# Patient Record
Sex: MALE | Race: WHITE | NOT HISPANIC OR LATINO | URBAN - METROPOLITAN AREA
[De-identification: names, ages, dates, MRNs, and addresses within clinical notes are randomized per-mention and may not be internally consistent; named-entity substitution may affect disease eponyms.]

---

## 2021-07-07 PROBLEM — D63.8 ANEMIA OF CHRONIC ILLNESS: Status: ACTIVE | Noted: 2021-07-07

## 2021-07-07 PROBLEM — Z80.1 FAMILY HISTORY OF LUNG CANCER: Status: ACTIVE | Noted: 2021-07-07

## 2021-07-07 PROBLEM — I25.10 ASHD (ARTERIOSCLEROTIC HEART DISEASE): Status: ACTIVE | Noted: 2021-07-07

## 2021-07-07 PROBLEM — R91.8 LUNG NODULES: Status: ACTIVE | Noted: 2021-07-07

## 2021-07-07 PROBLEM — Z82.3 FAMILY HISTORY OF CEREBROVASCULAR ACCIDENT (CVA): Status: ACTIVE | Noted: 2021-07-07

## 2021-07-07 PROBLEM — E78.00 HIGH CHOLESTEROL: Status: ACTIVE | Noted: 2021-07-07

## 2021-07-07 PROBLEM — Z86.73 HISTORY OF CEREBROVASCULAR ACCIDENT: Status: RESOLVED | Noted: 2021-07-07 | Resolved: 2021-07-07

## 2021-07-08 ENCOUNTER — OUTPATIENT (OUTPATIENT)
Dept: OUTPATIENT SERVICES | Facility: HOSPITAL | Age: 66
LOS: 1 days | Discharge: HOME | End: 2021-07-08

## 2021-07-08 ENCOUNTER — NON-APPOINTMENT (OUTPATIENT)
Age: 66
End: 2021-07-08

## 2021-07-08 ENCOUNTER — APPOINTMENT (OUTPATIENT)
Dept: SURGERY | Facility: CLINIC | Age: 66
End: 2021-07-08
Payer: MEDICARE

## 2021-07-08 ENCOUNTER — RESULT REVIEW (OUTPATIENT)
Age: 66
End: 2021-07-08

## 2021-07-08 VITALS
HEART RATE: 84 BPM | WEIGHT: 270 LBS | DIASTOLIC BLOOD PRESSURE: 84 MMHG | TEMPERATURE: 98 F | HEIGHT: 71 IN | BODY MASS INDEX: 37.8 KG/M2 | SYSTOLIC BLOOD PRESSURE: 174 MMHG | OXYGEN SATURATION: 96 % | RESPIRATION RATE: 16 BRPM

## 2021-07-08 DIAGNOSIS — I25.10 ATHEROSCLEROTIC HEART DISEASE OF NATIVE CORONARY ARTERY W/OUT ANGINA PECTORIS: ICD-10-CM

## 2021-07-08 DIAGNOSIS — Z80.3 FAMILY HISTORY OF MALIGNANT NEOPLASM OF BREAST: ICD-10-CM

## 2021-07-08 DIAGNOSIS — D63.8 ANEMIA IN OTHER CHRONIC DISEASES CLASSIFIED ELSEWHERE: ICD-10-CM

## 2021-07-08 DIAGNOSIS — K63.5 POLYP OF COLON: ICD-10-CM

## 2021-07-08 DIAGNOSIS — C21.8 MALIGNANT NEOPLASM OF OVERLAPPING SITES OF RECTUM, ANUS AND ANAL CANAL: ICD-10-CM

## 2021-07-08 DIAGNOSIS — Z86.73 PERSONAL HISTORY OF TRANSIENT ISCHEMIC ATTACK (TIA), AND CEREBRAL INFARCTION W/OUT RESIDUAL DEFICITS: ICD-10-CM

## 2021-07-08 DIAGNOSIS — R91.8 OTHER NONSPECIFIC ABNORMAL FINDING OF LUNG FIELD: ICD-10-CM

## 2021-07-08 DIAGNOSIS — Z82.49 FAMILY HISTORY OF ISCHEMIC HEART DISEASE AND OTHER DISEASES OF THE CIRCULATORY SYSTEM: ICD-10-CM

## 2021-07-08 DIAGNOSIS — E78.00 PURE HYPERCHOLESTEROLEMIA, UNSPECIFIED: ICD-10-CM

## 2021-07-08 DIAGNOSIS — Z82.3 FAMILY HISTORY OF STROKE: ICD-10-CM

## 2021-07-08 DIAGNOSIS — Z80.1 FAMILY HISTORY OF MALIGNANT NEOPLASM OF TRACHEA, BRONCHUS AND LUNG: ICD-10-CM

## 2021-07-08 PROCEDURE — 99072 ADDL SUPL MATRL&STAF TM PHE: CPT

## 2021-07-08 PROCEDURE — 45300 PROCTOSIGMOIDOSCOPY DX: CPT

## 2021-07-08 PROCEDURE — 99204 OFFICE O/P NEW MOD 45 MIN: CPT

## 2021-07-08 NOTE — HISTORY OF PRESENT ILLNESS
[FreeTextEntry1] : Shabbir is a 67 y/o male here for a consultation for rectal cancer found on colonoscopy 6/10/21 with Dr. Shabbir Thakkar. Findings: infiltrating non-obstructing medium sized non circumferential mass in the rectum (path = invasive adenocarcinoma, mod. differentiated, immunostains MLH1,MSH2 and PMS2 intact);  12 mm sessile polyp in rectum completely retrieved  (Path = fragments of TA); 3 mm polyp in sigmoid (path = hyperplastic polyp); 13 mm polyp in descending colon ( path= fragments of TA); 4 mm sessile polyp in transverse colon (path= TA).\par PET 6/16/21 shows no active findings of malignancy. A 7 mm right upper lobe pulmonary nodule that a non-contrast CT is recommended in 3 months.\par Labs 6/2/21 show anemia H/H 9.0/31.6, . No CEA done.\par \par Patient reports rectal bleeding that started over 3 months ago - saw Dr. Thakkar for colonoscopy. He was having painless or bleeding with bms. Has 2 hard bms daily with small amt of blood on TP.

## 2021-07-08 NOTE — PHYSICAL EXAM
[Thyroid] : the thyroid was normal [Normal Breath Sounds] : Normal breath sounds [Alert] : alert [Oriented to Person] : oriented to person [Oriented to Place] : oriented to place [Oriented to Time] : oriented to time [Anxious] : anxious [Abdomen Masses] : No abdominal masses [Abdomen Tenderness] : ~T No ~M abdominal tenderness [JVD] : no jugular venous distention  [de-identified] : obese [de-identified] : wnl obese male [de-identified] : wnl [de-identified] : irregular rhythm [de-identified] : full ROM- varicose veins bilaterally [de-identified] : skin lesions from eczema  [FreeTextEntry1] : Perianal inspection revealed a deep buttocks. The tumor was palpable in the left lateral position at approximately 2 cm proximal to the levators. Rigid sigmoidoscopy revealed the lesion at 6 cm from the anal verge.

## 2021-07-08 NOTE — ASSESSMENT
[FreeTextEntry1] : I have seen and evaluated patient and I have corroborated all nursing input into this note. Patient with rectal cancer in the left lateral position at 6 cm from the anal verge on rigid sigmoidoscopy. PET CT shows no evidence of metastatic disease. Patient will also get standard CTs of the chest abdomen and pelvis as per NCCN and NAPRC guidelines, as well as a dedicated rectal cancer staging MRI. Patient will then be presented at the rectal cancer ([facility])multidisciplinary team meeting for treatment recommendations. I expect that the total neoadjuvant treatment as per [facility] protocol will most likely be recommended. Since the patient has a BMI of 39 and the cancer appears to be within 2 cm of the levators a wait and watch approach would be considered if the patient has a complete clinical response to neoadjuvant treatment.

## 2021-07-08 NOTE — CONSULT LETTER
[Dear  ___] : Dear ~JAGDISH, [Courtesy Letter:] : I had the pleasure of seeing your patient, [unfilled], in my office today. [Please see my note below.] : Please see my note below. [Consult Closing:] : Thank you very much for allowing me to participate in the care of this patient.  If you have any questions, please do not hesitate to contact me. [Sincerely,] : Sincerely, [DrValerio  ___] : Dr. GEORGE [FreeTextEntry2] : Dr. Shabbir Thakkar [FreeTextEntry3] : Buck Magana M.D., KARL.[email or web address]., [email or web address].\[facility] Chief Colorectal Clinical Services, [facility]

## 2021-07-09 ENCOUNTER — RESULT REVIEW (OUTPATIENT)
Age: 66
End: 2021-07-09

## 2021-07-09 ENCOUNTER — LABORATORY RESULT (OUTPATIENT)
Age: 66
End: 2021-07-09

## 2021-07-09 ENCOUNTER — APPOINTMENT (OUTPATIENT)
Dept: HEMATOLOGY ONCOLOGY | Facility: CLINIC | Age: 66
End: 2021-07-09
Payer: MEDICARE

## 2021-07-09 ENCOUNTER — OUTPATIENT (OUTPATIENT)
Dept: OUTPATIENT SERVICES | Facility: HOSPITAL | Age: 66
LOS: 1 days | End: 2021-07-09
Payer: MEDICARE

## 2021-07-09 VITALS
RESPIRATION RATE: 18 BRPM | BODY MASS INDEX: 38.46 KG/M2 | OXYGEN SATURATION: 98 % | DIASTOLIC BLOOD PRESSURE: 87 MMHG | WEIGHT: 265.66 LBS | SYSTOLIC BLOOD PRESSURE: 152 MMHG | HEART RATE: 83 BPM | HEIGHT: 69.88 IN | TEMPERATURE: 97.3 F

## 2021-07-09 DIAGNOSIS — Z87.891 PERSONAL HISTORY OF NICOTINE DEPENDENCE: ICD-10-CM

## 2021-07-09 DIAGNOSIS — L30.9 DERMATITIS, UNSPECIFIED: ICD-10-CM

## 2021-07-09 DIAGNOSIS — I63.9 CEREBRAL INFARCTION, UNSPECIFIED: ICD-10-CM

## 2021-07-09 DIAGNOSIS — I10 ESSENTIAL (PRIMARY) HYPERTENSION: ICD-10-CM

## 2021-07-09 DIAGNOSIS — I48.91 UNSPECIFIED ATRIAL FIBRILLATION: ICD-10-CM

## 2021-07-09 DIAGNOSIS — C21.8 MALIGNANT NEOPLASM OF OVERLAPPING SITES OF RECTUM, ANUS AND ANAL CANAL: ICD-10-CM

## 2021-07-09 DIAGNOSIS — E11.9 TYPE 2 DIABETES MELLITUS W/OUT COMPLICATIONS: ICD-10-CM

## 2021-07-09 LAB
BASOPHILS # BLD AUTO: 0.07 K/UL — SIGNIFICANT CHANGE UP (ref 0–0.2)
BASOPHILS NFR BLD AUTO: 0.7 % — SIGNIFICANT CHANGE UP (ref 0–2)
EOSINOPHIL # BLD AUTO: 0.19 K/UL — SIGNIFICANT CHANGE UP (ref 0–0.5)
EOSINOPHIL NFR BLD AUTO: 1.9 % — SIGNIFICANT CHANGE UP (ref 0–6)
HCT VFR BLD CALC: 33.4 % — LOW (ref 39–50)
HGB BLD-MCNC: 9.1 G/DL — LOW (ref 13–17)
IMM GRANULOCYTES NFR BLD AUTO: 0.7 % — SIGNIFICANT CHANGE UP (ref 0–1.5)
LYMPHOCYTES # BLD AUTO: 1.11 K/UL — SIGNIFICANT CHANGE UP (ref 1–3.3)
LYMPHOCYTES # BLD AUTO: 11.2 % — LOW (ref 13–44)
MCHC RBC-ENTMCNC: 19.2 PG — LOW (ref 27–34)
MCHC RBC-ENTMCNC: 27.2 G/DL — LOW (ref 32–36)
MCV RBC AUTO: 70.3 FL — LOW (ref 80–100)
MONOCYTES # BLD AUTO: 0.78 K/UL — SIGNIFICANT CHANGE UP (ref 0–0.9)
MONOCYTES NFR BLD AUTO: 7.8 % — SIGNIFICANT CHANGE UP (ref 2–14)
NEUTROPHILS # BLD AUTO: 7.72 K/UL — HIGH (ref 1.8–7.4)
NEUTROPHILS NFR BLD AUTO: 77.7 % — HIGH (ref 43–77)
NRBC # BLD: 0 /100 WBCS — SIGNIFICANT CHANGE UP (ref 0–0)
PLATELET # BLD AUTO: 557 K/UL — HIGH (ref 150–400)
RBC # BLD: 4.75 M/UL — SIGNIFICANT CHANGE UP (ref 4.2–5.8)
RBC # FLD: 18.8 % — HIGH (ref 10.3–14.5)
WBC # BLD: 9.94 K/UL — SIGNIFICANT CHANGE UP (ref 3.8–10.5)
WBC # FLD AUTO: 9.94 K/UL — SIGNIFICANT CHANGE UP (ref 3.8–10.5)

## 2021-07-09 PROCEDURE — 99072 ADDL SUPL MATRL&STAF TM PHE: CPT

## 2021-07-09 PROCEDURE — 86901 BLOOD TYPING SEROLOGIC RH(D): CPT

## 2021-07-09 PROCEDURE — 86850 RBC ANTIBODY SCREEN: CPT

## 2021-07-09 PROCEDURE — G2212 PROLONG OUTPT/OFFICE VIS: CPT

## 2021-07-09 PROCEDURE — 99205 OFFICE O/P NEW HI 60 MIN: CPT

## 2021-07-09 PROCEDURE — 86900 BLOOD TYPING SEROLOGIC ABO: CPT

## 2021-07-09 RX ORDER — METFORMIN HYDROCHLORIDE 1000 MG/1
1000 TABLET, COATED ORAL
Refills: 0 | Status: ACTIVE | COMMUNITY

## 2021-07-09 RX ORDER — METFORMIN HYDROCHLORIDE 625 MG/1
TABLET ORAL
Refills: 0 | Status: DISCONTINUED | COMMUNITY
End: 2021-07-09

## 2021-07-09 RX ORDER — AMLODIPINE BESYLATE 10 MG/1
10 TABLET ORAL
Refills: 0 | Status: ACTIVE | COMMUNITY

## 2021-07-09 RX ORDER — APIXABAN 5 MG/1
5 TABLET, FILM COATED ORAL
Refills: 0 | Status: ACTIVE | COMMUNITY

## 2021-07-09 RX ORDER — BISOPROLOL FUMARATE AND HYDROCHLOROTHIAZIDE 10; 6.25 MG/1; MG/1
10-6.25 TABLET, COATED ORAL
Refills: 0 | Status: ACTIVE | COMMUNITY

## 2021-07-09 RX ORDER — APIXABAN 5 MG/1
TABLET, FILM COATED ORAL
Refills: 0 | Status: DISCONTINUED | COMMUNITY
End: 2021-07-09

## 2021-07-09 RX ORDER — AMLODIPINE BESYLATE 5 MG/1
TABLET ORAL
Refills: 0 | Status: DISCONTINUED | COMMUNITY
End: 2021-07-09

## 2021-07-09 RX ORDER — BISOPROLOL FUMARATE 5 MG/1
TABLET, FILM COATED ORAL
Refills: 0 | Status: DISCONTINUED | COMMUNITY
End: 2021-07-09

## 2021-07-09 NOTE — PHYSICAL EXAM
[Obese] : obese [Normal] : affect appropriate [Ambulatory and capable of all self care but unable to carry out any work activities] : Status 2- Ambulatory and capable of all self care but unable to carry out any work activities. Up and about more than 50% of waking hours [FreeTextEntry1] : refused today due to pain [de-identified] : multiple excoriations on bilateral forearms

## 2021-07-09 NOTE — CONSULT LETTER
[Dear  ___] : Dear  [unfilled], [Consult Letter:] : I had the pleasure of evaluating your patient, [unfilled]. [Please see my note below.] : Please see my note below. [Consult Closing:] : Thank you very much for allowing me to participate in the care of this patient.  If you have any questions, please do not hesitate to contact me. [Sincerely,] : Sincerely, [DrValerio  ___] : Dr. GEORGE [DrValerio ___] : Dr. GEORGE

## 2021-07-09 NOTE — HISTORY OF PRESENT ILLNESS
[Disease: _____________________] : Disease: [unfilled] [de-identified] : Mr. Chacon is a 66 year old gentlemen with past medical history of HTN, DM2, and mild CVA presenting to the office for an initial consultation of rectal CA.\par \par Patient originally presented with rectal bleeding x 3 months and was seen by his gastroenterologist.\par \par Patient underwent colonoscopy with Dr. Shabbir Thakkar.\par Findings:  infiltrating non-obstructing medium sized non circumferential mass in the rectum\par Pathology: invasive adenocarcinoma, moderately differentiated, MMR-proficient.\par \par Exam by Dr Magana: Perianal inspection revealed a deep buttocks. The tumor was palpable in the left lateral position at approximately 2 cm proximal to the levators. Rigid sigmoidoscopy revealed the lesion at 6 cm from the anal verge. \par \par PET 6/16/21 shows no active findings of malignancy. A 7 mm right upper lobe pulmonary nodule that a non-contrast CT is recommended in 3 months.\par Labs 6/2/21 show anemia H/H = 9.0/31.6, PLT = 576. \par \par CT chest, abdomen/pelvis 7/10/2021\par MR pelvis 7/15/2021\par \par CVA and HTN diagnosed in early 2020, although no neurologic issue; however he had high blood pressure.\par A stroke was found on brain MRI at that time, chronicity not clear.\par The stroke is thought to be possible related to atrial fibrillation, and he was started on Eliquis.\par \par Patient has not had COVID-19 vaccine. He did not have prior COVID.\par He has been fearful of reactions to it, but is willing to reconsider. [de-identified] : adenocarcinoma [de-identified] : GI: Shabbir Thakkar\par Colorectal surgeon: Buck Magana\par Cardiology: Eliazar Duenas [phone]\par PCP: Juventino Garcia  [phone]\par Neurology: Lenka Tate\par \par Daughter: Hafsa

## 2021-07-10 ENCOUNTER — APPOINTMENT (OUTPATIENT)
Dept: CT IMAGING | Facility: IMAGING CENTER | Age: 66
End: 2021-07-10
Payer: MEDICARE

## 2021-07-10 ENCOUNTER — OUTPATIENT (OUTPATIENT)
Dept: OUTPATIENT SERVICES | Facility: HOSPITAL | Age: 66
LOS: 1 days | End: 2021-07-10
Payer: MEDICARE

## 2021-07-10 DIAGNOSIS — Z00.8 ENCOUNTER FOR OTHER GENERAL EXAMINATION: ICD-10-CM

## 2021-07-10 DIAGNOSIS — C20 MALIGNANT NEOPLASM OF RECTUM: ICD-10-CM

## 2021-07-10 PROCEDURE — 71260 CT THORAX DX C+: CPT | Mod: 26

## 2021-07-10 PROCEDURE — 74177 CT ABD & PELVIS W/CONTRAST: CPT

## 2021-07-10 PROCEDURE — 71260 CT THORAX DX C+: CPT

## 2021-07-10 PROCEDURE — 74177 CT ABD & PELVIS W/CONTRAST: CPT | Mod: 26

## 2021-07-10 PROCEDURE — 82565 ASSAY OF CREATININE: CPT

## 2021-07-15 ENCOUNTER — APPOINTMENT (OUTPATIENT)
Dept: MRI IMAGING | Facility: IMAGING CENTER | Age: 66
End: 2021-07-15
Payer: MEDICARE

## 2021-07-15 ENCOUNTER — OUTPATIENT (OUTPATIENT)
Dept: OUTPATIENT SERVICES | Facility: HOSPITAL | Age: 66
LOS: 1 days | End: 2021-07-15
Payer: MEDICARE

## 2021-07-15 DIAGNOSIS — C20 MALIGNANT NEOPLASM OF RECTUM: ICD-10-CM

## 2021-07-15 DIAGNOSIS — Z00.8 ENCOUNTER FOR OTHER GENERAL EXAMINATION: ICD-10-CM

## 2021-07-15 PROCEDURE — A9585: CPT

## 2021-07-15 PROCEDURE — 72197 MRI PELVIS W/O & W/DYE: CPT | Mod: 26

## 2021-07-15 PROCEDURE — 72197 MRI PELVIS W/O & W/DYE: CPT

## 2021-07-17 ENCOUNTER — NON-APPOINTMENT (OUTPATIENT)
Age: 66
End: 2021-07-17

## 2021-07-21 ENCOUNTER — NON-APPOINTMENT (OUTPATIENT)
Age: 66
End: 2021-07-21

## 2021-07-22 ENCOUNTER — NON-APPOINTMENT (OUTPATIENT)
Age: 66
End: 2021-07-22

## 2021-07-26 ENCOUNTER — APPOINTMENT (OUTPATIENT)
Dept: RADIATION ONCOLOGY | Facility: CLINIC | Age: 66
End: 2021-07-26
Payer: MEDICARE

## 2021-07-26 ENCOUNTER — OUTPATIENT (OUTPATIENT)
Dept: OUTPATIENT SERVICES | Facility: HOSPITAL | Age: 66
LOS: 1 days | Discharge: HOME | End: 2021-07-26
Payer: MEDICARE

## 2021-07-26 VITALS
TEMPERATURE: 97.6 F | WEIGHT: 261.24 LBS | RESPIRATION RATE: 17 BRPM | HEART RATE: 80 BPM | OXYGEN SATURATION: 99 % | BODY MASS INDEX: 36.57 KG/M2 | SYSTOLIC BLOOD PRESSURE: 165 MMHG | DIASTOLIC BLOOD PRESSURE: 81 MMHG | HEIGHT: 71 IN

## 2021-07-26 PROCEDURE — 77263 THER RADIOLOGY TX PLNG CPLX: CPT

## 2021-07-26 PROCEDURE — 99203 OFFICE O/P NEW LOW 30 MIN: CPT | Mod: 25

## 2021-07-29 ENCOUNTER — NON-APPOINTMENT (OUTPATIENT)
Age: 66
End: 2021-07-29

## 2021-07-29 PROCEDURE — 77333 RADIATION TREATMENT AID(S): CPT | Mod: 26

## 2021-07-29 PROCEDURE — 77290 THER RAD SIMULAJ FIELD CPLX: CPT | Mod: 26

## 2021-07-29 NOTE — HISTORY OF PRESENT ILLNESS
[FreeTextEntry1] : 85 yo male seen with \par \par Diagnosis: invasive adenocarcinoma, moderately differentiated of the rectum, T3NO\par \par \par Oncologic history/history of presenting complaint: Presented  2/2020 with rectal bleeding x 3 months. Evaluated by GI and underwent colonoscopy 6/10/21 with Dr. Shabbir Thakkar. Findings:  infiltrating non-obstructing medium sized non circumferential mass in the rectum. Pathology: invasive adenocarcinoma, moderately differentiated.\par \par PET 6/16/21 shows no active findings of malignancy. A 7 mm right upper lobe pulmonary nodule that a non-contrast CT is recommended in 3 months. Labs 6/2/21 show anemia H/H = 9.0/31.6, PLT = 576. \par \par 7/8/21: saw Dr Magana: Perianal inspection revealed a deep buttocks. The tumor was palpable in the left lateral position at approximately 2 cm proximal to the levators. Rigid sigmoidoscopy revealed the lesion at 6 cm from the anal verge. \par \par 7/8/21: saw Dr. Ramos. Plan for chemoradiation with xeloda\par \par 7/10/2021: CT CAP:  Slight asymmetry of the anterior left rectal wall may correspond to the patient's known rectal cancer. No evidence of surrounding adenopathy can be demonstrated.\par \par 7/15/2021: MR pelvis/Rectal/Anal: Mid rectal tumor with a 1.3 cm above the anal sphincter. Stage: T3  N0\par \par Significant PMH: CVA and HTN diagnosed in early 1/2020, although no neurologic issue; however he had high blood pressure.\par  MRI findings show CVA. CVA thought to be possible related to atrial fibrillation, and he was started on Eliquis.\par \par \par Today: Feels generally well. Denies abdominal pain, weight loss, or changes in bowel habits or urination. Notes occasional constipation  and blood in stool with straining.\par \par \par \par \par \par \par \par

## 2021-07-29 NOTE — REVIEW OF SYSTEMS
[Visual Disturbances] : visual disturbances [Constipation] : constipation [Nocturia] : nocturia [Urinary Frequency] : urinary frequency [Disturbance Of Gait] : gait disturbance [Anxiety] : anxiety [Negative] : Constitutional [Eye Pain] : no eye pain [Loss of Hearing] : no loss of hearing [Chest Pain] : no chest pain [Shortness Of Breath] : no shortness of breath [Cough] : no cough [Abdominal Pain] : no abdominal pain [Vomiting] : no vomiting [Diarrhea] : no diarrhea [Confused] : no confusion [Easy Bruising] : no tendency for easy bruising [Swollen Glands] : no swollen glands [FreeTextEntry3] : R eye cataract [FreeTextEntry9] : uses cane [de-identified] :  skin peeling forearms from scratching due to anxiety

## 2021-07-29 NOTE — PHYSICAL EXAM
[General Appearance - Alert] : alert [General Appearance - In No Acute Distress] : in no acute distress [Sclera] : the sclera and conjunctiva were normal [Hearing Threshold Finger Rub Not [place]] : hearing was normal [] : no respiratory distress [Abdomen Soft] : soft [Oriented To Time, Place, And Person] : oriented to person, place, and time [de-identified] : gait instability [de-identified] :  skin peeling forearms from scratching

## 2021-07-29 NOTE — VITALS
[Maximal Pain Intensity: 0/10] : 0/10 [Least Pain Intensity: 0/10] : 0/10 [80: Normal activity with effort; some signs or symptoms of disease.] : 80: Normal activity with effort; some signs or symptoms of disease.  [7 - Distress Level] : Distress Level: 7 [ECOG Performance Status: 1 - Restricted in physically strenuous activity but ambulatory and able to carry out work of a light or sedentary nature] : Performance Status: 1 - Restricted in physically strenuous activity but ambulatory and able to carry out work of a light or sedentary nature, e.g., light house work, office work

## 2021-07-29 NOTE — REASON FOR VISIT
[Rectal Cancer] : cancer [Other: _____] : [unfilled] [Consideration of Curative Therapy] : consideration of curative therapy for

## 2021-08-11 PROCEDURE — 77334 RADIATION TREATMENT AID(S): CPT | Mod: 26

## 2021-08-11 PROCEDURE — 77295 3-D RADIOTHERAPY PLAN: CPT | Mod: 26

## 2021-08-11 PROCEDURE — 77300 RADIATION THERAPY DOSE PLAN: CPT | Mod: 26

## 2021-08-16 PROCEDURE — 77280 THER RAD SIMULAJ FIELD SMPL: CPT | Mod: 26

## 2021-08-17 PROCEDURE — 77387B: CUSTOM | Mod: 26

## 2021-08-17 RX ORDER — PROCHLORPERAZINE MALEATE 10 MG/1
10 TABLET ORAL EVERY 6 HOURS
Qty: 20 | Refills: 2 | Status: ACTIVE | COMMUNITY
Start: 2021-08-17 | End: 1900-01-01

## 2021-08-18 PROCEDURE — 77387B: CUSTOM | Mod: 26

## 2021-08-19 ENCOUNTER — NON-APPOINTMENT (OUTPATIENT)
Age: 66
End: 2021-08-19

## 2021-08-19 VITALS
SYSTOLIC BLOOD PRESSURE: 220 MMHG | OXYGEN SATURATION: 99 % | BODY MASS INDEX: 37.44 KG/M2 | HEART RATE: 92 BPM | RESPIRATION RATE: 17 BRPM | WEIGHT: 268.41 LBS | DIASTOLIC BLOOD PRESSURE: 109 MMHG

## 2021-08-19 PROCEDURE — 77387B: CUSTOM | Mod: 26

## 2021-08-19 NOTE — VITALS
[Maximal Pain Intensity: 0/10] : 0/10 [Least Pain Intensity: 0/10] : 0/10 [80: Normal activity with effort; some signs or symptoms of disease.] : 80: Normal activity with effort; some signs or symptoms of disease.  [ECOG Performance Status: 1 - Restricted in physically strenuous activity but ambulatory and able to carry out work of a light or sedentary nature] : Performance Status: 1 - Restricted in physically strenuous activity but ambulatory and able to carry out work of a light or sedentary nature, e.g., light house work, office work [7 - Distress Level] : Distress Level: 7

## 2021-08-19 NOTE — REVIEW OF SYSTEMS
[Anal Pain: Grade 0] : Anal Pain: Grade 0 [Constipation: Grade 0] : Constipation: Grade 0 [Diarrhea: Grade 0] : Diarrhea: Grade 0 [Dysphagia: Grade 0] : Dysphagia: Grade 0 [Nausea: Grade 1 - Loss of appetite without alteration in eating habits] : Nausea: Grade 1 - Loss of appetite without alteration in eating habits [Vomiting: Grade 0] : Vomiting: Grade 0 [Fatigue: Grade 1 - Fatigue relieved by rest] : Fatigue: Grade 1 - Fatigue relieved by rest [Cough: Grade 0] : Cough: Grade 0

## 2021-08-20 PROCEDURE — 77387B: CUSTOM | Mod: 26

## 2021-08-23 ENCOUNTER — NON-APPOINTMENT (OUTPATIENT)
Age: 66
End: 2021-08-23

## 2021-08-23 PROCEDURE — 77387B: CUSTOM | Mod: 26

## 2021-08-23 PROCEDURE — 77427 RADIATION TX MANAGEMENT X5: CPT

## 2021-08-23 NOTE — REASON FOR VISIT
[Routine On-Treatment] : a routine on-treatment visit for [Rectal Cancer] : cancer [Spouse] : spouse

## 2021-08-23 NOTE — REVIEW OF SYSTEMS
[Anal Pain: Grade 0] : Anal Pain: Grade 0 [Diarrhea: Grade 0] : Diarrhea: Grade 0 [Vomiting: Grade 0] : Vomiting: Grade 0 [Fatigue: Grade 1 - Fatigue relieved by rest] : Fatigue: Grade 1 - Fatigue relieved by rest [Cough: Grade 0] : Cough: Grade 0 [Nausea: Grade 0] : Nausea: Grade 0

## 2021-08-24 VITALS
OXYGEN SATURATION: 97 % | HEART RATE: 58 BPM | WEIGHT: 262.24 LBS | BODY MASS INDEX: 36.58 KG/M2 | RESPIRATION RATE: 17 BRPM | DIASTOLIC BLOOD PRESSURE: 95 MMHG | SYSTOLIC BLOOD PRESSURE: 177 MMHG

## 2021-08-24 PROCEDURE — 77387B: CUSTOM | Mod: 26

## 2021-08-25 PROCEDURE — 77387B: CUSTOM | Mod: 26

## 2021-08-26 PROCEDURE — 77387B: CUSTOM | Mod: 26

## 2021-08-27 PROCEDURE — 77387B: CUSTOM | Mod: 26

## 2021-08-30 ENCOUNTER — NON-APPOINTMENT (OUTPATIENT)
Age: 66
End: 2021-08-30

## 2021-08-30 VITALS
DIASTOLIC BLOOD PRESSURE: 111 MMHG | OXYGEN SATURATION: 100 % | SYSTOLIC BLOOD PRESSURE: 185 MMHG | BODY MASS INDEX: 36.58 KG/M2 | WEIGHT: 262.24 LBS | HEART RATE: 74 BPM | TEMPERATURE: 95.54 F | RESPIRATION RATE: 17 BRPM

## 2021-08-30 PROCEDURE — 77387B: CUSTOM | Mod: 26

## 2021-08-30 PROCEDURE — 77427 RADIATION TX MANAGEMENT X5: CPT

## 2021-08-30 NOTE — REVIEW OF SYSTEMS
[Anal Pain: Grade 0] : Anal Pain: Grade 0 [Diarrhea: Grade 0] : Diarrhea: Grade 0 [Vomiting: Grade 0] : Vomiting: Grade 0 [Fatigue: Grade 1 - Fatigue relieved by rest] : Fatigue: Grade 1 - Fatigue relieved by rest [Cough: Grade 0] : Cough: Grade 0 [Nausea: Grade 1 - Loss of appetite without alteration in eating habits] : Nausea: Grade 1 - Loss of appetite without alteration in eating habits

## 2021-09-01 PROCEDURE — 77387B: CUSTOM | Mod: 26

## 2021-09-02 PROCEDURE — 77387B: CUSTOM | Mod: 26

## 2021-09-03 ENCOUNTER — OUTPATIENT (OUTPATIENT)
Dept: OUTPATIENT SERVICES | Facility: HOSPITAL | Age: 66
LOS: 1 days | Discharge: HOME | End: 2021-09-03

## 2021-09-03 DIAGNOSIS — C21.8 MALIGNANT NEOPLASM OF OVERLAPPING SITES OF RECTUM, ANUS AND ANAL CANAL: ICD-10-CM

## 2021-09-03 PROCEDURE — 77387B: CUSTOM | Mod: 26

## 2021-09-05 NOTE — HISTORY OF PRESENT ILLNESS
[FreeTextEntry1] : Shabbir Chacon is a 86 year old man diagnosed with invasive adenocarcinom moderately differentiated of the rectum T3NO\par Taking capecitabine under guidance of Dr. Ramos\par \par 8/23/21: 5/28 fx occasional blood in toilet bowl. No further nausea. Denies pain. Skin care reviewed\par \par 8/19/21\par -Tolerating tx well\par -No complaints of pain noted\par -Eating well\par -BP elevated and will speak to medical doctor.\par -Moving bowels well.\par

## 2021-09-05 NOTE — PHYSICAL EXAM
[General Appearance - Alert] : alert [General Appearance - In No Acute Distress] : in no acute distress [de-identified] : mild erythema perianal area

## 2021-09-07 PROCEDURE — 77387B: CUSTOM | Mod: 26

## 2021-09-08 ENCOUNTER — APPOINTMENT (OUTPATIENT)
Dept: HEMATOLOGY ONCOLOGY | Facility: CLINIC | Age: 66
End: 2021-09-08
Payer: MEDICARE

## 2021-09-08 ENCOUNTER — RESULT REVIEW (OUTPATIENT)
Age: 66
End: 2021-09-08

## 2021-09-08 ENCOUNTER — NON-APPOINTMENT (OUTPATIENT)
Age: 66
End: 2021-09-08

## 2021-09-08 VITALS
OXYGEN SATURATION: 99 % | TEMPERATURE: 95.72 F | SYSTOLIC BLOOD PRESSURE: 186 MMHG | DIASTOLIC BLOOD PRESSURE: 81 MMHG | RESPIRATION RATE: 17 BRPM | BODY MASS INDEX: 36.24 KG/M2 | WEIGHT: 259.81 LBS | HEART RATE: 76 BPM

## 2021-09-08 LAB
ALBUMIN SERPL ELPH-MCNC: 4.1 G/DL
ALP BLD-CCNC: 107 U/L
ALT SERPL-CCNC: 10 U/L
ANION GAP SERPL CALC-SCNC: 18 MMOL/L
AST SERPL-CCNC: 14 U/L
BASOPHILS # BLD AUTO: 0.04 K/UL — SIGNIFICANT CHANGE UP (ref 0–0.2)
BASOPHILS NFR BLD AUTO: 0.6 % — SIGNIFICANT CHANGE UP (ref 0–2)
BILIRUB SERPL-MCNC: 0.3 MG/DL
BUN SERPL-MCNC: 17 MG/DL
CALCIUM SERPL-MCNC: 9.7 MG/DL
CEA SERPL-MCNC: 3.6 NG/ML
CHLORIDE SERPL-SCNC: 94 MMOL/L
CO2 SERPL-SCNC: 23 MMOL/L
CREAT SERPL-MCNC: 1.01 MG/DL
EOSINOPHIL # BLD AUTO: 0.21 K/UL — SIGNIFICANT CHANGE UP (ref 0–0.5)
EOSINOPHIL NFR BLD AUTO: 3.4 % — SIGNIFICANT CHANGE UP (ref 0–6)
GLUCOSE SERPL-MCNC: 367 MG/DL
HCT VFR BLD CALC: 39.8 % — SIGNIFICANT CHANGE UP (ref 39–50)
HGB BLD-MCNC: 11.6 G/DL — LOW (ref 13–17)
IMM GRANULOCYTES NFR BLD AUTO: 0.5 % — SIGNIFICANT CHANGE UP (ref 0–1.5)
LYMPHOCYTES # BLD AUTO: 0.42 K/UL — LOW (ref 1–3.3)
LYMPHOCYTES # BLD AUTO: 6.7 % — LOW (ref 13–44)
MCHC RBC-ENTMCNC: 23.4 PG — LOW (ref 27–34)
MCHC RBC-ENTMCNC: 29.1 G/DL — LOW (ref 32–36)
MCV RBC AUTO: 80.7 FL — SIGNIFICANT CHANGE UP (ref 80–100)
MONOCYTES # BLD AUTO: 0.69 K/UL — SIGNIFICANT CHANGE UP (ref 0–0.9)
MONOCYTES NFR BLD AUTO: 11.1 % — SIGNIFICANT CHANGE UP (ref 2–14)
NEUTROPHILS # BLD AUTO: 4.84 K/UL — SIGNIFICANT CHANGE UP (ref 1.8–7.4)
NEUTROPHILS NFR BLD AUTO: 77.7 % — HIGH (ref 43–77)
NRBC # BLD: 0 /100 WBCS — SIGNIFICANT CHANGE UP (ref 0–0)
PLATELET # BLD AUTO: 310 K/UL — SIGNIFICANT CHANGE UP (ref 150–400)
POTASSIUM SERPL-SCNC: 4.6 MMOL/L
PROT SERPL-MCNC: 7.3 G/DL
RBC # BLD: 4.95 M/UL — SIGNIFICANT CHANGE UP (ref 4.2–5.8)
RBC # FLD: 28.3 % — HIGH (ref 10.3–14.5)
SODIUM SERPL-SCNC: 135 MMOL/L
WBC # BLD: 6.23 K/UL — SIGNIFICANT CHANGE UP (ref 3.8–10.5)
WBC # FLD AUTO: 6.23 K/UL — SIGNIFICANT CHANGE UP (ref 3.8–10.5)

## 2021-09-08 PROCEDURE — 99214 OFFICE O/P EST MOD 30 MIN: CPT

## 2021-09-08 PROCEDURE — 77387B: CUSTOM | Mod: 26

## 2021-09-08 PROCEDURE — 77427 RADIATION TX MANAGEMENT X5: CPT

## 2021-09-08 NOTE — REVIEW OF SYSTEMS
[Anal Pain: Grade 0] : Anal Pain: Grade 0 [Diarrhea: Grade 1 - Increase of <4 stools per day over baseline; mild increase in ostomy output compared to baseline] : Diarrhea: Grade 1 - Increase of <4 stools per day over baseline; mild increase in ostomy output compared to baseline [Fatigue: Grade 1 - Fatigue relieved by rest] : Fatigue: Grade 1 - Fatigue relieved by rest

## 2021-09-08 NOTE — HISTORY OF PRESENT ILLNESS
[Disease: _____________________] : Disease: [unfilled] [de-identified] : Mr. Chacon is a 66 year old gentlemen with past medical history of HTN, DM2, and mild CVA presenting to the office for an initial consultation of rectal CA.\par \par Patient originally presented with rectal bleeding x 3 months and was seen by his gastroenterologist.\par \par Patient underwent colonoscopy with Dr. Shabbir Thakkar.\par Findings:  infiltrating non-obstructing medium sized non circumferential mass in the rectum\par Pathology: invasive adenocarcinoma, moderately differentiated, MMR-proficient.\par \par Exam by Dr Mgaana: Perianal inspection revealed a deep buttocks. The tumor was palpable in the left lateral position at approximately 2 cm proximal to the levators. Rigid sigmoidoscopy revealed the lesion at 6 cm from the anal verge. \par \par PET 6/16/21 shows no active findings of malignancy. A 7 mm right upper lobe pulmonary nodule that a non-contrast CT is recommended in 3 months.\par Labs 6/2/21 show anemia H/H = 9.0/31.6, PLT = 576. \par \par CT chest, abdomen/pelvis 7/10/2021\par MR pelvis 7/15/2021\par \par CVA and HTN diagnosed in early 2020, although no neurologic issue; however he had high blood pressure.\par A stroke was found on brain MRI at that time, chronicity not clear.\par The stroke is thought to be possible related to atrial fibrillation, and he was started on Eliquis.\par \par Patient has not had COVID-19 vaccine. He did not have prior COVID.\par He has been fearful of reactions to it, but is willing to reconsider.\par \par 9/8/2021: \par 1) Rectal CA: D1 Xeloda/RT on 8/17/2021.\par Today is D15/28\par He is currently on Capecitabine 3 tablets in AM and 2 tablets in PM.\par Patient admits to diarrhea over the weekend which has subsided.\par On average patient has 2-3 bowel movement/day prior to starting Xeloda.\par Over the weekend patient went 6-7 times/day.\par The stool was watery, no blood or mucus noted.\par No bad odor as per patient.\par He is back to normal.\par Denies fever, chills, HFS or fatigue.\par No restrictions with her ADLs. [de-identified] : adenocarcinoma [de-identified] : GI: Shabbir Thakkar\par Colorectal surgeon: Buck Magana\par Cardiology: Eliazar Duenas [phone]\par PCP: Juventino Garcia  [phone]\par Neurology: Lenka Tate\par \par Daughter: Hafsa

## 2021-09-09 PROCEDURE — 77387B: CUSTOM | Mod: 26

## 2021-09-09 NOTE — REVIEW OF SYSTEMS
[Visual Disturbances] : visual disturbances [Constipation] : constipation [Nocturia] : nocturia [Urinary Frequency] : urinary frequency [Disturbance Of Gait] : gait disturbance [Anxiety] : anxiety [Negative] : Constitutional [Diarrhea: Grade 0] : Diarrhea: Grade 0 [Rectal Pain: Grade 0] : Rectal Pain: Grade 0 [Eye Pain] : no eye pain [Loss of Hearing] : no loss of hearing [Chest Pain] : no chest pain [Shortness Of Breath] : no shortness of breath [Cough] : no cough [Abdominal Pain] : no abdominal pain [Vomiting] : no vomiting [Diarrhea] : no diarrhea [Confused] : no confusion [Easy Bruising] : no tendency for easy bruising [Swollen Glands] : no swollen glands [FreeTextEntry3] : R eye cataract [FreeTextEntry9] : uses cane [de-identified] :  skin peeling forearms from scratching due to anxiety

## 2021-09-09 NOTE — DISEASE MANAGEMENT
[Clinical] : TNM Stage: c [IIA] : IIA [TTNM] : 3 [NTNM] : 0 [MTNM] : 0 [de-identified] : 3fx / 540cGy [de-identified] : 28 Fx / 2150cGy

## 2021-09-09 NOTE — DISEASE MANAGEMENT
[Clinical] : TNM Stage: c [IIA] : IIA [TTNM] : 3 [NTNM] : 0 [MTNM] : 0 [de-identified] : 3 tx/540cGy [de-identified] : 28 tx/4500cGy [de-identified] : rectum

## 2021-09-09 NOTE — HISTORY OF PRESENT ILLNESS
[FreeTextEntry1] : Shabbir Chacon is a 86 year old man diagnosed with invasive adenocarcinom moderately differentiated of the rectum T3NO\par \par 8/19/21\par -Tolerating tx well\par -No complaints of pain noted\par -Eating well\par -BP elevated and will speak to medical doctor.\par -Moving bowels well.\par

## 2021-09-10 ENCOUNTER — APPOINTMENT (OUTPATIENT)
Dept: ENDOCRINOLOGY | Facility: CLINIC | Age: 66
End: 2021-09-10

## 2021-09-13 ENCOUNTER — NON-APPOINTMENT (OUTPATIENT)
Age: 66
End: 2021-09-13

## 2021-09-13 VITALS
HEIGHT: 71 IN | OXYGEN SATURATION: 100 % | DIASTOLIC BLOOD PRESSURE: 95 MMHG | WEIGHT: 257.17 LBS | SYSTOLIC BLOOD PRESSURE: 147 MMHG | BODY MASS INDEX: 36 KG/M2 | RESPIRATION RATE: 16 BRPM | TEMPERATURE: 96.4 F | HEART RATE: 79 BPM

## 2021-09-13 PROCEDURE — 77387B: CUSTOM | Mod: 26

## 2021-09-13 NOTE — REVIEW OF SYSTEMS
[Anal Pain: Grade 0] : Anal Pain: Grade 0 [Diarrhea: Grade 1 - Increase of <4 stools per day over baseline; mild increase in ostomy output compared to baseline] : Diarrhea: Grade 1 - Increase of <4 stools per day over baseline; mild increase in ostomy output compared to baseline [Fatigue: Grade 1 - Fatigue relieved by rest] : Fatigue: Grade 1 - Fatigue relieved by rest [Nausea: Grade 1 - Loss of appetite without alteration in eating habits] : Nausea: Grade 1 - Loss of appetite without alteration in eating habits [Hematuria: Grade 0] : Hematuria: Grade 0 [Urinary Tract Pain: Grade 0] : Urinary Tract Pain: Grade 0 [Dermatitis Radiation: Grade 1 - Faint erythema or dry desquamation] : Dermatitis Radiation: Grade 1 - Faint erythema or dry desquamation

## 2021-09-13 NOTE — PHYSICAL EXAM
[General Appearance - Alert] : alert [General Appearance - In No Acute Distress] : in no acute distress [Oriented To Time, Place, And Person] : oriented to person, place, and time [de-identified] : mild erythema to treated  site

## 2021-09-13 NOTE — HISTORY OF PRESENT ILLNESS
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 Taking capecitabine under guidance of Dr. Ramos.\par \par 9/13/21: 17/28 fx: nausea,  passing soft  stools and small amounts clear liquid 2-3 x day. No blood in stool or  symptoms. Appetite fair-approx. 2 lb weight loss. Not taking ant-emetics.  Skin care with desitin/baby wipes\par \par 9/8/21: 15/28 fx: diarrhea over weekend\par \par 8/30/21: 10/28 fx: occasionally minimal blood in the stool. Occasional nausea and diarrhea (once a week).\par \par 8/23/21: 5/28 fx occasional blood in toilet bowl. No further nausea. Denies pain. Skin care reviewed\par \par 8/19/21\par -Tolerating tx well\par -No complaints of pain noted\par -Eating well\par -BP elevated and will speak to medical doctor.\par -Moving bowels well.\par

## 2021-09-14 PROCEDURE — 77280 THER RAD SIMULAJ FIELD SMPL: CPT | Mod: 26

## 2021-09-14 PROCEDURE — 77387B: CUSTOM | Mod: 26

## 2021-09-14 NOTE — ADDENDUM
[FreeTextEntry1] : I reviewed above and agree.  Patient doing well except for some diarrhea. Continue with RT.\par [facility]\par

## 2021-09-14 NOTE — HISTORY OF PRESENT ILLNESS
[FreeTextEntry1] : Shabbir Chacon is a 66 year old man diagnosed with invasive adenocarcinom moderately differentiated of the rectum T3NO\par Taking capecitabine under guidance of Dr. Ramos.\par \par \par 9/8/21: 15/28 fx: diarrhea over weekend\par \par 8/30/21: 10/28 fx: occasionally minimal blood in the stool. Occasional nausea and diarrhea (once a week).\par \par 8/23/21: 5/28 fx occasional blood in toilet bowl. No further nausea. Denies pain. Skin care reviewed\par \par 8/19/21\par -Tolerating tx well\par -No complaints of pain noted\par -Eating well\par -BP elevated and will speak to medical doctor.\par -Moving bowels well.\par

## 2021-09-14 NOTE — HISTORY OF PRESENT ILLNESS
[FreeTextEntry1] : Shabbir Chacon is a 66 year old man diagnosed with invasive adenocarcinom moderately differentiated of the rectum T3NO\par Taking capecitabine under guidance of Dr. Ramos.\par \par 8/30/21: 10/28 fx: occasionally minimal blood in the stool. Occasional nausea and diarrhea (once a week).\par \par 8/23/21: 5/28 fx occasional blood in toilet bowl. No further nausea. Denies pain. Skin care reviewed\par \par 8/19/21\par -Tolerating tx well\par -No complaints of pain noted\par -Eating well\par -BP elevated and will speak to medical doctor.\par -Moving bowels well.\par

## 2021-09-14 NOTE — ADDENDUM
[FreeTextEntry1] : I reviewed above and agree.  Patient doing well.  Continue with RT.\par [facility]\par

## 2021-09-15 ENCOUNTER — APPOINTMENT (OUTPATIENT)
Dept: HEMATOLOGY ONCOLOGY | Facility: CLINIC | Age: 66
End: 2021-09-15
Payer: MEDICARE

## 2021-09-15 ENCOUNTER — RESULT REVIEW (OUTPATIENT)
Age: 66
End: 2021-09-15

## 2021-09-15 VITALS
BODY MASS INDEX: 35.98 KG/M2 | RESPIRATION RATE: 16 BRPM | OXYGEN SATURATION: 98 % | SYSTOLIC BLOOD PRESSURE: 157 MMHG | WEIGHT: 257.94 LBS | TEMPERATURE: 97.3 F | DIASTOLIC BLOOD PRESSURE: 88 MMHG | HEART RATE: 77 BPM

## 2021-09-15 LAB
BASOPHILS # BLD AUTO: 0.04 K/UL — SIGNIFICANT CHANGE UP (ref 0–0.2)
BASOPHILS NFR BLD AUTO: 0.6 % — SIGNIFICANT CHANGE UP (ref 0–2)
EOSINOPHIL # BLD AUTO: 0.22 K/UL — SIGNIFICANT CHANGE UP (ref 0–0.5)
EOSINOPHIL NFR BLD AUTO: 3.4 % — SIGNIFICANT CHANGE UP (ref 0–6)
HCT VFR BLD CALC: 37.5 % — LOW (ref 39–50)
HGB BLD-MCNC: 11.3 G/DL — LOW (ref 13–17)
IMM GRANULOCYTES NFR BLD AUTO: 0.8 % — SIGNIFICANT CHANGE UP (ref 0–1.5)
LYMPHOCYTES # BLD AUTO: 0.48 K/UL — LOW (ref 1–3.3)
LYMPHOCYTES # BLD AUTO: 7.3 % — LOW (ref 13–44)
MCHC RBC-ENTMCNC: 24.2 PG — LOW (ref 27–34)
MCHC RBC-ENTMCNC: 30.1 G/DL — LOW (ref 32–36)
MCV RBC AUTO: 80.3 FL — SIGNIFICANT CHANGE UP (ref 80–100)
MONOCYTES # BLD AUTO: 0.52 K/UL — SIGNIFICANT CHANGE UP (ref 0–0.9)
MONOCYTES NFR BLD AUTO: 8 % — SIGNIFICANT CHANGE UP (ref 2–14)
NEUTROPHILS # BLD AUTO: 5.23 K/UL — SIGNIFICANT CHANGE UP (ref 1.8–7.4)
NEUTROPHILS NFR BLD AUTO: 79.9 % — HIGH (ref 43–77)
NRBC # BLD: 0 /100 WBCS — SIGNIFICANT CHANGE UP (ref 0–0)
PLATELET # BLD AUTO: 264 K/UL — SIGNIFICANT CHANGE UP (ref 150–400)
RBC # BLD: 4.67 M/UL — SIGNIFICANT CHANGE UP (ref 4.2–5.8)
RBC # FLD: 28.8 % — HIGH (ref 10.3–14.5)
WBC # BLD: 6.54 K/UL — SIGNIFICANT CHANGE UP (ref 3.8–10.5)
WBC # FLD AUTO: 6.54 K/UL — SIGNIFICANT CHANGE UP (ref 3.8–10.5)

## 2021-09-15 PROCEDURE — 99213 OFFICE O/P EST LOW 20 MIN: CPT

## 2021-09-15 PROCEDURE — 77387B: CUSTOM | Mod: 26

## 2021-09-15 NOTE — HISTORY OF PRESENT ILLNESS
[Disease: _____________________] : Disease: [unfilled] [de-identified] : Mr. Chacon is a 66 year old gentlemen with past medical history of HTN, DM2, and mild CVA presenting to the office for an initial consultation of rectal CA.\par \par Patient originally presented with rectal bleeding x 3 months and was seen by his gastroenterologist.\par \par Patient underwent colonoscopy with Dr. Shabbir Thakkar.\par Findings:  infiltrating non-obstructing medium sized non circumferential mass in the rectum\par Pathology: invasive adenocarcinoma, moderately differentiated, MMR-proficient.\par \par Exam by Dr Magana: Perianal inspection revealed a deep buttocks. The tumor was palpable in the left lateral position at approximately 2 cm proximal to the levators. Rigid sigmoidoscopy revealed the lesion at 6 cm from the anal verge. \par \par PET 6/16/21 shows no active findings of malignancy. A 7 mm right upper lobe pulmonary nodule that a non-contrast CT is recommended in 3 months.\par Labs 6/2/21 show anemia H/H = 9.0/31.6, PLT = 576. \par \par CT chest, abdomen/pelvis 7/10/2021\par MR pelvis 7/15/2021\par \par CVA and HTN diagnosed in early 2020, although no neurologic issue; however he had high blood pressure.\par A stroke was found on brain MRI at that time, chronicity not clear.\par The stroke is thought to be possible related to atrial fibrillation, and he was started on Eliquis.\par \par Patient has not had COVID-19 vaccine. He did not have prior COVID.\par He has been fearful of reactions to it, but is willing to reconsider.\par \par 9/8/2021: \par 1) Rectal CA: D1 Xeloda/RT on 8/17/2021.\par Today is D15/28\par He is currently on Capecitabine 3 tablets in AM and 2 tablets in PM.\par Patient admits to diarrhea over the weekend which has subsided.\par On average patient has 2-3 bowel movement/day prior to starting Xeloda.\par Over the weekend patient went 6-7 times/day.\par The stool was watery, no blood or mucus noted.\par No bad odor as per patient.\par He is back to normal.\par Denies fever, chills, HFS or fatigue.\par No restrictions with her ADLs.\par \par 9/15/2021:\par 1) Rectal CA:\par Today is D20 Xeloda/RT.\par Patient is tolerating Xeloda well and reports no significant adverse events.\par Admits to mild nausea and is using Compazine with relief.\par Denies fever, chills, diarrhea, abdominal pain, mouth sores or HFS.\par Admits to mild rectal irritation and is using desiccant cream PRN to area.\par Patient is apprehensive about receiving further treatment given his other comorbidities.\par He would like to wait after chemo/RT is over before deciding if he would like to pursue with IV chemotherapy.\par Blood counts today are stable.\par  [de-identified] : adenocarcinoma [de-identified] : GI: Shabbir Thakkar\par Colorectal surgeon: Buck Magana\par Cardiology: Eliazar Duenas [phone]\par PCP: Juventino Garcia  [phone]\par Neurology: Lenka Tate\par \par Daughter: Hafsa

## 2021-09-15 NOTE — PHYSICAL EXAM
[Ambulatory and capable of all self care but unable to carry out any work activities] : Status 2- Ambulatory and capable of all self care but unable to carry out any work activities. Up and about more than 50% of waking hours [Obese] : obese [Normal] : affect appropriate [FreeTextEntry1] : refused today due to pain [de-identified] : multiple excoriations on bilateral forearms

## 2021-09-16 PROCEDURE — 77387B: CUSTOM | Mod: 26

## 2021-09-16 PROCEDURE — 77427 RADIATION TX MANAGEMENT X5: CPT

## 2021-09-17 PROCEDURE — 77387B: CUSTOM | Mod: 26

## 2021-09-20 ENCOUNTER — NON-APPOINTMENT (OUTPATIENT)
Age: 66
End: 2021-09-20

## 2021-09-20 VITALS
SYSTOLIC BLOOD PRESSURE: 175 MMHG | HEART RATE: 87 BPM | DIASTOLIC BLOOD PRESSURE: 92 MMHG | RESPIRATION RATE: 17 BRPM | BODY MASS INDEX: 36.21 KG/M2 | WEIGHT: 259.59 LBS | OXYGEN SATURATION: 98 % | TEMPERATURE: 97 F

## 2021-09-20 PROCEDURE — 77387B: CUSTOM | Mod: 26

## 2021-09-20 NOTE — REVIEW OF SYSTEMS
[Anal Pain: Grade 0] : Anal Pain: Grade 0 [Diarrhea: Grade 1 - Increase of <4 stools per day over baseline; mild increase in ostomy output compared to baseline] : Diarrhea: Grade 1 - Increase of <4 stools per day over baseline; mild increase in ostomy output compared to baseline [Nausea: Grade 1 - Loss of appetite without alteration in eating habits] : Nausea: Grade 1 - Loss of appetite without alteration in eating habits [Fatigue: Grade 1 - Fatigue relieved by rest] : Fatigue: Grade 1 - Fatigue relieved by rest [Hematuria: Grade 0] : Hematuria: Grade 0 [Urinary Tract Pain: Grade 0] : Urinary Tract Pain: Grade 0 [Dermatitis Radiation: Grade 1 - Faint erythema or dry desquamation] : Dermatitis Radiation: Grade 1 - Faint erythema or dry desquamation

## 2021-09-21 PROCEDURE — 77387B: CUSTOM | Mod: 26

## 2021-09-22 ENCOUNTER — RESULT REVIEW (OUTPATIENT)
Age: 66
End: 2021-09-22

## 2021-09-22 ENCOUNTER — APPOINTMENT (OUTPATIENT)
Dept: HEMATOLOGY ONCOLOGY | Facility: CLINIC | Age: 66
End: 2021-09-22
Payer: MEDICARE

## 2021-09-22 VITALS
HEART RATE: 88 BPM | RESPIRATION RATE: 17 BRPM | DIASTOLIC BLOOD PRESSURE: 96 MMHG | HEIGHT: 71 IN | OXYGEN SATURATION: 98 % | WEIGHT: 256.4 LBS | SYSTOLIC BLOOD PRESSURE: 159 MMHG | TEMPERATURE: 97.5 F | BODY MASS INDEX: 35.9 KG/M2

## 2021-09-22 LAB
BASOPHILS # BLD AUTO: 0.05 K/UL — SIGNIFICANT CHANGE UP (ref 0–0.2)
BASOPHILS NFR BLD AUTO: 0.8 % — SIGNIFICANT CHANGE UP (ref 0–2)
EOSINOPHIL # BLD AUTO: 0.39 K/UL — SIGNIFICANT CHANGE UP (ref 0–0.5)
EOSINOPHIL NFR BLD AUTO: 6.2 % — HIGH (ref 0–6)
HCT VFR BLD CALC: 38.2 % — LOW (ref 39–50)
HGB BLD-MCNC: 11.8 G/DL — LOW (ref 13–17)
IMM GRANULOCYTES NFR BLD AUTO: 1 % — SIGNIFICANT CHANGE UP (ref 0–1.5)
LYMPHOCYTES # BLD AUTO: 0.39 K/UL — LOW (ref 1–3.3)
LYMPHOCYTES # BLD AUTO: 6.2 % — LOW (ref 13–44)
MCHC RBC-ENTMCNC: 25.2 PG — LOW (ref 27–34)
MCHC RBC-ENTMCNC: 30.9 G/DL — LOW (ref 32–36)
MCV RBC AUTO: 81.4 FL — SIGNIFICANT CHANGE UP (ref 80–100)
MONOCYTES # BLD AUTO: 0.72 K/UL — SIGNIFICANT CHANGE UP (ref 0–0.9)
MONOCYTES NFR BLD AUTO: 11.5 % — SIGNIFICANT CHANGE UP (ref 2–14)
NEUTROPHILS # BLD AUTO: 4.67 K/UL — SIGNIFICANT CHANGE UP (ref 1.8–7.4)
NEUTROPHILS NFR BLD AUTO: 74.3 % — SIGNIFICANT CHANGE UP (ref 43–77)
NRBC # BLD: 0 /100 WBCS — SIGNIFICANT CHANGE UP (ref 0–0)
PLATELET # BLD AUTO: 276 K/UL — SIGNIFICANT CHANGE UP (ref 150–400)
RBC # BLD: 4.69 M/UL — SIGNIFICANT CHANGE UP (ref 4.2–5.8)
RBC # FLD: 28.8 % — HIGH (ref 10.3–14.5)
WBC # BLD: 6.28 K/UL — SIGNIFICANT CHANGE UP (ref 3.8–10.5)
WBC # FLD AUTO: 6.28 K/UL — SIGNIFICANT CHANGE UP (ref 3.8–10.5)

## 2021-09-22 PROCEDURE — 99213 OFFICE O/P EST LOW 20 MIN: CPT

## 2021-09-22 PROCEDURE — 77387B: CUSTOM | Mod: 26

## 2021-09-22 RX ORDER — CHOLECALCIFEROL (VITAMIN D3) 50 MCG
50 MCG TABLET ORAL
Qty: 300 | Refills: 0 | Status: DISCONTINUED | COMMUNITY
Start: 2021-07-13 | End: 2021-09-22

## 2021-09-22 RX ORDER — CHLORHEXIDINE GLUCONATE 4 %
325 (65 FE) LIQUID (ML) TOPICAL DAILY
Qty: 90 | Refills: 0 | Status: DISCONTINUED | COMMUNITY
Start: 2021-07-13 | End: 2021-09-22

## 2021-09-22 NOTE — PHYSICAL EXAM
[Ambulatory and capable of all self care but unable to carry out any work activities] : Status 2- Ambulatory and capable of all self care but unable to carry out any work activities. Up and about more than 50% of waking hours [Obese] : obese [Normal] : affect appropriate [FreeTextEntry1] : refused today due to pain [de-identified] : multiple excoriations on bilateral forearms

## 2021-09-22 NOTE — HISTORY OF PRESENT ILLNESS
[Disease: _____________________] : Disease: [unfilled] [de-identified] : Mr. Chacon is a 66 year old gentlemen with past medical history of HTN, DM2, and mild CVA presenting to the office for an initial consultation of rectal CA.\par \par Patient originally presented with rectal bleeding x 3 months and was seen by his gastroenterologist.\par \par Patient underwent colonoscopy with Dr. Shabbir Thakkar.\par Findings:  infiltrating non-obstructing medium sized non circumferential mass in the rectum\par Pathology: invasive adenocarcinoma, moderately differentiated, MMR-proficient.\par \par Exam by Dr Magana: Perianal inspection revealed a deep buttocks. The tumor was palpable in the left lateral position at approximately 2 cm proximal to the levators. Rigid sigmoidoscopy revealed the lesion at 6 cm from the anal verge. \par \par PET 6/16/21 shows no active findings of malignancy. A 7 mm right upper lobe pulmonary nodule that a non-contrast CT is recommended in 3 months.\par Labs 6/2/21 show anemia H/H = 9.0/31.6, PLT = 576. \par \par CT chest, abdomen/pelvis 7/10/2021\par MR pelvis 7/15/2021\par \par CVA and HTN diagnosed in early 2020, although no neurologic issue; however he had high blood pressure.\par A stroke was found on brain MRI at that time, chronicity not clear.\par The stroke is thought to be possible related to atrial fibrillation, and he was started on Eliquis.\par \par Patient has not had COVID-19 vaccine. He did not have prior COVID.\par He has been fearful of reactions to it, but is willing to reconsider.\par \par 9/8/2021: \par 1) Rectal CA: D1 Xeloda/RT on 8/17/2021.\par Today is D15/28\par He is currently on Capecitabine 3 tablets in AM and 2 tablets in PM.\par Patient admits to diarrhea over the weekend which has subsided.\par On average patient has 2-3 bowel movement/day prior to starting Xeloda.\par Over the weekend patient went 6-7 times/day.\par The stool was watery, no blood or mucus noted.\par No bad odor as per patient.\par He is back to normal.\par Denies fever, chills, HFS or fatigue.\par No restrictions with her ADLs.\par \par 9/22/2021:\par 1) Rectal CA:\par Today is D25 Xeloda/RT.\par Patient is tolerating Xeloda well and reports no significant adverse events.\par Admits to mild nausea and is using Compazine with relief.\par Denies fever, chills, diarrhea, abdominal pain, mouth sores or HFS.\par Admits to mild rectal irritation and is using desiccant cream PRN to area.\par Patient is apprehensive about receiving further treatment given his other comorbidities.\par He would like to wait after chemo/RT is over before deciding if he would like to pursue with IV chemotherapy.\par As of now, patient is leaning towards no IV chemotherapy after chemo/RT is completed.\par Blood counts today are stable.\par \par \par  [de-identified] : adenocarcinoma [de-identified] : GI: Shabbir Thakkar\par Colorectal surgeon: Buck Magana\par Cardiology: Eliazar Duenas [phone]\par PCP: Juventino Garcia  [phone]\par Neurology: Lenka Tate\par \par Daughter: Hafsa

## 2021-09-23 PROCEDURE — 77427 RADIATION TX MANAGEMENT X5: CPT

## 2021-09-23 PROCEDURE — 77387B: CUSTOM | Mod: 26

## 2021-09-24 PROCEDURE — 77387B: CUSTOM | Mod: 26

## 2021-09-27 PROCEDURE — 77387B: CUSTOM | Mod: 26

## 2021-09-27 NOTE — HISTORY OF PRESENT ILLNESS
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 Taking capecitabine under guidance of Dr. Ramos.\par \par 9/20/21: 22/28 fx: still with nausea, soft stools. Taking Imodium and anti-emetics. No  symptoms. appetite good. Weight back to baseline. Skin care with desitin/baby wipes\par \par 9/13/21: 17/28 fx: nausea,  passing soft  stools and small amounts clear liquid 2-3 x day. No blood in stool or  symptoms. Appetite fair-approx. 2 lb weight loss. Not taking ant-emetics.  Skin care with desitin/baby wipes\par \par 9/8/21: 15/28 fx: diarrhea over weekend\par \par 8/30/21: 10/28 fx: occasionally minimal blood in the stool. Occasional nausea and diarrhea (once a week).\par \par 8/23/21: 5/28 fx occasional blood in toilet bowl. No further nausea. Denies pain. Skin care reviewed\par \par 8/19/21\par -Tolerating tx well\par -No complaints of pain noted\par -Eating well\par -BP elevated and will speak to medical doctor.\par -Moving bowels well.\par

## 2021-09-27 NOTE — PHYSICAL EXAM
[General Appearance - Alert] : alert [Oriented To Time, Place, And Person] : oriented to person, place, and time [General Appearance - In No Acute Distress] : in no acute distress [de-identified] : mild erythema to treated  site

## 2021-09-28 ENCOUNTER — APPOINTMENT (OUTPATIENT)
Dept: HEMATOLOGY ONCOLOGY | Facility: CLINIC | Age: 66
End: 2021-09-28
Payer: MEDICARE

## 2021-09-28 ENCOUNTER — RESULT REVIEW (OUTPATIENT)
Age: 66
End: 2021-09-28

## 2021-09-28 ENCOUNTER — NON-APPOINTMENT (OUTPATIENT)
Age: 66
End: 2021-09-28

## 2021-09-28 VITALS
BODY MASS INDEX: 36.11 KG/M2 | TEMPERATURE: 97.2 F | DIASTOLIC BLOOD PRESSURE: 81 MMHG | SYSTOLIC BLOOD PRESSURE: 162 MMHG | HEIGHT: 71 IN | HEART RATE: 87 BPM | WEIGHT: 257.94 LBS | RESPIRATION RATE: 18 BRPM | OXYGEN SATURATION: 99 %

## 2021-09-28 LAB
BASOPHILS # BLD AUTO: 0.06 K/UL — SIGNIFICANT CHANGE UP (ref 0–0.2)
BASOPHILS NFR BLD AUTO: 0.9 % — SIGNIFICANT CHANGE UP (ref 0–2)
EOSINOPHIL # BLD AUTO: 0.3 K/UL — SIGNIFICANT CHANGE UP (ref 0–0.5)
EOSINOPHIL NFR BLD AUTO: 4.6 % — SIGNIFICANT CHANGE UP (ref 0–6)
HCT VFR BLD CALC: 40.8 % — SIGNIFICANT CHANGE UP (ref 39–50)
HGB BLD-MCNC: 12.4 G/DL — LOW (ref 13–17)
IMM GRANULOCYTES NFR BLD AUTO: 0.6 % — SIGNIFICANT CHANGE UP (ref 0–1.5)
LYMPHOCYTES # BLD AUTO: 0.32 K/UL — LOW (ref 1–3.3)
LYMPHOCYTES # BLD AUTO: 4.9 % — LOW (ref 13–44)
MCHC RBC-ENTMCNC: 25.7 PG — LOW (ref 27–34)
MCHC RBC-ENTMCNC: 30.4 G/DL — LOW (ref 32–36)
MCV RBC AUTO: 84.5 FL — SIGNIFICANT CHANGE UP (ref 80–100)
MONOCYTES # BLD AUTO: 0.66 K/UL — SIGNIFICANT CHANGE UP (ref 0–0.9)
MONOCYTES NFR BLD AUTO: 10.1 % — SIGNIFICANT CHANGE UP (ref 2–14)
NEUTROPHILS # BLD AUTO: 5.13 K/UL — SIGNIFICANT CHANGE UP (ref 1.8–7.4)
NEUTROPHILS NFR BLD AUTO: 78.9 % — HIGH (ref 43–77)
NRBC # BLD: 0 /100 WBCS — SIGNIFICANT CHANGE UP (ref 0–0)
PLATELET # BLD AUTO: 269 K/UL — SIGNIFICANT CHANGE UP (ref 150–400)
RBC # BLD: 4.83 M/UL — SIGNIFICANT CHANGE UP (ref 4.2–5.8)
RBC # FLD: 27.3 % — HIGH (ref 10.3–14.5)
WBC # BLD: 6.51 K/UL — SIGNIFICANT CHANGE UP (ref 3.8–10.5)
WBC # FLD AUTO: 6.51 K/UL — SIGNIFICANT CHANGE UP (ref 3.8–10.5)

## 2021-09-28 PROCEDURE — 77427 RADIATION TX MANAGEMENT X5: CPT

## 2021-09-28 PROCEDURE — 99214 OFFICE O/P EST MOD 30 MIN: CPT

## 2021-09-28 PROCEDURE — 77387B: CUSTOM | Mod: 26

## 2021-09-28 RX ORDER — CAPECITABINE 500 MG/1
500 TABLET ORAL
Qty: 140 | Refills: 0 | Status: DISCONTINUED | COMMUNITY
Start: 2021-08-17 | End: 2021-09-28

## 2021-09-28 NOTE — HISTORY OF PRESENT ILLNESS
[Disease: _____________________] : Disease: [unfilled] [de-identified] : Mr. Chacon is a 66 year old gentlemen with past medical history of HTN, DM2, and mild CVA presenting to the office for an initial consultation of rectal CA.\par \par Patient originally presented with rectal bleeding x 3 months and was seen by his gastroenterologist.\par \par Patient underwent colonoscopy with Dr. Shabbir Thakkar.\par Findings:  infiltrating non-obstructing medium sized non circumferential mass in the rectum\par Pathology: invasive adenocarcinoma, moderately differentiated, MMR-proficient.\par \par Exam by Dr Magana: Perianal inspection revealed a deep buttocks. The tumor was palpable in the left lateral position at approximately 2 cm proximal to the levators. Rigid sigmoidoscopy revealed the lesion at 6 cm from the anal verge. \par \par PET 6/16/21 shows no active findings of malignancy. A 7 mm right upper lobe pulmonary nodule that a non-contrast CT is recommended in 3 months.\par Labs 6/2/21 show anemia H/H = 9.0/31.6, PLT = 576. \par \par CT chest, abdomen/pelvis 7/10/2021\par MR pelvis 7/15/2021\par \par CVA and HTN diagnosed in early 2020, although no neurologic issue; however he had high blood pressure.\par A stroke was found on brain MRI at that time, chronicity not clear.\par The stroke is thought to be possible related to atrial fibrillation, and he was started on Eliquis.\par \par Patient has not had COVID-19 vaccine. He did not have prior COVID.\par He has been fearful of reactions to it, but is willing to reconsider.\par \par 9/8/2021: \par 1) Rectal CA: D1 Xeloda/RT on 8/17/2021.\par Today is D15/28\par He is currently on Capecitabine 3 tablets in AM and 2 tablets in PM.\par Patient admits to diarrhea over the weekend which has subsided.\par On average patient has 2-3 bowel movement/day prior to starting Xeloda.\par Over the weekend patient went 6-7 times/day.\par The stool was watery, no blood or mucus noted.\par No bad odor as per patient.\par He is back to normal.\par Denies fever, chills, HFS or fatigue.\par No restrictions with her ADLs.\par \par 9/22/2021:\par 1) Rectal CA:\par Today is D25 Xeloda/RT.\par Patient is tolerating Xeloda well and reports no significant adverse events.\par Admits to mild nausea and is using Compazine with relief.\par Denies fever, chills, diarrhea, abdominal pain, mouth sores or HFS.\par Admits to mild rectal irritation and is using desiccant cream PRN to area.\par Patient is apprehensive about receiving further treatment given his other comorbidities.\par He would like to wait after chemo/RT is over before deciding if he would like to pursue with IV chemotherapy.\par As of now, patient is leaning towards no IV chemotherapy after chemo/RT is completed.\par Blood counts today are stable.\par \par 9/28/21\par Patient is here for follow-up of rectal tumor.\par He has had a difficult time with Xeloda with nausea.\par This is much more than expected and occurs despite 50% dose reduction.\par He took Compazine with no benefit, despite what he told PA last week.\par Patient is not interested to pursue chemotherapy as part of ZAID right now.\par We discussed FOLFOX instead of Xeloda, and is not interested right now.\par Would like to see surgeon to see how the tumor is doing.\par I explained that it takes weeks to see a response.\par \par  [de-identified] : adenocarcinoma [de-identified] : GI: Shabbir Thakkar\par Colorectal surgeon: Buck Magana\par Cardiology: Eliazar Duenas [phone]\par PCP: Juventino Garcia  [phone]\par Neurology: Lenka Tate\par \par Daughter: Hafsa

## 2021-09-28 NOTE — PHYSICAL EXAM
[Ambulatory and capable of all self care but unable to carry out any work activities] : Status 2- Ambulatory and capable of all self care but unable to carry out any work activities. Up and about more than 50% of waking hours [Obese] : obese [Normal] : affect appropriate [FreeTextEntry1] : refused today due to pain [de-identified] : multiple excoriations on bilateral forearms

## 2021-09-30 LAB
ALBUMIN SERPL ELPH-MCNC: 3.8 G/DL
ALP BLD-CCNC: 123 U/L
ALT SERPL-CCNC: 18 U/L
ANION GAP SERPL CALC-SCNC: 14 MMOL/L
AST SERPL-CCNC: 25 U/L
BILIRUB SERPL-MCNC: 0.5 MG/DL
BUN SERPL-MCNC: 17 MG/DL
CALCIUM SERPL-MCNC: 9.5 MG/DL
CEA SERPL-MCNC: 3.9 NG/ML
CHLORIDE SERPL-SCNC: 99 MMOL/L
CO2 SERPL-SCNC: 20 MMOL/L
CREAT SERPL-MCNC: 1.02 MG/DL
GLUCOSE SERPL-MCNC: 291 MG/DL
POTASSIUM SERPL-SCNC: 5 MMOL/L
PROT SERPL-MCNC: 7.2 G/DL
SODIUM SERPL-SCNC: 134 MMOL/L

## 2021-10-05 ENCOUNTER — NON-APPOINTMENT (OUTPATIENT)
Age: 66
End: 2021-10-05

## 2021-10-27 ENCOUNTER — APPOINTMENT (OUTPATIENT)
Dept: SURGERY | Facility: CLINIC | Age: 66
End: 2021-10-27
Payer: MEDICARE

## 2021-10-27 VITALS
OXYGEN SATURATION: 97 % | TEMPERATURE: 98.2 F | SYSTOLIC BLOOD PRESSURE: 166 MMHG | RESPIRATION RATE: 17 BRPM | DIASTOLIC BLOOD PRESSURE: 103 MMHG | HEART RATE: 75 BPM

## 2021-10-27 PROCEDURE — 99214 OFFICE O/P EST MOD 30 MIN: CPT | Mod: 25

## 2021-10-27 PROCEDURE — 45300 PROCTOSIGMOIDOSCOPY DX: CPT

## 2021-10-27 RX ORDER — OMEPRAZOLE 40 MG/1
40 CAPSULE, DELAYED RELEASE ORAL
Qty: 30 | Refills: 1 | Status: DISCONTINUED | COMMUNITY
Start: 2021-09-28 | End: 2021-10-27

## 2021-10-27 NOTE — ASSESSMENT
[FreeTextEntry1] : Cancer seems to be approximately half the size of the original lesion status post chemotherapy/long course radiation. I strongly recommended that the patient continue the ZAID approach to improve the chance of complete response. The patient's cancer is very close to the sphincters and sphincter sparing surgery in this obese patient may not be possible. The patient's sister was present for the conversation and all questions were answered. The patient stated that he plans to continue chemotherapy as recommended.

## 2021-10-27 NOTE — HISTORY OF PRESENT ILLNESS
[FreeTextEntry1] : Shabbir is a 66 year old male here for follow up. \par \par Shabbir is a 67 y/o male here for a consultation for rectal cancer found on colonoscopy 6/10/21 with Dr. Shabbir Thakkar. Findings: infiltrating non-obstructing medium sized non circumferential mass in the rectum (path = invasive adenocarcinoma, mod. differentiated, immunostains MLH1,MSH2 and PMS2 intact); 12 mm sessile polyp in rectum completely retrieved (Path = fragments of TA); 3 mm polyp in sigmoid (path = hyperplastic polyp); 13 mm polyp in descending colon ( path= fragments of TA); 4 mm sessile polyp in transverse colon (path= TA).\par PET 6/16/21 shows no active findings of malignancy. A 7 mm right upper lobe pulmonary nodule that a non-contrast CT is recommended in 3 months.\par CT chest, abdomen and pelvis 7/10/21- slight asymmetry of the anterior left rectal wall may correspond ti the patient's known rectal cancer. no evidence of surrounding adenopathy can be demonstrated. nonspecific punctate pulmonary nodules but no definitive evidence of metastatic disease. \par MR pelvis rectal anal 715/21- mid rectal tumor with a 1.3 cm above the anal sphincter. stage: T3N0. CRM (T3 tumors): clear. Sphincter involvement (low rectal tumors): n/a. suspicious extra mesorectal nodes: None. \par \par Last seen 7/8/21- Patient with rectal cancer in the left lateral position at 6 cm from the anal verge on rigid sigmoidoscopy. PET CT shows no evidence of metastatic disease. Patient will also get standard CTs of the chest abdomen and pelvis as per NCCN and NAPRC guidelines, as well as a dedicated rectal cancer staging MRI. Patient will then be presented at the rectal cancer ([facility])multidisciplinary team meeting for treatment recommendations. I expect that the total neoadjuvant treatment as per [facility] protocol will most likely be recommended. Since the patient has a BMI of 39 and the cancer appears to be within 2 cm of the levators a wait and watch approach would be considered if the patient has a complete clinical response to neoadjuvant treatment. \par \par Last saw Dr. Ramos 9/28/21\par \par Today pt reports feeling great. 1 formed BM daily, no pain, no bleeding. No episodes of incontinence. Good appetite, no nausea, no vomiting. Pt on Eliquis daily for afib. Last chemo radiation 9/28/21.  Had long course with Xeloda

## 2021-10-27 NOTE — PHYSICAL EXAM
[FreeTextEntry1] : Perianal inspection unremarkable. 2 cm x 2 cm ulcerated lesion identified in left lateral position just proximal to the levators on digital exam. Rigid sigmoidoscopy confirmed the lesion at approximately 5.5 cm from the anal verge.

## 2021-10-28 ENCOUNTER — OUTPATIENT (OUTPATIENT)
Dept: OUTPATIENT SERVICES | Facility: HOSPITAL | Age: 66
LOS: 1 days | Discharge: HOME | End: 2021-10-28

## 2021-10-28 DIAGNOSIS — C21.8 MALIGNANT NEOPLASM OF OVERLAPPING SITES OF RECTUM, ANUS AND ANAL CANAL: ICD-10-CM

## 2021-10-30 NOTE — HISTORY OF PRESENT ILLNESS
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 Taking capecitabine under guidance of Dr. Ramos.\par \par 9/28/21: 28/28 fx: Overall doing ok.  some nausea, meds the same, continue sitz baths.\par \par 9/20/21: 22/28 fx: still with nausea, soft stools. Taking Imodium and anti-emetics. No  symptoms. appetite good. Weight back to baseline. Skin care with desitin/baby wipes\par \par 9/13/21: 17/28 fx: nausea,  passing soft  stools and small amounts clear liquid 2-3 x day. No blood in stool or  symptoms. Appetite fair-approx. 2 lb weight loss. Not taking ant-emetics.  Skin care with desitin/baby wipes\par \par 9/8/21: 15/28 fx: diarrhea over weekend\par \par 8/30/21: 10/28 fx: occasionally minimal blood in the stool. Occasional nausea and diarrhea (once a week).\par \par 8/23/21: 5/28 fx occasional blood in toilet bowl. No further nausea. Denies pain. Skin care reviewed\par \par 8/19/21\par -Tolerating tx well\par -No complaints of pain noted\par -Eating well\par -BP elevated and will speak to medical doctor.\par -Moving bowels well.\par

## 2021-11-01 ENCOUNTER — APPOINTMENT (OUTPATIENT)
Dept: RADIATION ONCOLOGY | Facility: CLINIC | Age: 66
End: 2021-11-01
Payer: MEDICARE

## 2021-11-01 ENCOUNTER — APPOINTMENT (OUTPATIENT)
Dept: HEMATOLOGY ONCOLOGY | Facility: CLINIC | Age: 66
End: 2021-11-01
Payer: MEDICARE

## 2021-11-01 ENCOUNTER — RESULT REVIEW (OUTPATIENT)
Age: 66
End: 2021-11-01

## 2021-11-01 VITALS
OXYGEN SATURATION: 97 % | WEIGHT: 259.26 LBS | HEIGHT: 70.98 IN | SYSTOLIC BLOOD PRESSURE: 163 MMHG | DIASTOLIC BLOOD PRESSURE: 90 MMHG | TEMPERATURE: 97.7 F | HEART RATE: 67 BPM | RESPIRATION RATE: 18 BRPM | BODY MASS INDEX: 36.3 KG/M2

## 2021-11-01 VITALS
RESPIRATION RATE: 16 BRPM | HEIGHT: 70 IN | BODY MASS INDEX: 37.08 KG/M2 | SYSTOLIC BLOOD PRESSURE: 181 MMHG | DIASTOLIC BLOOD PRESSURE: 108 MMHG | OXYGEN SATURATION: 100 % | HEART RATE: 77 BPM | WEIGHT: 259 LBS

## 2021-11-01 LAB
BASOPHILS # BLD AUTO: 0.04 K/UL — SIGNIFICANT CHANGE UP (ref 0–0.2)
BASOPHILS NFR BLD AUTO: 0.5 % — SIGNIFICANT CHANGE UP (ref 0–2)
EOSINOPHIL # BLD AUTO: 0.33 K/UL — SIGNIFICANT CHANGE UP (ref 0–0.5)
EOSINOPHIL NFR BLD AUTO: 3.9 % — SIGNIFICANT CHANGE UP (ref 0–6)
HCT VFR BLD CALC: 41.5 % — SIGNIFICANT CHANGE UP (ref 39–50)
HGB BLD-MCNC: 12.6 G/DL — LOW (ref 13–17)
IMM GRANULOCYTES NFR BLD AUTO: 0.6 % — SIGNIFICANT CHANGE UP (ref 0–1.5)
LYMPHOCYTES # BLD AUTO: 0.58 K/UL — LOW (ref 1–3.3)
LYMPHOCYTES # BLD AUTO: 6.8 % — LOW (ref 13–44)
MCHC RBC-ENTMCNC: 25.9 PG — LOW (ref 27–34)
MCHC RBC-ENTMCNC: 30.4 G/DL — LOW (ref 32–36)
MCV RBC AUTO: 85.2 FL — SIGNIFICANT CHANGE UP (ref 80–100)
MONOCYTES # BLD AUTO: 0.8 K/UL — SIGNIFICANT CHANGE UP (ref 0–0.9)
MONOCYTES NFR BLD AUTO: 9.4 % — SIGNIFICANT CHANGE UP (ref 2–14)
NEUTROPHILS # BLD AUTO: 6.69 K/UL — SIGNIFICANT CHANGE UP (ref 1.8–7.4)
NEUTROPHILS NFR BLD AUTO: 78.8 % — HIGH (ref 43–77)
NRBC # BLD: 0 /100 WBCS — SIGNIFICANT CHANGE UP (ref 0–0)
PLATELET # BLD AUTO: 394 K/UL — SIGNIFICANT CHANGE UP (ref 150–400)
RBC # BLD: 4.87 M/UL — SIGNIFICANT CHANGE UP (ref 4.2–5.8)
RBC # FLD: 19.8 % — HIGH (ref 10.3–14.5)
WBC # BLD: 8.49 K/UL — SIGNIFICANT CHANGE UP (ref 3.8–10.5)
WBC # FLD AUTO: 8.49 K/UL — SIGNIFICANT CHANGE UP (ref 3.8–10.5)

## 2021-11-01 PROCEDURE — 99215 OFFICE O/P EST HI 40 MIN: CPT

## 2021-11-01 PROCEDURE — 99024 POSTOP FOLLOW-UP VISIT: CPT | Mod: GC

## 2021-11-01 NOTE — PHYSICAL EXAM
[Ambulatory and capable of all self care but unable to carry out any work activities] : Status 2- Ambulatory and capable of all self care but unable to carry out any work activities. Up and about more than 50% of waking hours [Obese] : obese [Normal] : affect appropriate [FreeTextEntry1] : refused today due to pain [de-identified] : multiple excoriations on bilateral forearms

## 2021-11-01 NOTE — HISTORY OF PRESENT ILLNESS
[Disease: _____________________] : Disease: [unfilled] [de-identified] : Mr. Chacon is a 66 year old gentlemen with past medical history of HTN, DM2, and mild CVA presenting to the office for an initial consultation of rectal CA.\par \par Patient originally presented with rectal bleeding x 3 months and was seen by his gastroenterologist.\par \par Patient underwent colonoscopy with Dr. Shabbir Thakkar.\par Findings:  infiltrating non-obstructing medium sized non circumferential mass in the rectum\par Pathology: invasive adenocarcinoma, moderately differentiated, MMR-proficient.\par \par Exam by Dr Magana: Perianal inspection revealed a deep buttocks. The tumor was palpable in the left lateral position at approximately 2 cm proximal to the levators. Rigid sigmoidoscopy revealed the lesion at 6 cm from the anal verge. \par \par PET 6/16/21 shows no active findings of malignancy. A 7 mm right upper lobe pulmonary nodule that a non-contrast CT is recommended in 3 months.\par Labs 6/2/21 show anemia H/H = 9.0/31.6, PLT = 576. \par \par CT chest, abdomen/pelvis 7/10/2021\par MR pelvis 7/15/2021\par \par CVA and HTN diagnosed in early 2020, although no neurologic issue; however he had high blood pressure.\par A stroke was found on brain MRI at that time, chronicity not clear.\par The stroke is thought to be possible related to atrial fibrillation, and he was started on Eliquis.\par \par Patient has not had COVID-19 vaccine. He did not have prior COVID.\par He has been fearful of reactions to it, but is willing to reconsider.\par \par 9/8/2021: \par 1) Rectal CA: D1 Xeloda/RT on 8/17/2021.\par Today is D15/28\par He is currently on Capecitabine 3 tablets in AM and 2 tablets in PM.\par Patient admits to diarrhea over the weekend which has subsided.\par On average patient has 2-3 bowel movement/day prior to starting Xeloda.\par Over the weekend patient went 6-7 times/day.\par The stool was watery, no blood or mucus noted.\par No bad odor as per patient.\par He is back to normal.\par Denies fever, chills, HFS or fatigue.\par No restrictions with her ADLs.\par \par 9/22/2021:\par 1) Rectal CA:\par Today is D25 Xeloda/RT.\par Patient is tolerating Xeloda well and reports no significant adverse events.\par Admits to mild nausea and is using Compazine with relief.\par Denies fever, chills, diarrhea, abdominal pain, mouth sores or HFS.\par Admits to mild rectal irritation and is using desiccant cream PRN to area.\par Patient is apprehensive about receiving further treatment given his other comorbidities.\par He would like to wait after chemo/RT is over before deciding if he would like to pursue with IV chemotherapy.\par As of now, patient is leaning towards no IV chemotherapy after chemo/RT is completed.\par Blood counts today are stable.\par \par 9/28/21\par Patient is here for follow-up of rectal tumor.\par He has had a difficult time with Xeloda with nausea.\par This is much more than expected and occurs despite 50% dose reduction.\par He took Compazine with no benefit, despite what he told PA last week.\par Patient is not interested to pursue chemotherapy as part of ZAID right now.\par We discussed FOLFOX instead of Xeloda, and is not interested right now.\par Would like to see surgeon to see how the tumor is doing.\par I explained that it takes weeks to see a response.\par \par 11/1/2021:\par 1) Rectal CA:\par He completed chemoRT to 5040 cGy/28 fxs with concurrent Xeloda on 9/27/21.\par He was seen by Dr. Buck Magana (Colorectal Sx) who recommended to continue with ZAID to improve the change of complete response.\par The tumor is very close to the sphincter and sphincter sparing surgery will be difficult.\par He is feeling well today and is here to discuss next steps.\par Patient was recommended to start on FOLFOX x 9 cycles (4.5 months).\par Patient has agreed to start IV chemotherapy.\par We discuss the need of mediport and patient was given education.\par Emailed authorization team to expedite insertion.\par We reviewed logistics, mechanism of action and most common adverse events from FOLFOX.\par Will start of with 50% dose reduction and have low threshold to start patient on Emend if he develops significant nausea or vomiting.\par \par \par \par  [de-identified] : adenocarcinoma [de-identified] : GI: Shabbir Thakkar\par Colorectal surgeon: Buck Magana\par Cardiology: Eliazar Duenas [phone]\par PCP: Juventino Garcia  [phone]\par Neurology: Lenka Tate\par \par Daughter: Hafsa

## 2021-11-01 NOTE — REVIEW OF SYSTEMS
[Constipation] : constipation [Negative] : Integumentary [Constipation: Grade 1 - Occasional or intermittent symptoms; occasional use of stool softeners, laxatives, dietary modification, or enema] : Constipation: Grade 1 - Occasional or intermittent symptoms; occasional use of stool softeners, laxatives, dietary modification, or enema [Diarrhea: Grade 0] : Diarrhea: Grade 0 [Dysphagia: Grade 0] : Dysphagia: Grade 0 [Nausea: Grade 0] : Nausea: Grade 0 [Vomiting: Grade 0] : Vomiting: Grade 0 [Edema Limbs: Grade 0] : Edema Limbs: Grade 0  [Fatigue: Grade 0] : Fatigue: Grade 0 [Localized Edema: Grade 0] : Localized Edema: Grade 0  [Neck Edema: Grade 0] : Neck Edema: Grade 0 [Alopecia: Grade 0] : Alopecia: Grade 0 [Pruritus: Grade 0] : Pruritus: Grade 0 [Skin Atrophy: Grade 0] : Skin Atrophy: Grade 0 [Skin Hyperpigmentation: Grade 0] : Skin Hyperpigmentation: Grade 0 [Skin Induration: Grade 0] : Skin Induration: Grade 0 [Dermatitis Radiation: Grade 0] : Dermatitis Radiation: Grade 0

## 2021-11-01 NOTE — VITALS
[Maximal Pain Intensity: 0/10] : 0/10 [Least Pain Intensity: 1/10] : 1/10 [90: Able to carry normal activity; minor signs or symptoms of disease.] : 90: Able to carry normal activity; minor signs or symptoms of disease.  [ECOG Performance Status: 1 - Restricted in physically strenuous activity but ambulatory and able to carry out work of a light or sedentary nature] : Performance Status: 1 - Restricted in physically strenuous activity but ambulatory and able to carry out work of a light or sedentary nature, e.g., light house work, office work

## 2021-11-01 NOTE — PHYSICAL EXAM
[Normal] : no respiratory distress, lungs were clear to auscultation bilaterally [Abdomen Soft] : soft [Nondistended] : nondistended

## 2021-11-02 LAB
ALBUMIN SERPL ELPH-MCNC: 4.3 G/DL
ALP BLD-CCNC: 140 U/L
ALT SERPL-CCNC: 11 U/L
ANION GAP SERPL CALC-SCNC: 14 MMOL/L
APTT BLD: 36.1 SEC
AST SERPL-CCNC: 13 U/L
BILIRUB SERPL-MCNC: 0.2 MG/DL
BUN SERPL-MCNC: 31 MG/DL
CALCIUM SERPL-MCNC: 10 MG/DL
CHLORIDE SERPL-SCNC: 98 MMOL/L
CO2 SERPL-SCNC: 22 MMOL/L
CREAT SERPL-MCNC: 1.14 MG/DL
GLUCOSE SERPL-MCNC: 289 MG/DL
INR PPP: 1.25 RATIO
POTASSIUM SERPL-SCNC: 5.3 MMOL/L
PROT SERPL-MCNC: 7.8 G/DL
PT BLD: 14.6 SEC
SODIUM SERPL-SCNC: 134 MMOL/L

## 2021-11-03 NOTE — HISTORY OF PRESENT ILLNESS
[FreeTextEntry1] : Shabbir Chacon is a 66 year old man diagnosed with T3N0 invasive adenocarcinoma of the rectum. He completed chemoRT to 5040 cGy/28 fxs with concurrent Xeloda on 9/27/21.\par \par 11/1/21 He returns for PTE today. Constipation continues on and off.Patient was seen by surgeon, sigmoidoscopy was done. Asymptomatic except for dry skin and itching. Recommend Benadryl cream. Will follow up.\par \par

## 2021-11-08 ENCOUNTER — OUTPATIENT (OUTPATIENT)
Dept: OUTPATIENT SERVICES | Facility: HOSPITAL | Age: 66
LOS: 1 days | End: 2021-11-08
Payer: MEDICARE

## 2021-11-08 DIAGNOSIS — Z11.52 ENCOUNTER FOR SCREENING FOR COVID-19: ICD-10-CM

## 2021-11-08 LAB — SARS-COV-2 RNA SPEC QL NAA+PROBE: SIGNIFICANT CHANGE UP

## 2021-11-08 PROCEDURE — C9803: CPT

## 2021-11-08 PROCEDURE — U0003: CPT

## 2021-11-08 PROCEDURE — U0005: CPT

## 2021-11-11 ENCOUNTER — RESULT REVIEW (OUTPATIENT)
Age: 66
End: 2021-11-11

## 2021-11-11 ENCOUNTER — OUTPATIENT (OUTPATIENT)
Dept: OUTPATIENT SERVICES | Facility: HOSPITAL | Age: 66
LOS: 1 days | End: 2021-11-11
Payer: MEDICARE

## 2021-11-11 VITALS
RESPIRATION RATE: 15 BRPM | HEIGHT: 71 IN | DIASTOLIC BLOOD PRESSURE: 95 MMHG | HEART RATE: 77 BPM | OXYGEN SATURATION: 98 % | TEMPERATURE: 98 F | WEIGHT: 259.04 LBS | SYSTOLIC BLOOD PRESSURE: 156 MMHG

## 2021-11-11 VITALS
OXYGEN SATURATION: 97 % | RESPIRATION RATE: 18 BRPM | HEART RATE: 75 BPM | DIASTOLIC BLOOD PRESSURE: 72 MMHG | SYSTOLIC BLOOD PRESSURE: 160 MMHG

## 2021-11-11 DIAGNOSIS — C20 MALIGNANT NEOPLASM OF RECTUM: ICD-10-CM

## 2021-11-11 PROCEDURE — 36561 INSERT TUNNELED CV CATH: CPT

## 2021-11-11 PROCEDURE — 76937 US GUIDE VASCULAR ACCESS: CPT

## 2021-11-11 PROCEDURE — 77001 FLUOROGUIDE FOR VEIN DEVICE: CPT

## 2021-11-11 PROCEDURE — C1894: CPT

## 2021-11-11 PROCEDURE — C1788: CPT

## 2021-11-11 PROCEDURE — 77001 FLUOROGUIDE FOR VEIN DEVICE: CPT | Mod: 26

## 2021-11-11 PROCEDURE — 76937 US GUIDE VASCULAR ACCESS: CPT | Mod: 26

## 2021-11-11 PROCEDURE — C1769: CPT

## 2021-11-11 NOTE — PROGRESS NOTE ADULT - SUBJECTIVE AND OBJECTIVE BOX
Interventional Radiology    HPI: 66y Male with rectal Ca for venous access    Allergies: Patient stated he had sensation of  throat closing  30 minites after  Epidural pain management resolved it self few minitus after , /  20 y ago Unable to recall MD name or number (Other)    Medications (Abx/Cardiac/Anticoagulation/Blood Products)      Data:  180.3  117.5  T(C): 36.6  HR: 77  BP: 156/95  RR: 15  SpO2: 98%    Exam  General: No acute distress  Chest: Non labored breathing  Abdomen: Non-distended  Extremities: No swelling, warm          Imaging:     Plan: 66y Male presents for chest port for venous access. Labs reviewed and acceptable.  -Risks/Benefits/alternatives explained with the patient and/or healthcare proxy and witnessed informed consent obtained.

## 2021-11-11 NOTE — ASU PATIENT PROFILE, ADULT - NSICDXPASTMEDICALHX_GEN_ALL_CORE_FT
PAST MEDICAL HISTORY:  Benign hypertension dx 10 years    Diabetes mellitus type II  on Meds 4/2012     Eczema     H/O renal calculi ESWL right side yrs ago  last stone one year ago  (passed on its own)    Lumbago     Lumbago with sciatica of right side     Obese

## 2021-11-11 NOTE — ASU DISCHARGE PLAN (ADULT/PEDIATRIC) - ASU DC SPECIAL INSTRUCTIONSFT
Chest Port Placement    Discharge Instructions  - You have had a chest port implanted in your chest.   - The port is ready for use.  - You may shower in 48 hours. No soaking or swimming for 2 weeks or until the site is completely healed.  - Keep the area covered and dry for the next 2 days. It may be removed by a chemotherapy nurse as needed for treatment.  -Do not remove steri strips, they will fall in 1-2 weeks.  - Do not perform any heavy lifting or put tension on the area for the next week or until the site is healed.  - You may resume your normal diet.  - You may resume your normal medications however you should wait 48 hours before restarting aspirin, plavix, or blood thinners.  - It is normal to experience some pain over the site for the next few days. Take Tylenol for that pain. Do not take more frequently than every 6 hours and do not exceed more than 3000mg of Tylenol in a 24 hour period.  - You were given conscious sedation which may make you drowsy, therefore you need someone to stay with you until the morning following the procedure.  - Do not drive, engage in heavy lifting or strenuous activity, or drink any alcoholic beverages for the next 24 hours.   - You may resume normal activity in 24 hours.    Notify your primary physician and/or Interventional Radiology IMMEDIATELY if you experience any of the following       - Fever of 101F or 38C       - Chills or Rigors/ Shakes       - Swelling and/or Redness in the area around the port       - Worsening Pain       - Blood soaked bandages or worsening bleeding       - Lightheadedness and/or dizziness upon standing       - Chest Pain/ Tightness       - Shortness of Breath       - Difficulty walking    If you have a problem that you believe requires IMMEDIATE attention, please go to your NEAREST Emergency Room. If you believe your problem can safely wait until you speak to a physician, please call Interventional Radiology for any concerns.    May resume eliquis in 24 hours if no bleeding overnight.

## 2021-11-11 NOTE — PROCEDURE NOTE - PROCEDURE FINDINGS AND DETAILS
catheter tip in SVC 8fr powerport. Catheter tip in SVC. 8fr powerport placed using US and fluoro guidance. . Catheter tip in SVC.May use immediately.  Full report to follow.

## 2021-11-11 NOTE — ASU DISCHARGE PLAN (ADULT/PEDIATRIC) - NURSING INSTRUCTIONS
Please feel free to contact us at [phone] if any problems arise. After 6PM, Monday through Friday, on weekends and on holidays, please call [phone] and ask for the radiology resident on call to be paged.

## 2021-11-13 ENCOUNTER — EMERGENCY (EMERGENCY)
Facility: HOSPITAL | Age: 66
LOS: 1 days | Discharge: ANOTHER HOSPITAL | End: 2021-11-13
Attending: EMERGENCY MEDICINE
Payer: MEDICARE

## 2021-11-13 ENCOUNTER — APPOINTMENT (OUTPATIENT)
Dept: NEUROSURGERY | Facility: HOSPITAL | Age: 66
End: 2021-11-13

## 2021-11-13 ENCOUNTER — INPATIENT (INPATIENT)
Facility: HOSPITAL | Age: 66
LOS: 3 days | Discharge: NURSING, REHAB OR LONG-TERM CARE | DRG: 24 | End: 2021-11-17
Attending: SPECIALIST | Admitting: PSYCHIATRY & NEUROLOGY
Payer: MEDICARE

## 2021-11-13 ENCOUNTER — EMERGENCY (EMERGENCY)
Facility: HOSPITAL | Age: 66
LOS: 1 days | Discharge: HOME | End: 2021-11-13
Attending: STUDENT IN AN ORGANIZED HEALTH CARE EDUCATION/TRAINING PROGRAM
Payer: MEDICARE

## 2021-11-13 VITALS
SYSTOLIC BLOOD PRESSURE: 145 MMHG | DIASTOLIC BLOOD PRESSURE: 78 MMHG | TEMPERATURE: 98 F | OXYGEN SATURATION: 95 % | RESPIRATION RATE: 17 BRPM | HEART RATE: 83 BPM

## 2021-11-13 VITALS
DIASTOLIC BLOOD PRESSURE: 96 MMHG | HEART RATE: 94 BPM | OXYGEN SATURATION: 95 % | RESPIRATION RATE: 20 BRPM | SYSTOLIC BLOOD PRESSURE: 168 MMHG

## 2021-11-13 VITALS
OXYGEN SATURATION: 95 % | TEMPERATURE: 98 F | RESPIRATION RATE: 20 BRPM | HEART RATE: 81 BPM | SYSTOLIC BLOOD PRESSURE: 170 MMHG | DIASTOLIC BLOOD PRESSURE: 86 MMHG

## 2021-11-13 VITALS
HEART RATE: 90 BPM | OXYGEN SATURATION: 97 % | WEIGHT: 261.69 LBS | DIASTOLIC BLOOD PRESSURE: 94 MMHG | SYSTOLIC BLOOD PRESSURE: 174 MMHG | RESPIRATION RATE: 16 BRPM

## 2021-11-13 VITALS — HEIGHT: 71 IN

## 2021-11-13 DIAGNOSIS — I63.9 CEREBRAL INFARCTION, UNSPECIFIED: ICD-10-CM

## 2021-11-13 LAB
A1C WITH ESTIMATED AVERAGE GLUCOSE RESULT: 11.8 % — HIGH (ref 4–5.6)
ALBUMIN SERPL ELPH-MCNC: 3.2 G/DL — LOW (ref 3.5–5)
ALP SERPL-CCNC: 133 U/L — HIGH (ref 40–120)
ALT FLD-CCNC: 18 U/L DA — SIGNIFICANT CHANGE UP (ref 10–60)
ANION GAP SERPL CALC-SCNC: 11 MMOL/L — SIGNIFICANT CHANGE UP (ref 5–17)
ANION GAP SERPL CALC-SCNC: 13 MMOL/L — SIGNIFICANT CHANGE UP (ref 5–17)
ANION GAP SERPL CALC-SCNC: 8 MMOL/L — SIGNIFICANT CHANGE UP (ref 5–17)
APTT BLD: 32.6 SEC — SIGNIFICANT CHANGE UP (ref 27.5–35.5)
AST SERPL-CCNC: 15 U/L — SIGNIFICANT CHANGE UP (ref 10–40)
BASOPHILS # BLD AUTO: 0.07 K/UL — SIGNIFICANT CHANGE UP (ref 0–0.2)
BASOPHILS NFR BLD AUTO: 0.8 % — SIGNIFICANT CHANGE UP (ref 0–2)
BILIRUB SERPL-MCNC: 0.2 MG/DL — SIGNIFICANT CHANGE UP (ref 0.2–1.2)
BLD GP AB SCN SERPL QL: NEGATIVE — SIGNIFICANT CHANGE UP
BUN SERPL-MCNC: 16 MG/DL — SIGNIFICANT CHANGE UP (ref 7–23)
BUN SERPL-MCNC: 20 MG/DL — SIGNIFICANT CHANGE UP (ref 7–23)
BUN SERPL-MCNC: 23 MG/DL — HIGH (ref 7–18)
CALCIUM SERPL-MCNC: 8.6 MG/DL — SIGNIFICANT CHANGE UP (ref 8.4–10.5)
CALCIUM SERPL-MCNC: 8.6 MG/DL — SIGNIFICANT CHANGE UP (ref 8.4–10.5)
CALCIUM SERPL-MCNC: 8.9 MG/DL — SIGNIFICANT CHANGE UP (ref 8.4–10.5)
CHLORIDE SERPL-SCNC: 101 MMOL/L — SIGNIFICANT CHANGE UP (ref 96–108)
CHLORIDE SERPL-SCNC: 103 MMOL/L — SIGNIFICANT CHANGE UP (ref 96–108)
CHLORIDE SERPL-SCNC: 106 MMOL/L — SIGNIFICANT CHANGE UP (ref 96–108)
CHOLEST SERPL-MCNC: 162 MG/DL — SIGNIFICANT CHANGE UP
CK MB BLD-MCNC: 2.8 % — SIGNIFICANT CHANGE UP (ref 0–3.5)
CK MB CFR SERPL CALC: 5 NG/ML — SIGNIFICANT CHANGE UP (ref 0–6.7)
CK SERPL-CCNC: 181 U/L — SIGNIFICANT CHANGE UP (ref 30–200)
CO2 SERPL-SCNC: 19 MMOL/L — LOW (ref 22–31)
CO2 SERPL-SCNC: 20 MMOL/L — LOW (ref 22–31)
CO2 SERPL-SCNC: 23 MMOL/L — SIGNIFICANT CHANGE UP (ref 22–31)
CREAT SERPL-MCNC: 0.92 MG/DL — SIGNIFICANT CHANGE UP (ref 0.5–1.3)
CREAT SERPL-MCNC: 0.94 MG/DL — SIGNIFICANT CHANGE UP (ref 0.5–1.3)
CREAT SERPL-MCNC: 1.15 MG/DL — SIGNIFICANT CHANGE UP (ref 0.5–1.3)
EOSINOPHIL # BLD AUTO: 0.35 K/UL — SIGNIFICANT CHANGE UP (ref 0–0.5)
EOSINOPHIL NFR BLD AUTO: 4.2 % — SIGNIFICANT CHANGE UP (ref 0–6)
ESTIMATED AVERAGE GLUCOSE: 292 MG/DL — HIGH (ref 68–114)
ETHANOL SERPL-MCNC: 6 MG/DL — SIGNIFICANT CHANGE UP (ref 0–10)
GLUCOSE SERPL-MCNC: 128 MG/DL — HIGH (ref 70–99)
GLUCOSE SERPL-MCNC: 325 MG/DL — HIGH (ref 70–99)
GLUCOSE SERPL-MCNC: 377 MG/DL — HIGH (ref 70–99)
HCT VFR BLD CALC: 33.8 % — LOW (ref 39–50)
HCT VFR BLD CALC: 35.7 % — LOW (ref 39–50)
HCT VFR BLD CALC: 40.6 % — SIGNIFICANT CHANGE UP (ref 39–50)
HDLC SERPL-MCNC: 42 MG/DL — SIGNIFICANT CHANGE UP
HGB BLD-MCNC: 10.7 G/DL — LOW (ref 13–17)
HGB BLD-MCNC: 11.2 G/DL — LOW (ref 13–17)
HGB BLD-MCNC: 12.6 G/DL — LOW (ref 13–17)
IMM GRANULOCYTES NFR BLD AUTO: 0.5 % — SIGNIFICANT CHANGE UP (ref 0–1.5)
INR BLD: 1 RATIO — SIGNIFICANT CHANGE UP (ref 0.88–1.16)
LIPID PNL WITH DIRECT LDL SERPL: 97 MG/DL — SIGNIFICANT CHANGE UP
LYMPHOCYTES # BLD AUTO: 0.81 K/UL — LOW (ref 1–3.3)
LYMPHOCYTES # BLD AUTO: 9.8 % — LOW (ref 13–44)
MAGNESIUM SERPL-MCNC: 1.8 MG/DL — SIGNIFICANT CHANGE UP (ref 1.6–2.6)
MAGNESIUM SERPL-MCNC: 1.8 MG/DL — SIGNIFICANT CHANGE UP (ref 1.6–2.6)
MCHC RBC-ENTMCNC: 26 PG — LOW (ref 27–34)
MCHC RBC-ENTMCNC: 26 PG — LOW (ref 27–34)
MCHC RBC-ENTMCNC: 26.4 PG — LOW (ref 27–34)
MCHC RBC-ENTMCNC: 31 GM/DL — LOW (ref 32–36)
MCHC RBC-ENTMCNC: 31.4 GM/DL — LOW (ref 32–36)
MCHC RBC-ENTMCNC: 31.7 GM/DL — LOW (ref 32–36)
MCV RBC AUTO: 82.2 FL — SIGNIFICANT CHANGE UP (ref 80–100)
MCV RBC AUTO: 83.7 FL — SIGNIFICANT CHANGE UP (ref 80–100)
MCV RBC AUTO: 84.2 FL — SIGNIFICANT CHANGE UP (ref 80–100)
MONOCYTES # BLD AUTO: 0.9 K/UL — SIGNIFICANT CHANGE UP (ref 0–0.9)
MONOCYTES NFR BLD AUTO: 10.9 % — SIGNIFICANT CHANGE UP (ref 2–14)
NEUTROPHILS # BLD AUTO: 6.11 K/UL — SIGNIFICANT CHANGE UP (ref 1.8–7.4)
NEUTROPHILS NFR BLD AUTO: 73.8 % — SIGNIFICANT CHANGE UP (ref 43–77)
NON HDL CHOLESTEROL: 120 MG/DL — SIGNIFICANT CHANGE UP
NRBC # BLD: 0 /100 WBCS — SIGNIFICANT CHANGE UP (ref 0–0)
PHOSPHATE SERPL-MCNC: 2.7 MG/DL — SIGNIFICANT CHANGE UP (ref 2.5–4.5)
PHOSPHATE SERPL-MCNC: 3 MG/DL — SIGNIFICANT CHANGE UP (ref 2.5–4.5)
PLATELET # BLD AUTO: 313 K/UL — SIGNIFICANT CHANGE UP (ref 150–400)
PLATELET # BLD AUTO: 316 K/UL — SIGNIFICANT CHANGE UP (ref 150–400)
PLATELET # BLD AUTO: 365 K/UL — SIGNIFICANT CHANGE UP (ref 150–400)
POTASSIUM SERPL-MCNC: 3.6 MMOL/L — SIGNIFICANT CHANGE UP (ref 3.5–5.3)
POTASSIUM SERPL-MCNC: 3.8 MMOL/L — SIGNIFICANT CHANGE UP (ref 3.5–5.3)
POTASSIUM SERPL-MCNC: 3.9 MMOL/L — SIGNIFICANT CHANGE UP (ref 3.5–5.3)
POTASSIUM SERPL-SCNC: 3.6 MMOL/L — SIGNIFICANT CHANGE UP (ref 3.5–5.3)
POTASSIUM SERPL-SCNC: 3.8 MMOL/L — SIGNIFICANT CHANGE UP (ref 3.5–5.3)
POTASSIUM SERPL-SCNC: 3.9 MMOL/L — SIGNIFICANT CHANGE UP (ref 3.5–5.3)
PROT SERPL-MCNC: 8.1 G/DL — SIGNIFICANT CHANGE UP (ref 6–8.3)
PROTHROM AB SERPL-ACNC: 11.9 SEC — SIGNIFICANT CHANGE UP (ref 10.6–13.6)
RBC # BLD: 4.11 M/UL — LOW (ref 4.2–5.8)
RBC # BLD: 4.24 M/UL — SIGNIFICANT CHANGE UP (ref 4.2–5.8)
RBC # BLD: 4.85 M/UL — SIGNIFICANT CHANGE UP (ref 4.2–5.8)
RBC # FLD: 18.3 % — HIGH (ref 10.3–14.5)
RBC # FLD: 18.3 % — HIGH (ref 10.3–14.5)
RBC # FLD: 18.6 % — HIGH (ref 10.3–14.5)
RH IG SCN BLD-IMP: POSITIVE — SIGNIFICANT CHANGE UP
SARS-COV-2 RNA SPEC QL NAA+PROBE: SIGNIFICANT CHANGE UP
SODIUM SERPL-SCNC: 133 MMOL/L — LOW (ref 135–145)
SODIUM SERPL-SCNC: 134 MMOL/L — LOW (ref 135–145)
SODIUM SERPL-SCNC: 137 MMOL/L — SIGNIFICANT CHANGE UP (ref 135–145)
T3 SERPL-MCNC: 70 NG/DL — LOW (ref 80–200)
T4 AB SER-ACNC: 6.7 UG/DL — SIGNIFICANT CHANGE UP (ref 4.6–12)
TRIGL SERPL-MCNC: 116 MG/DL — SIGNIFICANT CHANGE UP
TROPONIN I, HIGH SENSITIVITY RESULT: 8 NG/L — SIGNIFICANT CHANGE UP
TROPONIN T, HIGH SENSITIVITY RESULT: 21 NG/L — SIGNIFICANT CHANGE UP (ref 0–51)
TSH SERPL-MCNC: 1.58 UIU/ML — SIGNIFICANT CHANGE UP (ref 0.27–4.2)
WBC # BLD: 11.1 K/UL — HIGH (ref 3.8–10.5)
WBC # BLD: 8.28 K/UL — SIGNIFICANT CHANGE UP (ref 3.8–10.5)
WBC # BLD: 9.84 K/UL — SIGNIFICANT CHANGE UP (ref 3.8–10.5)
WBC # FLD AUTO: 11.1 K/UL — HIGH (ref 3.8–10.5)
WBC # FLD AUTO: 8.28 K/UL — SIGNIFICANT CHANGE UP (ref 3.8–10.5)
WBC # FLD AUTO: 9.84 K/UL — SIGNIFICANT CHANGE UP (ref 3.8–10.5)

## 2021-11-13 PROCEDURE — 99291 CRITICAL CARE FIRST HOUR: CPT

## 2021-11-13 PROCEDURE — 80053 COMPREHEN METABOLIC PANEL: CPT

## 2021-11-13 PROCEDURE — 36415 COLL VENOUS BLD VENIPUNCTURE: CPT

## 2021-11-13 PROCEDURE — 85730 THROMBOPLASTIN TIME PARTIAL: CPT

## 2021-11-13 PROCEDURE — 0042T: CPT

## 2021-11-13 PROCEDURE — 99285 EMERGENCY DEPT VISIT HI MDM: CPT | Mod: 25

## 2021-11-13 PROCEDURE — G0425: CPT | Mod: 95

## 2021-11-13 PROCEDURE — 70496 CT ANGIOGRAPHY HEAD: CPT | Mod: 26

## 2021-11-13 PROCEDURE — 70496 CT ANGIOGRAPHY HEAD: CPT | Mod: MA

## 2021-11-13 PROCEDURE — 87635 SARS-COV-2 COVID-19 AMP PRB: CPT

## 2021-11-13 PROCEDURE — 70450 CT HEAD/BRAIN W/O DYE: CPT | Mod: 26,77

## 2021-11-13 PROCEDURE — 85610 PROTHROMBIN TIME: CPT

## 2021-11-13 PROCEDURE — 70450 CT HEAD/BRAIN W/O DYE: CPT | Mod: MA

## 2021-11-13 PROCEDURE — 93005 ELECTROCARDIOGRAM TRACING: CPT

## 2021-11-13 PROCEDURE — 70496 CT ANGIOGRAPHY HEAD: CPT | Mod: 26,MA

## 2021-11-13 PROCEDURE — 93010 ELECTROCARDIOGRAM REPORT: CPT

## 2021-11-13 PROCEDURE — 84484 ASSAY OF TROPONIN QUANT: CPT

## 2021-11-13 PROCEDURE — 80307 DRUG TEST PRSMV CHEM ANLYZR: CPT

## 2021-11-13 PROCEDURE — 99284 EMERGENCY DEPT VISIT MOD MDM: CPT

## 2021-11-13 PROCEDURE — 70498 CT ANGIOGRAPHY NECK: CPT | Mod: MA

## 2021-11-13 PROCEDURE — 70498 CT ANGIOGRAPHY NECK: CPT | Mod: 26,MA

## 2021-11-13 PROCEDURE — 61645 PERQ ART M-THROMBECT &/NFS: CPT

## 2021-11-13 PROCEDURE — 82962 GLUCOSE BLOOD TEST: CPT

## 2021-11-13 PROCEDURE — 85025 COMPLETE CBC W/AUTO DIFF WBC: CPT

## 2021-11-13 RX ORDER — SODIUM CHLORIDE 9 MG/ML
1000 INJECTION INTRAMUSCULAR; INTRAVENOUS; SUBCUTANEOUS
Refills: 0 | Status: DISCONTINUED | OUTPATIENT
Start: 2021-11-13 | End: 2021-11-14

## 2021-11-13 RX ORDER — POTASSIUM CHLORIDE 20 MEQ
10 PACKET (EA) ORAL
Refills: 0 | Status: COMPLETED | OUTPATIENT
Start: 2021-11-13 | End: 2021-11-13

## 2021-11-13 RX ORDER — HYDROMORPHONE HYDROCHLORIDE 2 MG/ML
0.25 INJECTION INTRAMUSCULAR; INTRAVENOUS; SUBCUTANEOUS
Refills: 0 | Status: DISCONTINUED | OUTPATIENT
Start: 2021-11-13 | End: 2021-11-13

## 2021-11-13 RX ORDER — MAGNESIUM SULFATE 500 MG/ML
1 VIAL (ML) INJECTION ONCE
Refills: 0 | Status: COMPLETED | OUTPATIENT
Start: 2021-11-13 | End: 2021-11-13

## 2021-11-13 RX ORDER — DEXTROSE 50 % IN WATER 50 %
25 SYRINGE (ML) INTRAVENOUS
Refills: 0 | Status: DISCONTINUED | OUTPATIENT
Start: 2021-11-13 | End: 2021-11-17

## 2021-11-13 RX ORDER — INSULIN HUMAN 100 [IU]/ML
3 INJECTION, SOLUTION SUBCUTANEOUS
Qty: 100 | Refills: 0 | Status: DISCONTINUED | OUTPATIENT
Start: 2021-11-13 | End: 2021-11-14

## 2021-11-13 RX ORDER — ACETAMINOPHEN 500 MG
650 TABLET ORAL EVERY 6 HOURS
Refills: 0 | Status: DISCONTINUED | OUTPATIENT
Start: 2021-11-13 | End: 2021-11-17

## 2021-11-13 RX ORDER — NICARDIPINE HYDROCHLORIDE 30 MG/1
5 CAPSULE, EXTENDED RELEASE ORAL
Qty: 40 | Refills: 0 | Status: DISCONTINUED | OUTPATIENT
Start: 2021-11-13 | End: 2021-11-14

## 2021-11-13 RX ORDER — ATORVASTATIN CALCIUM 80 MG/1
80 TABLET, FILM COATED ORAL AT BEDTIME
Refills: 0 | Status: DISCONTINUED | OUTPATIENT
Start: 2021-11-13 | End: 2021-11-17

## 2021-11-13 RX ORDER — ONDANSETRON 8 MG/1
4 TABLET, FILM COATED ORAL ONCE
Refills: 0 | Status: DISCONTINUED | OUTPATIENT
Start: 2021-11-13 | End: 2021-11-16

## 2021-11-13 RX ORDER — NICARDIPINE HYDROCHLORIDE 30 MG/1
5 CAPSULE, EXTENDED RELEASE ORAL
Qty: 40 | Refills: 0 | Status: DISCONTINUED | OUTPATIENT
Start: 2021-11-13 | End: 2021-11-16

## 2021-11-13 RX ORDER — AMLODIPINE BESYLATE 2.5 MG/1
10 TABLET ORAL DAILY
Refills: 0 | Status: DISCONTINUED | OUTPATIENT
Start: 2021-11-13 | End: 2021-11-17

## 2021-11-13 RX ORDER — POTASSIUM PHOSPHATE, MONOBASIC POTASSIUM PHOSPHATE, DIBASIC 236; 224 MG/ML; MG/ML
15 INJECTION, SOLUTION INTRAVENOUS ONCE
Refills: 0 | Status: COMPLETED | OUTPATIENT
Start: 2021-11-13 | End: 2021-11-13

## 2021-11-13 RX ORDER — SODIUM CHLORIDE 9 MG/ML
1000 INJECTION INTRAMUSCULAR; INTRAVENOUS; SUBCUTANEOUS
Refills: 0 | Status: DISCONTINUED | OUTPATIENT
Start: 2021-11-13 | End: 2021-11-16

## 2021-11-13 RX ORDER — DEXTROSE 50 % IN WATER 50 %
50 SYRINGE (ML) INTRAVENOUS
Refills: 0 | Status: DISCONTINUED | OUTPATIENT
Start: 2021-11-13 | End: 2021-11-17

## 2021-11-13 RX ORDER — INSULIN LISPRO 100/ML
VIAL (ML) SUBCUTANEOUS EVERY 6 HOURS
Refills: 0 | Status: DISCONTINUED | OUTPATIENT
Start: 2021-11-13 | End: 2021-11-13

## 2021-11-13 RX ADMIN — Medication 10: at 08:05

## 2021-11-13 RX ADMIN — Medication 100 MILLIEQUIVALENT(S): at 11:07

## 2021-11-13 RX ADMIN — AMLODIPINE BESYLATE 10 MILLIGRAM(S): 2.5 TABLET ORAL at 21:41

## 2021-11-13 RX ADMIN — Medication 100 MILLIEQUIVALENT(S): at 09:53

## 2021-11-13 RX ADMIN — Medication 100 GRAM(S): at 23:54

## 2021-11-13 RX ADMIN — Medication 100 GRAM(S): at 12:16

## 2021-11-13 RX ADMIN — ATORVASTATIN CALCIUM 80 MILLIGRAM(S): 80 TABLET, FILM COATED ORAL at 21:42

## 2021-11-13 RX ADMIN — SODIUM CHLORIDE 75 MILLILITER(S): 9 INJECTION INTRAMUSCULAR; INTRAVENOUS; SUBCUTANEOUS at 08:00

## 2021-11-13 RX ADMIN — INSULIN HUMAN 3 UNIT(S)/HR: 100 INJECTION, SOLUTION SUBCUTANEOUS at 10:10

## 2021-11-13 RX ADMIN — POTASSIUM PHOSPHATE, MONOBASIC POTASSIUM PHOSPHATE, DIBASIC 62.5 MILLIMOLE(S): 236; 224 INJECTION, SOLUTION INTRAVENOUS at 23:39

## 2021-11-13 RX ADMIN — NICARDIPINE HYDROCHLORIDE 25 MG/HR: 30 CAPSULE, EXTENDED RELEASE ORAL at 08:00

## 2021-11-13 NOTE — STROKE CODE NOTE - ASSESSMENT/PLAN
78 year old man presenting with dysarthria, facial droop. Last known normal at 10:30 pm when he went to sleep.   No MRI capability overnight at    NIHSS 14   not a tPA candidate given last known normal at 10:30 pm  CTA distal basilar occlusion, I talked to neuro IR at [facility], will transfer patient for thrombectomy  keep head of bed flat , permissive HTN , transfer to [facility] for thrombectomy

## 2021-11-13 NOTE — PROGRESS NOTE ADULT - SUBJECTIVE AND OBJECTIVE BOX
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      EVENTS: Seen in pacu.     VITALS:  T(C): , Max: 36.7 (11-13-21 @ 07:30)  HR:  (77 - 94)  BP:  (105/58 - 168/96)  ABP:  (128/57 - 160/69)  RR:  (14 - 20)  SpO2:  (93% - 100%)  Wt(kg): --      11-13-21 @ 07:01  -  11-13-21 @ 18:42  --------------------------------------------------------  IN: 465.5 mL / OUT: 460 mL / NET: 5.5 mL      LABS:  Na: 133 (11-13 @ 08:20)  K: 3.8 (11-13 @ 08:20)  Cl: 101 (11-13 @ 08:20)  CO2: 19 (11-13 @ 08:20)  BUN: 20 (11-13 @ 08:20)  Cr: 0.92 (11-13 @ 08:20)  Glu: 377(11-13 @ 08:20)    Hgb: 11.2 (11-13 @ 08:20)  Hct: 35.7 (11-13 @ 08:20)  WBC: 11.10 (11-13 @ 08:20)  Plt: 316 (11-13 @ 08:20)    INR:   PTT:     MEDICATIONS:  acetaminophen     Tablet .. 650 milliGRAM(s) Oral every 6 hours PRN  amLODIPine   Tablet 10 milliGRAM(s) Oral daily  atorvastatin 80 milliGRAM(s) Oral at bedtime  dextrose 50% Injectable 50 milliLiter(s) IV Push every 15 minutes  dextrose 50% Injectable 25 milliLiter(s) IV Push every 15 minutes  insulin regular Infusion 3 Unit(s)/Hr IV Continuous <Continuous>  niCARdipine Infusion 5 mG/Hr IV Continuous <Continuous>  sodium chloride 0.9%. 1000 milliLiter(s) IV Continuous <Continuous>    EXAMINATION:  General:  calm  HEENT:  MMM  Neuro:  AOx3, dysarthric, R CN6p and CN3p, L CN3p, LUE 4+/5, rest 5/5  Cards:  RRR  Respiratory:  no respiratory distress  Abdomen:  soft  Extremities:  no edema       Summary: 66M hx of DM, HTN, Afib (on eliquis on hold since 11/8 for chemoport placement), prior CVA with no deficits, colon ca, who presented to  with slurred speech and facial droop, NIHSS was 14, CTH initially negative, CTA with distal basilar artery and b/l P1 segments, txer to [facility] with NIHSS 16 on arrival, oow tpa, 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      EVENTS: Seen in pacu.     VITALS:  T(C): , Max: 36.7 (11-13-21 @ 07:30)  HR:  (77 - 94)  BP:  (105/58 - 168/96)  ABP:  (128/57 - 160/69)  RR:  (14 - 20)  SpO2:  (93% - 100%)  Wt(kg): --      11-13-21 @ 07:01  -  11-13-21 @ 18:42  --------------------------------------------------------  IN: 465.5 mL / OUT: 460 mL / NET: 5.5 mL      LABS:  Na: 133 (11-13 @ 08:20)  K: 3.8 (11-13 @ 08:20)  Cl: 101 (11-13 @ 08:20)  CO2: 19 (11-13 @ 08:20)  BUN: 20 (11-13 @ 08:20)  Cr: 0.92 (11-13 @ 08:20)  Glu: 377(11-13 @ 08:20)    Hgb: 11.2 (11-13 @ 08:20)  Hct: 35.7 (11-13 @ 08:20)  WBC: 11.10 (11-13 @ 08:20)  Plt: 316 (11-13 @ 08:20)    INR:   PTT:     MEDICATIONS:  acetaminophen     Tablet .. 650 milliGRAM(s) Oral every 6 hours PRN  amLODIPine   Tablet 10 milliGRAM(s) Oral daily  atorvastatin 80 milliGRAM(s) Oral at bedtime  dextrose 50% Injectable 50 milliLiter(s) IV Push every 15 minutes  dextrose 50% Injectable 25 milliLiter(s) IV Push every 15 minutes  insulin regular Infusion 3 Unit(s)/Hr IV Continuous <Continuous>  niCARdipine Infusion 5 mG/Hr IV Continuous <Continuous>  sodium chloride 0.9%. 1000 milliLiter(s) IV Continuous <Continuous>    EXAMINATION:  General:  calm  HEENT:  MMM  Neuro:  AOx3, dysarthric, bilat GILDA, VFF, dysmetria BUE, FC, LUE triceps 4-, hg and bi 5/5, RUE tric 4+, hg and bi 5/5, LLE 5/5, RLE HF 4/5 DF 4/5 PF 5/5   Cards:  RRR  Respiratory:  no respiratory distress  Abdomen:  soft  Extremities:  no edema       EVENTS: Seen in pacu for evening rounds. No acute events.     VITALS:  T(C): , Max: 36.7 (11-13-21 @ 07:30)  HR:  (77 - 94)  BP:  (105/58 - 168/96)  ABP:  (128/57 - 160/69)  RR:  (14 - 20)  SpO2:  (93% - 100%)  Wt(kg): --      11-13-21 @ 07:01  -  11-13-21 @ 18:42  --------------------------------------------------------  IN: 465.5 mL / OUT: 460 mL / NET: 5.5 mL      LABS:  Na: 133 (11-13 @ 08:20)  K: 3.8 (11-13 @ 08:20)  Cl: 101 (11-13 @ 08:20)  CO2: 19 (11-13 @ 08:20)  BUN: 20 (11-13 @ 08:20)  Cr: 0.92 (11-13 @ 08:20)  Glu: 377(11-13 @ 08:20)    Hgb: 11.2 (11-13 @ 08:20)  Hct: 35.7 (11-13 @ 08:20)  WBC: 11.10 (11-13 @ 08:20)  Plt: 316 (11-13 @ 08:20)      MEDICATIONS:  acetaminophen     Tablet .. 650 milliGRAM(s) Oral every 6 hours PRN  amLODIPine   Tablet 10 milliGRAM(s) Oral daily  atorvastatin 80 milliGRAM(s) Oral at bedtime  dextrose 50% Injectable 50 milliLiter(s) IV Push every 15 minutes  dextrose 50% Injectable 25 milliLiter(s) IV Push every 15 minutes  insulin regular Infusion 3 Unit(s)/Hr IV Continuous <Continuous>  niCARdipine Infusion 5 mG/Hr IV Continuous <Continuous>  sodium chloride 0.9%. 1000 milliLiter(s) IV Continuous <Continuous>    EXAMINATION:  General:  calm  HEENT:  MMM  Neuro:  AOx3, dysarthric, follows commands, bilat GILDA, VFF except for inconsistent motion detection in R temporal visual field, LUE triceps 4-, hand  and bi 5/5, RUE triceps 4+, hg and bi 5/5, LLE 5/5, RLE HF 4/5 DF 4/5 PF 5/5, dysmetria BUE  Cards:  RRR  Respiratory:  no respiratory distress  Abdomen:  soft  Extremities:  no edema. Groin without hematoma.

## 2021-11-13 NOTE — ED PROVIDER NOTE - ATTENDING CONTRIBUTION TO CARE
66 year old male with history of DM, HTN, Afib (on eliquis, however on hold since 11/8 for chemoport placement on 11/11), prior stroke (no deficits), colon cancer transferred from OSH for acute right basila stroke NIHSS 14, LNK 22:30 on 11/12/21. IR and neurology emergently consulted for mechanical thrombectomy.

## 2021-11-13 NOTE — ED PROVIDER NOTE - OBJECTIVE STATEMENT
66 year old male PMh DM, HTM, afib (normall on eliquis but hasn't taken in 5 days because has a chemoport placed 5 days ago), CVA no deficits, colon cancer coming in with slurred speech. as per wife he yelled out about 45min PTA and she found him on the floor. states that's when she noticed he had slurred speech. states went to sleep at 1030pm and was at baseline at that time.

## 2021-11-13 NOTE — ED ADULT NURSE NOTE - PRO INTERPRETER NEED 2
English Xenograft Text: The defect edges were debeveled with a #[id number] scalpel blade.  Given the location of the defect, shape of the defect and the proximity to free margins a xenograft was deemed most appropriate.  The graft was then trimmed to fit the size of the defect.  The graft was then placed in the primary defect and oriented appropriately.

## 2021-11-13 NOTE — PHYSICAL THERAPY INITIAL EVALUATION ADULT - PERTINENT HX OF CURRENT PROBLEM, REHAB EVAL
66 year old M with a h/o DM, HTN, Afib,prior stroke (no deficits), colon cancer, who initially presented to [facility] with slurred speech and facial droop. Stroke code activated at OSH, NIHSS 14 at that time. CTH without reported to be negative for acute infarct. 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 Patient transferred to [facility] for mechanical thrombectomy

## 2021-11-13 NOTE — PHYSICAL THERAPY INITIAL EVALUATION ADULT - PLANNED THERAPY INTERVENTIONS, PT EVAL
No
Pt will negotiate 1 flight of stairs indepenedently in 2 weeks./balance training/bed mobility training/gait training/strengthening/transfer training

## 2021-11-13 NOTE — PHYSICAL THERAPY INITIAL EVALUATION ADULT - ACTIVE RANGE OF MOTION EXAMINATION, REHAB EVAL
RLE in KI/bilateral upper extremity Active ROM was WFL (within functional limits)/bilateral  lower extremity Active ROM was WFL (within functional limits)

## 2021-11-13 NOTE — PHYSICAL THERAPY INITIAL EVALUATION ADULT - ADDITIONAL COMMENTS
Patient has 4 steps with B/L rails to enter home, then 1 flight up with right rails up inside home. lives with spouse. and also has a RW but states he does not use.

## 2021-11-13 NOTE — CHART NOTE - NSCHARTNOTEFT_GEN_A_CORE
CAPRINI SCORE [CLOT] Score on Admission for     AGE RELATED RISK FACTORS                                                       MOBILITY RELATED FACTORS  [ ] Age 41-60 years                                            (1 Point)                  [ ] Bed rest                                                        (1 Point)  [x ] Age: 61-74 years                                           (2 Points)                 [ ] Plaster cast                                                   (2 Points)  [ ] Age= 75 years                                              (3 Points)                 [ ] Bed bound for more than 72 hours                 (2 Points)    DISEASE RELATED RISK FACTORS                                               GENDER SPECIFIC FACTORS  [ ] Edema in the lower extremities                       (1 Point)                  [ ] Pregnancy                                                     (1 Point)  [ ] Varicose veins                                               (1 Point)                  [ ] Post-partum < 6 weeks                                   (1 Point)             [ ] BMI > 25 Kg/m2                                            (1 Point)                  [ ] Hormonal therapy  or oral contraception          (1 Point)                 [ ] Sepsis (in the previous month)                        (1 Point)                  [ ] History of pregnancy complications                 (1 point)  [ ] Pneumonia or serious lung disease                                               [ ] Unexplained or recurrent                     (1 Point)           (in the previous month)                               (1 Point)  [ ] Abnormal pulmonary function test                     (1 Point)                 SURGERY RELATED RISK FACTORS (include planned surgeries)  [ ] Acute myocardial infarction                              (1 Point)                 [ ]  Section                                             (1 Point)  [ ] Congestive heart failure (in the previous month)  (1 Point)         [ ] Minor surgery                                                  (1 Point)   [ ] Inflammatory bowel disease                             (1 Point)                 [ ] Arthroscopic surgery                                        (2 Points)  [ ] Central venous access                                      (2 Points)                [ ] General surgery lasting more than 45 minutes   (2 Points)       [x ] Stroke (in the previous month)                          (5 Points)               [ ] Elective arthroplasty                                         (5 Points)            [ ] current or past malignancy                              (2 Points)                                                                                                       HEMATOLOGY RELATED FACTORS                                                 TRAUMA RELATED RISK FACTORS  [ ] Prior episodes of VTE                                     (3 Points)                [ ] Fracture of the hip, pelvis, or leg                       (5 Points)  [ ] Positive family history for VTE                         (3 Points)                 [ ] Acute spinal cord injury (in the previous month)  (5 Points)  [ ] Prothrombin 45537 A                                     (3 Points)                 [ ] Paralysis  (less than 1 month)                             (5 Points)  [ ] Factor V Leiden                                             (3 Points)                  [ ] Multiple Trauma within 1 month                        (5 Points)  [ ] Lupus anticoagulants                                     (3 Points)                                                           [ ] Anticardiolipin antibodies                               (3 Points)                                                       [ ] High homocysteine in the blood                      (3 Points)                                             [ ] Other congenital or acquired thrombophilia      (3 Points)                                                [ ] Heparin induced thrombocytopenia                  (3 Points)                                          Total Score [      7    ]    Risk:  Very low 0   Low 1 to 2   Moderate 3 to 4   High =5       VTE Prophylasix Recommednations:  [x ] mechanical pneumatic compression devices                                      [ ] contraindicated: _____________________  [ ] chemo prophylasix                                                                                   [x] contraindicated ____new thrombectomy_________________    **** HIGH LIKELIHOOD DVT PRESENT ON ADMISSION  [x ] (please order LE dopplers within 24 hours of admission)

## 2021-11-13 NOTE — ED ADULT NURSE NOTE - OBJECTIVE STATEMENT
Pt presents to ED as notification for code stroke. Pt has slurred speech and left facial droop. Pt presents to ED as notification for code stroke. Pt has slurred speech and left facial droop. Abrasion noted to head, left arm, and bilateral lower extremities. As per wife, pt went to bed last night around 2230, pt was found on the floor at home 30-45 mins prior to ED arrival.

## 2021-11-13 NOTE — ED PROVIDER NOTE - PROGRESS NOTE DETAILS
stroke code called. as per neuro pt to be transferred for clot retrieval as basilar artery and b/l PCA occlusion.

## 2021-11-13 NOTE — H&P ADULT - ATTENDING COMMENTS
seen in PACU. code stroke called patient for MT  Briefly   66 year old M with a h/o DM, HTN, Afib (on eliquis, however on hold since 11/8 for chemoport placement on 11/11), prior stroke (no deficits), colon cancer, who initially presented to [facility] with slurred speech and facial droop. LNK 22:30 on 11/12/21 when he went to bed. Patient found by wife on the floor, after reportedly falling out of bed, at which time she noted his speech was slurred. Stroke code activated at OSH, NIHSS 14 at that time. CTH without reported to be negative for acute infarct. 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 Patient transferred to [facility] for mechanical thrombectomy.   NIHSS on arrival: 16  Not a candidate for tPA due to presentation outside the window.   Candidate for MT.  on exam s/p TICI 2B with dysconjugate gaze and severe dysarthria but DAWSON and followign commands.  Imrpession sonya likely 2/2 CE source  - ASA when abler   - High dose statin therapy - atorvastatin 40mg PO daily. LDL goal <70mg/dL.  - BP per Nsx/NSCU   - MRI brain w/o to decide AC   - Hemoglobin A1c and lipid panel  - TTE  - telemetry  - PT/OT/SS/SLP, OOBC  - check FS, glucose control <180  - GI/DVT ppx  - Counseling on diet, exercise, and medication adherence was done  - Counseling on smoking cessation and alcohol consumption offered when appropriate.  - Pain assessed and judicious use of narcotics when appropriate was discussed.    - Stroke education given when appropriate.  - Importance of fall prevention discussed.   - Differential diagnosis and plan of care discussed with patient and/or family and primary team  - Thank you for allowing me to participate in the care of this patient. Call with questions.   - eventually tx to stroke unit  Long Fernandez MD  Vascular Neurology  Office: [phone]

## 2021-11-13 NOTE — H&P ADULT - NSHPPHYSICALEXAM_GEN_ALL_CORE
General:  Constitutional: M, obese, appears stated age.  Head: Normocephalic & atraumatic.  ENT: Patent ear canals, intact TM, mucus membranes moist & pink, neck supple, no lymphadenopathy.   Respiratory: On room air, no apparent respiratory distress.    Neurological (>12):  MS: Eyes initially closed, however open to verbal command. Alert. Follows verbal commands.   Language: Speech is severely dysarthric, however comprehension appears intact.   CNs: Pupils 2mm OU. Horizontal gaze impaired b/l, however overcome with VOR. V1-3 intact to LT. L lower facial droop. Full eye closure strength b/l. Gag reflex deferred.   Motor: Muscle bulk appears appropriate for age. No noticeable tremor at rest or seizure.   RUE: antigravity without drift  RLE: antigravity without drift  LUE: drift, hits bed  LLE: drift, hits bed   Sensation: Grossly intact to LT b/l throughout.   Cortical: Extinction on DSS (neglect): none  Coordination: Unable to perform on L. Mild dysmetria with R FNF however unclear if possible component of visual impairment.   Gait: Patient unable to participate. General:  Constitutional: M, obese, appears stated age.  Head: Normocephalic & atraumatic.  ENT: Patent ear canals, intact TM, mucus membranes moist & pink, neck supple, no lymphadenopathy.   Respiratory: On room air, no apparent respiratory distress.    Neurological (>12):  MS: Eyes initially closed, however open to verbal command. Alert. Follows verbal commands.   Language: Speech is severely dysarthric, however comprehension appears intact.   CNs: Pupils 2mm OU. Horizontal gaze impaired b/l, however overcome with VOR. BTT not elicited b/l. V1-3 intact to LT b/l. L lower facial droop. Full eye closure strength b/l. Gag reflex deferred.   Motor: Muscle bulk appears appropriate for age. No noticeable tremor at rest or seizure.   RUE: antigravity without drift  RLE: antigravity without drift  LUE: drift, hits bed  LLE: drift, hits bed   Sensation: Grossly intact to LT b/l throughout.   Cortical: Extinction on DSS (neglect): none  Coordination: Unable to perform on L. Mild dysmetria with R FNF however unclear if possible component of visual impairment.   Gait: Patient unable to participate. General:  Constitutional: M, obese, appears stated age.  Head: Normocephalic & atraumatic.  ENT: Patent ear canals, intact TM, mucus membranes moist & pink, neck supple, no lymphadenopathy.   Respiratory: On room air, no apparent respiratory distress.    Neurological (>12):  MS: Eyes initially closed, however open to verbal command. Alert. Unable to participate in assessment of orientation given incomprehensible speech. Follows verbal commands.   Language: Speech is severely dysarthric, however comprehension appears intact.   CNs: Pupils 2mm OU. Horizontal gaze impaired b/l, however overcome with VOR. BTT not elicited b/l. V1-3 intact to LT b/l. L lower facial droop. Full eye closure strength b/l. Gag reflex deferred.   Motor: Muscle bulk appears appropriate for age. No noticeable tremor at rest or seizure.   RUE: antigravity without drift  RLE: antigravity without drift  LUE: drift, hits bed  LLE: drift, hits bed   Sensation: Grossly intact to LT b/l throughout.   Cortical: Extinction on DSS (neglect): none  Coordination: Unable to perform on L. Mild dysmetria with R FNF however unclear if possible component of visual impairment.   Gait: Patient unable to participate.

## 2021-11-13 NOTE — H&P ADULT - NSHPLABSRESULTS_GEN_ALL_CORE
CTH w/o contrast ([facility]): old L occipital infarct    CTA head/neck, CTP ([facility]):    CT brain perfusion:  Large area of increased mean transit time in the posterior fossa bilaterally, sammi, bilateral cerebral white matter, with areas involving the cortex in the bilateral occipital lobes.    Perfusion parameters are reported as follows:  CBF < 30%: 0  TMax > 6s: 254 cc  Mismatch volume: 254 cc  Mismatch ratio: infinite    CTA head and neck:  Visualized innominate and subclavian arteries are patent. The common carotid arteries are unremarkable.    Minimal atherosclerotic calcifications along the right carotid bifurcation without significant stenosis. Left carotid bifurcation is unremarkable. Atherosclerotic calcifications along the bilateral cavernous segments without significant stenosis. The internal carotid arteries are patent without significant stenosis.    The anterior and middle cerebral arteries are patent without significant stenosis.    Bilateral vertebral arteries are patent from their origins to their intracranial segments. Mild to moderate stenosis of the right V4 segment.    There is occlusion of the distal basilar artery and bilateral proximal posterior cerebral artery P1 segments. Bilateral distal P1 and remainder of the bilateral posterior cerebral arteries are patent. Bilateral superior cerebellar arteries are also not seen.    Other:  No enlarged cervical adenopathy by size criteria.    The visualized lung apices are clear.    Impression:  CT brain perfusion: Large area of ischemic penumbra in the posterior fossa, sammi, bilateral cerebral white matter, with areas involving both occipital lobes.    CTA head and neck: Occlusion of the distal basilar artery, and bilateral proximal posterior cerebral artery P1 segments of the basilar. Bilateral superior cerebellar arteries are also not seen.    No CTA evidence of aneurysm or dissection.  Multilevel degenerative changes in the cervical spine.

## 2021-11-13 NOTE — CHART NOTE - NSCHARTNOTEFT_GEN_A_CORE
Interventional Neuro- Radiology   Procedure Note PA-C    Procedure: Selective Cerebral Angiography   Pre- Procedure Diagnosis: stroke/ basilar and pca occlusion   Post- Procedure Diagnosis: mechanical thrombectomy of basilar artery occlusion with tici 2b reperfusion    : Dr. Samuel Jensen   Fellow: Dr. Keith Barros    Physician Assistant: Lenka Tovar PA-C    RN: Sheyla    Anesthesia: Dr. Gonzalez (general anesthesia)    Sheath: 8 Swedish BMX     I/Os:  Estimated blood loss: less than 10cc  IV fluids: 500cc     Urine output: 900 cc        Contrast Omnipaque 240: 24cc         Antibiotics:    Vitals: /66        HR 87     Spo2 99   %       Preliminary Report:  Using a 8 Swedish BMX sheath to the right groin under general anesthesia via left vertebral artery, a selective cerebral angiography was performed and  demonstrates basilar artery occlusion. Proceeded with mechanical thrombectomy with TICI 2b reperfusion achieved. Also did angiography of right subclavian. ( Official note to follow).  Patient tolerated procedure well, hemodynamically stable, no change in neurological status compared to pre op.  Results discussed with neurosurgery, patient and patient's  family.  Groin sheath was removed, vascade device deployed,  manual compression held to hemostasis  for  21 minutes, no active bleeding, no hematoma,  quick clot and safeguard balloon dressing applied at 0700h.  Patient transferred to Recovery Room PACU  SBP post procedure 100-140  RUPAL CT performed post procedure repeat CT head non con 6 hrs

## 2021-11-13 NOTE — PROGRESS NOTE ADULT - ASSESSMENT
ASSESSMENT:  67 yo M w DM, HTN, Afib (on eliquis on hold since 11/8 for chemoport placement), prior CVA with no deficits, colon ca, who presented w an ischemic stroke w basilar artery thrombosis s/p MT w TICI2b flow restored. Etiology  cardioembolic vs hypercoagulable/malignancy less likely      NEURO:  Neurochecksq1  CT Head in AM, MRI at some point  Start asa if ct clear  stroke core measures  Stroke team to follow  Pain control  Activity:  [x] PT [x] OT  SLP eval    PULM:  Incentive spirometry, mobilize as tolerated    CV:  Keep -140mmHg, d/c a-line if off antihypertensive drips can probably liberalize tomorrow  Lipitor  TTE pending  amlodipine 10     RENAL:  IVF until good PO intake, d/c shea in AM NS 75cc    GI: SLP swallow eval  Diet: NPO currently  Bowel regimen senna miralax standing    ENDO:  on ins drip for high sugars, will wean off, start NPH   a1c 11.8  tsh wnl  Goal euglycemia (-180)    HEME/ONC:  VTE prophylaxis: [x] SCDs hold chemoprophylaxis due to: post angio    ID: Ruthy-op antibiotics    MISC:    SOCIAL/FAMILY:  [x] awaiting [] updated at bedside [] family meeting    CODE STATUS:  [] Full Code [] DNR [] DNI [] Palliative/Comfort Care    DISPOSITION:  [] ICU [] Stroke Unit [] Floor [] EMU [] RCU [] PCU    [] Patient is at high risk of neurologic deterioration/death due to:     Time seen:  Time spent: ___ [] critical care minutes    Contact: [phone] ASSESSMENT:  67 yo M w DM, HTN, Afib (on eliquis on hold since 11/8 for chemoport placement), prior CVA with no deficits, colon ca, who presented w an ischemic stroke w basilar artery thrombosis s/p MT w TICI2b flow restored. Etiology  cardioembolic vs hypercoagulable/malignancy less likely      NEURO:  Neurochecksq1  CT Head in AM, MRI at some point  Start asa if ct clear  stroke core measures  Stroke team to follow  Pain control  Activity:  [x] PT [x] OT  SLP eval    PULM:  Incentive spirometry, mobilize as tolerated    CV:  Keep -140mmHg, d/c a-line if off antihypertensive drips can probably liberalize tomorrow  Lipitor  TTE pending  amlodipine 10     RENAL:  IVF until good PO intake, d/c shea in AM NS 75cc    GI: SLP swallow eval  Diet: NPO currently  Bowel regimen senna miralax standing    ENDO:  on ins drip for high sugars, will wean off, start lantus and iss  a1c 11.8  tsh wnl  Goal euglycemia (-180)    HEME/ONC:  VTE prophylaxis: [x] SCDs hold chemoprophylaxis due to: post angio    ID: Ruthy-op antibiotics      SOCIAL/FAMILY:  [x] awaiting     CODE STATUS:  [x] Full Code   DISPOSITION:  [x] ICU    65 yo man w DM, HTN, Afib (on eliquis on hold since 11/8 for chemoport placement), prior CVA with no deficits, colon ca, who presented w an ischemic stroke w basilar artery thrombosis s/p MT w TICI2b flow restored. Etiology cardioembolic vs hypercoagulable/malignancy.      NEURO:  Neurochecksq1  CT Head in AM, MRI at some point  Start asa if ct clear  stroke core measures  Stroke team to follow  Pain control  Activity:  [x] PT [x] OT  SLP eval    PULM:  Incentive spirometry, mobilize as tolerated    CV:  Keep -140mmHg, d/c a-line if off antihypertensive drips can probably liberalize tomorrow  Lipitor  TTE pending  amlodipine 10     RENAL:  IVF until good PO intake, d/c shea in AM NS 75cc    GI: SLP swallow eval  Diet: NPO currently  Bowel regimen senna miralax standing    ENDO:  on ins drip for high sugars, will wean off, start lantus and iss  a1c 11.8  tsh wnl  Goal euglycemia (-180)    HEME/ONC:  VTE prophylaxis: [x] SCDs hold chemoprophylaxis due to: post angio    ID: Ruthy-op antibiotics

## 2021-11-13 NOTE — ED ADULT NURSE NOTE - NSIMPLEMENTINTERV_GEN_ALL_ED
Implemented All Universal Safety Interventions:  [place] to call system. Call bell, personal items and telephone within reach. Instruct patient to call for assistance. Room bathroom lighting operational. Non-slip footwear when patient is off stretcher. Physically safe environment: no spills, clutter or unnecessary equipment. Stretcher in lowest position, wheels locked, appropriate side rails in place.

## 2021-11-13 NOTE — ED PROVIDER NOTE - OBJECTIVE STATEMENT
66M DM, HTN, Afib (on eliquis, however on hold since 11/8 for chemoport placement on 11/11), prior stroke (no deficits), colon cancer, who initially presented to [facility] with slurred speech and facial droop. LNK 22:30 on 11/12/21 when he went to bed. Patient found by wife on the floor, after reportedly falling out of bed, at which time she noted his speech was slurred. Stroke code activated at OSH, NIHSS 14 at that time. CTH without reported to be negative for acute infarct. 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 Patient transferred to [facility] for mechanical thrombectomy.

## 2021-11-13 NOTE — ED PROVIDER NOTE - CLINICAL SUMMARY MEDICAL DECISION MAKING FREE TEXT BOX
66M presenting from OSH for basilar artery occlusion. Neurology called immediately, will send pre ops and plan for thrombectomy. SHARMILA Adams PGY3

## 2021-11-13 NOTE — PHYSICAL THERAPY INITIAL EVALUATION ADULT - RANGE OF MOTION EXAMINATION, REHAB EVAL
RLE in KI/bilateral upper extremity ROM was WFL (within functional limits)/bilateral lower extremity ROM was WFL (within functional limits)

## 2021-11-13 NOTE — ED ADULT NURSE NOTE - OBJECTIVE STATEMENT
65 y/o male BIBA as a transfer from [place] with c/o stroke. PMH DM, a.fib on eliquis as per EMS eliquis was paused monday for a port placement for chemo. As per EMS patient last known well ws 1030pm 11/12/2021, patient's wife found him to be aphasic, left sided facial droop, left sided weakness around 0230 today. As per EMS no medications were given at [facility]. Patient presents aphasic, able to follow basic commands. Right side upper and lower extremities strong. 67 y/o male BIBA as a transfer from [facility] with c/o stroke. PMH DM, a.fib on eliquis as per EMS eliquis was paused monday for a port placement for chemo. Unable to assess mentation due to aphasia. As per EMS patient last known well ws 1030pm 11/12/2021, patient's wife found him to be aphasic, left sided facial droop, left sided weakness around 0230 today. As per EMS no medications were given at [facility]. Patient presents aphasic, able to follow basic commands. Right side upper and lower extremities strong. Weakness noted in the upper and lower left extremities, patient unable to hold left leg up against gravity. PERRL. VSS. Neurology at the bedside upon patient arrival and patient transported up to IR.

## 2021-11-13 NOTE — ED ADULT NURSE NOTE - NSFALLRSKASSESSTYPE_ED_ALL_ED
24 yo m no pmh  pt presents for eval of L rib pain s/p basketball injury 2 days ago. pt was elbowed to L chest. pt endorses pain w/ inspiration and better w/ rest. mild relief w/ apap.  no head/neck injury. no meds.    vss  gen- NAD, aaox3  card-rrr  lungs-ctab, no wheezing or rhonchi  abd-sntnd, no guarding or rebound  neuro- full str/sensation, cn ii-xii grossly intact, normal coordination and gait  Chest- focal TTP to L ant/lateral chest wall, no skin changes    r/o rib fx, ptx  will get cxr, nsaids, reassess Initial (On Arrival)

## 2021-11-13 NOTE — ED PROVIDER NOTE - PHYSICAL EXAMINATION
Const: Well-nourished, Well-developed, appearing stated age.  Eyes: no conjunctival injection, and symmetrical lids.  HEENT: Head NCAT, no lesions. Atraumatic external nose and ears. Moist MM.  Neck: Symmetric, trachea midline.   CVS: +S1/S2, Peripheral pulses 2+ and equal in all extremities.  RESP: Unlabored respiratory effort. Clear to auscultation bilaterally.  GI: Nontender/Nondistended, No CVA tenderness b/l.   MSK: Normocephalic/Atraumatic, Lower Extremities w/o calf tenderness or edema b/l.   Skin: Warm, dry and intact.   Neuro: CNs II-XII grossly intact. Motor & Sensation grossly intact. +dysarthria and word finding difficulty   Psych: Awake, Alert, & Oriented. Appropriate mood and affect.

## 2021-11-13 NOTE — PHYSICAL THERAPY INITIAL EVALUATION ADULT - GENERAL OBSERVATIONS, REHAB EVAL
Pt encountered supine in bed, AO x3 + A line, + Dumsa, + RLE KI, + PACU and sequentials Pt encountered supine in bed, AO x3 + A line, + Dumas, + RLE KI, + PACU and sequentials, + Dysarthria

## 2021-11-13 NOTE — CHART NOTE - NSCHARTNOTEFT_GEN_A_CORE
Interventional Neuro Radiology  Pre-Procedure Note PA-C      HPI:  This is a 66yr old male with pmh hypertension, diabetese type 2, a fib (eliquis on hold since monday), cva, colon cancer presents to [facility] with slurred speech left side weakness s/p fall out of bed. Last known well 1030pm 11/12/2021. CTA showing basilar artery occulusion and pca occlusion. Transferred to [facility] for thrombectomy. Covid negative. NIH at [facility] 14 NIH at [facility] ER 16.     Allergies: No Known Drug Allergies  Patient stated he had sensation of  throat closing  30 minutes after  Epidural pain management resolved it self few minitus after , /  20 y ago Unable to recall MD name or number (Other)      PAST MEDICAL & SURGICAL HISTORY:  Lumbago    Lumbago with sciatica of right side    Benign hypertension  dx 10 years    H/O renal calculi  ESWL right side yrs ago  last stone one year ago  (passed on its own)    Obese    Eczema    Diabetes mellitus type II  on Meds 4/2012    S/P tonsillectomy      Social History: unknown    FAMILY HISTORY:  unknown      Blood Bank: 11-13-21  AB Positive    Assessment/Plan:   This is a 66y  year old male code stroke slurred speech left side weakness CTA showing basilar and pca occlusion.  Patient presents to neuro-IR for selective cerebral angiography and mechanical thrombectomy.   Procedure, goals, risks, benefits and alternatives  were discussed with patient and patient's family.  All questions were answered.  Risks include but are not limited to stroke, vessel injury, hemorrhage, and or right  groin hematoma.  Patient;s wife demonstrates understanding  of all risks involved with this procedure and wishes to continue.   Appropriate  consent was obtained from patient;s wife and consent is in the patient's chart.

## 2021-11-13 NOTE — H&P ADULT - HISTORY OF PRESENT ILLNESS
66 year old M with a h/o DM, HTN, Afib (on eliquis, however on hold since 11/8 for chemoport placement on 11/11), prior stroke (no deficits), colon cancer, who initially presented to [facility] with slurred speech and facial droop. LNK 22:30 on 11/12/21 when he went to bed. Patient found by wife on the floor, after reportedly falling out of bed, at which time she noted his sleep was slurred. Stroke code activated at OSH, NIHSS 14 at that time. CTH without reported to be negative for acute infarct. 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 Patient transferred to [facility] for mechanical thrombectomy.     NIHSS on arrival: 16  Not a candidate for tPA due to presentation outside the window.   Candidate for MT. 66 year old M with a h/o DM, HTN, Afib (on eliquis, however on hold since 11/8 for chemoport placement on 11/11), prior stroke (no deficits), colon cancer, who initially presented to [facility] with slurred speech and facial droop. LNK 22:30 on 11/12/21 when he went to bed. Patient found by wife on the floor, after reportedly falling out of bed, at which time she noted his speech was slurred. Stroke code activated at OSH, NIHSS 14 at that time. CTH without reported to be negative for acute infarct. 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 Patient transferred to [facility] for mechanical thrombectomy.     NIHSS on arrival: 16  Not a candidate for tPA due to presentation outside the window.   Candidate for MT. 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 LNK 22:30 on 11/12/21 when he went to bed. Patient found by wife on the floor, around 02:30, after reportedly falling out of bed, at which time she noted his speech was slurred. Stroke code activated at OSH, NIHSS 14 at that time. CTH without reported to be negative for acute infarct. 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 Patient transferred to [facility] for mechanical thrombectomy.     NIHSS on arrival: 16  Not a candidate for tPA due to presentation outside the window.   Candidate for MT.

## 2021-11-13 NOTE — PROGRESS NOTE ADULT - ASSESSMENT
ASSESSMENT/PLAN:    occlusion of distal basilar artery and b/l P2 segments s/p MT, TICI2b    NEURO:  - neuro checks q1h  - repeat CTH AM  - start ASA if CTH clear  - lipitor 40  - MRI brain  - stroke core measures  - pain control    CVS:  - SBP goal <140  - tele  - amlodipine 10  - cardene prn to goal    PULM:  RA, aspiration precautions    RENAL:  - Fluids: NS 75cc/hr while NPO  - daily IOs    GI:  - Diet: Swallow eval, speech/swallow, NPO\  - Bowel regimen    ENDO:   - FS goal 120-180  - GISELLE  - a1c    HEME/ONC:  - SCDs  - Chemoppx: hold pending post MT imaging    ID:  - monitor for fevers

## 2021-11-13 NOTE — H&P ADULT - ASSESSMENT
*INCOMPLETE**    Recommendations:  [] Admit to NSCU after thrombectomy    Imaging/Studies:  [] Repeat CTH in AM/24hrs to evaluate for hemorrhagic conversion, or earlier for change in mental status  [] MRI brain without contrast  [] TTE    Meds:  [] Hold AC/AP therapy for now, can resume if repeat imaging stable  [] SCDs for now. Can start pharmacological DVT ppx if repeat imaging stable  [] Atorvastatin 80mg (titrate to LDL < 70) after establishing enteral access or passing swallow evaluation    Labs:  [] Send Lipid panel, HbA1c     Other:   [] Telemetry monitoring   [] Neuro checks q1hr in ICU Unit  [] SBP goal 140-160 mmHg if reperfusion achieved   [] BG goal <180 mg/dL  [] NPO x 24hrs  [] Dysphagia screen, Speech and Swallow eval if failed dysphagia screen  [] PT/OT eval       Case to be discussed with stroke attending, Dr. Fernandez.     *INCOMPLETE**    Recommendations:  [] Admit to NSCU after thrombectomy    Imaging/Studies:  [] Repeat CTH in AM/24hrs to evaluate for hemorrhagic conversion, or earlier for change in mental status  [] MRI brain without contrast  [] TTE    Meds:  [] Hold AC/AP therapy for now, can resume if repeat imaging stable  [] SCDs for now. Can start pharmacological DVT ppx if repeat imaging stable  [] Atorvastatin 80mg (titrate to LDL < 70) after establishing enteral access or passing swallow evaluation    Labs:  [] Send Lipid panel, HbA1c     Other:   [] Telemetry monitoring   [] Neuro checks q1hr in ICU Unit  [] SBP goal 140-160 mmHg if reperfusion achieved   [] BG goal <180 mg/dL  [] NPO x 24hrs  [] Dysphagia screen; Swallow eval if failed dysphagia screen  [] PT/OT/SLP eval       Case to be discussed with stroke attending, Dr. Fernandez.     Impression: L hemiparesis, dysarthria, ?visual field deficit due to occlusion of the distal basilar artery and b/l proximal PCAs, localizing to the brainstem, with possible competing mechanisms of cardioembolism vs less likely hypercoagulability in the setting of malignancy.    Recommendations:  [] Admit to NSCU after thrombectomy    Imaging/Studies:  [] Repeat CTH in AM/24hrs to evaluate for hemorrhagic conversion, or earlier for change in mental status  [] MRI brain without contrast  [] TTE    Meds:  [] Hold AC/AP therapy for now, can resume if repeat imaging stable  [] SCDs for now. Can start pharmacological DVT ppx if repeat imaging stable  [] Atorvastatin 80mg (titrate to LDL < 70) after establishing enteral access or passing swallow evaluation    Labs:  [] Send Lipid panel, HbA1c     Other:   [] Telemetry monitoring   [] Neuro checks q1hr in ICU Unit  [] SBP goal 140-160 mmHg if reperfusion achieved   [] BG goal <180 mg/dL  [] NPO x 24hrs  [] Dysphagia screen; Swallow eval if failed dysphagia screen  [] PT/OT/SLP eval       Case to be discussed with stroke attending, Dr. Fernandez. 66 year old M with a h/o DM, HTN, Afib (on eliquis, however on hold since 11/8 for chemoport placement on 11/11), prior stroke (no deficits), colon cancer (invasive adenocarcinoma of the rectum), who initially presented to OSH with slurred speech and facial droop. LNK 22:30 on 11/12/21. Found to have occlusion of the distal basilar artery and b/l P1 segments on CTA H/N, and transferred to [facility] for MT. On exam patient noted to have severe dysarthria, ?visual field deficits, possibly b/l however difficult to ascertain, L facial droop, LUE/LLE weakness.    Impression: L hemiparesis, dysarthria, ?visual field deficit due to occlusion of the distal basilar artery and b/l proximal PCAs, localizing to the brainstem, with possible competing mechanisms of cardioembolism vs less likely hypercoagulability in the setting of malignancy.    Recommendations:  [] Admit to NSCU after thrombectomy    Imaging/Studies:  [] Repeat CTH in AM/24hrs to evaluate for hemorrhagic conversion, or earlier for change in mental status  [] MRI brain without contrast  [] TTE    Meds:  [] Hold AC/AP therapy for now, can resume if repeat imaging stable  [] SCDs for now. Can start pharmacological DVT ppx if repeat imaging stable  [] Atorvastatin 80mg (titrate to LDL < 70) after establishing enteral access or passing swallow evaluation    Labs:  [] Send Lipid panel, HbA1c     Other:   [] Telemetry monitoring   [] Neuro checks q1hr in ICU Unit  [] SBP goal 140-160 mmHg if reperfusion achieved   [] BG goal <180 mg/dL  [] NPO x 24hrs  [] Dysphagia screen; Swallow eval if failed dysphagia screen  [] PT/OT/SLP eval       Case to be discussed with stroke attending, Dr. Fernandez.

## 2021-11-14 DIAGNOSIS — I10 ESSENTIAL (PRIMARY) HYPERTENSION: ICD-10-CM

## 2021-11-14 DIAGNOSIS — E78.49 OTHER HYPERLIPIDEMIA: ICD-10-CM

## 2021-11-14 DIAGNOSIS — E11.65 TYPE 2 DIABETES MELLITUS WITH HYPERGLYCEMIA: ICD-10-CM

## 2021-11-14 PROCEDURE — 99223 1ST HOSP IP/OBS HIGH 75: CPT

## 2021-11-14 PROCEDURE — 70450 CT HEAD/BRAIN W/O DYE: CPT | Mod: 26

## 2021-11-14 PROCEDURE — 99233 SBSQ HOSP IP/OBS HIGH 50: CPT

## 2021-11-14 RX ORDER — INSULIN LISPRO 100/ML
7 VIAL (ML) SUBCUTANEOUS
Refills: 0 | Status: DISCONTINUED | OUTPATIENT
Start: 2021-11-14 | End: 2021-11-17

## 2021-11-14 RX ORDER — NICARDIPINE HYDROCHLORIDE 30 MG/1
5 CAPSULE, EXTENDED RELEASE ORAL
Qty: 40 | Refills: 0 | Status: DISCONTINUED | OUTPATIENT
Start: 2021-11-14 | End: 2021-11-14

## 2021-11-14 RX ORDER — POLYETHYLENE GLYCOL 3350 17 G/17G
17 POWDER, FOR SOLUTION ORAL
Refills: 0 | Status: DISCONTINUED | OUTPATIENT
Start: 2021-11-14 | End: 2021-11-17

## 2021-11-14 RX ORDER — INSULIN GLARGINE 100 [IU]/ML
10 INJECTION, SOLUTION SUBCUTANEOUS
Refills: 0 | Status: DISCONTINUED | OUTPATIENT
Start: 2021-11-14 | End: 2021-11-15

## 2021-11-14 RX ORDER — QUETIAPINE FUMARATE 200 MG/1
25 TABLET, FILM COATED ORAL ONCE
Refills: 0 | Status: COMPLETED | OUTPATIENT
Start: 2021-11-14 | End: 2021-11-14

## 2021-11-14 RX ORDER — ENOXAPARIN SODIUM 100 MG/ML
40 INJECTION SUBCUTANEOUS DAILY
Refills: 0 | Status: DISCONTINUED | OUTPATIENT
Start: 2021-11-14 | End: 2021-11-14

## 2021-11-14 RX ORDER — SENNA PLUS 8.6 MG/1
2 TABLET ORAL AT BEDTIME
Refills: 0 | Status: DISCONTINUED | OUTPATIENT
Start: 2021-11-14 | End: 2021-11-17

## 2021-11-14 RX ORDER — INSULIN LISPRO 100/ML
VIAL (ML) SUBCUTANEOUS
Refills: 0 | Status: DISCONTINUED | OUTPATIENT
Start: 2021-11-14 | End: 2021-11-15

## 2021-11-14 RX ORDER — APIXABAN 2.5 MG/1
5 TABLET, FILM COATED ORAL
Refills: 0 | Status: DISCONTINUED | OUTPATIENT
Start: 2021-11-14 | End: 2021-11-15

## 2021-11-14 RX ADMIN — Medication 2: at 11:07

## 2021-11-14 RX ADMIN — Medication 7 UNIT(S): at 17:00

## 2021-11-14 RX ADMIN — SENNA PLUS 2 TABLET(S): 8.6 TABLET ORAL at 21:29

## 2021-11-14 RX ADMIN — Medication 7 UNIT(S): at 11:09

## 2021-11-14 RX ADMIN — ATORVASTATIN CALCIUM 80 MILLIGRAM(S): 80 TABLET, FILM COATED ORAL at 21:14

## 2021-11-14 RX ADMIN — APIXABAN 5 MILLIGRAM(S): 2.5 TABLET, FILM COATED ORAL at 18:04

## 2021-11-14 RX ADMIN — Medication 7 UNIT(S): at 07:13

## 2021-11-14 RX ADMIN — INSULIN GLARGINE 10 UNIT(S): 100 INJECTION, SOLUTION SUBCUTANEOUS at 04:45

## 2021-11-14 RX ADMIN — Medication 2: at 21:31

## 2021-11-14 RX ADMIN — AMLODIPINE BESYLATE 10 MILLIGRAM(S): 2.5 TABLET ORAL at 05:43

## 2021-11-14 RX ADMIN — Medication 4: at 17:01

## 2021-11-14 RX ADMIN — Medication 2: at 07:08

## 2021-11-14 RX ADMIN — INSULIN GLARGINE 10 UNIT(S): 100 INJECTION, SOLUTION SUBCUTANEOUS at 19:58

## 2021-11-14 RX ADMIN — QUETIAPINE FUMARATE 25 MILLIGRAM(S): 200 TABLET, FILM COATED ORAL at 21:20

## 2021-11-14 NOTE — PATIENT PROFILE ADULT - VISION (WITH CORRECTIVE LENSES IF THE PATIENT USUALLY WEARS THEM):
history of cataracts/Partially impaired: cannot see medication labels or newsprint, but can see obstacles in path, and the surrounding layout; can count fingers at arm's length

## 2021-11-14 NOTE — SPEECH LANGUAGE PATHOLOGY EVALUATION - SLP DIAGNOSIS
Pt presents with: 1) Cognitive-linguistic deficits, with distractibility, short-term memory deficits, and impaired insight. Pt is able to follow simple directives and make wants/ needs known. 2) Dysarthria, with reduced articulatory contact resulting in impaired intelligibility in unknown contexts. 65 yo man w DM, HTN, Afib (on eliquis on hold since 11/8 for chemoport placement), prior CVA with no deficits, colon ca, who presented w an ischemic stroke w basilar artery thrombosis s/p MT w TICI2b flow restored. Pt presents with: 1) Cognitive-linguistic deficits, with distractibility, short-term memory deficits, and impaired insight. Pt is able to follow simple directives and make wants/ needs known. 2) Dysarthria, with reduced articulatory contact resulting in impaired intelligibility in unknown contexts. 67 yo man w DM, HTN, Afib (on eliquis on hold since 11/8 for chemoport placement), prior CVA with no deficits, colon ca, who presented w an ischemic stroke w basilar artery thrombosis s/p MT w TICI2b flow restored. Pt presents with: 1) Cognitive-linguistic deficits, with distractibility and impulsivity, short-term memory deficits, and impaired insight. Pt is able to follow simple directives and make basic wants/ needs known. 2) Dysarthria, with reduced articulatory contact resulting in impaired intelligibility in unknown contexts.

## 2021-11-14 NOTE — CHART NOTE - NSCHARTNOTEFT_GEN_A_CORE
CT head stable this am. OK to transfer to stroke unit per NSCU attending. Stroke attending Dr. Mosquera saw patient this am and accepted transfer. Personally signed out to stroke ACP ([id number]) before transfer.

## 2021-11-14 NOTE — CONSULT NOTE ADULT - PROBLEM SELECTOR RECOMMENDATION 9
-Poor control with Hba1c of 11.8%  -Blood glucose target is 100 to 180  -Encouraged a carbohydrate consistent diet  -Agree with transitioning off insulin drip to Lantus 10 units SQ BID and Admelog 7 units QAC with moderate correction scale  -Will follow closely for insulin regimen adjustments  -For discharge, would recommend basal bolus regimen with doses TBD  -Recommend endocrine follow up at our [place] office [phone]
Likely cardioembolic   occlusion of distal basilar artery and b/l P2 segments s/p MT, TICI2b

## 2021-11-14 NOTE — CONSULT NOTE ADULT - SUBJECTIVE AND OBJECTIVE BOX
HPI: 66 y.o. male with h/o uncontrolled Type 2 DM, HTN, hyperlipidemia, afib on Eliquis, CVA and rectal cancer presents with ischemic stroke with basilar artery thrombosis. Unable to obtain much history from patient as he is agitated and trying to climb out of his bed.    His spouse and daughter arrived at bedside and provided history. He has had Type 2 DM for many years and only takes Metformin at home. Not sure of dose of metformin. He does not monitor his blood glucose levels at home. He does watch his diet and avoids sweets. He does sometimes have chips. No regular sodas or juices. Not active. He follows with his PCP only for his diabetes care. UTD with ophthalmology and not aware of retinopathy. Family reports he needed cataract surgery; however has been put on hold given his rectal cancer diagnosis. Not aware of kidney disease or neuropathy.     Patient without complaints today but agitated.       PAST MEDICAL & SURGICAL HISTORY:    Atrial fibrillation    Lumbago with sciatica of right side    Benign hypertension    Hyperlipidemia    H/O renal calculi  ESWL right side yrs ago  last stone one year ago  (passed on its own)    Obese    Eczema    Diabetes mellitus type II     S/P tonsillectomy    rectal cancer        FAMILY HISTORY:  Mother and sister with Type 2 DM      Social History:    Home Medications:  amLODIPine 10 mg oral tablet: 1 tab(s) orally once a day (11 Nov 2021 10:18)  Eliquis 5 mg oral tablet: 1 tab(s) orally 2 times a day (11 Nov 2021 10:18)  metFORMIN 1000 mg oral tablet: 1 tab(s) orally 2 times a day (11 Nov 2021 10:18)    MEDICATIONS  (STANDING):  amLODIPine   Tablet 10 milliGRAM(s) Oral daily  apixaban 5 milliGRAM(s) Oral two times a day  atorvastatin 80 milliGRAM(s) Oral at bedtime  dextrose 50% Injectable 50 milliLiter(s) IV Push every 15 minutes  dextrose 50% Injectable 25 milliLiter(s) IV Push every 15 minutes  insulin glargine Injectable (LANTUS) 10 Unit(s) SubCutaneous two times a day  insulin lispro (ADMELOG) corrective regimen sliding scale   SubCutaneous Before meals and at bedtime  insulin lispro Injectable (ADMELOG) 7 Unit(s) SubCutaneous three times a day before meals  polyethylene glycol 3350 17 Gram(s) Oral two times a day  QUEtiapine 25 milliGRAM(s) Oral once  senna 2 Tablet(s) Oral at bedtime    MEDICATIONS  (PRN):  acetaminophen     Tablet .. 650 milliGRAM(s) Oral every 6 hours PRN Temp greater or equal to 38C (100.4F), Mild Pain (1 - 3)      Allergies    No Known Drug Allergies  Patient stated he had sensation of  throat closing  30 minutes after  Epidural pain management resolved it self few minutes after , /  20 y ago Unable to recall MD name or number (Other)    Intolerances      Review of Systems:    UNABLE TO OBTAIN    PHYSICAL EXAM:  VITALS: T(C): 37.1 (11-14-21 @ 12:26)  T(F): 98.7 (11-14-21 @ 12:26), Max: 98.8 (11-13-21 @ 20:00)  HR: 92 (11-14-21 @ 12:26) (78 - 92)  BP: 121/74 (11-14-21 @ 12:26) (121/74 - 160/80)  RR:  (14 - 22)  SpO2:  (92% - 100%)  Wt(kg): --  GENERAL: Alert  EYES: No proptosis  HEENT:  Atraumatic, Normocephalic  THYROID: Normal size  RESPIRATORY: Clear to auscultation bilaterally; No rales, rhonchi, wheezing  CARDIOVASCULAR: +S1/S2; no peripheral edema  GI: Normal bowel sounds, soft, nontender, non distended,  SKIN: Dry  MUSCULOSKELETAL: Full range of motion  NEURO:  no tremor  PSYCH: Agitated  CUSHING'S SIGNS: no striae    POCT Blood Glucose.: 199 mg/dL (11-14-21 @ 11:05)  POCT Blood Glucose.: 157 mg/dL (11-14-21 @ 07:02)  POCT Blood Glucose.: 149 mg/dL (11-14-21 @ 04:43)  POCT Blood Glucose.: 144 mg/dL (11-14-21 @ 03:32)  POCT Blood Glucose.: 132 mg/dL (11-14-21 @ 02:31)  POCT Blood Glucose.: 128 mg/dL (11-14-21 @ 01:31)  POCT Blood Glucose.: 124 mg/dL (11-14-21 @ 00:34)  POCT Blood Glucose.: 117 mg/dL (11-13-21 @ 23:25)  POCT Blood Glucose.: 122 mg/dL (11-13-21 @ 22:27)  POCT Blood Glucose.: 140 mg/dL (11-13-21 @ 21:20)  POCT Blood Glucose.: 116 mg/dL (11-13-21 @ 19:56)  POCT Blood Glucose.: 141 mg/dL (11-13-21 @ 19:05)  POCT Blood Glucose.: 152 mg/dL (11-13-21 @ 18:08)  POCT Blood Glucose.: 163 mg/dL (11-13-21 @ 16:58)  POCT Blood Glucose.: 211 mg/dL (11-13-21 @ 16:20)  POCT Blood Glucose.: 196 mg/dL (11-13-21 @ 15:10)  POCT Blood Glucose.: 194 mg/dL (11-13-21 @ 14:11)  POCT Blood Glucose.: 255 mg/dL (11-13-21 @ 13:09)  POCT Blood Glucose.: 292 mg/dL (11-13-21 @ 12:09)  POCT Blood Glucose.: 285 mg/dL (11-13-21 @ 11:11)  POCT Blood Glucose.: 301 mg/dL (11-13-21 @ 10:19)  POCT Blood Glucose.: 356 mg/dL (11-13-21 @ 07:38)  POCT Blood Glucose.: 368 mg/dL (11-13-21 @ 07:36)                            10.7   9.84  )-----------( 313      ( 13 Nov 2021 22:36 )             33.8       11-13    137  |  106  |  16  ----------------------------<  128<H>  3.6   |  20<L>  |  0.94    EGFR if : 98  EGFR if non : 85    Ca    8.6      11-13  Mg     1.8     11-13  Phos  2.7     11-13        Thyroid Function Tests:  11-13 @ 12:26 TSH 1.58 FreeT4 -- T3 70 Anti TPO -- Anti Thyroglobulin Ab -- TSI --          11-13 Chol 162 Direct LDL -- LDL calculated 97 HDL 42 Trig 116    Radiology:

## 2021-11-14 NOTE — OCCUPATIONAL THERAPY INITIAL EVALUATION ADULT - PERTINENT HX OF CURRENT PROBLEM, REHAB EVAL
66M with a h/o DM, HTN, Afib,prior stroke (no deficits), colon cancer, who initially presented to [facility] with slurred speech and facial droop. Stroke code activated at OSH, NIHSS 14 at that time. CTH without reported to be negative for acute infarct. 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 Patient transferred to [facility] for mechanical thrombectomy of basilar artery occlusion with tici 2b reperfusion 66M with a h/o DM, HTN, Afib, prior stroke (no deficits), colon cancer, who initially presented to [facility] with slurred speech and facial droop. Stroke code activated at OSH, NIHSS 14 at that time. CTH without reported to be negative for acute infarct. 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 Patient transferred to [facility] for mechanical thrombectomy of basilar artery occlusion with tici 2b reperfusion

## 2021-11-14 NOTE — OCCUPATIONAL THERAPY INITIAL EVALUATION ADULT - PLANNED THERAPY INTERVENTIONS, OT EVAL
ADL retraining/balance training/bed mobility training/cognitive, visual perceptual/fine motor coordination training/motor coordination training/neuromuscular re-education/strengthening/transfer training

## 2021-11-14 NOTE — PROGRESS NOTE ADULT - ASSESSMENT
65 yo man w DM, HTN, Afib (on eliquis on hold since 11/8 for chemoport placement), prior CVA with no deficits, colon ca, who presented w an ischemic stroke w basilar artery thrombosis s/p MT w TICI2b flow restored. Etiology cardioembolic vs hypercoagulable/malignancy.      NEURO:  Neurochecksq1  CT Head in AM, MRI at some point  Start asa if ct clear  stroke core measures  Stroke team to follow  Pain control  Activity:  [x] PT [x] OT  SLP eval    PULM:  Incentive spirometry, mobilize as tolerated    CV:  Keep -140mmHg, d/c a-line if off antihypertensive drips can probably liberalize tomorrow  Lipitor  TTE pending  amlodipine 10     RENAL:  IVF until good PO intake, d/c shea in AM, NS 75cc    GI: SLP swallow eval  Diet: Passed bedside swallow, on pureed diet  Bowel regimen senna miralax standing    ENDO:  cont lantus and iss and lispro w meals 7 units, adjust as needed  a1c 11.8  tsh wnl  Goal euglycemia (-180)    HEME/ONC:  VTE prophylaxis: [x] SCDs, resume chemical ppx pending imaging    ID: afebrile   67 yo man w DM, HTN, Afib (on eliquis on hold since 11/8 for chemoport placement), prior CVA with no deficits, colon ca, who presented w an ischemic stroke w basilar artery thrombosis s/p MT w TICI2b flow restored. Etiology cardioembolic vs hypercoagulable/malignancy.      NEURO:  Neurochecksq1  CT Head in AM, MRI at some point  Start asa if ct clear  stroke core measures  Stroke team to follow  Pain control  Activity:  [x] PT [x] OT  SLP eval    PULM:  Incentive spirometry, mobilize as tolerated    CV:  Keep -140mmHg, d/c a-line if off antihypertensive drips can probably liberalize tomorrow  Lipitor  TTE pending  amlodipine 10     RENAL:  IVF until good PO intake, d/c shea in AM, NS 75cc    GI: SLP swallow eval  Diet: Passed bedside swallow, on pureed diet  Bowel regimen senna miralax standing    ENDO:  cont lantus and iss and lispro w meals 7 units, adjust as needed  a1c 11.8  tsh wnl  Goal euglycemia (-180)    HEME/ONC: pt w active cancer and recent chemoth being seen by oncology in this hosp per pt, will contact oncology group to make them aware  VTE prophylaxis: [x] SCDs, resume chemical ppx pending imaging    ID: afebrile

## 2021-11-14 NOTE — SWALLOW BEDSIDE ASSESSMENT ADULT - ADDITIONAL RECOMMENDATIONS
Maintain good oral hygiene. Encourage mobility as able. Maintain good oral hygiene. Encourage mobility as able.    GOAL: Pt will tolerate diet without signs of laryngeal penetration/aspiration.

## 2021-11-14 NOTE — OCCUPATIONAL THERAPY INITIAL EVALUATION ADULT - DIAGNOSIS, OT EVAL
Pt p/w impaired balance, strength, endurance, coordination, vision, cognition, safety awareness impacting fxnl ind.

## 2021-11-14 NOTE — SWALLOW BEDSIDE ASSESSMENT ADULT - SWALLOW EVAL: DIAGNOSIS
Pt presents with a functional oropharyngeal swallow. No overt signs of laryngeal penetration/aspiration observed across trials. Pt is noted with impulsivity re: amount/rate of intake. 67 yo man w DM, HTN, Afib (on eliquis on hold since 11/8 for chemoport placement), prior CVA with no deficits, colon ca, who presented w an ischemic stroke w basilar artery thrombosis s/p MT w TICI2b flow restored. Pt presents with a functional oropharyngeal swallow. No overt signs of laryngeal penetration/aspiration observed across trials. Pt is noted with impulsivity re: amount/rate of intake.

## 2021-11-14 NOTE — PATIENT PROFILE ADULT - NSPRESCRUSEDDRG_GEN_A_NUR
Pt states that she has not had her period in 3 months, states that she took a pregnancy test in May that was neg.  Pt states that she started to have vaginal bleeding on Sunday and started taking BCP on that day, pt now c/o continued vaginal bleeding with clots.  Denies PMH
No

## 2021-11-14 NOTE — PROGRESS NOTE ADULT - SUBJECTIVE AND OBJECTIVE BOX
EVENTS: Transitioned off insulin gtt, cleared bedside swallow, awaiting SLP. No other events    ICU Vital Signs Last 24 Hrs  T(C): 36.8 (14 Nov 2021 04:00), Max: 37.1 (13 Nov 2021 20:00)  T(F): 98.2 (14 Nov 2021 04:00), Max: 98.8 (13 Nov 2021 20:00)  HR: 88 (14 Nov 2021 05:00) (77 - 94)  BP: 150/89 (14 Nov 2021 05:00) (105/58 - 168/96)  BP(mean): 112 (14 Nov 2021 05:00) (76 - 118)  ABP: 13/2 (13 Nov 2021 19:15) (13/2 - 160/69)  ABP(mean): 7 (13 Nov 2021 19:15) (7 - 104)  RR: 18 (14 Nov 2021 05:00) (14 - 22)  SpO2: 95% (14 Nov 2021 05:00) (93% - 100%)        11-13-21 @ 07:01  -  11-13-21 @ 18:42  --------------------------------------------------------  IN: 465.5 mL / OUT: 460 mL / NET: 5.5 mL      LABS:  Na: 133 (11-13 @ 08:20)  K: 3.8 (11-13 @ 08:20)  Cl: 101 (11-13 @ 08:20)  CO2: 19 (11-13 @ 08:20)  BUN: 20 (11-13 @ 08:20)  Cr: 0.92 (11-13 @ 08:20)  Glu: 377(11-13 @ 08:20)    Hgb: 11.2 (11-13 @ 08:20)  Hct: 35.7 (11-13 @ 08:20)  WBC: 11.10 (11-13 @ 08:20)  Plt: 316 (11-13 @ 08:20)      MEDICATIONS:  acetaminophen     Tablet .. 650 milliGRAM(s) Oral every 6 hours PRN  amLODIPine   Tablet 10 milliGRAM(s) Oral daily  atorvastatin 80 milliGRAM(s) Oral at bedtime  dextrose 50% Injectable 50 milliLiter(s) IV Push every 15 minutes  dextrose 50% Injectable 25 milliLiter(s) IV Push every 15 minutes  insulin regular Infusion 3 Unit(s)/Hr IV Continuous <Continuous>  niCARdipine Infusion 5 mG/Hr IV Continuous <Continuous>  sodium chloride 0.9%. 1000 milliLiter(s) IV Continuous <Continuous>    EXAMINATION:  General:  calm  HEENT:  MMM  Neuro:  AOx3, dysarthric, follows commands, bilat GILDA, VFF except for inconsistent motion detection in R temporal visual field, LUE triceps 4-, hand  and bi 5/5, RUE triceps 4+, hg and bi 5/5, LLE 5/5, RLE HF 4/5 DF 4/5 PF 5/5, dysmetria BUE  Cards:  RRR  Respiratory:  no respiratory distress  Abdomen:  soft  Extremities:  no edema. Groin without hematoma.        EVENTS: Transitioned off insulin gtt, cleared bedside swallow, awaiting SLP. No other events    ICU Vital Signs Last 24 Hrs  T(C): 36.8 (14 Nov 2021 04:00), Max: 37.1 (13 Nov 2021 20:00)  T(F): 98.2 (14 Nov 2021 04:00), Max: 98.8 (13 Nov 2021 20:00)  HR: 88 (14 Nov 2021 05:00) (77 - 94)  BP: 150/89 (14 Nov 2021 05:00) (105/58 - 168/96)  BP(mean): 112 (14 Nov 2021 05:00) (76 - 118)  ABP: 13/2 (13 Nov 2021 19:15) (13/2 - 160/69)  ABP(mean): 7 (13 Nov 2021 19:15) (7 - 104)  RR: 18 (14 Nov 2021 05:00) (14 - 22)  SpO2: 95% (14 Nov 2021 05:00) (93% - 100%)        11-13-21 @ 07:01  -  11-13-21 @ 18:42  --------------------------------------------------------  IN: 465.5 mL / OUT: 460 mL / NET: 5.5 mL      LABS:  Na: 133 (11-13 @ 08:20)  K: 3.8 (11-13 @ 08:20)  Cl: 101 (11-13 @ 08:20)  CO2: 19 (11-13 @ 08:20)  BUN: 20 (11-13 @ 08:20)  Cr: 0.92 (11-13 @ 08:20)  Glu: 377(11-13 @ 08:20)    Hgb: 11.2 (11-13 @ 08:20)  Hct: 35.7 (11-13 @ 08:20)  WBC: 11.10 (11-13 @ 08:20)  Plt: 316 (11-13 @ 08:20)      MEDICATIONS:  acetaminophen     Tablet .. 650 milliGRAM(s) Oral every 6 hours PRN  amLODIPine   Tablet 10 milliGRAM(s) Oral daily  atorvastatin 80 milliGRAM(s) Oral at bedtime  dextrose 50% Injectable 50 milliLiter(s) IV Push every 15 minutes  dextrose 50% Injectable 25 milliLiter(s) IV Push every 15 minutes  insulin regular Infusion 3 Unit(s)/Hr IV Continuous <Continuous>  niCARdipine Infusion 5 mG/Hr IV Continuous <Continuous>  sodium chloride 0.9%. 1000 milliLiter(s) IV Continuous <Continuous>    EXAMINATION:  General:  calm  HEENT:  MMM  Neuro:  AOx3, dysarthric, follows commands, bilat GILDA, VFF except for inconsistent motion detection in R temporal visual field, LUE triceps 4-, hand  and bi 5/5, RUE triceps 4+, hg and bi 5/5, LLE 5/5, RLE HF 4/5 DF 4/5 PF 5/5, dysmetria BUE  Cards:  RRR. Port in L upper chest  Respiratory:  no respiratory distress  Abdomen:  soft  Extremities:  no edema. Groin without hematoma.

## 2021-11-14 NOTE — PROGRESS NOTE ADULT - ASSESSMENT
ASSESSMENT: This is a 66yman with  hx of DM, HTN, Afib (on eliquis on hold since 11/8 for chemoport placement), prior CVA with no deficits, colon ca, who was found by wife on the floor, brought to [facility] with slurred speech and facial droop. CTA showed basilar artery thrombosis. Pt is now s/p MT w TICI2b flow restored.     Impression: left PCA ischemic infarct. Etiology cardioembolic vs hypercoagulable/malignancy.      NEURO: Neuro exam improved vs admission, Continue close monitoring for neurologic deterioration, -180mmHg, with slow titration to normotensive, high dose statin, titrate statin to LDL goal less than 70, Pending MRI Brain w/o contrast, CT/CTA head/neck results as above. Physical therapy/OT eval/treatment.     ANTITHROMBOTIC THERAPY: Plan to start Eliquis for Afib if repeat head CT shows no hemorrhage     PULMONARY: Protecting airway, saturating well     CARDIOVASCULAR: check TTE, cardiac monitoring: episode of Afib, continue cardiac meds, Dr Garcia contacted for further eval                               SBP goal: 110-180mmHg    GASTROINTESTINAL: Dysphagia screen failed, S/S eval on 11/13 with recommendations for easy to chew textures with thin liquids. Continue bowel regimen      Diet: Easy to chew textures with thin liquids    RENAL: BUN/Cr stable, good urine output      Na Goal: Greater than 135     Dumas: No    HEMATOLOGY: anemia, no acute signs of bleeding, will continue to monitor,  Platelets normal, LE doppler ordered to r/o DVT      DVT ppx:  LMWH     ID: afebrile, no leukocytosis     OTHER: HgbA1C 11.8, FS on admission 368, now 157 (11/14) Off Insulin drip since last night 11/13/21. Pt started on Lantus and sliding scale, Will contact endocrine team for further recommendations    DISPOSITION: Rehab or home depending on PT eval once stable and workup is complete    CORE MEASURES:        Admission NIHSS: 16     TPA:  NO      LDL/HDL: 97/42     Depression Screen: 0     Statin Therapy: Y     Dysphagia Screen:  FAIL     Smoking  NO      Afib  YES     Stroke Education  YES    Obtain screening ultrasound in patients who meet 1 or more of the following criteria as patient is high risk for DVT/PE upon admission:   [] History of DVT/PE  [X]Hypercoagulable states (Factor V Leiden, Cancer, OCP, etc. )  []Prolonged immobility (hemiplegia/hemiparesis/post operative or any other extended immobilization)   [] Transferred from outside facility (Rehab or Long term care)  [] Age </= to 50 ASSESSMENT: This is a 66yman with  hx of DM, HTN, Afib (on eliquis on hold since 11/8 for chemoport placement), prior CVA with no deficits, colon ca, who was found by wife on the floor, brought to [facility] with slurred speech and facial droop. not TPA candidate as pt LKN more than 4hrs, CTH reported to be negative for acute infarct.  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 Bilateral superior cerebellar arteries are also not seen. Not a candidate for tPA due to presentation outside the window. Pt is s/p MT w TICI2b flow restored.     Impression: Left PCA ischemic infarct. Etiology cardioembolic vs hypercoagulable/malignancy.      NEURO: Neuro exam improved vs admission, Continue close monitoring for neurologic deterioration, -180mmHg, with slow titration to normotensive, high dose statin, titrate statin to LDL goal less than 70, Pending MRI Brain w/o contrast, CT/CTA head/neck results as above. Physical therapy/OT eval/treatment.     ANTITHROMBOTIC THERAPY: Plan to start Eliquis for Afib if repeat head CT shows no hemorrhage     PULMONARY: Protecting airway, saturating well     CARDIOVASCULAR: check TTE, cardiac monitoring: episode of Afib, continue cardiac meds, Dr Garcia contacted for further eval                               SBP goal: 110-180mmHg    GASTROINTESTINAL: Dysphagia screen failed, S/S eval on 11/13 with recommendations for easy to chew textures with thin liquids. Continue bowel regimen      Diet: Easy to chew textures with thin liquids    RENAL: BUN/Cr stable, good urine output      Na Goal: Greater than 135     Dumas: No    HEMATOLOGY: anemia, no acute signs of bleeding, will continue to monitor,  Platelets normal, LE doppler ordered to r/o DVT      DVT ppx:  LMWH     ID: afebrile, no leukocytosis     OTHER: HgbA1C 11.8, FS on admission 368, now 157 (11/14) Off Insulin drip since last night 11/13/21. Pt started on Lantus and sliding scale, Will contact endocrine team for further recommendations    DISPOSITION: Rehab or home depending on PT eval once stable and workup is complete    CORE MEASURES:        Admission NIHSS: 16     TPA:  NO      LDL/HDL: 97/42     Depression Screen: 0     Statin Therapy: Y     Dysphagia Screen:  FAIL     Smoking  NO      Afib  YES     Stroke Education  YES    Obtain screening ultrasound in patients who meet 1 or more of the following criteria as patient is high risk for DVT/PE upon admission:   [] History of DVT/PE  [X]Hypercoagulable states (Factor V Leiden, Cancer, OCP, etc. )  []Prolonged immobility (hemiplegia/hemiparesis/post operative or any other extended immobilization)   [] Transferred from outside facility (Rehab or Long term care)  [] Age </= to 50

## 2021-11-14 NOTE — OCCUPATIONAL THERAPY INITIAL EVALUATION ADULT - LIVES WITH, PROFILE
Patient has 4 steps with B/L rails to enter home, then 1 flight up with right rails up inside home. Pt lives with spouse and also has a RW but states he does not use./spouse

## 2021-11-14 NOTE — SWALLOW BEDSIDE ASSESSMENT ADULT - ASPIRATION PRECAUTIONS
Monitor for s/s aspiration/laryngeal penetration. If noted:  D/C p.o. intake, provide non-oral nutrition/hydration/meds, and contact this service @ [id number]

## 2021-11-14 NOTE — SPEECH LANGUAGE PATHOLOGY EVALUATION - SLP PERTINENT HISTORY OF CURRENT PROBLEM
66 year old M with a h/o DM, HTN, Afib (on eliquis, however on hold since 11/8 for chemoport placement on 11/11), prior stroke (no deficits), colon cancer (invasive adenocarcinoma of the rectum), who initially presented to [facility] with slurred speech and facial droop. LNK 22:30 on 11/12/21 when he went to bed. Patient found by wife on the floor, around 02:30, after reportedly falling out of bed, at which time she noted his speech was slurred. Stroke code activated at OSH, NIHSS 14 at that time. CTH without reported to be negative for acute infarct. 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 Patient transferred to [facility] for mechanical thrombectomy, s/p MT w TICI2b flow restored. Etiology cardioembolic vs hypercoagulable/ malignancy.

## 2021-11-14 NOTE — SWALLOW BEDSIDE ASSESSMENT ADULT - SLP PERTINENT HISTORY OF CURRENT PROBLEM
66 year old M with a h/o DM, HTN, Afib (on eliquis, however on hold since 11/8 for chemoport placement on 11/11), prior stroke (no deficits), colon cancer (invasive adenocarcinoma of the rectum), who initially presented to [facility] with slurred speech and facial droop. LNK 22:30 on 11/12/21 when he went to bed. Patient found by wife on the floor, around 02:30, after reportedly falling out of bed, at which time she noted his speech was slurred. Stroke code activated at OSH, NIHSS 14 at that time. CTH without reported to be negative for acute infarct. 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 Patient transferred to [facility] for mechanical thrombectomy, s/p MT w TICI2b flow restored. Etiology cardioembolic vs hypercoagulable/ malignancy.

## 2021-11-14 NOTE — PATIENT PROFILE ADULT - ARRIVAL FROM
transfer from [facility]

## 2021-11-14 NOTE — SPEECH LANGUAGE PATHOLOGY EVALUATION - COMMENTS
Neuro:  AOx3, dysarthric, follows commands, bilat GILDA, VFF except for inconsistent motion detection in R temporal visual field, LUE triceps 4-, hand  and bi 5/5, RUE triceps 4+, hg and bi 5/5, LLE 5/5, RLE HF 4/5 DF 4/5 PF 5/5, dysmetria BUE  11/13: Pt passed dysphagia screen Picture description task: Pt able to identify concrete elements, however has difficulty identifying abstract elements/ inferential information Clock-drawing task: Conceptual and spatial deficits noted, with absent patient awareness. Pt has difficulty with oral reading task, with substitutions and skipped words.

## 2021-11-14 NOTE — CONSULT NOTE ADULT - SUBJECTIVE AND OBJECTIVE BOX
CHIEF COMPLAINT:    HISTORY OF PRESENT ILLNESS:  67 yo man w DM, HTN, Afib (on eliquis on hold since 11/8 for chemoport placement), prior CVA with no deficits, colon ca, who presented w an ischemic stroke w basilar artery thrombosis.  11/13: PAD0 occlusion of distal basilar artery and b/l P2 segments s/p MT, TICI2b    24 Hour Events/Subjective:  - PAD0 occlusion of distal basilar artery and b/l P2 segments s/p MT, TICI2b      PAST MEDICAL & SURGICAL HISTORY:  Lumbago    Lumbago with sciatica of right side    Benign hypertension  dx 10 years    H/O renal calculi  ESWL right side yrs ago  last stone one year ago  (passed on its own)    Obese    Eczema    Diabetes mellitus type II  on Meds 4/2012    S/P tonsillectomy            MEDICATIONS:  amLODIPine   Tablet 10 milliGRAM(s) Oral daily  enoxaparin Injectable 40 milliGRAM(s) SubCutaneous daily        acetaminophen     Tablet .. 650 milliGRAM(s) Oral every 6 hours PRN    polyethylene glycol 3350 17 Gram(s) Oral two times a day  senna 2 Tablet(s) Oral at bedtime    atorvastatin 80 milliGRAM(s) Oral at bedtime  dextrose 50% Injectable 50 milliLiter(s) IV Push every 15 minutes  dextrose 50% Injectable 25 milliLiter(s) IV Push every 15 minutes  insulin glargine Injectable (LANTUS) 10 Unit(s) SubCutaneous two times a day  insulin lispro (ADMELOG) corrective regimen sliding scale   SubCutaneous Before meals and at bedtime  insulin lispro Injectable (ADMELOG) 7 Unit(s) SubCutaneous three times a day before meals        FAMILY HISTORY:      SOCIAL HISTORY:    [ ] Non-smoker  [ ] Smoker  [ ] Alcohol    Allergies    No Known Drug Allergies  Patient stated he had sensation of  throat closing  30 minites after  Epidural pain management resolved it self few minitus after , /  20 y ago Unable to recall MD name or number (Other)    Intolerances    	    REVIEW OF SYSTEMS:  CONSTITUTIONAL: No fever, weight loss, or fatigue  EYES: No eye pain, visual disturbances, or discharge  ENMT:  No difficulty hearing, tinnitus, vertigo; No sinus or throat pain  NECK: No pain or stiffness  RESPIRATORY: No cough, wheezing, chills or hemoptysis; No Shortness of Breath  CARDIOVASCULAR: No chest pain, palpitations, passing out, dizziness, or leg swelling  GASTROINTESTINAL: No abdominal or epigastric pain. No nausea, vomiting, or hematemesis; No diarrhea or constipation. No melena or hematochezia.  GENITOURINARY: No dysuria, frequency, hematuria, or incontinence  NEUROLOGICAL: No headaches, memory loss, loss of strength, numbness, or tremors  SKIN: No itching, burning, rashes, or lesions   LYMPH Nodes: No enlarged glands  ENDOCRINE: No heat or cold intolerance; No hair loss  MUSCULOSKELETAL: No joint pain or swelling; No muscle, back, or extremity pain  PSYCHIATRIC: No depression, anxiety, mood swings, or difficulty sleeping  HEME/LYMPH: No easy bruising, or bleeding gums  ALLERY AND IMMUNOLOGIC: No hives or eczema	    [ ] All others negative	  [ ] Unable to obtain    PHYSICAL EXAM:  T(C): 36.2 (11-14-21 @ 11:00), Max: 37.1 (11-13-21 @ 20:00)  HR: 86 (11-14-21 @ 11:00) (77 - 92)  BP: 145/71 (11-14-21 @ 11:00) (122/66 - 160/80)  RR: 16 (11-14-21 @ 11:00) (14 - 22)  SpO2: 94% (11-14-21 @ 11:00) (93% - 100%)  Wt(kg): --  I&O's Summary    13 Nov 2021 07:01  -  14 Nov 2021 07:00  --------------------------------------------------------  IN: 2643.8 mL / OUT: 760 mL / NET: 1883.8 mL    14 Nov 2021 07:01  -  14 Nov 2021 11:28  --------------------------------------------------------  IN: 475 mL / OUT: 0 mL / NET: 475 mL        Appearance: NAD  HEENT:  Dry  oral mucosa, PERRL, EOMI	  Lymphatic: No lymphadenopathy  Cardiovascular: Normal S1 S2, No JVD, No murmurs, No edema  Respiratory: Lungs clear to auscultation	  Psychiatry: A & O x 3, Mood & affect appropriate  Gastrointestinal:  Soft, Non-tender, + BS	  Skin: No rashes, No ecchymoses, No cyanosis	  Neuro:  AOx3, dysarthric, follows commands, bilat GILDA, VFF except for inconsistent motion detection in R temporal visual field, LUE triceps 4-, hand  and bi 5/5, RUE triceps 4+, hg and bi 5/5, LLE 5/5, RLE HF 4/5 DF 4/5 PF 5/5, dysmetria BUE    Extremities: Normal range of motion, No clubbing, cyanosis or edema  Vascular: Peripheral pulses palpable 2+ bilaterally    TELEMETRY: 	    ECG:  < from: CT Head No Cont (11.13.21 @ 13:27) >    EXAM:  CT BRAIN                            PROCEDURE DATE:  11/13/2021            INTERPRETATION:  CLINICAL INFORMATION: Status post thrombectomy for basilar artery occlusion.    TECHNIQUE: Noncontrast axial CT images were acquired through the head. Two-dimensional sagittal and coronal reformats were generated.    COMPARISON STUDY: None    FINDINGS:    There is an ill-defined region of hypoattenuation in the left occipital lobe.    There is no CT evidence of acute intracranial hemorrhage, extra-axial collection, mass effect, midline shift, central herniation, or hydrocephalus.    Mild prominence of the ventricles and sulci compatible with age-related involutional change. Nonspecific patchy white matter hypoattenuation likely related to chronic microvascular ischemic disease.    The visualized paranasal sinuses are clear. The mastoid air cells and middle ear cavities are clear.    The soft tissues of the scalp are unremarkable. The calvarium is intact.    IMPRESSION:  No CT evidence of acute intracranial hemorrhage, mass effect or midline shift.  Ill-defined region of hypoattenuation in the left occipital lobe concerning for subacute to chronic left PCA infarct. Recommend further evaluation with MR brain.    --- End of Report ---              ANNEL KERR MD; Resident Radiology  This document has been electronically signed.  KARRIE MELCHOR MD; Attending Radiologist  This document has been electronically signed. Nov 13 2021  1:40PM    < end of copied text >  	  RADIOLOGY:  OTHER: 	  	  LABS:	 	    CARDIAC MARKERS:                                  10.7   9.84  )-----------( 313      ( 13 Nov 2021 22:36 )             33.8     11-13    137  |  106  |  16  ----------------------------<  128<H>  3.6   |  20<L>  |  0.94    Ca    8.6      13 Nov 2021 22:36  Phos  2.7     11-13  Mg     1.8     11-13      proBNP:   Lipid Profile:   HgA1c:   TSH: Thyroid Stimulating Hormone, Serum: 1.58 uIU/mL (11-13 @ 12:26)             CHIEF COMPLAINT:    HISTORY OF PRESENT ILLNESS:  67 yo man w DM, HTN, Afib (on eliquis on hold since 11/8 for chemoport placement), prior CVA with no deficits, colon ca, who presented w an ischemic stroke w basilar artery thrombosis.  11/13: PAD0 occlusion of distal basilar artery and b/l P2 segments s/p MT, TICI2b    24 Hour Events/Subjective:  - PAD0 occlusion of distal basilar artery and b/l P2 segments s/p MT, TICI2b      PAST MEDICAL & SURGICAL HISTORY:  Lumbago    Lumbago with sciatica of right side    Benign hypertension  dx 10 years    H/O renal calculi  ESWL right side yrs ago  last stone one year ago  (passed on its own)    Obese    Eczema    Diabetes mellitus type II  on Meds 4/2012    S/P tonsillectomy            MEDICATIONS:  amLODIPine   Tablet 10 milliGRAM(s) Oral daily  enoxaparin Injectable 40 milliGRAM(s) SubCutaneous daily        acetaminophen     Tablet .. 650 milliGRAM(s) Oral every 6 hours PRN    polyethylene glycol 3350 17 Gram(s) Oral two times a day  senna 2 Tablet(s) Oral at bedtime    atorvastatin 80 milliGRAM(s) Oral at bedtime  dextrose 50% Injectable 50 milliLiter(s) IV Push every 15 minutes  dextrose 50% Injectable 25 milliLiter(s) IV Push every 15 minutes  insulin glargine Injectable (LANTUS) 10 Unit(s) SubCutaneous two times a day  insulin lispro (ADMELOG) corrective regimen sliding scale   SubCutaneous Before meals and at bedtime  insulin lispro Injectable (ADMELOG) 7 Unit(s) SubCutaneous three times a day before meals        FAMILY HISTORY:      SOCIAL HISTORY:    [ ] Non-smoker  [ ] Smoker  [ ] Alcohol    Allergies    No Known Drug Allergies  Patient stated he had sensation of  throat closing  30 minites after  Epidural pain management resolved it self few minitus after , /  20 y ago Unable to recall MD name or number (Other)    Intolerances    	    REVIEW OF SYSTEMS:  CONSTITUTIONAL: No fever, weight loss, or fatigue  EYES: No eye pain, visual disturbances, or discharge  ENMT:  No difficulty hearing, tinnitus, vertigo; No sinus or throat pain  NECK: No pain or stiffness  RESPIRATORY: No cough, wheezing, chills or hemoptysis; No Shortness of Breath  CARDIOVASCULAR: No chest pain, palpitations, passing out, dizziness, or leg swelling  GASTROINTESTINAL: No abdominal or epigastric pain. No nausea, vomiting, or hematemesis; No diarrhea or constipation. No melena or hematochezia.  GENITOURINARY: No dysuria, frequency, hematuria, or incontinence  NEUROLOGICAL: No headaches, memory loss, loss of strength, numbness, or tremors  SKIN: No itching, burning, rashes, or lesions   LYMPH Nodes: No enlarged glands  ENDOCRINE: No heat or cold intolerance; No hair loss  MUSCULOSKELETAL: No joint pain or swelling; No muscle, back, or extremity pain  PSYCHIATRIC: No depression, anxiety, mood swings, or difficulty sleeping  HEME/LYMPH: No easy bruising, or bleeding gums  ALLERY AND IMMUNOLOGIC: No hives or eczema	    [ ] All others negative	  [ ] Unable to obtain    PHYSICAL EXAM:  T(C): 36.2 (11-14-21 @ 11:00), Max: 37.1 (11-13-21 @ 20:00)  HR: 86 (11-14-21 @ 11:00) (77 - 92)  BP: 145/71 (11-14-21 @ 11:00) (122/66 - 160/80)  RR: 16 (11-14-21 @ 11:00) (14 - 22)  SpO2: 94% (11-14-21 @ 11:00) (93% - 100%)  Wt(kg): --  I&O's Summary    13 Nov 2021 07:01  -  14 Nov 2021 07:00  --------------------------------------------------------  IN: 2643.8 mL / OUT: 760 mL / NET: 1883.8 mL    14 Nov 2021 07:01  -  14 Nov 2021 11:28  --------------------------------------------------------  IN: 475 mL / OUT: 0 mL / NET: 475 mL        Appearance: NAD  HEENT:  Dry  oral mucosa, PERRL, EOMI	  Lymphatic: No lymphadenopathy  Cardiovascular: Normal S1 S2, No JVD, No murmurs, No edema  Respiratory: Lungs clear to auscultation	  Psychiatry: A & O x 3, sleepy  Gastrointestinal:  Soft, Non-tender, + BS	  Skin: No rashes, No ecchymoses, No cyanosis	  Neuro:  AOx3, dysarthric, follows commands, bilat GILDA, VFF except for inconsistent motion detection in R temporal visual field, LUE triceps 4-, hand  and bi 5/5, RUE triceps 4+, hg and bi 5/5, LLE 5/5, RLE HF 4/5 DF 4/5 PF 5/5, dysmetria BUE  Extremities: Normal range of motion, No clubbing, cyanosis or edema  Vascular: Peripheral pulses palpable 2+ bilaterally    TELEMETRY: 	    ECG:  < from: CT Head No Cont (11.13.21 @ 13:27) >    EXAM:  CT BRAIN                            PROCEDURE DATE:  11/13/2021            INTERPRETATION:  CLINICAL INFORMATION: Status post thrombectomy for basilar artery occlusion.    TECHNIQUE: Noncontrast axial CT images were acquired through the head. Two-dimensional sagittal and coronal reformats were generated.    COMPARISON STUDY: None    FINDINGS:    There is an ill-defined region of hypoattenuation in the left occipital lobe.    There is no CT evidence of acute intracranial hemorrhage, extra-axial collection, mass effect, midline shift, central herniation, or hydrocephalus.    Mild prominence of the ventricles and sulci compatible with age-related involutional change. Nonspecific patchy white matter hypoattenuation likely related to chronic microvascular ischemic disease.    The visualized paranasal sinuses are clear. The mastoid air cells and middle ear cavities are clear.    The soft tissues of the scalp are unremarkable. The calvarium is intact.    IMPRESSION:  No CT evidence of acute intracranial hemorrhage, mass effect or midline shift.  Ill-defined region of hypoattenuation in the left occipital lobe concerning for subacute to chronic left PCA infarct. Recommend further evaluation with MR brain.    --- End of Report ---              ANNEL KERR MD; Resident Radiology  This document has been electronically signed.  KARRIE MELCHOR MD; Attending Radiologist  This document has been electronically signed. Nov 13 2021  1:40PM    < end of copied text >  	  RADIOLOGY:  OTHER: 	  	  LABS:	 	    CARDIAC MARKERS:                                  10.7   9.84  )-----------( 313      ( 13 Nov 2021 22:36 )             33.8     11-13    137  |  106  |  16  ----------------------------<  128<H>  3.6   |  20<L>  |  0.94    Ca    8.6      13 Nov 2021 22:36  Phos  2.7     11-13  Mg     1.8     11-13      proBNP:   Lipid Profile:   HgA1c:   TSH: Thyroid Stimulating Hormone, Serum: 1.58 uIU/mL (11-13 @ 12:26)

## 2021-11-14 NOTE — SWALLOW BEDSIDE ASSESSMENT ADULT - SLP GENERAL OBSERVATIONS
Pt encountered in bed, awake, on room air. VSS. Oriented to self, place, and month; confusion re: date/year. Able to follow commands for assessment purposes. Vocal quality WFL. Dysarthric, with reduced articulatory precision.

## 2021-11-14 NOTE — SPEECH LANGUAGE PATHOLOGY EVALUATION - SLP ANTICIPATED DISCHARGE DISPOSITION
D/c TBD, pending further evaluation by PT/OT. Pt will benefit from skilled ST services at next level of care to address dysarthria/ cognitive linguistic deficits.

## 2021-11-14 NOTE — PROGRESS NOTE ADULT - SUBJECTIVE AND OBJECTIVE BOX
THE PATIENT WAS SEEN AND EXAMINED BY ME WITH THE HOUSESTAFF AND STROKE TEAM DURING MORNING ROUNDS.   HPI:      SUBJECTIVE: No events overnight.  No new neurologic complaints, ROS reported negative unless otherwise noted.      acetaminophen     Tablet .. 650 milliGRAM(s) Oral every 6 hours PRN  amLODIPine   Tablet 10 milliGRAM(s) Oral daily  atorvastatin 80 milliGRAM(s) Oral at bedtime  dextrose 50% Injectable 50 milliLiter(s) IV Push every 15 minutes  dextrose 50% Injectable 25 milliLiter(s) IV Push every 15 minutes  insulin glargine Injectable (LANTUS) 10 Unit(s) SubCutaneous two times a day  insulin lispro (ADMELOG) corrective regimen sliding scale   SubCutaneous Before meals and at bedtime  insulin lispro Injectable (ADMELOG) 7 Unit(s) SubCutaneous three times a day before meals  polyethylene glycol 3350 17 Gram(s) Oral two times a day  senna 2 Tablet(s) Oral at bedtime      PHYSICAL EXAM:   Vital Signs Last 24 Hrs  T(C): 36.4 (14 Nov 2021 10:00), Max: 37.1 (13 Nov 2021 20:00)  T(F): 97.5 (14 Nov 2021 10:00), Max: 98.8 (13 Nov 2021 20:00)  HR: 89 (14 Nov 2021 10:30) (77 - 92)  BP: 132/68 (14 Nov 2021 10:15) (122/66 - 160/80)  BP(mean): 96 (14 Nov 2021 10:15) (85 - 118)  RR: 17 (14 Nov 2021 10:30) (14 - 22)  SpO2: 96% (14 Nov 2021 10:30) (93% - 100%)    General: No acute distress  HEENT: EOM intact, visual fields full  Abdomen: Soft, nontender, nondistended   Extremities: No edema    NEUROLOGICAL EXAM:  Mental status: Eyes open, awake, alert, oriented x3, fluent speech, no neglect, able to follow commands  Cranial Nerves: No facial asymmetry, B/L exotropia, no nystagmus, severe dysarthria,  Motor exam: Normal tone, B/L hemiparesis RUE 4/5, RLE 4/5, LUE 4/5, LLE 4/5.  Sensation: Intact to light touch   Coordination/ Gait: B/L dysmetria     LABS:                        10.7   9.84  )-----------( 313      ( 13 Nov 2021 22:36 )             33.8    11-13    137  |  106  |  16  ----------------------------<  128<H>  3.6   |  20<L>  |  0.94    Ca    8.6      13 Nov 2021 22:36  Phos  2.7     11-13  Mg     1.8     11-13          IMAGING: Reviewed by me.     Head CT (11/13/21) No CT evidence of acute intracranial hemorrhage, mass effect or midline shift.  Ill-defined region of hypoattenuation in the left occipital lobe concerning for subacute to chronic left PCA infarct.  THE PATIENT WAS SEEN AND EXAMINED BY ME WITH THE HOUSESTAFF AND STROKE TEAM DURING MORNING ROUNDS.   HPI: 66 year old M with a h/o DM, HTN, Afib (on eliquis, however on hold since 11/8 for chemoport placement on 11/11), prior stroke (no deficits), colon cancer (invasive adenocarcinoma of the rectum), who initially presented to [facility] with slurred speech and facial droop. LNK 22:30 on 11/12/21 when he went to bed. Patient found by wife on the floor, around 02:30, after reportedly falling out of bed, at which time she noted his speech was slurred. Stroke code activated at OSH, NIHSS 14 at that time. CTH without reported to be negative for acute infarct. 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 Patient transferred to [facility] for mechanical thrombectomy. NIHSS on arrival: 16, Not a candidate for tPA due to presentation outside the window.     SUBJECTIVE: No events overnight.  No new neurologic complaints, ROS reported negative unless otherwise noted.      acetaminophen     Tablet .. 650 milliGRAM(s) Oral every 6 hours PRN  amLODIPine   Tablet 10 milliGRAM(s) Oral daily  atorvastatin 80 milliGRAM(s) Oral at bedtime  dextrose 50% Injectable 50 milliLiter(s) IV Push every 15 minutes  dextrose 50% Injectable 25 milliLiter(s) IV Push every 15 minutes  insulin glargine Injectable (LANTUS) 10 Unit(s) SubCutaneous two times a day  insulin lispro (ADMELOG) corrective regimen sliding scale   SubCutaneous Before meals and at bedtime  insulin lispro Injectable (ADMELOG) 7 Unit(s) SubCutaneous three times a day before meals  polyethylene glycol 3350 17 Gram(s) Oral two times a day  senna 2 Tablet(s) Oral at bedtime      PHYSICAL EXAM:   Vital Signs Last 24 Hrs  T(C): 36.4 (14 Nov 2021 10:00), Max: 37.1 (13 Nov 2021 20:00)  T(F): 97.5 (14 Nov 2021 10:00), Max: 98.8 (13 Nov 2021 20:00)  HR: 89 (14 Nov 2021 10:30) (77 - 92)  BP: 132/68 (14 Nov 2021 10:15) (122/66 - 160/80)  BP(mean): 96 (14 Nov 2021 10:15) (85 - 118)  RR: 17 (14 Nov 2021 10:30) (14 - 22)  SpO2: 96% (14 Nov 2021 10:30) (93% - 100%)    General: No acute distress  HEENT: B/L exotropia,  Abdomen: Soft, nontender, nondistended   Extremities: No edema    NEUROLOGICAL EXAM:  Mental status: Eyes open, awake, alert, oriented x3, fluent speech, no neglect, able to follow commands  Cranial Nerves: No facial asymmetry, dysconjugate gaze: B/L exotropia, no nystagmus, severe dysarthria,  Motor exam: Normal tone, B/L hemiparesis RUE 4/5, RLE 4/5, LUE 4/5, LLE 4/5.  Sensation: Intact to light touch   Coordination/ Gait: B/L dysmetria     LABS:                        10.7   9.84  )-----------( 313      ( 13 Nov 2021 22:36 )             33.8    11-13    137  |  106  |  16  ----------------------------<  128<H>  3.6   |  20<L>  |  0.94    Ca    8.6      13 Nov 2021 22:36  Phos  2.7     11-13  Mg     1.8     11-13          IMAGING: Reviewed by me.     Head CT (11/13/21) No CT evidence of acute intracranial hemorrhage, mass effect or midline shift. Ill-defined region of hypoattenuation in the left occipital lobe concerning for subacute to chronic left PCA infarct.     CT brain perfusion: Large area of ischemic penumbra in the posterior fossa, sammi, bilateral cerebral white matter, with areas involving both occipital lobes.    CTA head and neck: Occlusion of the distal basilar artery, and bilateral proximal posterior cerebral artery P1 segments of the basilar. Bilateral superior cerebellar arteries are also not seen. No CTA evidence of aneurysm or dissection

## 2021-11-14 NOTE — CONSULT NOTE ADULT - PROBLEM SELECTOR RECOMMENDATION 3
-Agree with high intensity statin        Patient with high medical risk and high complexity level decision making  Case discussed with primary team    Elizabeth Kearns DO  Attending Division of Endocrinology  [phone]
Stable   Cont with Norvasc

## 2021-11-14 NOTE — SWALLOW BEDSIDE ASSESSMENT ADULT - COMMENTS
Neuro:  AOx3, dysarthric, follows commands, bilat GILDA, VFF except for inconsistent motion detection in R temporal visual field, LUE triceps 4-, hand  and bi 5/5, RUE triceps 4+, hg and bi 5/5, LLE 5/5, RLE HF 4/5 DF 4/5 PF 5/5, dysmetria BUE  11/13: Pt passed dysphagia screen Impulsivity re: amount/ rate of intake

## 2021-11-15 DIAGNOSIS — I63.9 CEREBRAL INFARCTION, UNSPECIFIED: ICD-10-CM

## 2021-11-15 DIAGNOSIS — C20 MALIGNANT NEOPLASM OF RECTUM: ICD-10-CM

## 2021-11-15 DIAGNOSIS — Z45.2 ENCOUNTER FOR ADJUSTMENT AND MANAGEMENT OF VASCULAR ACCESS DEVICE: ICD-10-CM

## 2021-11-15 LAB
ANION GAP SERPL CALC-SCNC: 13 MMOL/L — SIGNIFICANT CHANGE UP (ref 5–17)
APPEARANCE UR: CLEAR — SIGNIFICANT CHANGE UP
BILIRUB UR-MCNC: NEGATIVE — SIGNIFICANT CHANGE UP
BUN SERPL-MCNC: 9 MG/DL — SIGNIFICANT CHANGE UP (ref 7–23)
CALCIUM SERPL-MCNC: 8.5 MG/DL — SIGNIFICANT CHANGE UP (ref 8.4–10.5)
CHLORIDE SERPL-SCNC: 108 MMOL/L — SIGNIFICANT CHANGE UP (ref 96–108)
CO2 SERPL-SCNC: 19 MMOL/L — LOW (ref 22–31)
COLOR SPEC: YELLOW — SIGNIFICANT CHANGE UP
CREAT SERPL-MCNC: 0.93 MG/DL — SIGNIFICANT CHANGE UP (ref 0.5–1.3)
DIFF PNL FLD: ABNORMAL
GLUCOSE SERPL-MCNC: 186 MG/DL — HIGH (ref 70–99)
GLUCOSE UR QL: ABNORMAL
HCT VFR BLD CALC: 32.5 % — LOW (ref 39–50)
HGB BLD-MCNC: 9.8 G/DL — LOW (ref 13–17)
KETONES UR-MCNC: SIGNIFICANT CHANGE UP
LEUKOCYTE ESTERASE UR-ACNC: NEGATIVE — SIGNIFICANT CHANGE UP
MCHC RBC-ENTMCNC: 25.7 PG — LOW (ref 27–34)
MCHC RBC-ENTMCNC: 30.2 GM/DL — LOW (ref 32–36)
MCV RBC AUTO: 85.1 FL — SIGNIFICANT CHANGE UP (ref 80–100)
NITRITE UR-MCNC: NEGATIVE — SIGNIFICANT CHANGE UP
NRBC # BLD: 0 /100 WBCS — SIGNIFICANT CHANGE UP (ref 0–0)
PH UR: 6 — SIGNIFICANT CHANGE UP (ref 5–8)
PLATELET # BLD AUTO: 284 K/UL — SIGNIFICANT CHANGE UP (ref 150–400)
POTASSIUM SERPL-MCNC: 3.7 MMOL/L — SIGNIFICANT CHANGE UP (ref 3.5–5.3)
POTASSIUM SERPL-SCNC: 3.7 MMOL/L — SIGNIFICANT CHANGE UP (ref 3.5–5.3)
PROT UR-MCNC: ABNORMAL
RBC # BLD: 3.82 M/UL — LOW (ref 4.2–5.8)
RBC # FLD: 18.7 % — HIGH (ref 10.3–14.5)
SODIUM SERPL-SCNC: 140 MMOL/L — SIGNIFICANT CHANGE UP (ref 135–145)
SP GR SPEC: 1.02 — SIGNIFICANT CHANGE UP (ref 1.01–1.02)
UROBILINOGEN FLD QL: NEGATIVE — SIGNIFICANT CHANGE UP
WBC # BLD: 8.42 K/UL — SIGNIFICANT CHANGE UP (ref 3.8–10.5)
WBC # FLD AUTO: 8.42 K/UL — SIGNIFICANT CHANGE UP (ref 3.8–10.5)

## 2021-11-15 PROCEDURE — 99232 SBSQ HOSP IP/OBS MODERATE 35: CPT

## 2021-11-15 PROCEDURE — 93306 TTE W/DOPPLER COMPLETE: CPT | Mod: 26

## 2021-11-15 PROCEDURE — 93970 EXTREMITY STUDY: CPT | Mod: 26

## 2021-11-15 PROCEDURE — 99222 1ST HOSP IP/OBS MODERATE 55: CPT

## 2021-11-15 PROCEDURE — 70551 MRI BRAIN STEM W/O DYE: CPT | Mod: 26

## 2021-11-15 RX ORDER — INSULIN LISPRO 100/ML
VIAL (ML) SUBCUTANEOUS
Refills: 0 | Status: DISCONTINUED | OUTPATIENT
Start: 2021-11-15 | End: 2021-11-17

## 2021-11-15 RX ORDER — DIPHENHYDRAMINE HCL 50 MG
25 CAPSULE ORAL ONCE
Refills: 0 | Status: COMPLETED | OUTPATIENT
Start: 2021-11-15 | End: 2021-11-15

## 2021-11-15 RX ORDER — ENOXAPARIN SODIUM 100 MG/ML
120 INJECTION SUBCUTANEOUS
Refills: 0 | Status: DISCONTINUED | OUTPATIENT
Start: 2021-11-15 | End: 2021-11-17

## 2021-11-15 RX ORDER — INSULIN GLARGINE 100 [IU]/ML
13 INJECTION, SOLUTION SUBCUTANEOUS AT BEDTIME
Refills: 0 | Status: DISCONTINUED | OUTPATIENT
Start: 2021-11-15 | End: 2021-11-17

## 2021-11-15 RX ORDER — LANOLIN ALCOHOL/MO/W.PET/CERES
5 CREAM (GRAM) TOPICAL AT BEDTIME
Refills: 0 | Status: DISCONTINUED | OUTPATIENT
Start: 2021-11-15 | End: 2021-11-17

## 2021-11-15 RX ORDER — INSULIN LISPRO 100/ML
VIAL (ML) SUBCUTANEOUS AT BEDTIME
Refills: 0 | Status: DISCONTINUED | OUTPATIENT
Start: 2021-11-15 | End: 2021-11-17

## 2021-11-15 RX ADMIN — Medication 2: at 17:17

## 2021-11-15 RX ADMIN — Medication 2: at 08:57

## 2021-11-15 RX ADMIN — POLYETHYLENE GLYCOL 3350 17 GRAM(S): 17 POWDER, FOR SOLUTION ORAL at 17:17

## 2021-11-15 RX ADMIN — INSULIN GLARGINE 13 UNIT(S): 100 INJECTION, SOLUTION SUBCUTANEOUS at 22:33

## 2021-11-15 RX ADMIN — Medication 5 MILLIGRAM(S): at 22:33

## 2021-11-15 RX ADMIN — ATORVASTATIN CALCIUM 80 MILLIGRAM(S): 80 TABLET, FILM COATED ORAL at 22:33

## 2021-11-15 RX ADMIN — AMLODIPINE BESYLATE 10 MILLIGRAM(S): 2.5 TABLET ORAL at 05:16

## 2021-11-15 RX ADMIN — APIXABAN 5 MILLIGRAM(S): 2.5 TABLET, FILM COATED ORAL at 05:16

## 2021-11-15 RX ADMIN — SENNA PLUS 2 TABLET(S): 8.6 TABLET ORAL at 22:33

## 2021-11-15 RX ADMIN — ENOXAPARIN SODIUM 120 MILLIGRAM(S): 100 INJECTION SUBCUTANEOUS at 17:20

## 2021-11-15 RX ADMIN — Medication 25 MILLIGRAM(S): at 01:07

## 2021-11-15 RX ADMIN — Medication 7 UNIT(S): at 11:32

## 2021-11-15 RX ADMIN — POLYETHYLENE GLYCOL 3350 17 GRAM(S): 17 POWDER, FOR SOLUTION ORAL at 05:17

## 2021-11-15 RX ADMIN — Medication 7 UNIT(S): at 17:16

## 2021-11-15 RX ADMIN — Medication 4: at 11:32

## 2021-11-15 NOTE — PROGRESS NOTE ADULT - ASSESSMENT
ASSESSMENT: This is a 66yman with  hx of DM, HTN, Afib (on eliquis on hold since 11/8 for chemoport placement), prior CVA with no deficits, colon ca, who was found by wife on the floor, brought to [facility] with slurred speech and facial droop. not TPA candidate as pt LKN more than 4hrs, CTH reported to be negative for acute infarct.  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 Bilateral superior cerebellar arteries are also not seen. Not a candidate for tPA due to presentation outside the window. Pt is s/p MT w TICI2b flow restored.     Impression: Left PCA ischemic infarct. Etiology cardioembolic vs hypercoagulable/malignancy.    NEURO: Neuro exam improved vs admission, drowsy this am post sedation as he was agitated overnight, now up in bedside chair eating, Continue close monitoring for neurologic deterioration, -180mmHg, with slow titration to normotensive, high dose statin, titrate statin to LDL goal less than 70, Pending MRI Brain w/o contrast, CT/CTA head/neck results as above. Physical therapy/OT AR     ANTITHROMBOTIC THERAPY: full dose lovenox anticoagulation BID.    PULMONARY: Protecting airway, saturating well     CARDIOVASCULAR:  TTE: ef 55%, mild mitral annular calcification, minimal MR, mildly dilated LA, mild TR, minimal [place] , cardiac monitoring: episode of Afib, continue cardiac meds, Dr Garcia contacted for further eval                               SBP goal: 110-180mmHg    GASTROINTESTINAL: Dysphagia screen failed, S/S eval on 11/13 with recommendations for easy to chew textures with thin liquids. Continue bowel regimen      Diet: Easy to chew textures with thin liquids    RENAL: BUN/Cr without acute change, maintain adequate hydration, retention noted, UA pending.     Na Goal: Greater than 135     Dumas: No    HEMATOLOGY: anemia, no acute signs of bleeding, will continue to monitor,  Platelets 284, LE doppler ordered to r/o DVT,  heme/onc follow up for noted cancer .      DVT ppx:  LMWH     ID: afebrile, no leukocytosis     OTHER: HgbA1C 11.8, endocrine following. Regimen adjustment as noted.     DISPOSITION: AR     CORE MEASURES:        Admission NIHSS: 16     TPA:  NO      LDL/HDL: 97/42     Depression Screen: 0     Statin Therapy: Y     Dysphagia Screen:  FAIL     Smoking  NO      Afib  YES     Stroke Education  YES    Obtain screening ultrasound in patients who meet 1 or more of the following criteria as patient is high risk for DVT/PE upon admission:   [] History of DVT/PE  [X]Hypercoagulable states (Factor V Leiden, Cancer, OCP, etc. )  []Prolonged immobility (hemiplegia/hemiparesis/post operative or any other extended immobilization)   [] Transferred from outside facility (Rehab or Long term care)  [] Age </= to 50 ASSESSMENT: This is a 66yman with  hx of DM, HTN, Afib (on eliquis on hold since 11/8 for chemoport placement), prior CVA with no deficits, colon ca, who was found by wife on the floor, brought to [facility] with slurred speech and facial droop. not TPA candidate as pt LKN more than 4hrs, CTH reported to be negative for acute infarct.  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 Bilateral superior cerebellar arteries are also not seen. Not a candidate for tPA due to presentation outside the window. Pt is s/p MT w TICI2b flow restored.     Impression: Left PCA ischemic infarct. Etiology cardioembolic vs hypercoagulable/malignancy.    NEURO: Neuro exam improved vs admission, drowsy this am post sedation as he was agitated overnight, now upon re-eval 1700 awake, alert, oriented to self, age, location, follows simple commands, mildly impaired repetition, mild-moderate dysarthria, able to name and ID objects with function, trace right nasolabial fold flattening,  right eye exodeviated and downward, left eye with impaired adduction, DAWSON against gravity, sensation equial, Continue close monitoring for neurologic deterioration, -180mmHg, with slow titration to normotensive, high dose statin, titrate statin to LDL goal less than 70, Pending MRI Brain w/o contrast, CT/CTA head/neck results as above. Physical therapy/OT AR     ANTITHROMBOTIC THERAPY: full dose lovenox anticoagulation BID.    PULMONARY: Protecting airway, saturating well     CARDIOVASCULAR:  TTE: ef 55%, mild mitral annular calcification, minimal MR, mildly dilated LA, mild TR, minimal TN , cardiac monitoring: episode of Afib, continue cardiac meds, Dr Garcia contacted for further eval                               SBP goal: 110-180mmHg    GASTROINTESTINAL: Dysphagia screen failed, S/S eval on 11/13 with recommendations for easy to chew textures with thin liquids. Continue bowel regimen      Diet: Easy to chew textures with thin liquids    RENAL: BUN/Cr without acute change, maintain adequate hydration, retention noted, UA pending.     Na Goal: Greater than 135     Dumas: No    HEMATOLOGY: anemia, no acute signs of bleeding, will continue to monitor,  Platelets 284, LE doppler ordered to r/o DVT,  heme/onc follow up for noted cancer .      DVT ppx:  LMWH     ID: afebrile, no leukocytosis     OTHER: HgbA1C 11.8, endocrine following. Regimen adjustment as noted.     DISPOSITION: AR     CORE MEASURES:        Admission NIHSS: 16     TPA:  NO      LDL/HDL: 97/42     Depression Screen: 0     Statin Therapy: Y     Dysphagia Screen:  FAIL     Smoking  NO      Afib  YES     Stroke Education  YES    Obtain screening ultrasound in patients who meet 1 or more of the following criteria as patient is high risk for DVT/PE upon admission:   [] History of DVT/PE  [X]Hypercoagulable states (Factor V Leiden, Cancer, OCP, etc. )  []Prolonged immobility (hemiplegia/hemiparesis/post operative or any other extended immobilization)   [] Transferred from outside facility (Rehab or Long term care)  [] Age </= to 50 ASSESSMENT: This is a 66yman with  hx of DM, HTN, Afib (on eliquis on hold since 11/8 for chemoport placement), prior CVA with no deficits, colon ca, who was found by wife on the floor, brought to [facility] with slurred speech and facial droop. not TPA candidate as pt LKN more than 4hrs, CTH reported to be negative for acute infarct.  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 Bilateral superior cerebellar arteries are also not seen. Not a candidate for tPA due to presentation outside the window. Pt is s/p MT w TICI2b flow restored.     Impression: Left PCA ischemic infarct. Etiology cardioembolic vs hypercoagulable/malignancy.    NEURO: Neuro exam improved vs admission, drowsy this am post sedation as he was agitated overnight, now upon re-eval 1700 awake, alert, oriented to self, age, location, follows simple commands, mildly impaired repetition, mild-moderate dysarthria, able to name and ID objects with function, trace right nasolabial fold flattening,  right eye exodeviated and downward (states chronic), left eye with impaired adduction, right vision cut (states chronic) DAWSON against gravity, sensation equial, bl ataxia.  Continue close monitoring for neurologic deterioration, -180mmHg, with slow titration to normotensive, high dose statin, titrate statin to LDL goal less than 70, Pending MRI Brain w/o contrast, CT/CTA head/neck results as above. Physical therapy/OT AR     ANTITHROMBOTIC THERAPY: full dose lovenox anticoagulation BID.    PULMONARY: Protecting airway, saturating well     CARDIOVASCULAR:  TTE: ef 55%, mild mitral annular calcification, minimal MR, mildly dilated LA, mild TR, minimal MI , cardiac monitoring: episode of Afib, continue cardiac meds, Dr Garcia contacted for further eval                               SBP goal: 110-180mmHg    GASTROINTESTINAL: Dysphagia screen failed, S/S eval on 11/13 with recommendations for easy to chew textures with thin liquids. Continue bowel regimen      Diet: Easy to chew textures with thin liquids    RENAL: BUN/Cr without acute change, maintain adequate hydration, retention noted, UA pending.     Na Goal: Greater than 135     Dumas: No    HEMATOLOGY: anemia, no acute signs of bleeding, will continue to monitor,  Platelets 284, LE doppler ordered to r/o DVT,  heme/onc follow up for noted cancer .      DVT ppx:  LMWH     ID: afebrile, no leukocytosis     OTHER: HgbA1C 11.8, endocrine following. Regimen adjustment as noted.     DISPOSITION: AR     CORE MEASURES:        Admission NIHSS: 16     TPA:  NO      LDL/HDL: 97/42     Depression Screen: 0     Statin Therapy: Y     Dysphagia Screen:  FAIL     Smoking  NO      Afib  YES     Stroke Education  YES    Obtain screening ultrasound in patients who meet 1 or more of the following criteria as patient is high risk for DVT/PE upon admission:   [] History of DVT/PE  [X]Hypercoagulable states (Factor V Leiden, Cancer, OCP, etc. )  []Prolonged immobility (hemiplegia/hemiparesis/post operative or any other extended immobilization)   [] Transferred from outside facility (Rehab or Long term care)  [] Age </= to 50

## 2021-11-15 NOTE — CONSULT NOTE ADULT - SUBJECTIVE AND OBJECTIVE BOX
Patient is a 66 year old man with h/o DM, HTN, Afib (on eliquis, however on hold since 11/8 for chemoport placement on 11/11), prior stroke (no deficits), colon cancer (invasive adenocarcinoma of the rectum) who initially presented to [facility] with slurred speech and facial droop. Patient found by wife on the floor, around 02:30, after reportedly falling out of bed, at which time she noted his speech was slurred. Stroke code activated at OSH, NIHSS 14 at that time. CTH without reported to be negative for acute infarct. 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 Patient transferred to [facility] for mechanical thrombectomy, s/p MT w TICI2b flow restored. Patient agitated overnight, seen today with wife, sister at bedside. Patient lethargic, opens eyes, minimal verbal output.         REVIEW OF SYSTEMS  unable to obtain     VITALS  T(C): 37.3 (11-15-21 @ 07:01), Max: 37.3 (11-15-21 @ 07:01)  HR: 88 (11-15-21 @ 10:00) (79 - 96)  BP: 122/73 (11-15-21 @ 10:00) (114/72 - 153/70)  RR: 27 (11-15-21 @ 10:00) (19 - 28)  SpO2: 98% (11-15-21 @ 10:00) (91% - 99%)  Wt(kg): --    PAST MEDICAL & SURGICAL HISTORY  Acute hand eczema    Lumbago    Lumbago with sciatica of right side    Benign hypertension    Borderline diabetes mellitus    H/O renal calculi    Obese    Eczema    Diabetes mellitus type II  on Meds 4/2012    S/P tonsillectomy    Obese        SOCIAL HISTORY  Smoking - Denied  EtOH - Denied   Drugs - Denied    FUNCTIONAL HISTORY  Lives with wife, 4 [place], one flight inside   Independent ADLs, uses cane as needed outside home due to back pain/h/o surgery     CURRENT FUNCTIONAL STATUS  11/14 SLP  functional oropharyngeal swallow, dysarthria, and cognitive-linguistic deficits    11/14 PT  bed mobility mod assist   transfers mod assist x 2  gait mod assist x 2, one step     11/14 OT  bed mobility mod assist  transfers mod assist x 2    FAMILY HISTORY   no pertinent history in first degree relatives     RECENT LABS/IMAGING  CBC Full  -  ( 15 Nov 2021 04:55 )  WBC Count : 8.42 K/uL  RBC Count : 3.82 M/uL  Hemoglobin : 9.8 g/dL  Hematocrit : 32.5 %  Platelet Count - Automated : 284 K/uL  Mean Cell Volume : 85.1 fl  Mean Cell Hemoglobin : 25.7 pg  Mean Cell Hemoglobin Concentration : 30.2 gm/dL  Auto Neutrophil # : x  Auto Lymphocyte # : x  Auto Monocyte # : x  Auto Eosinophil # : x  Auto Basophil # : x  Auto Neutrophil % : x  Auto Lymphocyte % : x  Auto Monocyte % : x  Auto Eosinophil % : x  Auto Basophil % : x    11-15    140  |  108  |  9   ----------------------------<  186<H>  3.7   |  19<L>  |  0.93    Ca    8.5      15 Nov 2021 04:55  Phos  2.7     11-13  Mg     1.8     11-13    < from: CT Head No Cont (11.14.21 @ 09:37) >    IMPRESSION: Age-appropriate involutional and ischemic gliotic changes. Old left medial occipital infarct No hemorrhage. No change from 11/13/2021.      < end of copied text >        ALLERGIES  No Known Drug Allergies  Patient stated he had sensation of  throat closing  30 minites after  Epidural pain management resolved it self few minitus after , /  20 y ago Unable to recall MD name or number (Other)      MEDICATIONS   acetaminophen     Tablet .. 650 milliGRAM(s) Oral every 6 hours PRN  amLODIPine   Tablet 10 milliGRAM(s) Oral daily  atorvastatin 80 milliGRAM(s) Oral at bedtime  dextrose 50% Injectable 50 milliLiter(s) IV Push every 15 minutes  dextrose 50% Injectable 25 milliLiter(s) IV Push every 15 minutes  enoxaparin Injectable 120 milliGRAM(s) SubCutaneous two times a day  insulin glargine Injectable (LANTUS) 13 Unit(s) SubCutaneous at bedtime  insulin lispro (ADMELOG) corrective regimen sliding scale   SubCutaneous three times a day with meals  insulin lispro (ADMELOG) corrective regimen sliding scale   SubCutaneous at bedtime  insulin lispro Injectable (ADMELOG) 7 Unit(s) SubCutaneous three times a day before meals  melatonin 5 milliGRAM(s) Oral at bedtime  polyethylene glycol 3350 17 Gram(s) Oral two times a day  senna 2 Tablet(s) Oral at bedtime      ----------------------------------------------------------------------------------------  PHYSICAL EXAM  Constitutional - NAD, Comfortable, sitting in chair   Chest - Breathing comfortably  Cardiovascular - S1S2   Abdomen - Soft   Extremities - No C/C/E, No calf tenderness   Neurologic Exam -    follows commands                 Cognitive - Awake, lethargic, not oriented to month, year, place      Communication -  + dysarthria       Motor - moves all ext x 4     Sensory - Intact to LT     Psychiatric - Mood stable, Affect WNL  ----------------------------------------------------------------------------------------  ASSESSMENT/PLAN  66yMale hx of DM, HTN, Afib (on eliquis on hold since 11/8 for chemoport placement), prior CVA with no deficits, colon ca, with functional deficits after CVA  Left PCA ischemic infarct, s/p MT  Pain - Tylenol  DVT PPX - SCDs, lovenox   Rehab - Will continue to follow for ongoing rehab needs and recommendations.   continue bedside therapy  out of bed to chair   patient requires mod assist with transfers, gait    Recommend ACUTE inpatient rehabilitation for the functional deficits consisting of 3 hours of therapy/day & 24 hour RN/daily PMR physician for comorbid medical management. Patient will be able to tolerate 3 hours a day.

## 2021-11-15 NOTE — PROGRESS NOTE ADULT - SUBJECTIVE AND OBJECTIVE BOX
Diabetes Follow up note:    Chief complaint: T2DM    Interval Hx: BG values 160-mid 200s over past 24 hours. Pt seen at bedside in chair. Pt able to nod yes/no to questions today. Has poor appetite and did not eat most of breakfast. Team held premeal insulin dose and AM Lantus dose today. Pt endorses he takes Metformin at home.     Review of Systems:  General: denies pain  GI: Tolerating POs. Denies N/V/D/Abd pain  CV: Denies CP/SOB  ENDO: No S&Sx of hypoglycemia  MEDS:  atorvastatin 80 milliGRAM(s) Oral at bedtime  insulin glargine Injectable (LANTUS) 13 Unit(s) SubCutaneous at bedtime  insulin lispro (ADMELOG) corrective regimen sliding scale   SubCutaneous Before meals and at bedtime  insulin lispro Injectable (ADMELOG) 7 Unit(s) SubCutaneous three times a day before meals      Allergies    No Known Drug Allergies  Patient stated he had sensation of  throat closing  30 minites after  Epidural pain management resolved it self few minitus after , /  20 y ago Unable to recall MD name or number (Other)      PE:  General: Male sitting in chair. NAD.   Vital Signs Last 24 Hrs  T(C): 37.3 (15 Nov 2021 07:01), Max: 37.3 (15 Nov 2021 07:01)  T(F): 99.1 (15 Nov 2021 07:01), Max: 99.1 (15 Nov 2021 07:01)  HR: 88 (15 Nov 2021 10:00) (79 - 96)  BP: 122/73 (15 Nov 2021 10:00) (114/72 - 153/70)  BP(mean): 83 (15 Nov 2021 10:00) (83 - 109)  RR: 27 (15 Nov 2021 10:00) (17 - 28)  SpO2: 98% (15 Nov 2021 10:00) (91% - 99%)  Abd: Soft, NT,ND, Obese.   Extremities: Warm  Neuro: A&O X3. Dysarthric    LABS:  POCT Blood Glucose.: 184 mg/dL (11-15-21 @ 08:55)  POCT Blood Glucose.: 195 mg/dL (11-15-21 @ 07:52)  POCT Blood Glucose.: 163 mg/dL (11-14-21 @ 21:28)  POCT Blood Glucose.: 174 mg/dL (11-14-21 @ 19:55)  POCT Blood Glucose.: 245 mg/dL (11-14-21 @ 16:55)  POCT Blood Glucose.: 199 mg/dL (11-14-21 @ 11:05)  POCT Blood Glucose.: 157 mg/dL (11-14-21 @ 07:02)  POCT Blood Glucose.: 149 mg/dL (11-14-21 @ 04:43)  POCT Blood Glucose.: 144 mg/dL (11-14-21 @ 03:32)  POCT Blood Glucose.: 132 mg/dL (11-14-21 @ 02:31)  POCT Blood Glucose.: 128 mg/dL (11-14-21 @ 01:31)  POCT Blood Glucose.: 124 mg/dL (11-14-21 @ 00:34)  POCT Blood Glucose.: 117 mg/dL (11-13-21 @ 23:25)  POCT Blood Glucose.: 122 mg/dL (11-13-21 @ 22:27)  POCT Blood Glucose.: 140 mg/dL (11-13-21 @ 21:20)  POCT Blood Glucose.: 116 mg/dL (11-13-21 @ 19:56)  POCT Blood Glucose.: 141 mg/dL (11-13-21 @ 19:05)  POCT Blood Glucose.: 152 mg/dL (11-13-21 @ 18:08)  POCT Blood Glucose.: 163 mg/dL (11-13-21 @ 16:58)  POCT Blood Glucose.: 211 mg/dL (11-13-21 @ 16:20)  POCT Blood Glucose.: 196 mg/dL (11-13-21 @ 15:10)  POCT Blood Glucose.: 194 mg/dL (11-13-21 @ 14:11)  POCT Blood Glucose.: 255 mg/dL (11-13-21 @ 13:09)  POCT Blood Glucose.: 292 mg/dL (11-13-21 @ 12:09)  POCT Blood Glucose.: 285 mg/dL (11-13-21 @ 11:11)  POCT Blood Glucose.: 301 mg/dL (11-13-21 @ 10:19)  POCT Blood Glucose.: 356 mg/dL (11-13-21 @ 07:38)  POCT Blood Glucose.: 368 mg/dL (11-13-21 @ 07:36)                            9.8    8.42  )-----------( 284      ( 15 Nov 2021 04:55 )             32.5       11-15    140  |  108  |  9   ----------------------------<  186<H>  3.7   |  19<L>  |  0.93    Ca    8.5      15 Nov 2021 04:55  Phos  2.7     11-13  Mg     1.8     11-13        Thyroid Function Tests:  11-13 @ 12:26 TSH 1.58 FreeT4 -- T3 70 Anti TPO -- Anti Thyroglobulin Ab -- TSI --      A1C with Estimated Average Glucose Result: 11.8 % (11-13-21 @ 11:39)          Contact number: fidel [phone] or [phone]

## 2021-11-15 NOTE — PROGRESS NOTE ADULT - ASSESSMENT
67 yo man w DM, HTN, Afib (on eliquis on hold since 11/8 for chemoport placement), prior CVA with no deficits, colon ca, who presented w an ischemic stroke w basilar artery thrombosis s/p MT w TICI2b flow restored

## 2021-11-15 NOTE — PROGRESS NOTE ADULT - ASSESSMENT
66 y.o. male with h/o uncontrolled type 2 DM, HTN, hyperlipidemia, CVA, afib, and rectal cancer presents with ischemic stroke and now with hyperglycemia. Required insulin gtt on admission now off on basal/bolus. Variable PO intake on easy to chew diet. WIll adjust basal to once daily since AM dose held this AM w/glucose in 100s. BG goal (100-180mg/dl)> Likely will need acute rehab at discharge. Given high A1C likely will need insulin once home to improve A1C.

## 2021-11-15 NOTE — PROGRESS NOTE ADULT - SUBJECTIVE AND OBJECTIVE BOX
THE PATIENT WAS SEEN AND EXAMINED BY ME WITH THE HOUSESTAFF AND STROKE TEAM DURING MORNING ROUNDS.   HPI:  HPI: 66 year old M with a h/o DM, HTN, Afib (on eliquis, however on hold since 11/8 for chemoport placement on 11/11), prior stroke (no deficits), colon cancer (invasive adenocarcinoma of the rectum), who initially presented to [facility] with slurred speech and facial droop. LNK 22:30 on 11/12/21 when he went to bed. Patient found by wife on the floor, around 02:30, after reportedly falling out of bed, at which time she noted his speech was slurred. Stroke code activated at OSH, NIHSS 14 at that time. CTH without reported to be negative for acute infarct. 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 Patient transferred to [facility] for mechanical thrombectomy. NIHSS on arrival: 16, Not a candidate for tPA due to presentation outside the window.     SUBJECTIVE: Agitated overnight, s/p sedation. No events overnight.  No new neurologic complaints.  ROS reported negative unless otherwise noted.    acetaminophen     Tablet .. 650 milliGRAM(s) Oral every 6 hours PRN  amLODIPine   Tablet 10 milliGRAM(s) Oral daily  atorvastatin 80 milliGRAM(s) Oral at bedtime  dextrose 50% Injectable 50 milliLiter(s) IV Push every 15 minutes  dextrose 50% Injectable 25 milliLiter(s) IV Push every 15 minutes  enoxaparin Injectable 120 milliGRAM(s) SubCutaneous two times a day  insulin glargine Injectable (LANTUS) 13 Unit(s) SubCutaneous at bedtime  insulin lispro (ADMELOG) corrective regimen sliding scale   SubCutaneous three times a day with meals  insulin lispro (ADMELOG) corrective regimen sliding scale   SubCutaneous at bedtime  insulin lispro Injectable (ADMELOG) 7 Unit(s) SubCutaneous three times a day before meals  melatonin 5 milliGRAM(s) Oral at bedtime  polyethylene glycol 3350 17 Gram(s) Oral two times a day  senna 2 Tablet(s) Oral at bedtime      PHYSICAL EXAM:   Vital Signs Last 24 Hrs  T(C): 37.3 (15 Nov 2021 07:01), Max: 37.3 (15 Nov 2021 07:01)  T(F): 99.1 (15 Nov 2021 07:01), Max: 99.1 (15 Nov 2021 07:01)  HR: 88 (15 Nov 2021 10:00) (79 - 96)  BP: 122/73 (15 Nov 2021 10:00) (114/72 - 153/70)  BP(mean): 83 (15 Nov 2021 10:00) (83 - 109)  RR: 27 (15 Nov 2021 10:00) (19 - 28)  SpO2: 98% (15 Nov 2021 10:00) (91% - 99%)    General: No acute distress, drowsy  HEENT: RIght exotropia,  Abdomen: Soft, nontender, nondistended   Extremities: No edema    NEUROLOGICAL EXAM:  Mental status: Eyes closed, generates some short fluent 3 word sentences, able to follow some simple commands with prompting   Cranial Nerves: No facial asymmetry, dysconjugate gaze: R exotropia, no nystagmus, severe dysarthria,  Motor exam: Normal tone, moving all extremities well against gravity within bed   Sensation: Intact to light touch   Coordination/ Gait: B/L UE tremor    LABS:                        9.8    8.42  )-----------( 284      ( 15 Nov 2021 04:55 )             32.5    11-15    140  |  108  |  9   ----------------------------<  186<H>  3.7   |  19<L>  |  0.93    Ca    8.5      15 Nov 2021 04:55  Phos  2.7     11-13  Mg     1.8     11-13          IMAGING: Reviewed by me.      CT Head No Cont (11.14.21 @ 09:37)   Age-appropriate involutional and ischemic gliotic changes. Old left medial occipital infarct No hemorrhage. No change from 11/13/2021.    Head CT (11/13/21) No CT evidence of acute intracranial hemorrhage, mass effect or midline shift. Ill-defined region of hypoattenuation in the left occipital lobe concerning for subacute to chronic left PCA infarct.     CT brain perfusion: Large area of ischemic penumbra in the posterior fossa, sammi, bilateral cerebral white matter, with areas involving both occipital lobes.    CTA head and neck: Occlusion of the distal basilar artery, and bilateral proximal posterior cerebral artery P1 segments of the basilar. Bilateral superior cerebellar arteries are also not seen. No CTA evidence of aneurysm or dissection

## 2021-11-15 NOTE — PROGRESS NOTE ADULT - SUBJECTIVE AND OBJECTIVE BOX
Above events noted.  Neurological and neurosurgical care appreciated. Pt is sleeping.  VSS. Pt with h/o rectal ca with eliquis held for chest port with me in IR last Thursday with resumption of eliquis Fri pm developed basilar a occlusion Fri night taken to [facility] then transferred s/p thrombectomy now on lovenox.  Discussed with neurology team pt is awaiting MR and echo demonstrating no intracardiac thrombus. Discussed with Dr. Ramos of oncology. Will continue to follow.

## 2021-11-15 NOTE — PROGRESS NOTE ADULT - SUBJECTIVE AND OBJECTIVE BOX
Subjective: Patient seen and examined. No new events except as noted.   remains in Stroke unit   No cp or sob     REVIEW OF SYSTEMS:    CONSTITUTIONAL: + weakness, fevers or chills  EYES/ENT: No visual changes;  No vertigo or throat pain   NECK: No pain or stiffness  RESPIRATORY: No cough, wheezing, hemoptysis; No shortness of breath  CARDIOVASCULAR: No chest pain or palpitations  GASTROINTESTINAL: No abdominal or epigastric pain. No nausea, vomiting, or hematemesis; No diarrhea or constipation. No melena or hematochezia.  GENITOURINARY: No dysuria, frequency or hematuria  NEUROLOGICAL: No numbness or weakness  SKIN: No itching, burning, rashes, or lesions   All other review of systems is negative unless indicated above.    MEDICATIONS:  MEDICATIONS  (STANDING):  amLODIPine   Tablet 10 milliGRAM(s) Oral daily  atorvastatin 80 milliGRAM(s) Oral at bedtime  dextrose 50% Injectable 50 milliLiter(s) IV Push every 15 minutes  dextrose 50% Injectable 25 milliLiter(s) IV Push every 15 minutes  insulin glargine Injectable (LANTUS) 10 Unit(s) SubCutaneous two times a day  insulin lispro (ADMELOG) corrective regimen sliding scale   SubCutaneous Before meals and at bedtime  insulin lispro Injectable (ADMELOG) 7 Unit(s) SubCutaneous three times a day before meals  melatonin 5 milliGRAM(s) Oral at bedtime  polyethylene glycol 3350 17 Gram(s) Oral two times a day  senna 2 Tablet(s) Oral at bedtime      PHYSICAL EXAM:  T(C): 37.3 (11-15-21 @ 07:01), Max: 37.3 (11-15-21 @ 07:01)  HR: 88 (11-15-21 @ 08:00) (79 - 96)  BP: 139/75 (11-15-21 @ 08:00) (114/72 - 153/70)  RR: 26 (11-15-21 @ 08:00) (16 - 28)  SpO2: 96% (11-15-21 @ 08:00) (91% - 99%)  Wt(kg): --  I&O's Summary    14 Nov 2021 07:01  -  15 Nov 2021 07:00  --------------------------------------------------------  IN: 695 mL / OUT: 1050 mL / NET: -355 mL        Weight (kg): 121.5 (11-15 @ 09:20)      Appearance: NAD  HEENT:  Dry  oral mucosa, PERRL, EOMI	  Lymphatic: No lymphadenopathy  Cardiovascular: Normal S1 S2, No JVD, No murmurs, No edema  Respiratory: Lungs clear to auscultation	  Psychiatry: A & O x 3, sleepy  Gastrointestinal:  Soft, Non-tender, + BS	  Skin: No rashes, No ecchymoses, No cyanosis	  Neuro:  AOx3, dysarthric, follows commands, bilat GILDA, VFF except for inconsistent motion detection in R temporal visual field, LUE triceps 4-, hand  and bi 5/5, RUE triceps 4+, hg and bi 5/5, LLE 5/5, RLE HF 4/5 DF 4/5 PF 5/5, dysmetria BUE  Extremities: Normal range of motion, No clubbing, cyanosis or edema  Vascular: Peripheral pulses palpable 2+ bilaterally      LABS:    CARDIAC MARKERS:  CARDIAC MARKERS ( 13 Nov 2021 08:20 )  x     / x     / 181 U/L / x     / 5.0 ng/mL                                9.8    8.42  )-----------( 284      ( 15 Nov 2021 04:55 )             32.5     11-15    140  |  108  |  9   ----------------------------<  186<H>  3.7   |  19<L>  |  0.93    Ca    8.5      15 Nov 2021 04:55  Phos  2.7     11-13  Mg     1.8     11-13    TELEMETRY: SR	    ECG:  	  RADIOLOGY:   DIAGNOSTIC TESTING:  [ ] Echocardiogram:  [ ]  Catheterization:  [ ] Stress Test:    OTHER:

## 2021-11-16 ENCOUNTER — TRANSCRIPTION ENCOUNTER (OUTPATIENT)
Age: 66
End: 2021-11-16

## 2021-11-16 LAB
ANION GAP SERPL CALC-SCNC: 13 MMOL/L — SIGNIFICANT CHANGE UP (ref 5–17)
BUN SERPL-MCNC: 8 MG/DL — SIGNIFICANT CHANGE UP (ref 7–23)
CALCIUM SERPL-MCNC: 8.5 MG/DL — SIGNIFICANT CHANGE UP (ref 8.4–10.5)
CHLORIDE SERPL-SCNC: 106 MMOL/L — SIGNIFICANT CHANGE UP (ref 96–108)
CO2 SERPL-SCNC: 19 MMOL/L — LOW (ref 22–31)
CREAT SERPL-MCNC: 0.99 MG/DL — SIGNIFICANT CHANGE UP (ref 0.5–1.3)
GLUCOSE SERPL-MCNC: 168 MG/DL — HIGH (ref 70–99)
HCT VFR BLD CALC: 33.1 % — LOW (ref 39–50)
HGB BLD-MCNC: 10.1 G/DL — LOW (ref 13–17)
MCHC RBC-ENTMCNC: 26.3 PG — LOW (ref 27–34)
MCHC RBC-ENTMCNC: 30.5 GM/DL — LOW (ref 32–36)
MCV RBC AUTO: 86.2 FL — SIGNIFICANT CHANGE UP (ref 80–100)
NRBC # BLD: 0 /100 WBCS — SIGNIFICANT CHANGE UP (ref 0–0)
PLATELET # BLD AUTO: 293 K/UL — SIGNIFICANT CHANGE UP (ref 150–400)
POTASSIUM SERPL-MCNC: 3.8 MMOL/L — SIGNIFICANT CHANGE UP (ref 3.5–5.3)
POTASSIUM SERPL-SCNC: 3.8 MMOL/L — SIGNIFICANT CHANGE UP (ref 3.5–5.3)
RBC # BLD: 3.84 M/UL — LOW (ref 4.2–5.8)
RBC # FLD: 18.7 % — HIGH (ref 10.3–14.5)
SODIUM SERPL-SCNC: 138 MMOL/L — SIGNIFICANT CHANGE UP (ref 135–145)
WBC # BLD: 9.79 K/UL — SIGNIFICANT CHANGE UP (ref 3.8–10.5)
WBC # FLD AUTO: 9.79 K/UL — SIGNIFICANT CHANGE UP (ref 3.8–10.5)

## 2021-11-16 PROCEDURE — 99231 SBSQ HOSP IP/OBS SF/LOW 25: CPT

## 2021-11-16 PROCEDURE — 99232 SBSQ HOSP IP/OBS MODERATE 35: CPT

## 2021-11-16 PROCEDURE — 99222 1ST HOSP IP/OBS MODERATE 55: CPT | Mod: GC

## 2021-11-16 RX ORDER — METFORMIN HYDROCHLORIDE 850 MG/1
1 TABLET ORAL
Qty: 0 | Refills: 0 | DISCHARGE

## 2021-11-16 RX ORDER — INSULIN GLARGINE 100 [IU]/ML
13 INJECTION, SOLUTION SUBCUTANEOUS
Qty: 0 | Refills: 0 | DISCHARGE
Start: 2021-11-16

## 2021-11-16 RX ORDER — INSULIN LISPRO 100/ML
7 VIAL (ML) SUBCUTANEOUS
Qty: 0 | Refills: 0 | DISCHARGE
Start: 2021-11-16

## 2021-11-16 RX ORDER — INSULIN LISPRO 100/ML
1 VIAL (ML) SUBCUTANEOUS
Qty: 0 | Refills: 0 | DISCHARGE
Start: 2021-11-16

## 2021-11-16 RX ORDER — ENOXAPARIN SODIUM 100 MG/ML
120 INJECTION SUBCUTANEOUS
Qty: 0 | Refills: 0 | DISCHARGE
Start: 2021-11-16

## 2021-11-16 RX ORDER — SENNA PLUS 8.6 MG/1
2 TABLET ORAL
Qty: 0 | Refills: 0 | DISCHARGE
Start: 2021-11-16

## 2021-11-16 RX ORDER — POLYETHYLENE GLYCOL 3350 17 G/17G
17 POWDER, FOR SOLUTION ORAL
Qty: 0 | Refills: 0 | DISCHARGE
Start: 2021-11-16

## 2021-11-16 RX ORDER — NYSTATIN CREAM 100000 [USP'U]/G
1 CREAM TOPICAL
Refills: 0 | Status: DISCONTINUED | OUTPATIENT
Start: 2021-11-16 | End: 2021-11-17

## 2021-11-16 RX ORDER — ATORVASTATIN CALCIUM 80 MG/1
1 TABLET, FILM COATED ORAL
Qty: 0 | Refills: 0 | DISCHARGE
Start: 2021-11-16

## 2021-11-16 RX ORDER — LANOLIN ALCOHOL/MO/W.PET/CERES
1 CREAM (GRAM) TOPICAL
Qty: 0 | Refills: 0 | DISCHARGE
Start: 2021-11-16

## 2021-11-16 RX ORDER — AMLODIPINE BESYLATE 2.5 MG/1
1 TABLET ORAL
Qty: 0 | Refills: 0 | DISCHARGE
Start: 2021-11-16

## 2021-11-16 RX ORDER — AMLODIPINE BESYLATE 2.5 MG/1
1 TABLET ORAL
Qty: 0 | Refills: 0 | DISCHARGE

## 2021-11-16 RX ORDER — APIXABAN 2.5 MG/1
1 TABLET, FILM COATED ORAL
Qty: 0 | Refills: 0 | DISCHARGE

## 2021-11-16 RX ORDER — NYSTATIN CREAM 100000 [USP'U]/G
1 CREAM TOPICAL
Qty: 0 | Refills: 0 | DISCHARGE
Start: 2021-11-16

## 2021-11-16 RX ADMIN — NYSTATIN CREAM 1 APPLICATION(S): 100000 CREAM TOPICAL at 05:41

## 2021-11-16 RX ADMIN — POLYETHYLENE GLYCOL 3350 17 GRAM(S): 17 POWDER, FOR SOLUTION ORAL at 17:02

## 2021-11-16 RX ADMIN — Medication 7 UNIT(S): at 11:44

## 2021-11-16 RX ADMIN — AMLODIPINE BESYLATE 10 MILLIGRAM(S): 2.5 TABLET ORAL at 05:11

## 2021-11-16 RX ADMIN — POLYETHYLENE GLYCOL 3350 17 GRAM(S): 17 POWDER, FOR SOLUTION ORAL at 05:12

## 2021-11-16 RX ADMIN — Medication 7 UNIT(S): at 07:58

## 2021-11-16 RX ADMIN — ATORVASTATIN CALCIUM 80 MILLIGRAM(S): 80 TABLET, FILM COATED ORAL at 21:22

## 2021-11-16 RX ADMIN — ENOXAPARIN SODIUM 120 MILLIGRAM(S): 100 INJECTION SUBCUTANEOUS at 17:03

## 2021-11-16 RX ADMIN — ENOXAPARIN SODIUM 120 MILLIGRAM(S): 100 INJECTION SUBCUTANEOUS at 05:12

## 2021-11-16 RX ADMIN — INSULIN GLARGINE 13 UNIT(S): 100 INJECTION, SOLUTION SUBCUTANEOUS at 21:23

## 2021-11-16 RX ADMIN — Medication 7 UNIT(S): at 17:03

## 2021-11-16 RX ADMIN — SENNA PLUS 2 TABLET(S): 8.6 TABLET ORAL at 21:22

## 2021-11-16 RX ADMIN — NYSTATIN CREAM 1 APPLICATION(S): 100000 CREAM TOPICAL at 17:03

## 2021-11-16 RX ADMIN — Medication 5 MILLIGRAM(S): at 21:22

## 2021-11-16 NOTE — DISCHARGE NOTE PROVIDER - HOSPITAL COURSE
66 year old male w/ PMHx DM, HTN, atrial fibrillation (on apixaban, however apixaban held since 11/8 for chemoport placement on 11/11), prior stroke (no residual deficits), colon cancer (invasive adenocarcinoma of the recum), who initially presented to [facility] with slurred speech and facial droop. LNK 22:30 on 11/12/21 when he went to bed. Patient 66 year old male w/ PMHx DM, HTN, atrial fibrillation (on apixaban, however apixaban held since 11/8 for chemoport placement on 11/11), prior stroke (no residual deficits), colon cancer (invasive adenocarcinoma of the recum), who initially presented to [facility] with slurred speech and facial droop. LNK 22:30 on 11/12/21 when he went to bed. Patient found by wife on the floor, around 02:30, after reportedly falling out of bed, at which time she noted his speech was slurred. Stroke code activated at OSH, NIHSS 14 at that time. CTH w/o reported to be negative for acute infarct. 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 Patient transferred to [facility] for mechanical thrombectomy. NIHSS on arrival: 16, not a candidate for tPA due to presentation outside the window.  Patient s/p MT w/ TICI2b flow restored.    Impression: Left PCA ischemic infarct. Etiology cardioembolic vs hypercoagulable/malignancy.    IMAGING: Reviewed by me.    CT Head No Cont (11.14.21 @ 09:37)   Age-appropriate involutional and ischemic gliotic changes. Old left medial occipital infarct No hemorrhage. No change from 11/13/2021.    Head CT (11/13/21) No CT evidence of acute intracranial hemorrhage, mass effect or midline shift. Ill-defined region of hypoattenuation in the left occipital lobe concerning for subacute to chronic left PCA infarct.     CT brain perfusion: Large area of ischemic penumbra in the posterior fossa, sammi, bilateral cerebral white matter, with areas involving both occipital lobes.    CTA head and neck: Occlusion of the distal basilar artery, and bilateral proximal posterior cerebral artery P1 segments of the basilar. Bilateral superior cerebellar arteries are also not seen. No CTA evidence of aneurysm or dissection.    Patient started on full dose Lovenox anticoagulation BID.   TTE: ef 55%, mild mitral annular calcification, minimal MR, mildly dilated LA, mild TR, minimal AR , cardiac monitoring: episode of Afib, rate controlled, Dr Garcia was consulted.  Dysphagia screen failed, S/S evaluation on 11/13 w/ recommendations for easy to chew textures w/ thin liquids.  Heme/onc follow up for noted cancer and plan, no absolute neurological contraindication to chemo (per colorectal he is post radiation), would just closely monitor platelet level as to avoid increased risk of bleeding, he is high risk for recurrent thrombosis in setting of atrial fibrillation and malignancy. Per discussion with patient's family, they would like to focus on optimization at rehabilitation, prior to initiation of chemotherapy.  HgbA1C 11.8, Endocrinology was following..     66 year old male w/ PMHx DM, HTN, atrial fibrillation (on apixaban, however apixaban held since 11/8 for chemoport placement on 11/11), prior stroke (no residual deficits), colon cancer (invasive adenocarcinoma of the recum), who initially presented to [facility] with slurred speech and facial droop. LNK 22:30 on 11/12/21 when he went to bed. Patient found by wife on the floor, around 02:30, after reportedly falling out of bed, at which time she noted his speech was slurred. Stroke code activated at OSH, NIHSS 14 at that time. CTH w/o reported to be negative for acute infarct. 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 Patient transferred to [facility] for mechanical thrombectomy. NIHSS on arrival: 16, not a candidate for tPA due to presentation outside the window.  Patient s/p MT w/ TICI2b flow restored.    Impression: Left PCA ischemic infarct. Etiology cardioembolic vs hypercoagulable/malignancy.    IMAGING: Reviewed by me.    CT Head No Cont (11.14.21 @ 09:37)   Age-appropriate involutional and ischemic gliotic changes. Old left medial occipital infarct No hemorrhage. No change from 11/13/2021.    Head CT (11/13/21) No CT evidence of acute intracranial hemorrhage, mass effect or midline shift. Ill-defined region of hypoattenuation in the left occipital lobe concerning for subacute to chronic left PCA infarct.     CT brain perfusion: Large area of ischemic penumbra in the posterior fossa, sammi, bilateral cerebral white matter, with areas involving both occipital lobes.    CTA head and neck: Occlusion of the distal basilar artery, and bilateral proximal posterior cerebral artery P1 segments of the basilar. Bilateral superior cerebellar arteries are also not seen. No CTA evidence of aneurysm or dissection.    Patient started on full dose Lovenox anticoagulation BID.   TTE: ef 55%, mild mitral annular calcification, minimal MR, mildly dilated LA, mild TR, minimal [place] , cardiac monitoring: episode of Afib, rate controlled, Dr Garcia was consulted.  Dysphagia screen failed, S/S evaluation on 11/13 w/ recommendations for easy to chew textures w/ thin liquids.  Heme/onc follow up for noted cancer and plan, no absolute neurological contraindication to chemo (per colorectal he is post radiation), would just closely monitor platelet level as to avoid increased risk of bleeding, he is high risk for recurrent thrombosis in setting of atrial fibrillation and malignancy. Per discussion with patient's family, they would like to focus on optimization at rehabilitation, prior to initiation of chemotherapy.  HgbA1C 11.8, Endocrinology was following. PT/OT recommends acute rehab. Patient stable for discharge.      66 year old male w/ PMHx DM, HTN, atrial fibrillation (on apixaban, however apixaban held since 11/8 for chemoport placement on 11/11), prior stroke (no residual deficits), colon cancer (invasive adenocarcinoma of the recum), who initially presented to [facility] with slurred speech and facial droop. LNK 22:30 on 11/12/21 when he went to bed. Patient found by wife on the floor, around 02:30, after reportedly falling out of bed, at which time she noted his speech was slurred. Stroke code activated at OSH, NIHSS 14 at that time. CTH w/o reported to be negative for acute infarct. 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 Patient transferred to [facility] for mechanical thrombectomy. NIHSS on arrival: 16, not a candidate for tPA due to presentation outside the window.  Patient s/p MT w/ TICI2b flow restored.    Impression: Posterior circulation infarcts with etiology cardioembolic vs hypercoagulable/malignancy.    IMAGING: Reviewed by me.    CT Head No Cont (11.14.21 @ 09:37)   Age-appropriate involutional and ischemic gliotic changes. Old left medial occipital infarct No hemorrhage. No change from 11/13/2021.    Head CT (11/13/21) No CT evidence of acute intracranial hemorrhage, mass effect or midline shift. Ill-defined region of hypoattenuation in the left occipital lobe concerning for subacute to chronic left PCA infarct.     CT brain perfusion: Large area of ischemic penumbra in the posterior fossa, sammi, bilateral cerebral white matter, with areas involving both occipital lobes.    CTA head and neck: Occlusion of the distal basilar artery, and bilateral proximal posterior cerebral artery P1 segments of the basilar. Bilateral superior cerebellar arteries are also not seen. No CTA evidence of aneurysm or dissection.    MRI brain 11.15.21- Early subacute infarct of the basilar artery branch distribution involving the bilateral cerebellum and sammi, and to lesser extent distal left posterior cerebral artery (more embolic appearing). Susceptibility within the central pontine lesion suggests some early hemorrhagic conversion.    Evolving T2 signal intensity in the mid/superior sammi and cerebral peduncles consistent with that seen on the head CT of 11/14/2021; extent of this finding is greater than any regional DWI abnormalities, more consistent with extracellular edema.    Chronic old infarct with old blood products involving the medial left occipital lobe.      Patient started on full dose Lovenox anticoagulation BID.   TTE: ef 55%, mild mitral annular calcification, minimal MR, mildly dilated LA, mild TR, minimal RI , cardiac monitoring: episode of Afib, rate controlled, Dr Garcia was consulted.  Dysphagia screen failed, S/S evaluation on 11/13 w/ recommendations for easy to chew textures w/ thin liquids.  Heme/onc follow up for noted cancer and plan, no absolute neurological contraindication to chemo (per colorectal he is post radiation), would just closely monitor platelet level as to avoid increased risk of bleeding, he is high risk for recurrent thrombosis in setting of atrial fibrillation and malignancy. Per discussion with patient's family, they would like to focus on optimization at rehabilitation, prior to initiation of chemotherapy, which will be initiated as outpatient.   HgbA1C 11.8, Endocrinology was following. PT/OT recommends acute rehab. Patient stable for discharge.

## 2021-11-16 NOTE — PROGRESS NOTE ADULT - SUBJECTIVE AND OBJECTIVE BOX
pt seen 11/16/2021 at TIME- 10:40 AM    Interventional Radiology Follow-Up Note    This is a 66y Male s/p RIJ chest port placement in Interventional Radiology with Dr. Emanuel Corey on 11/11/2021.    S: Patient seen and examined @ bedside. No complaints offered. Pt denies pain or N/V. He occasionally exhibits slurred speech when talking. Patient's wife is present in room when he was seen.    Medication:     amLODIPine   Tablet: (11-16)  apixaban: (11-15)  enoxaparin Injectable: (11-16)    Vitals:   T(F): 98.7, Max: 99 (19:50)  HR: 94  BP: 167/96  RR: 20  SpO2: 96%    Physical Exam:  General: Nontoxic, in NAD, Awake and alert  Lungs: non-labored breathing   Right Chest: chest port dressing removed, site is intact with steri-strips. no oozing or hematoma.      LABS:  WBC 9.79 / Hgb 10.1 / Hct 33.1 / Plt 293  Na 138 / K 3.8 / CO2 19 / Cl 106 / BUN 8 / Cr 0.99 / Glucose 168  ALT -- / AST -- / Alk Phos -- / Tbili --  Ptt -- / Pt -- / INR --                          10.1   9.79  )-----------( 293      ( 16 Nov 2021 04:53 )             33.1     11-16    138  |  106  |  8   ----------------------------<  168<H>  3.8   |  19<L>  |  0.99    Ca    8.5      16 Nov 2021 04:53    Assessment/Plan:  66y Male admitted with h/o rectal ca with eliquis held for chest port placement by Dr. Corey in IR last Thursday on 11/11/21. After resumption of eliquis Friday 11/12 pm the patient developed basilar artery occlusion (CTA showed Occlusion of the distal basilar artery, and bilateral proximal posterior cerebral artery P1 segments of the basilar). Friday night the patient presented to [facility] then transferred to [facility]. Now he is s/p thrombectomy and on lovenox.      -continue global management per primary team/ Neurological and neurosurgical care appreciated.  -trend vs/labs  Steri-strips over the chest port should remain intact and will fall of on their own as was d/w patient and his wife.  Case d/w IR attending Dr. Emanuel Corey and IR will continue to follow.    Please call IR at extension [phone] with any questions, concerns, or issues regarding above.      ROSEMARIE Frias  Available on Microsoft Teams  Spectralink [phone]  Ext [phone]

## 2021-11-16 NOTE — DISCHARGE NOTE PROVIDER - NSDCQMSTROKERISK_NEU_ALL_CORE
Diabetes/High blood pressure/History of a stroke or TIA Atrial fibrillation/Diabetes/High blood pressure/History of a stroke or TIA

## 2021-11-16 NOTE — DISCHARGE NOTE PROVIDER - CARE PROVIDERS DIRECT ADDRESSES
,DirectAddress_Unknown,DirectAddress_Unknown,[email or web address],DirectAddress_Unknown

## 2021-11-16 NOTE — DIETITIAN INITIAL EVALUATION ADULT. - ORAL INTAKE PTA/DIET HISTORY
Pt alerted and oriented upon visit. Reports good appetite and PO intake PTA. Pt states he eats whatever his wife cooks at home, likes potatoes. Pt alerted and oriented upon visit. Reports good appetite and PO intake PTA. Pt states he eats whatever his wife cooks at home, likes potatoes. Pt states he doesn't check his blood glucose at home, only taking medications. Therapeutic restriction: avoid sweets. Per chart, "He has had Type 2 DM for many years and only takes Metformin at home. Pt alerted and oriented upon visit. Reports good appetite and PO intake PTA. Pt states he eats whatever his wife cooks at home, likes potatoes. Pt states he doesn't check his blood glucose at home, only taking medications. Pt reports he avoid sweets at home. Per chart, "He has had Type 2 DM for many years and only takes Metformin at home. Pt alerted and oriented upon visit. Reports good appetite and PO intake PTA. Pt states he eats whatever his wife cooks at home, likes potatoes. Food allergy noted to nuts. Pt states he doesn't check his blood glucose at home, only taking medications. Pt reports he avoid sweets at home. Per chart, "He has had Type 2 DM for many years and only takes Metformin at home.

## 2021-11-16 NOTE — DIETITIAN INITIAL EVALUATION ADULT. - REASON INDICATOR FOR ASSESSMENT
A1c 11.8  Source: medical record, RN, pt interview. Consult for Registered Dietitian  Source: medical record, RN, pt interview.

## 2021-11-16 NOTE — DIETITIAN INITIAL EVALUATION ADULT. - PATIENT MEETS CRITERIA FOR MALNUTRITION
After Visit Summary   3/1/2018    Ofelia Will    MRN: [id number]           Patient Information     Date Of Birth          1990        Visit Information        Provider Department      3/1/2018 9:00 AM RI PRENATAL NURSE [facility]        Today's Diagnoses     Pregnancy test positive    -  1    Supervision of normal first pregnancy, antepartum           Follow-ups after your visit        Your next 10 appointments already scheduled     Mar 05, 2018  8:15 AM CST   US OB < 14 WEEKS WITH TRANSVAGINAL SINGLE with OXUS1   [facility]    [address] [id number] [phone]           Please bring a list of your medicines (including vitamins, minerals and over-the-counter drugs). Also, tell your doctor about any allergies you may have. Wear comfortable clothes and leave your valuables at home.  If you re less than 20 weeks drink four 8-ounce glasses of fluid an hour before your exam. If you need to empty your bladder before your exam, try to release only a little urine. Then, drink another glass of fluid.  You may have up to two family members in the exam room. If you bring a small child, an adult must be there to care for him or her.  Please call the Imaging Department at your exam site with any questions.            Mar 05, 2018 10:45 AM CST   New Prenatal with Kaila Bacon,    [facility]    [address]  [facility] [id number] [phone]              Future tests that were ordered for you today     Open Future Orders        Priority Expected Expires Ordered    US OB <14 Weeks w Transvaginal Single Routine  3/1/2019 3/1/2018            Who to contact     If you have questions or need follow up information about today's clinic visit or your schedule please contact [facility] directly at [phone].  Normal or  non-critical lab and imaging results will be communicated to you by MyChart, letter or phone within 4 business days after the clinic has received the results. If you do not hear from us within 7 days, please contact the clinic through [employer] or phone. If you have a critical or abnormal lab result, we will notify you by phone as soon as possible.  Submit refill requests through [employer] or call your pharmacy and they will forward the refill request to us. Please allow 3 business days for your refill to be completed.          Additional Information About Your Visit        [employer] Information     [employer] gives you secure access to your electronic health record. If you see a primary care provider, you can also send messages to your care team and make appointments. If you have questions, please call your primary care clinic.  If you do not have a primary care provider, please call [phone] and they will assist you.        Care EveryWhere ID     This is your Care EveryWhere ID. This could be used by other organizations to access your [place] medical records  [id number]        Your Vitals Were     Last Period                   12/20/2017            Blood Pressure from Last 3 Encounters:   12/05/17 128/81   05/26/17 119/82    Weight from Last 3 Encounters:   12/05/17 144 lb (65.3 kg)   05/26/17 142 lb 4 oz (64.5 kg)              We Performed the Following     ABO/Rh type and screen     Anti Treponema     Beta HCG qual IFA urine - [facility] and [place]     CBC with platelets     Hepatitis B surface antigen     HIV Antigen Antibody Combo     Rubella Antibody IgG Quantitative     Urine Culture Aerobic Bacterial        Primary Care Provider Office Phone # Fax #    Guerda Esquivel [phone] [address] [facility] [address]        Equal Access to Services     [facility] JINNY : Frida Worthington, martínez stokes, viki benitez. So [facility]  [phone].    ATENCIÓN: Si raisa yoder, tiene a ferris disposición servicios gratuitos de asistencia lingüística. Joaquim al [phone].    We comply with applicable federal civil rights laws and [place] laws. We do not discriminate on the basis of race, color, national origin, age, disability, sex, sexual orientation, or gender identity.            Thank you!     Thank you for choosing [facility]  for your care. Our goal is always to provide you with excellent care. Hearing back from our patients is one way we can continue to improve our services. Please take a few minutes to complete the written survey that you may receive in the mail after your visit with us. Thank you!             Your Updated Medication List - Protect others around you: Learn how to safely use, store and throw away your medicines at www.disposemymeds.org.          This list is accurate as of 3/1/18 11:28 AM.  Always use your most recent med list.                   Brand Name Dispense Instructions for use Diagnosis    prenatal multivitamin plus iron 27-0.8 MG Tabs per tablet     100 tablet    Take 1 tablet by mouth daily    Supervision of normal first pregnancy, antepartum          no

## 2021-11-16 NOTE — PROGRESS NOTE ADULT - ASSESSMENT
ASSESSMENT: This is a 66yman with  hx of DM, HTN, Afib (on eliquis on hold since 11/8 for chemoport placement), prior CVA with no deficits, colon ca, who was found by wife on the floor, brought to [facility] with slurred speech and facial droop. not TPA candidate as pt LKN more than 4hrs, CTH reported to be negative for acute infarct.  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 Bilateral superior cerebellar arteries are also not seen. Not a candidate for tPA due to presentation outside the window. Pt is s/p MT w TICI2b flow restored.     Impression: Left PCA ischemic infarct. Etiology cardioembolic vs hypercoagulable/malignancy.    NEURO: Neuro exam improved vs admission, drowsy 11/15 am post sedation as he was agitated overnight, now improved, he states right eye findings are chronic, Continue close monitoring for neurologic deterioration, -180mmHg, with slow titration to normotensive, high dose statin, titrate statin to LDL goal less than 70, Pending MRI Brain w/o contrast, CT/CTA head/neck results as above. Physical therapy/OT AR     ANTITHROMBOTIC THERAPY: full dose lovenox anticoagulation BID.    PULMONARY: Protecting airway, saturating well     CARDIOVASCULAR:  TTE: ef 55%, mild mitral annular calcification, minimal MR, mildly dilated LA, mild TR, minimal OH , cardiac monitoring: episode of Afib, continue cardiac meds, Dr Garcia contacted for further eval                               SBP goal: 110-180mmHg    GASTROINTESTINAL: Dysphagia screen failed, S/S eval on 11/13 with recommendations for easy to chew textures with thin liquids. Continue bowel regimen      Diet: Easy to chew textures with thin liquids    RENAL: BUN/Cr without acute change, maintain adequate hydration, retention noted, UA without evidence of uti.     Na Goal: Greater than 135     [place]umas: No    HEMATOLOGY: anemia, no acute signs of bleeding, will continue to monitor,  Platelets 293, LE doppler ordered to r/o DVT,  heme/onc follow up for noted cancer and plan, no absolute neurological contraindication to chemo (per colorectal he is post radiation), would just closely monitor plt level as to avoid increased risk of bleeding, he is high risk for recurrent thrombosis in setting of atrial fibrillation and malignancy .      DVT ppx:  LMWH     ID: afebrile, no leukocytosis , no si/sx of infection     OTHER: HgbA1C 11.8, endocrine following. Regimen adjustment as noted.     DISPOSITION: AR     CORE MEASURES:        Admission NIHSS: 16     TPA:  NO      LDL/HDL: 97/42     Depression Screen: 0     Statin Therapy: Y     Dysphagia Screen:  FAIL     Smoking  NO      Afib  YES     Stroke Education  YES    Obtain screening ultrasound in patients who meet 1 or more of the following criteria as patient is high risk for DVT/PE upon admission:   [] History of DVT/PE  [X]Hypercoagulable states (Factor V Leiden, Cancer, OCP, etc. )  []Prolonged immobility (hemiplegia/hemiparesis/post operative or any other extended immobilization)   [] Transferred from outside facility (Rehab or Long term care)  [] Age </= to 50

## 2021-11-16 NOTE — DIETITIAN INITIAL EVALUATION ADULT. - REASON
No overt signs of muscle or fat depletion. Nutrition Focused Physical Exam is not indicated at this time

## 2021-11-16 NOTE — PROGRESS NOTE ADULT - PROBLEM SELECTOR PROBLEM 2
HTN (hypertension)
Type 2 diabetes mellitus, uncontrolled
HTN (hypertension)
Type 2 diabetes mellitus, uncontrolled

## 2021-11-16 NOTE — CONSULT NOTE ADULT - ASSESSMENT
66 y.o. male with h/o uncontrolled type 2 DM, HTN, hyperlipidemia, CVA, afib, and rectal cancer presents with ischemic stroke and now with hyperglycemia. 
66yman with  hx of DM, HTN, Afib (on apixaban, on hold since 11/8 for chemoport placement), prior CVA with no deficits, colon ca, who was found by wife on the floor, brought to [facility] with slurred speech and facial droop; not candidate for TPA (outside of window, >4 hours), CTA showing occlusion of the distal basilar artery and bilateral proximal posterior cerebral artery P1 segments of the basilar; pt is s/p MT w TICI2b flow restored.     Oncology consulted given patient's hx of rectal cancer, on treatment.    #Rectal adenocarcinoma; T3N0  -MMR-proficient; moderately differentiated invasive adenocarcinoma per path on diagnosis  -Rigid sigmoidoscopy revealed lesion at 6 cm from the anal verge  -treated with capecitabine (started 8/17/21-9/28/21); completed; completed chemoRT to 5040 cGy/28 fxs with concurrent Xeloda on 9/27/21  -Seen by Dr. Buck Magana (colorectal x), recommended continuing total neoadjuvant therapy to improve chance of complete response  -Per last outpatient note, was planned to start FOLFOX x9 cycles (4.5 months), agreed to IV chemotherapy, was to get mediport as in HPI. Planned to start w/ 50% dose reduction; was to start FOLFOX on week of 11/15/21 prior to admission  -Given stroke, will hold chemotherapy for now. Patient pending discharge to rehab. Recommend follow up in 2 weeks with Dr. Ramos, will reschedule chemotherapy at that time  -no plans for inpatient chemotherapy  -lovenox per primary team    Case discussed with primary team. Please call with any questions.    Ambrose Pal MD, MPH  Hematology / Oncology Fellow, PGY7  p   
65 yo man w DM, HTN, Afib (on eliquis on hold since 11/8 for chemoport placement), prior CVA with no deficits, colon ca, who presented w an ischemic stroke w basilar artery thrombosis s/p MT w TICI2b flow restored

## 2021-11-16 NOTE — PROGRESS NOTE ADULT - SUBJECTIVE AND OBJECTIVE BOX
Subjective: Patient seen and examined. No new events except as noted.   remains in Stroke unit    no cp or sob      REVIEW OF SYSTEMS:    CONSTITUTIONAL: + weakness, fevers or chills  EYES/ENT: No visual changes;  No vertigo or throat pain   NECK: No pain or stiffness  RESPIRATORY: No cough, wheezing, hemoptysis; No shortness of breath  CARDIOVASCULAR: No chest pain or palpitations  GASTROINTESTINAL: No abdominal or epigastric pain. No nausea, vomiting, or hematemesis; No diarrhea or constipation. No melena or hematochezia.  GENITOURINARY: No dysuria, frequency or hematuria  NEUROLOGICAL: + numbness or weakness  SKIN: No itching, burning, rashes, or lesions   All other review of systems is negative unless indicated above.    MEDICATIONS:  MEDICATIONS  (STANDING):  amLODIPine   Tablet 10 milliGRAM(s) Oral daily  atorvastatin 80 milliGRAM(s) Oral at bedtime  dextrose 50% Injectable 50 milliLiter(s) IV Push every 15 minutes  dextrose 50% Injectable 25 milliLiter(s) IV Push every 15 minutes  enoxaparin Injectable 120 milliGRAM(s) SubCutaneous two times a day  insulin glargine Injectable (LANTUS) 13 Unit(s) SubCutaneous at bedtime  insulin lispro (ADMELOG) corrective regimen sliding scale   SubCutaneous three times a day with meals  insulin lispro (ADMELOG) corrective regimen sliding scale   SubCutaneous at bedtime  insulin lispro Injectable (ADMELOG) 7 Unit(s) SubCutaneous three times a day before meals  melatonin 5 milliGRAM(s) Oral at bedtime  nystatin Powder 1 Application(s) Topical two times a day  polyethylene glycol 3350 17 Gram(s) Oral two times a day  senna 2 Tablet(s) Oral at bedtime      PHYSICAL EXAM:  T(C): 37.1 (11-16-21 @ 07:28), Max: 37.2 (11-15-21 @ 19:50)  HR: 90 (11-16-21 @ 08:00) (75 - 99)  BP: 159/86 (11-16-21 @ 08:00) (136/81 - 179/100)  RR: 20 (11-16-21 @ 08:00) (17 - 26)  SpO2: 96% (11-16-21 @ 08:00) (92% - 99%)  Wt(kg): --  I&O's Summary    15 Nov 2021 07:01  -  16 Nov 2021 07:00  --------------------------------------------------------  IN: 100 mL / OUT: 1100 mL / NET: -1000 mL        Appearance: NAD  HEENT:  Dry  oral mucosa, PERRL, EOMI	  Lymphatic: No lymphadenopathy  Cardiovascular: Normal S1 S2, No JVD, No murmurs, No edema  Respiratory: Lungs clear to auscultation	  Psychiatry: A & O x 3, sleepy  Gastrointestinal:  Soft, Non-tender, + BS	  Skin: No rashes, No ecchymoses, No cyanosis	  Neuro:  AOx3, dysarthric, follows commands, bilat GILDA, VFF except for inconsistent motion detection in R temporal visual field, LUE triceps 4-, hand  and bi 5/5, RUE triceps 4+, hg and bi 5/5, LLE 5/5, RLE HF 4/5 DF 4/5 PF 5/5, dysmetria BUE  Extremities: Normal range of motion, No clubbing, cyanosis or edema  Vascular: Peripheral pulses palpable 2+ bilaterally        LABS:    CARDIAC MARKERS:                                10.1   9.79  )-----------( 293      ( 16 Nov 2021 04:53 )             33.1     11-16    138  |  106  |  8   ----------------------------<  168<H>  3.8   |  19<L>  |  0.99    Ca    8.5      16 Nov 2021 04:53            TELEMETRY: 	  SR  ECG:  	  RADIOLOGY:   DIAGNOSTIC TESTING:  [ ] Echocardiogram:  [ ]  Catheterization:  [ ] Stress Test:    OTHER:

## 2021-11-16 NOTE — PROGRESS NOTE ADULT - SUBJECTIVE AND OBJECTIVE BOX
DIABETES FOLLOW UP : Seen earlier today    INTERVAL HX: pt asking to see his wife, endorses his blurry vision is due to a cataract and has worsened recently , neuro team aware . BG mostly at goal since dinner 136-189 . FBG at goal 148. has had variable PO intake, reports he is forcing himself to eat because he has a poor appetite       Review of Systems:  General: As above  Cardiovascular: No chest pain, palpitations  Respiratory: No SOB, no cough  GI: No nausea, vomiting, abdominal pain  Endocrine: no polyuria, polydipsia or S&Sx of hypoglycemia    Allergies    No Known Drug Allergies  Nuts (Hives)      Intolerances      MEDICATIONS  (STANDING):  amLODIPine   Tablet 10 milliGRAM(s) Oral daily  atorvastatin 80 milliGRAM(s) Oral at bedtime  dextrose 50% Injectable 50 milliLiter(s) IV Push every 15 minutes  dextrose 50% Injectable 25 milliLiter(s) IV Push every 15 minutes  enoxaparin Injectable 120 milliGRAM(s) SubCutaneous two times a day  insulin glargine Injectable (LANTUS) 13 Unit(s) SubCutaneous at bedtime  insulin lispro (ADMELOG) corrective regimen sliding scale   SubCutaneous three times a day with meals  insulin lispro (ADMELOG) corrective regimen sliding scale   SubCutaneous at bedtime  insulin lispro Injectable (ADMELOG) 7 Unit(s) SubCutaneous three times a day before meals  melatonin 5 milliGRAM(s) Oral at bedtime  nystatin Powder 1 Application(s) Topical two times a day  polyethylene glycol 3350 17 Gram(s) Oral two times a day  senna 2 Tablet(s) Oral at bedtime      PHYSICAL EXAM:  VITALS: T(C): 37.1 (11-16-21 @ 07:28)  T(F): 98.7 (11-16-21 @ 07:28), Max: 99 (11-15-21 @ 19:50)  HR: 82 (11-16-21 @ 10:00) (75 - 99)  BP: 157/85 (11-16-21 @ 10:00) (136/81 - 179/100)  RR:  (17 - 26)  SpO2:  (92% - 100%)  Wt(kg): --  GENERAL: male laying in bed in NAD  Abdomen: Soft, nontender, non distended  Extremities: Warm, no edema in all 4 exts  NEURO: Alert    LABS:  POCT Blood Glucose.: 136 mg/dL (11-16-21 @ 11:41)  POCT Blood Glucose.: 148 mg/dL (11-16-21 @ 07:32)  POCT Blood Glucose.: 156 mg/dL (11-15-21 @ 22:28)  POCT Blood Glucose.: 189 mg/dL (11-15-21 @ 16:45)  POCT Blood Glucose.: 221 mg/dL (11-15-21 @ 11:19)  POCT Blood Glucose.: 184 mg/dL (11-15-21 @ 08:55)  POCT Blood Glucose.: 195 mg/dL (11-15-21 @ 07:52)  POCT Blood Glucose.: 163 mg/dL (11-14-21 @ 21:28)  POCT Blood Glucose.: 174 mg/dL (11-14-21 @ 19:55)  POCT Blood Glucose.: 245 mg/dL (11-14-21 @ 16:55)  POCT Blood Glucose.: 199 mg/dL (11-14-21 @ 11:05)  POCT Blood Glucose.: 157 mg/dL (11-14-21 @ 07:02)  POCT Blood Glucose.: 149 mg/dL (11-14-21 @ 04:43)  POCT Blood Glucose.: 144 mg/dL (11-14-21 @ 03:32)  POCT Blood Glucose.: 132 mg/dL (11-14-21 @ 02:31)  POCT Blood Glucose.: 128 mg/dL (11-14-21 @ 01:31)  POCT Blood Glucose.: 124 mg/dL (11-14-21 @ 00:34)  POCT Blood Glucose.: 117 mg/dL (11-13-21 @ 23:25)  POCT Blood Glucose.: 122 mg/dL (11-13-21 @ 22:27)  POCT Blood Glucose.: 140 mg/dL (11-13-21 @ 21:20)  POCT Blood Glucose.: 116 mg/dL (11-13-21 @ 19:56)  POCT Blood Glucose.: 141 mg/dL (11-13-21 @ 19:05)  POCT Blood Glucose.: 152 mg/dL (11-13-21 @ 18:08)  POCT Blood Glucose.: 163 mg/dL (11-13-21 @ 16:58)  POCT Blood Glucose.: 211 mg/dL (11-13-21 @ 16:20)  POCT Blood Glucose.: 196 mg/dL (11-13-21 @ 15:10)  POCT Blood Glucose.: 194 mg/dL (11-13-21 @ 14:11)  POCT Blood Glucose.: 255 mg/dL (11-13-21 @ 13:09)  POCT Blood Glucose.: 292 mg/dL (11-13-21 @ 12:09)                            10.1   9.79  )-----------( 293      ( 16 Nov 2021 04:53 )             33.1       11-16    138  |  106  |  8   ----------------------------<  168<H>  3.8   |  19<L>  |  0.99    EGFR if : 92  EGFR if non : 80    Ca    8.5      11-16  Mg     1.8     11-13  Phos  2.7     11-13 11-13 Chol 162 Direct LDL -- LDL calculated 97 HDL 42 Trig 116    Thyroid Function Tests:  11-13 @ 12:26 TSH 1.58 FreeT4 -- T3 70 Anti TPO -- Anti Thyroglobulin Ab -- TSI --          A1C with Estimated Average Glucose Result: 11.8 % (11-13-21 @ 11:39)      Estimated Average Glucose: 292 mg/dL (11-13-21 @ 11:39)

## 2021-11-16 NOTE — CONSULT NOTE ADULT - SUBJECTIVE AND OBJECTIVE BOX
OncologyConsult Note    HPI: 66 year old M with a h/o DM, HTN, Afib (on eliquis, however on hold since 11/8 for chemoport placement on 11/11), prior stroke (no deficits), colon cancer (invasive adenocarcinoma of the rectum), who initially presented to [facility] with slurred speech and facial droop. LNK 22:30 on 11/12/21 when he went to bed. Patient found by wife on the floor, around 02:30, after reportedly falling out of bed, at which time she noted his speech was slurred. Stroke code activated at OSH, NIHSS 14 at that time. CTH without reported to be negative for acute infarct. 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 Patient transferred to [facility] for mechanical thrombectomy.     NIHSS on arrival: 16  Not a candidate for tPA due to presentation outside the window.   Candidate for MT.    Patient s/p MT w/ TICI2b flow restored on 11/13/21. Impression: Left PCA ischemic infarct. Etiology cardioembolic vs hypercoagulable/malignancy.     Patient started on full dose Lovenox anticoagulation BID    TTE: ef 55%, mild mitral annular calcification, minimal MR, mildly dilated LA, mild TR, minimal NJ , cardiac monitoring: episode of Afib, rate controlled, Dr Garcia was consulted    Dysphagia screen failed, S/S evaluation on 11/13 w/ recommendations for easy to chew textures w/ thin liquids    HgbA1C 11.8, Endocrinology was following. PT/OT recommends acute rehab. Patient stable for discharge.       PAST MEDICAL & SURGICAL HISTORY:  Lumbago  Lumbago with sciatica of right side  Benign hypertension  dx 10 years  H/O renal calculi  ESWL right side yrs ago  last stone one year ago  (passed on its own)  Obese  Eczema  Diabetes mellitus type II  on Meds 4/2012  S/P tonsillectomy    FAMILY HISTORY:      MEDICATIONS  (STANDING):  amLODIPine   Tablet 10 milliGRAM(s) Oral daily  atorvastatin 80 milliGRAM(s) Oral at bedtime  dextrose 50% Injectable 50 milliLiter(s) IV Push every 15 minutes  dextrose 50% Injectable 25 milliLiter(s) IV Push every 15 minutes  enoxaparin Injectable 120 milliGRAM(s) SubCutaneous two times a day  insulin glargine Injectable (LANTUS) 13 Unit(s) SubCutaneous at bedtime  insulin lispro (ADMELOG) corrective regimen sliding scale   SubCutaneous three times a day with meals  insulin lispro (ADMELOG) corrective regimen sliding scale   SubCutaneous at bedtime  insulin lispro Injectable (ADMELOG) 7 Unit(s) SubCutaneous three times a day before meals  melatonin 5 milliGRAM(s) Oral at bedtime  nystatin Powder 1 Application(s) Topical two times a day  polyethylene glycol 3350 17 Gram(s) Oral two times a day  senna 2 Tablet(s) Oral at bedtime    MEDICATIONS  (PRN):  acetaminophen     Tablet .. 650 milliGRAM(s) Oral every 6 hours PRN Temp greater or equal to 38C (100.4F), Mild Pain (1 - 3)      Allergies    No Known Drug Allergies  Nuts (Hives)  Patient stated he had sensation of  throat closing  30 minites after  Epidural pain management resolved it self few minitus after , /  20 y ago Unable to recall MD name or number (Other)    Intolerances    SOCIAL HISTORY: No EtOH, no tobacco    REVIEW OF SYSTEMS: ROS otherwise negative    T(F): 98.7 (11-16-21 @ 07:28), Max: 99 (11-15-21 @ 19:50)  HR: 93 (11-16-21 @ 18:00)  BP: 167/90 (11-16-21 @ 18:00)  RR: 25 (11-16-21 @ 18:00)  SpO2: 95% (11-16-21 @ 18:00)  Wt(kg): --    General: No acute distress, drowsy  HEENT: RIght exotropia,  Abdomen: Soft, nontender, nondistended   Extremities: No edema    NEUROLOGICAL EXAM:  Mental status: awake, alert, interactive, oriented to self and hospital, able to follow commands   Cranial Nerves: trace right nasolabial fold flattening, dysconjugate gaze: R exotropia, RHHA, b/l impaired adduction, no nystagmus, severe dysarthria,  Motor exam: Normal tone, moving all extremities well against gravity within bed   Sensation: Intact to light touch   Coordination/ Gait: B/L UE tremor and ataxia, dysdiadokokinesia, LLE abnormal heel to shin test                            10.1   9.79  )-----------( 293      ( 16 Nov 2021 04:53 )             33.1       11-16    138  |  106  |  8   ----------------------------<  168<H>  3.8   |  19<L>  |  0.99    Ca    8.5      16 Nov 2021 04:53      -Head CT (11/13/21) No CT evidence of acute intracranial hemorrhage, mass effect or midline shift. Ill-defined region of hypoattenuation in the left occipital lobe concerning for subacute to chronic left PCA infarct.   -CT brain perfusion: Large area of ischemic penumbra in the posterior fossa, sammi, bilateral cerebral white matter, with areas involving both occipital lobes.  -CTA head and neck: Occlusion of the distal basilar artery, and bilateral proximal posterior cerebral artery P1 segments of the basilar. Bilateral superior cerebellar arteries are also not seen. No CTA evidence of aneurysm or dissection.

## 2021-11-16 NOTE — PROGRESS NOTE ADULT - PROBLEM SELECTOR PLAN 1
-test BG AC/HS  -C/w Lantus to 13 units QHS  -c/w Admelog 7 units AC meals (hold if not eating)  -c/w Admelog moderate correction scale AC and change HS to scale to start above 250mg/dl  -cons carb/easy to chew diet-team changed today after discussing with them  -RD consult  -Insulin teaching  -For discharge, would recommend basal bolus regimen with doses TBD  -Recommend endocrine follow up at our [place] office [phone]
Likely cardioembolic   occlusion of distal basilar artery and b/l P2 segments s/p MT, TICI2b.
-test BG AC/HS  -CHange Lantus to 13 units QHS  -c/w Admelog 7 units AC meals (hold if not eating)  -c/w Admelog moderate correction scale AC and change HS to scale to start above 250mg/dl  -cons carb/easy to chew diet-team changed today after discussing with them  -RD consult  -Insulin teaching  -For discharge, would recommend basal bolus regimen with doses TBD  -Recommend endocrine follow up at our [place] office [phone]
Likely cardioembolic   occlusion of distal basilar artery and b/l P2 segments s/p MT, TICI2b.

## 2021-11-16 NOTE — DIETITIAN INITIAL EVALUATION ADULT. - ADD RECOMMEND
1. Continue with current diet order, recommend addition to low sodium 2. Continue to monitor PO intake/weights/labs/skin/GI status 3. Reinforce education as needed 1. Continue with current diet order, encourage PO intake 2. Continue to monitor PO intake/weights/labs/skin/GI status 3. Reinforce education as needed

## 2021-11-16 NOTE — PROGRESS NOTE ADULT - TIME BILLING
development of plan of care/coordination of care/glycemic control through review of labs, blood glucose values and vital signs.
development of plan of care/coordination of care/glycemic control through review of labs, blood glucose values and vital signs.

## 2021-11-16 NOTE — PROGRESS NOTE ADULT - PROBLEM SELECTOR PROBLEM 1
Type 2 diabetes mellitus, uncontrolled
CVA (cerebrovascular accident)
Type 2 diabetes mellitus, uncontrolled
CVA (cerebrovascular accident)

## 2021-11-16 NOTE — DIETITIAN INITIAL EVALUATION ADULT. - CHIEF COMPLAINT
Per chart "67 yo man w DM, HTN, Afib (on eliquis on hold since 11/8 for chemoport placement), prior CVA with no deficits, colon ca, who presented w an ischemic stroke w basilar artery thrombosis s/p MT w TICI2b flow restored"

## 2021-11-16 NOTE — DISCHARGE NOTE PROVIDER - CARE PROVIDER_API CALL
Cesar Garcia (DO)  Cardiology; Internal Medicine  [address], [address]  [place] [address]  Phone: [phone]  Fax: [phone]  Follow Up Time: 2 weeks    Alvaro Whyte (NP; RN)  NP in Adult Health  [address], [address]  [place] [address]  Phone: [phone]  Fax: [phone]  Follow Up Time: 2 weeks    Wiliam Ramos)  Hematology; Internal Medicine; Medical Oncology  [address] [place] [address]  Phone: [phone]  Fax: [phone]  Follow Up Time: 2 weeks    Long Fernandez)  Neurology; Vascular Neurology  [address], [address]  [place] [address]  Phone: [phone]  Fax: [phone]  Follow Up Time: 2 weeks

## 2021-11-16 NOTE — DIETITIAN INITIAL EVALUATION ADULT. - PHYSCIAL ASSESSMENT
Per flowsheets, no pressure injury, surgical incision to right chest, left and right skin fold upper chest erythema. obese Ht obtained from [facility] as no Ht available in chart.   Per flowsheets, no pressure injury, surgical incision to right chest, left and right skin fold upper chest erythema.

## 2021-11-16 NOTE — DISCHARGE NOTE PROVIDER - NSDCMRMEDTOKEN_GEN_ALL_CORE_FT
amLODIPine 10 mg oral tablet: 1 tab(s) orally once a day  Eliquis 5 mg oral tablet: 1 tab(s) orally 2 times a day  metFORMIN 1000 mg oral tablet: 1 tab(s) orally 2 times a day   amLODIPine 10 mg oral tablet: 1 tab(s) orally once a day  atorvastatin 80 mg oral tablet: 1 tab(s) orally once a day (at bedtime)  enoxaparin: 120 milligram(s) subcutaneous 2 times a day  insulin glargine: 13 milligram(s) subcutaneous once a day (at bedtime)  insulin lispro 100 units/mL injectable solution: Before meals:  2 Unit(s) if Glucose 151 - 200  4 Unit(s) if Glucose 201 - 250  6 Unit(s) if Glucose 251 - 300  8 Unit(s) if Glucose 301 - 350  10 Unit(s) if Glucose 351 - 400  12 Unit(s) if Glucose Greater Than 400  insulin lispro 100 units/mL injectable solution: At bedtime:  0 Unit(s) if Glucose 61 - 250  2 Unit(s) if Glucose 251 - 300  4 Unit(s) if Glucose 301 - 350  6 Unit(s) if Glucose 351 - 400  8 Unit(s) if Glucose Greater Than 400  insulin lispro 100 units/mL injectable solution: 7  injectable 3 times a day (before meals)  melatonin 5 mg oral tablet: 1 tab(s) orally once a day (at bedtime)  nystatin 100,000 units/g topical powder: 1 application topically 2 times a day  polyethylene glycol 3350 oral powder for reconstitution: 17 gram(s) orally 2 times a day  senna oral tablet: 2 tab(s) orally once a day (at bedtime)   amLODIPine 10 mg oral tablet: 1 tab(s) orally once a day  atorvastatin 80 mg oral tablet: 1 tab(s) orally once a day (at bedtime)  BISOPROLOL-HCTZ 10-6.25 MG TAB:   enoxaparin: 120 milligram(s) subcutaneous 2 times a day  FERROUS SULFATE 325 MG TABLET:   insulin glargine: 13 milligram(s) subcutaneous once a day (at bedtime)  insulin lispro 100 units/mL injectable solution: Before meals:  2 Unit(s) if Glucose 151 - 200  4 Unit(s) if Glucose 201 - 250  6 Unit(s) if Glucose 251 - 300  8 Unit(s) if Glucose 301 - 350  10 Unit(s) if Glucose 351 - 400  12 Unit(s) if Glucose Greater Than 400  insulin lispro 100 units/mL injectable solution: At bedtime:  0 Unit(s) if Glucose 61 - 250  2 Unit(s) if Glucose 251 - 300  4 Unit(s) if Glucose 301 - 350  6 Unit(s) if Glucose 351 - 400  8 Unit(s) if Glucose Greater Than 400  insulin lispro 100 units/mL injectable solution: 7  injectable 3 times a day (before meals)  melatonin 5 mg oral tablet: 1 tab(s) orally once a day (at bedtime)  nystatin 100,000 units/g topical powder: 1 application topically 2 times a day  OMEPRAZOLE  40 MG CPDR:   polyethylene glycol 3350 oral powder for reconstitution: 17 gram(s) orally 2 times a day  PROCHLORPERAZINE MALEATE  10 MG TABS:   senna oral tablet: 2 tab(s) orally once a day (at bedtime)  VITAMIN D3 50 MCG TABLET:   XELODA  500 MG TABS:

## 2021-11-16 NOTE — DISCHARGE NOTE PROVIDER - PROVIDER TOKENS
PROVIDER:[TOKEN:[[id number]],FOLLOWUP:[2 weeks]],PROVIDER:[TOKEN:[[id number]],FOLLOWUP:[2 weeks]],PROVIDER:[TOKEN:[[id number]],FOLLOWUP:[2 weeks]],PROVIDER:[TOKEN:[[id number]],FOLLOWUP:[2 weeks]]

## 2021-11-16 NOTE — DIETITIAN INITIAL EVALUATION ADULT. - OTHER INFO
Pt states he doesn't check his blood glucose at home, only taking medications. Per chart note, "He has had Type 2 DM for many years and only takes Metformin at home. Not sure of dose of metformin. He does not monitor his blood glucose levels at home. He does watch his diet and avoids sweets. He does sometimes have chips. No regular sodas or juices. Not active. He follows with his PCP only for his diabetes care."    No food allergies, or difficulty chewing/swallowing reported. Per RN, pt had some cranberry juice and peach for breakfast, was asking for his wife and food to eat during RD visit. Denies N&V, no diarrhea or constipation, last BM 11/14 per RN. Pt states he doesn't check his blood glucose at home, only taking medications. Per chart note, "He has had Type 2 DM for many years and only takes Metformin at home. Not sure of dose of metformin. He does not monitor his blood glucose levels at home. He does watch his diet and avoids sweets. He does sometimes have chips. No regular sodas or juices. Not active. He follows with his PCP only for his diabetes care."    No food allergies, or difficulty chewing/swallowing reported. Per RN, pt had some cranberry juice and peach for breakfast, was asking for his wife and food to eat during RD visit. Denies N&V, no diarrhea or constipation, last BM 11/14 per RN.    Discussed with pt on the importance of consistent carbohydrate in glycemic control. Recommended pt to avoid concentrated sweets, pair a protein with starch foods. Pt receptive, able to teach back 2 education points. No food allergies, or difficulty chewing/swallowing reported. However, pt s/p S&S, recommended easy to chew with thin liquids.   Per RN, pt had some cranberry juice and peach for breakfast, was asking for his wife and food to eat during RD visit. Denies N&V, no diarrhea or constipation, last BM 11/14 per RN.    Reports  pounds 3-4 weeks ago, current dosing wt 267.8 pounds, 5% weight gain in one month. Will continue to monitor wt as able.     Discussed the importance of consistent carbohydrate intake in glycemic control with pt. Recommended pt to avoid concentrated sweets, consuming adequate protein with well-balanced meal. Pt receptive, able to teach back 2 education points.

## 2021-11-16 NOTE — PROGRESS NOTE ADULT - ASSESSMENT
66 y.o. male with h/o uncontrolled type 2 DM, HTN, hyperlipidemia, CVA, afib, and rectal cancer presents with ischemic stroke and now with hyperglycemia. Required insulin gtt on admission now off on basal/bolus. Variable PO intake on easy to chew diet.  FBG improved this AM with increase of basal dosing, prandial BG at goal today. BG goal (100-180mg/dl)> Likely will need acute rehab at discharge. Given high A1C likely will need insulin once home to improve A1C.

## 2021-11-16 NOTE — PROGRESS NOTE ADULT - PROBLEM SELECTOR PLAN 3
-Agree with high intensity statin      Discussed with patient and primary  team Neurology PA   Contact via Microsoft Teams Tues/Thurs/Friday  On evenings and weekends, please call [phone] or page endocrine fellow on call.   Please note that this patient may be followed by different provider tomorrow. If no answer, contact endocrine fellow on call [phone] M-F 9a-5pm  [employer] password: [email or web address]
Stable   Cont with Norvasc.
Stable   Cont with Norvasc.
-Agree with high intensity statin      discussed w/pt and team  pager: [phone]   office:  [phone] (M-F 9a-5pm)               [phone] (nights/weekends)   [email or web address] password [email or web address]

## 2021-11-17 ENCOUNTER — TRANSCRIPTION ENCOUNTER (OUTPATIENT)
Age: 66
End: 2021-11-17

## 2021-11-17 ENCOUNTER — INPATIENT (INPATIENT)
Facility: HOSPITAL | Age: 66
LOS: 14 days | Discharge: ANOTHER HOSPITAL | DRG: 949 | End: 2021-12-02
Attending: PSYCHIATRY & NEUROLOGY | Admitting: PSYCHIATRY & NEUROLOGY
Payer: MEDICARE

## 2021-11-17 VITALS
SYSTOLIC BLOOD PRESSURE: 145 MMHG | DIASTOLIC BLOOD PRESSURE: 87 MMHG | OXYGEN SATURATION: 100 % | HEART RATE: 95 BPM | RESPIRATION RATE: 19 BRPM

## 2021-11-17 VITALS
HEART RATE: 110 BPM | TEMPERATURE: 98 F | WEIGHT: 260.15 LBS | RESPIRATION RATE: 15 BRPM | OXYGEN SATURATION: 96 % | DIASTOLIC BLOOD PRESSURE: 98 MMHG | HEIGHT: 71 IN | SYSTOLIC BLOOD PRESSURE: 182 MMHG

## 2021-11-17 DIAGNOSIS — Z95.828 PRESENCE OF OTHER VASCULAR IMPLANTS AND GRAFTS: Chronic | ICD-10-CM

## 2021-11-17 DIAGNOSIS — I63.9 CEREBRAL INFARCTION, UNSPECIFIED: ICD-10-CM

## 2021-11-17 LAB
ANION GAP SERPL CALC-SCNC: 13 MMOL/L — SIGNIFICANT CHANGE UP (ref 5–17)
BUN SERPL-MCNC: 7 MG/DL — SIGNIFICANT CHANGE UP (ref 7–23)
CALCIUM SERPL-MCNC: 8.9 MG/DL — SIGNIFICANT CHANGE UP (ref 8.4–10.5)
CHLORIDE SERPL-SCNC: 103 MMOL/L — SIGNIFICANT CHANGE UP (ref 96–108)
CO2 SERPL-SCNC: 20 MMOL/L — LOW (ref 22–31)
CREAT SERPL-MCNC: 0.93 MG/DL — SIGNIFICANT CHANGE UP (ref 0.5–1.3)
GLUCOSE BLDC GLUCOMTR-MCNC: 205 MG/DL — HIGH (ref 70–99)
GLUCOSE SERPL-MCNC: 140 MG/DL — HIGH (ref 70–99)
HCT VFR BLD CALC: 37.2 % — LOW (ref 39–50)
HGB BLD-MCNC: 11.3 G/DL — LOW (ref 13–17)
MCHC RBC-ENTMCNC: 26.3 PG — LOW (ref 27–34)
MCHC RBC-ENTMCNC: 30.4 GM/DL — LOW (ref 32–36)
MCV RBC AUTO: 86.5 FL — SIGNIFICANT CHANGE UP (ref 80–100)
NRBC # BLD: 0 /100 WBCS — SIGNIFICANT CHANGE UP (ref 0–0)
PLATELET # BLD AUTO: 335 K/UL — SIGNIFICANT CHANGE UP (ref 150–400)
POTASSIUM SERPL-MCNC: 3.7 MMOL/L — SIGNIFICANT CHANGE UP (ref 3.5–5.3)
POTASSIUM SERPL-SCNC: 3.7 MMOL/L — SIGNIFICANT CHANGE UP (ref 3.5–5.3)
RBC # BLD: 4.3 M/UL — SIGNIFICANT CHANGE UP (ref 4.2–5.8)
RBC # FLD: 18.6 % — HIGH (ref 10.3–14.5)
SARS-COV-2 RNA SPEC QL NAA+PROBE: SIGNIFICANT CHANGE UP
SODIUM SERPL-SCNC: 136 MMOL/L — SIGNIFICANT CHANGE UP (ref 135–145)
WBC # BLD: 7.57 K/UL — SIGNIFICANT CHANGE UP (ref 3.8–10.5)
WBC # FLD AUTO: 7.57 K/UL — SIGNIFICANT CHANGE UP (ref 3.8–10.5)

## 2021-11-17 PROCEDURE — 99233 SBSQ HOSP IP/OBS HIGH 50: CPT

## 2021-11-17 PROCEDURE — 99223 1ST HOSP IP/OBS HIGH 75: CPT

## 2021-11-17 RX ORDER — DEXTROSE 50 % IN WATER 50 %
25 SYRINGE (ML) INTRAVENOUS ONCE
Refills: 0 | Status: DISCONTINUED | OUTPATIENT
Start: 2021-11-17 | End: 2021-12-02

## 2021-11-17 RX ORDER — SODIUM CHLORIDE 9 MG/ML
1000 INJECTION, SOLUTION INTRAVENOUS
Refills: 0 | Status: DISCONTINUED | OUTPATIENT
Start: 2021-11-17 | End: 2021-12-02

## 2021-11-17 RX ORDER — DEXTROSE 50 % IN WATER 50 %
12.5 SYRINGE (ML) INTRAVENOUS ONCE
Refills: 0 | Status: DISCONTINUED | OUTPATIENT
Start: 2021-11-17 | End: 2021-12-02

## 2021-11-17 RX ORDER — GLUCAGON INJECTION, SOLUTION 0.5 MG/.1ML
1 INJECTION, SOLUTION SUBCUTANEOUS ONCE
Refills: 0 | Status: DISCONTINUED | OUTPATIENT
Start: 2021-11-17 | End: 2021-12-02

## 2021-11-17 RX ORDER — ATORVASTATIN CALCIUM 80 MG/1
80 TABLET, FILM COATED ORAL AT BEDTIME
Refills: 0 | Status: DISCONTINUED | OUTPATIENT
Start: 2021-11-17 | End: 2021-12-02

## 2021-11-17 RX ORDER — SENNA PLUS 8.6 MG/1
2 TABLET ORAL AT BEDTIME
Refills: 0 | Status: DISCONTINUED | OUTPATIENT
Start: 2021-11-17 | End: 2021-12-02

## 2021-11-17 RX ORDER — FERROUS SULFATE 325(65) MG
325 TABLET ORAL DAILY
Refills: 0 | Status: DISCONTINUED | OUTPATIENT
Start: 2021-11-18 | End: 2021-12-02

## 2021-11-17 RX ORDER — INSULIN LISPRO 100/ML
7 VIAL (ML) SUBCUTANEOUS
Refills: 0 | Status: DISCONTINUED | OUTPATIENT
Start: 2021-11-17 | End: 2021-12-02

## 2021-11-17 RX ORDER — AMLODIPINE BESYLATE 2.5 MG/1
10 TABLET ORAL DAILY
Refills: 0 | Status: DISCONTINUED | OUTPATIENT
Start: 2021-11-18 | End: 2021-12-02

## 2021-11-17 RX ORDER — ACETAMINOPHEN 500 MG
650 TABLET ORAL EVERY 6 HOURS
Refills: 0 | Status: DISCONTINUED | OUTPATIENT
Start: 2021-11-17 | End: 2021-12-02

## 2021-11-17 RX ORDER — INSULIN LISPRO 100/ML
VIAL (ML) SUBCUTANEOUS AT BEDTIME
Refills: 0 | Status: DISCONTINUED | OUTPATIENT
Start: 2021-11-17 | End: 2021-12-02

## 2021-11-17 RX ORDER — METFORMIN HYDROCHLORIDE 850 MG/1
500 TABLET ORAL
Refills: 0 | Status: DISCONTINUED | OUTPATIENT
Start: 2021-11-17 | End: 2021-11-23

## 2021-11-17 RX ORDER — PANTOPRAZOLE SODIUM 20 MG/1
40 TABLET, DELAYED RELEASE ORAL
Refills: 0 | Status: DISCONTINUED | OUTPATIENT
Start: 2021-11-18 | End: 2021-12-02

## 2021-11-17 RX ORDER — INSULIN GLARGINE 100 [IU]/ML
13 INJECTION, SOLUTION SUBCUTANEOUS AT BEDTIME
Refills: 0 | Status: DISCONTINUED | OUTPATIENT
Start: 2021-11-17 | End: 2021-12-02

## 2021-11-17 RX ORDER — APIXABAN 2.5 MG/1
0 TABLET, FILM COATED ORAL
Qty: 0 | Refills: 0 | DISCHARGE

## 2021-11-17 RX ORDER — POLYETHYLENE GLYCOL 3350 17 G/17G
17 POWDER, FOR SOLUTION ORAL
Refills: 0 | Status: DISCONTINUED | OUTPATIENT
Start: 2021-11-17 | End: 2021-12-02

## 2021-11-17 RX ORDER — DEXTROSE 50 % IN WATER 50 %
15 SYRINGE (ML) INTRAVENOUS ONCE
Refills: 0 | Status: DISCONTINUED | OUTPATIENT
Start: 2021-11-17 | End: 2021-12-02

## 2021-11-17 RX ORDER — AMLODIPINE BESYLATE 2.5 MG/1
0 TABLET ORAL
Qty: 0 | Refills: 0 | DISCHARGE

## 2021-11-17 RX ORDER — METFORMIN HYDROCHLORIDE 850 MG/1
0 TABLET ORAL
Qty: 0 | Refills: 0 | DISCHARGE

## 2021-11-17 RX ORDER — ENOXAPARIN SODIUM 100 MG/ML
120 INJECTION SUBCUTANEOUS
Refills: 0 | Status: DISCONTINUED | OUTPATIENT
Start: 2021-11-17 | End: 2021-12-01

## 2021-11-17 RX ORDER — INSULIN LISPRO 100/ML
VIAL (ML) SUBCUTANEOUS
Refills: 0 | Status: DISCONTINUED | OUTPATIENT
Start: 2021-11-17 | End: 2021-12-02

## 2021-11-17 RX ORDER — LANOLIN ALCOHOL/MO/W.PET/CERES
6 CREAM (GRAM) TOPICAL AT BEDTIME
Refills: 0 | Status: DISCONTINUED | OUTPATIENT
Start: 2021-11-17 | End: 2021-12-02

## 2021-11-17 RX ADMIN — ENOXAPARIN SODIUM 120 MILLIGRAM(S): 100 INJECTION SUBCUTANEOUS at 20:20

## 2021-11-17 RX ADMIN — ENOXAPARIN SODIUM 120 MILLIGRAM(S): 100 INJECTION SUBCUTANEOUS at 05:22

## 2021-11-17 RX ADMIN — METFORMIN HYDROCHLORIDE 500 MILLIGRAM(S): 850 TABLET ORAL at 20:22

## 2021-11-17 RX ADMIN — Medication 2: at 08:04

## 2021-11-17 RX ADMIN — Medication 7 UNIT(S): at 08:03

## 2021-11-17 RX ADMIN — ATORVASTATIN CALCIUM 80 MILLIGRAM(S): 80 TABLET, FILM COATED ORAL at 20:22

## 2021-11-17 RX ADMIN — AMLODIPINE BESYLATE 10 MILLIGRAM(S): 2.5 TABLET ORAL at 05:22

## 2021-11-17 RX ADMIN — INSULIN GLARGINE 13 UNIT(S): 100 INJECTION, SOLUTION SUBCUTANEOUS at 20:22

## 2021-11-17 RX ADMIN — POLYETHYLENE GLYCOL 3350 17 GRAM(S): 17 POWDER, FOR SOLUTION ORAL at 05:21

## 2021-11-17 RX ADMIN — NYSTATIN CREAM 1 APPLICATION(S): 100000 CREAM TOPICAL at 05:21

## 2021-11-17 RX ADMIN — Medication 6 MILLIGRAM(S): at 20:22

## 2021-11-17 RX ADMIN — Medication 7 UNIT(S): at 11:55

## 2021-11-17 NOTE — H&P ADULT - NSHPREVIEWOFSYSTEMS_GEN_ALL_CORE
REVIEW OF SYSTEMS  Constitutional: No fever, No Chills, No fatigue  HEENT: No eye pain, No visual disturbances, No difficulty hearing  Pulm: No cough,  No shortness of breath  Cardio: No chest pain, No palpitations  GI:  No abdominal pain, No nausea, No vomiting, No diarrhea, No constipation  : No dysuria, No frequency, No hematuria  Neuro: No headaches, No memory +No loss of strength, No numbness, No tremors, +dysarthria   Skin: No itching, No rashes, No lesions   Endo: No temperature intolerance  MSK: No joint pain, No joint swelling, No muscle pain, No Neck or back pain  Psych:  No depression, No anxiety REVIEW OF SYSTEMS  Constitutional: No fever, No Chills, + fatigue  HEENT: No eye pain, No visual disturbances, No difficulty hearing  Pulm: + cough,  No shortness of breath  Cardio: No chest pain, No palpitations  GI:  No abdominal pain, No nausea, No vomiting, No diarrhea, No constipation  : No dysuria, No frequency, No hematuria  Neuro: No headaches, No memory +No loss of strength, No numbness, No tremors, +dysarthria   Skin: No itching, No rashes, No lesions   Endo: No temperature intolerance  MSK: No joint pain, No joint swelling, No muscle pain, No Neck or back pain  Psych:  No depression, No anxiety

## 2021-11-17 NOTE — DISCHARGE NOTE NURSING/CASE MANAGEMENT/SOCIAL WORK - PATIENT PORTAL LINK FT
You can access the FollowMyHealth Patient Portal offered by [facility] by registering at the following website: [email or web address]. By joining [facility] FollowMyHealth portal, you will also be able to view your health information using other applications (apps) compatible with our system.

## 2021-11-17 NOTE — H&P ADULT - HISTORY OF PRESENT ILLNESS
66 year old M with a h/o DM type 2, HTN, Afib (on eliquis, however on hold since 11/8 for chemoport placement on 11/11), prior stroke (no deficits), colon cancer (invasive adenocarcinoma of the rectum), who initially presented to [facility] on 11/13/21 with speech and facial droop. Patient found by wife on the floor with slurred speech. He had imaging done in [facility] with CTH reported to be negative for acute infarct. 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 Patient transferred to [facility] for mechanical thrombectomy. NIHSS on arrival: 16, Not a candidate for tPA due to presentation outside the window. He underwent thrombectomy with TICI2b flow restored on 11/13/21. Etiology cardioembolic vs hypercoagulable/malignancy. He was evaluated for acute inpatient rehab and was admitted to [facility] on 11/17.

## 2021-11-17 NOTE — H&P ADULT - ASSESSMENT
Assessment/Plan:  66y with a history of DM type 2, HTN, afib on eliquis, Colon ca, previous stroke with no residual defecits who presented to [facility] initially on 11/13 and found to have basilar artery occlusion and was transferred to [facility] for mechanical thrombectomy which was performed on 11/13 with TICI 2B restoration of flow. He continued workup and monitoring at [facility] and was evaluated for inpatient acute rehab. Patient now admitted for a multidisciplinary rehab program. 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 with TICI2b  - Left PCA, Bilateral cerebellum and pontine stroke  -Generalized weakness, dysarthria,   - Start comprehensive rehab program of PT/OT/SLP - 3 hours a day, 5 days a week  - Continue full dose lovenox 120mg BID  -Continue atorvastatin 80mg daily    #Atrial Fibrillation  - Was on eliquis which was stopped for port placement for chemotherapy  -Restarted on full dose lovenox 120mg BID  -TTE with ef 55%  -EKG monitoring on admission    #HTN  -Continue norvasc 10mg daily  - Monitor vital signs    #DM type 2  - A1c at [facility] 11.8 on 11/13  -Continue Lantus  -Continue premeal lispro   -Continue ISS and FS  -Home meds:     #Rectal adenocarcinoma  - MMR-proficient; moderately differentiated invasive adenocarcinoma per path on diagnosis  -Rigid sigmoidoscopy revealed lesion at 6 cm from the anal verge  -treated with capecitabine (started 8/17/21-9/28/21); completed; completed chemo RT to 5040 cGy/28 fxs with concurrent Xeloda on 9/27/21  -Seen by Dr. Buck Magana (colorectal x), recommended continuing total neoadjuvant therapy to improve chance of complete response  -Per last outpatient note, was planned to start FOLFOX x9 cycles (4.5 months), agreed to IV chemotherapy Planned to start w/ 50% dose reduction; was to start FOLFOX on week of 11/15/21 prior to admission  -Given stroke, chemotherapy held for now as per Heme/onc at [facility]. - Recommended  follow up in 2 weeks with Dr. Ramos,  -no plans for inpatient chemotherapy as per Heme/onc rec      #Pain  - Tylenol PRN    #Mood / Cognition  - Neuropsychology consult PRN    #Sleep  - Maintain quiet hours and a low stim environment   - Monitor sleep logs   - Melatonin PRN     #GI / Bowel  - Senna qHS  - Miralax PRN Daily  - GI ppx: protonix 40mg     # / Bladder  - Continue bladder scans Q8 hours with straight cath for >400cc.  - Toileting schedule every 4 hours    #Skin / Pressure injury  - Monitor port-a-cath site   - Turn q2 hours in bed while awake, air mattress  - nursing to monitor skin qShift    #Diet  - Diet Consistency: Regular- carb control   - Aspiration Precautions  - SLP consult for swallow function evaluation and treatment      DVT prophylaxis:   - Full dose lovenox   - SCDs  - Teds    #Participation Restrictions/Precautions:  - Weight bearing status: WBAT  - Precautions: Falls, Seizures, Aspiration     Outpatient Follow-up:  Cesar Garcia ()  Cardiology; Internal Medicine  [address], [address]  [place] [address]  Phone: [phone]  Fax: [phone]  Follow Up Time: 2 weeks    Alvaro Whyte (NP; RN)  NP in [facility]  [address], [address]  [place] [address]  Phone: [phone]  Fax: [phone]  Follow Up Time: 2 weeks    Wiliam Ramos)  Hematology; Internal Medicine; Medical Oncology  [address] [place] [address]  Phone: [phone]  Fax: [phone]  Follow Up Time: 2 weeks    Long Fernandez)  Neurology; Vascular Neurology  [address]  [place] [address]  Phone: [phone]  Fax: [phone]  Follow Up Time: 2 weeks    ---------------    Goals: Safe discharge to home  Estimated Length of Stay: 10-14 days  Rehab Potential: Good  Medical Prognosis: Good  Estimated Disposition: Home with home care    PRESCREEN COMPARISON:  I have reviewed the prescreen information and I have found no relevant changes between the preadmission screening and my post admission evaluation.    RATIONALE FOR INPATIENT ADMISSION: Patient demonstrates the following:  [X] Medically appropriate for rehabilitation admission  [X] Has attainable rehab goals with an appropriate initial discharge plan  [X]Has rehabilitation potential (expected to make a significant improvement within a reasonable period of time)  [X] Requires close medical management by a rehab physician, rehab nursing care, Hospitalist and comprehensive interdisciplinary team (including PT, OT and/or SLP, Prosthetics and Orthotics)       Assessment/Plan:  66y with a history of DM type 2, HTN, afib on eliquis, Colon ca, previous stroke with no residual defecits who presented to [facility] initially on 11/13 and found to have basilar artery occlusion and was transferred to [facility] for mechanical thrombectomy which was performed on 11/13 with TICI 2B restoration of flow. He continued workup and monitoring at [facility] and was evaluated for inpatient acute rehab. Patient now admitted for a multidisciplinary rehab program. 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 with TICI2b  - Left PCA, Bilateral cerebellum and pontine stroke  -Generalized weakness, dysarthria,   - Start comprehensive rehab program of PT/OT/SLP - 3 hours a day, 5 days a week  - Continue full dose lovenox 120mg BID  -Continue atorvastatin 80mg daily    #Atrial Fibrillation  - Was on eliquis which was stopped for port placement for chemotherapy  -Restarted on full dose lovenox 120mg BID  -TTE with ef 55%  -EKG monitoring on admission    #HTN  -Continue norvasc 10mg daily  - Was on HCTZ at home- now held   - Monitor vital signs    #DM type 2  - A1c at [facility] 11.8 on 11/13  -Continue Lantus  -Continue premeal lispro   -Continue ISS and FS  -Restart Metformin 500mg BID  -Home meds: Metformin 1000mg BID    #Rectal adenocarcinoma  - MMR-proficient; moderately differentiated invasive adenocarcinoma per path on diagnosis  -Rigid sigmoidoscopy revealed lesion at 6 cm from the anal verge  -treated with capecitabine (started 8/17/21-9/28/21); completed; completed chemo RT to 5040 cGy/28 fxs with concurrent Xeloda on 9/27/21  -Seen by Dr. Buck Magana (colorectal x), recommended continuing total neoadjuvant therapy to improve chance of complete response  -Per last outpatient note, was planned to start FOLFOX x9 cycles (4.5 months), agreed to IV chemotherapy Planned to start w/ 50% dose reduction; was to start FOLFOX on week of 11/15/21 prior to admission  -Given stroke, chemotherapy held for now as per Heme/onc at [facility]. - Recommended  follow up in 2 weeks with Dr. Ramos,  -no plans for inpatient chemotherapy as per Heme/onc rec  -Was on Xeloda- now held    #Pain  - Tylenol PRN    #Mood / Cognition  - Neuropsychology consult PRN    #Sleep  - Maintain quiet hours and a low stim environment   - Monitor sleep logs   - Melatonin PRN     #GI / Bowel  - Senna qHS  - Miralax PRN Daily  - GI ppx: protonix 40mg     # / Bladder  - Continue bladder scans Q8 hours with straight cath for >400cc.  - Toileting schedule every 4 hours    #Skin / Pressure injury  - Monitor port-a-cath site   - Turn q2 hours in bed while awake, air mattress  - nursing to monitor skin qShift    #Diet  - Diet Consistency: Regular- carb control   - Aspiration Precautions  - SLP consult for swallow function evaluation and treatment      DVT prophylaxis:   - Full dose lovenox   - SCDs  - Teds    #Participation Restrictions/Precautions:  - Weight bearing status: WBAT  - Precautions: Falls, Seizures, Aspiration     Outpatient Follow-up:  Cesar Garcia ()  Cardiology; Internal Medicine  [address], [address]  [place] [address]  Phone: [phone]  Fax: [phone]  Follow Up Time: 2 weeks    Alvaro Whyte (NP; RN)  NP in Adult Health  [address], [address]  [place] [address]  Phone: [phone]  Fax: [phone]  Follow Up Time: 2 weeks    Wiliam Ramos)  Hematology; Internal Medicine; Medical Oncology  [address] [place] [address]  Phone: [phone]  Fax: [phone]  Follow Up Time: 2 weeks    Long Fernandez)  Neurology; Vascular Neurology  [address]  [place] [address]  Phone: [phone]  Fax: [phone]  Follow Up Time: 2 weeks    ---------------    Goals: Safe discharge to home  Estimated Length of Stay: 10-14 days  Rehab Potential: Good  Medical Prognosis: Good  Estimated Disposition: Home with home care    PRESCREEN COMPARISON:  I have reviewed the prescreen information and I have found no relevant changes between the preadmission screening and my post admission evaluation.    RATIONALE FOR INPATIENT ADMISSION: Patient demonstrates the following:  [X] Medically appropriate for rehabilitation admission  [X] Has attainable rehab goals with an appropriate initial discharge plan  [X]Has rehabilitation potential (expected to make a significant improvement within a reasonable period of time)  [X] Requires close medical management by a rehab physician, rehab nursing care, Hospitalist and comprehensive interdisciplinary team (including PT, OT and/or SLP, Prosthetics and Orthotics)

## 2021-11-17 NOTE — PROGRESS NOTE ADULT - ASSESSMENT
ASSESSMENT: This is a 66yman with  hx of DM, HTN, Afib (on eliquis on hold since 11/8 for chemoport placement), prior CVA with no deficits, colon ca, who was found by wife on the floor, brought to [facility] with slurred speech and facial droop. not TPA candidate as pt LKN more than 4hrs, CTH reported to be negative for acute infarct.  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 Bilateral superior cerebellar arteries are also not seen. Not a candidate for tPA due to presentation outside the window. Pt is s/p MT w TICI2b flow restored.     Impression: Left PCA ischemic infarcts, b/l cerebellum and pontine. Etiology cardioembolic vs hypercoagulable/malignancy.    NEURO: Neuro exam improved vs admission,  Continue close monitoring for neurologic deterioration, -180mmHg, with slow titration to normotensive, high dose statin, titrate statin to LDL goal less than 70, Pending MRI Brain w/o contrast, CT/CTA head/neck results as above. Physical therapy/OT AR     ANTITHROMBOTIC THERAPY: full dose lovenox anticoagulation BID.    PULMONARY: Protecting airway, saturating well     CARDIOVASCULAR:  TTE: ef 55%, mild mitral annular calcification, minimal MR, mildly dilated LA, mild TR, minimal WV , cardiac monitoring: episode of Afib, continue cardiac meds, Dr Garcia contacted for further eval                               SBP goal: 110-180mmHg    GASTROINTESTINAL: Dysphagia screen failed, S/S eval on 11/13 with recommendations for easy to chew textures with thin liquids. Continue bowel regimen      Diet: Easy to chew textures with thin liquids    RENAL: BUN/Cr without acute change, maintain adequate hydration, retention noted, UA without evidence of uti.     Na Goal: Greater than 135     Dumas: No    HEMATOLOGY: anemia with some improvement, no acute signs of bleeding, will continue to monitor,  Platelets 335, LE doppler ordered to r/o DVT,  heme/onc follow up for noted cancer and plan noted anticipate 2 week follow up , no absolute neurological contraindication to chemo (per colorectal he is post radiation), would just closely monitor plt level as to avoid increased risk of bleeding, he is high risk for recurrent thrombosis in setting of atrial fibrillation and malignancy .      DVT ppx:  LMWH     ID: afebrile, no leukocytosis , no si/sx of infection     OTHER: HgbA1C 11.8, endocrine following. Regimen adjustment as noted. IR follow up appreciated, steri strips over chest port should remain intact and will fall off on their own.     DISPOSITION: AR     CORE MEASURES:        Admission NIHSS: 16     TPA:  NO      LDL/HDL: 97/42     Depression Screen: 0     Statin Therapy: Y     Dysphagia Screen:  FAIL     Smoking  NO      Afib  YES     Stroke Education  YES    Obtain screening ultrasound in patients who meet 1 or more of the following criteria as patient is high risk for DVT/PE upon admission:   [] History of DVT/PE  [X]Hypercoagulable states (Factor V Leiden, Cancer, OCP, etc. )  []Prolonged immobility (hemiplegia/hemiparesis/post operative or any other extended immobilization)   [] Transferred from outside facility (Rehab or Long term care)  [] Age </= to 50

## 2021-11-17 NOTE — H&P ADULT - ATTENDING COMMENTS
Patient examined, medica; records and brain imaging reviewed  Admit to BIU  Resume full dose AC with GT ppx  Goal LDL<70, continue statin  Medicine evaluation Patient examined, medical records and brain imaging reviewed  Admit to BIU  Resume full dose AC with GT ppx  Goal LDL<70, continue statin  Medicine evaluation

## 2021-11-17 NOTE — H&P ADULT - NSHPPHYSICALEXAM_GEN_ALL_CORE
General: NAD, Resting Comfortable,                                  HEENT: NC/AT, EOM I, PERRLA, Normal Conjunctivae, dysconjugate gaze   Cardio: RRR, Normal S1-S2, No M/G/R                              Pulm: No Respiratory Distress,  Lungs CTAB                        Abdomen: ND/NT, Soft, BS+                                                MSK: No joint swelling, Full ROM                                         Ext: No C/C/E, Pulses 2+ throughout, No calf tenderness    Skin:  all skin intact                                                                 Wounds: none  Decubitus Ulcers: None Present     Neurological Examination    Cognitive: AAO x 3                                                                         Attention: Intact   Judgment: Good evidence of judgement                               Memory: Recall 3 objects immediate and 3 min later      Mood/Affect: wnl                                                                           Communication:  Fluent,  severe dysarthria   Swallow: intact  Coordination: UE ataxia                                                                      Sensory: Intact to light touch                                                                                           Tone: normal Throughout   Balance: impaired    Right facial noted    Motor    LEFT    UE: 4/5  RIGHT UE: 4/5  LEFT    LE:  4/5  RIGHT LE:  4/5      Reflex:  2 + thoroughout, Sullivan/Babinski negative PHYSICAL EXAM  VITALS  T(C): 36.7 (11-17-21 @ 17:50), Max: 37.7 (11-17-21 @ 00:00)  HR: 110 (11-17-21 @ 17:50) (79 - 110)  BP: 182/98 (11-17-21 @ 17:50) (118/87 - 182/98)  RR: 15 (11-17-21 @ 17:50) (15 - 29)  SpO2: 96% (11-17-21 @ 17:50) (94% - 100%)    Gen - NAD, Comfortable  HEENT - NCAT, EOMI, MMM  Neck - Supple, No limited ROM  Pulm - CTAB, No wheeze, No rhonchi, No crackles  Cardiovascular - RRR, S1S2, No murmurs  Abdomen - Soft, NT/ND, +BS  Extremities - No C/C/E, No calf tenderness  Neuro-     Cognitive - AAOx3     Communication -  + dysarthria     Attention: Intact      Judgement: Good evidence of judgement     Memory: Recall 3 objects immediate and 3 min later         Cranial Nerves - CN 2-12 intact     Motor - B/L UE ALEA                    LEFT    UE - ShAB 4/5, EF 4/5, EE 4/5,  4/5                    RIGHT UE - ShAB 4/5, EF 4/5, EE 4/5,   4/5                    LEFT    LE - HF 4/5, KE 4/5, DF 4/5, PF 4/5                    RIGHT LE - HF 4/5, KE 4/5, DF 4/5, PF 4/5        Sensory - Intact to LT     Reflexes - DTR Intact, No primitive reflexive     Coordination - B/L UE ATAXIA     Tone - normal  Psychiatric - Mood stable, Affect WNL  Skin:  scattered bruise all over body, redness to right forearm, SS to right chest wall  Wounds: None Present PHYSICAL EXAM  VITALS  T(C): 36.7 (11-17-21 @ 17:50), Max: 37.7 (11-17-21 @ 00:00)  HR: 110 (11-17-21 @ 17:50) (79 - 110)  BP: 182/98 (11-17-21 @ 17:50) (118/87 - 182/98)  RR: 15 (11-17-21 @ 17:50) (15 - 29)  SpO2: 96% (11-17-21 @ 17:50) (94% - 100%)    Gen - NAD, Comfortable  HEENT - NCAT, EOMI, MMM, right visual field cut  Neck - Supple, No limited ROM  Pulm - CTAB, No wheeze, No rhonchi, No crackles  Cardiovascular - RRR, S1S2, No murmurs  Abdomen - Soft, NT/ND, +BS  Extremities - No C/C/E, No calf tenderness  Neuro-     Cognitive - AAOx3     Communication -  + dysarthria     Attention: Intact      Judgement: Good evidence of judgement     Memory: Recall 3 objects immediate and 3 min later         Cranial Nerves - CN 2-12 intact     Motor - B/L UE ALEA                    LEFT    UE - ShAB 4/5, EF 4/5, EE 4/5,  4/5                    RIGHT UE - ShAB 4/5, EF 4/5, EE 4/5,   4/5                    LEFT    LE - HF 4/5, KE 4/5, DF 4/5, PF 4/5                    RIGHT LE - HF 4/5, KE 4/5, DF 4/5, PF 4/5        Sensory - Intact to LT     Reflexes - DTR Intact, No primitive reflexive     Coordination - B/L UE ATAXIA     Tone - normal  Psychiatric - Mood stable, Affect WNL  Skin:  scattered bruise all over body, redness to right forearm, SS to right chest wall  Wounds: None Present

## 2021-11-17 NOTE — PROGRESS NOTE ADULT - SUBJECTIVE AND OBJECTIVE BOX
THE PATIENT WAS SEEN AND EXAMINED BY ME WITH THE HOUSESTAFF AND STROKE TEAM DURING MORNING ROUNDS.        HPI: 66 year old M with a h/o DM, HTN, Afib (on eliquis, however on hold since 11/8 for chemoport placement on 11/11), prior stroke (no deficits), colon cancer (invasive adenocarcinoma of the rectum), who initially presented to [facility] with slurred speech and facial droop. LNK 22:30 on 11/12/21 when he went to bed. Patient found by wife on the floor, around 02:30, after reportedly falling out of bed, at which time she noted his speech was slurred. Stroke code activated at OSH, NIHSS 14 at that time. CTH without reported to be negative for acute infarct. 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 Patient transferred to [facility] for mechanical thrombectomy. NIHSS on arrival: 16, Not a candidate for tPA due to presentation outside the window.   SUBJECTIVE: No events overnight.  No new neurologic complaints.  ROS reported negative unless otherwise noted.    acetaminophen     Tablet .. 650 milliGRAM(s) Oral every 6 hours PRN  amLODIPine   Tablet 10 milliGRAM(s) Oral daily  atorvastatin 80 milliGRAM(s) Oral at bedtime  dextrose 50% Injectable 50 milliLiter(s) IV Push every 15 minutes  dextrose 50% Injectable 25 milliLiter(s) IV Push every 15 minutes  enoxaparin Injectable 120 milliGRAM(s) SubCutaneous two times a day  insulin glargine Injectable (LANTUS) 13 Unit(s) SubCutaneous at bedtime  insulin lispro (ADMELOG) corrective regimen sliding scale   SubCutaneous three times a day with meals  insulin lispro (ADMELOG) corrective regimen sliding scale   SubCutaneous at bedtime  insulin lispro Injectable (ADMELOG) 7 Unit(s) SubCutaneous three times a day before meals  melatonin 5 milliGRAM(s) Oral at bedtime  nystatin Powder 1 Application(s) Topical two times a day  polyethylene glycol 3350 17 Gram(s) Oral two times a day  senna 2 Tablet(s) Oral at bedtime      PHYSICAL EXAM:   Vital Signs Last 24 Hrs  T(C): 37 (17 Nov 2021 05:44), Max: 37.7 (17 Nov 2021 00:00)  T(F): 98.6 (17 Nov 2021 05:44), Max: 99.8 (17 Nov 2021 00:00)  HR: 91 (17 Nov 2021 10:00) (79 - 98)  BP: 132/86 (17 Nov 2021 10:00) (118/87 - 167/96)  BP(mean): 99 (17 Nov 2021 10:00) (95 - 118)  RR: 24 (17 Nov 2021 10:00) (17 - 29)  SpO2: 100% (17 Nov 2021 10:00) (94% - 100%)    General: No acute distress, drowsy  HEENT: RIght exotropia,  Abdomen: Soft, nontender, nondistended   Extremities: No edema    NEUROLOGICAL EXAM:  Mental status: awake, alert, interactive, oriented to self and hospital, able to follow commands   Cranial Nerves: trace right nasolabial fold flattening, dysconjugate gaze: R exotropia, RHHA, b/l impaired adduction, no nystagmus, severe dysarthria,  Motor exam: Normal tone, moving all extremities well against gravity within bed   Sensation: Intact to light touch   Coordination/ Gait: B/L UE  ataxia      LABS:                        11.3   7.57  )-----------( 335      ( 17 Nov 2021 05:24 )             37.2    11-17    136  |  103  |  7   ----------------------------<  140<H>  3.7   |  20<L>  |  0.93    Ca    8.9      17 Nov 2021 05:24          IMAGING: Reviewed by me.     MR Head No Cont (11.15.21 @ 22:21)   Early subacute infarct of the basilar artery branch distribution involving the bilateral cerebellum and sammi, and to lesser extent distal left posterior cerebral artery (more embolic appearing). Susceptibility within the central pontine lesion suggests someearly hemorrhagic conversion.    Evolving T2 signal intensity in the mid/superior sammi and cerebral peduncles consistent with that seen on the head CT of 11/14/2021; extent of this finding is greater than any regional DWI abnormalities, more consistent with extracellular edema.    Chronic old infarct with old blood products involving the medial left occipital lobe.    Unremarkable T2 flow voids within the vertebral basilar system. Suboptimally visualized left PCA. Consider MRA. For further assessment as clinically indicated.    No extra-axial collection, hydrocephalus, midline shift or space-occupying mass lesion.        CT Head No Cont (11.14.21 @ 09:37)   Age-appropriate involutional and ischemic gliotic changes. Old left medial occipital infarct No hemorrhage. No change from 11/13/2021.    Head CT (11/13/21) No CT evidence of acute intracranial hemorrhage, mass effect or midline shift. Ill-defined region of hypoattenuation in the left occipital lobe concerning for subacute to chronic left PCA infarct.     CT brain perfusion: Large area of ischemic penumbra in the posterior fossa, sammi, bilateral cerebral white matter, with areas involving both occipital lobes.    CTA head and neck: Occlusion of the distal basilar artery, and bilateral proximal posterior cerebral artery P1 segments of the basilar. Bilateral superior cerebellar arteries are also not seen. No CTA evidence of aneurysm or dissection

## 2021-11-17 NOTE — PROGRESS NOTE ADULT - ATTENDING COMMENTS
Long Fernandez MD  Vascular Neurology
Agree with above.
Agree with above.
Patient seen and examined by attending on 11/13/2021.    Patient is critically ill due to basilar artery occlusion and at high risk for neurological deterioration or death due to: potential hemorrhagic conversion, potential new strokes

## 2021-11-17 NOTE — H&P ADULT - NSHPLABSRESULTS_GEN_ALL_CORE
MR Head No Cont (11.15.21 @ 22:21)   Early subacute infarct of the basilar artery branch distribution involving the bilateral cerebellum and sammi, and to lesser extent distal left posterior cerebral artery (more embolic appearing). Susceptibility within the central pontine lesion suggests someearly hemorrhagic conversion.    Evolving T2 signal intensity in the mid/superior sammi and cerebral peduncles consistent with that seen on the head CT of 11/14/2021; extent of this finding is greater than any regional DWI abnormalities, more consistent with extracellular edema.    Chronic old infarct with old blood products involving the medial left occipital lobe.    Unremarkable T2 flow voids within the vertebral basilar system. Suboptimally visualized left PCA. Consider MRA. For further assessment as clinically indicated.    No extra-axial collection, hydrocephalus, midline shift or space-occupying mass lesion.        CT Head No Cont (11.14.21 @ 09:37)   Age-appropriate involutional and ischemic gliotic changes. Old left medial occipital infarct No hemorrhage. No change from 11/13/2021.    Head CT (11/13/21) No CT evidence of acute intracranial hemorrhage, mass effect or midline shift. Ill-defined region of hypoattenuation in the left occipital lobe concerning for subacute to chronic left PCA infarct.     CT brain perfusion: Large area of ischemic penumbra in the posterior fossa, sammi, bilateral cerebral white matter, with areas involving both occipital lobes.    CTA head and neck: Occlusion of the distal basilar artery, and bilateral proximal posterior cerebral artery P1 segments of the basilar. Bilateral superior cerebellar arteries are also not seen. No CTA evidence of aneurysm or dissection MR Head No Cont (11.15.21 @ 22:21)   Early subacute infarct of the basilar artery branch distribution involving the bilateral cerebellum and sammi, and to lesser extent distal left posterior cerebral artery (more embolic appearing). Susceptibility within the central pontine lesion suggests some early hemorrhagic conversion.    Evolving T2 signal intensity in the mid/superior sammi and cerebral peduncles consistent with that seen on the head CT of 11/14/2021; extent of this finding is greater than any regional DWI abnormalities, more consistent with extracellular edema.    Chronic old infarct with old blood products involving the medial left occipital lobe.    Unremarkable T2 flow voids within the vertebral basilar system. Suboptimally visualized left PCA. Consider MRA. For further assessment as clinically indicated.    No extra-axial collection, hydrocephalus, midline shift or space-occupying mass lesion.        CT Head No Cont (11.14.21 @ 09:37)   Age-appropriate involutional and ischemic gliotic changes. Old left medial occipital infarct No hemorrhage. No change from 11/13/2021.    Head CT (11/13/21) No CT evidence of acute intracranial hemorrhage, mass effect or midline shift. Ill-defined region of hypoattenuation in the left occipital lobe concerning for subacute to chronic left PCA infarct.     CT brain perfusion: Large area of ischemic penumbra in the posterior fossa, sammi, bilateral cerebral white matter, with areas involving both occipital lobes.    CTA head and neck: Occlusion of the distal basilar artery, and bilateral proximal posterior cerebral artery P1 segments of the basilar. Bilateral superior cerebellar arteries are also not seen. No CTA evidence of aneurysm or dissection

## 2021-11-17 NOTE — PROGRESS NOTE ADULT - PROVIDER SPECIALTY LIST ADULT
Neurology
Intervent Radiology
Neurology
Rehab Medicine
Intervent Radiology
Endocrinology
NSICU
Cardiology
Endocrinology
Cardiology

## 2021-11-17 NOTE — PATIENT PROFILE ADULT - NSPROGENSOURCEINFO_GEN_A_NUR
Reason for Disposition    COVID-19 vaccine, injection site reaction (e.g., pain, redness, swelling), question about    Additional Information    Negative: [1] Difficulty breathing or swallowing AND [2] starts within 2 hours after injection    Negative: Sounds like a life-threatening emergency to the triager    Negative: [1] Symptoms of COVID-19 (e.g., cough, fever, SOB, or others) AND [2] within 14 days of EXPOSURE (close contact) with diagnosed or suspected COVID-19 patient    Negative: [1] Symptoms of COVID-19 (e.g., cough, fever, SOB, or others) AND [2] within 14 days of being at a crowded indoor or outdoor event (e.g., concert, festival, rally, wedding)    Negative: Typical COVID-19 symptoms (e.g., cough, difficulty breathing, loss of taste or smell, runny nose, sore throat) that are NOT expected from vaccine    Negative: [1] COVID-19 exposure AND [2] no symptoms, or symptoms not typical of COVID-19    Negative: Fever > 104 F (40 C)    Negative: Sounds like a severe, unusual reaction to the triager    Negative: [1] Redness or red streak around the injection site AND [2] started > 48 hours after getting vaccine AND [3] fever    Negative: [1] Fever > 101 F (38.3 C) AND [2] age > 60 years AND [3] started > 48 hours after getting vaccine    Negative: [1] Fever > 100.0 F (37.8 C) AND [2] bedridden (e.g., nursing home patient, CVA, chronic illness, recovering from surgery) AND [3] started > 48 hours after getting vaccine    Negative: [1] Fever > 100.0 F (37.8 C) AND [2] diabetes mellitus or weak immune system (e.g., HIV positive, cancer chemo, splenectomy, organ transplant, chronic steroids) AND [3] started > 48 hours after getting vaccine    Negative: Fever > 100.0 F (37.8 C) present > 3 days (72 hours)    Negative: [1] Fever > 100.0 F (37.8 C) AND [2] healthcare worker    Negative: [1] Redness around the injection site AND [2] started > 48 hours after getting vaccine AND [3] no fever  (Exception: red area < 1  "inch or 2.5 cm wide)    Negative: [1] Pain, tenderness, or swelling at the injection site AND [2] over 3 days (72 hours) since vaccine AND [3] getting worse    Negative: [1] Pain, tenderness, or swelling at the injection site AND [2] lasts > 7 days    Negative: [1] Lymph node swelling (i.e., armpit or neck on side of vaccine) AND [2] lasts > 3 weeks    Negative: [1] Requesting COVID-19 vaccine AND [2] healthcare worker (e.g., EMS first responders, doctors, nurses)    Negative: [1] Requesting COVID-19 vaccine AND [2] resident of a long-term care facility (e.g., nursing home)    Negative: [1] Requesting COVID-19 vaccine AND [2] vaccine available in the community for this patient group    Answer Assessment - Initial Assessment Questions  1. MAIN CONCERN OR SYMPTOM:  \"What is your main concern right now?\" \"What question do you have?\" \"What's the main symptom you're worried about?\" (e.g., fever, pain, redness, swelling)      Swollen lymph node in axillary area  2. VACCINE: \"What vaccination did you receive?\" \"Is this your first or second shot?\" (e.g., none; AstraZeneca, J&J, Moderna, Pfizer, other)      moderna- booster, 11/1  3. SYMPTOM ONSET: \"When did the symptoms begin?\" (e.g., not relevant; hours, days)       Monday night  4. SYMPTOM SEVERITY: \"How bad is it?\"       mild  5. FEVER: \"Is there a fever?\" If Yes, ask: \"What is it, how was it measured, and when did it start?\"       no  6. PAST REACTIONS: \"Have you reacted to immunizations before?\" If Yes, ask: \"What happened?\"      no  7. OTHER SYMPTOMS: \"Do you have any other symptoms?\"     no    Protocols used: CORONAVIRUS (COVID-19) VACCINE QUESTIONS AND NJMHIHOIG-X-DQ 8.25.2021      " patient

## 2021-11-17 NOTE — H&P ADULT - NSHPSOCIALHISTORY_GEN_ALL_CORE
SOCIAL HISTORY  Smoking - Denied  EtOH - Denied   Drugs - Denied    FUNCTIONAL HISTORY  Lives with wife, 4 [address], one flight inside   Independent ADLs, uses cane as needed outside home due to back pain/h/o surgery     CURRENT FUNCTIONAL STATUS  11/14 SLP  functional oropharyngeal swallow, dysarthria, and cognitive-linguistic deficits    11/14 PT  bed mobility mod assist   transfers mod assist x 2  gait mod assist x 2, one step     11/14 OT  bed mobility mod assist  transfers mod assist x 2

## 2021-11-17 NOTE — H&P ADULT - NSICDXPASTMEDICALHX_GEN_ALL_CORE_FT
PAST MEDICAL HISTORY:  Benign hypertension dx 10 years    Chronic atrial fibrillation     Colon cancer     Diabetes mellitus type II  on Meds 4/2012     Eczema     H/O renal calculi ESWL right side yrs ago  last stone one year ago  (passed on its own)    Lumbago     Lumbago with sciatica of right side     Obese

## 2021-11-17 NOTE — PROGRESS NOTE ADULT - SUBJECTIVE AND OBJECTIVE BOX
no new complaints, wife, sister at bedside     REVIEW OF SYSTEMS  Constitutional - No fever,  No fatigue  HEENT - No vertigo, No neck pain  Neurological - No headaches, No memory loss, No loss of strength, No numbness, No tremors  Skin - No rashes, No lesions   Musculoskeletal - No joint pain, No joint swelling, No muscle pain  Psychiatric - No depression, No anxiety    FUNCTIONAL PROGRESS  11/17 PT  transfers min to mod assist, non mech lift  Pt impulsive/confused at times        VITALS  T(C): 37 (11-17-21 @ 05:44), Max: 37.7 (11-17-21 @ 00:00)  HR: 95 (11-17-21 @ 14:00) (79 - 98)  BP: 145/87 (11-17-21 @ 14:00) (118/87 - 167/95)  RR: 19 (11-17-21 @ 14:00) (17 - 29)  SpO2: 100% (11-17-21 @ 14:00) (94% - 100%)  Wt(kg): --    MEDICATIONS   acetaminophen     Tablet .. 650 milliGRAM(s) every 6 hours PRN  amLODIPine   Tablet 10 milliGRAM(s) daily  atorvastatin 80 milliGRAM(s) at bedtime  dextrose 50% Injectable 50 milliLiter(s) every 15 minutes  dextrose 50% Injectable 25 milliLiter(s) every 15 minutes  enoxaparin Injectable 120 milliGRAM(s) two times a day  insulin glargine Injectable (LANTUS) 13 Unit(s) at bedtime  insulin lispro (ADMELOG) corrective regimen sliding scale   three times a day with meals  insulin lispro (ADMELOG) corrective regimen sliding scale   at bedtime  insulin lispro Injectable (ADMELOG) 7 Unit(s) three times a day before meals  melatonin 5 milliGRAM(s) at bedtime  nystatin Powder 1 Application(s) two times a day  polyethylene glycol 3350 17 Gram(s) two times a day  senna 2 Tablet(s) at bedtime      RECENT LABS - Reviewed                        11.3   7.57  )-----------( 335      ( 17 Nov 2021 05:24 )             37.2     11-17    136  |  103  |  7   ----------------------------<  140<H>  3.7   |  20<L>  |  0.93    Ca    8.9      17 Nov 2021 05:24          ----------------------------------------------------------------------------------------  PHYSICAL EXAM  Constitutional - NAD, Comfortable, sitting in chair   Chest - Breathing comfortably  Cardiovascular - S1S2   Abdomen - Soft   Extremities - No C/C/E, No calf tenderness   Neurologic Exam -    follows commands                 Cognitive - Awake, alert, oriented to self, hospital       Communication -  + dysarthria       Motor - moves all ext x 4     Sensory - Intact to LT     Psychiatric - Mood stable, Affect WNL  ----------------------------------------------------------------------------------------  ASSESSMENT/PLAN  66yMale hx of DM, HTN, Afib (on eliquis on hold since 11/8 for chemoport placement), prior CVA with no deficits, colon ca, with functional deficits after CVA  Left PCA ischemic infarct, s/p MT  no plans for inpatient chemo, outpatient follow up   Pain - Tylenol  DVT PPX - SCDs, lovenox   Rehab - Will continue to follow for ongoing rehab needs and recommendations.   continue bedside therapy  out of bed to chair   patient requires min to mod assist with transfers, gait    Recommend ACUTE inpatient rehabilitation for the functional deficits consisting of 3 hours of therapy/day & 24 hour RN/daily PMR physician for comorbid medical management. Patient will be able to tolerate 3 hours a day.

## 2021-11-18 ENCOUNTER — APPOINTMENT (OUTPATIENT)
Dept: INFUSION THERAPY | Facility: HOSPITAL | Age: 66
End: 2021-11-18

## 2021-11-18 DIAGNOSIS — C20 MALIGNANT NEOPLASM OF RECTUM: ICD-10-CM

## 2021-11-18 DIAGNOSIS — I63.9 CEREBRAL INFARCTION, UNSPECIFIED: ICD-10-CM

## 2021-11-18 DIAGNOSIS — E11.9 TYPE 2 DIABETES MELLITUS WITHOUT COMPLICATIONS: ICD-10-CM

## 2021-11-18 DIAGNOSIS — I48.91 UNSPECIFIED ATRIAL FIBRILLATION: ICD-10-CM

## 2021-11-18 DIAGNOSIS — I10 ESSENTIAL (PRIMARY) HYPERTENSION: ICD-10-CM

## 2021-11-18 LAB
ALBUMIN SERPL ELPH-MCNC: 2.7 G/DL — LOW (ref 3.3–5)
ALP SERPL-CCNC: 123 U/L — HIGH (ref 40–120)
ALT FLD-CCNC: 15 U/L — SIGNIFICANT CHANGE UP (ref 10–45)
ANION GAP SERPL CALC-SCNC: 13 MMOL/L — SIGNIFICANT CHANGE UP (ref 5–17)
AST SERPL-CCNC: 24 U/L — SIGNIFICANT CHANGE UP (ref 10–40)
BASOPHILS # BLD AUTO: 0.06 K/UL — SIGNIFICANT CHANGE UP (ref 0–0.2)
BASOPHILS NFR BLD AUTO: 0.8 % — SIGNIFICANT CHANGE UP (ref 0–2)
BILIRUB SERPL-MCNC: 0.6 MG/DL — SIGNIFICANT CHANGE UP (ref 0.2–1.2)
BUN SERPL-MCNC: 13 MG/DL — SIGNIFICANT CHANGE UP (ref 7–23)
CALCIUM SERPL-MCNC: 8.9 MG/DL — SIGNIFICANT CHANGE UP (ref 8.4–10.5)
CHLORIDE SERPL-SCNC: 101 MMOL/L — SIGNIFICANT CHANGE UP (ref 96–108)
CO2 SERPL-SCNC: 22 MMOL/L — SIGNIFICANT CHANGE UP (ref 22–31)
CREAT SERPL-MCNC: 1.16 MG/DL — SIGNIFICANT CHANGE UP (ref 0.5–1.3)
EOSINOPHIL # BLD AUTO: 0.31 K/UL — SIGNIFICANT CHANGE UP (ref 0–0.5)
EOSINOPHIL NFR BLD AUTO: 4.1 % — SIGNIFICANT CHANGE UP (ref 0–6)
GLUCOSE BLDC GLUCOMTR-MCNC: 100 MG/DL — HIGH (ref 70–99)
GLUCOSE BLDC GLUCOMTR-MCNC: 134 MG/DL — HIGH (ref 70–99)
GLUCOSE BLDC GLUCOMTR-MCNC: 136 MG/DL — HIGH (ref 70–99)
GLUCOSE BLDC GLUCOMTR-MCNC: 200 MG/DL — HIGH (ref 70–99)
GLUCOSE SERPL-MCNC: 159 MG/DL — HIGH (ref 70–99)
HCT VFR BLD CALC: 37.5 % — LOW (ref 39–50)
HCV AB S/CO SERPL IA: 0.08 S/CO — SIGNIFICANT CHANGE UP (ref 0–0.99)
HCV AB SERPL-IMP: SIGNIFICANT CHANGE UP
HGB BLD-MCNC: 11.5 G/DL — LOW (ref 13–17)
IMM GRANULOCYTES NFR BLD AUTO: 0.5 % — SIGNIFICANT CHANGE UP (ref 0–1.5)
LYMPHOCYTES # BLD AUTO: 0.4 K/UL — LOW (ref 1–3.3)
LYMPHOCYTES # BLD AUTO: 5.3 % — LOW (ref 13–44)
MCHC RBC-ENTMCNC: 26.3 PG — LOW (ref 27–34)
MCHC RBC-ENTMCNC: 30.7 GM/DL — LOW (ref 32–36)
MCV RBC AUTO: 85.8 FL — SIGNIFICANT CHANGE UP (ref 80–100)
MONOCYTES # BLD AUTO: 0.96 K/UL — HIGH (ref 0–0.9)
MONOCYTES NFR BLD AUTO: 12.6 % — SIGNIFICANT CHANGE UP (ref 2–14)
NEUTROPHILS # BLD AUTO: 5.84 K/UL — SIGNIFICANT CHANGE UP (ref 1.8–7.4)
NEUTROPHILS NFR BLD AUTO: 76.7 % — SIGNIFICANT CHANGE UP (ref 43–77)
NRBC # BLD: 0 /100 WBCS — SIGNIFICANT CHANGE UP (ref 0–0)
PLATELET # BLD AUTO: 336 K/UL — SIGNIFICANT CHANGE UP (ref 150–400)
POTASSIUM SERPL-MCNC: 3.9 MMOL/L — SIGNIFICANT CHANGE UP (ref 3.5–5.3)
POTASSIUM SERPL-SCNC: 3.9 MMOL/L — SIGNIFICANT CHANGE UP (ref 3.5–5.3)
PROT SERPL-MCNC: 7.3 G/DL — SIGNIFICANT CHANGE UP (ref 6–8.3)
RBC # BLD: 4.37 M/UL — SIGNIFICANT CHANGE UP (ref 4.2–5.8)
RBC # FLD: 18.3 % — HIGH (ref 10.3–14.5)
SARS-COV-2 RNA SPEC QL NAA+PROBE: SIGNIFICANT CHANGE UP
SODIUM SERPL-SCNC: 136 MMOL/L — SIGNIFICANT CHANGE UP (ref 135–145)
WBC # BLD: 7.61 K/UL — SIGNIFICANT CHANGE UP (ref 3.8–10.5)
WBC # FLD AUTO: 7.61 K/UL — SIGNIFICANT CHANGE UP (ref 3.8–10.5)

## 2021-11-18 PROCEDURE — 99232 SBSQ HOSP IP/OBS MODERATE 35: CPT

## 2021-11-18 PROCEDURE — 99223 1ST HOSP IP/OBS HIGH 75: CPT

## 2021-11-18 RX ADMIN — PANTOPRAZOLE SODIUM 40 MILLIGRAM(S): 20 TABLET, DELAYED RELEASE ORAL at 05:52

## 2021-11-18 RX ADMIN — INSULIN GLARGINE 13 UNIT(S): 100 INJECTION, SOLUTION SUBCUTANEOUS at 21:53

## 2021-11-18 RX ADMIN — Medication 7 UNIT(S): at 16:45

## 2021-11-18 RX ADMIN — Medication 1: at 07:58

## 2021-11-18 RX ADMIN — METFORMIN HYDROCHLORIDE 500 MILLIGRAM(S): 850 TABLET ORAL at 07:57

## 2021-11-18 RX ADMIN — ATORVASTATIN CALCIUM 80 MILLIGRAM(S): 80 TABLET, FILM COATED ORAL at 21:53

## 2021-11-18 RX ADMIN — ENOXAPARIN SODIUM 120 MILLIGRAM(S): 100 INJECTION SUBCUTANEOUS at 05:52

## 2021-11-18 RX ADMIN — Medication 325 MILLIGRAM(S): at 12:19

## 2021-11-18 RX ADMIN — METFORMIN HYDROCHLORIDE 500 MILLIGRAM(S): 850 TABLET ORAL at 17:31

## 2021-11-18 RX ADMIN — Medication 7 UNIT(S): at 12:19

## 2021-11-18 RX ADMIN — Medication 6 MILLIGRAM(S): at 21:53

## 2021-11-18 RX ADMIN — ENOXAPARIN SODIUM 120 MILLIGRAM(S): 100 INJECTION SUBCUTANEOUS at 17:31

## 2021-11-18 RX ADMIN — AMLODIPINE BESYLATE 10 MILLIGRAM(S): 2.5 TABLET ORAL at 05:52

## 2021-11-18 RX ADMIN — Medication 7 UNIT(S): at 07:58

## 2021-11-18 NOTE — CHART NOTE - NSCHARTNOTEFT_GEN_A_CORE
67 y/o male admitted to [facility]U s/p stroke. Chart reviewed.  Patient previously on eliquis without any reports/ h/o GI bleed. H/H stable. Patient now on full dose lovenox. Continue anticoagulation. No overt bleeding. Please reconsult if patient becomes symptomatic.

## 2021-11-18 NOTE — PROGRESS NOTE ADULT - ATTENDING COMMENTS
Adjusting well to program  Comfortable, no new complaints  Hematology- Oncology  and GI evaluation requested  Labs reviewed

## 2021-11-18 NOTE — PROGRESS NOTE ADULT - ASSESSMENT
66y with a history of DM type 2, HTN, afib on eliquis, Colon ca, previous stroke with no residual defecits who presented to [facility] initially on 11/13 and found to have basilar artery occlusion and was transferred to [facility] for mechanical thrombectomy which was performed on 11/13 with TICI 2B restoration of flow. He continued workup and monitoring at [facility] and was evaluated for inpatient acute rehab. Patient now admitted for a multidisciplinary rehab program. 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 with TICI2b  - Left PCA, Bilateral cerebellum and pontine stroke  -Generalized weakness, dysarthria,   - Continue comprehensive rehab program of PT/OT/SLP - 3 hours a day, 5 days a week  - Continue full dose lovenox 120mg BID  -Continue atorvastatin 80mg daily    #Atrial Fibrillation  - Was on eliquis which was stopped for port placement for chemotherapy  -Restarted on full dose lovenox 120mg BID  -TTE with ef 55%  -EKG monitoring on admission    #HTN  -Continue norvasc 10mg daily  - Was on HCTZ at home- now held   - Monitor vital signs    #DM type 2  - A1c at [facility] 11.8 on 11/13  -Continue Lantus  -Continue premeal lispro   -Continue ISS and FS  -Restart Metformin 500mg BID  -Home meds: Metformin 1000mg BID    #Rectal adenocarcinoma  - MMR-proficient; moderately differentiated invasive adenocarcinoma per path on diagnosis  -Rigid sigmoidoscopy revealed lesion at 6 cm from the anal verge  -treated with capecitabine (started 8/17/21-9/28/21); completed; completed chemo RT to 5040 cGy/28 fxs with concurrent Xeloda on 9/27/21  -Seen by Dr. Buck Magana (colorectal x), recommended continuing total neoadjuvant therapy to improve chance of complete response  -Per last outpatient note, was planned to start FOLFOX x9 cycles (4.5 months), agreed to IV chemotherapy Planned to start w/ 50% dose reduction; was to start FOLFOX on week of 11/15/21 prior to admission  -Given stroke, chemotherapy held for now as per Heme/onc at [facility]. - Recommended follow up in 2 weeks with Dr. Ramos,  -no plans for inpatient chemotherapy as per Heme/onc rec  -Was on Xeloda- now held  -Consult with Heme/onc pending (11/18)  -Consult with GI pending (11/18)    #Pain  - Tylenol PRN      #Sleep  - Maintain quiet hours and a low stim environment   - Monitor sleep logs   - Melatonin 6mg at bedtime     #GI / Bowel  - Senna qHS  - Miralax BID  - GI ppx: protonix 40mg     # / Bladder  - Continue bladder scans Q8 hours with straight cath for >400cc.  - Toileting schedule every 4 hours    #Skin / Pressure injury  - Monitor port-a-cath site   - Turn q2 hours in bed while awake, air mattress  - nursing to monitor skin qShift    #Diet  - Diet Consistency: easy to chew- carb control   - Aspiration Precautions  - SLP consult for swallow function evaluation and treatment      DVT prophylaxis:   - Full dose lovenox   - SCDs  - Teds    #Participation Restrictions/Precautions:  - Weight bearing status: WBAT  - Precautions: Falls, Seizures, Aspiration     Outpatient Follow-up:  Cesar Garcia (DO)  Cardiology; Internal Medicine  [address], [address]  [place] [address]  Phone: [phone]  Fax: [phone]  Follow Up Time: 2 weeks    Alvaro Whyte (NP; RN)  NP in Adult Health  [address], [address]  [place] [address]  Phone: [phone]  Fax: [phone]  Follow Up Time: 2 weeks    Wiliam Ramos)  Hematology; Internal Medicine; Medical Oncology  [address] [place] [address]  Phone: [phone]  Fax: [phone]  Follow Up Time: 2 weeks    Long Fernandez)  Neurology; Vascular Neurology  [address], [address]  [place] [address]  Phone: [phone]  Fax: [phone]  Follow Up Time: 2 weeks

## 2021-11-18 NOTE — DIETITIAN INITIAL EVALUATION ADULT. - PERTINENT MEDS FT
MEDICATIONS  (STANDING):  amLODIPine   Tablet 10 milliGRAM(s) Oral daily  atorvastatin 80 milliGRAM(s) Oral at bedtime  dextrose 40% Gel 15 Gram(s) Oral once  dextrose 5%. 1000 milliLiter(s) (50 mL/Hr) IV Continuous <Continuous>  dextrose 5%. 1000 milliLiter(s) (100 mL/Hr) IV Continuous <Continuous>  dextrose 50% Injectable 25 Gram(s) IV Push once  dextrose 50% Injectable 12.5 Gram(s) IV Push once  dextrose 50% Injectable 25 Gram(s) IV Push once  enoxaparin Injectable 120 milliGRAM(s) SubCutaneous two times a day  ferrous    sulfate 325 milliGRAM(s) Oral daily  glucagon  Injectable 1 milliGRAM(s) IntraMuscular once  insulin glargine Injectable (LANTUS) 13 Unit(s) SubCutaneous at bedtime  insulin lispro (ADMELOG) corrective regimen sliding scale   SubCutaneous three times a day before meals  insulin lispro (ADMELOG) corrective regimen sliding scale   SubCutaneous at bedtime  insulin lispro Injectable (ADMELOG) 7 Unit(s) SubCutaneous three times a day before meals  melatonin 6 milliGRAM(s) Oral at bedtime  metFORMIN 500 milliGRAM(s) Oral two times a day  pantoprazole    Tablet 40 milliGRAM(s) Oral before breakfast  polyethylene glycol 3350 17 Gram(s) Oral two times a day  senna 2 Tablet(s) Oral at bedtime    MEDICATIONS  (PRN):  acetaminophen     Tablet .. 650 milliGRAM(s) Oral every 6 hours PRN Temp greater or equal to 38C (100.4F), Mild Pain (1 - 3)

## 2021-11-18 NOTE — CONSULT NOTE ADULT - ASSESSMENT
66y with a history of DM type 2, HTN, afib on eliquis, Colon ca, previous stroke with no residual defecits who presented to [facility] initially on 11/13 and found to have basilar artery occlusion and was transferred to [facility] for mechanical thrombectomy which was performed on 11/13 with TICI 2B restoration of flow. He continued workup and monitoring at [facility] and was evaluated for inpatient acute rehab. Patient now admitted for a multidisciplinary rehab program.    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   - found to have Left PCA, Bilateral cerebellum and pontine stroke s/p mechanical thrombectomy with TICI2b  - likely cardioembolic in nature, eliquis was held for afib in setting of chemo port placement, vs hypercoagulable state in setting of active malignancy  - start comprehensive rehab therapy  - continue atorvastatin 80mg daily    #Afib  - was on eliquis which was stopped for chemo port placement  - restarted on full dose lovenox   - echo 11/15 with EF 55%  - follow up EKG    #HTN  - continue norvasc 10mg  - holding home HCTZ     #DM type 2  - HbA1C 11.8 on 11/3  - continue lantus 13U qhs and admelog 7U qAC  - will likely need to be discharged home on insulin due to A1C  - restarted metformin 500mg BID (home dose 1000mg BID, can increase back to home dose in 2-3 days if tolerates)    #Rectal adenocarcinoma  - rigid sigmoidoscopy revealed lesion at 6 cm from the anal verge  - treated with capecitabine (started 8/17/21-9/28/21); completed; completed chemo RT to 5040 cGy/28 fxs with concurrent Xeloda on 9/27/21  - Seen by Dr. Buck Magana (colorectal x), recommended continuing total neoadjuvant therapy to improve chance of complete response. Per last outpatient note, was planned to start FOLFOX x9 cycles (4.5 months), agreed to IV chemotherapy Planned to start w/ 50% dose reduction; was to start FOLFOX on week of 11/15/21 prior to admission  - Given stroke, chemotherapy held for now as per Heme/onc at [facility]. - Recommended  follow up in 2 weeks with Dr. Ramos  - no chemo while inpatient    #DVT ppx  - on full dose lovenox

## 2021-11-18 NOTE — DIETITIAN INITIAL EVALUATION ADULT. - ORAL INTAKE PTA/DIET HISTORY
Pt reports good PO intake PTA. Was not following a carb controlled diet, did not monitor anything in his diet per pt & family at bedside. Diet recall limited in fruit and vegetables. Pt reports UBW of 250 lbs. Pt reports an allergy to nuts, reports that he strongly dislikes salmon. HbA1c of 11.8 (H) indicates suboptimal glycemic control. Pt reports taking metformin only PTA.

## 2021-11-18 NOTE — DIETITIAN INITIAL EVALUATION ADULT. - LITERATURE/VIDEOS GIVEN
Heart Healthy Consistent Carbohydrate Nutrition Therapy, Carbohydrate Counting for People with Diabetes

## 2021-11-18 NOTE — DIETITIAN INITIAL EVALUATION ADULT. - PERTINENT LABORATORY DATA
POCT glucose x 4: 134 (H), 200 (H), 205 (H), 111 (H)  11-18-21: Hgb 11.5 (L), Hct 37.5 (L), Na 136 (WNL), K 3.9 (WNL), cl 101 (WNL), BUN 13 (WNL), Creat 1.16 (WNL), Glu 159 (H), Alb 2.7 (L)  11-13-21: HbA1c 11.8 (H)

## 2021-11-18 NOTE — DIETITIAN INITIAL EVALUATION ADULT. - OTHER INFO
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 He continued workup and monitoring at [facility] and was evaluated for inpatient acute rehab. Patient now admitted for a multidisciplinary rehab program.    Pt tolerating diet with fair-good PO intake. Pt observed after lunch, ate most of meal, but did not consume some of the chicken and did not consume fruit. Pt receptive to education on consistent carb diet and carb counting. Also explained using My Plate method to visualize meals and utilizing portion control. Pt reports UBW of 250 lbs. Current weight is 260 lbs. No edema per nursing flow sheets. Denies nausea, vomiting, diarrhea, constipation. Last BM 11/15 Per nursing flowsheets. Skin noted w/ skin tear Per nursing flowsheets.

## 2021-11-19 LAB
GLUCOSE BLDC GLUCOMTR-MCNC: 123 MG/DL — HIGH (ref 70–99)
GLUCOSE BLDC GLUCOMTR-MCNC: 127 MG/DL — HIGH (ref 70–99)
GLUCOSE BLDC GLUCOMTR-MCNC: 188 MG/DL — HIGH (ref 70–99)
GLUCOSE BLDC GLUCOMTR-MCNC: 99 MG/DL — SIGNIFICANT CHANGE UP (ref 70–99)

## 2021-11-19 PROCEDURE — 90792 PSYCH DIAG EVAL W/MED SRVCS: CPT

## 2021-11-19 PROCEDURE — 99223 1ST HOSP IP/OBS HIGH 75: CPT

## 2021-11-19 PROCEDURE — 99232 SBSQ HOSP IP/OBS MODERATE 35: CPT

## 2021-11-19 RX ORDER — QUETIAPINE FUMARATE 200 MG/1
12.5 TABLET, FILM COATED ORAL DAILY
Refills: 0 | Status: DISCONTINUED | OUTPATIENT
Start: 2021-11-19 | End: 2021-11-19

## 2021-11-19 RX ORDER — QUETIAPINE FUMARATE 200 MG/1
12.5 TABLET, FILM COATED ORAL AT BEDTIME
Refills: 0 | Status: DISCONTINUED | OUTPATIENT
Start: 2021-11-19 | End: 2021-12-02

## 2021-11-19 RX ADMIN — QUETIAPINE FUMARATE 12.5 MILLIGRAM(S): 200 TABLET, FILM COATED ORAL at 22:22

## 2021-11-19 RX ADMIN — ENOXAPARIN SODIUM 120 MILLIGRAM(S): 100 INJECTION SUBCUTANEOUS at 06:03

## 2021-11-19 RX ADMIN — ATORVASTATIN CALCIUM 80 MILLIGRAM(S): 80 TABLET, FILM COATED ORAL at 22:22

## 2021-11-19 RX ADMIN — Medication 1: at 12:12

## 2021-11-19 RX ADMIN — INSULIN GLARGINE 13 UNIT(S): 100 INJECTION, SOLUTION SUBCUTANEOUS at 22:23

## 2021-11-19 RX ADMIN — PANTOPRAZOLE SODIUM 40 MILLIGRAM(S): 20 TABLET, DELAYED RELEASE ORAL at 06:04

## 2021-11-19 RX ADMIN — SENNA PLUS 2 TABLET(S): 8.6 TABLET ORAL at 22:23

## 2021-11-19 RX ADMIN — AMLODIPINE BESYLATE 10 MILLIGRAM(S): 2.5 TABLET ORAL at 06:04

## 2021-11-19 RX ADMIN — Medication 325 MILLIGRAM(S): at 12:12

## 2021-11-19 RX ADMIN — Medication 7 UNIT(S): at 08:07

## 2021-11-19 RX ADMIN — ENOXAPARIN SODIUM 120 MILLIGRAM(S): 100 INJECTION SUBCUTANEOUS at 17:12

## 2021-11-19 RX ADMIN — Medication 6 MILLIGRAM(S): at 22:22

## 2021-11-19 RX ADMIN — Medication 7 UNIT(S): at 12:12

## 2021-11-19 RX ADMIN — METFORMIN HYDROCHLORIDE 500 MILLIGRAM(S): 850 TABLET ORAL at 08:05

## 2021-11-19 RX ADMIN — METFORMIN HYDROCHLORIDE 500 MILLIGRAM(S): 850 TABLET ORAL at 17:12

## 2021-11-19 RX ADMIN — Medication 7 UNIT(S): at 17:12

## 2021-11-19 NOTE — BH CONSULTATION LIAISON ASSESSMENT NOTE - NSBHCONSULTSUBST_PSY_A_CORE
"Chief Complaint   Patient presents with     Lab Follow Up       Initial /86 (BP Location: Right arm, Patient Position: Chair, Cuff Size: Adult Regular)  Pulse 86  Temp 98.5  F (36.9  C) (Tympanic)  Ht 5' 7.5\" (1.715 m) Estimated body mass index is 31.94 kg/(m^2) as calculated from the following:    Height as of 10/6/17: 5' 7.5\" (1.715 m).    Weight as of 10/6/17: 207 lb (93.9 kg).  Medication Reconciliation: complete     Aftab Tellez, CHARITY    " no

## 2021-11-19 NOTE — BH CONSULTATION LIAISON ASSESSMENT NOTE - NSBHCHARTREVIEWVS_PSY_A_CORE FT
Vital Signs Last 24 Hrs  T(C): 36.6 (19 Nov 2021 07:59), Max: 36.9 (18 Nov 2021 21:44)  T(F): 97.8 (19 Nov 2021 07:59), Max: 98.5 (18 Nov 2021 21:44)  HR: 91 (19 Nov 2021 07:59) (91 - 100)  BP: 146/92 (19 Nov 2021 07:59) (143/80 - 170/83)  BP(mean): --  RR: 16 (19 Nov 2021 07:59) (15 - 16)  SpO2: 93% (19 Nov 2021 07:59) (93% - 95%)

## 2021-11-19 NOTE — BH CONSULTATION LIAISON ASSESSMENT NOTE - NSICDXBHSECONDARYDX_PSY_ALL_CORE
CVA (cerebrovascular accident)   I63.9  Afib   I48.91  HTN (hypertension)   I10  Rectal adenocarcinoma   C20  Type 2 diabetes mellitus   E11.9

## 2021-11-19 NOTE — PROGRESS NOTE ADULT - ASSESSMENT
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 He continued workup and monitoring at [facility] and was evaluated for inpatient acute rehab. Patient now admitted for a multidisciplinary rehab program. 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 with TICI2b  - Left PCA, Bilateral cerebellum and pontine stroke  -Generalized weakness, dysarthria,   - Continue comprehensive rehab program of PT/OT/SLP - 3 hours a day, 5 days a week  - Continue full dose lovenox 120mg BID  -Continue atorvastatin 80mg daily    #Atrial Fibrillation  - Was on eliquis which was stopped for port placement for chemotherapy  -Restarted on full dose lovenox 120mg BID  -TTE with ef 55%  -EKG monitoring on admission    #HTN  -Continue norvasc 10mg daily  - Was on HCTZ at home- now held   - Monitor vital signs    #DM type 2  - A1c at [facility] 11.8 on 11/13  -Continue Lantus  -Continue premeal lispro   -Continue ISS and FS  -Restart Metformin 500mg BID  -Home meds: Metformin 1000mg BID    #Rectal adenocarcinoma  - MMR-proficient; moderately differentiated invasive adenocarcinoma per path on diagnosis  -Rigid sigmoidoscopy revealed lesion at 6 cm from the anal verge  -treated with capecitabine (started 8/17/21-9/28/21); completed; completed chemo RT to 5040 cGy/28 fxs with concurrent Xeloda on 9/27/21  -Seen by Dr. Buck Magana (colorectal x), recommended continuing total neoadjuvant therapy to improve chance of complete response  -Per last outpatient note, was planned to start FOLFOX x9 cycles (4.5 months), agreed to IV chemotherapy Planned to start w/ 50% dose reduction; was to start FOLFOX on week of 11/15/21 prior to admission  -Given stroke, chemotherapy held for now as per Heme/onc at [facility]. - Recommended follow up in 2 weeks with Dr. Ramos,  -no plans for inpatient chemotherapy as per Heme/onc rec  -Was on Xeloda- now held  -Consult with Heme/onc appreciated   -Consult with GI appreciated     #Pain  - Tylenol PRN      #Sleep  - Maintain quiet hours and a low stim environment   - Monitor sleep logs   - Melatonin 6mg at bedtime   -Consider starting trazodone 25mg for sleep    #GI / Bowel  - Senna qHS  - Miralax BID  - GI ppx: protonix 40mg     # / Bladder  - Continue bladder scans Q8 hours with straight cath for >400cc.  - Toileting schedule every 4 hours    #Skin / Pressure injury  - Monitor port-a-cath site   - Turn q2 hours in bed while awake, air mattress  - nursing to monitor skin qShift    #Diet  - Diet Consistency: easy to chew- carb control   - Aspiration Precautions  - SLP consult for swallow function evaluation and treatment    ##DVT prophylaxis:   - Full dose lovenox   - SCDs  - Teds    #Participation Restrictions/Precautions:  - Weight bearing status: WBAT  - Precautions: Falls, Seizures, Aspiration     Outpatient Follow-up:  Cesar Garcia (DO)  Cardiology; Internal Medicine  [address], [address]  [place] [address]  Phone: [phone]  Fax: [phone]  Follow Up Time: 2 weeks    Alvaro Whyte (NP; RN)  NP in Adult Health  [address], [address]  [place] [address]  Phone: [phone]  Fax: [phone]  Follow Up Time: 2 weeks    Wiliam Ramos)  Hematology; Internal Medicine; Medical Oncology  [address] [place] [address]  Phone: [phone]  Fax: [phone]  Follow Up Time: 2 weeks    Long Fernandez)  Neurology; Vascular Neurology  [address], [address]  [place] [address]  Phone: [phone]  Fax: [phone]  Follow Up Time: 2 weeks       66y with a history of DM type 2, HTN, afib on eliquis, Colon ca, previous stroke with no residual defecits who presented to [facility] initially on 11/13 and 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 He continued workup and monitoring at [facility] and was evaluated for inpatient acute rehab. Patient now admitted for a multidisciplinary rehab program. 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 with TICI2b  - Left PCA, Bilateral cerebellum and pontine stroke  -Generalized weakness, dysarthria,   - Continue comprehensive rehab program of PT/OT/SLP - 3 hours a day, 5 days a week  - Continue full dose lovenox 120mg BID  -Continue atorvastatin 80mg daily    #Atrial Fibrillation  - Was on eliquis which was stopped for port placement for chemotherapy  -Restarted on full dose lovenox 120mg BID  -TTE with ef 55%  -EKG monitoring on admission    #HTN  -Continue norvasc 10mg daily  - Was on HCTZ at home- now held   - Monitor vital signs    #DM type 2  - A1c at [facility] 11.8 on 11/13  -Continue Lantus  -Continue premeal lispro   -Continue ISS and FS  -Restart Metformin 500mg BID  -Home meds: Metformin 1000mg BID    #Rectal adenocarcinoma  - MMR-proficient; moderately differentiated invasive adenocarcinoma per path on diagnosis  -Rigid sigmoidoscopy revealed lesion at 6 cm from the anal verge  -treated with capecitabine (started 8/17/21-9/28/21); completed; completed chemo RT to 5040 cGy/28 fxs with concurrent Xeloda on 9/27/21  -Seen by Dr. Buck Magana (colorectal x), recommended continuing total neoadjuvant therapy to improve chance of complete response  -Per last outpatient note, was planned to start FOLFOX x9 cycles (4.5 months), agreed to IV chemotherapy Planned to start w/ 50% dose reduction; was to start FOLFOX on week of 11/15/21 prior to admission  -Given stroke, chemotherapy held for now as per Heme/onc at [facility]. - Recommended follow up in 2 weeks with Dr. 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  - Toileting schedule every 4 hours    #Skin / Pressure injury  - Monitor port-a-cath site   - Turn q2 hours in bed while awake, air mattress  - nursing to monitor skin qShift    #Diet  - Diet Consistency: easy to chew- carb control   - Aspiration Precautions  - SLP consult for swallow function evaluation and treatment    ##DVT prophylaxis:   - Full dose lovenox   - SCDs  - Teds    #Participation Restrictions/Precautions:  - Weight bearing status: WBAT  - Precautions: Falls, Seizures, Aspiration     Outpatient Follow-up:  Cesar Garcia ()  Cardiology; Internal Medicine  [address], [address]  [place] [address]  Phone: [phone]  Fax: [phone]  Follow Up Time: 2 weeks    Alvaro Whyte (NP; RN)  NP in Adult Health  [address], [address]  [place] [address]  Phone: [phone]  Fax: [phone]  Follow Up Time: 2 weeks    Wiliam Ramos)  Hematology; Internal Medicine; Medical Oncology  [address] [place] [address]  Phone: [phone]  Fax: [phone]  Follow Up Time: 2 weeks    Long Fernandez)  Neurology; Vascular Neurology  [address], [address]  [place] [address]  Phone: [phone]  Fax: [phone]  Follow Up Time: 2 weeks

## 2021-11-19 NOTE — BH CONSULTATION LIAISON ASSESSMENT NOTE - HPI (INCLUDE ILLNESS QUALITY, SEVERITY, DURATION, TIMING, CONTEXT, MODIFYING FACTORS, ASSOCIATED SIGNS AND SYMPTOMS)
HPI:   66 year old M with a h/o DM type 2, HTN, Afib (on eliquis, however on hold since 11/8 for chemoport placement on 11/11), prior stroke (no deficits), colon cancer (invasive adenocarcinoma of the rectum), who initially presented to [facility] on 11/13/21 with speech and facial droop. Patient found by wife on the floor with slurred speech. He had imaging done in [facility] with CTH reported to be negative for acute infarct. 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 Patient transferred to [facility] for mechanical thrombectomy. NIHSS on arrival: 16, Not a candidate for tPA due to presentation outside the window. He underwent thrombectomy with TICI2b flow restored on 11/13/21. Etiology cardioembolic vs hypercoagulable/malignancy. He was evaluated for acute inpatient rehab and was admitted to [facility] on 11/17.    (17 Nov 2021 13:07)   HPI:   66 year old M with a h/o DM type 2, HTN, Afib (on eliquis, however on hold since 11/8 for chemoport placement on 11/11), prior stroke (no deficits), colon cancer (invasive adenocarcinoma of the rectum), who initially presented to [facility] on 11/13/21 with speech and facial droop. Patient found by wife on the floor with slurred speech. He had imaging done in [facility] with CTH reported to be negative for acute infarct. 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 Patient transferred to [facility] for mechanical thrombectomy. NIHSS on arrival: 16, Not a candidate for tPA due to presentation outside the window. He underwent thrombectomy with TICI2b flow restored on 11/13/21. Etiology cardioembolic vs hypercoagulable/malignancy. He was evaluated for acute inpatient rehab and was admitted to [facility] on 11/17.    (17 Nov 2021 13:07)    Psychiatry asked to evaluate patient for visual hallucinations. As per treatment team, visual hallucinations pre date his previous CVA.   Per patient he sees "pictures", and little children, most recently a little boy with a kite. Pt states he is aware they are not real, he attempts to touch them   when "they are too close", but they then disappear. Pt reports they do not talk to him, nor he to them. They are mildly distressing. Pt reports sleep issues as well with difficulty falling and staying asleep, Melatonin has been titrated to 6 mg with little effect. Pt denied any further mood symptoms, does have some anxiety, and had deferred getting COVID vaccine secondary to worries about a reaction from it. Pt somewhat dysarthric and so interview was difficult , writer left message for patient spouse for more collateral , await call back. Per chart review no other perceptual disturbances, no psychiatric or substance use history, former smoker, with previous CVA in 2020 ( No deficits per chart review), now with rectal cancer as above HPI, and back ground history of seeing ophthalmology for visual disturbance.

## 2021-11-19 NOTE — BH CONSULTATION LIAISON ASSESSMENT NOTE - RISK ASSESSMENT
Pt does not appear to be an imminent danger to self or others, no previous psychiatric history or substance use history.

## 2021-11-19 NOTE — BH CONSULTATION LIAISON ASSESSMENT NOTE - DIFFERENTIAL
perceptual disturbance( visual hallucinations) in the context of CVA with no other perceptual findings at present/ nor other symptoms of psychosis.   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

## 2021-11-19 NOTE — PROGRESS NOTE ADULT - ASSESSMENT
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 He continued workup and monitoring at [facility] and was evaluated for inpatient acute rehab. Patient now admitted for a multidisciplinary rehab program.    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   - found to have Left PCA, Bilateral cerebellum and pontine stroke s/p mechanical thrombectomy with TICI2b  - likely cardioembolic in nature, eliquis was held for afib in setting of chemo port placement, vs hypercoagulable state in setting of active malignancy  - start comprehensive rehab therapy  - continue atorvastatin 80mg daily    #Afib  - was on eliquis which was stopped for chemo port placement  - restarted on full dose lovenox (seen by GI here, okay to continue with full dose AC)   - echo 11/15 with EF 55%  - follow up EKG    #HTN  - continue norvasc 10mg  - holding home HCTZ     #DM type 2  - HbA1C 11.8 on 11/3  - continue lantus 13U qhs and admelog 7U qAC  - will likely need to be discharged home on insulin due to A1C  - restarted metformin 500mg BID (home dose 1000mg BID, can increase back to home dose in 2-3 days if tolerates)    #Rectal adenocarcinoma  - rigid sigmoidoscopy revealed lesion at 6 cm from the anal verge  - treated with capecitabine (started 8/17/21-9/28/21); completed; completed chemo RT to 5040 cGy/28 fxs with concurrent Xeloda on 9/27/21  - Seen by Dr. Buck Magana (colorectal x), recommended continuing total neoadjuvant therapy to improve chance of complete response. Per last outpatient note, was planned to start FOLFOX x9 cycles (4.5 months), agreed to IV chemotherapy Planned to start w/ 50% dose reduction; was to start FOLFOX on week of 11/15/21 prior to admission  - Given stroke, chemotherapy held for now as per Heme/onc at [facility]. - Recommended  follow up in 2 weeks with Dr. Ramos  - no chemo while inpatient  - GI consulted- recs appreciated  - heme consulted by primary team    #DVT ppx  - on full dose lovenox

## 2021-11-19 NOTE — BH CONSULTATION LIAISON ASSESSMENT NOTE - NSSUICPROTFACT_PSY_ALL_CORE
Advised of up to 3 day processing time.     Kirstie Ware would like to have refills included on prescription.     Patient is requesting a call when prescription is sent to the pharmcay.    Responsibility to children, family, or others/Identifies reasons for living/Supportive social network of family or friends

## 2021-11-19 NOTE — BH CONSULTATION LIAISON ASSESSMENT NOTE - SUMMARY
Pt is a 66 year old male who appears older than stated age, psychiatry asked to see patient due to complaints of visual hallucinations and sleep disturbance.   Pt with previous CVA in 2020 and now presents with CVA - subacute infarct of the basilar artery branch distribution involving the bilateral cerebellum and sammi, and to lesser extent distal left posterior cerebral artery (more embolic appearing). Susceptibility within the central pontine lesion suggests some early hemorrhagic conversion.  Pt also has A fib, rectal cancer, and Diabetes, HTN, Eczema.

## 2021-11-19 NOTE — BH CONSULTATION LIAISON ASSESSMENT NOTE - NSBHCHARTREVIEWLAB_PSY_A_CORE FT
11-18    136  |  101  |  13  ----------------------------<  159<H>  3.9   |  22  |  1.16    Ca    8.9      18 Nov 2021 05:30    TPro  7.3  /  Alb  2.7<L>  /  TBili  0.6  /  DBili  x   /  AST  24  /  ALT  15  /  AlkPhos  123<H>  11-18                            11.5   7.61  )-----------( 336      ( 18 Nov 2021 05:30 )             37.5

## 2021-11-19 NOTE — PROGRESS NOTE ADULT - ATTENDING COMMENTS
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 There is history of visual impairments and prior care by Ophthalmology before the stroke. Case discussed with Psychiatry - consultation requested, will try small dose Seroquel before bedtime.

## 2021-11-19 NOTE — BH CONSULTATION LIAISON ASSESSMENT NOTE - OTHER
not tested, patient in wheel chair. see neurology note for deficits on exam.  acute neurological illness , cancer appears intact, but difficult to test formally, would suggest neuropsych testing for hickey details of speech and language and thought process.

## 2021-11-19 NOTE — CONSULT NOTE ADULT - PROBLEM SELECTOR RECOMMENDATION 9
Rectal adenocarcinoma (T3N0) receiving ZAID in ambulatory which included chemo XRT to 5040 cGy with concurrent capecitabine in the neoadjuvant setting.  Patient's tumor is very close to anal sphincter, making sphincter preservation surgery very difficult.  Suggestion was made to complete 9 cycles of FOLFOX over ensuing half a year.  Given acute neurologic event systemic chemotherapy is on hold, including oral Capecitabine. While it is unclear whether patient's acute CVA is secondary to an embolic event vs malignancy- induced secondary hypercoagulable state, would recommend discontinuing oral chemotherapy for the duration of his acute rehab hospitalization.  Continue present medication, including anticoagulation for DVT prophylaxis.  Case discussed with Dr. Monae's team.

## 2021-11-19 NOTE — CONSULT NOTE ADULT - SUBJECTIVE AND OBJECTIVE BOX
MELIZA DAILEY,  66y Male  MRN: [id number]  ATTENDING: Dr. Jigna Monae      HPI:  66M  with past medical history of atrial fibrillation, hypertension, type 2 diabetes, diagnosed with stage II (T3 N0 M0) rectal adenocarcinoma, status post completion chemo RT (5040 cGy/28 fractions) with concurrent capecitabine on 9/27/2021 On oral capecitabine treatment in ambulatory, admitted at [facility] on 11/13/2021 with slurred speech and facial droop.  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  He was deemed not a candidate for TPA, so he underwent thrombectomy [id number].  He was transferred to [facility] on 11/17/2021 for acute inpatient rehab.  Oncology consulted to comment whether he is a candidate to resume NEOadjuvant capecitabine. He had bilateral venous Doppler that showed no evidence of venous thrombosis.  MRI of the brain shows an early subacute infarct of the basilar artery branch distribution involving the bilateral cerebellum and sammi and to a lesser extent distal left posterior cerebral artery, more embolic appearing.  There is evidence of extracellular edema, a chronic old infarct with old blood products involving the medial left occipital lobe.  MRA is recommended due to suboptimally visualized left PCA.  There is no extra-axial collection, hydrocephalus, midline shift or space-occupying mass lesions.    PAST MEDICAL & SURGICAL HISTORY:  Lumbago with sciatica of right side  Benign hypertension -dx 10 years  H/O renal calculi  ESWL right side yrs ago  Obese  Eczema  Diabetes mellitus type II  on Meds 4/2012  Chronic atrial fibrillation  Colon cancer  S/P tonsillectomy  Port-A-Cath in place    MEDICATION:  amLODIPine   Tablet 10 milliGRAM(s) Oral daily  atorvastatin 80 milliGRAM(s) Oral at bedtime  dextrose 40% Gel 15 Gram(s) Oral once  dextrose 5%. 1000 milliLiter(s) IV Continuous <Continuous>  dextrose 5%. 1000 milliLiter(s) IV Continuous <Continuous>  dextrose 50% Injectable 25 Gram(s) IV Push once  dextrose 50% Injectable 12.5 Gram(s) IV Push once  dextrose 50% Injectable 25 Gram(s) IV Push once  enoxaparin Injectable 120 milliGRAM(s) SubCutaneous two times a day  ferrous    sulfate 325 milliGRAM(s) Oral daily  glucagon  Injectable 1 milliGRAM(s) IntraMuscular once  insulin glargine Injectable (LANTUS) 13 Unit(s) SubCutaneous at bedtime  insulin lispro (ADMELOG) corrective regimen sliding scale   SubCutaneous three times a day before meals  insulin lispro (ADMELOG) corrective regimen sliding scale   SubCutaneous at bedtime  insulin lispro Injectable (ADMELOG) 7 Unit(s) SubCutaneous three times a day before meals  melatonin 6 milliGRAM(s) Oral at bedtime  metFORMIN 500 milliGRAM(s) Oral two times a day  pantoprazole    Tablet 40 milliGRAM(s) Oral before breakfast  polyethylene glycol 3350 17 Gram(s) Oral two times a day  senna 2 Tablet(s) Oral at bedtime    ALLERGIES:  No Known Drug Allergies  Nuts (Hives)  Patient stated he had sensation of  throat closing  30 minites after  Epidural pain management resolved it self few minitus after , /  20 y ago Unable to recall MD name or number (Other)  [place] (Unknown)    FAMILY HISTORY:  Reviewed, non-contributory: [ x ]     SOCIAL HISTORY:  Tobacco: YES [ ]  ; NO [ ]; Former smoker [ x ]  Alcohol:   YES [ ]  ; NO [ ]; Social alcohol user [ x]  Occupation/ marital status/ children:    REVIEW SYSTEMS:  Constitutional: no fever, chills, night sweats, no weight loss  HEENT: denies visual changes; no oral ulcers, dysphagia, no epistaxis; oses corrective lenses  Respiratory: no dyspnea , wheezing, cough, hemoptysis  Cardiovascular: denies acute chest pain, palpitations  GI: no loss of appetite, dark stools, or abdominal tenderness / pain; no change in bowel habits.  Musculoskeletal: no new back pain, bone/ joint pain ,no extremity swelling  Integumentary: denies pruritus; no skin rash, bruises, no  suspicious skin lesions  Neurologic: follows commands; sensory intact  Heme: no reported easy bruisability; no lymph node enlargement    VITALS:  T(C): 36.6, Max: 36.9 (11-18-21 @ 21:44)  T(F): 97.8, Max: 98.5 (11-18-21 @ 21:44)  HR: 91 (91 - 100)  BP: 146/92 (143/80 - 170/83)  SpO2: 93% (93% - 95%)    PHYSICAL EXAM:  Constitutional: alert, well developed, sitting in chair  HEENT: normocephalic, anicteric sclerae, no mucositis or thrush  Respiratory: bilateral clear to auscultation anteriorly  Cardiovascular : S1, S2 regular, rhythmic, no murmurs, gallops or rubs  Abdomen: soft, distended, + normoactive BS, no palpable HS- megaly  Extremities: no tenderness;  -c/c/e, pulses equal bilaterally  Integumentary: no rashes, scars, or lesions suggestive of malignancy  Neurologic: + dysarthria    LABS:  (11-18) WBC: 7.61 K/uL,Hemoglobin: 11.5 g/dL, Hematocrit: 37.5 %,  Platelet: 336 K/uL    RADIOLOGY:    EXAM:  MR BRAIN                            PROCEDURE DATE:  11/15/2021            INTERPRETATION:  CLINICAL INFORMATION: 66-year-old with basilar occlusion status post thrombectomy. Evaluate for CVA.    TECHNIQUE: MRI of the brain was performed without contrast. Sagittal and axial T1, axial T2, FLAIR, SWI, diffusion-weighted images and an ADC map were obtained.    COMPARISON: CT head 11/14/2021.    FINDINGS:  Abnormal restricted diffusion of the basal artery branch distribution involving the central and dorsal sammi adjoining the cerebral aqueduct, and bilateral medial cerebellar hemispheres. Some hemorrhagic conversion is suggested in the central pontine lesion on SWI (4:28-30).    Trace linear and punctate restricted diffusion along the posterior medial aspect of the atrium of the left lateral ventricle involving the posterior temporal lobe and adjoining occipital lobe. These findings may represent small embolic infarcts from the left PCA. Punctate recent infarcts in the inferior left parietal lobe (3:19 and 3:21).    Encephalomalacia and gliosis and marginal old blood products in the posterior medial left occipital lobe (6:10), consistent with an old infarct, unchanged.    Abnormal involving T2 signal intensity is also again notedin the bilateral cerebral peduncles and dorsal sammi, seen on CT of 11/14/2021 but not convincingly demonstrated on 11/13/2021, findings likely representing extracellular water rather than infarct since no convincing DWI signal abnormality correspondsto this region.    Moderate predominantly periventricular T2/FLAIR hyperintense foci are most consistent with chronic white matter microvascular ischemic changes in this age group.    Mild cerebral volume loss involving both ventricles and sulci, consistent with age. No acute intraparenchymal hematoma, abnormal extra-axial fluid collections, hydrocephalus or midline shift.    Unremarkable T2 flow voids within the vertebral basilar system (7:3-10). The mid and distal left PCA is suboptimally visualized on axial T2-weighted images. Other large vessel flow voids are maintained.    The calvarium is nonfocal. The visualized intraorbital compartments, paranasal sinuses and tympanomastoid cavities appear free of acute disease.    IMPRESSION:  Early subacute infarct of the basilar artery branch distribution involving the bilateral cerebellum and sammi, and to lesser extent distal left posterior cerebral artery (more embolic appearing). Susceptibility within the central pontine lesion suggests someearly hemorrhagic conversion.    Evolving T2 signal intensity in the mid/superior sammi and cerebral peduncles consistent with that seen on the head CT of 11/14/2021; extent of this finding is greater than any regional DWI abnormalities, more consistent with extracellular edema.    Chronic old infarct with old blood products involving the medial left occipital lobe.    Unremarkable T2 flow voids within the vertebral basilar system. Suboptimally visualized left PCA. Consider MRA. For further assessment as clinically indicated.    No extra-axial collection, hydrocephalus, midline shift or space-occupying mass lesion.    EXAM:  DUPLEX SCAN EXT VEINS LOWER BI                            PROCEDURE DATE:  11/15/2021            INTERPRETATION:  CLINICAL INFORMATION: Lower extremity swelling    COMPARISON: None available.    TECHNIQUE: Duplex sonography of the BILATERAL LOWER extremity veins with color and spectral Doppler, with and without compression.    FINDINGS:    RIGHT:  Normal compressibility of the RIGHT common femoral, femoral and popliteal veins.  Doppler examination shows normal spontaneous and phasic flow.  No RIGHT calf vein thrombosis is detected.    LEFT:  Normal compressibility of the LEFT common femoral, femoral and popliteal veins.  Doppler examination shows normal spontaneous and phasic flow.  No LEFT calf vein thrombosis is detected.    IMPRESSION:  No evidence of deep venous thrombosis in either lower extremity.            
Patient is a 66y old  Male who presents with a chief complaint of STROKE (2021 12:57)    HPI:   66 year old M with a h/o DM type 2, HTN, Afib (on eliquis, however on hold since  for chemoport placement on ), prior stroke (no deficits), colon cancer (invasive adenocarcinoma of the rectum), who initially presented to [facility] on 21 with speech and facial droop. Patient found by wife on the floor with slurred speech. He had imaging done in [facility] with CTH reported to be negative for acute infarct. 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 Patient transferred to [facility] for mechanical thrombectomy. NIHSS on arrival: 16, Not a candidate for tPA due to presentation outside the window. He underwent thrombectomy with TICI2b flow restored on 21. Etiology cardioembolic vs hypercoagulable/malignancy. He was evaluated for acute inpatient rehab and was admitted to [facility] on .        (2021 13:07)    Subjective History:  Patient seen and examined at bedside. Patient at this time denies any acute complaints- states he is feeling okay but has issues with walking and balance. He admits to loss of vision in R eye due to cataract but no new neurological deficits for him. He is feeling okay and looking forward to PT.    PAST MEDICAL & SURGICAL HISTORY:  Lumbago with sciatica of right side  Benign hypertension dx 10 years  H/O renal calculi  ESWL right side yrs ago  last stone one year ago  (passed on its own)  Obese  Eczema  Diabetes mellitus type II  on Meds 2012  Chronic atrial fibrillation  Colon cancer  S/P tonsillectomy  Port-A-Cath in place    SOCIAL HISTORY:  Lives at home with wife  States he was independent with ambulation and ADLs prior to hospitalization  Denies drinking or illicit drug use  Former smoker, quit 20 years ago    FAMILY HISTORY:  Denies Family history of CAD    ALLERGIES:  No Known Drug Allergies  Nuts (Hives)  Patient stated he had sensation of  throat closing  30 minites after  Epidural pain management resolved it self few minitus after , /  20 y ago Unable to recall MD name or number (Other)  [place] (Unknown)    MEDICATIONS  (STANDING):  amLODIPine   Tablet 10 milliGRAM(s) Oral daily  atorvastatin 80 milliGRAM(s) Oral at bedtime  dextrose 40% Gel 15 Gram(s) Oral once  dextrose 5%. 1000 milliLiter(s) (50 mL/Hr) IV Continuous <Continuous>  dextrose 5%. 1000 milliLiter(s) (100 mL/Hr) IV Continuous <Continuous>  dextrose 50% Injectable 25 Gram(s) IV Push once  dextrose 50% Injectable 12.5 Gram(s) IV Push once  dextrose 50% Injectable 25 Gram(s) IV Push once  enoxaparin Injectable 120 milliGRAM(s) SubCutaneous two times a day  ferrous    sulfate 325 milliGRAM(s) Oral daily  glucagon  Injectable 1 milliGRAM(s) IntraMuscular once  insulin glargine Injectable (LANTUS) 13 Unit(s) SubCutaneous at bedtime  insulin lispro (ADMELOG) corrective regimen sliding scale   SubCutaneous three times a day before meals  insulin lispro (ADMELOG) corrective regimen sliding scale   SubCutaneous at bedtime  insulin lispro Injectable (ADMELOG) 7 Unit(s) SubCutaneous three times a day before meals  melatonin 6 milliGRAM(s) Oral at bedtime  metFORMIN 500 milliGRAM(s) Oral two times a day  pantoprazole    Tablet 40 milliGRAM(s) Oral before breakfast  polyethylene glycol 3350 17 Gram(s) Oral two times a day  senna 2 Tablet(s) Oral at bedtime    MEDICATIONS  (PRN):  acetaminophen     Tablet .. 650 milliGRAM(s) Oral every 6 hours PRN Temp greater or equal to 38C (100.4F), Mild Pain (1 - 3)    Review of Systems:   CONSTITUTIONAL: denies fever, chills, admits to fatigue and weakness  HEENT: denies blurred vision, sore throat  CARDIOVASCULAR: denies chest pain, chest pressure, palpitations  RESPIRATORY: denies shortness of breath, sputum production  GASTROINTESTINAL: denies nausea, vomiting, diarrhea, abdominal pain  GENITOURINARY: denies dysuria, discharge  NEUROLOGICAL: denies numbness, headache, focal weakness  MUSCULOSKELETAL: denies new joint pain, muscle aches  HEMATOLOGIC: denies gross bleeding, bruising  LYMPHATICS: denies enlarged lymph nodes, extremity swelling  PSYCHIATRIC: denies recent changes in anxiety, depression  ENDOCRINOLOGIC: denies sweating, cold or heat intolerance    Vital Signs Last 24 Hrs  T(F): 97.6 (2021 07:54), Max: 98.2 (2021 20:05)  HR: 102 (2021 09:48) (94 - 110)  BP: 112/69 (2021 09:48) (112/69 - 185/107)  RR: 16 (2021 07:54) (15 - 19)  SpO2: 95% (2021 07:54) (93% - 100%)  I&O's Summary    PHYSICAL EXAM:  GENERAL: NAD, comfortably sitting in chair  HEAD:  Atraumatic, Normocephalic  EYES:  PERRL, conjunctiva and sclera clear  ENMT: Moist mucous membranes, Good dentition  CHEST/LUNG: Clear to auscultation bilaterally, non-labored breathing, good air entry  HEART: RRR; S1/S2, No murmur  ABDOMEN: Soft, Nontender, Nondistended; Bowel sounds present  VASCULAR: Normal pulses, Normal capillary refill  EXTREMITIES: No cyanosis, No edema  SKIN: Warm, perfused  PSYCH: Normal mood and affect  NERVOUS SYSTEM:  A/O x3, dysarthric, strength 5/5 in all extremities    LABS:                        11.5   7.61  )-----------( 336      ( 2021 05:30 )             37.5         136  |  101  |  13  ----------------------------<  159  3.9   |  22  |  1.16    Ca    8.9      2021 05:30    TPro  7.3  /  Alb  2.7  /  TBili  0.6  /  DBili  x   /  AST  24  /  ALT  15  /  AlkPhos  123      eGFR if Non African American: 65 mL/min/1.73M2 (21 @ 05:30)  eGFR if : 76 mL/min/1.73M2 (21 @ 05:30)                        POCT Blood Glucose.: 134 mg/dL (2021 12:18)  POCT Blood Glucose.: 200 mg/dL (2021 07:56)  POCT Blood Glucose.: 205 mg/dL (2021 20:19)          Urinalysis Basic - ( 15 Nov 2021 14:54 )    Color: Yellow / Appearance: Clear / S.016 / pH: x  Gluc: x / Ketone: Trace  / Bili: Negative / Urobili: Negative   Blood: x / Protein: 100 mg/dL / Nitrite: Negative   Leuk Esterase: Negative / RBC: 50 /hpf / WBC 4 /HPF   Sq Epi: x / Non Sq Epi: 1 /hpf / Bacteria: Negative        COVID-19 PCR: NotDetec (21 @ 18:21)  COVID-19 PCR: NotDetec (21 @ 04:00)  COVID-19 PCR: NotDetec (21 @ 16:26)    RADIOLOGY & ADDITIONAL TESTS: reviewed all labs and imaging personally, EKG afib  with HR 89    Care Discussed with Consultants/Other Providers: discussed with Dr. Monae

## 2021-11-19 NOTE — BH CONSULTATION LIAISON ASSESSMENT NOTE - CURRENT ACTIVE IDEATION
Detail Level: Zone Otc Regimen: Switch to Cetaphil for body wash\\nVanicream Cleanser, Medicated shampoo \\Maame free and clear Continue Regimen: Desonide - as needed for reoccurrence No

## 2021-11-19 NOTE — BH CONSULTATION LIAISON ASSESSMENT NOTE - NSBHCONSULTFOLLOWAFTERCARE_PSY_A_CORE FT
as per treatment team, will need neurological follow up, pt resides in [place] can refer to [facility] psych [facility] at [facility] [phone].

## 2021-11-19 NOTE — BH CONSULTATION LIAISON ASSESSMENT NOTE - NSBHCONSULTRECOMMENDOTHER_PSY_A_CORE FT
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   Typically these visual hallucinations don't tend to resolve with antipsychotic medications, but can use Seroquel in a low dose and monitor response,   especially given sleep disturbance as well, if too sedated with Seroquel would consider change to short acting benzodiazepine - eg Xanax.

## 2021-11-20 ENCOUNTER — APPOINTMENT (OUTPATIENT)
Dept: INFUSION THERAPY | Facility: HOSPITAL | Age: 66
End: 2021-11-20

## 2021-11-20 PROBLEM — C18.9 MALIGNANT NEOPLASM OF COLON, UNSPECIFIED: Chronic | Status: ACTIVE | Noted: 2021-11-17

## 2021-11-20 PROBLEM — I48.20 CHRONIC ATRIAL FIBRILLATION, UNSPECIFIED: Chronic | Status: ACTIVE | Noted: 2021-11-17

## 2021-11-20 LAB
COVID-19 NUCLEOCAPSID GAM AB INTERP: NEGATIVE — SIGNIFICANT CHANGE UP
COVID-19 NUCLEOCAPSID TOTAL GAM ANTIBODY RESULT: 0.06 INDEX — SIGNIFICANT CHANGE UP
COVID-19 SPIKE DOMAIN AB INTERP: NEGATIVE — SIGNIFICANT CHANGE UP
COVID-19 SPIKE DOMAIN ANTIBODY RESULT: 0.4 U/ML — SIGNIFICANT CHANGE UP
GLUCOSE BLDC GLUCOMTR-MCNC: 124 MG/DL — HIGH (ref 70–99)
GLUCOSE BLDC GLUCOMTR-MCNC: 126 MG/DL — HIGH (ref 70–99)
GLUCOSE BLDC GLUCOMTR-MCNC: 180 MG/DL — HIGH (ref 70–99)
GLUCOSE BLDC GLUCOMTR-MCNC: 94 MG/DL — SIGNIFICANT CHANGE UP (ref 70–99)
SARS-COV-2 IGG+IGM SERPL QL IA: 0.06 INDEX — SIGNIFICANT CHANGE UP
SARS-COV-2 IGG+IGM SERPL QL IA: 0.4 U/ML — SIGNIFICANT CHANGE UP
SARS-COV-2 IGG+IGM SERPL QL IA: NEGATIVE — SIGNIFICANT CHANGE UP
SARS-COV-2 IGG+IGM SERPL QL IA: NEGATIVE — SIGNIFICANT CHANGE UP

## 2021-11-20 PROCEDURE — 99232 SBSQ HOSP IP/OBS MODERATE 35: CPT

## 2021-11-20 RX ADMIN — ATORVASTATIN CALCIUM 80 MILLIGRAM(S): 80 TABLET, FILM COATED ORAL at 21:43

## 2021-11-20 RX ADMIN — INSULIN GLARGINE 13 UNIT(S): 100 INJECTION, SOLUTION SUBCUTANEOUS at 21:43

## 2021-11-20 RX ADMIN — ENOXAPARIN SODIUM 120 MILLIGRAM(S): 100 INJECTION SUBCUTANEOUS at 06:05

## 2021-11-20 RX ADMIN — Medication 6 MILLIGRAM(S): at 21:43

## 2021-11-20 RX ADMIN — PANTOPRAZOLE SODIUM 40 MILLIGRAM(S): 20 TABLET, DELAYED RELEASE ORAL at 06:05

## 2021-11-20 RX ADMIN — Medication 325 MILLIGRAM(S): at 11:57

## 2021-11-20 RX ADMIN — Medication 7 UNIT(S): at 16:51

## 2021-11-20 RX ADMIN — ENOXAPARIN SODIUM 120 MILLIGRAM(S): 100 INJECTION SUBCUTANEOUS at 17:13

## 2021-11-20 RX ADMIN — METFORMIN HYDROCHLORIDE 500 MILLIGRAM(S): 850 TABLET ORAL at 17:13

## 2021-11-20 RX ADMIN — METFORMIN HYDROCHLORIDE 500 MILLIGRAM(S): 850 TABLET ORAL at 08:11

## 2021-11-20 RX ADMIN — QUETIAPINE FUMARATE 12.5 MILLIGRAM(S): 200 TABLET, FILM COATED ORAL at 21:43

## 2021-11-20 RX ADMIN — Medication 7 UNIT(S): at 11:57

## 2021-11-20 RX ADMIN — AMLODIPINE BESYLATE 10 MILLIGRAM(S): 2.5 TABLET ORAL at 06:05

## 2021-11-20 RX ADMIN — Medication 1: at 11:57

## 2021-11-20 RX ADMIN — Medication 7 UNIT(S): at 08:12

## 2021-11-20 NOTE — PROGRESS NOTE ADULT - ASSESSMENT
66y with a history of DM type 2, HTN, afib on eliquis, Colon ca, previous stroke with no residual defecits who presented to [facility] initially on 11/13 and found to have basilar artery occlusion and was transferred to [facility] for mechanical thrombectomy which was performed on 11/13 with TICI 2B restoration of flow. He continued workup and monitoring at [facility] and was evaluated for inpatient acute rehab. Patient now admitted for a multidisciplinary rehab program.    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   - found to have Left PCA, Bilateral cerebellum and pontine stroke s/p mechanical thrombectomy with TICI2b  - likely cardioembolic in nature, eliquis was held for afib in setting of chemo port placement, vs hypercoagulable state in setting of active malignancy  - Continue comprehensive rehab therapy  - continue atorvastatin 80mg daily    #Afib  - was on eliquis which was stopped for chemo port placement  - restarted on full dose lovenox (seen by GI here, okay to continue with full dose AC)   - echo 11/15 with EF 55%    #HTN  - continue norvasc 10mg    #DM type 2  - HbA1C 11.8  - continue lantus 13U qhs and admelog 7U qAC  - will likely need to be discharged home on insulin due to A1C  - Continue metformin 500mg BID (home dose 1000mg BID, can increase back to home dose in 2-3 days if tolerates)    #Rectal adenocarcinoma  - rigid sigmoidoscopy revealed lesion at 6 cm from the anal verge  - treated with capecitabine (started 8/17/21-9/28/21); completed; completed chemo RT to 5040 cGy/28 fxs with concurrent Xeloda on 9/27/21  - Seen by Dr. Buck Magana (colorectal x), recommended continuing total neoadjuvant therapy to improve chance of complete response. Per last outpatient note, was planned to start FOLFOX x9 cycles (4.5 months), agreed to IV chemotherapy Planned to start w/ 50% dose reduction; was to start FOLFOX on week of 11/15/21 prior to admission  - Given stroke, chemotherapy held for now as per Heme/onc at [facility]. - Recommended  follow up in 2 weeks with Dr. Ramos  - no chemo while inpatient  - GI consulted- recs appreciated  - heme/onc recommendations appreciated     #DVT ppx  - on full dose lovenox

## 2021-11-21 LAB
GLUCOSE BLDC GLUCOMTR-MCNC: 132 MG/DL — HIGH (ref 70–99)
GLUCOSE BLDC GLUCOMTR-MCNC: 135 MG/DL — HIGH (ref 70–99)
GLUCOSE BLDC GLUCOMTR-MCNC: 151 MG/DL — HIGH (ref 70–99)
GLUCOSE BLDC GLUCOMTR-MCNC: 163 MG/DL — HIGH (ref 70–99)

## 2021-11-21 PROCEDURE — 99232 SBSQ HOSP IP/OBS MODERATE 35: CPT

## 2021-11-21 RX ADMIN — POLYETHYLENE GLYCOL 3350 17 GRAM(S): 17 POWDER, FOR SOLUTION ORAL at 18:54

## 2021-11-21 RX ADMIN — ENOXAPARIN SODIUM 120 MILLIGRAM(S): 100 INJECTION SUBCUTANEOUS at 17:40

## 2021-11-21 RX ADMIN — QUETIAPINE FUMARATE 12.5 MILLIGRAM(S): 200 TABLET, FILM COATED ORAL at 21:08

## 2021-11-21 RX ADMIN — INSULIN GLARGINE 13 UNIT(S): 100 INJECTION, SOLUTION SUBCUTANEOUS at 21:08

## 2021-11-21 RX ADMIN — AMLODIPINE BESYLATE 10 MILLIGRAM(S): 2.5 TABLET ORAL at 06:43

## 2021-11-21 RX ADMIN — ATORVASTATIN CALCIUM 80 MILLIGRAM(S): 80 TABLET, FILM COATED ORAL at 21:08

## 2021-11-21 RX ADMIN — Medication 7 UNIT(S): at 08:24

## 2021-11-21 RX ADMIN — METFORMIN HYDROCHLORIDE 500 MILLIGRAM(S): 850 TABLET ORAL at 17:40

## 2021-11-21 RX ADMIN — Medication 1: at 17:39

## 2021-11-21 RX ADMIN — ENOXAPARIN SODIUM 120 MILLIGRAM(S): 100 INJECTION SUBCUTANEOUS at 06:43

## 2021-11-21 RX ADMIN — Medication 1: at 12:23

## 2021-11-21 RX ADMIN — Medication 7 UNIT(S): at 17:39

## 2021-11-21 RX ADMIN — METFORMIN HYDROCHLORIDE 500 MILLIGRAM(S): 850 TABLET ORAL at 08:26

## 2021-11-21 RX ADMIN — PANTOPRAZOLE SODIUM 40 MILLIGRAM(S): 20 TABLET, DELAYED RELEASE ORAL at 06:43

## 2021-11-21 RX ADMIN — Medication 6 MILLIGRAM(S): at 21:08

## 2021-11-21 RX ADMIN — Medication 325 MILLIGRAM(S): at 11:43

## 2021-11-21 RX ADMIN — SENNA PLUS 2 TABLET(S): 8.6 TABLET ORAL at 21:08

## 2021-11-21 RX ADMIN — Medication 7 UNIT(S): at 12:23

## 2021-11-21 NOTE — BH CONSULTATION LIAISON PROGRESS NOTE - NSBHFUPINTERVALHXFT_PSY_A_CORE
:	Pt is a 66 year old male who appears older than stated age, psychiatry asked to see patient due to complaints of visual hallucinations and sleep disturbance.   Pt with previous CVA in 2020 and now presents with CVA - subacute infarct of the basilar artery branch distribution involving the bilateral cerebellum and sammi, and to lesser extent distal left posterior cerebral artery (more embolic appearing). Susceptibility within the central pontine lesion suggests some early hemorrhagic conversion.  Pt also has A fib, rectal cancer, and Diabetes, HTN, Eczema.  Differential	perceptual disturbance( visual hallucinations) in the context of CVA with no other perceptual findings at present/ nor other symptoms of psychosis.   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

## 2021-11-21 NOTE — BH CONSULTATION LIAISON PROGRESS NOTE - NSBHCHARTREVIEWVS_PSY_A_CORE FT
Vital Signs Last 24 Hrs  T(C): 36.7 (21 Nov 2021 08:21), Max: 36.7 (21 Nov 2021 08:21)  T(F): 98.1 (21 Nov 2021 08:21), Max: 98.1 (21 Nov 2021 08:21)  HR: 95 (21 Nov 2021 08:21) (88 - 95)  BP: 108/76 (21 Nov 2021 08:21) (108/76 - 154/89)  BP(mean): --  RR: 16 (21 Nov 2021 08:21) (16 - 16)  SpO2: 98% (21 Nov 2021 08:21) (97% - 98%)

## 2021-11-21 NOTE — BH CONSULTATION LIAISON PROGRESS NOTE - NSBHASSESSMENTFT_PSY_ALL_CORE
Pt is a 66 year old male who appears older than stated age, psychiatry asked to see patient due to complaints of visual hallucinations and sleep disturbance.   Pt with previous CVA in 2020 and now presents with CVA - subacute infarct of the basilar artery branch distribution involving the bilateral cerebellum and sammi, and to lesser extent distal left posterior cerebral artery (more embolic appearing). Susceptibility within the central pontine lesion suggests some early hemorrhagic conversion.  Pt also has A fib, rectal cancer, and Diabetes, HTN, Eczema.  Differential	perceptual disturbance( visual hallucinations) in the context of CVA with no other perceptual findings at present/ nor other symptoms of psychosis.   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      	Pt is a 66 year old male who appears older than stated age, psychiatry asked to see patient due to complaints of visual hallucinations and sleep disturbance.   Pt with previous CVA in 2020 and now presents with CVA - subacute infarct of the basilar artery branch distribution involving the bilateral cerebellum and sammi, and to lesser extent distal left posterior cerebral artery (more embolic appearing). Susceptibility within the central pontine lesion suggests some early hemorrhagic conversion.  Pt also has A fib, rectal cancer, and Diabetes, HTN, Eczema.  Differential	perceptual disturbance( visual hallucinations) in the context of CVA with no other perceptual findings at present/ nor other symptoms of psychosis.   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   Seroquel 12,5 qhs tolerated well . Will continue.

## 2021-11-21 NOTE — PROGRESS NOTE ADULT - ASSESSMENT
66y with a history of DM type 2, HTN, afib on eliquis, Colon ca, previous stroke with no residual defecits who presented to [facility] initially on 11/13 and found to have basilar artery occlusion and was transferred to [facility] for mechanical thrombectomy which was performed on 11/13 with TICI 2B restoration of flow. He continued workup and monitoring at [facility] and was evaluated for inpatient acute rehab. Patient now admitted for a multidisciplinary rehab program.    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   - found to have Left PCA, Bilateral cerebellum and pontine stroke s/p mechanical thrombectomy with TICI2b  - likely cardioembolic in nature, eliquis was held for afib in setting of chemo port placement, vs hypercoagulable state in setting of active malignancy  - Continue comprehensive rehab therapy  - continue atorvastatin 80mg daily    #Afib  - was on eliquis which was stopped for chemo port placement  - restarted on full dose lovenox (seen by GI here, okay to continue with full dose AC)   - echo 11/15 with EF 55%    #HTN  - continue norvasc 10mg    #DM type 2  - HbA1C 11.8  - continue lantus 13U qhs and admelog 7U qAC  - will likely need to be discharged home on insulin due to A1C  - Continue metformin 500mg BID (home dose 1000mg BID, can increase back to home dose on 11/23)    #Rectal adenocarcinoma  - rigid sigmoidoscopy revealed lesion at 6 cm from the anal verge  - treated with capecitabine (started 8/17/21-9/28/21); completed; completed chemo RT to 5040 cGy/28 fxs with concurrent Xeloda on 9/27/21  - Seen by Dr. Buck Magana (colorectal x), recommended continuing total neoadjuvant therapy to improve chance of complete response. Per last outpatient note, was planned to start FOLFOX x9 cycles (4.5 months), agreed to IV chemotherapy Planned to start w/ 50% dose reduction; was to start FOLFOX on week of 11/15/21 prior to admission  - Given stroke, chemotherapy held for now as per Heme/onc at [facility]. - Recommended  follow up in 2 weeks with Dr. Ramos  - no chemo while inpatient  - GI consulted- recs appreciated  - heme/onc recommendations appreciated     #DVT ppx  - on full dose lovenox

## 2021-11-21 NOTE — BH CONSULTATION LIAISON PROGRESS NOTE - CURRENT MEDICATION
MEDICATIONS  (STANDING):  amLODIPine   Tablet 10 milliGRAM(s) Oral daily  atorvastatin 80 milliGRAM(s) Oral at bedtime  dextrose 40% Gel 15 Gram(s) Oral once  dextrose 5%. 1000 milliLiter(s) (50 mL/Hr) IV Continuous <Continuous>  dextrose 5%. 1000 milliLiter(s) (100 mL/Hr) IV Continuous <Continuous>  dextrose 50% Injectable 25 Gram(s) IV Push once  dextrose 50% Injectable 12.5 Gram(s) IV Push once  dextrose 50% Injectable 25 Gram(s) IV Push once  enoxaparin Injectable 120 milliGRAM(s) SubCutaneous two times a day  ferrous    sulfate 325 milliGRAM(s) Oral daily  glucagon  Injectable 1 milliGRAM(s) IntraMuscular once  insulin glargine Injectable (LANTUS) 13 Unit(s) SubCutaneous at bedtime  insulin lispro (ADMELOG) corrective regimen sliding scale   SubCutaneous at bedtime  insulin lispro (ADMELOG) corrective regimen sliding scale   SubCutaneous three times a day before meals  insulin lispro Injectable (ADMELOG) 7 Unit(s) SubCutaneous three times a day before meals  melatonin 6 milliGRAM(s) Oral at bedtime  metFORMIN 500 milliGRAM(s) Oral two times a day  pantoprazole    Tablet 40 milliGRAM(s) Oral before breakfast  polyethylene glycol 3350 17 Gram(s) Oral two times a day  QUEtiapine 12.5 milliGRAM(s) Oral at bedtime  senna 2 Tablet(s) Oral at bedtime    MEDICATIONS  (PRN):  acetaminophen     Tablet .. 650 milliGRAM(s) Oral every 6 hours PRN Temp greater or equal to 38C (100.4F), Mild Pain (1 - 3)

## 2021-11-21 NOTE — BH CONSULTATION LIAISON PROGRESS NOTE - NSBHCONSULTFOLLOWAFTERCARE_PSY_A_CORE FT
as per treatment team, will need neurological follow up, pt resides in [place] can refer to [facility] at [facility] [phone].

## 2021-11-21 NOTE — BH CONSULTATION LIAISON PROGRESS NOTE - OTHER
not tested, patient in wheel chair. see neurology note for deficits on exam.  appears intact, but difficult to test formally, would suggest neuropsych testing for hickey details of speech and language and thought process.  acute neurological illness , cancer

## 2021-11-21 NOTE — BH CONSULTATION LIAISON PROGRESS NOTE - NSBHCONSULTMEDNEWDISCUSSNO_PSY_A_CORE FT
message left for caregiver   Have patient followed up by Dr. Longoria - writer off service.  message left for caregiver

## 2021-11-22 LAB
ALBUMIN SERPL ELPH-MCNC: 2.8 G/DL — LOW (ref 3.3–5)
ALP SERPL-CCNC: 106 U/L — SIGNIFICANT CHANGE UP (ref 40–120)
ALT FLD-CCNC: 42 U/L — SIGNIFICANT CHANGE UP (ref 10–45)
ANION GAP SERPL CALC-SCNC: 11 MMOL/L — SIGNIFICANT CHANGE UP (ref 5–17)
AST SERPL-CCNC: 41 U/L — HIGH (ref 10–40)
BASOPHILS # BLD AUTO: 0.07 K/UL — SIGNIFICANT CHANGE UP (ref 0–0.2)
BASOPHILS NFR BLD AUTO: 1.2 % — SIGNIFICANT CHANGE UP (ref 0–2)
BILIRUB SERPL-MCNC: 0.3 MG/DL — SIGNIFICANT CHANGE UP (ref 0.2–1.2)
BUN SERPL-MCNC: 21 MG/DL — SIGNIFICANT CHANGE UP (ref 7–23)
CALCIUM SERPL-MCNC: 8.7 MG/DL — SIGNIFICANT CHANGE UP (ref 8.4–10.5)
CHLORIDE SERPL-SCNC: 106 MMOL/L — SIGNIFICANT CHANGE UP (ref 96–108)
CO2 SERPL-SCNC: 25 MMOL/L — SIGNIFICANT CHANGE UP (ref 22–31)
CREAT SERPL-MCNC: 1.23 MG/DL — SIGNIFICANT CHANGE UP (ref 0.5–1.3)
EOSINOPHIL # BLD AUTO: 0.29 K/UL — SIGNIFICANT CHANGE UP (ref 0–0.5)
EOSINOPHIL NFR BLD AUTO: 5.1 % — SIGNIFICANT CHANGE UP (ref 0–6)
GLUCOSE BLDC GLUCOMTR-MCNC: 144 MG/DL — HIGH (ref 70–99)
GLUCOSE BLDC GLUCOMTR-MCNC: 146 MG/DL — HIGH (ref 70–99)
GLUCOSE BLDC GLUCOMTR-MCNC: 150 MG/DL — HIGH (ref 70–99)
GLUCOSE BLDC GLUCOMTR-MCNC: 160 MG/DL — HIGH (ref 70–99)
GLUCOSE SERPL-MCNC: 150 MG/DL — HIGH (ref 70–99)
HCT VFR BLD CALC: 38 % — LOW (ref 39–50)
HGB BLD-MCNC: 11.7 G/DL — LOW (ref 13–17)
IMM GRANULOCYTES NFR BLD AUTO: 0.5 % — SIGNIFICANT CHANGE UP (ref 0–1.5)
LYMPHOCYTES # BLD AUTO: 0.63 K/UL — LOW (ref 1–3.3)
LYMPHOCYTES # BLD AUTO: 11 % — LOW (ref 13–44)
MCHC RBC-ENTMCNC: 26.5 PG — LOW (ref 27–34)
MCHC RBC-ENTMCNC: 30.8 GM/DL — LOW (ref 32–36)
MCV RBC AUTO: 86 FL — SIGNIFICANT CHANGE UP (ref 80–100)
MONOCYTES # BLD AUTO: 1.03 K/UL — HIGH (ref 0–0.9)
MONOCYTES NFR BLD AUTO: 17.9 % — HIGH (ref 2–14)
NEUTROPHILS # BLD AUTO: 3.69 K/UL — SIGNIFICANT CHANGE UP (ref 1.8–7.4)
NEUTROPHILS NFR BLD AUTO: 64.3 % — SIGNIFICANT CHANGE UP (ref 43–77)
NRBC # BLD: 0 /100 WBCS — SIGNIFICANT CHANGE UP (ref 0–0)
PLATELET # BLD AUTO: 346 K/UL — SIGNIFICANT CHANGE UP (ref 150–400)
POTASSIUM SERPL-MCNC: 3.4 MMOL/L — LOW (ref 3.5–5.3)
POTASSIUM SERPL-SCNC: 3.4 MMOL/L — LOW (ref 3.5–5.3)
PROT SERPL-MCNC: 6.8 G/DL — SIGNIFICANT CHANGE UP (ref 6–8.3)
RBC # BLD: 4.42 M/UL — SIGNIFICANT CHANGE UP (ref 4.2–5.8)
RBC # FLD: 17.7 % — HIGH (ref 10.3–14.5)
SODIUM SERPL-SCNC: 142 MMOL/L — SIGNIFICANT CHANGE UP (ref 135–145)
WBC # BLD: 5.74 K/UL — SIGNIFICANT CHANGE UP (ref 3.8–10.5)
WBC # FLD AUTO: 5.74 K/UL — SIGNIFICANT CHANGE UP (ref 3.8–10.5)

## 2021-11-22 PROCEDURE — 74230 X-RAY XM SWLNG FUNCJ C+: CPT | Mod: 26

## 2021-11-22 PROCEDURE — 99233 SBSQ HOSP IP/OBS HIGH 50: CPT

## 2021-11-22 PROCEDURE — 99232 SBSQ HOSP IP/OBS MODERATE 35: CPT

## 2021-11-22 RX ORDER — POTASSIUM CHLORIDE 20 MEQ
20 PACKET (EA) ORAL
Refills: 0 | Status: COMPLETED | OUTPATIENT
Start: 2021-11-22 | End: 2021-11-22

## 2021-11-22 RX ADMIN — Medication 1: at 16:53

## 2021-11-22 RX ADMIN — Medication 7 UNIT(S): at 12:21

## 2021-11-22 RX ADMIN — ATORVASTATIN CALCIUM 80 MILLIGRAM(S): 80 TABLET, FILM COATED ORAL at 21:26

## 2021-11-22 RX ADMIN — Medication 7 UNIT(S): at 16:53

## 2021-11-22 RX ADMIN — ENOXAPARIN SODIUM 120 MILLIGRAM(S): 100 INJECTION SUBCUTANEOUS at 17:05

## 2021-11-22 RX ADMIN — Medication 6 MILLIGRAM(S): at 21:26

## 2021-11-22 RX ADMIN — Medication 20 MILLIEQUIVALENT(S): at 12:21

## 2021-11-22 RX ADMIN — Medication 325 MILLIGRAM(S): at 11:50

## 2021-11-22 RX ADMIN — POLYETHYLENE GLYCOL 3350 17 GRAM(S): 17 POWDER, FOR SOLUTION ORAL at 17:05

## 2021-11-22 RX ADMIN — INSULIN GLARGINE 13 UNIT(S): 100 INJECTION, SOLUTION SUBCUTANEOUS at 21:25

## 2021-11-22 RX ADMIN — Medication 20 MILLIEQUIVALENT(S): at 16:54

## 2021-11-22 RX ADMIN — ENOXAPARIN SODIUM 120 MILLIGRAM(S): 100 INJECTION SUBCUTANEOUS at 05:43

## 2021-11-22 RX ADMIN — SENNA PLUS 2 TABLET(S): 8.6 TABLET ORAL at 21:26

## 2021-11-22 RX ADMIN — METFORMIN HYDROCHLORIDE 500 MILLIGRAM(S): 850 TABLET ORAL at 08:04

## 2021-11-22 RX ADMIN — AMLODIPINE BESYLATE 10 MILLIGRAM(S): 2.5 TABLET ORAL at 05:43

## 2021-11-22 RX ADMIN — METFORMIN HYDROCHLORIDE 500 MILLIGRAM(S): 850 TABLET ORAL at 17:05

## 2021-11-22 RX ADMIN — PANTOPRAZOLE SODIUM 40 MILLIGRAM(S): 20 TABLET, DELAYED RELEASE ORAL at 05:44

## 2021-11-22 RX ADMIN — QUETIAPINE FUMARATE 12.5 MILLIGRAM(S): 200 TABLET, FILM COATED ORAL at 21:26

## 2021-11-22 RX ADMIN — Medication 7 UNIT(S): at 08:04

## 2021-11-22 NOTE — PROGRESS NOTE ADULT - ATTENDING COMMENTS
Active issues:  - improving hallucinations on Seroquel, case discussed with Psychiatry  - MBS today - dysphagia  - poor DM control - Medicine and Diabetes Education NP are fred, case discussed    Multidisciplinary team meeting today:  patient's functional goals and needs, functional and clinical  progress were discussed, barriers to discharge were identified. Anticipate discharge home with home care, 24/7 supervision for safety vs [facility] facility placement.     EDOD  12/3/21      > 35 min spent, > 50 % coordinating care and counseling

## 2021-11-22 NOTE — PROGRESS NOTE ADULT - ASSESSMENT
66y with a history of DM type 2, HTN, afib on eliquis, Colon ca, previous stroke with no residual defecits who presented to [facility] initially on 11/13 and found to have basilar artery occlusion and was transferred to [facility] for mechanical thrombectomy which was performed on 11/13 with TICI 2B restoration of flow. He continued workup and monitoring at [facility] and was evaluated for inpatient acute rehab. Patient now admitted for a multidisciplinary rehab program. 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 with TICI2b  - Left PCA, Bilateral cerebellum and pontine stroke  -Generalized weakness, dysarthria,   - Continue comprehensive rehab program of PT/OT/SLP - 3 hours a day, 5 days a week  - Continue full dose lovenox 120mg BID  -Continue atorvastatin 80mg daily    #Atrial Fibrillation  - Was on eliquis which was stopped for port placement for chemotherapy  -Restarted on full dose lovenox 120mg BID  -TTE with ef 55%  -EKG monitoring on admission    #HTN  -Continue norvasc 10mg daily  - Was on HCTZ at home- now held   - Monitor vital signs    #DM type 2  - A1c at [facility] 11.8 on 11/13  -Continue Lantus  -Continue premeal lispro   -Continue ISS and FS  -Restart Metformin 500mg BID  -Home meds: Metformin 1000mg BID    #Rectal adenocarcinoma  - MMR-proficient; moderately differentiated invasive adenocarcinoma per path on diagnosis  -Rigid sigmoidoscopy revealed lesion at 6 cm from the anal verge  -treated with capecitabine (started 8/17/21-9/28/21); completed; completed chemo RT to 5040 cGy/28 fxs with concurrent Xeloda on 9/27/21  -Seen by Dr. Buck Magana (colorectal x), recommended continuing total neoadjuvant therapy to improve chance of complete response  -Per last outpatient note, was planned to start FOLFOX x9 cycles (4.5 months), agreed to IV chemotherapy Planned to start w/ 50% dose reduction; was to start FOLFOX on week of 11/15/21 prior to admission  -Given stroke, chemotherapy held for now as per Heme/onc at [facility]. - Recommended follow up in 2 weeks with Dr. 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  - Toileting schedule every 4 hours    #Skin / Pressure injury  - Monitor port-a-cath site   - Turn q2 hours in bed while awake, air mattress  - nursing to monitor skin qShift    #Diet  - Diet Consistency: easy to chew- carb control   - Aspiration Precautions  - SLP consult for swallow function evaluation and treatment    ##DVT prophylaxis:   - Full dose lovenox   - SCDs  - Teds    #Participation Restrictions/Precautions:  - Weight bearing status: WBAT  - Precautions: Falls, Seizures, Aspiration     Outpatient Follow-up:  Cesar Garcia (DO)  Cardiology; Internal Medicine  [address], [address]  [place] [address]  Phone: [phone]  Fax: [phone]  Follow Up Time: 2 weeks    Alvaro Whyte (NP; RN)  NP in Adult Health  [address], [address]  [place] [address]  Phone: [phone]  Fax: [phone]  Follow Up Time: 2 weeks    Wiliam Ramos)  Hematology; Internal Medicine; Medical Oncology  [address] [place] [address]  Phone: [phone]  Fax: [phone]  Follow Up Time: 2 weeks    Long Fernandez)  Neurology; Vascular Neurology  [address], [address]  [place] [address]  Phone: [phone]  Fax: [phone]  Follow Up Time: 2 weeks    TEAM MEETING 11/22/21  SW:  5 steps to enter, lives with wife, sister involved in care  OT: Mod for ADLS, max for transfers,  PT: max-tot for transfers, walks minimally with walker, no stairs   SLP: MBS today with silent aspiration with thins, vital stim,   barriers: dec cognition, ataxic,  dec vision, dec balance   goals: SV for grooming, min for adls and transfers, min for comprehension   EDOD: Home with 24/7 vs [facility] 12/3

## 2021-11-22 NOTE — ADVANCED PRACTICE NURSE CONSULT - RECOMMEDATIONS
Recommendations:  Currently BG within range. Current insulin regimen heavy on bolus verses basal. Might see more stable glucose with 50% basal and 50 % bolus regimen- Lantus 15 units, Admelog 5 units before each meal and moderate correction scale.   Recommend changing correction scale from low to moderate since pt is T2DM.   Discharge Recommendations:  Teach family BG monitoring  Teach family insulin administration including insulin pen use.  Lantus Solostar Pen 100 units/ml- (on favorite list)  SubCutaneous DAILY- dose per needs at time of discharge  Humalog kwikpen 100 units/ml- SubCutaneous, before meals, three times/day (on favorite list) dose per needs at time of discharge   Insulin pen needles 4 mm- zacarias- (on favorite list)   Alcohol swabs- (on favorite list)  BG meter per insurance- (on favorite list)  BG test strips per insurance- (on favorite list)  Lancets- per insurance -(on favorite list)  Metformin  mg tablets orally- dose per dose pt receiving at time of discharge, take with breakfast and dinner.  Follow up with PCP- pt would benefit from SGTL2 agent for glycemic control and to decrease CV risks- may not need insulin if placed on SGTL2 agent.  Endocrinology Consult ASAP after discharge     Recommendations:  Currently BG within range. Current insulin regimen heavy on bolus verses basal. Might see more stable glucose with 50% basal and 50 % bolus regimen- Lantus 15 units, Admelog 5 units before each meal and moderate correction scale.   Recommend changing correction scale from low to moderate since pt is T2DM.   Titrate Metformin from 500 mg 2X/Day to 1000 mg 2X/day as tolerated. Monitor renal function  Discharge Recommendations:  Teach family BG monitoring  Teach family insulin administration including insulin pen use.  Lantus Solostar Pen 100 units/ml- (on favorite list)  SubCutaneous DAILY- dose per needs at time of discharge  Humalog kwikpen 100 units/ml- SubCutaneous, before meals, three times/day (on favorite list) dose per needs at time of discharge   Insulin pen needles 4 mm- zacarias- (on favorite list)   Alcohol swabs- (on favorite list)  BG meter per insurance- (on favorite list)  BG test strips per insurance- (on favorite list)  Lancets- per insurance -(on favorite list)  Metformin  mg tablets orally- dose per dose pt receiving at time of discharge, take with breakfast and dinner.  Follow up with PCP- pt would benefit from SGTL2 agent for glycemic control and to decrease CV risks- may not need insulin if placed on SGTL2 agent.  Endocrinology Consult ASAP after discharge

## 2021-11-22 NOTE — PROGRESS NOTE ADULT - ASSESSMENT
66y with a history of DM type 2, HTN, afib on eliquis, Colon ca, previous stroke with no residual defecits who presented to [facility] initially on 11/13 and found to have basilar artery occlusion and was transferred to [facility] for mechanical thrombectomy which was performed on 11/13 with TICI 2B restoration of flow. He continued workup and monitoring at [facility] and was evaluated for inpatient acute rehab. Patient now admitted for a multidisciplinary rehab program.    #Acute CVA  - patient with Basilar artery occlusion bilateral P1 segment occlusion   - found to have Left PCA, Bilateral cerebellum and pontine stroke s/p mechanical thrombectomy with TICI2b  - likely cardioembolic in nature, eliquis was held for afib in setting of chemo port placement, vs hypercoagulable state in setting of active malignancy  - Continue comprehensive rehab therapy  - continue atorvastatin 80mg daily    #Hypokalemia  -REplace with K x two doses    #Afib  - was on eliquis which was stopped for chemo port placement  - restarted on full dose lovenox 120mg BID (seen by GI here, okay to continue with full dose AC)   - echo 11/15 with EF 55%    #HTN  - continue norvasc 10mg daily    #DM type 2  - HbA1C 11.8  - continue lantus 13U qhs and admelog 7U qAC  - will likely need to be discharged home on insulin due to A1C  - Continue metformin 500mg BID (home dose 1000mg BID, can increase back to home dose on 11/23)    #Rectal adenocarcinoma  - rigid sigmoidoscopy revealed lesion at 6 cm from the anal verge  - treated with capecitabine (started 8/17/21-9/28/21); completed; completed chemo RT to 5040 cGy/28 fxs with concurrent Xeloda on 9/27/21  - Seen by Dr. Buck Magana (colorectal x), recommended continuing total neoadjuvant therapy to improve chance of complete response. Per last outpatient note, was planned to start FOLFOX x9 cycles (4.5 months), agreed to IV chemotherapy Planned to start w/ 50% dose reduction; was to start FOLFOX on week of 11/15/21 prior to admission  - Given stroke, chemotherapy held for now as per Heme/onc at [facility]. - Recommended  follow up in 2 weeks with Dr. Ramos  - no chemo while inpatient  - GI consulted- recs appreciated  - heme/onc recommendations appreciated     #DVT ppx  - on full dose lovenox

## 2021-11-23 LAB
GLUCOSE BLDC GLUCOMTR-MCNC: 117 MG/DL — HIGH (ref 70–99)
GLUCOSE BLDC GLUCOMTR-MCNC: 148 MG/DL — HIGH (ref 70–99)
GLUCOSE BLDC GLUCOMTR-MCNC: 173 MG/DL — HIGH (ref 70–99)
GLUCOSE BLDC GLUCOMTR-MCNC: 186 MG/DL — HIGH (ref 70–99)

## 2021-11-23 PROCEDURE — 99232 SBSQ HOSP IP/OBS MODERATE 35: CPT

## 2021-11-23 RX ORDER — METFORMIN HYDROCHLORIDE 850 MG/1
1000 TABLET ORAL
Refills: 0 | Status: DISCONTINUED | OUTPATIENT
Start: 2021-11-23 | End: 2021-12-02

## 2021-11-23 RX ADMIN — Medication 1: at 11:41

## 2021-11-23 RX ADMIN — Medication 7 UNIT(S): at 16:58

## 2021-11-23 RX ADMIN — PANTOPRAZOLE SODIUM 40 MILLIGRAM(S): 20 TABLET, DELAYED RELEASE ORAL at 06:00

## 2021-11-23 RX ADMIN — SENNA PLUS 2 TABLET(S): 8.6 TABLET ORAL at 21:24

## 2021-11-23 RX ADMIN — INSULIN GLARGINE 13 UNIT(S): 100 INJECTION, SOLUTION SUBCUTANEOUS at 21:25

## 2021-11-23 RX ADMIN — METFORMIN HYDROCHLORIDE 1000 MILLIGRAM(S): 850 TABLET ORAL at 17:49

## 2021-11-23 RX ADMIN — ENOXAPARIN SODIUM 120 MILLIGRAM(S): 100 INJECTION SUBCUTANEOUS at 17:48

## 2021-11-23 RX ADMIN — Medication 7 UNIT(S): at 07:44

## 2021-11-23 RX ADMIN — Medication 7 UNIT(S): at 11:41

## 2021-11-23 RX ADMIN — METFORMIN HYDROCHLORIDE 500 MILLIGRAM(S): 850 TABLET ORAL at 07:44

## 2021-11-23 RX ADMIN — Medication 6 MILLIGRAM(S): at 21:24

## 2021-11-23 RX ADMIN — Medication 325 MILLIGRAM(S): at 11:40

## 2021-11-23 RX ADMIN — QUETIAPINE FUMARATE 12.5 MILLIGRAM(S): 200 TABLET, FILM COATED ORAL at 21:24

## 2021-11-23 RX ADMIN — ENOXAPARIN SODIUM 120 MILLIGRAM(S): 100 INJECTION SUBCUTANEOUS at 06:00

## 2021-11-23 RX ADMIN — ATORVASTATIN CALCIUM 80 MILLIGRAM(S): 80 TABLET, FILM COATED ORAL at 21:24

## 2021-11-23 RX ADMIN — AMLODIPINE BESYLATE 10 MILLIGRAM(S): 2.5 TABLET ORAL at 06:00

## 2021-11-23 NOTE — PROGRESS NOTE ADULT - ASSESSMENT
66y with a history of DM type 2, HTN, afib on eliquis, Colon ca, previous stroke with no residual defecits who presented to [facility] initially on 11/13 and found to have basilar artery occlusion and was transferred to [facility] for mechanical thrombectomy which was performed on 11/13 with TICI 2B restoration of flow. He continued workup and monitoring at [facility] and was evaluated for inpatient acute rehab. Patient now admitted for a multidisciplinary rehab program. 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8 on 11/13  -Continue Lantus  -Continue premeal lispro   -Continue ISS and FS  -Restart Metformin 500mg BID  -Home meds: Metformin 1000mg BID    #Rectal adenocarcinoma  - MMR-proficient; moderately differentiated invasive adenocarcinoma per path on diagnosis  -Rigid sigmoidoscopy revealed lesion at 6 cm from the anal verge  -treated with capecitabine (started 8/17/21-9/28/21); completed; completed chemo RT to 5040 cGy/28 fxs with concurrent Xeloda on 9/27/21  -Seen by Dr. Buck Magana (colorectal x), recommended continuing total neoadjuvant therapy to improve chance of complete response  -Per last outpatient note, was planned to start FOLFOX x9 cycles (4.5 months), agreed to IV chemotherapy Planned to start w/ 50% dose reduction; was to start FOLFOX on week of 11/15/21 prior to admission  -Given stroke, chemotherapy held for now as per Heme/onc at [facility]. - Recommended follow up in 2 weeks with Dr. 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 - hallucinations have improved     #GI / Bowel  - Senna qHS  - Miralax BID  - GI ppx: protonix 40mg     # / Bladder  - Discontinue bladder scans due to low PVR  - Toileting schedule every 4 hours    #Skin / Pressure injury  - Monitor port-a-cath site   - Turn q2 hours in bed while awake, air mattress  - nursing to monitor skin qShift    #Diet  - Diet Consistency: easy to chew- carb control and thick liquids  - Aspiration Precautions  - SLP consult for swallow function evaluation and treatment  -MBS performed 11/22 -shown with silent aspiration - continue current diet     ##DVT prophylaxis:   - Full dose lovenox   - SCDs  - Teds    #Participation Restrictions/Precautions:  - Weight bearing status: WBAT  - Precautions: Falls, Seizures, Aspiration     Outpatient Follow-up:  Cesar Garcia (DO)  Cardiology; Internal Medicine  [address], [address]  [place] [address]  Phone: [phone]  Fax: [phone]  Follow Up Time: 2 weeks    Alvaro Whyte (NP; RN)  NP in Adult Health  [address]  [place] [address]  Phone: [phone]  Fax: [phone]  Follow Up Time: 2 weeks    Wiliam Ramos)  Hematology; Internal Medicine; Medical Oncology  [address] [place] [address]  Phone: [phone]  Fax: [phone]  Follow Up Time: 2 weeks    Long Fernandez)  Neurology; Vascular Neurology  [address], [address]  [place] [address]  Phone: [phone]  Fax: [phone]  Follow Up Time: 2 weeks    TEAM MEETING 11/22/21  SW:  5 steps to enter, lives with wife, sister involved in care  OT: Mod for ADLS, max for transfers,  PT: max-tot for transfers, walks minimally with walker, no stairs   SLP: MBS today with silent aspiration with thins, vital stim,   barriers: dec cognition, ataxic,  dec vision, dec balance   goals: SV for grooming, min for adls and transfers, min for comprehension   EDOD: Home with 24/7 vs [facility]ILA 12/3

## 2021-11-23 NOTE — PROGRESS NOTE ADULT - ASSESSMENT
66y with a history of DM type 2, HTN, afib on eliquis, Colon ca, previous stroke with no residual defecits who presented to [facility] initially on 11/13 and found to have basilar artery occlusion and was transferred to [facility] for mechanical thrombectomy which was performed on 11/13 with TICI 2B restoration of flow. He continued workup and monitoring at [facility] and was evaluated for inpatient acute rehab. Patient now admitted for a multidisciplinary rehab program.    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   - found to have Left PCA, Bilateral cerebellum and pontine stroke s/p mechanical thrombectomy with TICI2b  - likely cardioembolic in nature, eliquis was held for afib in setting of chemo port placement, vs hypercoagulable state in setting of active malignancy  - Continue comprehensive rehab therapy  - continue atorvastatin 80mg daily    #Afib  - was on eliquis which was stopped for chemo port placement  - restarted on full dose lovenox (seen by GI here, okay to continue with full dose AC)   - echo 11/15 with EF 55%    #HTN  - continue norvasc 10mg    #DM type 2  - HbA1C 11.8  - continue lantus 13U qhs and admelog 7U qAC  - Increase Metformin to 1000mg BID which is home dose     #Rectal adenocarcinoma  - rigid sigmoidoscopy revealed lesion at 6 cm from the anal verge  - treated with capecitabine (started 8/17/21-9/28/21); completed; completed chemo RT to 5040 cGy/28 fxs with concurrent Xeloda on 9/27/21  - Seen by Dr. Buck Magana (colorectal x), recommended continuing total neoadjuvant therapy to improve chance of complete response. Per last outpatient note, was planned to start FOLFOX x9 cycles (4.5 months), agreed to IV chemotherapy Planned to start w/ 50% dose reduction; was to start FOLFOX on week of 11/15/21 prior to admission  - Given stroke, chemotherapy held for now as per Heme/onc at [facility]. - Recommended  follow up in 2 weeks with Dr. Ramos  - no chemo while inpatient  - GI consulted- recs appreciated  - heme/onc recommendations appreciated     #DVT ppx  - on full dose lovenox

## 2021-11-24 LAB
GLUCOSE BLDC GLUCOMTR-MCNC: 132 MG/DL — HIGH (ref 70–99)
GLUCOSE BLDC GLUCOMTR-MCNC: 137 MG/DL — HIGH (ref 70–99)
GLUCOSE BLDC GLUCOMTR-MCNC: 144 MG/DL — HIGH (ref 70–99)
GLUCOSE BLDC GLUCOMTR-MCNC: 189 MG/DL — HIGH (ref 70–99)
SARS-COV-2 RNA SPEC QL NAA+PROBE: SIGNIFICANT CHANGE UP

## 2021-11-24 PROCEDURE — 99232 SBSQ HOSP IP/OBS MODERATE 35: CPT

## 2021-11-24 PROCEDURE — 99233 SBSQ HOSP IP/OBS HIGH 50: CPT

## 2021-11-24 RX ADMIN — Medication 7 UNIT(S): at 16:31

## 2021-11-24 RX ADMIN — Medication 1: at 16:31

## 2021-11-24 RX ADMIN — AMLODIPINE BESYLATE 10 MILLIGRAM(S): 2.5 TABLET ORAL at 05:22

## 2021-11-24 RX ADMIN — ENOXAPARIN SODIUM 120 MILLIGRAM(S): 100 INJECTION SUBCUTANEOUS at 17:03

## 2021-11-24 RX ADMIN — Medication 7 UNIT(S): at 07:52

## 2021-11-24 RX ADMIN — METFORMIN HYDROCHLORIDE 1000 MILLIGRAM(S): 850 TABLET ORAL at 07:51

## 2021-11-24 RX ADMIN — ATORVASTATIN CALCIUM 80 MILLIGRAM(S): 80 TABLET, FILM COATED ORAL at 21:42

## 2021-11-24 RX ADMIN — PANTOPRAZOLE SODIUM 40 MILLIGRAM(S): 20 TABLET, DELAYED RELEASE ORAL at 05:22

## 2021-11-24 RX ADMIN — SENNA PLUS 2 TABLET(S): 8.6 TABLET ORAL at 21:42

## 2021-11-24 RX ADMIN — Medication 7 UNIT(S): at 11:24

## 2021-11-24 RX ADMIN — ENOXAPARIN SODIUM 120 MILLIGRAM(S): 100 INJECTION SUBCUTANEOUS at 05:21

## 2021-11-24 RX ADMIN — INSULIN GLARGINE 13 UNIT(S): 100 INJECTION, SOLUTION SUBCUTANEOUS at 21:42

## 2021-11-24 RX ADMIN — QUETIAPINE FUMARATE 12.5 MILLIGRAM(S): 200 TABLET, FILM COATED ORAL at 21:42

## 2021-11-24 RX ADMIN — METFORMIN HYDROCHLORIDE 1000 MILLIGRAM(S): 850 TABLET ORAL at 17:03

## 2021-11-24 RX ADMIN — Medication 6 MILLIGRAM(S): at 21:42

## 2021-11-24 NOTE — PROGRESS NOTE ADULT - ASSESSMENT
66y with a history of DM type 2, HTN, afib on eliquis, Colon ca, previous stroke with no residual defecits who presented to [facility] initially on 11/13 and found to have basilar artery occlusion and was transferred to [facility] for mechanical thrombectomy which was performed on 11/13 with TICI 2B restoration of flow. He continued workup and monitoring at [facility] and was evaluated for inpatient acute rehab. Patient now admitted for a multidisciplinary rehab program.    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   - found to have Left PCA, Bilateral cerebellum and pontine stroke s/p mechanical thrombectomy with TICI2b  - likely cardioembolic in nature, eliquis was held for afib in setting of chemo port placement, vs hypercoagulable state in setting of active malignancy  - Continue comprehensive rehab therapy  - continue atorvastatin 80mg daily    #Afib  - was on eliquis which was stopped for chemo port placement  - restarted on full dose lovenox (seen by GI here, okay to continue with full dose AC)   - echo 11/15 with EF 55%    #HTN  - continue norvasc 10mg    #DM type 2  - HbA1C 11.8  - continue lantus 13U qhs and admelog 7U qAC + Metformin 1000mg BID    #Rectal adenocarcinoma  - rigid sigmoidoscopy revealed lesion at 6 cm from the anal verge  - treated with capecitabine (started 8/17/21-9/28/21); completed; completed chemo RT to 5040 cGy/28 fxs with concurrent Xeloda on 9/27/21  - Seen by Dr. Buck Magana (colorectal x), recommended continuing total neoadjuvant therapy to improve chance of complete response. Per last outpatient note, was planned to start FOLFOX x9 cycles (4.5 months), agreed to IV chemotherapy Planned to start w/ 50% dose reduction; was to start FOLFOX on week of 11/15/21 prior to admission  - Given stroke, chemotherapy held for now as per Heme/onc at [facility]. - Recommended  follow up in 2 weeks with Dr. Ramos  - no chemo while inpatient  - GI consulted- recs appreciated  - heme/onc recommendations appreciated     #DVT ppx  - on full dose lovenox     66y with a history of DM type 2, HTN, afib on eliquis, Colon ca, previous stroke with no residual defecits who presented to [facility] initially on 11/13 and found to have basilar artery occlusion and was transferred to [facility] for mechanical thrombectomy which was performed on 11/13 with TICI 2B restoration of flow. He continued workup and monitoring at [facility] and was evaluated for inpatient acute rehab. Patient now admitted for a multidisciplinary rehab program.    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   - found to have Left PCA, Bilateral cerebellum and pontine stroke s/p mechanical thrombectomy with TICI2b  - likely cardioembolic in nature, eliquis was held for afib in setting of chemo port placement, vs hypercoagulable state in setting of active malignancy  - Continue comprehensive rehab therapy  - continue atorvastatin 80mg daily    #Afib  - was on eliquis which was stopped for chemo port placement  - Continue full dose lovenox (seen by GI here, okay to continue with full dose AC)   - echo 11/15 with EF 55%    #HTN  - continue norvasc 10mg    #DM type 2  - HbA1C 11.8  - continue lantus 13U qhs and admelog 7U qAC + Metformin 1000mg BID    #Rectal adenocarcinoma  - rigid sigmoidoscopy revealed lesion at 6 cm from the anal verge  - treated with capecitabine (started 8/17/21-9/28/21); completed; completed chemo RT to 5040 cGy/28 fxs with concurrent Xeloda on 9/27/21  - Seen by Dr. Buck Magana (colorectal x), recommended continuing total neoadjuvant therapy to improve chance of complete response. Per last outpatient note, was planned to start FOLFOX x9 cycles (4.5 months), agreed to IV chemotherapy Planned to start w/ 50% dose reduction; was to start FOLFOX on week of 11/15/21 prior to admission  - Given stroke, chemotherapy held for now as per Heme/onc at [facility]. - Recommended  follow up in 2 weeks with Dr. Ramos  - no chemo while inpatient  - GI consulted- recs appreciated  - heme/onc recommendations appreciated     #DVT ppx  - on full dose lovenox

## 2021-11-24 NOTE — ADVANCED PRACTICE NURSE CONSULT - RECOMMEDATIONS
1. BG Goal 100- 180 mg/dL  2. C/w Lantus 13 units daily- titrate to FBG of 100- 180 mg/dL  3. recommend considering decreasing pre-meal from 7 units to 3 units three times/day before meals- titrate to AC BG of 100- 180 mg/dL  4. recommend Changing correction scale from low to moderate AC and HS -  5. c/w metformin 1000 mg 2 Xs/Day- monitor renal function  Discharge Recommendations:  Teach family BG monitoring  Teach family insulin administration including insulin pen use.  Lantus Solostar Pen 100 units/ml- (on favorite list)  SubCutaneous DAILY- dose per needs at time of discharge  Humalog kwikpen 100 units/ml- SubCutaneous, before meals, three times/day (on favorite list) dose per needs at time of discharge   Insulin pen needles 4 mm- zacarias- (on favorite list)   Alcohol swabs- (on favorite list)  BG meter per insurance- (on favorite list)  BG test strips per insurance- (on favorite list)  Lancets- per insurance -(on favorite list)  Metformin  mg tablets orally- 2 tablets twice a day, take with breakfast and dinner.  Follow up with PCP- pt would benefit from SGTL2 agent for glycemic control and to decrease CV risks- may not need insulin if placed on SGTL2 agent.  Endocrinology Consult ASAP after discharge

## 2021-11-24 NOTE — PROGRESS NOTE ADULT - ASSESSMENT
66y with a history of DM type 2, HTN, afib on eliquis, Colon ca, previous stroke with no residual defecits who presented to [facility] initially on 11/13 and found to have basilar artery occlusion and was transferred to [facility] for mechanical thrombectomy which was performed on 11/13 with TICI 2B restoration of flow. He continued workup and monitoring at [facility] and was evaluated for inpatient acute rehab. Patient now admitted for a multidisciplinary rehab program. 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 11.8 on 11/13  -Continue Lantus  -Continue premeal lispro   -Continue ISS and FS  -Continue Metformin 1000mg BID  -Home meds: Metformin 1000mg BID    #Rectal adenocarcinoma  - MMR-proficient; moderately differentiated invasive adenocarcinoma per path on diagnosis  -Rigid sigmoidoscopy revealed lesion at 6 cm from the anal verge  -treated with capecitabine (started 8/17/21-9/28/21); completed; completed chemo RT to 5040 cGy/28 fxs with concurrent Xeloda on 9/27/21  -Seen by Dr. Buck Magana (colorectal x), recommended continuing total neoadjuvant therapy to improve chance of complete response  -Per last outpatient note, was planned to start FOLFOX x9 cycles (4.5 months), agreed to IV chemotherapy Planned to start w/ 50% dose reduction; was to start FOLFOX on week of 11/15/21 prior to admission  -Given stroke, chemotherapy held for now as per Heme/onc at [facility]. - Recommended follow up in 2 weeks with Dr. 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 - hallucinations have improved     #GI / Bowel  - Senna qHS  - Miralax BID  - GI ppx: protonix 40mg     # / Bladder  - Discontinue bladder scans due to low PVR  - Toileting schedule every 4 hours    #Skin / Pressure injury  - Monitor port-a-cath site   - Turn q2 hours in bed while awake, air mattress  - nursing to monitor skin qShift    #Diet  - Diet Consistency: easy to chew- carb control and thick liquids  - Aspiration Precautions  - SLP consult for swallow function evaluation and treatment  -MBS performed 11/22 -shown with silent aspiration - continue current diet     ##DVT prophylaxis:   - Full dose lovenox   - SCDs  - Teds    #Participation Restrictions/Precautions:  - Weight bearing status: WBAT  - Precautions: Falls, Seizures, Aspiration     Outpatient Follow-up:  Cesar Garcia (DO)  Cardiology; Internal Medicine  [address], [address]  [place] [address]  Phone: [phone]  Fax: [phone]  Follow Up Time: 2 weeks    Alvaro Whyte (NP; RN)  NP in Adult Health  [address], [address]  [place] [address]  Phone: [phone]  Fax: [phone]  Follow Up Time: 2 weeks    Wiliam Ramos)  Hematology; Internal Medicine; Medical Oncology  [address] [place] [address]  Phone: [phone]  Fax: [phone]  Follow Up Time: 2 weeks    Long Fernandez)  Neurology; Vascular Neurology  [address], [address]  [place] [address]  Phone: [phone]  Fax: [phone]  Follow Up Time: 2 weeks    TEAM MEETING 11/22/21  SW:  5 steps to enter, lives with wife, sister involved in care  OT: Mod for ADLS, max for transfers,  PT: max-tot for transfers, walks minimally with walker, no stairs   SLP: MBS today with silent aspiration with thins, vital stim,   barriers: dec cognition, ataxic,  dec vision, dec balance   goals: SV for grooming, min for adls and transfers, min for comprehension   EDOD: Home with 24/7 vs LAILA 12/3

## 2021-11-24 NOTE — CHART NOTE - NSCHARTNOTEFT_GEN_A_CORE
NUTRITION FOLLOW UP    SOURCE: Patient [)   Family [ ]     other [X ] staff    DIET: Consistent cho easy to chew mildly thick liquids    PATIENT REPORT[ ] nausea  [ ] vomiting [ ] diarrhea [ ] constipation  [ X]chewing problems [ ] swallowing issues  [ ] other: no GI distress    PO INTAKE:  < 50% [ ]   50-75%  [ ]   %  [X]  other :    SOURCE: for PO intake [] Patient [ ] family [ ] chart [X ] staff [ ] other    ENTERAL/PARENTERAL NUTRITION: n/a    CURRENT WEIGHT: Weight (kg): 118 (11-17 @ 17:50)   11/23 117.4 kg.     PERTINENT LABS:      Creatinine    ACCUCHECK  POCT Blood Glucose.: 137 mg/dL (24 Nov 2021 07:07)  POCT Blood Glucose.: 173 mg/dL (23 Nov 2021 21:20)  POCT Blood Glucose.: 117 mg/dL (23 Nov 2021 16:31)  POCT Blood Glucose.: 186 mg/dL (23 Nov 2021 11:37)      SKIN: ecchymotic, last bm was 11/24, no edema noted    ESTIMATED NEEDS:   [X] no change since previous assessment  [ ] recalculated:     PREVIOUS NUTRITION DIAGNOSIS: obesity    NUTRITION DIAGNOSIS is   [X] ongoing    NEW NUTRITION DIAGNOSIS: [X] not applicable    MONITORING AND EVALUATION:   Current diet order is appropriate and is well tolerated, but will monitor for any changes that may be needed    [X] PO intake [X] Tolerance to diet prescription [X] weights [X] follow up per protocol    NUTRITION RECOMMENDATIONS:    RD remains available ERICA Saini RD, CDE

## 2021-11-24 NOTE — PROGRESS NOTE ADULT - ATTENDING COMMENTS
No new complaints.  Stable  neurologically , improving functionally.  Attempted to call sister - left a message  [phone] No new complaints.  Stable  neurologically , improving functionally.  Attempted to call sister - left a message  [phone]      3 pm - spoke with sister, wife - detailed update given, all questions answered to their satisfaction, they requested Hematology input to explore future options for AC therapy .

## 2021-11-24 NOTE — PROGRESS NOTE ADULT - ASSESSMENT
66M  with past medical history of atrial fibrillation, hypertension, type 2 diabetes, diagnosed with stage II (T3 N0 M0) rectal adenocarcinoma, status post completion chemo RT (5040 cGy/28 fractions) with concurrent capecitabine on 9/27/2021 On oral capecitabine treatment in ambulatory, admitted at [facility] on 11/13/2021 with slurred speech and facial droop.  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  He was deemed not a candidate for TPA, so he underwent thrombectomy with TICI2b.  Hx of Afib, patient was off anticoagulation for 4 days ahead of a mediport placement.  Stroke occurred 2 days after this.  Unclear what was the predominant factor in the new CVA, patient also has the risk factor of an active malignancy.  Given francisco afib and the active malignancy and new history of CVA, patient will need long term anticoagulation.  Can use lovenox BID treatment dose for this, or the DOAC such as Eliquis 5mg BID after standard loading dose.  Would not reccomend full hypercoagulable state workup given that most of this would not change the fact taht he needs full dose anticoagulation.  However the presence of an antiphospholipid antibody would suggest that coumadin may be the preferred agent so can run this just to rule that etiology out.    Cancer care will have to be postponed until functional status improves with acute rehab.

## 2021-11-25 LAB
GLUCOSE BLDC GLUCOMTR-MCNC: 139 MG/DL — HIGH (ref 70–99)
GLUCOSE BLDC GLUCOMTR-MCNC: 146 MG/DL — HIGH (ref 70–99)
GLUCOSE BLDC GLUCOMTR-MCNC: 153 MG/DL — HIGH (ref 70–99)
GLUCOSE BLDC GLUCOMTR-MCNC: 182 MG/DL — HIGH (ref 70–99)

## 2021-11-25 PROCEDURE — 99232 SBSQ HOSP IP/OBS MODERATE 35: CPT

## 2021-11-25 RX ADMIN — Medication 7 UNIT(S): at 08:05

## 2021-11-25 RX ADMIN — ENOXAPARIN SODIUM 120 MILLIGRAM(S): 100 INJECTION SUBCUTANEOUS at 05:26

## 2021-11-25 RX ADMIN — QUETIAPINE FUMARATE 12.5 MILLIGRAM(S): 200 TABLET, FILM COATED ORAL at 21:12

## 2021-11-25 RX ADMIN — ATORVASTATIN CALCIUM 80 MILLIGRAM(S): 80 TABLET, FILM COATED ORAL at 21:12

## 2021-11-25 RX ADMIN — AMLODIPINE BESYLATE 10 MILLIGRAM(S): 2.5 TABLET ORAL at 05:26

## 2021-11-25 RX ADMIN — Medication 7 UNIT(S): at 12:14

## 2021-11-25 RX ADMIN — METFORMIN HYDROCHLORIDE 1000 MILLIGRAM(S): 850 TABLET ORAL at 08:05

## 2021-11-25 RX ADMIN — PANTOPRAZOLE SODIUM 40 MILLIGRAM(S): 20 TABLET, DELAYED RELEASE ORAL at 05:26

## 2021-11-25 RX ADMIN — METFORMIN HYDROCHLORIDE 1000 MILLIGRAM(S): 850 TABLET ORAL at 17:10

## 2021-11-25 RX ADMIN — INSULIN GLARGINE 13 UNIT(S): 100 INJECTION, SOLUTION SUBCUTANEOUS at 21:12

## 2021-11-25 RX ADMIN — Medication 1: at 16:54

## 2021-11-25 RX ADMIN — Medication 1: at 08:05

## 2021-11-25 RX ADMIN — Medication 6 MILLIGRAM(S): at 21:14

## 2021-11-25 RX ADMIN — ENOXAPARIN SODIUM 120 MILLIGRAM(S): 100 INJECTION SUBCUTANEOUS at 17:10

## 2021-11-25 RX ADMIN — Medication 325 MILLIGRAM(S): at 12:14

## 2021-11-25 RX ADMIN — Medication 7 UNIT(S): at 16:54

## 2021-11-25 NOTE — PROGRESS NOTE ADULT - ASSESSMENT
66y with a history of DM type 2, HTN, afib on eliquis, Colon ca, previous stroke with no residual defecits who presented to [facility] initially on 11/13 and found to have basilar artery occlusion and was transferred to [facility] for mechanical thrombectomy which was performed on 11/13 with TICI 2B restoration of flow. He continued workup and monitoring at [facility] and was evaluated for inpatient acute rehab. Patient now admitted for a multidisciplinary rehab program.    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   - found to have Left PCA, Bilateral cerebellum and pontine stroke s/p mechanical thrombectomy with TICI2b  - Hematology recommendations appreciated; Follow up with Lupus panel to decide Lovenox vs Eliquis vs Coumadin  - Continue comprehensive rehab therapy  - continue atorvastatin 80mg daily    #Afib  - was on eliquis which was stopped for chemo port placement  - Continue full dose lovenox (seen by GI here, okay to continue with full dose AC)   - echo 11/15 with EF 55%    #HTN  - continue norvasc 10mg    #DM type 2  - HbA1C 11.8  - continue lantus 13U qhs and admelog 7U qAC + Metformin 1000mg BID    #Rectal adenocarcinoma  - rigid sigmoidoscopy revealed lesion at 6 cm from the anal verge  - treated with capecitabine (started 8/17/21-9/28/21); completed; completed chemo RT to 5040 cGy/28 fxs with concurrent Xeloda on 9/27/21  - Seen by Dr. Buck Magana (colorectal x), recommended continuing total neoadjuvant therapy to improve chance of complete response. Per last outpatient note, was planned to start FOLFOX x9 cycles (4.5 months), agreed to IV chemotherapy Planned to start w/ 50% dose reduction; was to start FOLFOX on week of 11/15/21 prior to admission  - Given stroke, chemotherapy held for now as per Heme/onc at [facility]. - Recommended  follow up in 2 weeks with Dr. Ramos  - no chemo while inpatient  - GI consulted- recs appreciated  - heme/onc recommendations appreciated     #DVT ppx  - on full dose lovenox

## 2021-11-26 LAB
ALBUMIN SERPL ELPH-MCNC: 2.8 G/DL — LOW (ref 3.3–5)
ALP SERPL-CCNC: 104 U/L — SIGNIFICANT CHANGE UP (ref 40–120)
ALT FLD-CCNC: 37 U/L — SIGNIFICANT CHANGE UP (ref 10–45)
ANION GAP SERPL CALC-SCNC: 10 MMOL/L — SIGNIFICANT CHANGE UP (ref 5–17)
AST SERPL-CCNC: 25 U/L — SIGNIFICANT CHANGE UP (ref 10–40)
BILIRUB SERPL-MCNC: 0.3 MG/DL — SIGNIFICANT CHANGE UP (ref 0.2–1.2)
BUN SERPL-MCNC: 15 MG/DL — SIGNIFICANT CHANGE UP (ref 7–23)
CALCIUM SERPL-MCNC: 8.7 MG/DL — SIGNIFICANT CHANGE UP (ref 8.4–10.5)
CHLORIDE SERPL-SCNC: 106 MMOL/L — SIGNIFICANT CHANGE UP (ref 96–108)
CO2 SERPL-SCNC: 26 MMOL/L — SIGNIFICANT CHANGE UP (ref 22–31)
CREAT SERPL-MCNC: 1.28 MG/DL — SIGNIFICANT CHANGE UP (ref 0.5–1.3)
DRVVT SCREEN TO CONFIRM RATIO: SIGNIFICANT CHANGE UP
GLUCOSE BLDC GLUCOMTR-MCNC: 158 MG/DL — HIGH (ref 70–99)
GLUCOSE BLDC GLUCOMTR-MCNC: 160 MG/DL — HIGH (ref 70–99)
GLUCOSE BLDC GLUCOMTR-MCNC: 171 MG/DL — HIGH (ref 70–99)
GLUCOSE BLDC GLUCOMTR-MCNC: 233 MG/DL — HIGH (ref 70–99)
GLUCOSE SERPL-MCNC: 166 MG/DL — HIGH (ref 70–99)
HCT VFR BLD CALC: 37 % — LOW (ref 39–50)
HGB BLD-MCNC: 11.1 G/DL — LOW (ref 13–17)
LA NT DPL PPP QL: 41.3 SEC — SIGNIFICANT CHANGE UP
MCHC RBC-ENTMCNC: 26.5 PG — LOW (ref 27–34)
MCHC RBC-ENTMCNC: 30 GM/DL — LOW (ref 32–36)
MCV RBC AUTO: 88.3 FL — SIGNIFICANT CHANGE UP (ref 80–100)
NORMALIZED SCT PPP-RTO: 1.01 RATIO — SIGNIFICANT CHANGE UP (ref 0–1.16)
NORMALIZED SCT PPP-RTO: SIGNIFICANT CHANGE UP
NRBC # BLD: 0 /100 WBCS — SIGNIFICANT CHANGE UP (ref 0–0)
PLATELET # BLD AUTO: 353 K/UL — SIGNIFICANT CHANGE UP (ref 150–400)
POTASSIUM SERPL-MCNC: 3.5 MMOL/L — SIGNIFICANT CHANGE UP (ref 3.5–5.3)
POTASSIUM SERPL-SCNC: 3.5 MMOL/L — SIGNIFICANT CHANGE UP (ref 3.5–5.3)
PROT SERPL-MCNC: 6.8 G/DL — SIGNIFICANT CHANGE UP (ref 6–8.3)
RBC # BLD: 4.19 M/UL — LOW (ref 4.2–5.8)
RBC # FLD: 17.9 % — HIGH (ref 10.3–14.5)
SODIUM SERPL-SCNC: 142 MMOL/L — SIGNIFICANT CHANGE UP (ref 135–145)
WBC # BLD: 6.4 K/UL — SIGNIFICANT CHANGE UP (ref 3.8–10.5)
WBC # FLD AUTO: 6.4 K/UL — SIGNIFICANT CHANGE UP (ref 3.8–10.5)

## 2021-11-26 PROCEDURE — 99232 SBSQ HOSP IP/OBS MODERATE 35: CPT

## 2021-11-26 RX ADMIN — Medication 7 UNIT(S): at 16:36

## 2021-11-26 RX ADMIN — Medication 1: at 11:51

## 2021-11-26 RX ADMIN — METFORMIN HYDROCHLORIDE 1000 MILLIGRAM(S): 850 TABLET ORAL at 17:11

## 2021-11-26 RX ADMIN — SENNA PLUS 2 TABLET(S): 8.6 TABLET ORAL at 22:27

## 2021-11-26 RX ADMIN — ENOXAPARIN SODIUM 120 MILLIGRAM(S): 100 INJECTION SUBCUTANEOUS at 05:37

## 2021-11-26 RX ADMIN — AMLODIPINE BESYLATE 10 MILLIGRAM(S): 2.5 TABLET ORAL at 05:37

## 2021-11-26 RX ADMIN — Medication 7 UNIT(S): at 08:16

## 2021-11-26 RX ADMIN — Medication 6 MILLIGRAM(S): at 22:29

## 2021-11-26 RX ADMIN — QUETIAPINE FUMARATE 12.5 MILLIGRAM(S): 200 TABLET, FILM COATED ORAL at 22:26

## 2021-11-26 RX ADMIN — METFORMIN HYDROCHLORIDE 1000 MILLIGRAM(S): 850 TABLET ORAL at 07:36

## 2021-11-26 RX ADMIN — INSULIN GLARGINE 13 UNIT(S): 100 INJECTION, SOLUTION SUBCUTANEOUS at 22:27

## 2021-11-26 RX ADMIN — Medication 325 MILLIGRAM(S): at 11:54

## 2021-11-26 RX ADMIN — Medication 2: at 16:37

## 2021-11-26 RX ADMIN — Medication 1: at 08:17

## 2021-11-26 RX ADMIN — Medication 7 UNIT(S): at 11:51

## 2021-11-26 RX ADMIN — ATORVASTATIN CALCIUM 80 MILLIGRAM(S): 80 TABLET, FILM COATED ORAL at 22:26

## 2021-11-26 RX ADMIN — ENOXAPARIN SODIUM 120 MILLIGRAM(S): 100 INJECTION SUBCUTANEOUS at 17:11

## 2021-11-26 RX ADMIN — PANTOPRAZOLE SODIUM 40 MILLIGRAM(S): 20 TABLET, DELAYED RELEASE ORAL at 05:37

## 2021-11-26 NOTE — PROGRESS NOTE ADULT - ASSESSMENT
66y with a history of DM type 2, HTN, afib on eliquis, Colon ca, previous stroke with no residual defecits who presented to [facility] initially on 11/13 and found to have basilar artery occlusion and was transferred to [facility] for mechanical thrombectomy which was performed on 11/13 with TICI 2B restoration of flow. He continued workup and monitoring at [facility] and was evaluated for inpatient acute rehab. Patient now admitted for a multidisciplinary rehab program.    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   - found to have Left PCA, Bilateral cerebellum and pontine stroke s/p mechanical thrombectomy with TICI2b  - Hematology recommendations appreciated; Follow up with Lupus panel to decide Lovenox vs Eliquis vs Coumadin  - Continue comprehensive rehab therapy  - continue atorvastatin 80mg daily    #Afib  - was on eliquis which was stopped for chemo port placement  - Continue full dose lovenox (seen by GI here, okay to continue with full dose AC)   - echo 11/15 with EF 55%    #HTN  - continue norvasc 10mg    #DM type 2  - HbA1C 11.8  - continue lantus 13U qhs and admelog 7U qAC + Metformin 1000mg BID    #Rectal adenocarcinoma  - rigid sigmoidoscopy revealed lesion at 6 cm from the anal verge  - treated with capecitabine (started 8/17/21-9/28/21); completed; completed chemo RT to 5040 cGy/28 fxs with concurrent Xeloda on 9/27/21  - Seen by Dr. Buck Magana (colorectal x), recommended continuing total neoadjuvant therapy to improve chance of complete response. Per last outpatient note, was planned to start FOLFOX x9 cycles (4.5 months), agreed to IV chemotherapy Planned to start w/ 50% dose reduction; was to start FOLFOX on week of 11/15/21 prior to admission  - Given stroke, chemotherapy held for now as per Heme/onc at [facility]. - Recommended  follow up in 2 weeks with Dr. Ramos  - no chemo while inpatient  - GI consulted- recs appreciated  - heme/onc recommendations appreciated     #DVT ppx  - on full dose lovenox

## 2021-11-26 NOTE — PROGRESS NOTE ADULT - ASSESSMENT
66y with a history of DM type 2, HTN, afib on eliquis, Colon ca, previous stroke with no residual defecits who presented to [facility] initially on 11/13 and found to have basilar artery occlusion and was transferred to [facility] for mechanical thrombectomy which was performed on 11/13 with TICI 2B restoration of flow. He continued workup and monitoring at [facility] and was evaluated for inpatient acute rehab. Patient now admitted for a multidisciplinary rehab program. 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     #HTN  -Continue norvasc 10mg daily  - Was on HCTZ at home- now held   - Monitor vital signs    #DM type 2  - A1c at [facility] 11.8 on 11/13  -Continue Lantus 13U  -Continue premeal lispro 7U  -Continue ISS and FS  -Continue Metformin 1000mg BID  -Home meds: Metformin 1000mg BID    #Rectal adenocarcinoma  - MMR-proficient; moderately differentiated invasive adenocarcinoma per path on diagnosis  -Rigid sigmoidoscopy revealed lesion at 6 cm from the anal verge  -treated with capecitabine (started 8/17/21-9/28/21); completed; completed chemo RT to 5040 cGy/28 fxs with concurrent Xeloda on 9/27/21  -Seen by Dr. Buck Magana (colorectal x), recommended continuing total neoadjuvant therapy to improve chance of complete response  -Per last outpatient note, was planned to start FOLFOX x9 cycles (4.5 months), agreed to IV chemotherapy Planned to start w/ 50% dose reduction; was to start FOLFOX on week of 11/15/21 prior to admission  -Given stroke, chemotherapy held for now as per Heme/onc at [facility]. - Recommended follow up in 2 weeks with Dr. 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 - hallucinations have improved     #GI / Bowel  - Senna qHS  - Miralax BID  - GI ppx: protonix 40mg     # / Bladder  - Discontinue bladder scans due to low PVR  - Toileting schedule every 4 hours    #Skin / Pressure injury  - Monitor port-a-cath site -stable   - Turn q2 hours in bed while awake, air mattress  - nursing to monitor skin qShift    #Diet  - Diet Consistency: easy to chew- carb control and thick liquids  - Aspiration Precautions  - SLP consult for swallow function evaluation and treatment  -MBS performed 11/22 -shown with silent aspiration - continue current diet     ##DVT prophylaxis:   - Full dose lovenox   - SCDs  - Teds    #Participation Restrictions/Precautions:  - Weight bearing status: WBAT  - Precautions: Falls, Seizures, Aspiration     Outpatient Follow-up:  Cesar Garcia (DO)  Cardiology; Internal Medicine  [address], [address]  [place] [address]  Phone: [phone]  Fax: [phone]  Follow Up Time: 2 weeks    Alvaro Whyet (NP; RN)  NP in Adult Health  [address], [address]  [place] [address]  Phone: [phone]  Fax: [phone]  Follow Up Time: 2 weeks    Wiliam Ramos)  Hematology; Internal Medicine; Medical Oncology  [address] [place] [address]  Phone: [phone]  Fax: [phone]  Follow Up Time: 2 weeks    Long Fernandez)  Neurology; Vascular Neurology  [address], [address]  [place] [address]  Phone: [phone]  Fax: [phone]  Follow Up Time: 2 weeks    TEAM MEETING 11/22/21  SW:  5 steps to enter, lives with wife, sister involved in care  OT: Mod for ADLS, max for transfers,  PT: max-tot for transfers, walks minimally with walker, no stairs   SLP: LAYNE today with silent aspiration with thins, vital stim,   barriers: dec cognition, ataxic,  dec vision, dec balance   goals: SV for grooming, min for adls and transfers, min for comprehension   EDOD: Home with 24/7 vs LAILA 12/3

## 2021-11-26 NOTE — PROGRESS NOTE ADULT - ATTENDING COMMENTS
Pt. seen with ACP.  Agree with documentation above as per ACP. Patient medically stable. Making progress towards rehab goals.     Basilar artery infarct  Stable

## 2021-11-27 LAB
GLUCOSE BLDC GLUCOMTR-MCNC: 136 MG/DL — HIGH (ref 70–99)
GLUCOSE BLDC GLUCOMTR-MCNC: 160 MG/DL — HIGH (ref 70–99)
GLUCOSE BLDC GLUCOMTR-MCNC: 186 MG/DL — HIGH (ref 70–99)
GLUCOSE BLDC GLUCOMTR-MCNC: 200 MG/DL — HIGH (ref 70–99)

## 2021-11-27 PROCEDURE — 99232 SBSQ HOSP IP/OBS MODERATE 35: CPT

## 2021-11-27 RX ORDER — SODIUM CHLORIDE 0.65 %
1 AEROSOL, SPRAY (ML) NASAL THREE TIMES A DAY
Refills: 0 | Status: DISCONTINUED | OUTPATIENT
Start: 2021-11-27 | End: 2021-12-02

## 2021-11-27 RX ADMIN — Medication 325 MILLIGRAM(S): at 11:55

## 2021-11-27 RX ADMIN — Medication 6 MILLIGRAM(S): at 21:34

## 2021-11-27 RX ADMIN — ENOXAPARIN SODIUM 120 MILLIGRAM(S): 100 INJECTION SUBCUTANEOUS at 05:42

## 2021-11-27 RX ADMIN — Medication 7 UNIT(S): at 12:27

## 2021-11-27 RX ADMIN — METFORMIN HYDROCHLORIDE 1000 MILLIGRAM(S): 850 TABLET ORAL at 17:37

## 2021-11-27 RX ADMIN — Medication 1: at 12:28

## 2021-11-27 RX ADMIN — QUETIAPINE FUMARATE 12.5 MILLIGRAM(S): 200 TABLET, FILM COATED ORAL at 21:35

## 2021-11-27 RX ADMIN — ATORVASTATIN CALCIUM 80 MILLIGRAM(S): 80 TABLET, FILM COATED ORAL at 21:34

## 2021-11-27 RX ADMIN — Medication 1: at 08:20

## 2021-11-27 RX ADMIN — PANTOPRAZOLE SODIUM 40 MILLIGRAM(S): 20 TABLET, DELAYED RELEASE ORAL at 05:42

## 2021-11-27 RX ADMIN — AMLODIPINE BESYLATE 10 MILLIGRAM(S): 2.5 TABLET ORAL at 05:42

## 2021-11-27 RX ADMIN — Medication 7 UNIT(S): at 17:35

## 2021-11-27 RX ADMIN — Medication 7 UNIT(S): at 08:18

## 2021-11-27 RX ADMIN — ENOXAPARIN SODIUM 120 MILLIGRAM(S): 100 INJECTION SUBCUTANEOUS at 17:37

## 2021-11-27 RX ADMIN — INSULIN GLARGINE 13 UNIT(S): 100 INJECTION, SOLUTION SUBCUTANEOUS at 21:34

## 2021-11-27 RX ADMIN — METFORMIN HYDROCHLORIDE 1000 MILLIGRAM(S): 850 TABLET ORAL at 08:18

## 2021-11-28 LAB
GLUCOSE BLDC GLUCOMTR-MCNC: 122 MG/DL — HIGH (ref 70–99)
GLUCOSE BLDC GLUCOMTR-MCNC: 124 MG/DL — HIGH (ref 70–99)
GLUCOSE BLDC GLUCOMTR-MCNC: 139 MG/DL — HIGH (ref 70–99)
GLUCOSE BLDC GLUCOMTR-MCNC: 149 MG/DL — HIGH (ref 70–99)

## 2021-11-28 PROCEDURE — 99232 SBSQ HOSP IP/OBS MODERATE 35: CPT

## 2021-11-28 RX ADMIN — ENOXAPARIN SODIUM 120 MILLIGRAM(S): 100 INJECTION SUBCUTANEOUS at 05:27

## 2021-11-28 RX ADMIN — METFORMIN HYDROCHLORIDE 1000 MILLIGRAM(S): 850 TABLET ORAL at 17:05

## 2021-11-28 RX ADMIN — Medication 7 UNIT(S): at 17:07

## 2021-11-28 RX ADMIN — ENOXAPARIN SODIUM 120 MILLIGRAM(S): 100 INJECTION SUBCUTANEOUS at 17:05

## 2021-11-28 RX ADMIN — QUETIAPINE FUMARATE 12.5 MILLIGRAM(S): 200 TABLET, FILM COATED ORAL at 21:20

## 2021-11-28 RX ADMIN — METFORMIN HYDROCHLORIDE 1000 MILLIGRAM(S): 850 TABLET ORAL at 08:35

## 2021-11-28 RX ADMIN — Medication 7 UNIT(S): at 08:36

## 2021-11-28 RX ADMIN — Medication 7 UNIT(S): at 12:04

## 2021-11-28 RX ADMIN — Medication 6 MILLIGRAM(S): at 21:20

## 2021-11-28 RX ADMIN — PANTOPRAZOLE SODIUM 40 MILLIGRAM(S): 20 TABLET, DELAYED RELEASE ORAL at 05:27

## 2021-11-28 RX ADMIN — INSULIN GLARGINE 13 UNIT(S): 100 INJECTION, SOLUTION SUBCUTANEOUS at 21:20

## 2021-11-28 RX ADMIN — Medication 325 MILLIGRAM(S): at 11:17

## 2021-11-28 RX ADMIN — AMLODIPINE BESYLATE 10 MILLIGRAM(S): 2.5 TABLET ORAL at 05:27

## 2021-11-28 RX ADMIN — ATORVASTATIN CALCIUM 80 MILLIGRAM(S): 80 TABLET, FILM COATED ORAL at 21:20

## 2021-11-28 NOTE — PROGRESS NOTE ADULT - ASSESSMENT
66y with a history of DM type 2, HTN, afib on eliquis, Colon ca, previous stroke with no residual defecits who presented to [facility] initially on 11/13 and found to have basilar artery occlusion and was transferred to [facility] for mechanical thrombectomy which was performed on 11/13 with TICI 2B restoration of flow. He continued workup and monitoring at [facility] and was evaluated for inpatient acute rehab. Patient now admitted for a multidisciplinary rehab program.    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   - found to have Left PCA, Bilateral cerebellum and pontine stroke s/p mechanical thrombectomy with TICI2b  - Hematology recommendations appreciated; Follow up with Lupus panel to decide Lovenox vs Eliquis vs Coumadin  - Continue comprehensive rehab therapy  - continue atorvastatin 80mg daily    #Afib  - was on eliquis which was stopped for chemo port placement  - Continue full dose lovenox (seen by GI here, okay to continue with full dose AC)   - echo 11/15 with EF 55%    #HTN  - continue norvasc 10mg    #DM type 2  - HbA1C 11.8  - continue lantus 13U qhs and admelog 7U qAC + Metformin 1000mg BID    #Rectal adenocarcinoma  - rigid sigmoidoscopy revealed lesion at 6 cm from the anal verge  - treated with capecitabine (started 8/17/21-9/28/21); completed; completed chemo RT to 5040 cGy/28 fxs with concurrent Xeloda on 9/27/21  - Seen by Dr. Buck Magana (colorectal x), recommended continuing total neoadjuvant therapy to improve chance of complete response. Per last outpatient note, was planned to start FOLFOX x9 cycles (4.5 months), agreed to IV chemotherapy Planned to start w/ 50% dose reduction; was to start FOLFOX on week of 11/15/21 prior to admission  - Given stroke, chemotherapy held for now as per Heme/onc at [facility]. - Recommended  follow up in 2 weeks with Dr. Ramos  - no chemo while inpatient  - GI consulted- recs appreciated  - heme/onc recommendations appreciated     #DVT ppx  - on full dose lovenox

## 2021-11-29 LAB
ALBUMIN SERPL ELPH-MCNC: 2.7 G/DL — LOW (ref 3.3–5)
ALP SERPL-CCNC: 94 U/L — SIGNIFICANT CHANGE UP (ref 40–120)
ALT FLD-CCNC: 25 U/L — SIGNIFICANT CHANGE UP (ref 10–45)
ANION GAP SERPL CALC-SCNC: 11 MMOL/L — SIGNIFICANT CHANGE UP (ref 5–17)
AST SERPL-CCNC: 17 U/L — SIGNIFICANT CHANGE UP (ref 10–40)
BILIRUB SERPL-MCNC: 0.5 MG/DL — SIGNIFICANT CHANGE UP (ref 0.2–1.2)
BUN SERPL-MCNC: 11 MG/DL — SIGNIFICANT CHANGE UP (ref 7–23)
CALCIUM SERPL-MCNC: 8.6 MG/DL — SIGNIFICANT CHANGE UP (ref 8.4–10.5)
CHLORIDE SERPL-SCNC: 106 MMOL/L — SIGNIFICANT CHANGE UP (ref 96–108)
CO2 SERPL-SCNC: 25 MMOL/L — SIGNIFICANT CHANGE UP (ref 22–31)
CREAT SERPL-MCNC: 0.95 MG/DL — SIGNIFICANT CHANGE UP (ref 0.5–1.3)
GLUCOSE BLDC GLUCOMTR-MCNC: 136 MG/DL — HIGH (ref 70–99)
GLUCOSE BLDC GLUCOMTR-MCNC: 138 MG/DL — HIGH (ref 70–99)
GLUCOSE BLDC GLUCOMTR-MCNC: 155 MG/DL — HIGH (ref 70–99)
GLUCOSE BLDC GLUCOMTR-MCNC: 177 MG/DL — HIGH (ref 70–99)
GLUCOSE SERPL-MCNC: 148 MG/DL — HIGH (ref 70–99)
HCT VFR BLD CALC: 36.5 % — LOW (ref 39–50)
HGB BLD-MCNC: 11.2 G/DL — LOW (ref 13–17)
MCHC RBC-ENTMCNC: 26.8 PG — LOW (ref 27–34)
MCHC RBC-ENTMCNC: 30.7 GM/DL — LOW (ref 32–36)
MCV RBC AUTO: 87.3 FL — SIGNIFICANT CHANGE UP (ref 80–100)
NRBC # BLD: 0 /100 WBCS — SIGNIFICANT CHANGE UP (ref 0–0)
PLATELET # BLD AUTO: 337 K/UL — SIGNIFICANT CHANGE UP (ref 150–400)
POTASSIUM SERPL-MCNC: 3.4 MMOL/L — LOW (ref 3.5–5.3)
POTASSIUM SERPL-SCNC: 3.4 MMOL/L — LOW (ref 3.5–5.3)
PROT SERPL-MCNC: 6.9 G/DL — SIGNIFICANT CHANGE UP (ref 6–8.3)
RBC # BLD: 4.18 M/UL — LOW (ref 4.2–5.8)
RBC # FLD: 17.8 % — HIGH (ref 10.3–14.5)
SODIUM SERPL-SCNC: 142 MMOL/L — SIGNIFICANT CHANGE UP (ref 135–145)
WBC # BLD: 7.64 K/UL — SIGNIFICANT CHANGE UP (ref 3.8–10.5)
WBC # FLD AUTO: 7.64 K/UL — SIGNIFICANT CHANGE UP (ref 3.8–10.5)

## 2021-11-29 PROCEDURE — 99232 SBSQ HOSP IP/OBS MODERATE 35: CPT

## 2021-11-29 RX ORDER — POTASSIUM CHLORIDE 20 MEQ
40 PACKET (EA) ORAL ONCE
Refills: 0 | Status: COMPLETED | OUTPATIENT
Start: 2021-11-29 | End: 2021-11-29

## 2021-11-29 RX ADMIN — Medication 7 UNIT(S): at 17:10

## 2021-11-29 RX ADMIN — ENOXAPARIN SODIUM 120 MILLIGRAM(S): 100 INJECTION SUBCUTANEOUS at 17:10

## 2021-11-29 RX ADMIN — QUETIAPINE FUMARATE 12.5 MILLIGRAM(S): 200 TABLET, FILM COATED ORAL at 21:06

## 2021-11-29 RX ADMIN — ENOXAPARIN SODIUM 120 MILLIGRAM(S): 100 INJECTION SUBCUTANEOUS at 05:31

## 2021-11-29 RX ADMIN — METFORMIN HYDROCHLORIDE 1000 MILLIGRAM(S): 850 TABLET ORAL at 17:10

## 2021-11-29 RX ADMIN — PANTOPRAZOLE SODIUM 40 MILLIGRAM(S): 20 TABLET, DELAYED RELEASE ORAL at 05:32

## 2021-11-29 RX ADMIN — Medication 40 MILLIEQUIVALENT(S): at 12:11

## 2021-11-29 RX ADMIN — Medication 325 MILLIGRAM(S): at 12:11

## 2021-11-29 RX ADMIN — Medication 7 UNIT(S): at 08:28

## 2021-11-29 RX ADMIN — Medication 650 MILLIGRAM(S): at 13:10

## 2021-11-29 RX ADMIN — Medication 6 MILLIGRAM(S): at 21:06

## 2021-11-29 RX ADMIN — Medication 1: at 17:12

## 2021-11-29 RX ADMIN — Medication 7 UNIT(S): at 12:13

## 2021-11-29 RX ADMIN — METFORMIN HYDROCHLORIDE 1000 MILLIGRAM(S): 850 TABLET ORAL at 08:29

## 2021-11-29 RX ADMIN — INSULIN GLARGINE 13 UNIT(S): 100 INJECTION, SOLUTION SUBCUTANEOUS at 21:05

## 2021-11-29 RX ADMIN — AMLODIPINE BESYLATE 10 MILLIGRAM(S): 2.5 TABLET ORAL at 05:32

## 2021-11-29 RX ADMIN — Medication 650 MILLIGRAM(S): at 12:12

## 2021-11-29 RX ADMIN — Medication 1: at 12:13

## 2021-11-29 RX ADMIN — ATORVASTATIN CALCIUM 80 MILLIGRAM(S): 80 TABLET, FILM COATED ORAL at 21:06

## 2021-11-29 NOTE — ADVANCED PRACTICE NURSE CONSULT - RECOMMEDATIONS
Recommendations:  BG Goal 100- 180 mg/dL  Decrease insulin lispro Injectable (ADMELOG) from 7 to 3 Unit(s) SubCutaneous three times a day before meals.  C/W insulin glargine Injectable (LANTUS) 13 Unit(s) SubCutaneous at bedtime  Change insulin lispro (ADMELOG) from low to moderate corrective regimen sliding scale   SubCutaneous three times a day before meals  C/W Change insulin lispro (ADMELOG) from low to moderate corrective regimen sliding scale   SubCutaneous at bedtime  C/W metFORMIN 1000 milliGRAM(s) Oral two times a day    Discharge Recommendations:  Teach family BG monitoring  Teach family insulin administration including insulin pen use.  Lantus Solostar Pen 100 units/ml- (on favorite list)  13 units SubCutaneous DAILY- dose per needs at time of discharge  Humalog kwikpen 100 units/ml- SubCutaneous, correction scale before meals, three times/day (on favorite list) dose per needs at time of discharge  BG <150 mg- 0 units  - 200 – 2 units  - 250- 4 units  - 300- 6 units  - 350- 8 units  - 400- 10 units  BG  401- 12 units and call MD  5.	 Insulin pen needles 4 mm- zacarias- (on favorite list)  6.	 Alcohol swabs- (on favorite list)  7.	BG meter per insurance- (on favorite list)  8.	BG test strips per insurance- (on favorite list)  9.	Lancets- per insurance -(on favorite list)  10.	Metformin  mg tablets orally- dose per dose pt receiving at time of discharge, take with breakfast and dinner.  11.	Follow up with PCP- pt would benefit from SGTL2 agent for glycemic control and to decrease CV risks- may not need insulin if placed on SGTL2 agent.  12.	Endocrinology Consult ASAP after discharge

## 2021-11-29 NOTE — ADVANCED PRACTICE NURSE CONSULT - ASSESSMENT
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 He continued workup and monitoring at [facility] and was evaluated for inpatient acute rehab. Patient now admitted for a multidisciplinary rehab program.    HbgA1c- 11.8 on 11/13/2021    eGFR- 61 on 11/22/21- decreased from 81 on 11/17/21 when Metformin started    POCT Blood Glucose.: 146 mg/dL (22 Nov 2021 08:00)  POCT Blood Glucose.: 132 mg/dL (21 Nov 2021 21:04)  POCT Blood Glucose.: 163 mg/dL (21 Nov 2021 16:45)  POCT Blood Glucose.: 151 mg/dL (21 Nov 2021 12:21)    Current In-pt Diabetes Medications:  insulin glargine Injectable (LANTUS) 13 Unit(s) SubCutaneous at bedtime  insulin lispro (ADMELOG) corrective regimen sliding scale   SubCutaneous three times a day before meals  insulin lispro (ADMELOG) corrective regimen sliding scale   SubCutaneous at bedtime  insulin lispro Injectable (ADMELOG) 7 Unit(s) SubCutaneous three times a day before meals  metFORMIN 500 milliGRAM(s) Oral two times a day    Currently BG within range. Current insulin regimen heavy on bolus verses basal. Might see more stable glucose with 50% basal and 50 % bolus regimen- Lantus 15 units, Admelog 5 units before each meal and moderate correction scale.   Recommend changing correction scale from low to moderate since pt is T2DM.   Per notes Metformin to be evaluated for increase to 1000 mg twice daily on 11/23/21- recommend recheck of renal function and agree with increase if renal function stable. Assess renal function 2-3 days after increase of metformin.  Pt able to state that he knows his "a1c was high". Pt states he does have a BG meter and home and did check his BG but not regularly. Pt states he was compliant with Metformin.  Likely, pt will need at least basla insulin upon discharge and BG monitoring. PT shaky and forgetful at present. Family will need to be taught BG monitoring and Insulin administration as well as S/S and tx of hypoglycemia.  Primary RN Olivia to start teaching family BG monitoring today.  Pt educated on T2DM disease process, progression, importance of proper management, medical follow up, and learning diabetes self- management skills. Pt not able to validate education  via teach back .  Provided written resources of Leaving the hospital with Diabetes, BG goals, A1c Goals, Preventing complications due to diabetes, Insulin pen use, sharps disposal, BG monitoring, S/S and Tx of hypoglycemia handout form from Learning about Diabetes.  Message left for wife, Namrata,  to contact me regarding discharge needs.   
Metformin increased to 1000 mg twice a day on 11/23.  eGFR 61 on 11/23/21  BG trending down in response to Metformin increase
Current In-patient Diabetes Regimen:  insulin glargine Injectable (LANTUS) 13 Unit(s) SubCutaneous at bedtime  insulin lispro (ADMELOG) low corrective regimen sliding scale   SubCutaneous three times a day before meals  insulin lispro (ADMELOG) low corrective regimen sliding scale   SubCutaneous at bedtime  insulin lispro Injectable (ADMELOG) 7 Unit(s) SubCutaneous three times a day before meals  metFORMIN 1000 milliGRAM(s) Oral two times a day    POCT Blood Glucose.:  136 mg/dL (29 Nov 2021 08:20)  POCT Blood Glucose.: 122 mg/dL (28 Nov 2021 21:18)  POCT Blood Glucose.:  124 mg/dL (28 Nov 2021 16:40)  POCT Blood Glucose.: 139 mg/dL (28 Nov 2021 12:00)  POCT Blood Glucose.: 149 mg/dL (28 Nov 2021 08:16)  POCT Blood Glucose.: 160 mg/dL (27 Nov 2021 21:31)  POCT Blood Glucose.: 136 mg/dL (27 Nov 2021 16:53)  POCT Blood Glucose.: 186 mg/dL (27 Nov 2021 11:55)    BG trend consistently below 150 mg/dL over past 24 hours. Pt/Family would benefit form less complicated Insulin Regimen.  Recommend decreasing standing nutritional insulin and assess BG response now that glucose toxicity and insulin resistance addressed.

## 2021-11-29 NOTE — PROGRESS NOTE ADULT - ATTENDING COMMENTS
Pt. seen with ACP.  Agree with documentation above as per resident with amendments made as appropriate. Patient medically stable. Making progress towards rehab goals.     Basilar artery --bilateral PCA infarcts  Pt. impulsive-- attempted to get up to go to bathroom-- slid onto floor,  no head strike.  No injury.  Pt. educated on safety.  Wife informed.  She notes that pt. seems to get urge incontinence and triggered to get to bathroom.  Will order toileting program to help address. Pt. seen this AM.  Agree with documentation above as per ACP. Patient medically stable. Making progress towards rehab goals.     Basilar artery --bilateral PCA infarcts  Pt. impulsive-- attempted to get up to go to bathroom-- slid onto floor,  no head strike.  No injury.  Pt. educated on safety.  Wife informed.  She notes that pt. seems to get urge incontinence and triggered to get to bathroom.  Will order toileting program to help address.

## 2021-11-29 NOTE — PROGRESS NOTE ADULT - ASSESSMENT
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 He continued workup and monitoring at [facility] and was evaluated for inpatient acute rehab. Patient now admitted for a multidisciplinary rehab program. 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     #HTN  -Continue norvasc 10mg daily  - Was on HCTZ at home- now held   - Monitor vital signs    #DM type 2  - A1c at [facility] 11.8 on 11/13  -Continue Lantus 13U  -Continue premeal lispro 7U  -Continue ISS and FS  -Continue Metformin 1000mg BID  -Home meds: Metformin 1000mg BID    #Rectal adenocarcinoma  - MMR-proficient; moderately differentiated invasive adenocarcinoma per path on diagnosis  -Rigid sigmoidoscopy revealed lesion at 6 cm from the anal verge  -treated with capecitabine (started 8/17/21-9/28/21); completed; completed chemo RT to 5040 cGy/28 fxs with concurrent Xeloda on 9/27/21  -Seen by Dr. Buck Magana (colorectal x), recommended continuing total neoadjuvant therapy to improve chance of complete response  -Per last outpatient note, was planned to start FOLFOX x9 cycles (4.5 months), agreed to IV chemotherapy Planned to start w/ 50% dose reduction; was to start FOLFOX on week of 11/15/21 prior to admission  -Given stroke, chemotherapy held for now as per Heme/onc at [facility]. - Recommended follow up in 2 weeks with Dr. 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 - hallucinations have improved     #GI / Bowel  - Senna qHS  - Miralax BID  - GI ppx: protonix 40mg     # / Bladder  - Discontinue bladder scans due to low PVR  - Toileting schedule every 4 hours    #Skin / Pressure injury  - Monitor port-a-cath site -stable   - Turn q2 hours in bed while awake, air mattress  - nursing to monitor skin qShift    #Diet  - Diet Consistency: easy to chew- carb control and thick liquids  - Aspiration Precautions  - SLP consult for swallow function evaluation and treatment  -MBS performed 11/22 -shown with silent aspiration - continue current diet     ##DVT prophylaxis:   - Full dose lovenox   - SCDs  - Teds    #Participation Restrictions/Precautions:  - Weight bearing status: WBAT  - Precautions: Falls, Seizures, Aspiration     Outpatient Follow-up:  Cesar Garcia (DO)  Cardiology; Internal Medicine  [address], [address]  [place] [address]  Phone: [phone]  Fax: [phone]  Follow Up Time: 2 weeks    Alvaro Whyte (NP; RN)  NP in Adult Health  [address], [address]  [place] [address]  Phone: [phone]  Fax: [phone]  Follow Up Time: 2 weeks    Wiliam Ramos)  Hematology; Internal Medicine; Medical Oncology  [address] [place] [address]  Phone: [phone]  Fax: [phone]  Follow Up Time: 2 weeks    Long Fernandez)  Neurology; Vascular Neurology  [address], [address]  [place] [address]  Phone: [phone]  Fax: [phone]  Follow Up Time: 2 weeks    TEAM MEETING 11/29/21  SW:  5 steps to enter, lives with wife, sister involved in care  OT: min-Mod for ADLS, mod for transfers,  PT: mod for transfers, no stairs, 40' mod assist and walker   SLP: MBS with silent aspiration with thins, vital stim, dysphagia   barriers: dec cognition, ataxic,  dec vision, dec balance, dec memory, dec problem solving   goals: SV for grooming, min for adls and transfers, min for comprehension - no on track for goals - will need min-mod for adls, transfers and comprehension  EDOD:  LAILA 12/3 pending auth

## 2021-11-30 LAB
GLUCOSE BLDC GLUCOMTR-MCNC: 141 MG/DL — HIGH (ref 70–99)
GLUCOSE BLDC GLUCOMTR-MCNC: 145 MG/DL — HIGH (ref 70–99)
GLUCOSE BLDC GLUCOMTR-MCNC: 156 MG/DL — HIGH (ref 70–99)
GLUCOSE BLDC GLUCOMTR-MCNC: 182 MG/DL — HIGH (ref 70–99)
SARS-COV-2 RNA SPEC QL NAA+PROBE: SIGNIFICANT CHANGE UP

## 2021-11-30 PROCEDURE — 99232 SBSQ HOSP IP/OBS MODERATE 35: CPT

## 2021-11-30 PROCEDURE — 99233 SBSQ HOSP IP/OBS HIGH 50: CPT

## 2021-11-30 RX ADMIN — Medication 6 MILLIGRAM(S): at 21:04

## 2021-11-30 RX ADMIN — ATORVASTATIN CALCIUM 80 MILLIGRAM(S): 80 TABLET, FILM COATED ORAL at 21:04

## 2021-11-30 RX ADMIN — METFORMIN HYDROCHLORIDE 1000 MILLIGRAM(S): 850 TABLET ORAL at 08:00

## 2021-11-30 RX ADMIN — ENOXAPARIN SODIUM 120 MILLIGRAM(S): 100 INJECTION SUBCUTANEOUS at 06:11

## 2021-11-30 RX ADMIN — Medication 7 UNIT(S): at 16:43

## 2021-11-30 RX ADMIN — METFORMIN HYDROCHLORIDE 1000 MILLIGRAM(S): 850 TABLET ORAL at 17:35

## 2021-11-30 RX ADMIN — Medication 1: at 16:44

## 2021-11-30 RX ADMIN — Medication 7 UNIT(S): at 12:20

## 2021-11-30 RX ADMIN — Medication 7 UNIT(S): at 08:22

## 2021-11-30 RX ADMIN — INSULIN GLARGINE 13 UNIT(S): 100 INJECTION, SOLUTION SUBCUTANEOUS at 21:03

## 2021-11-30 RX ADMIN — QUETIAPINE FUMARATE 12.5 MILLIGRAM(S): 200 TABLET, FILM COATED ORAL at 21:04

## 2021-11-30 RX ADMIN — AMLODIPINE BESYLATE 10 MILLIGRAM(S): 2.5 TABLET ORAL at 06:11

## 2021-11-30 RX ADMIN — PANTOPRAZOLE SODIUM 40 MILLIGRAM(S): 20 TABLET, DELAYED RELEASE ORAL at 06:11

## 2021-11-30 RX ADMIN — Medication 325 MILLIGRAM(S): at 11:32

## 2021-11-30 RX ADMIN — ENOXAPARIN SODIUM 120 MILLIGRAM(S): 100 INJECTION SUBCUTANEOUS at 17:35

## 2021-11-30 RX ADMIN — SENNA PLUS 2 TABLET(S): 8.6 TABLET ORAL at 21:04

## 2021-11-30 NOTE — PROGRESS NOTE ADULT - ASSESSMENT
This is a 66 year old man with past medical history of atrial fibrillation, hypertension, type 2 diabetes, diagnosed with stage II (T3 N0 M0) rectal adenocarcinoma, status post completion chemo RT (5040 cGy/28 fractions) with concurrent capecitabine on 9/27/2021 On oral capecitabine treatment in ambulatory, admitted at [facility] on 11/13/2021 with slurred speech and facial droop.  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  He was deemed not a candidate for TPA, so he underwent thrombectomy with TICI2b.  Hx of Afib, patient was off anticoagulation for 4 days ahead of a mediport placement.  Stroke occurred 2 days after this.  Unclear what was the predominant factor in the new CVA, patient also has the risk factor of an active malignancy.  Given the afib and the active malignancy and new history of CVA, patient will need long term anticoagulation.  Can use lovenox BID treatment dose for this, or the DOAC such as Eliquis 5mg BID after standard loading dose.  Considering his difficulty coordinating his hands at the current state that I have witnessed today, patient will not be able to inject himself with lovenox at home. Recommend conversion to Eliquis 5mg BID after standard loading dose of Eliquis 10mg BID for 7 days.

## 2021-11-30 NOTE — PROGRESS NOTE ADULT - ASSESSMENT
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 He continued workup and monitoring at [facility] and was evaluated for inpatient acute rehab. Patient now admitted for a multidisciplinary rehab program. 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     #HTN  -Continue norvasc 10mg daily  - Was on HCTZ at home- now held   - Monitor vital signs    #DM type 2  - A1c at [facility] 11.8 on 11/13  -Continue Lantus 13U  -Continue premeal lispro 7U  -Continue ISS and FS  -Continue Metformin 1000mg BID  -Home meds: Metformin 1000mg BID    #Rectal adenocarcinoma  - MMR-proficient; moderately differentiated invasive adenocarcinoma per path on diagnosis  -Rigid sigmoidoscopy revealed lesion at 6 cm from the anal verge  -treated with capecitabine (started 8/17/21-9/28/21); completed; completed chemo RT to 5040 cGy/28 fxs with concurrent Xeloda on 9/27/21  -Seen by Dr. Buck Magana (colorectal x), recommended continuing total neoadjuvant therapy to improve chance of complete response  -Per last outpatient note, was planned to start FOLFOX x9 cycles (4.5 months), agreed to IV chemotherapy Planned to start w/ 50% dose reduction; was to start FOLFOX on week of 11/15/21 prior to admission  -Given stroke, chemotherapy held for now as per Heme/onc at [facility]. - Recommended follow up in 2 weeks with Dr. 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 - hallucinations have improved     #GI / Bowel  - Senna qHS  - Miralax BID  - GI ppx: protonix 40mg     # / Bladder  - Discontinue bladder scans due to low PVR  - Toileting schedule every 4 hours    #Skin / Pressure injury  - Monitor port-a-cath site -stable   - Turn q2 hours in bed while awake, air mattress  - nursing to monitor skin qShift    #Diet  - Diet Consistency: easy to chew- carb control and thick liquids  - Aspiration Precautions  - SLP consult for swallow function evaluation and treatment  -MBS performed 11/22 -shown with silent aspiration - continue current diet     ##DVT prophylaxis:   - Full dose lovenox   - SCDs  - Teds    #Participation Restrictions/Precautions:  - Weight bearing status: WBAT  - Precautions: Falls, Seizures, Aspiration     Outpatient Follow-up:  Cesar Garcia (DO)  Cardiology; Internal Medicine  [address], [address]  [place] [address]  Phone: [phone]  Fax: [phone]  Follow Up Time: 2 weeks    Alvaro Whyte (NP; RN)  NP in Adult Health  [address], [address]  [place] [address]  Phone: [phone]  Fax: [phone]  Follow Up Time: 2 weeks    Wiliam Ramos)  Hematology; Internal Medicine; Medical Oncology  [address] [place] [address]  Phone: [phone]  Fax: [phone]  Follow Up Time: 2 weeks    Long Fernandez)  Neurology; Vascular Neurology  [address], [address]  [place] [address]  Phone: [phone]  Fax: [phone]  Follow Up Time: 2 weeks    TEAM MEETING 11/29/21  SW:  5 steps to enter, lives with wife, sister involved in care  OT: min-Mod for ADLS, mod for transfers,  PT: mod for transfers, no stairs, 40' mod assist and walker   SLP: MBS with silent aspiration with thins, vital stim, dysphagia   barriers: dec cognition, ataxic,  dec vision, dec balance, dec memory, dec problem solving   goals: SV for grooming, min for adls and transfers, min for comprehension - no on track for goals - will need min-mod for adls, transfers and comprehension  EDOD:  LAILA 12/3 pending auth

## 2021-12-01 LAB
GLUCOSE BLDC GLUCOMTR-MCNC: 133 MG/DL — HIGH (ref 70–99)
GLUCOSE BLDC GLUCOMTR-MCNC: 144 MG/DL — HIGH (ref 70–99)
GLUCOSE BLDC GLUCOMTR-MCNC: 155 MG/DL — HIGH (ref 70–99)
GLUCOSE BLDC GLUCOMTR-MCNC: 159 MG/DL — HIGH (ref 70–99)

## 2021-12-01 PROCEDURE — 99232 SBSQ HOSP IP/OBS MODERATE 35: CPT

## 2021-12-01 PROCEDURE — 99233 SBSQ HOSP IP/OBS HIGH 50: CPT

## 2021-12-01 RX ORDER — APIXABAN 2.5 MG/1
10 TABLET, FILM COATED ORAL
Refills: 0 | Status: DISCONTINUED | OUTPATIENT
Start: 2021-12-01 | End: 2021-12-02

## 2021-12-01 RX ORDER — APIXABAN 2.5 MG/1
5 TABLET, FILM COATED ORAL EVERY 12 HOURS
Refills: 0 | Status: CANCELLED | OUTPATIENT
Start: 2021-12-09 | End: 2021-12-02

## 2021-12-01 RX ADMIN — METFORMIN HYDROCHLORIDE 1000 MILLIGRAM(S): 850 TABLET ORAL at 17:49

## 2021-12-01 RX ADMIN — INSULIN GLARGINE 13 UNIT(S): 100 INJECTION, SOLUTION SUBCUTANEOUS at 21:29

## 2021-12-01 RX ADMIN — QUETIAPINE FUMARATE 12.5 MILLIGRAM(S): 200 TABLET, FILM COATED ORAL at 21:32

## 2021-12-01 RX ADMIN — AMLODIPINE BESYLATE 10 MILLIGRAM(S): 2.5 TABLET ORAL at 06:01

## 2021-12-01 RX ADMIN — Medication 1: at 08:40

## 2021-12-01 RX ADMIN — ENOXAPARIN SODIUM 120 MILLIGRAM(S): 100 INJECTION SUBCUTANEOUS at 06:01

## 2021-12-01 RX ADMIN — Medication 7 UNIT(S): at 12:26

## 2021-12-01 RX ADMIN — Medication 1: at 12:26

## 2021-12-01 RX ADMIN — Medication 7 UNIT(S): at 08:40

## 2021-12-01 RX ADMIN — APIXABAN 10 MILLIGRAM(S): 2.5 TABLET, FILM COATED ORAL at 17:48

## 2021-12-01 RX ADMIN — Medication 325 MILLIGRAM(S): at 11:47

## 2021-12-01 RX ADMIN — METFORMIN HYDROCHLORIDE 1000 MILLIGRAM(S): 850 TABLET ORAL at 09:00

## 2021-12-01 RX ADMIN — Medication 6 MILLIGRAM(S): at 21:32

## 2021-12-01 RX ADMIN — ATORVASTATIN CALCIUM 80 MILLIGRAM(S): 80 TABLET, FILM COATED ORAL at 21:32

## 2021-12-01 RX ADMIN — PANTOPRAZOLE SODIUM 40 MILLIGRAM(S): 20 TABLET, DELAYED RELEASE ORAL at 06:01

## 2021-12-01 RX ADMIN — Medication 7 UNIT(S): at 16:58

## 2021-12-01 NOTE — CHART NOTE - NSCHARTNOTEFT_GEN_A_CORE
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 He continued workup and monitoring at [facility] and was evaluated for inpatient acute rehab. Patient now admitted for a multidisciplinary rehab program.  Source: Medical Record [X] Patient [X] Family [ ]         Diet: Easy to chew, consistent carbohydrate (no snacks), mildly thick liquids  Pt tolerating diet with good PO intake, consuming ~75% of meals per nursing flow sheets. Pt observed after lunch today, ate 100% of meal. Pt's wife at bedside, provided with verbal and written nutrition education on carbohydrate counting. Denies nausea, vomiting, diarrhea, constipation.     Enteral/Parenteral Nutrition: N/A    Current Weight: 243.1 lbs (11/30)  242.2 lbs (11/29)  254.1 lbs (11/25)    Pertinent Medications: MEDICATIONS  (STANDING):  amLODIPine   Tablet 10 milliGRAM(s) Oral daily  apixaban 10 milliGRAM(s) Oral two times a day  atorvastatin 80 milliGRAM(s) Oral at bedtime  dextrose 40% Gel 15 Gram(s) Oral once  dextrose 5%. 1000 milliLiter(s) (50 mL/Hr) IV Continuous <Continuous>  dextrose 5%. 1000 milliLiter(s) (100 mL/Hr) IV Continuous <Continuous>  dextrose 50% Injectable 25 Gram(s) IV Push once  dextrose 50% Injectable 12.5 Gram(s) IV Push once  dextrose 50% Injectable 25 Gram(s) IV Push once  ferrous    sulfate 325 milliGRAM(s) Oral daily  glucagon  Injectable 1 milliGRAM(s) IntraMuscular once  insulin glargine Injectable (LANTUS) 13 Unit(s) SubCutaneous at bedtime  insulin lispro (ADMELOG) corrective regimen sliding scale   SubCutaneous three times a day before meals  insulin lispro (ADMELOG) corrective regimen sliding scale   SubCutaneous at bedtime  insulin lispro Injectable (ADMELOG) 7 Unit(s) SubCutaneous three times a day before meals  melatonin 6 milliGRAM(s) Oral at bedtime  metFORMIN 1000 milliGRAM(s) Oral two times a day  pantoprazole    Tablet 40 milliGRAM(s) Oral before breakfast  polyethylene glycol 3350 17 Gram(s) Oral two times a day  QUEtiapine 12.5 milliGRAM(s) Oral at bedtime  senna 2 Tablet(s) Oral at bedtime    MEDICATIONS  (PRN):  acetaminophen     Tablet .. 650 milliGRAM(s) Oral every 6 hours PRN Temp greater or equal to 38C (100.4F), Mild Pain (1 - 3)  sodium chloride 0.65% Nasal 1 Spray(s) Both Nostrils three times a day PRN Nasal Congestion      Pertinent Labs:  11-29 Na142 mmol/L Glu 148 mg/dL<H> K+ 3.4 mmol/L<L> Cr  0.95 mg/dL BUN 11 mg/dL 11-29 Alb 2.7 g/dL<L> 11-13 Chol 162 mg/dL LDL --    HDL 42 mg/dL Trig 116 mg/dL  POCT glucose x 4: 155 (H), 159 (H), 182 (H), 156 (H)      Skin: incontinence associated dermatitis per nursing flow sheets. dry skin per nursing flow sheets    Edema: No edema per nursing flow sheets     Last BM: on 11/30 Per nursing flowsheets     Estimated Needs:   [X] No Change since Previous Assessment  [ ] Recalculated:     Previous Nutrition Diagnosis:   1. Altered nutrition related lab values  2. Overweight/obesity      Nutrition Diagnosis is [X] Ongoing    [ ] Resolved   [ ] Not Applicable      New Nutrition Diagnosis: [X] Not Applicable  [ ] Inadequate Protein Energy Intake   [ ] Inadequate Oral Intake   [ ] Excessive Energy Intake   [ ] Increased Nutrient Needs   [ ] Obesity   [ ] Altered GI Function   [ ] Unintended Weight Loss   [ ] Food & Nutrition Related Knowledge Deficit  [ ] Limited Adherence to nutrition related recommendations   [ ] Malnutrition      Interventions:   1. Recommend continuing with current plan of care  2. Pt provided with nutrition education on current diet.     Monitoring & Evaluation:   [X] Weights   [X] PO Intake   [X] Follow Up (Per Protocol)  [X] Tolerance to Diet Prescription   [X] Other: Labs     RD Remains Available.  Nidhi Rodarte RD

## 2021-12-01 NOTE — PROGRESS NOTE ADULT - ASSESSMENT
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 He continued workup and monitoring at [facility] and was evaluated for inpatient acute rehab. Patient now admitted for a multidisciplinary rehab program.    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   - found to have Left PCA, Bilateral cerebellum and pontine stroke s/p mechanical thrombectomy with TICI2b  - Hematology recommendations appreciated  - Continue comprehensive rehab therapy  - continue atorvastatin 80mg daily    #Afib  - echo 11/15 with EF 55%  - Hematology recommendations appreciated; Changed Lovenox to Eliquis. Loading dose x 7days and then maintenance    #HTN  - continue norvasc 10mg    #DM type 2  - HbA1C 11.8  - continue lantus 13U qhs and admelog 7U qAC + Metformin 1000mg BID    #Rectal adenocarcinoma  - rigid sigmoidoscopy revealed lesion at 6 cm from the anal verge  - treated with capecitabine (started 8/17/21-9/28/21); completed; completed chemo RT to 5040 cGy/28 fxs with concurrent Xeloda on 9/27/21  - Seen by Dr. Buck Magana (colorectal x), recommended continuing total neoadjuvant therapy to improve chance of complete response. Per last outpatient note, was planned to start FOLFOX x9 cycles (4.5 months), agreed to IV chemotherapy Planned to start w/ 50% dose reduction; was to start FOLFOX on week of 11/15/21 prior to admission  - Given stroke, chemotherapy held for now as per Heme/onc at [facility]. - Recommended  follow up in 2 weeks with Dr. Ramos  - no chemo while inpatient  - GI consulted- recs appreciated  - heme/onc recommendations appreciated     #DVT ppx  -Eliquis

## 2021-12-01 NOTE — PROGRESS NOTE ADULT - ASSESSMENT
66y with a history of DM type 2, HTN, afib on eliquis, Colon ca, previous stroke with no residual defecits who [facility] and found to have basilar artery occlusion and was transferred to [facility] for mechanical thrombectomy which was performed on 11/13 with TICI 2B restoration of flow. He continued workup and monitoring at [facility] and was evaluated for inpatient acute rehab. Patient now admitted for a multidisciplinary rehab program. 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 with TICI2b  - Left PCA, Bilateral cerebellum and pontine stroke  -Generalized weakness, dysarthria,   - Continue comprehensive rehab program of PT/OT/SLP - 3 hours a day, 5 days a week  - Lovenox transitioned to Eliquis with loading dose of 10mg BID for 7 days and then 5mg BID ongoing (12/1) -as per heme/onc  -Continue atorvastatin 80mg daily    #Atrial Fibrillation  - Was on eliquis which was stopped for port placement for chemotherapy  -Continue Eliquis 10mg BID for 7 days and then 5mg BID ongoing   -TTE with ef 55%  -EKG monitoring on admission  -Heme/onc recs appreciated for adjustment to Eliquis     #HTN  -Continue norvasc 10mg daily  - Was on HCTZ at home- now held   - Monitor vital signs    #DM type 2  - A1c at [facility] 11.8 on 11/13  -Continue Lantus 13U  -Continue premeal lispro 7U  -Continue ISS and FS  -Continue Metformin 1000mg BID  -Home meds: Metformin 1000mg BID    #Rectal adenocarcinoma  - MMR-proficient; moderately differentiated invasive adenocarcinoma per path on diagnosis  -Rigid sigmoidoscopy revealed lesion at 6 cm from the anal verge  -treated with capecitabine (started 8/17/21-9/28/21); completed; completed chemo RT to 5040 cGy/28 fxs with concurrent Xeloda on 9/27/21  -Seen by Dr. Buck Magana (colorectal x), recommended continuing total neoadjuvant therapy to improve chance of complete response  -Per last outpatient note, was planned to start FOLFOX x9 cycles (4.5 months), agreed to IV chemotherapy Planned to start w/ 50% dose reduction; was to start FOLFOX on week of 11/15/21 prior to admission  -Given stroke, chemotherapy held for now as per Heme/onc at [facility]. - Recommended follow up in 2 weeks with Dr. 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 - hallucinations have improved     #GI / Bowel  - Senna qHS  - Miralax BID  - GI ppx: protonix 40mg     # / Bladder  - Discontinue bladder scans due to low PVR  - Toileting schedule every 4 hours    #Skin / Pressure injury  - Monitor port-a-cath site -stable   - Turn q2 hours in bed while awake, air mattress  - nursing to monitor skin qShift    #Diet  - Diet Consistency: easy to chew- carb control and thick liquids  - Aspiration Precautions  - SLP consult for swallow function evaluation and treatment  -MBS performed 11/22 -shown with silent aspiration - continue current diet     ##DVT prophylaxis:   - Eliquis  - SCDs  - Teds    #Participation Restrictions/Precautions:  - Weight bearing status: WBAT  - Precautions: Falls, Seizures, Aspiration     Outpatient Follow-up:  Cesar Garcia (DO)  Cardiology; Internal Medicine  [address], [address]  [place] [address]  Phone: [phone]  Fax: [phone]  Follow Up Time: 2 weeks    Alvaro Whyte (NP; RN)  NP in Adult Health  [address], [address]  [place] [address]  Phone: [phone]  Fax: [phone]  Follow Up Time: 2 weeks    Wiliam Ramos)  Hematology; Internal Medicine; Medical Oncology  [address] [place] [address]  Phone: [phone]  Fax: [phone]  Follow Up Time: 2 weeks    Long Fernandez)  Neurology; Vascular Neurology  [address], [address]  [place] [address]  Phone: [phone]  Fax: [phone]  Follow Up Time: 2 weeks    TEAM MEETING 11/29/21  SW:  5 steps to enter, lives with wife, sister involved in care  OT: min-Mod for ADLS, mod for transfers,  PT: mod for transfers, no stairs, 40' mod assist and walker   SLP: MBS with silent aspiration with thins, vital stim, dysphagia   barriers: dec cognition, ataxic,  dec vision, dec balance, dec memory, dec problem solving   goals: SV for grooming, min for adls and transfers, min for comprehension - no on track for goals - will need min-mod for adls, transfers and comprehension  EDOD:  LAILA 12/3 pending auth

## 2021-12-01 NOTE — PROGRESS NOTE ADULT - ATTENDING COMMENTS
Peer to peer reviewed with  Insurance Medical director- [facility] benefit was denied although need for additional  inpatient rehab confirmed. Patient was approved for additional week at [facility] with discharge to SNF long tem care. Family strongly refuses to consider SNF and want to take patient home. However, patient has met all goal at [facility], additional short stay at [facility] will satisfy discharge criteria to home ( one person assist for ataxic gait, transfers , ADLs).     I spoke with wife, sister and SW at length, detailed update given, family requests an to file an appeal for [facility] benefit.       > 35 min spent Peer to peer review with  Insurance Medical director- [facility] benefit was denied although need for additional  inpatient rehab confirmed. Patient was approved for additional week at [facility] with discharge to SNF long tem care. Family strongly refuses to consider SNF and wants to take patient home. However, patient has met all goals at [facility], additional short stay at [facility] will satisfy discharge criteria to home ( one person safe assist for ataxic gait, transfers , ADLs).     I spoke with wife, sister and SW at length, detailed update given, family requests  to file an appeal for [facility] benefit.       > 35 min spent, > 50 % coordinating care and counseling

## 2021-12-02 ENCOUNTER — INPATIENT (INPATIENT)
Facility: HOSPITAL | Age: 66
LOS: 0 days | Discharge: ANOTHER HOSPITAL | DRG: 374 | End: 2021-12-03
Attending: INTERNAL MEDICINE | Admitting: INTERNAL MEDICINE
Payer: MEDICARE

## 2021-12-02 ENCOUNTER — TRANSCRIPTION ENCOUNTER (OUTPATIENT)
Age: 66
End: 2021-12-02

## 2021-12-02 ENCOUNTER — APPOINTMENT (OUTPATIENT)
Dept: INFUSION THERAPY | Facility: HOSPITAL | Age: 66
End: 2021-12-02

## 2021-12-02 ENCOUNTER — NON-APPOINTMENT (OUTPATIENT)
Age: 66
End: 2021-12-02

## 2021-12-02 VITALS
RESPIRATION RATE: 16 BRPM | HEART RATE: 106 BPM | DIASTOLIC BLOOD PRESSURE: 77 MMHG | OXYGEN SATURATION: 100 % | SYSTOLIC BLOOD PRESSURE: 124 MMHG

## 2021-12-02 VITALS
WEIGHT: 262.35 LBS | HEART RATE: 86 BPM | OXYGEN SATURATION: 98 % | DIASTOLIC BLOOD PRESSURE: 49 MMHG | HEIGHT: 71 IN | RESPIRATION RATE: 17 BRPM | SYSTOLIC BLOOD PRESSURE: 77 MMHG

## 2021-12-02 DIAGNOSIS — K92.2 GASTROINTESTINAL HEMORRHAGE, UNSPECIFIED: ICD-10-CM

## 2021-12-02 DIAGNOSIS — I63.9 CEREBRAL INFARCTION, UNSPECIFIED: ICD-10-CM

## 2021-12-02 DIAGNOSIS — Z95.828 PRESENCE OF OTHER VASCULAR IMPLANTS AND GRAFTS: Chronic | ICD-10-CM

## 2021-12-02 LAB
ABO RH CONFIRMATION: SIGNIFICANT CHANGE UP
ALBUMIN SERPL ELPH-MCNC: 2.6 G/DL — LOW (ref 3.3–5)
ALBUMIN SERPL ELPH-MCNC: 2.7 G/DL — LOW (ref 3.3–5)
ALP SERPL-CCNC: 110 U/L — SIGNIFICANT CHANGE UP (ref 40–120)
ALP SERPL-CCNC: 125 U/L — HIGH (ref 40–120)
ALT FLD-CCNC: 22 U/L — SIGNIFICANT CHANGE UP (ref 10–45)
ALT FLD-CCNC: 25 U/L — SIGNIFICANT CHANGE UP (ref 10–45)
ANION GAP SERPL CALC-SCNC: 11 MMOL/L — SIGNIFICANT CHANGE UP (ref 5–17)
ANION GAP SERPL CALC-SCNC: 9 MMOL/L — SIGNIFICANT CHANGE UP (ref 5–17)
APTT BLD: 37.8 SEC — HIGH (ref 27.5–35.5)
AST SERPL-CCNC: 18 U/L — SIGNIFICANT CHANGE UP (ref 10–40)
AST SERPL-CCNC: 27 U/L — SIGNIFICANT CHANGE UP (ref 10–40)
BILIRUB SERPL-MCNC: 0.3 MG/DL — SIGNIFICANT CHANGE UP (ref 0.2–1.2)
BILIRUB SERPL-MCNC: 0.4 MG/DL — SIGNIFICANT CHANGE UP (ref 0.2–1.2)
BLD GP AB SCN SERPL QL: SIGNIFICANT CHANGE UP
BUN SERPL-MCNC: 18 MG/DL — SIGNIFICANT CHANGE UP (ref 7–23)
BUN SERPL-MCNC: 26 MG/DL — HIGH (ref 7–23)
CALCIUM SERPL-MCNC: 8.8 MG/DL — SIGNIFICANT CHANGE UP (ref 8.4–10.5)
CALCIUM SERPL-MCNC: 8.9 MG/DL — SIGNIFICANT CHANGE UP (ref 8.4–10.5)
CHLORIDE SERPL-SCNC: 104 MMOL/L — SIGNIFICANT CHANGE UP (ref 96–108)
CHLORIDE SERPL-SCNC: 104 MMOL/L — SIGNIFICANT CHANGE UP (ref 96–108)
CO2 SERPL-SCNC: 26 MMOL/L — SIGNIFICANT CHANGE UP (ref 22–31)
CO2 SERPL-SCNC: 27 MMOL/L — SIGNIFICANT CHANGE UP (ref 22–31)
CREAT SERPL-MCNC: 1.13 MG/DL — SIGNIFICANT CHANGE UP (ref 0.5–1.3)
CREAT SERPL-MCNC: 1.33 MG/DL — HIGH (ref 0.5–1.3)
GLUCOSE BLDC GLUCOMTR-MCNC: 143 MG/DL — HIGH (ref 70–99)
GLUCOSE BLDC GLUCOMTR-MCNC: 145 MG/DL — HIGH (ref 70–99)
GLUCOSE BLDC GLUCOMTR-MCNC: 149 MG/DL — HIGH (ref 70–99)
GLUCOSE SERPL-MCNC: 132 MG/DL — HIGH (ref 70–99)
GLUCOSE SERPL-MCNC: 166 MG/DL — HIGH (ref 70–99)
HCT VFR BLD CALC: 35 % — LOW (ref 39–50)
HCT VFR BLD CALC: 36 % — LOW (ref 39–50)
HGB BLD-MCNC: 10.8 G/DL — LOW (ref 13–17)
HGB BLD-MCNC: 11.1 G/DL — LOW (ref 13–17)
INR BLD: 1.73 RATIO — HIGH (ref 0.88–1.16)
MCHC RBC-ENTMCNC: 27 PG — SIGNIFICANT CHANGE UP (ref 27–34)
MCHC RBC-ENTMCNC: 27.2 PG — SIGNIFICANT CHANGE UP (ref 27–34)
MCHC RBC-ENTMCNC: 30.8 GM/DL — LOW (ref 32–36)
MCHC RBC-ENTMCNC: 30.9 GM/DL — LOW (ref 32–36)
MCV RBC AUTO: 87.6 FL — SIGNIFICANT CHANGE UP (ref 80–100)
MCV RBC AUTO: 88.2 FL — SIGNIFICANT CHANGE UP (ref 80–100)
NRBC # BLD: 0 /100 WBCS — SIGNIFICANT CHANGE UP (ref 0–0)
NRBC # BLD: 0 /100 WBCS — SIGNIFICANT CHANGE UP (ref 0–0)
PLATELET # BLD AUTO: 350 K/UL — SIGNIFICANT CHANGE UP (ref 150–400)
PLATELET # BLD AUTO: 371 K/UL — SIGNIFICANT CHANGE UP (ref 150–400)
POTASSIUM SERPL-MCNC: 3.6 MMOL/L — SIGNIFICANT CHANGE UP (ref 3.5–5.3)
POTASSIUM SERPL-MCNC: 4 MMOL/L — SIGNIFICANT CHANGE UP (ref 3.5–5.3)
POTASSIUM SERPL-SCNC: 3.6 MMOL/L — SIGNIFICANT CHANGE UP (ref 3.5–5.3)
POTASSIUM SERPL-SCNC: 4 MMOL/L — SIGNIFICANT CHANGE UP (ref 3.5–5.3)
PROT SERPL-MCNC: 6.7 G/DL — SIGNIFICANT CHANGE UP (ref 6–8.3)
PROT SERPL-MCNC: 7.2 G/DL — SIGNIFICANT CHANGE UP (ref 6–8.3)
PROTHROM AB SERPL-ACNC: 20.4 SEC — HIGH (ref 10.6–13.6)
RBC # BLD: 3.97 M/UL — LOW (ref 4.2–5.8)
RBC # BLD: 4.11 M/UL — LOW (ref 4.2–5.8)
RBC # FLD: 17.8 % — HIGH (ref 10.3–14.5)
RBC # FLD: 17.8 % — HIGH (ref 10.3–14.5)
SODIUM SERPL-SCNC: 140 MMOL/L — SIGNIFICANT CHANGE UP (ref 135–145)
SODIUM SERPL-SCNC: 141 MMOL/L — SIGNIFICANT CHANGE UP (ref 135–145)
WBC # BLD: 8.45 K/UL — SIGNIFICANT CHANGE UP (ref 3.8–10.5)
WBC # BLD: 9.61 K/UL — SIGNIFICANT CHANGE UP (ref 3.8–10.5)
WBC # FLD AUTO: 8.45 K/UL — SIGNIFICANT CHANGE UP (ref 3.8–10.5)
WBC # FLD AUTO: 9.61 K/UL — SIGNIFICANT CHANGE UP (ref 3.8–10.5)

## 2021-12-02 PROCEDURE — 99232 SBSQ HOSP IP/OBS MODERATE 35: CPT

## 2021-12-02 PROCEDURE — 99239 HOSP IP/OBS DSCHRG MGMT >30: CPT

## 2021-12-02 RX ORDER — INSULIN LISPRO 100/ML
VIAL (ML) SUBCUTANEOUS AT BEDTIME
Refills: 0 | Status: DISCONTINUED | OUTPATIENT
Start: 2021-12-02 | End: 2021-12-03

## 2021-12-02 RX ORDER — PHENYLEPHRINE HYDROCHLORIDE 10 MG/ML
0.1 INJECTION INTRAVENOUS
Qty: 40 | Refills: 0 | Status: DISCONTINUED | OUTPATIENT
Start: 2021-12-02 | End: 2021-12-03

## 2021-12-02 RX ORDER — PANTOPRAZOLE SODIUM 20 MG/1
1 TABLET, DELAYED RELEASE ORAL
Qty: 0 | Refills: 0 | DISCHARGE
Start: 2021-12-02

## 2021-12-02 RX ORDER — PANTOPRAZOLE SODIUM 20 MG/1
40 TABLET, DELAYED RELEASE ORAL ONCE
Refills: 0 | Status: COMPLETED | OUTPATIENT
Start: 2021-12-02 | End: 2021-12-02

## 2021-12-02 RX ORDER — FERROUS SULFATE 325(65) MG
0 TABLET ORAL
Qty: 0 | Refills: 0 | DISCHARGE

## 2021-12-02 RX ORDER — APIXABAN 2.5 MG/1
1 TABLET, FILM COATED ORAL
Qty: 0 | Refills: 0 | DISCHARGE
Start: 2021-12-02

## 2021-12-02 RX ORDER — SODIUM CHLORIDE 9 MG/ML
1000 INJECTION INTRAMUSCULAR; INTRAVENOUS; SUBCUTANEOUS ONCE
Refills: 0 | Status: COMPLETED | OUTPATIENT
Start: 2021-12-02 | End: 2021-12-02

## 2021-12-02 RX ORDER — DEXTROSE 50 % IN WATER 50 %
25 SYRINGE (ML) INTRAVENOUS ONCE
Refills: 0 | Status: DISCONTINUED | OUTPATIENT
Start: 2021-12-02 | End: 2021-12-03

## 2021-12-02 RX ORDER — LANOLIN ALCOHOL/MO/W.PET/CERES
3 CREAM (GRAM) TOPICAL AT BEDTIME
Refills: 0 | Status: DISCONTINUED | OUTPATIENT
Start: 2021-12-02 | End: 2021-12-03

## 2021-12-02 RX ORDER — CHLORHEXIDINE GLUCONATE 213 G/1000ML
1 SOLUTION TOPICAL
Refills: 0 | Status: DISCONTINUED | OUTPATIENT
Start: 2021-12-02 | End: 2021-12-03

## 2021-12-02 RX ORDER — SODIUM CHLORIDE 9 MG/ML
1000 INJECTION, SOLUTION INTRAVENOUS
Refills: 0 | Status: DISCONTINUED | OUTPATIENT
Start: 2021-12-02 | End: 2021-12-03

## 2021-12-02 RX ORDER — INSULIN LISPRO 100/ML
VIAL (ML) SUBCUTANEOUS
Refills: 0 | Status: DISCONTINUED | OUTPATIENT
Start: 2021-12-02 | End: 2021-12-03

## 2021-12-02 RX ORDER — SENNA PLUS 8.6 MG/1
2 TABLET ORAL AT BEDTIME
Refills: 0 | Status: DISCONTINUED | OUTPATIENT
Start: 2021-12-02 | End: 2021-12-03

## 2021-12-02 RX ORDER — ACETAMINOPHEN 500 MG
2 TABLET ORAL
Qty: 0 | Refills: 0 | DISCHARGE
Start: 2021-12-02

## 2021-12-02 RX ORDER — ATORVASTATIN CALCIUM 80 MG/1
80 TABLET, FILM COATED ORAL AT BEDTIME
Refills: 0 | Status: DISCONTINUED | OUTPATIENT
Start: 2021-12-02 | End: 2021-12-03

## 2021-12-02 RX ORDER — METFORMIN HYDROCHLORIDE 850 MG/1
1 TABLET ORAL
Qty: 0 | Refills: 0 | DISCHARGE
Start: 2021-12-02

## 2021-12-02 RX ORDER — QUETIAPINE FUMARATE 200 MG/1
12.5 TABLET, FILM COATED ORAL
Qty: 0 | Refills: 0 | DISCHARGE
Start: 2021-12-02

## 2021-12-02 RX ORDER — GLUCAGON INJECTION, SOLUTION 0.5 MG/.1ML
1 INJECTION, SOLUTION SUBCUTANEOUS ONCE
Refills: 0 | Status: DISCONTINUED | OUTPATIENT
Start: 2021-12-02 | End: 2021-12-03

## 2021-12-02 RX ORDER — PROCHLORPERAZINE MALEATE 5 MG
0 TABLET ORAL
Qty: 0 | Refills: 0 | DISCHARGE

## 2021-12-02 RX ORDER — DEXTROSE 50 % IN WATER 50 %
12.5 SYRINGE (ML) INTRAVENOUS ONCE
Refills: 0 | Status: DISCONTINUED | OUTPATIENT
Start: 2021-12-02 | End: 2021-12-03

## 2021-12-02 RX ORDER — FERROUS SULFATE 325(65) MG
1 TABLET ORAL
Qty: 0 | Refills: 0 | DISCHARGE
Start: 2021-12-02

## 2021-12-02 RX ORDER — DEXTROSE 50 % IN WATER 50 %
15 SYRINGE (ML) INTRAVENOUS ONCE
Refills: 0 | Status: DISCONTINUED | OUTPATIENT
Start: 2021-12-02 | End: 2021-12-03

## 2021-12-02 RX ORDER — CAPECITABINE 500 MG/1
0 TABLET ORAL
Qty: 0 | Refills: 0 | DISCHARGE

## 2021-12-02 RX ORDER — FERROUS SULFATE 325(65) MG
325 TABLET ORAL DAILY
Refills: 0 | Status: DISCONTINUED | OUTPATIENT
Start: 2021-12-02 | End: 2021-12-03

## 2021-12-02 RX ORDER — ACETAMINOPHEN 500 MG
650 TABLET ORAL EVERY 6 HOURS
Refills: 0 | Status: DISCONTINUED | OUTPATIENT
Start: 2021-12-02 | End: 2021-12-03

## 2021-12-02 RX ORDER — SENNA PLUS 8.6 MG/1
2 TABLET ORAL
Qty: 0 | Refills: 0 | DISCHARGE
Start: 2021-12-02

## 2021-12-02 RX ORDER — SODIUM CHLORIDE 9 MG/ML
500 INJECTION, SOLUTION INTRAVENOUS
Refills: 0 | Status: DISCONTINUED | OUTPATIENT
Start: 2021-12-02 | End: 2021-12-02

## 2021-12-02 RX ORDER — PANTOPRAZOLE SODIUM 20 MG/1
40 TABLET, DELAYED RELEASE ORAL
Refills: 0 | Status: DISCONTINUED | OUTPATIENT
Start: 2021-12-03 | End: 2021-12-03

## 2021-12-02 RX ORDER — POLYETHYLENE GLYCOL 3350 17 G/17G
17 POWDER, FOR SOLUTION ORAL
Qty: 0 | Refills: 0 | DISCHARGE
Start: 2021-12-02

## 2021-12-02 RX ORDER — SODIUM CHLORIDE 0.65 %
0 AEROSOL, SPRAY (ML) NASAL
Qty: 0 | Refills: 0 | DISCHARGE
Start: 2021-12-02

## 2021-12-02 RX ORDER — OMEPRAZOLE 10 MG/1
0 CAPSULE, DELAYED RELEASE ORAL
Qty: 0 | Refills: 0 | DISCHARGE

## 2021-12-02 RX ORDER — SODIUM CHLORIDE 9 MG/ML
1000 INJECTION, SOLUTION INTRAVENOUS ONCE
Refills: 0 | Status: COMPLETED | OUTPATIENT
Start: 2021-12-02 | End: 2021-12-02

## 2021-12-02 RX ORDER — APIXABAN 2.5 MG/1
2 TABLET, FILM COATED ORAL
Qty: 0 | Refills: 0 | DISCHARGE
Start: 2021-12-02

## 2021-12-02 RX ORDER — QUETIAPINE FUMARATE 200 MG/1
12.5 TABLET, FILM COATED ORAL AT BEDTIME
Refills: 0 | Status: DISCONTINUED | OUTPATIENT
Start: 2021-12-02 | End: 2021-12-03

## 2021-12-02 RX ORDER — BISOPROLOL FUMARATE AND HYDROCHLOROTHIAZIDE 5; 6.25 MG/1; MG/1
0 TABLET ORAL
Qty: 0 | Refills: 0 | DISCHARGE

## 2021-12-02 RX ADMIN — Medication 7 UNIT(S): at 17:18

## 2021-12-02 RX ADMIN — METFORMIN HYDROCHLORIDE 1000 MILLIGRAM(S): 850 TABLET ORAL at 17:18

## 2021-12-02 RX ADMIN — APIXABAN 10 MILLIGRAM(S): 2.5 TABLET, FILM COATED ORAL at 05:52

## 2021-12-02 RX ADMIN — Medication 7 UNIT(S): at 12:05

## 2021-12-02 RX ADMIN — METFORMIN HYDROCHLORIDE 1000 MILLIGRAM(S): 850 TABLET ORAL at 07:57

## 2021-12-02 RX ADMIN — PANTOPRAZOLE SODIUM 40 MILLIGRAM(S): 20 TABLET, DELAYED RELEASE ORAL at 05:52

## 2021-12-02 RX ADMIN — AMLODIPINE BESYLATE 10 MILLIGRAM(S): 2.5 TABLET ORAL at 05:53

## 2021-12-02 RX ADMIN — APIXABAN 10 MILLIGRAM(S): 2.5 TABLET, FILM COATED ORAL at 17:18

## 2021-12-02 RX ADMIN — SODIUM CHLORIDE 1000 MILLILITER(S): 9 INJECTION, SOLUTION INTRAVENOUS at 23:01

## 2021-12-02 RX ADMIN — Medication 7 UNIT(S): at 07:57

## 2021-12-02 RX ADMIN — Medication 325 MILLIGRAM(S): at 12:05

## 2021-12-02 RX ADMIN — SODIUM CHLORIDE 1000 MILLILITER(S): 9 INJECTION INTRAMUSCULAR; INTRAVENOUS; SUBCUTANEOUS at 21:03

## 2021-12-02 NOTE — H&P ADULT - TIME BILLING
Management of acute medical problem, thorough review of labs, w/ close ffup of pt's status.    Pt's status guarded, now upgraded to ICU.

## 2021-12-02 NOTE — H&P ADULT - NSHPPHYSICALEXAM_GEN_ALL_CORE
Vital Signs (24 Hrs):  T(C): 37 (12-02-21 @ 20:53), Max: 37 (12-02-21 @ 20:53)  HR: 97 (12-02-21 @ 22:36) (86 - 107)  BP: 91/52 (12-02-21 @ 22:36) (77/49 - 155/73)  RR: 19 (12-02-21 @ 22:36) (16 - 19)  SpO2: 98% (12-02-21 @ 22:36) (93% - 100%)  Daily Height in cm: 180.34 (02 Dec 2021 22:14)

## 2021-12-02 NOTE — PROVIDER CONTACT NOTE (OTHER) - ACTION/TREATMENT ORDERED:
Md was notified and came up to assess. IV 20G put in on left forearm and IV NS 0.9 started. Blood type, CBC, CMP, Pt/INR was drawn.

## 2021-12-02 NOTE — PROGRESS NOTE ADULT - PROVIDER SPECIALTY LIST ADULT
Hospitalist
Rehab Medicine
Hospitalist
Neurology
Neurology
Heme/Onc
Hospitalist
Neurology
Rehab Medicine
Hospitalist
Neurology
Rehab Medicine
Rehab Medicine
Heme/Onc

## 2021-12-02 NOTE — DISCHARGE NOTE PROVIDER - CARE PROVIDER_API CALL
Cesar Garcia (DO)  Cardiology; Internal Medicine  [address], [address]  [place] [address]  Phone: [phone]  Fax: [phone]  Follow Up Time: 2 weeks    Alvaro Whyet (NP; RN)  NP in Adult Health  [address], [address]  [place] [address]  Phone: [phone]  Fax: [phone]  Follow Up Time: 2 weeks    Wiliam Ramos)  Hematology; Internal Medicine; Medical Oncology  [address] [place] [address]  Phone: [phone]  Fax: [phone]  Follow Up Time: 2 weeks    Long Fernandez)  Neurology; Vascular Neurology  [address]  [place] [address]  Phone: [phone]  Fax: [phone]  Follow Up Time: 2 weeks

## 2021-12-02 NOTE — CHART NOTE - NSCHARTNOTEFT_GEN_A_CORE
Patient was seen due to new blood clot that was seen by the RN. Upon examination patient was noted to have Bright red blood per rectum with clots. Last dose of Eliquis was given at 17:00.    Vitals - 144/88 , 97 , 98.5 , 16     Spoke to the hospitalist who recommended to get Stat CBC, INR, PT , PTT. Along with TYPE & screen.    Plan     Holding Eliquis   f/u H&H   Transfuse to keep Hgb >7.

## 2021-12-02 NOTE — PROGRESS NOTE ADULT - ASSESSMENT
66y with a history of DM type 2, HTN, afib on eliquis, Colon ca, previous stroke with no residual defecits who presented to [facility] initially on 11/13 and found to have basilar artery occlusion and was transferred to [facility] for mechanical thrombectomy which was performed on 11/13 with TICI 2B restoration of flow. He continued workup and monitoring at [facility] and was evaluated for inpatient acute rehab. Patient now admitted for a multidisciplinary rehab program.    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   - found to have Left PCA, Bilateral cerebellum and pontine stroke s/p mechanical thrombectomy with TICI2b  - Hematology recommendations appreciated  - Continue comprehensive rehab therapy  - continue atorvastatin 80mg daily    #Afib  - echo 11/15 with EF 55%  - Hematology recommendations for Eliquis. Loading dose and then maintenance    #HTN  - continue norvasc 10mg    #DM type 2  - HbA1C 11.8  - continue lantus 13U qhs and admelog 7U qAC + Metformin 1000mg BID    #Rectal adenocarcinoma  - rigid sigmoidoscopy revealed lesion at 6 cm from the anal verge  - treated with capecitabine (started 8/17/21-9/28/21); completed; completed chemo RT to 5040 cGy/28 fxs with concurrent Xeloda on 9/27/21  - Seen by Dr. Buck Magana (colorectal x), recommended continuing total neoadjuvant therapy to improve chance of complete response. Per last outpatient note, was planned to start FOLFOX x9 cycles (4.5 months), agreed to IV chemotherapy Planned to start w/ 50% dose reduction; was to start FOLFOX on week of 11/15/21 prior to admission  - Given stroke, chemotherapy held for now as per Heme/onc at [facility]. - Recommended  follow up in 2 weeks with Dr. Ramos  - no chemo while inpatient  - GI consulted- recs appreciated  - heme/onc recommendations appreciated     #DVT ppx  -Eliquis

## 2021-12-02 NOTE — PROGRESS NOTE ADULT - REASON FOR ADMISSION
STROKE

## 2021-12-02 NOTE — H&P ADULT - ASSESSMENT
66 year old M with a h/o DM type 2, HTN, hx of CVA, Afib on eliquis (w/c was held 11/8 for chemoport placement on 11/11), colon cancer (invasive adenocarcinoma of the rectum), recently hopsitalized with CVA on 11/13/21 with speech and facial droop. 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 He underwent thrombectomy with TICI2b flow restored at [facility]  on 11/13/21.   Pt was admitted to [place] acute rehab 11/17/21. He was on tx dose lovenox, then changed to Eliquis 10 mg twice daily, was started today. Last dose given was at 5 PM.  Around 830 PM this evening, was noted to have BRBPR w/ blood clots, his VS were stable.  1 Liter bolus of NS was given, stat labs ordered and pt transferred to [facility]. However upon transfer to he had 2 more episodes of hematochezia, then became hypotensive to 77/50, had to be upgraded to ICU. 1 Liter of LR bolus was given, and 2 u of PRBC were ordered stat.      Acute Lower GI Bleeding, in pt with rectal cancer  On Eliquis for Afib, recent CVA, s/p thrombectomy   Now hypotensive    Transfer to ICU  IV LR bolus, transfuse 2 u PRBC now  Pantoprazole 40 mg IVP x 1 given  Eliquis d/xavier  Will keep NPO   Monitor FS, SSInsulin for now  Would also hold Amlodipine   Cont Statin  Prognosis guarded  D/w ICU PA.    IMPROVE VTE Individual Risk Assessment  RISK                                                                Points  [  ] Previous VTE                                                  3  [  ] Thrombophilia                                               2  [  ] Lower limb paralysis                                      2        (unable to hold up >15 seconds)    [ x ] Current Cancer                                              2         (within 6 months)  [ x ] Immobilization > 24 hrs                                1  [  ] ICU/CCU stay > 24 hours                              1  [ x ] Age > 60                                                      1  IMPROVE VTE Score _________  IMPROVE Score 0-1: Low Risk, No VTE prophylaxis required for most patients, encourage ambulation.   IMPROVE Score 2-3: At risk, pharmacologic VTE prophylaxis is indicated for most patients (in the absence of a contraindication)  IMPROVE Score > or = 4: High Risk, pharmacologic VTE prophylaxis is indicated for most patients (in the absence of a contraindication)  *Pt was on Eliquis - now with active bleeding

## 2021-12-02 NOTE — PROGRESS NOTE ADULT - SUBJECTIVE AND OBJECTIVE BOX
CHIEF COMPLAINT: He has significant dysarthria     Patient is a 66y old  Male who presents with a chief complaint of STROKE (2021 13:07)    HPI:   66 year old M with a h/o DM type 2, HTN, Afib (on eliquis, however on hold since  for chemoport placement on ), prior stroke (no deficits), colon cancer (invasive adenocarcinoma of the rectum), who initially presented to [facility] on 21 with speech and facial droop. Patient found by wife on the floor with slurred speech. He had imaging done in [facility] with CTH reported to be negative for acute infarct. 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 Patient transferred to [facility] for mechanical thrombectomy. NIHSS on arrival: 16, Not a candidate for tPA due to presentation outside the window. He underwent thrombectomy with TICI2b flow restored on 21. Etiology cardioembolic vs hypercoagulable/malignancy. He was evaluated for acute inpatient rehab and was admitted to [facility] on .        (2021 13:07)      PAST MEDICAL & SURGICAL HISTORY:  Lumbago    Lumbago with sciatica of right side    Benign hypertension  dx 10 years    H/O renal calculi  ESWL right side yrs ago  last stone one year ago  (passed on its own)    Obese    Eczema    Diabetes mellitus type II  on Meds 2012    Chronic atrial fibrillation    Colon cancer    S/P tonsillectomy    Port-A-Cath in place        Allergies    No Known Drug Allergies  Nuts (Hives)  Patient stated he had sensation of  throat closing  30 minites after  Epidural pain management resolved it self few minitus after , /  20 y ago Unable to recall MD name or number (Other)  [place] (Unknown)    Intolerances          PHYSICAL EXAM    Vital Signs Last 24 Hrs  T(C): 36.4 (2021 07:54), Max: 36.8 (2021 20:05)  T(F): 97.6 (2021 07:54), Max: 98.2 (2021 20:05)  HR: 102 (2021 09:48) (94 - 110)  BP: 112/69 (2021 09:48) (112/69 - 185/107)  BP(mean): 103 (2021 14:00) (103 - 103)  RR: 16 (2021 07:54) (15 - 19)  SpO2: 95% (2021 07:54) (93% - 100%)  Daily Height in cm: 180.34 (2021 17:50)    Daily Weight in k.2 (2021 22:31)      PHYSICAL EXAM  Constitutional - NAD, Comfortable  HEENT - NCAT, EOMI  Neck - Supple, No limited ROM  Chest - CTA bilaterally  Cardiovascular - RRR, S1S2  Abdomen -BS+, Soft, NTND  Extremities - No C/C/E, No calf tenderness   Neurologic Exam - severe dysarthria, disconjugate gaze     RECENT LABS:                          11.5   7.61  )-----------( 336      ( 2021 05:30 )             37.5     -    136  |  101  |  13  ----------------------------<  159<H>  3.9   |  22  |  1.16    Ca    8.9      2021 05:30    TPro  7.3  /  Alb  2.7<L>  /  TBili  0.6  /  DBili  x   /  AST  24  /  ALT  15  /  AlkPhos  123<H>  11-18    LIVER FUNCTIONS - ( 2021 05:30 )  Alb: 2.7 g/dL / Pro: 7.3 g/dL / ALK PHOS: 123 U/L / ALT: 15 U/L / AST: 24 U/L / GGT: x                   CAPILLARY BLOOD GLUCOSE      POCT Blood Glucose.: 134 mg/dL (2021 12:18)  POCT Blood Glucose.: 200 mg/dL (2021 07:56)  POCT Blood Glucose.: 205 mg/dL (2021 20:19)      MEDICATIONS  (STANDING):  amLODIPine   Tablet 10 milliGRAM(s) Oral daily  atorvastatin 80 milliGRAM(s) Oral at bedtime  dextrose 40% Gel 15 Gram(s) Oral once  dextrose 5%. 1000 milliLiter(s) (50 mL/Hr) IV Continuous <Continuous>  dextrose 5%. 1000 milliLiter(s) (100 mL/Hr) IV Continuous <Continuous>  dextrose 50% Injectable 25 Gram(s) IV Push once  dextrose 50% Injectable 12.5 Gram(s) IV Push once  dextrose 50% Injectable 25 Gram(s) IV Push once  enoxaparin Injectable 120 milliGRAM(s) SubCutaneous two times a day  ferrous    sulfate 325 milliGRAM(s) Oral daily  glucagon  Injectable 1 milliGRAM(s) IntraMuscular once  insulin glargine Injectable (LANTUS) 13 Unit(s) SubCutaneous at bedtime  insulin lispro (ADMELOG) corrective regimen sliding scale   SubCutaneous three times a day before meals  insulin lispro (ADMELOG) corrective regimen sliding scale   SubCutaneous at bedtime  insulin lispro Injectable (ADMELOG) 7 Unit(s) SubCutaneous three times a day before meals  melatonin 6 milliGRAM(s) Oral at bedtime  metFORMIN 500 milliGRAM(s) Oral two times a day  pantoprazole    Tablet 40 milliGRAM(s) Oral before breakfast  polyethylene glycol 3350 17 Gram(s) Oral two times a day  senna 2 Tablet(s) Oral at bedtime    MEDICATIONS  (PRN):  acetaminophen     Tablet .. 650 milliGRAM(s) Oral every 6 hours PRN Temp greater or equal to 38C (100.4F), Mild Pain (1 - 3)  
CHIEF COMPLAINT: He states he did not sleep well last night and has history of poor sleep. He remains with dysarthria but with slight improvement.     Patient is a 66y old  Male who presents with a chief complaint of STROKE (17 Nov 2021 13:07)    HPI:   66 year old M with a h/o DM type 2, HTN, Afib (on eliquis, however on hold since 11/8 for chemoport placement on 11/11), prior stroke (no deficits), colon cancer (invasive adenocarcinoma of the rectum), who initially presented to [facility] on 11/13/21 with speech and facial droop. Patient found by wife on the floor with slurred speech. He had imaging done in [facility] with CTH reported to be negative for acute infarct. 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 Patient transferred to [facility] for mechanical thrombectomy. NIHSS on arrival: 16, Not a candidate for tPA due to presentation outside the window. He underwent thrombectomy with TICI2b flow restored on 11/13/21. Etiology cardioembolic vs hypercoagulable/malignancy. He was evaluated for acute inpatient rehab and was admitted to [facility] on 11/17.        (17 Nov 2021 13:07)      PAST MEDICAL & SURGICAL HISTORY:  Lumbago    Lumbago with sciatica of right side    Benign hypertension  dx 10 years    H/O renal calculi  ESWL right side yrs ago  last stone one year ago  (passed on its own)    Obese    Eczema    Diabetes mellitus type II  on Meds 4/2012    Chronic atrial fibrillation    Colon cancer    S/P tonsillectomy    Port-A-Cath in place        Allergies    No Known Drug Allergies  Nuts (Hives)  Patient stated he had sensation of  throat closing  30 minites after  Epidural pain management resolved it self few minitus after , /  20 y ago Unable to recall MD name or number (Other)  [place] (Unknown)    Intolerances          PHYSICAL EXAM    Vital Signs Last 24 Hrs  T(C): 36.6 (19 Nov 2021 07:59), Max: 36.9 (18 Nov 2021 21:44)  T(F): 97.8 (19 Nov 2021 07:59), Max: 98.5 (18 Nov 2021 21:44)  HR: 91 (19 Nov 2021 07:59) (91 - 102)  BP: 146/92 (19 Nov 2021 07:59) (112/69 - 170/83)  BP(mean): --  RR: 16 (19 Nov 2021 07:59) (15 - 16)  SpO2: 93% (19 Nov 2021 07:59) (93% - 95%)    PHYSICAL EXAM  Constitutional - NAD, Comfortable  HEENT - NCAT, EOMI  Neck - Supple, No limited ROM  Chest - CTA bilaterally  Cardiovascular - RRR, S1S2  Abdomen -BS+, Soft, NTND  Extremities - No C/C/E, No calf tenderness   Neurologic Exam - severe dysarthria, disconjugate gaze     RECENT LABS:                          11.5   7.61  )-----------( 336      ( 18 Nov 2021 05:30 )             37.5     11-18    136  |  101  |  13  ----------------------------<  159<H>  3.9   |  22  |  1.16    Ca    8.9      18 Nov 2021 05:30    TPro  7.3  /  Alb  2.7<L>  /  TBili  0.6  /  DBili  x   /  AST  24  /  ALT  15  /  AlkPhos  123<H>  11-18    LIVER FUNCTIONS - ( 18 Nov 2021 05:30 )  Alb: 2.7 g/dL / Pro: 7.3 g/dL / ALK PHOS: 123 U/L / ALT: 15 U/L / AST: 24 U/L / GGT: x             CAPILLARY BLOOD GLUCOSE      POCT Blood Glucose.: 127 mg/dL (19 Nov 2021 08:04)  POCT Blood Glucose.: 100 mg/dL (18 Nov 2021 21:48)  POCT Blood Glucose.: 136 mg/dL (18 Nov 2021 16:45)  POCT Blood Glucose.: 134 mg/dL (18 Nov 2021 12:18)        MEDICATIONS  (STANDING):  amLODIPine   Tablet 10 milliGRAM(s) Oral daily  atorvastatin 80 milliGRAM(s) Oral at bedtime  dextrose 40% Gel 15 Gram(s) Oral once  dextrose 5%. 1000 milliLiter(s) (50 mL/Hr) IV Continuous <Continuous>  dextrose 5%. 1000 milliLiter(s) (100 mL/Hr) IV Continuous <Continuous>  dextrose 50% Injectable 25 Gram(s) IV Push once  dextrose 50% Injectable 12.5 Gram(s) IV Push once  dextrose 50% Injectable 25 Gram(s) IV Push once  enoxaparin Injectable 120 milliGRAM(s) SubCutaneous two times a day  ferrous    sulfate 325 milliGRAM(s) Oral daily  glucagon  Injectable 1 milliGRAM(s) IntraMuscular once  insulin glargine Injectable (LANTUS) 13 Unit(s) SubCutaneous at bedtime  insulin lispro (ADMELOG) corrective regimen sliding scale   SubCutaneous three times a day before meals  insulin lispro (ADMELOG) corrective regimen sliding scale   SubCutaneous at bedtime  insulin lispro Injectable (ADMELOG) 7 Unit(s) SubCutaneous three times a day before meals  melatonin 6 milliGRAM(s) Oral at bedtime  metFORMIN 500 milliGRAM(s) Oral two times a day  pantoprazole    Tablet 40 milliGRAM(s) Oral before breakfast  polyethylene glycol 3350 17 Gram(s) Oral two times a day  senna 2 Tablet(s) Oral at bedtime    MEDICATIONS  (PRN):  acetaminophen     Tablet .. 650 milliGRAM(s) Oral every 6 hours PRN Temp greater or equal to 38C (100.4F), Mild Pain (1 - 3)  
CHIEF COMPLAINT: He states he slept well last night and has no new complaints today.     Patient is a 66y old  Male who presents with a chief complaint of STROKE (17 Nov 2021 13:07)    HPI:   66 year old M with a h/o DM type 2, HTN, Afib (on eliquis, however on hold since 11/8 for chemoport placement on 11/11), prior stroke (no deficits), colon cancer (invasive adenocarcinoma of the rectum), who initially presented to [facility] on 11/13/21 with speech and facial droop. Patient found by wife on the floor with slurred speech. He had imaging done in [facility] with CTH reported to be negative for acute infarct. 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 Patient transferred to [facility] for mechanical thrombectomy. NIHSS on arrival: 16, Not a candidate for tPA due to presentation outside the window. He underwent thrombectomy with TICI2b flow restored on 11/13/21. Etiology cardioembolic vs hypercoagulable/malignancy. He was evaluated for acute inpatient rehab and was admitted to [facility] on 11/17.        (17 Nov 2021 13:07)      PAST MEDICAL & SURGICAL HISTORY:  Lumbago    Lumbago with sciatica of right side    Benign hypertension  dx 10 years    H/O renal calculi  ESWL right side yrs ago  last stone one year ago  (passed on its own)    Obese    Eczema    Diabetes mellitus type II  on Meds 4/2012    Chronic atrial fibrillation    Colon cancer    S/P tonsillectomy    Port-A-Cath in place        Allergies    No Known Drug Allergies  Nuts (Hives)  Patient stated he had sensation of  throat closing  30 minites after  Epidural pain management resolved it self few minitus after , /  20 y ago Unable to recall MD name or number (Other)  [place] (Unknown)    Intolerances          PHYSICAL EXAM    Vital Signs Last 24 Hrs  T(C): 36.7 (24 Nov 2021 07:17), Max: 36.8 (24 Nov 2021 07:08)  T(F): 98 (24 Nov 2021 07:17), Max: 98.3 (24 Nov 2021 07:08)  HR: 66 (24 Nov 2021 07:17) (66 - 95)  BP: 146/77 (24 Nov 2021 07:17) (132/77 - 159/88)  BP(mean): --  RR: 15 (24 Nov 2021 07:17) (15 - 16)  SpO2: 96% (24 Nov 2021 07:17) (95% - 96%)    PHYSICAL EXAM  Constitutional - NAD, Comfortable  HEENT - NCAT, EOMI  Neck - Supple, No limited ROM  Chest - CTA bilaterally  Cardiovascular - RRR, S1S2  Abdomen -BS+, Soft, NTND  Extremities - No C/C/E, No calf tenderness   Neurologic Exam - severe dysarthria, disconjugate gaze       LABS:                          11.7   5.74  )-----------( 346      ( 22 Nov 2021 05:30 )             38.0     11-22    142  |  106  |  21  ----------------------------<  150<H>  3.4<L>   |  25  |  1.23    Ca    8.7      22 Nov 2021 05:30    TPro  6.8  /  Alb  2.8<L>  /  TBili  0.3  /  DBili  x   /  AST  41<H>  /  ALT  42  /  AlkPhos  106  11-22    LIVER FUNCTIONS - ( 22 Nov 2021 05:30 )  Alb: 2.8 g/dL / Pro: 6.8 g/dL / ALK PHOS: 106 U/L / ALT: 42 U/L / AST: 41 U/L / GGT: x             CAPILLARY BLOOD GLUCOSE      POCT Blood Glucose.: 132 mg/dL (24 Nov 2021 11:21)  POCT Blood Glucose.: 137 mg/dL (24 Nov 2021 07:07)  POCT Blood Glucose.: 173 mg/dL (23 Nov 2021 21:20)  POCT Blood Glucose.: 117 mg/dL (23 Nov 2021 16:31)          MEDICATIONS  (STANDING):  amLODIPine   Tablet 10 milliGRAM(s) Oral daily  atorvastatin 80 milliGRAM(s) Oral at bedtime  dextrose 40% Gel 15 Gram(s) Oral once  dextrose 5%. 1000 milliLiter(s) (50 mL/Hr) IV Continuous <Continuous>  dextrose 5%. 1000 milliLiter(s) (100 mL/Hr) IV Continuous <Continuous>  dextrose 50% Injectable 25 Gram(s) IV Push once  dextrose 50% Injectable 12.5 Gram(s) IV Push once  dextrose 50% Injectable 25 Gram(s) IV Push once  enoxaparin Injectable 120 milliGRAM(s) SubCutaneous two times a day  ferrous    sulfate 325 milliGRAM(s) Oral daily  glucagon  Injectable 1 milliGRAM(s) IntraMuscular once  insulin glargine Injectable (LANTUS) 13 Unit(s) SubCutaneous at bedtime  insulin lispro (ADMELOG) corrective regimen sliding scale   SubCutaneous three times a day before meals  insulin lispro (ADMELOG) corrective regimen sliding scale   SubCutaneous at bedtime  insulin lispro Injectable (ADMELOG) 7 Unit(s) SubCutaneous three times a day before meals  melatonin 6 milliGRAM(s) Oral at bedtime  metFORMIN 500 milliGRAM(s) Oral two times a day  pantoprazole    Tablet 40 milliGRAM(s) Oral before breakfast  polyethylene glycol 3350 17 Gram(s) Oral two times a day  potassium chloride    Tablet ER 20 milliEquivalent(s) Oral every 2 hours  QUEtiapine 12.5 milliGRAM(s) Oral at bedtime  senna 2 Tablet(s) Oral at bedtime    MEDICATIONS  (PRN):  acetaminophen     Tablet .. 650 milliGRAM(s) Oral every 6 hours PRN Temp greater or equal to 38C (100.4F), Mild Pain (1 - 3)  
Patient is a 66y old  Male who presents with a chief complaint of STROKE (01 Dec 2021 11:03)      SUBJECTIVE / OVERNIGHT EVENTS:  Pt seen and examined at bedside. No acute events overnight.  Pt denies cp, palpitations, sob, abd pain, N/V, fever, chills.    ROS:  All other review of systems negative    Allergies    No Known Drug Allergies  Nuts (Hives)  Patient stated he had sensation of  throat closing  30 minites after  Epidural pain management resolved it self few minitus after , /  20 y ago Unable to recall MD name or number (Other)  [place] (Unknown)    Intolerances        MEDICATIONS  (STANDING):  amLODIPine   Tablet 10 milliGRAM(s) Oral daily  apixaban 10 milliGRAM(s) Oral two times a day  atorvastatin 80 milliGRAM(s) Oral at bedtime  dextrose 40% Gel 15 Gram(s) Oral once  dextrose 5%. 1000 milliLiter(s) (50 mL/Hr) IV Continuous <Continuous>  dextrose 5%. 1000 milliLiter(s) (100 mL/Hr) IV Continuous <Continuous>  dextrose 50% Injectable 25 Gram(s) IV Push once  dextrose 50% Injectable 12.5 Gram(s) IV Push once  dextrose 50% Injectable 25 Gram(s) IV Push once  ferrous    sulfate 325 milliGRAM(s) Oral daily  glucagon  Injectable 1 milliGRAM(s) IntraMuscular once  insulin glargine Injectable (LANTUS) 13 Unit(s) SubCutaneous at bedtime  insulin lispro (ADMELOG) corrective regimen sliding scale   SubCutaneous three times a day before meals  insulin lispro (ADMELOG) corrective regimen sliding scale   SubCutaneous at bedtime  insulin lispro Injectable (ADMELOG) 7 Unit(s) SubCutaneous three times a day before meals  melatonin 6 milliGRAM(s) Oral at bedtime  metFORMIN 1000 milliGRAM(s) Oral two times a day  pantoprazole    Tablet 40 milliGRAM(s) Oral before breakfast  polyethylene glycol 3350 17 Gram(s) Oral two times a day  QUEtiapine 12.5 milliGRAM(s) Oral at bedtime  senna 2 Tablet(s) Oral at bedtime    MEDICATIONS  (PRN):  acetaminophen     Tablet .. 650 milliGRAM(s) Oral every 6 hours PRN Temp greater or equal to 38C (100.4F), Mild Pain (1 - 3)  sodium chloride 0.65% Nasal 1 Spray(s) Both Nostrils three times a day PRN Nasal Congestion      Vital Signs Last 24 Hrs  T(C): 36.8 (02 Dec 2021 07:51), Max: 37.2 (01 Dec 2021 20:37)  T(F): 98.2 (02 Dec 2021 07:51), Max: 99 (01 Dec 2021 20:37)  HR: 92 (02 Dec 2021 07:51) (86 - 95)  BP: 155/73 (02 Dec 2021 07:51) (136/83 - 155/73)  BP(mean): --  RR: 16 (02 Dec 2021 07:51) (16 - 16)  SpO2: 95% (02 Dec 2021 07:51) (93% - 96%)  CAPILLARY BLOOD GLUCOSE      POCT Blood Glucose.: 149 mg/dL (02 Dec 2021 07:55)  POCT Blood Glucose.: 133 mg/dL (01 Dec 2021 21:28)  POCT Blood Glucose.: 144 mg/dL (01 Dec 2021 16:57)  POCT Blood Glucose.: 155 mg/dL (01 Dec 2021 11:50)    I&O's Summary      PHYSICAL EXAM:  GENERAL: NAD, obese male, AAOx3 comfortable in chair  CHEST/LUNG: Clear to auscultation bilaterally; No wheeze, nonlabored breathing  HEART: Regular rate and rhythm; No murmurs, rubs, or gallops  ABDOMEN: Soft, Nontender, Nondistended; Bowel sounds present  EXTREMITIES:  2+ Peripheral Pulses, No clubbing, cyanosis, or edema  PSYCH: calm, appropriate mood  SKIN: right chest port    LABS:                        10.8   8.45  )-----------( 350      ( 02 Dec 2021 07:20 )             35.0     12-02    141  |  104  |  18  ----------------------------<  132<H>  3.6   |  26  |  1.13    Ca    8.8      02 Dec 2021 07:20    TPro  6.7  /  Alb  2.6<L>  /  TBili  0.4  /  DBili  x   /  AST  27  /  ALT  22  /  AlkPhos  110  12-02              RADIOLOGY & ADDITIONAL TESTS:  Results Reviewed:   Imaging Personally Reviewed:  Electrocardiogram Personally Reviewed:    COORDINATION OF CARE:  Care Discussed with Consultants/Other Providers [Y/N]:  Prior or Outpatient Records Reviewed [Y/N]:  
Patient is a 66y old  Male who presents with a chief complaint of STROKE (22 Nov 2021 12:22)      SUBJECTIVE / OVERNIGHT EVENTS:  Pt seen and examined at bedside. No acute events overnight.  Pt denies cp, palpitations, sob, abd pain, N/V, fever, chills.    ROS:  All other review of systems negative    Allergies    No Known Drug Allergies  Nuts (Hives)  Patient stated he had sensation of  throat closing  30 minites after  Epidural pain management resolved it self few minitus after , /  20 y ago Unable to recall MD name or number (Other)  [place] (Unknown)    Intolerances        MEDICATIONS  (STANDING):  amLODIPine   Tablet 10 milliGRAM(s) Oral daily  atorvastatin 80 milliGRAM(s) Oral at bedtime  dextrose 40% Gel 15 Gram(s) Oral once  dextrose 5%. 1000 milliLiter(s) (50 mL/Hr) IV Continuous <Continuous>  dextrose 5%. 1000 milliLiter(s) (100 mL/Hr) IV Continuous <Continuous>  dextrose 50% Injectable 25 Gram(s) IV Push once  dextrose 50% Injectable 12.5 Gram(s) IV Push once  dextrose 50% Injectable 25 Gram(s) IV Push once  enoxaparin Injectable 120 milliGRAM(s) SubCutaneous two times a day  ferrous    sulfate 325 milliGRAM(s) Oral daily  glucagon  Injectable 1 milliGRAM(s) IntraMuscular once  insulin glargine Injectable (LANTUS) 13 Unit(s) SubCutaneous at bedtime  insulin lispro (ADMELOG) corrective regimen sliding scale   SubCutaneous three times a day before meals  insulin lispro (ADMELOG) corrective regimen sliding scale   SubCutaneous at bedtime  insulin lispro Injectable (ADMELOG) 7 Unit(s) SubCutaneous three times a day before meals  melatonin 6 milliGRAM(s) Oral at bedtime  metFORMIN 500 milliGRAM(s) Oral two times a day  pantoprazole    Tablet 40 milliGRAM(s) Oral before breakfast  polyethylene glycol 3350 17 Gram(s) Oral two times a day  QUEtiapine 12.5 milliGRAM(s) Oral at bedtime  senna 2 Tablet(s) Oral at bedtime    MEDICATIONS  (PRN):  acetaminophen     Tablet .. 650 milliGRAM(s) Oral every 6 hours PRN Temp greater or equal to 38C (100.4F), Mild Pain (1 - 3)      Vital Signs Last 24 Hrs  T(C): 36.8 (23 Nov 2021 08:09), Max: 36.8 (23 Nov 2021 08:09)  T(F): 98.3 (23 Nov 2021 08:09), Max: 98.3 (23 Nov 2021 08:09)  HR: 86 (23 Nov 2021 08:09) (86 - 87)  BP: 127/78 (23 Nov 2021 08:09) (127/78 - 148/86)  BP(mean): --  RR: 15 (23 Nov 2021 08:09) (15 - 16)  SpO2: 97% (23 Nov 2021 08:09) (94% - 97%)  CAPILLARY BLOOD GLUCOSE      POCT Blood Glucose.: 148 mg/dL (23 Nov 2021 07:42)  POCT Blood Glucose.: 144 mg/dL (22 Nov 2021 21:23)  POCT Blood Glucose.: 160 mg/dL (22 Nov 2021 16:51)  POCT Blood Glucose.: 150 mg/dL (22 Nov 2021 11:51)    I&O's Summary    22 Nov 2021 07:01  -  23 Nov 2021 07:00  --------------------------------------------------------  IN: 540 mL / OUT: 250 mL / NET: 290 mL        PHYSICAL EXAM:  GENERAL: NAD, obese male, AAOx3 comfortable in chair  CHEST/LUNG: Clear to auscultation bilaterally; No wheeze, nonlabored breathing  HEART: Regular rate and rhythm; No murmurs, rubs, or gallops  ABDOMEN: Soft, Nontender, Nondistended; Bowel sounds present  EXTREMITIES:  2+ Peripheral Pulses, No clubbing, cyanosis, or edema  PSYCH: calm, appropriate mood  SKIN: right chest port    LABS:                        11.7   5.74  )-----------( 346      ( 22 Nov 2021 05:30 )             38.0     11-22    142  |  106  |  21  ----------------------------<  150<H>  3.4<L>   |  25  |  1.23    Ca    8.7      22 Nov 2021 05:30    TPro  6.8  /  Alb  2.8<L>  /  TBili  0.3  /  DBili  x   /  AST  41<H>  /  ALT  42  /  AlkPhos  106  11-22              RADIOLOGY & ADDITIONAL TESTS:  Results Reviewed:   Imaging Personally Reviewed:  Electrocardiogram Personally Reviewed:    COORDINATION OF CARE:  Care Discussed with Consultants/Other Providers [Y/N]:  Prior or Outpatient Records Reviewed [Y/N]:  
Patient participating with rehab, still having significant difficulties with coordination, struggles with muscle control to eat, though it appears he can manage with some mishaps.      MEDICATIONS  (STANDING):  amLODIPine   Tablet 10 milliGRAM(s) Oral daily  atorvastatin 80 milliGRAM(s) Oral at bedtime  dextrose 40% Gel 15 Gram(s) Oral once  dextrose 5%. 1000 milliLiter(s) (50 mL/Hr) IV Continuous <Continuous>  dextrose 5%. 1000 milliLiter(s) (100 mL/Hr) IV Continuous <Continuous>  dextrose 50% Injectable 25 Gram(s) IV Push once  dextrose 50% Injectable 12.5 Gram(s) IV Push once  dextrose 50% Injectable 25 Gram(s) IV Push once  enoxaparin Injectable 120 milliGRAM(s) SubCutaneous two times a day  ferrous    sulfate 325 milliGRAM(s) Oral daily  glucagon  Injectable 1 milliGRAM(s) IntraMuscular once  insulin glargine Injectable (LANTUS) 13 Unit(s) SubCutaneous at bedtime  insulin lispro (ADMELOG) corrective regimen sliding scale   SubCutaneous three times a day before meals  insulin lispro (ADMELOG) corrective regimen sliding scale   SubCutaneous at bedtime  insulin lispro Injectable (ADMELOG) 7 Unit(s) SubCutaneous three times a day before meals  melatonin 6 milliGRAM(s) Oral at bedtime  metFORMIN 1000 milliGRAM(s) Oral two times a day  pantoprazole    Tablet 40 milliGRAM(s) Oral before breakfast  polyethylene glycol 3350 17 Gram(s) Oral two times a day  QUEtiapine 12.5 milliGRAM(s) Oral at bedtime  senna 2 Tablet(s) Oral at bedtime    MEDICATIONS  (PRN):  acetaminophen     Tablet .. 650 milliGRAM(s) Oral every 6 hours PRN Temp greater or equal to 38C (100.4F), Mild Pain (1 - 3)  sodium chloride 0.65% Nasal 1 Spray(s) Both Nostrils three times a day PRN Nasal Congestion    11-29    142  |  106  |  11  ----------------------------<  148<H>  3.4<L>   |  25  |  0.95    Ca    8.6      29 Nov 2021 07:46    TPro  6.9  /  Alb  2.7<L>  /  TBili  0.5  /  DBili  x   /  AST  17  /  ALT  25  /  AlkPhos  94  11-29                        11.2   7.64  )-----------( 337      ( 29 Nov 2021 07:46 )             36.5   
Pt. seen and examined at bedside.  No overnight events.      REVIEW OF SYSTEMS  Constitutional - No fever,  No fatigue  Neurological - No headaches, ++ loss of strength  Musculoskeletal - No joint pain, No joint swelling, No muscle pain    VITALS  T(C): 36.7 (11-21-21 @ 08:21), Max: 36.7 (11-21-21 @ 08:21)  HR: 95 (11-21-21 @ 08:21) (88 - 95)  BP: 108/76 (11-21-21 @ 08:21) (108/76 - 154/89)  RR: 16 (11-21-21 @ 08:21) (16 - 16)  SpO2: 98% (11-21-21 @ 08:21) (97% - 98%)  Wt(kg): --       MEDICATIONS   acetaminophen     Tablet .. 650 milliGRAM(s) every 6 hours PRN  amLODIPine   Tablet 10 milliGRAM(s) daily  atorvastatin 80 milliGRAM(s) at bedtime  dextrose 40% Gel 15 Gram(s) once  dextrose 5%. 1000 milliLiter(s) <Continuous>  dextrose 5%. 1000 milliLiter(s) <Continuous>  dextrose 50% Injectable 25 Gram(s) once  dextrose 50% Injectable 12.5 Gram(s) once  dextrose 50% Injectable 25 Gram(s) once  enoxaparin Injectable 120 milliGRAM(s) two times a day  ferrous    sulfate 325 milliGRAM(s) daily  glucagon  Injectable 1 milliGRAM(s) once  insulin glargine Injectable (LANTUS) 13 Unit(s) at bedtime  insulin lispro (ADMELOG) corrective regimen sliding scale   three times a day before meals  insulin lispro (ADMELOG) corrective regimen sliding scale   at bedtime  insulin lispro Injectable (ADMELOG) 7 Unit(s) three times a day before meals  melatonin 6 milliGRAM(s) at bedtime  metFORMIN 500 milliGRAM(s) two times a day  pantoprazole    Tablet 40 milliGRAM(s) before breakfast  polyethylene glycol 3350 17 Gram(s) two times a day  QUEtiapine 12.5 milliGRAM(s) at bedtime  senna 2 Tablet(s) at bedtime      RECENT LABS/IMAGING                      POCT Blood Glucose.: 135 mg/dL (11-21-21 @ 08:19)  POCT Blood Glucose.: 94 mg/dL (11-20-21 @ 21:40)  POCT Blood Glucose.: 126 mg/dL (11-20-21 @ 16:51)  POCT Blood Glucose.: 180 mg/dL (11-20-21 @ 11:55)    ---------  PHYSICAL EXAM  Constitutional - NAD, Comfortable  Pulm - Breathing comfortably, No wheezing  Abd - Soft, NTND  Extremities - No edema, No calf tenderness  Neurologic Exam -                    Cognitive - Awake, Alert     Communication - Fluent     Motor - RIGHT HEMIPARESIS     Sensory - Intact to LT  Psychiatric - Mood WNL, Affect WNL    ASSESSMENT/PLAN  66y Male with h/o colon cancer now w/ functional deficits 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  Continue current medical management  Pain - Tylenol PRN  DVT PPX - enoxaparin Injectable 120 milliGRAM(s) two times a day  Active issues - NONE  Continue 3hrs a day of comprehensive rehab program.       
CHIEF COMPLAINT: He states he slept well last night. No new complaints. He is participating well in therapy.    Patient is a 66y old  Male who presents with a chief complaint of STROKE (17 Nov 2021 13:07)    HPI:   66 year old M with a h/o DM type 2, HTN, Afib (on eliquis, however on hold since 11/8 for chemoport placement on 11/11), prior stroke (no deficits), colon cancer (invasive adenocarcinoma of the rectum), who initially presented to [facility] on 11/13/21 with speech and facial droop. Patient found by wife on the floor with slurred speech. He had imaging done in [facility] with CTH reported to be negative for acute infarct. 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 Patient transferred to [facility] for mechanical thrombectomy. NIHSS on arrival: 16, Not a candidate for tPA due to presentation outside the window. He underwent thrombectomy with TICI2b flow restored on 11/13/21. Etiology cardioembolic vs hypercoagulable/malignancy. He was evaluated for acute inpatient rehab and was admitted to [facility] on 11/17.        (17 Nov 2021 13:07)      PAST MEDICAL & SURGICAL HISTORY:  Lumbago    Lumbago with sciatica of right side    Benign hypertension  dx 10 years    H/O renal calculi  ESWL right side yrs ago  last stone one year ago  (passed on its own)    Obese    Eczema    Diabetes mellitus type II  on Meds 4/2012    Chronic atrial fibrillation    Colon cancer    S/P tonsillectomy    Port-A-Cath in place        Allergies    No Known Drug Allergies  Nuts (Hives)  Patient stated he had sensation of  throat closing  30 minites after  Epidural pain management resolved it self few minitus after , /  20 y ago Unable to recall MD name or number (Other)  [place] (Unknown)    Intolerances          PHYSICAL EXAM    Vital Signs Last 24 Hrs  T(C): 36.8 (23 Nov 2021 08:09), Max: 36.8 (23 Nov 2021 08:09)  T(F): 98.3 (23 Nov 2021 08:09), Max: 98.3 (23 Nov 2021 08:09)  HR: 86 (23 Nov 2021 08:09) (86 - 87)  BP: 127/78 (23 Nov 2021 08:09) (127/78 - 148/86)  BP(mean): --  RR: 15 (23 Nov 2021 08:09) (15 - 16)  SpO2: 97% (23 Nov 2021 08:09) (94% - 97%)    PHYSICAL EXAM  Constitutional - NAD, Comfortable  HEENT - NCAT, EOMI  Neck - Supple, No limited ROM  Chest - CTA bilaterally  Cardiovascular - RRR, S1S2  Abdomen -BS+, Soft, NTND  Extremities - No C/C/E, No calf tenderness   Neurologic Exam - severe dysarthria, disconjugate gaze       LABS:                          11.7   5.74  )-----------( 346      ( 22 Nov 2021 05:30 )             38.0     11-22    142  |  106  |  21  ----------------------------<  150<H>  3.4<L>   |  25  |  1.23    Ca    8.7      22 Nov 2021 05:30    TPro  6.8  /  Alb  2.8<L>  /  TBili  0.3  /  DBili  x   /  AST  41<H>  /  ALT  42  /  AlkPhos  106  11-22    LIVER FUNCTIONS - ( 22 Nov 2021 05:30 )  Alb: 2.8 g/dL / Pro: 6.8 g/dL / ALK PHOS: 106 U/L / ALT: 42 U/L / AST: 41 U/L / GGT: x               CAPILLARY BLOOD GLUCOSE      POCT Blood Glucose.: 186 mg/dL (23 Nov 2021 11:37)  POCT Blood Glucose.: 148 mg/dL (23 Nov 2021 07:42)  POCT Blood Glucose.: 144 mg/dL (22 Nov 2021 21:23)  POCT Blood Glucose.: 160 mg/dL (22 Nov 2021 16:51)        MEDICATIONS  (STANDING):  amLODIPine   Tablet 10 milliGRAM(s) Oral daily  atorvastatin 80 milliGRAM(s) Oral at bedtime  dextrose 40% Gel 15 Gram(s) Oral once  dextrose 5%. 1000 milliLiter(s) (50 mL/Hr) IV Continuous <Continuous>  dextrose 5%. 1000 milliLiter(s) (100 mL/Hr) IV Continuous <Continuous>  dextrose 50% Injectable 25 Gram(s) IV Push once  dextrose 50% Injectable 12.5 Gram(s) IV Push once  dextrose 50% Injectable 25 Gram(s) IV Push once  enoxaparin Injectable 120 milliGRAM(s) SubCutaneous two times a day  ferrous    sulfate 325 milliGRAM(s) Oral daily  glucagon  Injectable 1 milliGRAM(s) IntraMuscular once  insulin glargine Injectable (LANTUS) 13 Unit(s) SubCutaneous at bedtime  insulin lispro (ADMELOG) corrective regimen sliding scale   SubCutaneous three times a day before meals  insulin lispro (ADMELOG) corrective regimen sliding scale   SubCutaneous at bedtime  insulin lispro Injectable (ADMELOG) 7 Unit(s) SubCutaneous three times a day before meals  melatonin 6 milliGRAM(s) Oral at bedtime  metFORMIN 500 milliGRAM(s) Oral two times a day  pantoprazole    Tablet 40 milliGRAM(s) Oral before breakfast  polyethylene glycol 3350 17 Gram(s) Oral two times a day  potassium chloride    Tablet ER 20 milliEquivalent(s) Oral every 2 hours  QUEtiapine 12.5 milliGRAM(s) Oral at bedtime  senna 2 Tablet(s) Oral at bedtime    MEDICATIONS  (PRN):  acetaminophen     Tablet .. 650 milliGRAM(s) Oral every 6 hours PRN Temp greater or equal to 38C (100.4F), Mild Pain (1 - 3)  
No overnight events.  Slept well and feels well.   Pain is controlled.   No other new ROS.  Has been tolerating rehabilitation program.    VITALS  T(C): 36.3 (11-25-21 @ 08:01), Max: 36.7 (11-24-21 @ 20:08)  HR: 91 (11-25-21 @ 08:01) (84 - 91)  BP: 116/81 (11-25-21 @ 08:01) (115/71 - 131/78)  RR: 16 (11-25-21 @ 08:01) (16 - 16)  SpO2: 97% (11-25-21 @ 08:01) (97% - 98%)  Wt(kg): --     MEDICATIONS   acetaminophen     Tablet .. 650 milliGRAM(s) every 6 hours PRN  amLODIPine   Tablet 10 milliGRAM(s) daily  atorvastatin 80 milliGRAM(s) at bedtime  dextrose 40% Gel 15 Gram(s) once  dextrose 5%. 1000 milliLiter(s) <Continuous>  dextrose 5%. 1000 milliLiter(s) <Continuous>  dextrose 50% Injectable 25 Gram(s) once  dextrose 50% Injectable 12.5 Gram(s) once  dextrose 50% Injectable 25 Gram(s) once  enoxaparin Injectable 120 milliGRAM(s) two times a day  ferrous    sulfate 325 milliGRAM(s) daily  glucagon  Injectable 1 milliGRAM(s) once  insulin glargine Injectable (LANTUS) 13 Unit(s) at bedtime  insulin lispro (ADMELOG) corrective regimen sliding scale   three times a day before meals  insulin lispro (ADMELOG) corrective regimen sliding scale   at bedtime  insulin lispro Injectable (ADMELOG) 7 Unit(s) three times a day before meals  melatonin 6 milliGRAM(s) at bedtime  metFORMIN 1000 milliGRAM(s) two times a day  pantoprazole    Tablet 40 milliGRAM(s) before breakfast  polyethylene glycol 3350 17 Gram(s) two times a day  QUEtiapine 12.5 milliGRAM(s) at bedtime  senna 2 Tablet(s) at bedtime      RECENT LABS/IMAGING                   POCT Blood Glucose.: 153 mg/dL (11-25-21 @ 08:04)  POCT Blood Glucose.: 144 mg/dL (11-24-21 @ 20:34)  POCT Blood Glucose.: 189 mg/dL (11-24-21 @ 16:30)  POCT Blood Glucose.: 132 mg/dL (11-24-21 @ 11:21)    ------------------------------------------  PHYSICAL EXAM  Constitutional - NAD, Comfortable  Pulm - Breathing comfortably, No wheezing  Abd - Nondistended  Extremities - No calf tenderness  Neurologic Exam - Awake, Alert  Psychiatric - Mood WNL     ASSESSMENT/PLAN  66y Male with impairments in mobility and ADLs   - Continue current rehabilitation program 3hrs a day   - Continue current medications, patient is medically stable   - DVT prophylaxis  - Skin - OOB and mobilization daily     
No overnight events.  Slept well. Continues to be fatigued this AM and wants to sleep more.   Pain is controlled.   No other new ROS.  Has been tolerating rehabilitation program.    VITALS  T(C): 36.9 (11-26-21 @ 19:55), Max: 36.9 (11-26-21 @ 19:55)  HR: 90 (11-27-21 @ 05:38) (88 - 90)  BP: 133/82 (11-27-21 @ 05:38) (133/82 - 152/80)  RR: 16 (11-26-21 @ 19:55) (16 - 16)  SpO2: 95% (11-26-21 @ 19:55) (95% - 95%)  Wt(kg): --     MEDICATIONS   acetaminophen     Tablet .. 650 milliGRAM(s) every 6 hours PRN  amLODIPine   Tablet 10 milliGRAM(s) daily  atorvastatin 80 milliGRAM(s) at bedtime  dextrose 40% Gel 15 Gram(s) once  dextrose 5%. 1000 milliLiter(s) <Continuous>  dextrose 5%. 1000 milliLiter(s) <Continuous>  dextrose 50% Injectable 25 Gram(s) once  dextrose 50% Injectable 12.5 Gram(s) once  dextrose 50% Injectable 25 Gram(s) once  enoxaparin Injectable 120 milliGRAM(s) two times a day  ferrous    sulfate 325 milliGRAM(s) daily  glucagon  Injectable 1 milliGRAM(s) once  insulin glargine Injectable (LANTUS) 13 Unit(s) at bedtime  insulin lispro (ADMELOG) corrective regimen sliding scale   three times a day before meals  insulin lispro (ADMELOG) corrective regimen sliding scale   at bedtime  insulin lispro Injectable (ADMELOG) 7 Unit(s) three times a day before meals  melatonin 6 milliGRAM(s) at bedtime  metFORMIN 1000 milliGRAM(s) two times a day  pantoprazole    Tablet 40 milliGRAM(s) before breakfast  polyethylene glycol 3350 17 Gram(s) two times a day  QUEtiapine 12.5 milliGRAM(s) at bedtime  senna 2 Tablet(s) at bedtime      RECENT LABS/IMAGING                        11.1   6.40  )-----------( 353      ( 26 Nov 2021 05:30 )             37.0     11-26    142  |  106  |  15  ----------------------------<  166<H>  3.5   |  26  |  1.28    Ca    8.7      26 Nov 2021 05:30    TPro  6.8  /  Alb  2.8<L>  /  TBili  0.3  /  DBili  x   /  AST  25  /  ALT  37  /  AlkPhos  104  11-26             POCT Blood Glucose.: 160 mg/dL (11-26-21 @ 22:19)  POCT Blood Glucose.: 233 mg/dL (11-26-21 @ 16:36)  POCT Blood Glucose.: 171 mg/dL (11-26-21 @ 11:50)    ------------------------------------------  PHYSICAL EXAM  Constitutional - NAD, Comfortable  Pulm - Breathing comfortably, No wheezing  Abd - Nondistended  Extremities - No calf tenderness  Neurologic Exam - Awake, Alert  Psychiatric - Mood WNL     ASSESSMENT/PLAN  66y Male with impairments in mobility and ADLs   - Continue current rehabilitation program 3hrs a day   - Continue current medications, patient is medically stable   - DVT prophylaxis  - Skin - OOB and mobilization daily     
Patient is a 66y old  Male who presents with a chief complaint of STROKE (19 Nov 2021 08:49)      Patient seen and examined at bedside. No events overnight. States had some difficulty sleeping but no other acute complaints at this time. States when he walked with PT yesterday felt okay. Denies chest pain, SOB, abd pain.    ALLERGIES:  No Known Drug Allergies  Nuts (Hives)  Patient stated he had sensation of  throat closing  30 minites after  Epidural pain management resolved it self few minitus after , /  20 y ago Unable to recall MD name or number (Other)  [place] (Unknown)    MEDICATIONS  (STANDING):  amLODIPine   Tablet 10 milliGRAM(s) Oral daily  atorvastatin 80 milliGRAM(s) Oral at bedtime  dextrose 40% Gel 15 Gram(s) Oral once  dextrose 5%. 1000 milliLiter(s) (50 mL/Hr) IV Continuous <Continuous>  dextrose 5%. 1000 milliLiter(s) (100 mL/Hr) IV Continuous <Continuous>  dextrose 50% Injectable 25 Gram(s) IV Push once  dextrose 50% Injectable 12.5 Gram(s) IV Push once  dextrose 50% Injectable 25 Gram(s) IV Push once  enoxaparin Injectable 120 milliGRAM(s) SubCutaneous two times a day  ferrous    sulfate 325 milliGRAM(s) Oral daily  glucagon  Injectable 1 milliGRAM(s) IntraMuscular once  insulin glargine Injectable (LANTUS) 13 Unit(s) SubCutaneous at bedtime  insulin lispro (ADMELOG) corrective regimen sliding scale   SubCutaneous three times a day before meals  insulin lispro (ADMELOG) corrective regimen sliding scale   SubCutaneous at bedtime  insulin lispro Injectable (ADMELOG) 7 Unit(s) SubCutaneous three times a day before meals  melatonin 6 milliGRAM(s) Oral at bedtime  metFORMIN 500 milliGRAM(s) Oral two times a day  pantoprazole    Tablet 40 milliGRAM(s) Oral before breakfast  polyethylene glycol 3350 17 Gram(s) Oral two times a day  senna 2 Tablet(s) Oral at bedtime    MEDICATIONS  (PRN):  acetaminophen     Tablet .. 650 milliGRAM(s) Oral every 6 hours PRN Temp greater or equal to 38C (100.4F), Mild Pain (1 - 3)    Vital Signs Last 24 Hrs  T(F): 97.8 (19 Nov 2021 07:59), Max: 98.5 (18 Nov 2021 21:44)  HR: 91 (19 Nov 2021 07:59) (91 - 102)  BP: 146/92 (19 Nov 2021 07:59) (112/69 - 170/83)  RR: 16 (19 Nov 2021 07:59) (15 - 16)  SpO2: 93% (19 Nov 2021 07:59) (93% - 95%)  I&O's Summary    PHYSICAL EXAM:  GENERAL: NAD, comfortably sitting in chair  HEAD:  Atraumatic, Normocephalic  EYES:  PERRL, conjunctiva and sclera clear  ENMT: Moist mucous membranes, Good dentition  CHEST/LUNG: Clear to auscultation bilaterally, non-labored breathing, good air entry  HEART: RRR; S1/S2, No murmur  ABDOMEN: Soft, Nontender, Nondistended; Bowel sounds present  VASCULAR: Normal pulses, Normal capillary refill  EXTREMITIES: No cyanosis, No edema  SKIN: Warm, perfused  PSYCH: Normal mood and affect  NERVOUS SYSTEM:  A/O x3, dysarthric, strength 5/5 in all extremities    LABS:                        11.5   7.61  )-----------( 336      ( 18 Nov 2021 05:30 )             37.5     11-18    136  |  101  |  13  ----------------------------<  159  3.9   |  22  |  1.16    Ca    8.9      18 Nov 2021 05:30    TPro  7.3  /  Alb  2.7  /  TBili  0.6  /  DBili  x   /  AST  24  /  ALT  15  /  AlkPhos  123  11-18    eGFR if Non African American: 65 mL/min/1.73M2 (11-18-21 @ 05:30)  eGFR if : 76 mL/min/1.73M2 (11-18-21 @ 05:30)            11-13 Chol 162 mg/dL LDL -- HDL 42 mg/dL Trig 116 mg/dL              POCT Blood Glucose.: 127 mg/dL (19 Nov 2021 08:04)  POCT Blood Glucose.: 100 mg/dL (18 Nov 2021 21:48)  POCT Blood Glucose.: 136 mg/dL (18 Nov 2021 16:45)  POCT Blood Glucose.: 134 mg/dL (18 Nov 2021 12:18)          COVID-19 PCR: NotDetec (11-18-21 @ 13:00)  COVID-19 PCR: NotDetec (11-16-21 @ 18:21)  COVID-19 PCR: NotDetec (11-13-21 @ 04:00)  COVID-19 PCR: NotDetec (11-08-21 @ 16:26)      RADIOLOGY & ADDITIONAL TESTS:    Care Discussed with Consultants/Other Providers: discussed with Dr. Monae    
Patient is a 66y old  Male who presents with a chief complaint of STROKE (20 Nov 2021 09:14)      SUBJECTIVE / OVERNIGHT EVENTS:  Pt seen and examined at bedside. No acute events overnight.  Pt denies cp, palpitations, sob, abd pain, N/V, fever, chills.    ROS:  All other review of systems negative    Allergies    No Known Drug Allergies  Nuts (Hives)  Patient stated he had sensation of  throat closing  30 minites after  Epidural pain management resolved it self few minitus after , /  20 y ago Unable to recall MD name or number (Other)  [place] (Unknown)    Intolerances        MEDICATIONS  (STANDING):  amLODIPine   Tablet 10 milliGRAM(s) Oral daily  atorvastatin 80 milliGRAM(s) Oral at bedtime  dextrose 40% Gel 15 Gram(s) Oral once  dextrose 5%. 1000 milliLiter(s) (50 mL/Hr) IV Continuous <Continuous>  dextrose 5%. 1000 milliLiter(s) (100 mL/Hr) IV Continuous <Continuous>  dextrose 50% Injectable 25 Gram(s) IV Push once  dextrose 50% Injectable 12.5 Gram(s) IV Push once  dextrose 50% Injectable 25 Gram(s) IV Push once  enoxaparin Injectable 120 milliGRAM(s) SubCutaneous two times a day  ferrous    sulfate 325 milliGRAM(s) Oral daily  glucagon  Injectable 1 milliGRAM(s) IntraMuscular once  insulin glargine Injectable (LANTUS) 13 Unit(s) SubCutaneous at bedtime  insulin lispro (ADMELOG) corrective regimen sliding scale   SubCutaneous three times a day before meals  insulin lispro (ADMELOG) corrective regimen sliding scale   SubCutaneous at bedtime  insulin lispro Injectable (ADMELOG) 7 Unit(s) SubCutaneous three times a day before meals  melatonin 6 milliGRAM(s) Oral at bedtime  metFORMIN 500 milliGRAM(s) Oral two times a day  pantoprazole    Tablet 40 milliGRAM(s) Oral before breakfast  polyethylene glycol 3350 17 Gram(s) Oral two times a day  QUEtiapine 12.5 milliGRAM(s) Oral at bedtime  senna 2 Tablet(s) Oral at bedtime    MEDICATIONS  (PRN):  acetaminophen     Tablet .. 650 milliGRAM(s) Oral every 6 hours PRN Temp greater or equal to 38C (100.4F), Mild Pain (1 - 3)      Vital Signs Last 24 Hrs  T(C): 36.9 (20 Nov 2021 08:05), Max: 36.9 (20 Nov 2021 08:05)  T(F): 98.4 (20 Nov 2021 08:05), Max: 98.4 (20 Nov 2021 08:05)  HR: 80 (20 Nov 2021 08:05) (80 - 89)  BP: 135/93 (20 Nov 2021 08:05) (128/82 - 143/80)  BP(mean): --  RR: 16 (20 Nov 2021 08:05) (16 - 16)  SpO2: 96% (20 Nov 2021 08:05) (96% - 98%)  CAPILLARY BLOOD GLUCOSE      POCT Blood Glucose.: 124 mg/dL (20 Nov 2021 08:08)  POCT Blood Glucose.: 99 mg/dL (19 Nov 2021 22:12)  POCT Blood Glucose.: 123 mg/dL (19 Nov 2021 16:55)  POCT Blood Glucose.: 188 mg/dL (19 Nov 2021 11:35)    I&O's Summary      PHYSICAL EXAM:  GENERAL: NAD, obese male, AAOx3 comfortable in chair  HEAD:  Atraumatic, Normocephalic  EYES: EOMI, PERRLA, conjunctiva and sclera clear  NECK: Supple, No JVD  CHEST/LUNG: Clear to auscultation bilaterally; No wheeze, nonlabored breathing  HEART: Regular rate and rhythm; No murmurs, rubs, or gallops  ABDOMEN: Soft, Nontender, Nondistended; Bowel sounds present  EXTREMITIES:  2+ Peripheral Pulses, No clubbing, cyanosis, or edema  PSYCH: calm, appropriate mood  SKIN: right chest port    LABS:                    RADIOLOGY & ADDITIONAL TESTS:  Results Reviewed:   Imaging Personally Reviewed:  Electrocardiogram Personally Reviewed:    COORDINATION OF CARE:  Care Discussed with Consultants/Other Providers [Y/N]:  Prior or Outpatient Records Reviewed [Y/N]:  
Patient is a 66y old  Male who presents with a chief complaint of STROKE (30 Nov 2021 19:05)      SUBJECTIVE / OVERNIGHT EVENTS:  Pt seen and examined at bedside. No acute events overnight.  Pt denies cp, palpitations, sob, abd pain, N/V, fever, chills.    ROS:  All other review of systems negative    Allergies    No Known Drug Allergies  Nuts (Hives)  Patient stated he had sensation of  throat closing  30 minites after  Epidural pain management resolved it self few minitus after , /  20 y ago Unable to recall MD name or number (Other)  [place] (Unknown)    Intolerances        MEDICATIONS  (STANDING):  amLODIPine   Tablet 10 milliGRAM(s) Oral daily  apixaban 10 milliGRAM(s) Oral two times a day  atorvastatin 80 milliGRAM(s) Oral at bedtime  dextrose 40% Gel 15 Gram(s) Oral once  dextrose 5%. 1000 milliLiter(s) (50 mL/Hr) IV Continuous <Continuous>  dextrose 5%. 1000 milliLiter(s) (100 mL/Hr) IV Continuous <Continuous>  dextrose 50% Injectable 25 Gram(s) IV Push once  dextrose 50% Injectable 12.5 Gram(s) IV Push once  dextrose 50% Injectable 25 Gram(s) IV Push once  ferrous    sulfate 325 milliGRAM(s) Oral daily  glucagon  Injectable 1 milliGRAM(s) IntraMuscular once  insulin glargine Injectable (LANTUS) 13 Unit(s) SubCutaneous at bedtime  insulin lispro (ADMELOG) corrective regimen sliding scale   SubCutaneous three times a day before meals  insulin lispro (ADMELOG) corrective regimen sliding scale   SubCutaneous at bedtime  insulin lispro Injectable (ADMELOG) 7 Unit(s) SubCutaneous three times a day before meals  melatonin 6 milliGRAM(s) Oral at bedtime  metFORMIN 1000 milliGRAM(s) Oral two times a day  pantoprazole    Tablet 40 milliGRAM(s) Oral before breakfast  polyethylene glycol 3350 17 Gram(s) Oral two times a day  QUEtiapine 12.5 milliGRAM(s) Oral at bedtime  senna 2 Tablet(s) Oral at bedtime    MEDICATIONS  (PRN):  acetaminophen     Tablet .. 650 milliGRAM(s) Oral every 6 hours PRN Temp greater or equal to 38C (100.4F), Mild Pain (1 - 3)  sodium chloride 0.65% Nasal 1 Spray(s) Both Nostrils three times a day PRN Nasal Congestion      Vital Signs Last 24 Hrs  T(C): 36.7 (30 Nov 2021 20:16), Max: 36.7 (30 Nov 2021 20:16)  T(F): 98.1 (30 Nov 2021 20:16), Max: 98.1 (30 Nov 2021 20:16)  HR: 81 (01 Dec 2021 07:30) (81 - 96)  BP: 140/90 (01 Dec 2021 07:30) (140/90 - 149/69)  BP(mean): --  RR: 16 (01 Dec 2021 07:30) (16 - 16)  SpO2: 95% (01 Dec 2021 07:30) (93% - 95%)  CAPILLARY BLOOD GLUCOSE      POCT Blood Glucose.: 159 mg/dL (01 Dec 2021 08:38)  POCT Blood Glucose.: 182 mg/dL (30 Nov 2021 21:02)  POCT Blood Glucose.: 156 mg/dL (30 Nov 2021 16:41)  POCT Blood Glucose.: 145 mg/dL (30 Nov 2021 12:19)    I&O's Summary      PHYSICAL EXAM:  GENERAL: NAD, obese male, AAOx3 comfortable in chair  CHEST/LUNG: Clear to auscultation bilaterally; No wheeze, nonlabored breathing  HEART: Regular rate and rhythm; No murmurs, rubs, or gallops  ABDOMEN: Soft, Nontender, Nondistended; Bowel sounds present  EXTREMITIES:  2+ Peripheral Pulses, No clubbing, cyanosis, or edema  PSYCH: calm, appropriate mood  SKIN: right chest port    LABS:                    RADIOLOGY & ADDITIONAL TESTS:  Results Reviewed:   Imaging Personally Reviewed:  Electrocardiogram Personally Reviewed:    COORDINATION OF CARE:  Care Discussed with Consultants/Other Providers [Y/N]:  Prior or Outpatient Records Reviewed [Y/N]:  
CHIEF COMPLAINT: He states he is feeling well. No new complaints. He is participating well in therapy.     Patient is a 66y old  Male who presents with a chief complaint of STROKE (17 Nov 2021 13:07)    HPI:   66 year old M with a h/o DM type 2, HTN, Afib (on eliquis, however on hold since 11/8 for chemoport placement on 11/11), prior stroke (no deficits), colon cancer (invasive adenocarcinoma of the rectum), who initially presented to [facility] on 11/13/21 with speech and facial droop. Patient found by wife on the floor with slurred speech. [facility] reported to be negative for acute infarct. 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 Patient transferred to [facility] for mechanical thrombectomy. NIHSS on arrival: 16, Not a candidate for tPA due to presentation outside the window. He underwent thrombectomy with TICI2b flow restored on 11/13/21. Etiology cardioembolic vs hypercoagulable/malignancy. He was evaluated for acute inpatient rehab and was admitted to [facility] on 11/17.        (17 Nov 2021 13:07)      PAST MEDICAL & SURGICAL HISTORY:  Lumbago    Lumbago with sciatica of right side    Benign hypertension  dx 10 years    H/O renal calculi  ESWL right side yrs ago  last stone one year ago  (passed on its own)    Obese    Eczema    Diabetes mellitus type II  on Meds 4/2012    Chronic atrial fibrillation    Colon cancer    S/P tonsillectomy    Port-A-Cath in place        Allergies    No Known Drug Allergies  Nuts (Hives)  Patient stated he had sensation of  throat closing  30 minites after  Epidural pain management resolved it self few minitus after , /  20 y ago Unable to recall MD name or number (Other)  [place] (Unknown)    Intolerances          PHYSICAL EXAM    Vital Signs Last 24 Hrs  T(C): 36.8 (02 Dec 2021 07:51), Max: 37.2 (01 Dec 2021 20:37)  T(F): 98.2 (02 Dec 2021 07:51), Max: 99 (01 Dec 2021 20:37)  HR: 92 (02 Dec 2021 07:51) (86 - 95)  BP: 155/73 (02 Dec 2021 07:51) (136/83 - 155/73)  BP(mean): --  RR: 16 (02 Dec 2021 07:51) (16 - 16)  SpO2: 95% (02 Dec 2021 07:51) (93% - 96%)    PHYSICAL EXAM  Constitutional - NAD, Comfortable  HEENT - NCAT, EOMI  Neck - Supple, No limited ROM  Chest - CTA bilaterally  Cardiovascular - RRR, S1S2  Abdomen -BS+, Soft, NTND  Extremities - No C/C/E, No calf tenderness   Neurologic Exam - dysarthria improving, disconjugate gaze, mild weakness             LABS:                          10.8   8.45  )-----------( 350      ( 02 Dec 2021 07:20 )             35.0     12-02    141  |  104  |  18  ----------------------------<  132<H>  3.6   |  26  |  1.13    Ca    8.8      02 Dec 2021 07:20    TPro  6.7  /  Alb  2.6<L>  /  TBili  0.4  /  DBili  x   /  AST  27  /  ALT  22  /  AlkPhos  110  12-02    LIVER FUNCTIONS - ( 02 Dec 2021 07:20 )  Alb: 2.6 g/dL / Pro: 6.7 g/dL / ALK PHOS: 110 U/L / ALT: 22 U/L / AST: 27 U/L / GGT: x                   CAPILLARY BLOOD GLUCOSE      POCT Blood Glucose.: 143 mg/dL (02 Dec 2021 12:04)  POCT Blood Glucose.: 149 mg/dL (02 Dec 2021 07:55)  POCT Blood Glucose.: 133 mg/dL (01 Dec 2021 21:28)  POCT Blood Glucose.: 144 mg/dL (01 Dec 2021 16:57)          MEDICATIONS  (STANDING):  amLODIPine   Tablet 10 milliGRAM(s) Oral daily  apixaban 10 milliGRAM(s) Oral two times a day  atorvastatin 80 milliGRAM(s) Oral at bedtime  dextrose 40% Gel 15 Gram(s) Oral once  dextrose 5%. 1000 milliLiter(s) (50 mL/Hr) IV Continuous <Continuous>  dextrose 5%. 1000 milliLiter(s) (100 mL/Hr) IV Continuous <Continuous>  dextrose 50% Injectable 25 Gram(s) IV Push once  dextrose 50% Injectable 12.5 Gram(s) IV Push once  dextrose 50% Injectable 25 Gram(s) IV Push once  ferrous    sulfate 325 milliGRAM(s) Oral daily  glucagon  Injectable 1 milliGRAM(s) IntraMuscular once  insulin glargine Injectable (LANTUS) 13 Unit(s) SubCutaneous at bedtime  insulin lispro (ADMELOG) corrective regimen sliding scale   SubCutaneous three times a day before meals  insulin lispro (ADMELOG) corrective regimen sliding scale   SubCutaneous at bedtime  insulin lispro Injectable (ADMELOG) 7 Unit(s) SubCutaneous three times a day before meals  melatonin 6 milliGRAM(s) Oral at bedtime  metFORMIN 1000 milliGRAM(s) Oral two times a day  pantoprazole    Tablet 40 milliGRAM(s) Oral before breakfast  polyethylene glycol 3350 17 Gram(s) Oral two times a day  QUEtiapine 12.5 milliGRAM(s) Oral at bedtime  senna 2 Tablet(s) Oral at bedtime    MEDICATIONS  (PRN):  acetaminophen     Tablet .. 650 milliGRAM(s) Oral every 6 hours PRN Temp greater or equal to 38C (100.4F), Mild Pain (1 - 3)  sodium chloride 0.65% Nasal 1 Spray(s) Both Nostrils three times a day PRN Nasal Congestion  
Patient is a 66y old  Male who presents with a chief complaint of STROKE (23 Nov 2021 12:06)      SUBJECTIVE / OVERNIGHT EVENTS:  Pt seen and examined at bedside. No acute events overnight.  Pt denies cp, palpitations, sob, abd pain, N/V, fever, chills.    ROS:  All other review of systems negative    Allergies    No Known Drug Allergies  Nuts (Hives)  Patient stated he had sensation of  throat closing  30 minites after  Epidural pain management resolved it self few minitus after , /  20 y ago Unable to recall MD name or number (Other)  [place] (Unknown)    Intolerances        MEDICATIONS  (STANDING):  amLODIPine   Tablet 10 milliGRAM(s) Oral daily  atorvastatin 80 milliGRAM(s) Oral at bedtime  dextrose 40% Gel 15 Gram(s) Oral once  dextrose 5%. 1000 milliLiter(s) (50 mL/Hr) IV Continuous <Continuous>  dextrose 5%. 1000 milliLiter(s) (100 mL/Hr) IV Continuous <Continuous>  dextrose 50% Injectable 25 Gram(s) IV Push once  dextrose 50% Injectable 12.5 Gram(s) IV Push once  dextrose 50% Injectable 25 Gram(s) IV Push once  enoxaparin Injectable 120 milliGRAM(s) SubCutaneous two times a day  ferrous    sulfate 325 milliGRAM(s) Oral daily  glucagon  Injectable 1 milliGRAM(s) IntraMuscular once  insulin glargine Injectable (LANTUS) 13 Unit(s) SubCutaneous at bedtime  insulin lispro (ADMELOG) corrective regimen sliding scale   SubCutaneous three times a day before meals  insulin lispro (ADMELOG) corrective regimen sliding scale   SubCutaneous at bedtime  insulin lispro Injectable (ADMELOG) 7 Unit(s) SubCutaneous three times a day before meals  melatonin 6 milliGRAM(s) Oral at bedtime  metFORMIN 1000 milliGRAM(s) Oral two times a day  pantoprazole    Tablet 40 milliGRAM(s) Oral before breakfast  polyethylene glycol 3350 17 Gram(s) Oral two times a day  QUEtiapine 12.5 milliGRAM(s) Oral at bedtime  senna 2 Tablet(s) Oral at bedtime    MEDICATIONS  (PRN):  acetaminophen     Tablet .. 650 milliGRAM(s) Oral every 6 hours PRN Temp greater or equal to 38C (100.4F), Mild Pain (1 - 3)      Vital Signs Last 24 Hrs  T(C): 36.7 (24 Nov 2021 07:17), Max: 36.8 (24 Nov 2021 07:08)  T(F): 98 (24 Nov 2021 07:17), Max: 98.3 (24 Nov 2021 07:08)  HR: 66 (24 Nov 2021 07:17) (66 - 95)  BP: 146/77 (24 Nov 2021 07:17) (132/77 - 159/88)  BP(mean): --  RR: 15 (24 Nov 2021 07:17) (15 - 16)  SpO2: 96% (24 Nov 2021 07:17) (95% - 96%)  CAPILLARY BLOOD GLUCOSE      POCT Blood Glucose.: 137 mg/dL (24 Nov 2021 07:07)  POCT Blood Glucose.: 173 mg/dL (23 Nov 2021 21:20)  POCT Blood Glucose.: 117 mg/dL (23 Nov 2021 16:31)  POCT Blood Glucose.: 186 mg/dL (23 Nov 2021 11:37)    I&O's Summary      PHYSICAL EXAM:  GENERAL: NAD, obese male, AAOx3 comfortable in chair  CHEST/LUNG: Clear to auscultation bilaterally; No wheeze, nonlabored breathing  HEART: Regular rate and rhythm; No murmurs, rubs, or gallops  ABDOMEN: Soft, Nontender, Nondistended; Bowel sounds present  EXTREMITIES:  2+ Peripheral Pulses, No clubbing, cyanosis, or edema  PSYCH: calm, appropriate mood  SKIN: right chest port    LABS:                    RADIOLOGY & ADDITIONAL TESTS:  Results Reviewed:   Imaging Personally Reviewed:  Electrocardiogram Personally Reviewed:    COORDINATION OF CARE:  Care Discussed with Consultants/Other Providers [Y/N]:  Prior or Outpatient Records Reviewed [Y/N]:  
CHIEF COMPLAINT: He had a slide from chair to floor this morning without injury. No headstrike. No other bruising or complaints. He is at neurologic baseline. He states he was trying to get to the bathroom by himself.     Patient is a 66y old  Male who presents with a chief complaint of STROKE (17 Nov 2021 13:07)    HPI:   66 year old M with a h/o DM type 2, HTN, Afib (on eliquis, however on hold since 11/8 for chemoport placement on 11/11), prior stroke (no deficits), colon cancer (invasive adenocarcinoma of the rectum), who initially presented to [facility] on 11/13/21 with speech and facial droop. Patient found by wife on the floor with slurred speech. He had imaging done in [facility] with CTH reported to be negative for acute infarct. 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 Patient transferred to [facility] for mechanical thrombectomy. NIHSS on arrival: 16, Not a candidate for tPA due to presentation outside the window. He underwent thrombectomy with TICI2b flow restored on 11/13/21. Etiology cardioembolic vs hypercoagulable/malignancy. He was evaluated for acute inpatient rehab and was admitted to [facility] on 11/17.        (17 Nov 2021 13:07)      PAST MEDICAL & SURGICAL HISTORY:  Lumbago    Lumbago with sciatica of right side    Benign hypertension  dx 10 years    H/O renal calculi  ESWL right side yrs ago  last stone one year ago  (passed on its own)    Obese    Eczema    Diabetes mellitus type II  on Meds 4/2012    Chronic atrial fibrillation    Colon cancer    S/P tonsillectomy    Port-A-Cath in place        Allergies    No Known Drug Allergies  Nuts (Hives)  Patient stated he had sensation of  throat closing  30 minites after  Epidural pain management resolved it self few minitus after , /  20 y ago Unable to recall MD name or number (Other)  [place] (Unknown)    Intolerances          PHYSICAL EXAM  Vital Signs Last 24 Hrs  T(C): 36.4 (29 Nov 2021 07:45), Max: 37 (28 Nov 2021 19:24)  T(F): 97.6 (29 Nov 2021 07:45), Max: 98.6 (28 Nov 2021 19:24)  HR: 83 (29 Nov 2021 07:45) (76 - 97)  BP: 138/87 (29 Nov 2021 07:45) (136/85 - 153/76)  BP(mean): --  RR: 15 (29 Nov 2021 07:45) (15 - 15)  SpO2: 95% (29 Nov 2021 07:45) (95% - 97%)    PHYSICAL EXAM  Constitutional - NAD, Comfortable  HEENT - NCAT, EOMI  Neck - Supple, No limited ROM  Chest - CTA bilaterally  Cardiovascular - RRR, S1S2  Abdomen -BS+, Soft, NTND  Extremities - No C/C/E, No calf tenderness   Neurologic Exam - dysarthria improving, disconjugate gaze, mild weakness           LABS:                          11.2   7.64  )-----------( 337      ( 29 Nov 2021 07:46 )             36.5     11-29    142  |  106  |  11  ----------------------------<  148<H>  3.4<L>   |  25  |  0.95    Ca    8.6      29 Nov 2021 07:46    TPro  6.9  /  Alb  2.7<L>  /  TBili  0.5  /  DBili  x   /  AST  17  /  ALT  25  /  AlkPhos  94  11-29    LIVER FUNCTIONS - ( 29 Nov 2021 07:46 )  Alb: 2.7 g/dL / Pro: 6.9 g/dL / ALK PHOS: 94 U/L / ALT: 25 U/L / AST: 17 U/L / GGT: x             CAPILLARY BLOOD GLUCOSE      POCT Blood Glucose.: 155 mg/dL (29 Nov 2021 12:09)  POCT Blood Glucose.: 136 mg/dL (29 Nov 2021 08:20)  POCT Blood Glucose.: 122 mg/dL (28 Nov 2021 21:18)  POCT Blood Glucose.: 124 mg/dL (28 Nov 2021 16:40)        MEDICATIONS  (STANDING):  amLODIPine   Tablet 10 milliGRAM(s) Oral daily  atorvastatin 80 milliGRAM(s) Oral at bedtime  dextrose 40% Gel 15 Gram(s) Oral once  dextrose 5%. 1000 milliLiter(s) (50 mL/Hr) IV Continuous <Continuous>  dextrose 5%. 1000 milliLiter(s) (100 mL/Hr) IV Continuous <Continuous>  dextrose 50% Injectable 25 Gram(s) IV Push once  dextrose 50% Injectable 12.5 Gram(s) IV Push once  dextrose 50% Injectable 25 Gram(s) IV Push once  enoxaparin Injectable 120 milliGRAM(s) SubCutaneous two times a day  ferrous    sulfate 325 milliGRAM(s) Oral daily  glucagon  Injectable 1 milliGRAM(s) IntraMuscular once  insulin glargine Injectable (LANTUS) 13 Unit(s) SubCutaneous at bedtime  insulin lispro (ADMELOG) corrective regimen sliding scale   SubCutaneous three times a day before meals  insulin lispro (ADMELOG) corrective regimen sliding scale   SubCutaneous at bedtime  insulin lispro Injectable (ADMELOG) 7 Unit(s) SubCutaneous three times a day before meals  melatonin 6 milliGRAM(s) Oral at bedtime  metFORMIN 1000 milliGRAM(s) Oral two times a day  pantoprazole    Tablet 40 milliGRAM(s) Oral before breakfast  polyethylene glycol 3350 17 Gram(s) Oral two times a day  QUEtiapine 12.5 milliGRAM(s) Oral at bedtime  senna 2 Tablet(s) Oral at bedtime    MEDICATIONS  (PRN):  acetaminophen     Tablet .. 650 milliGRAM(s) Oral every 6 hours PRN Temp greater or equal to 38C (100.4F), Mild Pain (1 - 3)  sodium chloride 0.65% Nasal 1 Spray(s) Both Nostrils three times a day PRN Nasal Congestion  
CHIEF COMPLAINT: He states he has been sleeping much better than last week. He has no new complaints. Denies headache.     Patient is a 66y old  Male who presents with a chief complaint of STROKE (17 Nov 2021 13:07)    HPI:   66 year old M with a h/o DM type 2, HTN, Afib (on eliquis, however on hold since 11/8 for chemoport placement on 11/11), prior stroke (no deficits), colon cancer (invasive adenocarcinoma of the rectum), who initially presented to [facility] on 11/13/21 with speech and facial droop. Patient found by wife on the floor with slurred speech. He had imaging done in [facility] with CTH reported to be negative for acute infarct. 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 Patient transferred to [facility] for mechanical thrombectomy. NIHSS on arrival: 16, Not a candidate for tPA due to presentation outside the window. He underwent thrombectomy with TICI2b flow restored on 11/13/21. Etiology cardioembolic vs hypercoagulable/malignancy. He was evaluated for acute inpatient rehab and was admitted to [facility] on 11/17.        (17 Nov 2021 13:07)      PAST MEDICAL & SURGICAL HISTORY:  Lumbago    Lumbago with sciatica of right side    Benign hypertension  dx 10 years    H/O renal calculi  ESWL right side yrs ago  last stone one year ago  (passed on its own)    Obese    Eczema    Diabetes mellitus type II  on Meds 4/2012    Chronic atrial fibrillation    Colon cancer    S/P tonsillectomy    Port-A-Cath in place        Allergies    No Known Drug Allergies  Nuts (Hives)  Patient stated he had sensation of  throat closing  30 minites after  Epidural pain management resolved it self few minitus after , /  20 y ago Unable to recall MD name or number (Other)  [place] (Unknown)    Intolerances          PHYSICAL EXAM    Vital Signs Last 24 Hrs  T(C): 36.6 (21 Nov 2021 21:04), Max: 36.6 (21 Nov 2021 21:04)  T(F): 97.9 (21 Nov 2021 21:04), Max: 97.9 (21 Nov 2021 21:04)  HR: 91 (22 Nov 2021 07:48) (84 - 93)  BP: 127/80 (22 Nov 2021 07:48) (127/80 - 141/84)  BP(mean): --  RR: 16 (22 Nov 2021 07:48) (16 - 16)  SpO2: 97% (22 Nov 2021 07:48) (95% - 97%)    PHYSICAL EXAM  Constitutional - NAD, Comfortable  HEENT - NCAT, EOMI  Neck - Supple, No limited ROM  Chest - CTA bilaterally  Cardiovascular - RRR, S1S2  Abdomen -BS+, Soft, NTND  Extremities - No C/C/E, No calf tenderness   Neurologic Exam - severe dysarthria, disconjugate gaze       LABS:                          11.7   5.74  )-----------( 346      ( 22 Nov 2021 05:30 )             38.0     11-22    142  |  106  |  21  ----------------------------<  150<H>  3.4<L>   |  25  |  1.23    Ca    8.7      22 Nov 2021 05:30    TPro  6.8  /  Alb  2.8<L>  /  TBili  0.3  /  DBili  x   /  AST  41<H>  /  ALT  42  /  AlkPhos  106  11-22    LIVER FUNCTIONS - ( 22 Nov 2021 05:30 )  Alb: 2.8 g/dL / Pro: 6.8 g/dL / ALK PHOS: 106 U/L / ALT: 42 U/L / AST: 41 U/L / GGT: x                 CAPILLARY BLOOD GLUCOSE      POCT Blood Glucose.: 150 mg/dL (22 Nov 2021 11:51)  POCT Blood Glucose.: 146 mg/dL (22 Nov 2021 08:00)  POCT Blood Glucose.: 132 mg/dL (21 Nov 2021 21:04)  POCT Blood Glucose.: 163 mg/dL (21 Nov 2021 16:45)        MEDICATIONS  (STANDING):  amLODIPine   Tablet 10 milliGRAM(s) Oral daily  atorvastatin 80 milliGRAM(s) Oral at bedtime  dextrose 40% Gel 15 Gram(s) Oral once  dextrose 5%. 1000 milliLiter(s) (50 mL/Hr) IV Continuous <Continuous>  dextrose 5%. 1000 milliLiter(s) (100 mL/Hr) IV Continuous <Continuous>  dextrose 50% Injectable 25 Gram(s) IV Push once  dextrose 50% Injectable 12.5 Gram(s) IV Push once  dextrose 50% Injectable 25 Gram(s) IV Push once  enoxaparin Injectable 120 milliGRAM(s) SubCutaneous two times a day  ferrous    sulfate 325 milliGRAM(s) Oral daily  glucagon  Injectable 1 milliGRAM(s) IntraMuscular once  insulin glargine Injectable (LANTUS) 13 Unit(s) SubCutaneous at bedtime  insulin lispro (ADMELOG) corrective regimen sliding scale   SubCutaneous three times a day before meals  insulin lispro (ADMELOG) corrective regimen sliding scale   SubCutaneous at bedtime  insulin lispro Injectable (ADMELOG) 7 Unit(s) SubCutaneous three times a day before meals  melatonin 6 milliGRAM(s) Oral at bedtime  metFORMIN 500 milliGRAM(s) Oral two times a day  pantoprazole    Tablet 40 milliGRAM(s) Oral before breakfast  polyethylene glycol 3350 17 Gram(s) Oral two times a day  potassium chloride    Tablet ER 20 milliEquivalent(s) Oral every 2 hours  QUEtiapine 12.5 milliGRAM(s) Oral at bedtime  senna 2 Tablet(s) Oral at bedtime    MEDICATIONS  (PRN):  acetaminophen     Tablet .. 650 milliGRAM(s) Oral every 6 hours PRN Temp greater or equal to 38C (100.4F), Mild Pain (1 - 3)  
CHIEF COMPLAINT: He states he is feeling well. No new complaints.       Patient is a 66y old  Male who presents with a chief complaint of STROKE (17 Nov 2021 13:07)    HPI:   66 year old M with a h/o DM type 2, HTN, Afib (on eliquis, however on hold since 11/8 for chemoport placement on 11/11), prior stroke (no deficits), colon cancer (invasive adenocarcinoma of the rectum), who initially presented to [facility] on 11/13/21 with speech and facial droop. Patient found by wife on the floor with slurred speech. He had imaging done in [facility] with CTH reported to be negative for acute infarct. 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 Patient transferred to [facility] for mechanical thrombectomy. NIHSS on arrival: 16, Not a candidate for tPA due to presentation outside the window. He underwent thrombectomy with TICI2b flow restored on 11/13/21. Etiology cardioembolic vs hypercoagulable/malignancy. He was evaluated for acute inpatient rehab and was admitted to [facility] on 11/17.        (17 Nov 2021 13:07)      PAST MEDICAL & SURGICAL HISTORY:  Lumbago    Lumbago with sciatica of right side    Benign hypertension  dx 10 years    H/O renal calculi  ESWL right side yrs ago  last stone one year ago  (passed on its own)    Obese    Eczema    Diabetes mellitus type II  on Meds 4/2012    Chronic atrial fibrillation    Colon cancer    S/P tonsillectomy    Port-A-Cath in place        Allergies    No Known Drug Allergies  Nuts (Hives)  Patient stated he had sensation of  throat closing  30 minites after  Epidural pain management resolved it self few minitus after , /  20 y ago Unable to recall MD name or number (Other)  [place] (Unknown)    Intolerances          PHYSICAL EXAM    Vital Signs Last 24 Hrs  T(C): 36.7 (30 Nov 2021 20:16), Max: 36.7 (30 Nov 2021 20:16)  T(F): 98.1 (30 Nov 2021 20:16), Max: 98.1 (30 Nov 2021 20:16)  HR: 81 (01 Dec 2021 07:30) (81 - 96)  BP: 140/90 (01 Dec 2021 07:30) (140/90 - 149/69)  BP(mean): --  RR: 16 (01 Dec 2021 07:30) (16 - 16)  SpO2: 95% (01 Dec 2021 07:30) (93% - 95%)    PHYSICAL EXAM  Constitutional - NAD, Comfortable  HEENT - NCAT, EOMI  Neck - Supple, No limited ROM  Chest - CTA bilaterally  Cardiovascular - RRR, S1S2  Abdomen -BS+, Soft, NTND  Extremities - No C/C/E, No calf tenderness   Neurologic Exam - dysarthria improving, disconjugate gaze, mild weakness           LABS:                          11.2   7.64  )-----------( 337      ( 29 Nov 2021 07:46 )             36.5     11-29    142  |  106  |  11  ----------------------------<  148<H>  3.4<L>   |  25  |  0.95    Ca    8.6      29 Nov 2021 07:46    TPro  6.9  /  Alb  2.7<L>  /  TBili  0.5  /  DBili  x   /  AST  17  /  ALT  25  /  AlkPhos  94  11-29    LIVER FUNCTIONS - ( 29 Nov 2021 07:46 )  Alb: 2.7 g/dL / Pro: 6.9 g/dL / ALK PHOS: 94 U/L / ALT: 25 U/L / AST: 17 U/L / GGT: x             CAPILLARY BLOOD GLUCOSE      POCT Blood Glucose.: 159 mg/dL (01 Dec 2021 08:38)  POCT Blood Glucose.: 182 mg/dL (30 Nov 2021 21:02)  POCT Blood Glucose.: 156 mg/dL (30 Nov 2021 16:41)  POCT Blood Glucose.: 145 mg/dL (30 Nov 2021 12:19)        MEDICATIONS  (STANDING):  amLODIPine   Tablet 10 milliGRAM(s) Oral daily  apixaban 10 milliGRAM(s) Oral two times a day  atorvastatin 80 milliGRAM(s) Oral at bedtime  dextrose 40% Gel 15 Gram(s) Oral once  dextrose 5%. 1000 milliLiter(s) (50 mL/Hr) IV Continuous <Continuous>  dextrose 5%. 1000 milliLiter(s) (100 mL/Hr) IV Continuous <Continuous>  dextrose 50% Injectable 25 Gram(s) IV Push once  dextrose 50% Injectable 12.5 Gram(s) IV Push once  dextrose 50% Injectable 25 Gram(s) IV Push once  ferrous    sulfate 325 milliGRAM(s) Oral daily  glucagon  Injectable 1 milliGRAM(s) IntraMuscular once  insulin glargine Injectable (LANTUS) 13 Unit(s) SubCutaneous at bedtime  insulin lispro (ADMELOG) corrective regimen sliding scale   SubCutaneous three times a day before meals  insulin lispro (ADMELOG) corrective regimen sliding scale   SubCutaneous at bedtime  insulin lispro Injectable (ADMELOG) 7 Unit(s) SubCutaneous three times a day before meals  melatonin 6 milliGRAM(s) Oral at bedtime  metFORMIN 1000 milliGRAM(s) Oral two times a day  pantoprazole    Tablet 40 milliGRAM(s) Oral before breakfast  polyethylene glycol 3350 17 Gram(s) Oral two times a day  QUEtiapine 12.5 milliGRAM(s) Oral at bedtime  senna 2 Tablet(s) Oral at bedtime    MEDICATIONS  (PRN):  acetaminophen     Tablet .. 650 milliGRAM(s) Oral every 6 hours PRN Temp greater or equal to 38C (100.4F), Mild Pain (1 - 3)  sodium chloride 0.65% Nasal 1 Spray(s) Both Nostrils three times a day PRN Nasal Congestion  
CHIEF COMPLAINT: He states he slept well. Denies headache, chest pain, or SOB. He is participating well in therapy.       Patient is a 66y old  Male who presents with a chief complaint of STROKE (17 Nov 2021 13:07)    HPI:   66 year old M with a h/o DM type 2, HTN, Afib (on eliquis, however on hold since 11/8 for chemoport placement on 11/11), prior stroke (no deficits), colon cancer (invasive adenocarcinoma of the rectum), who initially presented to [facility] on 11/13/21 with speech and facial droop. Patient found by wife on the floor with slurred speech. He had imaging done in [facility] with CTH reported to be negative for acute infarct. 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 Patient transferred to [facility] for mechanical thrombectomy. NIHSS on arrival: 16, Not a candidate for tPA due to presentation outside the window. He underwent thrombectomy with TICI2b flow restored on 11/13/21. Etiology cardioembolic vs hypercoagulable/malignancy. He was evaluated for acute inpatient rehab and was admitted to [facility] on 11/17.        (17 Nov 2021 13:07)      PAST MEDICAL & SURGICAL HISTORY:  Lumbago    Lumbago with sciatica of right side    Benign hypertension  dx 10 years    H/O renal calculi  ESWL right side yrs ago  last stone one year ago  (passed on its own)    Obese    Eczema    Diabetes mellitus type II  on Meds 4/2012    Chronic atrial fibrillation    Colon cancer    S/P tonsillectomy    Port-A-Cath in place        Allergies    No Known Drug Allergies  Nuts (Hives)  Patient stated he had sensation of  throat closing  30 minites after  Epidural pain management resolved it self few minitus after , /  20 y ago Unable to recall MD name or number (Other)  [place] (Unknown)    Intolerances          PHYSICAL EXAM    Vital Signs Last 24 Hrs  T(C): 36.9 (30 Nov 2021 07:53), Max: 37.1 (29 Nov 2021 20:40)  T(F): 98.4 (30 Nov 2021 07:53), Max: 98.7 (29 Nov 2021 20:40)  HR: 86 (30 Nov 2021 07:53) (84 - 94)  BP: 138/86 (30 Nov 2021 07:53) (135/79 - 146/94)  BP(mean): --  RR: 16 (30 Nov 2021 07:53) (16 - 16)  SpO2: 95% (30 Nov 2021 07:53) (95% - 97%)    PHYSICAL EXAM  Constitutional - NAD, Comfortable  HEENT - NCAT, EOMI  Neck - Supple, No limited ROM  Chest - CTA bilaterally  Cardiovascular - RRR, S1S2  Abdomen -BS+, Soft, NTND  Extremities - No C/C/E, No calf tenderness   Neurologic Exam - dysarthria improving, disconjugate gaze, mild weakness           LABS:                          11.2   7.64  )-----------( 337      ( 29 Nov 2021 07:46 )             36.5     11-29    142  |  106  |  11  ----------------------------<  148<H>  3.4<L>   |  25  |  0.95    Ca    8.6      29 Nov 2021 07:46    TPro  6.9  /  Alb  2.7<L>  /  TBili  0.5  /  DBili  x   /  AST  17  /  ALT  25  /  AlkPhos  94  11-29    LIVER FUNCTIONS - ( 29 Nov 2021 07:46 )  Alb: 2.7 g/dL / Pro: 6.9 g/dL / ALK PHOS: 94 U/L / ALT: 25 U/L / AST: 17 U/L / GGT: x             CAPILLARY BLOOD GLUCOSE      POCT Blood Glucose.: 141 mg/dL (30 Nov 2021 07:58)  POCT Blood Glucose.: 138 mg/dL (29 Nov 2021 21:05)  POCT Blood Glucose.: 177 mg/dL (29 Nov 2021 16:41)  POCT Blood Glucose.: 155 mg/dL (29 Nov 2021 12:09)        MEDICATIONS  (STANDING):  amLODIPine   Tablet 10 milliGRAM(s) Oral daily  atorvastatin 80 milliGRAM(s) Oral at bedtime  dextrose 40% Gel 15 Gram(s) Oral once  dextrose 5%. 1000 milliLiter(s) (50 mL/Hr) IV Continuous <Continuous>  dextrose 5%. 1000 milliLiter(s) (100 mL/Hr) IV Continuous <Continuous>  dextrose 50% Injectable 25 Gram(s) IV Push once  dextrose 50% Injectable 12.5 Gram(s) IV Push once  dextrose 50% Injectable 25 Gram(s) IV Push once  enoxaparin Injectable 120 milliGRAM(s) SubCutaneous two times a day  ferrous    sulfate 325 milliGRAM(s) Oral daily  glucagon  Injectable 1 milliGRAM(s) IntraMuscular once  insulin glargine Injectable (LANTUS) 13 Unit(s) SubCutaneous at bedtime  insulin lispro (ADMELOG) corrective regimen sliding scale   SubCutaneous three times a day before meals  insulin lispro (ADMELOG) corrective regimen sliding scale   SubCutaneous at bedtime  insulin lispro Injectable (ADMELOG) 7 Unit(s) SubCutaneous three times a day before meals  melatonin 6 milliGRAM(s) Oral at bedtime  metFORMIN 1000 milliGRAM(s) Oral two times a day  pantoprazole    Tablet 40 milliGRAM(s) Oral before breakfast  polyethylene glycol 3350 17 Gram(s) Oral two times a day  QUEtiapine 12.5 milliGRAM(s) Oral at bedtime  senna 2 Tablet(s) Oral at bedtime    MEDICATIONS  (PRN):  acetaminophen     Tablet .. 650 milliGRAM(s) Oral every 6 hours PRN Temp greater or equal to 38C (100.4F), Mild Pain (1 - 3)  sodium chloride 0.65% Nasal 1 Spray(s) Both Nostrils three times a day PRN Nasal Congestion  
CHIEF COMPLAINT: He states his headache is well controlled. He has continued to sleep well. Denies chest pain, chills, nausea, vomiting, SOB, or cough.     Patient is a 66y old  Male who presents with a chief complaint of STROKE (17 Nov 2021 13:07)    HPI:   66 year old M with a h/o DM type 2, HTN, Afib (on eliquis, however on hold since 11/8 for chemoport placement on 11/11), prior stroke (no deficits), colon cancer (invasive adenocarcinoma of the rectum), who initially presented to [facility] on 11/13/21 with speech and facial droop. Patient found by wife on the floor with slurred speech. He had imaging done in [facility] with CTH reported to be negative for acute infarct. 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 Patient transferred to [facility] for mechanical thrombectomy. NIHSS on arrival: 16, Not a candidate for tPA due to presentation outside the window. He underwent thrombectomy with TICI2b flow restored on 11/13/21. Etiology cardioembolic vs hypercoagulable/malignancy. He was evaluated for acute inpatient rehab [facility]        (17 Nov 2021 13:07)      PAST MEDICAL & SURGICAL HISTORY:  Lumbago    Lumbago with sciatica of right side    Benign hypertension  dx 10 years    H/O renal calculi  ESWL right side yrs ago  last stone one year ago  (passed on its own)    Obese    Eczema    Diabetes mellitus type II  on Meds 4/2012    Chronic atrial fibrillation    Colon cancer    S/P tonsillectomy    Port-A-Cath in place        Allergies    No Known Drug Allergies  Nuts (Hives)  Patient stated he had sensation of  throat closing  30 minites after  Epidural pain management resolved it self few minitus after , /  20 y ago Unable to recall MD name or number (Other)  [place] (Unknown)    Intolerances          PHYSICAL EXAM    Vital Signs Last 24 Hrs  T(C): 36.7 (26 Nov 2021 07:33), Max: 36.9 (25 Nov 2021 19:54)  T(F): 98.1 (26 Nov 2021 07:33), Max: 98.5 (25 Nov 2021 19:54)  HR: 83 (26 Nov 2021 07:33) (83 - 87)  BP: 125/77 (26 Nov 2021 07:33) (125/77 - 145/83)  BP(mean): --  RR: 16 (26 Nov 2021 07:33) (16 - 16)  SpO2: 97% (26 Nov 2021 07:33) (96% - 97%)    PHYSICAL EXAM  Constitutional - NAD, Comfortable  HEENT - NCAT, EOMI  Neck - Supple, No limited ROM  Chest - CTA bilaterally  Cardiovascular - RRR, S1S2  Abdomen -BS+, Soft, NTND  Extremities - No C/C/E, No calf tenderness   Neurologic Exam - dysarthria improving, disconjugate gaze, mild weakness         LABS:                          11.1   6.40  )-----------( 353      ( 26 Nov 2021 05:30 )             37.0     11-26    142  |  106  |  15  ----------------------------<  166<H>  3.5   |  26  |  1.28    Ca    8.7      26 Nov 2021 05:30    TPro  6.8  /  Alb  2.8<L>  /  TBili  0.3  /  DBili  x   /  AST  25  /  ALT  37  /  AlkPhos  104  11-26    LIVER FUNCTIONS - ( 26 Nov 2021 05:30 )  Alb: 2.8 g/dL / Pro: 6.8 g/dL / ALK PHOS: 104 U/L / ALT: 37 U/L / AST: 25 U/L / GGT: x               CAPILLARY BLOOD GLUCOSE      POCT Blood Glucose.: 158 mg/dL (26 Nov 2021 07:35)  POCT Blood Glucose.: 139 mg/dL (25 Nov 2021 21:10)  POCT Blood Glucose.: 182 mg/dL (25 Nov 2021 16:53)  POCT Blood Glucose.: 146 mg/dL (25 Nov 2021 12:13)          MEDICATIONS  (STANDING):  amLODIPine   Tablet 10 milliGRAM(s) Oral daily  atorvastatin 80 milliGRAM(s) Oral at bedtime  dextrose 40% Gel 15 Gram(s) Oral once  dextrose 5%. 1000 milliLiter(s) (50 mL/Hr) IV Continuous <Continuous>  dextrose 5%. 1000 milliLiter(s) (100 mL/Hr) IV Continuous <Continuous>  dextrose 50% Injectable 25 Gram(s) IV Push once  dextrose 50% Injectable 12.5 Gram(s) IV Push once  dextrose 50% Injectable 25 Gram(s) IV Push once  enoxaparin Injectable 120 milliGRAM(s) SubCutaneous two times a day  ferrous    sulfate 325 milliGRAM(s) Oral daily  glucagon  Injectable 1 milliGRAM(s) IntraMuscular once  insulin glargine Injectable (LANTUS) 13 Unit(s) SubCutaneous at bedtime  insulin lispro (ADMELOG) corrective regimen sliding scale   SubCutaneous three times a day before meals  insulin lispro (ADMELOG) corrective regimen sliding scale   SubCutaneous at bedtime  insulin lispro Injectable (ADMELOG) 7 Unit(s) SubCutaneous three times a day before meals  melatonin 6 milliGRAM(s) Oral at bedtime  metFORMIN 1000 milliGRAM(s) Oral two times a day  pantoprazole    Tablet 40 milliGRAM(s) Oral before breakfast  polyethylene glycol 3350 17 Gram(s) Oral two times a day  QUEtiapine 12.5 milliGRAM(s) Oral at bedtime  senna 2 Tablet(s) Oral at bedtime    MEDICATIONS  (PRN):  acetaminophen     Tablet .. 650 milliGRAM(s) Oral every 6 hours PRN Temp greater or equal to 38C (100.4F), Mild Pain (1 - 3)  
Patient is a 66y old  Male who presents with a chief complaint of STROKE (21 Nov 2021 08:23)      SUBJECTIVE / OVERNIGHT EVENTS:  Pt seen and examined at bedside. No acute events overnight.  Pt denies cp, palpitations, sob, abd pain, N/V, fever, chills.    ROS:  All other review of systems negative    Allergies    No Known Drug Allergies  Nuts (Hives)  Patient stated he had sensation of  throat closing  30 minites after  Epidural pain management resolved it self few minitus after , /  20 y ago Unable to recall MD name or number (Other)  [place] (Unknown)    Intolerances        MEDICATIONS  (STANDING):  amLODIPine   Tablet 10 milliGRAM(s) Oral daily  atorvastatin 80 milliGRAM(s) Oral at bedtime  dextrose 40% Gel 15 Gram(s) Oral once  dextrose 5%. 1000 milliLiter(s) (50 mL/Hr) IV Continuous <Continuous>  dextrose 5%. 1000 milliLiter(s) (100 mL/Hr) IV Continuous <Continuous>  dextrose 50% Injectable 25 Gram(s) IV Push once  dextrose 50% Injectable 12.5 Gram(s) IV Push once  dextrose 50% Injectable 25 Gram(s) IV Push once  enoxaparin Injectable 120 milliGRAM(s) SubCutaneous two times a day  ferrous    sulfate 325 milliGRAM(s) Oral daily  glucagon  Injectable 1 milliGRAM(s) IntraMuscular once  insulin glargine Injectable (LANTUS) 13 Unit(s) SubCutaneous at bedtime  insulin lispro (ADMELOG) corrective regimen sliding scale   SubCutaneous three times a day before meals  insulin lispro (ADMELOG) corrective regimen sliding scale   SubCutaneous at bedtime  insulin lispro Injectable (ADMELOG) 7 Unit(s) SubCutaneous three times a day before meals  melatonin 6 milliGRAM(s) Oral at bedtime  metFORMIN 500 milliGRAM(s) Oral two times a day  pantoprazole    Tablet 40 milliGRAM(s) Oral before breakfast  polyethylene glycol 3350 17 Gram(s) Oral two times a day  QUEtiapine 12.5 milliGRAM(s) Oral at bedtime  senna 2 Tablet(s) Oral at bedtime    MEDICATIONS  (PRN):  acetaminophen     Tablet .. 650 milliGRAM(s) Oral every 6 hours PRN Temp greater or equal to 38C (100.4F), Mild Pain (1 - 3)      Vital Signs Last 24 Hrs  T(C): 36.7 (21 Nov 2021 08:21), Max: 36.7 (21 Nov 2021 08:21)  T(F): 98.1 (21 Nov 2021 08:21), Max: 98.1 (21 Nov 2021 08:21)  HR: 95 (21 Nov 2021 08:21) (88 - 95)  BP: 108/76 (21 Nov 2021 08:21) (108/76 - 154/89)  BP(mean): --  RR: 16 (21 Nov 2021 08:21) (16 - 16)  SpO2: 98% (21 Nov 2021 08:21) (97% - 98%)  CAPILLARY BLOOD GLUCOSE      POCT Blood Glucose.: 135 mg/dL (21 Nov 2021 08:19)  POCT Blood Glucose.: 94 mg/dL (20 Nov 2021 21:40)  POCT Blood Glucose.: 126 mg/dL (20 Nov 2021 16:51)  POCT Blood Glucose.: 180 mg/dL (20 Nov 2021 11:55)    I&O's Summary      PHYSICAL EXAM:  GENERAL: NAD, obese male, AAOx3 comfortable in chair  CHEST/LUNG: Clear to auscultation bilaterally; No wheeze, nonlabored breathing  HEART: Regular rate and rhythm; No murmurs, rubs, or gallops  ABDOMEN: Soft, Nontender, Nondistended; Bowel sounds present  EXTREMITIES:  2+ Peripheral Pulses, No clubbing, cyanosis, or edema  PSYCH: calm, appropriate mood  SKIN: right chest port    LABS:                    RADIOLOGY & ADDITIONAL TESTS:  Results Reviewed:   Imaging Personally Reviewed:  Electrocardiogram Personally Reviewed:    COORDINATION OF CARE:  Care Discussed with Consultants/Other Providers [Y/N]:  Prior or Outpatient Records Reviewed [Y/N]:  
Patient is a 66y old  Male who presents with a chief complaint of STROKE (21 Nov 2021 08:48)      Patient seen and examined at bedside.    ALLERGIES:  No Known Drug Allergies  Nuts (Hives)  Patient stated he had sensation of  throat closing  30 minites after  Epidural pain management resolved it self few minitus after , /  20 y ago Unable to recall MD name or number (Other)  [place] (Unknown)    MEDICATIONS  (STANDING):  amLODIPine   Tablet 10 milliGRAM(s) Oral daily  atorvastatin 80 milliGRAM(s) Oral at bedtime  dextrose 40% Gel 15 Gram(s) Oral once  dextrose 5%. 1000 milliLiter(s) (50 mL/Hr) IV Continuous <Continuous>  dextrose 5%. 1000 milliLiter(s) (100 mL/Hr) IV Continuous <Continuous>  dextrose 50% Injectable 25 Gram(s) IV Push once  dextrose 50% Injectable 12.5 Gram(s) IV Push once  dextrose 50% Injectable 25 Gram(s) IV Push once  enoxaparin Injectable 120 milliGRAM(s) SubCutaneous two times a day  ferrous    sulfate 325 milliGRAM(s) Oral daily  glucagon  Injectable 1 milliGRAM(s) IntraMuscular once  insulin glargine Injectable (LANTUS) 13 Unit(s) SubCutaneous at bedtime  insulin lispro (ADMELOG) corrective regimen sliding scale   SubCutaneous three times a day before meals  insulin lispro (ADMELOG) corrective regimen sliding scale   SubCutaneous at bedtime  insulin lispro Injectable (ADMELOG) 7 Unit(s) SubCutaneous three times a day before meals  melatonin 6 milliGRAM(s) Oral at bedtime  metFORMIN 500 milliGRAM(s) Oral two times a day  pantoprazole    Tablet 40 milliGRAM(s) Oral before breakfast  polyethylene glycol 3350 17 Gram(s) Oral two times a day  QUEtiapine 12.5 milliGRAM(s) Oral at bedtime  senna 2 Tablet(s) Oral at bedtime    MEDICATIONS  (PRN):  acetaminophen     Tablet .. 650 milliGRAM(s) Oral every 6 hours PRN Temp greater or equal to 38C (100.4F), Mild Pain (1 - 3)    Vital Signs Last 24 Hrs  T(F): 97.9 (21 Nov 2021 21:04), Max: 97.9 (21 Nov 2021 21:04)  HR: 91 (22 Nov 2021 07:48) (84 - 93)  BP: 127/80 (22 Nov 2021 07:48) (127/80 - 141/84)  RR: 16 (22 Nov 2021 07:48) (16 - 16)  SpO2: 97% (22 Nov 2021 07:48) (95% - 97%)  I&O's Summary    21 Nov 2021 07:01  -  22 Nov 2021 07:00  --------------------------------------------------------  IN: 0 mL / OUT: 250 mL / NET: -250 mL      BMI (kg/m2): 36.3 (11-17-21 @ 17:50)  PHYSICAL EXAM:  General: NAD, A/O x 3  ENT: MMM, no scleral icterus  Neck: Supple, No JVD, no thyroidomegaly  Lungs: Clear to auscultation bilaterally, no wheezes, no rales, no rhonchi, good inspiratory effort  Cardio: RRR, S1/S2, No murmurs  Abdomen: Soft, Nontender, Nondistended; Bowel sounds present  Extremities: No calf tenderness, No pitting edema, no skin changes    LABS:                        11.7   5.74  )-----------( 346      ( 22 Nov 2021 05:30 )             38.0       11-22    142  |  106  |  21  ----------------------------<  150  3.4   |  25  |  1.23    Ca    8.7      22 Nov 2021 05:30    TPro  6.8  /  Alb  2.8  /  TBili  0.3  /  DBili  x   /  AST  41  /  ALT  42  /  AlkPhos  106  11-22     eGFR if Non African American: 61 mL/min/1.73M2 (11-22-21 @ 05:30)  eGFR if : 71 mL/min/1.73M2 (11-22-21 @ 05:30)    11-13 Chol 162 mg/dL LDL -- HDL 42 mg/dL Trig 116 mg/dL    POCT Blood Glucose.: 146 mg/dL (22 Nov 2021 08:00)  POCT Blood Glucose.: 132 mg/dL (21 Nov 2021 21:04)  POCT Blood Glucose.: 163 mg/dL (21 Nov 2021 16:45)  POCT Blood Glucose.: 151 mg/dL (21 Nov 2021 12:21)    COVID-19 PCR: NotDetec (11-18-21 @ 13:00)  COVID-19 PCR: NotDetec (11-16-21 @ 18:21)  COVID-19 PCR: NotDetec (11-13-21 @ 04:00)  COVID-19 PCR: NotDetec (11-08-21 @ 16:26)    COVID-19 PCR: NotDetec (11-18-21 @ 13:00)  COVID-19 PCR: NotDetec (11-16-21 @ 18:21)  COVID-19 PCR: NotDetec (11-13-21 @ 04:00)  COVID-19 PCR: NotDetec (11-08-21 @ 16:26)    
Patient is a 66y old  Male who presents with a chief complaint of STROKE (25 Nov 2021 07:08)      SUBJECTIVE / OVERNIGHT EVENTS:  Pt seen and examined at bedside. No acute events overnight.  Pt denies cp, palpitations, sob, abd pain, N/V, fever, chills.    ROS:  All other review of systems negative    Allergies    No Known Drug Allergies  Nuts (Hives)  Patient stated he had sensation of  throat closing  30 minites after  Epidural pain management resolved it self few minitus after , /  20 y ago Unable to recall MD name or number (Other)  [place] (Unknown)    Intolerances        MEDICATIONS  (STANDING):  amLODIPine   Tablet 10 milliGRAM(s) Oral daily  atorvastatin 80 milliGRAM(s) Oral at bedtime  dextrose 40% Gel 15 Gram(s) Oral once  dextrose 5%. 1000 milliLiter(s) (50 mL/Hr) IV Continuous <Continuous>  dextrose 5%. 1000 milliLiter(s) (100 mL/Hr) IV Continuous <Continuous>  dextrose 50% Injectable 25 Gram(s) IV Push once  dextrose 50% Injectable 12.5 Gram(s) IV Push once  dextrose 50% Injectable 25 Gram(s) IV Push once  enoxaparin Injectable 120 milliGRAM(s) SubCutaneous two times a day  ferrous    sulfate 325 milliGRAM(s) Oral daily  glucagon  Injectable 1 milliGRAM(s) IntraMuscular once  insulin glargine Injectable (LANTUS) 13 Unit(s) SubCutaneous at bedtime  insulin lispro (ADMELOG) corrective regimen sliding scale   SubCutaneous three times a day before meals  insulin lispro (ADMELOG) corrective regimen sliding scale   SubCutaneous at bedtime  insulin lispro Injectable (ADMELOG) 7 Unit(s) SubCutaneous three times a day before meals  melatonin 6 milliGRAM(s) Oral at bedtime  metFORMIN 1000 milliGRAM(s) Oral two times a day  pantoprazole    Tablet 40 milliGRAM(s) Oral before breakfast  polyethylene glycol 3350 17 Gram(s) Oral two times a day  QUEtiapine 12.5 milliGRAM(s) Oral at bedtime  senna 2 Tablet(s) Oral at bedtime    MEDICATIONS  (PRN):  acetaminophen     Tablet .. 650 milliGRAM(s) Oral every 6 hours PRN Temp greater or equal to 38C (100.4F), Mild Pain (1 - 3)      Vital Signs Last 24 Hrs  T(C): 36.3 (25 Nov 2021 08:01), Max: 36.7 (24 Nov 2021 20:08)  T(F): 97.4 (25 Nov 2021 08:01), Max: 98.1 (24 Nov 2021 20:08)  HR: 91 (25 Nov 2021 08:01) (84 - 91)  BP: 116/81 (25 Nov 2021 08:01) (115/71 - 131/78)  BP(mean): --  RR: 16 (25 Nov 2021 08:01) (16 - 16)  SpO2: 97% (25 Nov 2021 08:01) (97% - 98%)  CAPILLARY BLOOD GLUCOSE      POCT Blood Glucose.: 153 mg/dL (25 Nov 2021 08:04)  POCT Blood Glucose.: 144 mg/dL (24 Nov 2021 20:34)  POCT Blood Glucose.: 189 mg/dL (24 Nov 2021 16:30)  POCT Blood Glucose.: 132 mg/dL (24 Nov 2021 11:21)    I&O's Summary    24 Nov 2021 07:01  -  25 Nov 2021 07:00  --------------------------------------------------------  IN: 0 mL / OUT: 400 mL / NET: -400 mL        PHYSICAL EXAM:  GENERAL: NAD, obese male, AAOx3 comfortable in chair  CHEST/LUNG: Clear to auscultation bilaterally; No wheeze, nonlabored breathing  HEART: Regular rate and rhythm; No murmurs, rubs, or gallops  ABDOMEN: Soft, Nontender, Nondistended; Bowel sounds present  EXTREMITIES:  2+ Peripheral Pulses, No clubbing, cyanosis, or edema  PSYCH: calm, appropriate mood  SKIN: right chest port    LABS:                    RADIOLOGY & ADDITIONAL TESTS:  Results Reviewed:   Imaging Personally Reviewed:  Electrocardiogram Personally Reviewed:    COORDINATION OF CARE:  Care Discussed with Consultants/Other Providers [Y/N]:  Prior or Outpatient Records Reviewed [Y/N]:  
Patient is a 66y old  Male who presents with a chief complaint of STROKE (26 Nov 2021 10:12)      SUBJECTIVE / OVERNIGHT EVENTS:  Pt seen and examined at bedside. No acute events overnight.  Pt denies cp, palpitations, sob, abd pain, N/V, fever, chills.    ROS:  All other review of systems negative    Allergies    No Known Drug Allergies  Nuts (Hives)  Patient stated he had sensation of  throat closing  30 minites after  Epidural pain management resolved it self few minitus after , /  20 y ago Unable to recall MD name or number (Other)  [place] (Unknown)    Intolerances        MEDICATIONS  (STANDING):  amLODIPine   Tablet 10 milliGRAM(s) Oral daily  atorvastatin 80 milliGRAM(s) Oral at bedtime  dextrose 40% Gel 15 Gram(s) Oral once  dextrose 5%. 1000 milliLiter(s) (50 mL/Hr) IV Continuous <Continuous>  dextrose 5%. 1000 milliLiter(s) (100 mL/Hr) IV Continuous <Continuous>  dextrose 50% Injectable 25 Gram(s) IV Push once  dextrose 50% Injectable 12.5 Gram(s) IV Push once  dextrose 50% Injectable 25 Gram(s) IV Push once  enoxaparin Injectable 120 milliGRAM(s) SubCutaneous two times a day  ferrous    sulfate 325 milliGRAM(s) Oral daily  glucagon  Injectable 1 milliGRAM(s) IntraMuscular once  insulin glargine Injectable (LANTUS) 13 Unit(s) SubCutaneous at bedtime  insulin lispro (ADMELOG) corrective regimen sliding scale   SubCutaneous three times a day before meals  insulin lispro (ADMELOG) corrective regimen sliding scale   SubCutaneous at bedtime  insulin lispro Injectable (ADMELOG) 7 Unit(s) SubCutaneous three times a day before meals  melatonin 6 milliGRAM(s) Oral at bedtime  metFORMIN 1000 milliGRAM(s) Oral two times a day  pantoprazole    Tablet 40 milliGRAM(s) Oral before breakfast  polyethylene glycol 3350 17 Gram(s) Oral two times a day  QUEtiapine 12.5 milliGRAM(s) Oral at bedtime  senna 2 Tablet(s) Oral at bedtime    MEDICATIONS  (PRN):  acetaminophen     Tablet .. 650 milliGRAM(s) Oral every 6 hours PRN Temp greater or equal to 38C (100.4F), Mild Pain (1 - 3)      Vital Signs Last 24 Hrs  T(C): 36.7 (26 Nov 2021 07:33), Max: 36.9 (25 Nov 2021 19:54)  T(F): 98.1 (26 Nov 2021 07:33), Max: 98.5 (25 Nov 2021 19:54)  HR: 83 (26 Nov 2021 07:33) (83 - 87)  BP: 125/77 (26 Nov 2021 07:33) (125/77 - 145/83)  BP(mean): --  RR: 16 (26 Nov 2021 07:33) (16 - 16)  SpO2: 97% (26 Nov 2021 07:33) (96% - 97%)  CAPILLARY BLOOD GLUCOSE      POCT Blood Glucose.: 158 mg/dL (26 Nov 2021 07:35)  POCT Blood Glucose.: 139 mg/dL (25 Nov 2021 21:10)  POCT Blood Glucose.: 182 mg/dL (25 Nov 2021 16:53)  POCT Blood Glucose.: 146 mg/dL (25 Nov 2021 12:13)    I&O's Summary    25 Nov 2021 07:01  -  26 Nov 2021 07:00  --------------------------------------------------------  IN: 1040 mL / OUT: 0 mL / NET: 1040 mL        PHYSICAL EXAM:  GENERAL: NAD, obese male, AAOx3 comfortable in chair  CHEST/LUNG: Clear to auscultation bilaterally; No wheeze, nonlabored breathing  HEART: Regular rate and rhythm; No murmurs, rubs, or gallops  ABDOMEN: Soft, Nontender, Nondistended; Bowel sounds present  EXTREMITIES:  2+ Peripheral Pulses, No clubbing, cyanosis, or edema  PSYCH: calm, appropriate mood  SKIN: right chest port    LABS:                        11.1   6.40  )-----------( 353      ( 26 Nov 2021 05:30 )             37.0     11-26    142  |  106  |  15  ----------------------------<  166<H>  3.5   |  26  |  1.28    Ca    8.7      26 Nov 2021 05:30    TPro  6.8  /  Alb  2.8<L>  /  TBili  0.3  /  DBili  x   /  AST  25  /  ALT  37  /  AlkPhos  104  11-26              RADIOLOGY & ADDITIONAL TESTS:  Results Reviewed:   Imaging Personally Reviewed:  Electrocardiogram Personally Reviewed:    COORDINATION OF CARE:  Care Discussed with Consultants/Other Providers [Y/N]:  Prior or Outpatient Records Reviewed [Y/N]:  
Patient is a 66y old  Male who presents with a chief complaint of STROKE (27 Nov 2021 06:37)      SUBJECTIVE / OVERNIGHT EVENTS:  Pt seen and examined at bedside. No acute events overnight.  Pt denies cp, palpitations, sob, abd pain, N/V, fever, chills.    ROS:  All other review of systems negative    Allergies    No Known Drug Allergies  Nuts (Hives)  Patient stated he had sensation of  throat closing  30 minites after  Epidural pain management resolved it self few minitus after , /  20 y ago Unable to recall MD name or number (Other)  [place] (Unknown)    Intolerances        MEDICATIONS  (STANDING):  amLODIPine   Tablet 10 milliGRAM(s) Oral daily  atorvastatin 80 milliGRAM(s) Oral at bedtime  dextrose 40% Gel 15 Gram(s) Oral once  dextrose 5%. 1000 milliLiter(s) (50 mL/Hr) IV Continuous <Continuous>  dextrose 5%. 1000 milliLiter(s) (100 mL/Hr) IV Continuous <Continuous>  dextrose 50% Injectable 25 Gram(s) IV Push once  dextrose 50% Injectable 12.5 Gram(s) IV Push once  dextrose 50% Injectable 25 Gram(s) IV Push once  enoxaparin Injectable 120 milliGRAM(s) SubCutaneous two times a day  ferrous    sulfate 325 milliGRAM(s) Oral daily  glucagon  Injectable 1 milliGRAM(s) IntraMuscular once  insulin glargine Injectable (LANTUS) 13 Unit(s) SubCutaneous at bedtime  insulin lispro (ADMELOG) corrective regimen sliding scale   SubCutaneous three times a day before meals  insulin lispro (ADMELOG) corrective regimen sliding scale   SubCutaneous at bedtime  insulin lispro Injectable (ADMELOG) 7 Unit(s) SubCutaneous three times a day before meals  melatonin 6 milliGRAM(s) Oral at bedtime  metFORMIN 1000 milliGRAM(s) Oral two times a day  pantoprazole    Tablet 40 milliGRAM(s) Oral before breakfast  polyethylene glycol 3350 17 Gram(s) Oral two times a day  QUEtiapine 12.5 milliGRAM(s) Oral at bedtime  senna 2 Tablet(s) Oral at bedtime    MEDICATIONS  (PRN):  acetaminophen     Tablet .. 650 milliGRAM(s) Oral every 6 hours PRN Temp greater or equal to 38C (100.4F), Mild Pain (1 - 3)  sodium chloride 0.65% Nasal 1 Spray(s) Both Nostrils three times a day PRN Nasal Congestion      Vital Signs Last 24 Hrs  T(C): 36.9 (28 Nov 2021 07:54), Max: 36.9 (28 Nov 2021 07:54)  T(F): 98.4 (28 Nov 2021 07:54), Max: 98.4 (28 Nov 2021 07:54)  HR: 79 (28 Nov 2021 07:54) (79 - 96)  BP: 134/73 (28 Nov 2021 07:54) (134/73 - 145/72)  BP(mean): --  RR: 16 (28 Nov 2021 07:54) (16 - 16)  SpO2: 94% (28 Nov 2021 07:54) (94% - 95%)  CAPILLARY BLOOD GLUCOSE      POCT Blood Glucose.: 149 mg/dL (28 Nov 2021 08:16)  POCT Blood Glucose.: 160 mg/dL (27 Nov 2021 21:31)  POCT Blood Glucose.: 136 mg/dL (27 Nov 2021 16:53)  POCT Blood Glucose.: 186 mg/dL (27 Nov 2021 11:55)    I&O's Summary    27 Nov 2021 07:01  -  28 Nov 2021 07:00  --------------------------------------------------------  IN: 0 mL / OUT: 1 mL / NET: -1 mL        PHYSICAL EXAM:  GENERAL: NAD, obese male, AAOx3 comfortable in chair  CHEST/LUNG: Clear to auscultation bilaterally; No wheeze, nonlabored breathing  HEART: Regular rate and rhythm; No murmurs, rubs, or gallops  ABDOMEN: Soft, Nontender, Nondistended; Bowel sounds present  EXTREMITIES:  2+ Peripheral Pulses, No clubbing, cyanosis, or edema  PSYCH: calm, appropriate mood  SKIN: right chest port    LABS:                    RADIOLOGY & ADDITIONAL TESTS:  Results Reviewed:   Imaging Personally Reviewed:  Electrocardiogram Personally Reviewed:    COORDINATION OF CARE:  Care Discussed with Consultants/Other Providers [Y/N]:  Prior or Outpatient Records Reviewed [Y/N]:  
Pt. seen and examined at bedside.  No overnight events.      REVIEW OF SYSTEMS  Constitutional - No fever,  No fatigue  Neurological - No headaches, ++ loss of strength  Musculoskeletal - No joint pain, No joint swelling, No muscle pain    VITALS  T(C): 36.9 (11-20-21 @ 08:05), Max: 36.9 (11-20-21 @ 08:05)  HR: 80 (11-20-21 @ 08:05) (80 - 89)  BP: 135/93 (11-20-21 @ 08:05) (128/82 - 143/80)  RR: 16 (11-20-21 @ 08:05) (16 - 16)  SpO2: 96% (11-20-21 @ 08:05) (96% - 98%)  Wt(kg): --       MEDICATIONS   acetaminophen     Tablet .. 650 milliGRAM(s) every 6 hours PRN  amLODIPine   Tablet 10 milliGRAM(s) daily  atorvastatin 80 milliGRAM(s) at bedtime  dextrose 40% Gel 15 Gram(s) once  dextrose 5%. 1000 milliLiter(s) <Continuous>  dextrose 5%. 1000 milliLiter(s) <Continuous>  dextrose 50% Injectable 25 Gram(s) once  dextrose 50% Injectable 12.5 Gram(s) once  dextrose 50% Injectable 25 Gram(s) once  enoxaparin Injectable 120 milliGRAM(s) two times a day  ferrous    sulfate 325 milliGRAM(s) daily  glucagon  Injectable 1 milliGRAM(s) once  insulin glargine Injectable (LANTUS) 13 Unit(s) at bedtime  insulin lispro (ADMELOG) corrective regimen sliding scale   three times a day before meals  insulin lispro (ADMELOG) corrective regimen sliding scale   at bedtime  insulin lispro Injectable (ADMELOG) 7 Unit(s) three times a day before meals  melatonin 6 milliGRAM(s) at bedtime  metFORMIN 500 milliGRAM(s) two times a day  pantoprazole    Tablet 40 milliGRAM(s) before breakfast  polyethylene glycol 3350 17 Gram(s) two times a day  QUEtiapine 12.5 milliGRAM(s) at bedtime  senna 2 Tablet(s) at bedtime      RECENT LABS/IMAGING                      POCT Blood Glucose.: 124 mg/dL (11-20-21 @ 08:08)  POCT Blood Glucose.: 99 mg/dL (11-19-21 @ 22:12)  POCT Blood Glucose.: 123 mg/dL (11-19-21 @ 16:55)  POCT Blood Glucose.: 188 mg/dL (11-19-21 @ 11:35)    ---------  PHYSICAL EXAM  Constitutional - NAD, Comfortable  Pulm - Breathing comfortably, No wheezing  Abd - Soft, NTND  Extremities - No edema, No calf tenderness  Neurologic Exam -                    Cognitive - Awake, Alert     Communication - APHASIC     Motor - RIGHT HEMIPARESIS     Sensory - Intact to LT; NEGLECT  Psychiatric - Mood WNL, Affect WNL    ASSESSMENT/PLAN  66y Male w/ h/o colon cancer  now  with functional deficits after 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  Continue current medical management  Pain - Tylenol PRN  DVT PPX - enoxaparin Injectable 120 milliGRAM(s) two times a day  Active issues-  Continue 3hrs a day of comprehensive rehab program.       
Patient participating in rehab, still suffering form right facial droop.  Is able to move extremities.      MEDICATIONS  (STANDING):  amLODIPine   Tablet 10 milliGRAM(s) Oral daily  atorvastatin 80 milliGRAM(s) Oral at bedtime  dextrose 40% Gel 15 Gram(s) Oral once  dextrose 5%. 1000 milliLiter(s) (50 mL/Hr) IV Continuous <Continuous>  dextrose 5%. 1000 milliLiter(s) (100 mL/Hr) IV Continuous <Continuous>  dextrose 50% Injectable 25 Gram(s) IV Push once  dextrose 50% Injectable 12.5 Gram(s) IV Push once  dextrose 50% Injectable 25 Gram(s) IV Push once  enoxaparin Injectable 120 milliGRAM(s) SubCutaneous two times a day  ferrous    sulfate 325 milliGRAM(s) Oral daily  glucagon  Injectable 1 milliGRAM(s) IntraMuscular once  insulin glargine Injectable (LANTUS) 13 Unit(s) SubCutaneous at bedtime  insulin lispro (ADMELOG) corrective regimen sliding scale   SubCutaneous three times a day before meals  insulin lispro (ADMELOG) corrective regimen sliding scale   SubCutaneous at bedtime  insulin lispro Injectable (ADMELOG) 7 Unit(s) SubCutaneous three times a day before meals  melatonin 6 milliGRAM(s) Oral at bedtime  metFORMIN 1000 milliGRAM(s) Oral two times a day  pantoprazole    Tablet 40 milliGRAM(s) Oral before breakfast  polyethylene glycol 3350 17 Gram(s) Oral two times a day  QUEtiapine 12.5 milliGRAM(s) Oral at bedtime  senna 2 Tablet(s) Oral at bedtime    MEDICATIONS  (PRN):  acetaminophen     Tablet .. 650 milliGRAM(s) Oral every 6 hours PRN Temp greater or equal to 38C (100.4F), Mild Pain (1 - 3)    Vital Signs Last 24 Hrs  T(C): 36.7 (24 Nov 2021 07:17), Max: 36.8 (24 Nov 2021 07:08)  T(F): 98 (24 Nov 2021 07:17), Max: 98.3 (24 Nov 2021 07:08)  HR: 66 (24 Nov 2021 07:17) (66 - 95)  BP: 146/77 (24 Nov 2021 07:17) (132/77 - 159/88)  BP(mean): --  RR: 15 (24 Nov 2021 07:17) (15 - 16)  SpO2: 96% (24 Nov 2021 07:17) (95% - 96%)

## 2021-12-02 NOTE — DISCHARGE NOTE NURSING/CASE MANAGEMENT/SOCIAL WORK - PATIENT PORTAL LINK FT
none
You can access the FollowMyHealth Patient Portal offered by [facility] by registering at the following website: [email or web address]. By joining [employer]’s FollowMyHealth portal, you will also be able to view your health information using other applications (apps) compatible with our system.

## 2021-12-02 NOTE — H&P ADULT - HISTORY OF PRESENT ILLNESS
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 Patient found by wife on the floor with slurred speech. He had imaging done in [facility] with CTH reported to be negative for acute infarct. 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 Patient transferred to [facility] for mechanical thrombectomy. NIHSS on arrival: 16, Not a candidate for tPA due to presentation outside the window. He underwent thrombectomy with TICI2b flow restored on 11/13/21. Etiology cardioembolic vs hypercoagulable/malignancy. He was evaluated for acute inpatient rehab and was admitted to [facility] on 11/17.        (17 Nov 2021 13:07)      Rehab course significant for visual hallucinations that were evaluated by psychiatry and patient was placed on seroquel which resolved hallucinations and assisted with sleep.     All other medical co-morbidites were stable.    Pt tolerated course of inpatient pt/ot/slp rehab with significant functional improvements and met rehab goals prior to discharge.     On 12/2/21, patient noted to have BRBPR with clots by nursing staff. Patient was seen and evaluated, VSS, labwork was stable. Of note, patient was transitioned from lovenox to loading dose Eliquis 10mg BID today and received AM and PM dose. Then patient continued to have more episodes of BRBPR. Hospitalist was consulted, accepted transfer to telemetry unit for further monitoring. Family called and aware of transfer.  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 on 11/13/21 with speech and facial droop. 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 Patient transferred to [facility] for mechanical thrombectomy.  He underwent thrombectomy with TICI2b flow restored on 11/13/21. Etiology cardioembolic vs hypercoagulable/malignancy. He was stabilized, d/xavier to [place] acute rehab on 11/17.   Pt was on tx dose lovenox, then changed to Eliquis 10 mg twice daily, was started today. Last dose given was at 5 PM.  Around 9PM this evening, was noted to have BRBPR w/ blood clots in his diaper. His vitals remained stable, stat labs were drawn, and 1 kiter of NS was administered.  A few minutes later, pt had another episode of BRBPR w/ blood clots.   Pt then was transferred to telemetry.  Family is aware.  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 Patient transferred to [facility] for mechanical thrombectomy.  He underwent thrombectomy with TICI2b flow restored on 11/13/21. Etiology cardioembolic vs hypercoagulable/malignancy. He was stabilized, d/xavier to [place] acute [facility] on 11/17.   Pt was on tx dose lovenox, then changed to Eliquis 10 mg twice daily, was started today. Last dose given was at 5 PM.  Around 9PM this evening, was noted to have BRBPR w/ blood clots in his diaper. His vitals remained stable, stat labs were drawn, and 1 kiter of NS was administered.  A few minutes later, pt had another episode of BRBPR w/ blood clots.   Pt then was transferred to telemetry.  Family was informed of plan.  While pt was in telemetry, pt had another episode of hematochezia, BP was rechecked and noted to be 77/50, pulse of 108/min.  1 L of LR bolus was started and 2 u PRBC ordered. H/H prior to being transferred to [facility] came back at 11/33, however BUN/Crea noted to be elev now to 26/1.3 and pt visibly appears pale.  ICU was consulted, pt is upgraded to ICU.  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 Patient transferred to [facility] for mechanical thrombectomy.  He underwent thrombectomy with TICI2b flow restored on 11/13/21. Etiology cardioembolic vs hypercoagulable/malignancy. He was stabilized, d/xavier to [facility] on 11/17.   Pt was on tx dose lovenox, then changed to Eliquis 10 mg twice daily, was started today. Last dose given was at 5 PM.  Around 830 PM this evening, was noted to have BRBPR w/ blood clots in his diaper. His vitals remained stable, stat labs were drawn, and 1 kiter of NS was administered.  A few minutes later, pt had another episode of BRBPR w/ blood clots.   Pt then was transferred to telemetry.  Family was informed of plan.  While pt was in telemetry, pt had another episode of hematochezia, BP was rechecked and noted to be 77/50, pulse of 108/min.  1 L of LR bolus was started and 2 u PRBC ordered. H/H prior to being transferred to [facility] came back at 11/33, however BUN/Crea noted to be elev now to 26/1.3 and pt visibly appears pale.  ICU was consulted, pt is upgraded to ICU.

## 2021-12-02 NOTE — DISCHARGE NOTE PROVIDER - NSDCCPCAREPLAN_GEN_ALL_CORE_FT
PRINCIPAL DISCHARGE DIAGNOSIS  Diagnosis: CVA (cerebrovascular accident)  Assessment and Plan of Treatment:       SECONDARY DISCHARGE DIAGNOSES  Diagnosis: HTN (hypertension)  Assessment and Plan of Treatment:     Diagnosis: Rectal adenocarcinoma  Assessment and Plan of Treatment:     Diagnosis: Type 2 diabetes mellitus  Assessment and Plan of Treatment:     Diagnosis: Afib  Assessment and Plan of Treatment:      PRINCIPAL DISCHARGE DIAGNOSIS  Diagnosis: CVA (cerebrovascular accident)  Assessment and Plan of Treatment: - Left PCA, Bilateral cerebellum and pontine stroke  - Lovenox transitioned to Eliquis with loading dose of 10mg BID for 7 days and then 5mg BID ongoing (5mg BID to start on 12/9)  -Continue atorvastatin 80mg daily      SECONDARY DISCHARGE DIAGNOSES  Diagnosis: Afib  Assessment and Plan of Treatment: - Was on eliquis which was stopped for port placement for chemotherapy  -Continue Eliquis 10mg BID for 7 days and then 5mg BID ongoing   -TTE with ef 55%    Diagnosis: HTN (hypertension)  Assessment and Plan of Treatment: -Continue norvasc 10mg daily  - Was on HCTZ at home- now held       Diagnosis: Rectal adenocarcinoma  Assessment and Plan of Treatment: - MMR-proficient; moderately differentiated invasive adenocarcinoma per path on diagnosis  -Rigid sigmoidoscopy revealed lesion at 6 cm from the anal verge  -treated with capecitabine (started 8/17/21-9/28/21); completed; completed chemo RT to 5040 cGy/28 fxs with concurrent Xeloda on 9/27/21  -Seen by Dr. Buck Magana (colorectal x), recommended continuing total neoadjuvant therapy to improve chance of complete response  -Per last outpatient note, was planned to start FOLFOX x9 cycles (4.5 months), agreed to IV chemotherapy Planned to start w/ 50% dose reduction; was to start FOLFOX on week of 11/15/21 prior to admission  -Given stroke, chemotherapy held for now as per Heme/onc at [facility]. - Recommended follow up in 2 weeks with Dr. Ramos,  -no plans for inpatient chemotherapy as per Heme/onc rec  -Was on Xeloda- now held  -Follow up with heme/onc for restarting chemo    Diagnosis: Type 2 diabetes mellitus  Assessment and Plan of Treatment: - A1c at 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

## 2021-12-02 NOTE — DISCHARGE NOTE PROVIDER - NSDCQMSTROKERISK_NEU_ALL_CORE
Atrial fibrillation/Diabetes/High blood pressure/History of a stroke or TIA Atrial fibrillation/Diabetes/High blood pressure/High cholesterol/History of a stroke or TIA/Obesity

## 2021-12-02 NOTE — CONSULT NOTE ADULT - SUBJECTIVE AND OBJECTIVE BOX
Patient is a 66y old  Male who presents with a chief complaint of Hematochezia w/ blood clots (02 Dec 2021 22:17)    BRIEF HOSPITAL COURSE:   HPI:  66 year old M with a h/o DM type 2, HTN, hx of CVA, Afib on eliquis (w/c was held 11/8 for chemoport placement on 11/11), colon cancer (invasive adenocarcinoma of the rectum), who initially presented to [facility] on 11/13/21 with speech and facial droop. 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 Patient transferred to [facility] for mechanical thrombectomy.  He underwent thrombectomy with TICI2b flow restored on 11/13/21. Etiology cardioembolic vs hypercoagulable/malignancy. He was stabilized, d/xavier to [place] [facility] on 11/17.   Pt was on tx dose lovenox, then changed to Eliquis 10 mg twice daily, was started today. Last dose given was at 5 PM.  Around 830 PM this evening, was noted to have BRBPR w/ blood clots in his diaper. His vitals remained stable, stat labs were drawn, and 1 kiter of NS was administered.  A few minutes later, pt had another episode of BRBPR w/ blood clots.   Pt then was transferred to telemetry.  Family was informed of plan.  While pt was in telemetry, pt had another episode of hematochezia, BP was rechecked and noted to be 77/50, pulse of 108/min.  1 L of LR bolus was started and 2 u PRBC ordered. H/H prior to being transferred to [facility] came back at 11/33, however BUN/Crea noted to be elev now to 26/1.3 and pt visibly appears pale.  ICU was consulted, pt is upgraded to ICU.  (02 Dec 2021 22:17)    Events last 24 hours:   - Patient actively passing blood from rectum, bright red   - Patient hypotensive requiring vasopressor support   - 2uPRBC ordered, waiting for it to arrive  - Hx of recent stroke from holding AC for chemoport placement   - Loading dose of Eliquis 10mgBID     PAST MEDICAL & SURGICAL HISTORY:  Lumbago    Lumbago with sciatica of right side    Benign hypertension  dx 10 years    H/O renal calculi  ESWL right side yrs ago  last stone one year ago  (passed on its own)    Obese    Eczema    Diabetes mellitus type II  on Meds 4/2012    Chronic atrial fibrillation    Colon cancer    S/P tonsillectomy    Port-A-Cath in place          Medications:    phenylephrine    Infusion 0.1 MICROgram(s)/kG/Min IV Continuous <Continuous>      acetaminophen     Tablet .. 650 milliGRAM(s) Oral every 6 hours PRN  melatonin 3 milliGRAM(s) Oral at bedtime PRN  QUEtiapine 12.5 milliGRAM(s) Oral at bedtime        pantoprazole  Injectable 40 milliGRAM(s) IV Push once  senna 2 Tablet(s) Oral at bedtime PRN      atorvastatin 80 milliGRAM(s) Oral at bedtime  dextrose 40% Gel 15 Gram(s) Oral once  dextrose 50% Injectable 25 Gram(s) IV Push once  dextrose 50% Injectable 12.5 Gram(s) IV Push once  dextrose 50% Injectable 25 Gram(s) IV Push once  glucagon  Injectable 1 milliGRAM(s) IntraMuscular once  insulin lispro (ADMELOG) corrective regimen sliding scale   SubCutaneous three times a day before meals  insulin lispro (ADMELOG) corrective regimen sliding scale   SubCutaneous at bedtime    dextrose 5%. 1000 milliLiter(s) IV Continuous <Continuous>  dextrose 5%. 1000 milliLiter(s) IV Continuous <Continuous>  ferrous    sulfate 325 milliGRAM(s) Oral daily      chlorhexidine 4% Liquid 1 Application(s) Topical <User Schedule>            ICU Vital Signs Last 24 Hrs  T(C): 37 (02 Dec 2021 20:53), Max: 37 (02 Dec 2021 20:53)  T(F): 98.6 (02 Dec 2021 20:53), Max: 98.6 (02 Dec 2021 20:53)  HR: 102 (02 Dec 2021 22:59) (86 - 107)  BP: 90/65 (02 Dec 2021 22:59) (77/49 - 155/73)  BP(mean): 58 (02 Dec 2021 22:14) (58 - 58)  ABP: --  ABP(mean): --  RR: 18 (02 Dec 2021 22:59) (16 - 19)  SpO2: 99% (02 Dec 2021 22:59) (93% - 100%)          I&O's Detail        LABS:                        11.1   9.61  )-----------( 371      ( 02 Dec 2021 21:09 )             36.0     12-02    140  |  104  |  26<H>  ----------------------------<  166<H>  4.0   |  27  |  1.33<H>    Ca    8.9      02 Dec 2021 21:09    TPro  7.2  /  Alb  2.7<L>  /  TBili  0.3  /  DBili  x   /  AST  18  /  ALT  25  /  AlkPhos  125<H>  12-02          CAPILLARY BLOOD GLUCOSE      POCT Blood Glucose.: 145 mg/dL (02 Dec 2021 17:16)    PT/INR - ( 02 Dec 2021 21:09 )   PT: 20.4 sec;   INR: 1.73 ratio         PTT - ( 02 Dec 2021 21:09 )  PTT:37.8 sec    CULTURES:      Physical Examination:    General: No acute distress.      HEENT: Pupils equal, reactive to light.  Symmetric.    PULM: Clear to auscultation bilaterally, no significant sputum production    NECK: Supple, no lymphadenopathy, trachea midline    CVS: Regular rate and rhythm, no murmurs, rubs, or gallops    ABD: Soft, nondistended, nontender, normoactive bowel sounds, no masses    EXT: No edema, nontender    SKIN: Warm and well perfused, no rashes noted.    NEURO: Alert, oriented, interactive, nonfocal    DEVICES:     RADIOLOGY: ***    CRITICAL CARE TIME SPENT: ** minutes assessing presenting problems of acute illness, which pose high probability of life threatening deterioration or end organ damage/dysfunction, as well as medical decision making including initiating plan of care, reviewing data, reviewing radiologic exams, discussing with multidisciplinary team,  discussing goals of care with patient/family, and writing this note.  Non-inclusive of procedures performed,   Patient is a 66y old  Male who presents with a chief complaint of Hematochezia w/ blood clots (02 Dec 2021 22:17)    BRIEF HOSPITAL COURSE:   HPI:  66 year old M with a h/o DM type 2, HTN, hx of CVA, Afib on eliquis (w/c was held 11/8 for chemoport placement on 11/11), colon cancer (invasive adenocarcinoma of the rectum), who initially presented to [facility] on 11/13/21 with speech and facial droop. 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 Patient transferred to [facility] for mechanical thrombectomy.  He underwent thrombectomy with TICI2b flow restored on 11/13/21. Etiology cardioembolic vs hypercoagulable/malignancy. He was stabilized, d/xavier to [place] acute [facility] on 11/17.   Pt was on tx dose lovenox, then changed to Eliquis 10 mg twice daily, was started today. Last dose given was at 5 PM.  Around 830 PM this evening, was noted to have BRBPR w/ blood clots in his diaper. His vitals remained stable, stat labs were drawn, and 1 kiter of NS was administered.  A few minutes later, pt had another episode of BRBPR w/ blood clots.   Pt then was transferred to telemetry.  Family was informed of plan.  While pt was in telemetry, pt had another episode of hematochezia, BP was rechecked and noted to be 77/50, pulse of 108/min.  1 L of LR bolus was started and 2 u PRBC ordered. H/H prior to being transferred to [facility] came back at 11/33, however BUN/Crea noted to be elev now to 26/1.3 and pt visibly appears pale.  ICU was consulted, pt is upgraded to ICU.  (02 Dec 2021 22:17)    Events last 24 hours:   - Patient actively passing blood from rectum, bright red   - Patient hypotensive requiring vasopressor support   - 2uPRBC ordered, waiting for it to arrive  - Hx of recent stroke from holding AC for chemoport placement   - Loading dose of Eliquis 10mgBID given today   - Spoke w/Neurologist, Dr. Chowdary, if patient needs K-centra for hemorrhagic shock, then we can reverse. Given this situation and hemodynamic instability, will give K-centra 2,500 units   - Dr. Dukes, GI, contacted, recommends supportive care for now  - Page placed out for surgery     PAST MEDICAL & SURGICAL HISTORY:  Lumbago  Lumbago with sciatica of right side  Benign hypertension  dx 10 years  H/O renal calculi  ESWL right side yrs ago  last stone one year ago  (passed on its own)  Obese  Eczema  Diabetes mellitus type II  on Meds 4/2012  Chronic atrial fibrillation  Colon cancer  S/P tonsillectomy  Port-A-Cath in place    Medications:  phenylephrine    Infusion 0.1 MICROgram(s)/kG/Min IV Continuous <Continuous>  acetaminophen     Tablet .. 650 milliGRAM(s) Oral every 6 hours PRN  melatonin 3 milliGRAM(s) Oral at bedtime PRN  QUEtiapine 12.5 milliGRAM(s) Oral at bedtime  pantoprazole  Injectable 40 milliGRAM(s) IV Push once  senna 2 Tablet(s) Oral at bedtime PRN  atorvastatin 80 milliGRAM(s) Oral at bedtime  dextrose 40% Gel 15 Gram(s) Oral once  dextrose 50% Injectable 25 Gram(s) IV Push once  dextrose 50% Injectable 12.5 Gram(s) IV Push once  dextrose 50% Injectable 25 Gram(s) IV Push once  glucagon  Injectable 1 milliGRAM(s) IntraMuscular once  insulin lispro (ADMELOG) corrective regimen sliding scale   SubCutaneous three times a day before meals  insulin lispro (ADMELOG) corrective regimen sliding scale   SubCutaneous at bedtime  dextrose 5%. 1000 milliLiter(s) IV Continuous <Continuous>  dextrose 5%. 1000 milliLiter(s) IV Continuous <Continuous>  ferrous    sulfate 325 milliGRAM(s) Oral daily  chlorhexidine 4% Liquid 1 Application(s) Topical <User Schedule>    ICU Vital Signs Last 24 Hrs  T(C): 37 (02 Dec 2021 20:53), Max: 37 (02 Dec 2021 20:53)  T(F): 98.6 (02 Dec 2021 20:53), Max: 98.6 (02 Dec 2021 20:53)  HR: 102 (02 Dec 2021 22:59) (86 - 107)  BP: 90/65 (02 Dec 2021 22:59) (77/49 - 155/73)  BP(mean): 58 (02 Dec 2021 22:14) (58 - 58)  RR: 18 (02 Dec 2021 22:59) (16 - 19)  SpO2: 99% (02 Dec 2021 22:59) (93% - 100%)    I&O's Detail    LABS:                        11.1   9.61  )-----------( 371      ( 02 Dec 2021 21:09 )             36.0     12-02    140  |  104  |  26<H>  ----------------------------<  166<H>  4.0   |  27  |  1.33<H>    Ca    8.9      02 Dec 2021 21:09    TPro  7.2  /  Alb  2.7<L>  /  TBili  0.3  /  DBili  x   /  AST  18  /  ALT  25  /  AlkPhos  125<H>  12-02    POCT Blood Glucose.: 145 mg/dL (02 Dec 2021 17:16)    PT/INR - ( 02 Dec 2021 21:09 )   PT: 20.4 sec;   INR: 1.73 ratio    PTT - ( 02 Dec 2021 21:09 )  PTT:37.8 sec    Physical Examination:    General: No acute distress.      HEENT: Pupils equal, reactive to light.  Symmetric.    PULM: Clear to auscultation bilaterally, no significant sputum production    NECK: Supple, no lymphadenopathy, trachea midline    CVS: Regular rate and rhythm, no murmurs, rubs, or gallops    ABD: Soft, nondistended, nontender, normoactive bowel sounds, no masses    EXT: No edema, nontender    SKIN: Cool and poorly perfused     RADIOLOGY:   < from: MR Head No Cont (11.15.21 @ 22:21) >    EXAM:  MR BRAIN                          PROCEDURE DATE:  11/15/2021      INTERPRETATION:  CLINICAL INFORMATION: 66-year-old with basilar occlusion status post thrombectomy. Evaluate for CVA.    TECHNIQUE: MRI of the brain was performed without contrast. Sagittal and axial T1, axial T2, FLAIR, SWI, diffusion-weighted images and an ADC map were obtained.    COMPARISON: CT head 11/14/2021.    FINDINGS:  Abnormal restricted diffusion of the basal artery branch distribution involving the central and dorsal sammi adjoining the cerebral aqueduct, and bilateral medial cerebellar hemispheres. Some hemorrhagic conversion is suggested in the central pontine lesion on SWI (4:28-30).    Trace linear and punctate restricted diffusion along the posterior medial aspect of the atrium of the left lateral ventricle involving the posterior temporal lobe and adjoining occipital lobe. These findings may represent small embolic infarcts from the left PCA. Punctate recent infarcts in the inferior left parietal lobe (3:19 and 3:21).    Encephalomalacia and gliosis and marginal old blood products in the posterior medial left occipital lobe (6:10), consistent with an old infarct, unchanged.    Abnormal involving T2 signal intensity is also again notedin the bilateral cerebral peduncles and dorsal sammi, seen on CT of 11/14/2021 but not convincingly demonstrated on 11/13/2021, findings likely representing extracellular water rather than infarct since no convincing DWI signal abnormality correspondsto this region.    Moderate predominantly periventricular T2/FLAIR hyperintense foci are most consistent with chronic white matter microvascular ischemic changes in this age group.    Mild cerebral volume loss involving both ventricles and sulci, consistent with age. No acute intraparenchymal hematoma, abnormal extra-axial fluid collections, hydrocephalus or midline shift.    Unremarkable T2 flow voids within the vertebral basilar system (7:3-10). The mid and distal left PCA is suboptimally visualized on axial T2-weighted images. Other large vessel flow voids are maintained.    The calvarium is nonfocal. The visualized intraorbital compartments, paranasal sinuses and tympanomastoid cavities appear free of acute disease.    IMPRESSION:  Early subacute infarct of the basilar artery branch distribution involving the bilateral cerebellum and sammi, and to lesser extent distal left posterior cerebral artery (more embolic appearing). Susceptibility within the central pontine lesion suggests someearly hemorrhagic conversion.    Evolving T2 signal intensity in the mid/superior sammi and cerebral peduncles consistent with that seen on the head CT of 11/14/2021; extent of this finding is greater than any regional DWI abnormalities, more consistent with extracellular edema.    Chronic old infarct with old blood products involving the medial left occipital lobe.    Unremarkable T2 flow voids within the vertebral basilar system. Suboptimally visualized left PCA. Consider MRA. For further assessment as clinically indicated.    No extra-axial collection, hydrocephalus, midline shift or space-occupying mass lesion.    Dr. Gilmore discussed these findings with Dr. Sanchez on 11/16/2021 8:38 AM, with additional clinical updates at 12:25 PM, with readback.    --- End of Report ---    DHEERAJ GILMORE MD; Resident Radiology  This document has been electronically signed.  ILAN THOMASON MD; Attending Radiologist  This document has been electronically signed. Nov 16 2021 12:26PM    < end of copied text >    < from: TTE with Doppler (w/Cont) (11.15.21 @ 07:50) >    Patient name: MELIZA DAILEY  YOB: 1955   Age: 66 (M)   MR#: [id number]  Study Date: 11/15/2021  [facility] Ar[facility] RDCS  Study quality: Technically difficult  Referring Physician: Neda Najera MD  Blood Pressure: 134/79 mmHg  Height: 180 cm  Weight: 117 kg  BSA: 2.4 m2  ------------------------------------------------------------------------  PROCEDURE: Transthoracic echocardiogram with 2-D, M-Mode  and complete spectral and color flow Doppler. Verbal  consent was obtained for injection of  Ultrasonic Enhancing  Agent following a discussion of risks and benefits.  Following intravenous injection of Ultrasonic Enhancing  Agent , harmonic imaging was performed.  INDICATION: Abnormal electrocardiogram(ECG) (EKG) (R94.31)  ------------------------------------------------------------------------  Dimensions:    Normal Values:  LA:     4.9    2.0 - 4.0 cm  Ao:     3.5    2.0 - 3.8 cm  SEPTUM: 0.9    0.6 - 1.2 cm  PWT:    1.1    0.6 - 1.1 cm  LVIDd:5.3    3.0 - 5.6 cm  LVIDs:  3.2    1.8 - 4.0 cm  Derived variables:  LVMI: 92 g/m2  RWT: 0.40  Fractional short: 39 %  EF (Visual Estimate):  %  EF (Araujo Rule): 55 %Doppler Peak Velocity (m/sec):  AoV=1.4  ------------------------------------------------------------------------  Observations:  Mitral Valve: Mild mitral annular calcification, otherwise  normal mitral valve. Minimal mitral regurgitation.  Aortic Valve/Aorta: Aortic valve not well visualized;  appears to be a calcified trileaflet valve with normal  opening. Peak transaortic valve gradient equals 8 mm Hg,  mean transaortic valve gradient equals 5 mm Hg, aortic  valve velocity time integral equals 26 cm. No aortic valve  regurgitation seen. Peak left ventricular outflow tract  gradient equals 3 mm Hg, mean gradient is equal to 2 mm Hg,  LVOT velocity time integral equals 13 cm.  Aortic Root: 3.5 cm.  Left Atrium: Mildly dilated left atrium.  LA volume index =  40 cc/m2.  Left Ventricle: Endocardial visualization enhanced with  intravenous injection of Ultrasonic Enhancing Agent  (Definity). Normal left ventricular systolic function. No  segmental wall motion abnormalities. Normal left  ventricular internal dimensions and wall thickness.  Indeterminate diastolic function.  Right Heart: Normal right atrium. The right ventricle is  not well visualized; grossly normal right ventricular  systolic function. Normal tricuspid valve. Mild tricuspid  regurgitation. Normal pulmonic valve. Minimal pulmonic  regurgitation.  Pericardium/Pleura: Normal pericardium with no pericardial  effusion.  Hemodynamic: Inadequate tricuspid regurgitation Doppler  envelope precludes estimation of RVSP.  ------------------------------------------------------------------------  Conclusions:  1. Mild mitral annular calcification, otherwise normal  mitral valve. Minimal mitral regurgitation.  2. Aortic valve not well visualized; appears to be a  calcified trileaflet valve with normal opening. No aortic  valve regurgitation seen.  3. Endocardial visualization enhanced with intravenous  injection of Ultrasonic Enhancing Agent (Definity). Normal  left ventricular systolic function. No segmental wall  motion abnormalities.  4. The right ventricle is not well visualized; grossly  normal right ventricular systolic function.  *** No previous Echo exam.  ------------------------------------------------------------------------  Confirmed on  11/15/2021 - 09:52:56 by Fany Gaona M.D.  ------------------------------------------------------------------------    < end of copied text >

## 2021-12-02 NOTE — DISCHARGE NOTE PROVIDER - CARE PROVIDERS DIRECT ADDRESSES
,DirectAddress_Unknown,DirectAddress_Unknown,[email or web address],DirectAddress_Unknown

## 2021-12-02 NOTE — PROVIDER CONTACT NOTE (OTHER) - SITUATION
While assessing pt, I discovered a blood clot in his groin and rectum area, Md was notified and came up to assess. IV 20G put in on left forearm and IV NS 0.9 started.

## 2021-12-02 NOTE — DISCHARGE NOTE PROVIDER - PROVIDER TOKENS
PROVIDER:[TOKEN:[[id number]],FOLLOWUP:[2 weeks]],PROVIDER:[TOKEN:[[id number]],FOLLOWUP:[2 weeks]],PROVIDER:[TOKEN:[[id number]],FOLLOWUP:[2 weeks]],PROVIDER:[TOKEN:[[id number]],FOLLOWUP:[2 weeks]]

## 2021-12-02 NOTE — CONSULT NOTE ADULT - ASSESSMENT
Assessment:  66 year old M with a h/o DM type 2, HTN, hx of CVA, Afib on eliquis (w/c was held 11/8 for chemoport placement on 11/11), colon cancer (invasive adenocarcinoma of the rectum), who initially presented to [facility] on 11/13/21 with speech and facial droop. 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 Patient transferred to [facility] for mechanical thrombectomy.  He underwent thrombectomy with TICI2b flow restored on 11/13/21. Etiology cardioembolic vs hypercoagulable/malignancy. He was stabilized, d/xavier to [place] [facility] on 11/17.     Problem List:  1. Hypovolemic shock 2/2 acute hemorrhage   2. GIB, suspect lower   3. Acute blood loss anemia   4. CVA s/p mechanical thrombectomy at [facility] w/TICI2b flow on 11/13/2021  5. Rectal adenocarcinoma   6. AMELIA  7. Pmhx: DM, HTN, AFIB (on Eliquis)     Plan:  Neuro: Monitor mental status, if acute change, stat CT head given reversal of AC in setting of hemorrhagic shock. Dr. Chowdary (neurologist) was called and made aware of situation. Recommended reversing AC if bleed was life threatening, and in this case it is   CV: Hemorrhagic shock 2/2 GIB. Patient was placed on loading dose of Eliquis 10mg BID starting today. Multiple episodes of bloody bowel movements, was moved to [facility], and then ICU consulted for worsening hemodynamics. Found to be hypotensive & tachy in afib w/RVR. 2uPRBC ordered STAT, 1L fluid bolused, and started on peripheral phenylephrine Actively titrating to maintain MAP>65mmHg. Transfusion should prioritize vasopressors, once bleeding is under control, hopefully will be able to titrate off xiao drip. Patient had recent TTE which does not show any systolic dysfunction, but still cautious of rate of blood products given they are colloid  Pulm: No acute issues as of now   Renal: AMELIA, likely pre-renal in nature given hemorrhage. Strict I/Os, goal UOP >0.5cc/kg/hr. Trend renal function and electrolytes, replete as needed. Avoid nephrotoxic agents  GI: NPO, PPI. Patient w/acute GIB, Bright red from rectum. GI consulted, Dr. Dukes, recommends supportive care for now as bleeding is likely from rectal tumor. Patient w/history of rectal adenocarncinoma stage 2 as per wife. Patient stopped therapy recently since stroke. Patient was having chemo port placed and stopped AC and then developed an acute CVA when off AC. Treated with capecitabine (started 8/17/21-9/28/21); completed; completed chemo RT to 5040 cGy/28 fxs with concurrent Xeloda on 9/27/21. Seen by Dr. Buck Magaan (colorectal x), recommended continuing total neoadjuvant therapy to improve chance of complete response. Per last outpatient note, was planned to start FOLFOX x9 cycles (4.5 months), agreed to IV chemotherapy Planned to start w/ 50% dose reduction; was to start FOLFOX on week of 11/15/21 prior to admission  ID: No acute issues   Heme: Patient w/acute blood anemia leading to hemorrhagic shock. Patient was on Lovenox full dose AC until today, started on Eliquis 10mg BID for 7 days as loading then would've transitioned to Eliquis 5mg BID to continue. Decision made to give K-centra, discussed the risks and benefits w/neurology, eICU, and patient's family. All in agreement that given that this is life threatening, we need to treat the bleed right now   Endo: Strict blood glucose control <180. C/w ISS     Discussed case w/eICU attending, Dr. Whaley     CRITICAL CARE TIME SPENT: 55 minutes assessing presenting problems of acute illness, which pose high probability of life threatening deterioration or end organ damage/dysfunction, as well as medical decision making including initiating plan of care, reviewing data, reviewing radiologic exams, discussing with multidisciplinary team,  discussing goals of care with patient/family, and writing this note.  Non-inclusive of procedures performed

## 2021-12-02 NOTE — DISCHARGE NOTE NURSING/CASE MANAGEMENT/SOCIAL WORK - NSDCPEFALRISK_GEN_ALL_CORE
For information on Fall & Injury Prevention, visit: [email or web address] OR  [email or web address] OR  https://www.cdc.gov/steadi/patient.html

## 2021-12-02 NOTE — CHART NOTE - NSCHARTNOTEFT_GEN_A_CORE
Called by resident, pt had BRBPR with blood clots x 2.  Pt was on tx dose lovenox until today, was started on Eliquis 10 mg twice daily - he received AM and 5 PM dose today.  Pt denies nausea, vomiting, abd pain.  /60 Pulse 96 Afebrile  C/L: Clear  Heart: S1 S2  Abd: Soft non tender, + ecchymoses on lower abd  Ext; No edema  A/P: Lower GI Bleed, in pt with rectal CA  Pt with Afib, HTN, recent CVA on Eliquis  D/c Eliquis  Stat Labs ordered, Type and screen  NPO for now  IVFluids for now  Will transfer to telemetry.  D/w nursing and rehab resident.

## 2021-12-02 NOTE — PROGRESS NOTE ADULT - ATTENDING COMMENTS
LAILA appeal initiated - as per team treatment plan and recommendations- additional LAILA intervention will benefit ongoing functional recovery to achieve goals for discharge home in 3-4 weeks at the most. Family insists on discharge home after LAILA and will not consider SNF.

## 2021-12-02 NOTE — DISCHARGE NOTE PROVIDER - NSDCMRMEDTOKEN_GEN_ALL_CORE_FT
amLODIPine 10 mg oral tablet: 1 tab(s) orally once a day  atorvastatin 80 mg oral tablet: 1 tab(s) orally once a day (at bedtime)  BISOPROLOL-HCTZ 10-6.25 MG TAB:   enoxaparin: 120 milligram(s) subcutaneous 2 times a day  FERROUS SULFATE 325 MG TABLET:   insulin glargine: 13 milligram(s) subcutaneous once a day (at bedtime)  insulin lispro 100 units/mL injectable solution: Before meals:  2 Unit(s) if Glucose 151 - 200  4 Unit(s) if Glucose 201 - 250  6 Unit(s) if Glucose 251 - 300  8 Unit(s) if Glucose 301 - 350  10 Unit(s) if Glucose 351 - 400  12 Unit(s) if Glucose Greater Than 400  insulin lispro 100 units/mL injectable solution: At bedtime:  0 Unit(s) if Glucose 61 - 250  2 Unit(s) if Glucose 251 - 300  4 Unit(s) if Glucose 301 - 350  6 Unit(s) if Glucose 351 - 400  8 Unit(s) if Glucose Greater Than 400  insulin lispro 100 units/mL injectable solution: 7  injectable 3 times a day (before meals)  melatonin 5 mg oral tablet: 1 tab(s) orally once a day (at bedtime)  nystatin 100,000 units/g topical powder: 1 application topically 2 times a day  OMEPRAZOLE  40 MG CPDR:   polyethylene glycol 3350 oral powder for reconstitution: 17 gram(s) orally 2 times a day  PROCHLORPERAZINE MALEATE  10 MG TABS:   senna oral tablet: 2 tab(s) orally once a day (at bedtime)  VITAMIN D3 50 MCG TABLET:   XELODA  500 MG TABS:    acetaminophen 325 mg oral tablet: 2 tab(s) orally every 6 hours, As needed, Temp greater or equal to 38C (100.4F), Mild Pain (1 - 3)  amLODIPine 10 mg oral tablet: 1 tab(s) orally once a day  apixaban 5 mg oral tablet: 2 tab(s) orally 2 times a day until 12/8 then switch to 1 tab BID   apixaban 5 mg oral tablet: 1 tab(s) orally every 12 hours to start 12/9  atorvastatin 80 mg oral tablet: 1 tab(s) orally once a day (at bedtime)  ferrous sulfate 325 mg (65 mg elemental iron) oral tablet: 1 tab(s) orally once a day  insulin glargine: 13 milligram(s) subcutaneous once a day (at bedtime)  insulin lispro 100 units/mL injectable solution: Before meals:  2 Unit(s) if Glucose 151 - 200  4 Unit(s) if Glucose 201 - 250  6 Unit(s) if Glucose 251 - 300  8 Unit(s) if Glucose 301 - 350  10 Unit(s) if Glucose 351 - 400  12 Unit(s) if Glucose Greater Than 400  insulin lispro 100 units/mL injectable solution: At bedtime:  0 Unit(s) if Glucose 61 - 250  2 Unit(s) if Glucose 251 - 300  4 Unit(s) if Glucose 301 - 350  6 Unit(s) if Glucose 351 - 400  8 Unit(s) if Glucose Greater Than 400  insulin lispro 100 units/mL injectable solution: 7  injectable 3 times a day (before meals)  melatonin 5 mg oral tablet: 1 tab(s) orally once a day (at bedtime)  metFORMIN 1000 mg oral tablet: 1 tab(s) orally 2 times a day  pantoprazole 40 mg oral delayed release tablet: 1 tab(s) orally once a day (before a meal)  polyethylene glycol 3350 oral powder for reconstitution: 17 gram(s) orally 2 times a day  QUEtiapine: 12.5 milligram(s) orally once a day (at bedtime)  senna oral tablet: 2 tab(s) orally once a day (at bedtime)  sodium chloride 0.65% nasal spray:  nasal   VITAMIN D3 50 MCG TABLET:    acetaminophen 325 mg oral tablet: 2 tab(s) orally every 6 hours, As needed, Temp greater or equal to 38C (100.4F), Mild Pain (1 - 3)  amLODIPine 10 mg oral tablet: 1 tab(s) orally once a day  atorvastatin 80 mg oral tablet: 1 tab(s) orally once a day (at bedtime)  ferrous sulfate 325 mg (65 mg elemental iron) oral tablet: 1 tab(s) orally once a day  insulin glargine: 13 milligram(s) subcutaneous once a day (at bedtime)  insulin lispro 100 units/mL injectable solution: Before meals:  2 Unit(s) if Glucose 151 - 200  4 Unit(s) if Glucose 201 - 250  6 Unit(s) if Glucose 251 - 300  8 Unit(s) if Glucose 301 - 350  10 Unit(s) if Glucose 351 - 400  12 Unit(s) if Glucose Greater Than 400  insulin lispro 100 units/mL injectable solution: At bedtime:  0 Unit(s) if Glucose 61 - 250  2 Unit(s) if Glucose 251 - 300  4 Unit(s) if Glucose 301 - 350  6 Unit(s) if Glucose 351 - 400  8 Unit(s) if Glucose Greater Than 400  insulin lispro 100 units/mL injectable solution: 7  injectable 3 times a day (before meals)  melatonin 5 mg oral tablet: 1 tab(s) orally once a day (at bedtime)  metFORMIN 1000 mg oral tablet: 1 tab(s) orally 2 times a day  pantoprazole 40 mg oral delayed release tablet: 1 tab(s) orally once a day (before a meal)  polyethylene glycol 3350 oral powder for reconstitution: 17 gram(s) orally 2 times a day  QUEtiapine: 12.5 milligram(s) orally once a day (at bedtime)  senna oral tablet: 2 tab(s) orally once a day (at bedtime)  sodium chloride 0.65% nasal spray:  nasal   VITAMIN D3 50 MCG TABLET:

## 2021-12-02 NOTE — PROGRESS NOTE ADULT - ASSESSMENT
66y with a history of DM type 2, HTN, afib on eliquis, Colon ca, previous stroke with no residual defecits who presented to [facility] initially on 11/13 and found to have basilar artery occlusion and was transferred to [facility] for mechanical thrombectomy which was performed on 11/13 with TICI 2B restoration of flow. He continued workup and monitoring at [facility] and was evaluated for inpatient acute rehab. Patient now admitted for a multidisciplinary rehab program. 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 with TICI2b  - Left PCA, Bilateral cerebellum and pontine stroke  -Generalized weakness, dysarthria,   - Continue comprehensive rehab program of PT/OT/SLP - 3 hours a day, 5 days a week  - Lovenox transitioned to Eliquis with loading dose of 10mg BID for 7 days and then 5mg BID ongoing (12/1) -as per heme/onc  -Continue atorvastatin 80mg daily    #Atrial Fibrillation  - Was on eliquis which was stopped for port placement for chemotherapy  -Continue Eliquis 10mg BID for 7 days and then 5mg BID ongoing   -TTE with ef 55%  -EKG monitoring on admission  -Heme/onc recs appreciated for adjustment to Eliquis     #HTN  -Continue norvasc 10mg daily  - Was on HCTZ at home- now held   - Monitor vital signs    #DM type 2  - A1c at [facility] 11.8 on 11/13  -Continue Lantus 13U  -Continue premeal lispro 7U  -Continue ISS and FS  -Continue Metformin 1000mg BID  -Home meds: Metformin 1000mg BID    #Rectal adenocarcinoma  - MMR-proficient; moderately differentiated invasive adenocarcinoma per path on diagnosis  -Rigid sigmoidoscopy revealed lesion at 6 cm from the anal verge  -treated with capecitabine (started 8/17/21-9/28/21); completed; completed chemo RT to 5040 cGy/28 fxs with concurrent Xeloda on 9/27/21  -Seen by Dr. Buck Magana (colorectal x), recommended continuing total neoadjuvant therapy to improve chance of complete response  -Per last outpatient note, was planned to start FOLFOX x9 cycles (4.5 months), agreed to IV chemotherapy Planned to start w/ 50% dose reduction; was to start FOLFOX on week of 11/15/21 prior to admission  -Given stroke, chemotherapy held for now as per Heme/onc at [facility]. - Recommended follow up in 2 weeks with Dr. 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 - hallucinations have improved     #GI / Bowel  - Senna qHS  - Miralax BID  - GI ppx: protonix 40mg     # / Bladder  - Discontinue bladder scans due to low PVR  - Toileting schedule every 4 hours    #Skin / Pressure injury  - Monitor port-a-cath site -stable   - Turn q2 hours in bed while awake, air mattress  - nursing to monitor skin qShift    #Diet  - Diet Consistency: easy to chew- carb control and thick liquids  - Aspiration Precautions  - SLP consult for swallow function evaluation and treatment  -MBS performed 11/22 -shown with silent aspiration - continue current diet     ##DVT prophylaxis:   - Eliquis  - SCDs  - Teds    #Participation Restrictions/Precautions:  - Weight bearing status: WBAT  - Precautions: Falls, Seizures, Aspiration     Outpatient Follow-up:  Cesar Garcia (DO)  Cardiology; Internal Medicine  [address], [address]  [place] [address]  Phone: [phone]  Fax: [phone]  Follow Up Time: 2 weeks    Alvaro Whyte (NP; RN)  NP in Adult Health  [address], [address]  [place] [address]  Phone: [phone]  Fax: [phone]  Follow Up Time: 2 weeks    Wiliam Ramos)  Hematology; Internal Medicine; Medical Oncology  [address] [place] [address]  Phone: [phone]  Fax: [phone]  Follow Up Time: 2 weeks    Long Fernandez)  Neurology; Vascular Neurology  [address], [address]  [place] [address]  Phone: [phone]  Fax: [phone]  Follow Up Time: 2 weeks    TEAM MEETING 11/29/21  SW:  5 steps to enter, lives with wife, sister involved in care  OT: min-Mod for ADLS, mod for transfers,  PT: mod for transfers, no stairs, 40' mod assist and walker   SLP: MBS with silent aspiration with thins, vital stim, dysphagia   barriers: dec cognition, ataxic,  dec vision, dec balance, dec memory, dec problem solving   goals: SV for grooming, min for adls and transfers, min for comprehension - no on track for goals - will need min-mod for adls, transfers and comprehension  EDOD:  LAILA 12/3 pending auth

## 2021-12-02 NOTE — DISCHARGE NOTE PROVIDER - HOSPITAL COURSE
HPI:   66 year old M with a h/o DM type 2, HTN, Afib (on eliquis, however on hold since 11/8 for chemoport placement on 11/11), prior stroke (no deficits), colon cancer (invasive adenocarcinoma of the rectum), who initially presented to [facility] on 11/13/21 with speech and facial droop. Patient found by wife on the floor with slurred speech. He had imaging done in [facility] with CTH reported to be negative for acute infarct. 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 Patient transferred to [facility] for mechanical thrombectomy. NIHSS on arrival: 16, Not a candidate for tPA due to presentation outside the window. He underwent thrombectomy with TICI2b flow restored on 11/13/21. Etiology cardioembolic vs hypercoagulable/malignancy. He was evaluated for acute inpatient rehab and was admitted to [facility] on 11/17.        (17 Nov 2021 13:07)      Rehab course significant for visual hallucinations that were evaluated by psychiatry and patient was placed on seroquel which resolved hallucinations and assisted with sleep.     All other medical co-morbidites were stable.    Pt tolerated course of inpatient pt/ot/slp rehab with significant functional improvements and met rehab goals prior to discharge.     Pt was medically cleared on 12/3/21 for discharge to [facility]       HPI:   66 year old M with a h/o DM type 2, HTN, Afib (on eliquis, however on hold since 11/8 for chemoport placement on 11/11), prior stroke (no deficits), colon cancer (invasive adenocarcinoma of the rectum), who initially presented to [facility] on 11/13/21 with speech and facial droop. Patient found by wife on the floor with slurred speech. He had imaging done in [facility] with CTH reported to be negative for acute infarct. 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 Patient transferred to [facility] for mechanical thrombectomy. NIHSS on arrival: 16, Not a candidate for tPA due to presentation outside the window. He underwent thrombectomy with TICI2b flow restored on 11/13/21. Etiology cardioembolic vs hypercoagulable/malignancy. He was evaluated for acute inpatient rehab and was admitted to [facility] on 11/17.        (17 Nov 2021 13:07)      Rehab course significant for visual hallucinations that were evaluated by psychiatry and patient was placed on seroquel which resolved hallucinations and assisted with sleep.     All other medical co-morbidites were stable.    Pt tolerated course of inpatient pt/ot/slp rehab with significant functional improvements and met rehab goals prior to discharge.     On 12/2/21, patient noted to have BRBPR with clots by nursing staff. Patient was seen and evaluated, VSS, labwork was stable. Of note, patient was transitioned from lovenox to loading dose Eliquis 10mg BID today and received AM and PM dose. Then patient continued to have more episodes of BRBPR. Hospitalist was consulted, accepted transfer to telemetry unit for further monitoring. Family called and aware of transfer.

## 2021-12-03 ENCOUNTER — TRANSCRIPTION ENCOUNTER (OUTPATIENT)
Age: 66
End: 2021-12-03

## 2021-12-03 ENCOUNTER — INPATIENT (INPATIENT)
Facility: HOSPITAL | Age: 66
LOS: 4 days | Discharge: NURSING, REHAB OR LONG-TERM CARE | DRG: 377 | End: 2021-12-08
Attending: COLON & RECTAL SURGERY | Admitting: COLON & RECTAL SURGERY
Payer: MEDICARE

## 2021-12-03 VITALS
RESPIRATION RATE: 25 BRPM | WEIGHT: 242.73 LBS | TEMPERATURE: 98 F | HEIGHT: 71 IN | HEART RATE: 101 BPM | DIASTOLIC BLOOD PRESSURE: 83 MMHG | OXYGEN SATURATION: 100 % | SYSTOLIC BLOOD PRESSURE: 139 MMHG

## 2021-12-03 VITALS
DIASTOLIC BLOOD PRESSURE: 78 MMHG | HEART RATE: 93 BPM | RESPIRATION RATE: 25 BRPM | SYSTOLIC BLOOD PRESSURE: 135 MMHG | OXYGEN SATURATION: 99 %

## 2021-12-03 DIAGNOSIS — Z95.828 PRESENCE OF OTHER VASCULAR IMPLANTS AND GRAFTS: Chronic | ICD-10-CM

## 2021-12-03 DIAGNOSIS — K92.2 GASTROINTESTINAL HEMORRHAGE, UNSPECIFIED: ICD-10-CM

## 2021-12-03 LAB
ALBUMIN SERPL ELPH-MCNC: 2.3 G/DL — LOW (ref 3.3–5)
ALP SERPL-CCNC: 91 U/L — SIGNIFICANT CHANGE UP (ref 40–120)
ALT FLD-CCNC: 20 U/L — SIGNIFICANT CHANGE UP (ref 10–45)
ANION GAP SERPL CALC-SCNC: 12 MMOL/L — SIGNIFICANT CHANGE UP (ref 5–17)
ANION GAP SERPL CALC-SCNC: 15 MMOL/L — SIGNIFICANT CHANGE UP (ref 5–17)
APTT BLD: 35.5 SEC — SIGNIFICANT CHANGE UP (ref 27.5–35.5)
APTT BLD: 36.8 SEC — HIGH (ref 27.5–35.5)
AST SERPL-CCNC: 22 U/L — SIGNIFICANT CHANGE UP (ref 10–40)
BASE EXCESS BLDV CALC-SCNC: -2.5 MMOL/L — LOW (ref -2–2)
BASOPHILS # BLD AUTO: 0.05 K/UL — SIGNIFICANT CHANGE UP (ref 0–0.2)
BASOPHILS NFR BLD AUTO: 0.4 % — SIGNIFICANT CHANGE UP (ref 0–2)
BILIRUB SERPL-MCNC: 0.5 MG/DL — SIGNIFICANT CHANGE UP (ref 0.2–1.2)
BLD GP AB SCN SERPL QL: NEGATIVE — SIGNIFICANT CHANGE UP
BUN SERPL-MCNC: 32 MG/DL — HIGH (ref 7–23)
BUN SERPL-MCNC: 35 MG/DL — HIGH (ref 7–23)
CA-I SERPL-SCNC: 1.2 MMOL/L — SIGNIFICANT CHANGE UP (ref 1.15–1.33)
CALCIUM SERPL-MCNC: 8.3 MG/DL — LOW (ref 8.4–10.5)
CALCIUM SERPL-MCNC: 8.9 MG/DL — SIGNIFICANT CHANGE UP (ref 8.4–10.5)
CHLORIDE BLDV-SCNC: 106 MMOL/L — SIGNIFICANT CHANGE UP (ref 96–108)
CHLORIDE SERPL-SCNC: 106 MMOL/L — SIGNIFICANT CHANGE UP (ref 96–108)
CHLORIDE SERPL-SCNC: 106 MMOL/L — SIGNIFICANT CHANGE UP (ref 96–108)
CO2 BLDV-SCNC: 25 MMOL/L — SIGNIFICANT CHANGE UP (ref 22–26)
CO2 SERPL-SCNC: 19 MMOL/L — LOW (ref 22–31)
CO2 SERPL-SCNC: 22 MMOL/L — SIGNIFICANT CHANGE UP (ref 22–31)
CREAT SERPL-MCNC: 1.39 MG/DL — HIGH (ref 0.5–1.3)
CREAT SERPL-MCNC: 1.57 MG/DL — HIGH (ref 0.5–1.3)
D DIMER BLD IA.RAPID-MCNC: 280 NG/ML DDU — HIGH
EOSINOPHIL # BLD AUTO: 0.06 K/UL — SIGNIFICANT CHANGE UP (ref 0–0.5)
EOSINOPHIL NFR BLD AUTO: 0.5 % — SIGNIFICANT CHANGE UP (ref 0–6)
FIBRINOGEN PPP-MCNC: 602 MG/DL — HIGH (ref 290–520)
GAS PNL BLDV: 140 MMOL/L — SIGNIFICANT CHANGE UP (ref 136–145)
GAS PNL BLDV: SIGNIFICANT CHANGE UP
GLUCOSE BLDC GLUCOMTR-MCNC: 178 MG/DL — HIGH (ref 70–99)
GLUCOSE BLDC GLUCOMTR-MCNC: 178 MG/DL — HIGH (ref 70–99)
GLUCOSE BLDC GLUCOMTR-MCNC: 209 MG/DL — HIGH (ref 70–99)
GLUCOSE BLDC GLUCOMTR-MCNC: 241 MG/DL — HIGH (ref 70–99)
GLUCOSE BLDV-MCNC: 167 MG/DL — HIGH (ref 70–99)
GLUCOSE SERPL-MCNC: 168 MG/DL — HIGH (ref 70–99)
GLUCOSE SERPL-MCNC: 187 MG/DL — HIGH (ref 70–99)
HCO3 BLDV-SCNC: 24 MMOL/L — SIGNIFICANT CHANGE UP (ref 22–29)
HCT VFR BLD CALC: 28.6 % — LOW (ref 39–50)
HCT VFR BLD CALC: 32.4 % — LOW (ref 39–50)
HCT VFR BLD CALC: 34.3 % — LOW (ref 39–50)
HCT VFR BLDA CALC: 33 % — LOW (ref 39–51)
HGB BLD CALC-MCNC: 11.1 G/DL — LOW (ref 12.6–17.4)
HGB BLD-MCNC: 10.2 G/DL — LOW (ref 13–17)
HGB BLD-MCNC: 10.9 G/DL — LOW (ref 13–17)
HGB BLD-MCNC: 9.3 G/DL — LOW (ref 13–17)
HOROWITZ INDEX BLDV+IHG-RTO: 28 — SIGNIFICANT CHANGE UP
IMM GRANULOCYTES NFR BLD AUTO: 1.1 % — SIGNIFICANT CHANGE UP (ref 0–1.5)
INR BLD: 1.44 RATIO — HIGH (ref 0.88–1.16)
INR BLD: 1.76 RATIO — HIGH (ref 0.88–1.16)
LACTATE BLDV-MCNC: 1.6 MMOL/L — SIGNIFICANT CHANGE UP (ref 0.7–2)
LACTATE SERPL-SCNC: 1.7 MMOL/L — SIGNIFICANT CHANGE UP (ref 0.7–2)
LACTATE SERPL-SCNC: 2.2 MMOL/L — HIGH (ref 0.7–2)
LYMPHOCYTES # BLD AUTO: 0.65 K/UL — LOW (ref 1–3.3)
LYMPHOCYTES # BLD AUTO: 5.4 % — LOW (ref 13–44)
MAGNESIUM SERPL-MCNC: 1.4 MG/DL — LOW (ref 1.6–2.6)
MAGNESIUM SERPL-MCNC: 1.9 MG/DL — SIGNIFICANT CHANGE UP (ref 1.6–2.6)
MCHC RBC-ENTMCNC: 27.8 PG — SIGNIFICANT CHANGE UP (ref 27–34)
MCHC RBC-ENTMCNC: 27.9 PG — SIGNIFICANT CHANGE UP (ref 27–34)
MCHC RBC-ENTMCNC: 28.1 PG — SIGNIFICANT CHANGE UP (ref 27–34)
MCHC RBC-ENTMCNC: 31.5 GM/DL — LOW (ref 32–36)
MCHC RBC-ENTMCNC: 31.8 GM/DL — LOW (ref 32–36)
MCHC RBC-ENTMCNC: 32.5 GM/DL — SIGNIFICANT CHANGE UP (ref 32–36)
MCV RBC AUTO: 86.4 FL — SIGNIFICANT CHANGE UP (ref 80–100)
MCV RBC AUTO: 87.7 FL — SIGNIFICANT CHANGE UP (ref 80–100)
MCV RBC AUTO: 88.3 FL — SIGNIFICANT CHANGE UP (ref 80–100)
MONOCYTES # BLD AUTO: 0.97 K/UL — HIGH (ref 0–0.9)
MONOCYTES NFR BLD AUTO: 8 % — SIGNIFICANT CHANGE UP (ref 2–14)
NEUTROPHILS # BLD AUTO: 10.26 K/UL — HIGH (ref 1.8–7.4)
NEUTROPHILS NFR BLD AUTO: 84.6 % — HIGH (ref 43–77)
NRBC # BLD: 0 /100 WBCS — SIGNIFICANT CHANGE UP (ref 0–0)
PCO2 BLDV: 46 MMHG — SIGNIFICANT CHANGE UP (ref 42–55)
PH BLDV: 7.32 — SIGNIFICANT CHANGE UP (ref 7.32–7.43)
PHOSPHATE SERPL-MCNC: 5.1 MG/DL — HIGH (ref 2.5–4.5)
PHOSPHATE SERPL-MCNC: 5.1 MG/DL — HIGH (ref 2.5–4.5)
PLATELET # BLD AUTO: 315 K/UL — SIGNIFICANT CHANGE UP (ref 150–400)
PLATELET # BLD AUTO: 328 K/UL — SIGNIFICANT CHANGE UP (ref 150–400)
PLATELET # BLD AUTO: 350 K/UL — SIGNIFICANT CHANGE UP (ref 150–400)
PO2 BLDV: 47 MMHG — HIGH (ref 25–45)
POTASSIUM BLDV-SCNC: 3.9 MMOL/L — SIGNIFICANT CHANGE UP (ref 3.5–5.1)
POTASSIUM SERPL-MCNC: 4 MMOL/L — SIGNIFICANT CHANGE UP (ref 3.5–5.3)
POTASSIUM SERPL-MCNC: 4.6 MMOL/L — SIGNIFICANT CHANGE UP (ref 3.5–5.3)
POTASSIUM SERPL-SCNC: 4 MMOL/L — SIGNIFICANT CHANGE UP (ref 3.5–5.3)
POTASSIUM SERPL-SCNC: 4.6 MMOL/L — SIGNIFICANT CHANGE UP (ref 3.5–5.3)
PROT SERPL-MCNC: 5.9 G/DL — LOW (ref 6–8.3)
PROTHROM AB SERPL-ACNC: 17 SEC — HIGH (ref 10.6–13.6)
PROTHROM AB SERPL-ACNC: 20.7 SEC — HIGH (ref 10.6–13.6)
RBC # BLD: 3.31 M/UL — LOW (ref 4.2–5.8)
RBC # BLD: 3.67 M/UL — LOW (ref 4.2–5.8)
RBC # BLD: 3.91 M/UL — LOW (ref 4.2–5.8)
RBC # FLD: 16.8 % — HIGH (ref 10.3–14.5)
RBC # FLD: 17 % — HIGH (ref 10.3–14.5)
RBC # FLD: 17.1 % — HIGH (ref 10.3–14.5)
RH IG SCN BLD-IMP: POSITIVE — SIGNIFICANT CHANGE UP
SAO2 % BLDV: 77.1 % — SIGNIFICANT CHANGE UP (ref 67–88)
SARS-COV-2 RNA SPEC QL NAA+PROBE: SIGNIFICANT CHANGE UP
SODIUM SERPL-SCNC: 140 MMOL/L — SIGNIFICANT CHANGE UP (ref 135–145)
SODIUM SERPL-SCNC: 140 MMOL/L — SIGNIFICANT CHANGE UP (ref 135–145)
TROPONIN I, HIGH SENSITIVITY RESULT: 8.6 NG/L — SIGNIFICANT CHANGE UP
WBC # BLD: 11.12 K/UL — HIGH (ref 3.8–10.5)
WBC # BLD: 11.55 K/UL — HIGH (ref 3.8–10.5)
WBC # BLD: 12.12 K/UL — HIGH (ref 3.8–10.5)
WBC # FLD AUTO: 11.12 K/UL — HIGH (ref 3.8–10.5)
WBC # FLD AUTO: 11.55 K/UL — HIGH (ref 3.8–10.5)
WBC # FLD AUTO: 12.12 K/UL — HIGH (ref 3.8–10.5)

## 2021-12-03 PROCEDURE — 92611 MOTION FLUOROSCOPY/SWALLOW: CPT

## 2021-12-03 PROCEDURE — U0005: CPT

## 2021-12-03 PROCEDURE — 74230 X-RAY XM SWLNG FUNCJ C+: CPT

## 2021-12-03 PROCEDURE — 97530 THERAPEUTIC ACTIVITIES: CPT

## 2021-12-03 PROCEDURE — 92526 ORAL FUNCTION THERAPY: CPT

## 2021-12-03 PROCEDURE — 86769 SARS-COV-2 COVID-19 ANTIBODY: CPT

## 2021-12-03 PROCEDURE — 92523 SPEECH SOUND LANG COMPREHEN: CPT

## 2021-12-03 PROCEDURE — 86803 HEPATITIS C AB TEST: CPT

## 2021-12-03 PROCEDURE — 86901 BLOOD TYPING SEROLOGIC RH(D): CPT

## 2021-12-03 PROCEDURE — 86900 BLOOD TYPING SEROLOGIC ABO: CPT

## 2021-12-03 PROCEDURE — 86923 COMPATIBILITY TEST ELECTRIC: CPT

## 2021-12-03 PROCEDURE — 99233 SBSQ HOSP IP/OBS HIGH 50: CPT

## 2021-12-03 PROCEDURE — 99223 1ST HOSP IP/OBS HIGH 75: CPT

## 2021-12-03 PROCEDURE — 97110 THERAPEUTIC EXERCISES: CPT

## 2021-12-03 PROCEDURE — 86850 RBC ANTIBODY SCREEN: CPT

## 2021-12-03 PROCEDURE — 85730 THROMBOPLASTIN TIME PARTIAL: CPT

## 2021-12-03 PROCEDURE — 87635 SARS-COV-2 COVID-19 AMP PRB: CPT

## 2021-12-03 PROCEDURE — 85610 PROTHROMBIN TIME: CPT

## 2021-12-03 PROCEDURE — 97116 GAIT TRAINING THERAPY: CPT

## 2021-12-03 PROCEDURE — 82962 GLUCOSE BLOOD TEST: CPT

## 2021-12-03 PROCEDURE — 97535 SELF CARE MNGMENT TRAINING: CPT

## 2021-12-03 PROCEDURE — 92610 EVALUATE SWALLOWING FUNCTION: CPT

## 2021-12-03 PROCEDURE — 97112 NEUROMUSCULAR REEDUCATION: CPT

## 2021-12-03 PROCEDURE — 92507 TX SP LANG VOICE COMM INDIV: CPT

## 2021-12-03 PROCEDURE — 97163 PT EVAL HIGH COMPLEX 45 MIN: CPT

## 2021-12-03 PROCEDURE — 99291 CRITICAL CARE FIRST HOUR: CPT

## 2021-12-03 PROCEDURE — 80053 COMPREHEN METABOLIC PANEL: CPT

## 2021-12-03 PROCEDURE — U0003: CPT

## 2021-12-03 PROCEDURE — 85027 COMPLETE CBC AUTOMATED: CPT

## 2021-12-03 PROCEDURE — 97167 OT EVAL HIGH COMPLEX 60 MIN: CPT

## 2021-12-03 PROCEDURE — 85025 COMPLETE CBC W/AUTO DIFF WBC: CPT

## 2021-12-03 PROCEDURE — 36415 COLL VENOUS BLD VENIPUNCTURE: CPT

## 2021-12-03 RX ORDER — PANTOPRAZOLE SODIUM 20 MG/1
40 TABLET, DELAYED RELEASE ORAL ONCE
Refills: 0 | Status: COMPLETED | OUTPATIENT
Start: 2021-12-03 | End: 2021-12-03

## 2021-12-03 RX ORDER — PHENYLEPHRINE HYDROCHLORIDE 10 MG/ML
0.3 INJECTION INTRAVENOUS
Qty: 40 | Refills: 0 | Status: DISCONTINUED | OUTPATIENT
Start: 2021-12-03 | End: 2021-12-03

## 2021-12-03 RX ORDER — PANTOPRAZOLE SODIUM 20 MG/1
40 TABLET, DELAYED RELEASE ORAL DAILY
Refills: 0 | Status: DISCONTINUED | OUTPATIENT
Start: 2021-12-03 | End: 2021-12-05

## 2021-12-03 RX ORDER — SODIUM CHLORIDE 9 MG/ML
1000 INJECTION, SOLUTION INTRAVENOUS
Refills: 0 | Status: DISCONTINUED | OUTPATIENT
Start: 2021-12-03 | End: 2021-12-04

## 2021-12-03 RX ORDER — PROTHROMBIN COMPLEX CONCENTRATE (HUMAN) 25.5; 16.5; 24; 22; 22; 26 [IU]/ML; [IU]/ML; [IU]/ML; [IU]/ML; [IU]/ML; [IU]/ML
5000 POWDER, FOR SOLUTION INTRAVENOUS ONCE
Refills: 0 | Status: DISCONTINUED | OUTPATIENT
Start: 2021-12-03 | End: 2021-12-03

## 2021-12-03 RX ORDER — SODIUM CHLORIDE 9 MG/ML
1000 INJECTION, SOLUTION INTRAVENOUS
Refills: 0 | Status: DISCONTINUED | OUTPATIENT
Start: 2021-12-03 | End: 2021-12-03

## 2021-12-03 RX ORDER — CHLORHEXIDINE GLUCONATE 213 G/1000ML
1 SOLUTION TOPICAL DAILY
Refills: 0 | Status: DISCONTINUED | OUTPATIENT
Start: 2021-12-03 | End: 2021-12-08

## 2021-12-03 RX ORDER — PROTHROMBIN COMPLEX CONCENTRATE (HUMAN) 25.5; 16.5; 24; 22; 22; 26 [IU]/ML; [IU]/ML; [IU]/ML; [IU]/ML; [IU]/ML; [IU]/ML
2500 POWDER, FOR SOLUTION INTRAVENOUS ONCE
Refills: 0 | Status: COMPLETED | OUTPATIENT
Start: 2021-12-03 | End: 2021-12-03

## 2021-12-03 RX ORDER — MAGNESIUM SULFATE 500 MG/ML
2 VIAL (ML) INJECTION ONCE
Refills: 0 | Status: COMPLETED | OUTPATIENT
Start: 2021-12-03 | End: 2021-12-03

## 2021-12-03 RX ORDER — CALCIUM GLUCONATE 100 MG/ML
1 VIAL (ML) INTRAVENOUS ONCE
Refills: 0 | Status: COMPLETED | OUTPATIENT
Start: 2021-12-03 | End: 2021-12-03

## 2021-12-03 RX ORDER — INSULIN LISPRO 100/ML
VIAL (ML) SUBCUTANEOUS EVERY 6 HOURS
Refills: 0 | Status: DISCONTINUED | OUTPATIENT
Start: 2021-12-03 | End: 2021-12-03

## 2021-12-03 RX ORDER — INSULIN LISPRO 100/ML
VIAL (ML) SUBCUTANEOUS EVERY 6 HOURS
Refills: 0 | Status: DISCONTINUED | OUTPATIENT
Start: 2021-12-03 | End: 2021-12-05

## 2021-12-03 RX ADMIN — CHLORHEXIDINE GLUCONATE 1 APPLICATION(S): 213 SOLUTION TOPICAL at 05:14

## 2021-12-03 RX ADMIN — Medication 4: at 05:06

## 2021-12-03 RX ADMIN — Medication 325 MILLIGRAM(S): at 11:52

## 2021-12-03 RX ADMIN — Medication 2: at 17:14

## 2021-12-03 RX ADMIN — PHENYLEPHRINE HYDROCHLORIDE 4.46 MICROGRAM(S)/KG/MIN: 10 INJECTION INTRAVENOUS at 18:20

## 2021-12-03 RX ADMIN — Medication 50 GRAM(S): at 21:20

## 2021-12-03 RX ADMIN — SODIUM CHLORIDE 75 MILLILITER(S): 9 INJECTION, SOLUTION INTRAVENOUS at 13:30

## 2021-12-03 RX ADMIN — SODIUM CHLORIDE 100 MILLILITER(S): 9 INJECTION, SOLUTION INTRAVENOUS at 21:21

## 2021-12-03 RX ADMIN — PANTOPRAZOLE SODIUM 40 MILLIGRAM(S): 20 TABLET, DELAYED RELEASE ORAL at 05:05

## 2021-12-03 RX ADMIN — PROTHROMBIN COMPLEX CONCENTRATE (HUMAN) 400 INTERNATIONAL UNIT(S): 25.5; 16.5; 24; 22; 22; 26 POWDER, FOR SOLUTION INTRAVENOUS at 00:44

## 2021-12-03 RX ADMIN — Medication 2: at 11:52

## 2021-12-03 RX ADMIN — PANTOPRAZOLE SODIUM 40 MILLIGRAM(S): 20 TABLET, DELAYED RELEASE ORAL at 00:25

## 2021-12-03 RX ADMIN — PHENYLEPHRINE HYDROCHLORIDE 4.46 MICROGRAM(S)/KG/MIN: 10 INJECTION INTRAVENOUS at 00:03

## 2021-12-03 RX ADMIN — PHENYLEPHRINE HYDROCHLORIDE 4.46 MICROGRAM(S)/KG/MIN: 10 INJECTION INTRAVENOUS at 17:19

## 2021-12-03 RX ADMIN — Medication 100 GRAM(S): at 03:47

## 2021-12-03 RX ADMIN — Medication 50 GRAM(S): at 16:17

## 2021-12-03 NOTE — CONSULT NOTE ADULT - ASSESSMENT
Neuro:  -    Resp:  -    CV:  -    GI:  -    /Renal:  -    Heme:  -    ID:  -    Endo:  -    Patient discussed with attending, Dr. Edmonds.    Magdalena Mcgill MD  General Surgery, PGY2  [facility] SICU  Spectra [phone]   66M with PMHx of HTN, T2DM, Afib on Eliquis, stroke (while A/C held) on s/p mechanical thrombectomy, and T3N0 rectal adenocarcinoma undergoing neoadjuvant chemo/XRT, transferred from [facility] ICU to [facility] for LGIB with hypotension, admitted to SICU for hemodynamic monitoring.     Neuro: AOx4, Hx stroke whil off a/c s/p mechanical thrombectomy with no residual deficits  - Tylenol PRN    Resp: no acute issues  - wean NC tolerated   - incentive spirometry    CV: hemorrhagic shock requiring pressors  - wean xiao gtt as tolerated  - hold home amlodipine    GI: T3N0 rectal adenocarcinoma undergoing chemo/XRT  - NPO  - IV Protonix 40mg daily   - monitor quality and quantity of BM    /Renal: AMELIA  - LR @ 100cc/hr  - Trend BMP daily    Heme: hemorrhagic shock secondary to BRBPR, resolving. Afib on Eliquis  - hold Eliquis  - Trend CBC Q4H    ID: mild leukocytosis   - no indication for abx at this time  - monitor WBC and temp curve    Endo: hx T2DM  - moderate sliding scale insulin    Patient discussed with attending, Dr. Edmonds.    Mgadalena Mcgill MD  General Surgery, PGY2  [facility] SICU  Spectra [phone]

## 2021-12-03 NOTE — H&P ADULT - NSHPLABSRESULTS_GEN_ALL_CORE
LABS:                        9.3    11.55 )-----------( 315      ( 03 Dec 2021 12:06 )             28.6     12-03    140  |  106  |  32<H>  ----------------------------<  187<H>  4.6   |  22  |  1.57<H>    Ca    8.3<L>      03 Dec 2021 05:00  Phos  5.1     12-03  Mg     1.4     12-03    TPro  5.9<L>  /  Alb  2.3<L>  /  TBili  0.5  /  DBili  x   /  AST  22  /  ALT  20  /  AlkPhos  91  12-03    PT/INR - ( 03 Dec 2021 05:00 )   PT: 20.7 sec;   INR: 1.76 ratio         PTT - ( 03 Dec 2021 05:00 )  PTT:36.8 sec    CAPILLARY BLOOD GLUCOSE      POCT Blood Glucose.: 178 mg/dL (03 Dec 2021 17:11)  POCT Blood Glucose.: 178 mg/dL (03 Dec 2021 11:44)  POCT Blood Glucose.: 209 mg/dL (03 Dec 2021 05:03)  POCT Blood Glucose.: 241 mg/dL (03 Dec 2021 00:31)    LIVER FUNCTIONS - ( 03 Dec 2021 05:00 )  Alb: 2.3 g/dL / Pro: 5.9 g/dL / ALK PHOS: 91 U/L / ALT: 20 U/L / AST: 22 U/L / GGT: x             Cultures:      RADIOLOGY & ADDITIONAL STUDIES:

## 2021-12-03 NOTE — DIETITIAN INITIAL EVALUATION ADULT. - PERTINENT LABORATORY DATA
Date: 03 Dec 2021 12:06  Hemoglobin 9.3    Hematocrit 28.6     Date: 12-03  Sodium 140  Potassium 4.6  Glucose Serum 187<H>  BUN 32<H>  Creatinine 1.57 (H)    HbA1c 11.8%  (H)     ACCUCHECK  POCT Blood Glucose.: 178 mg/dL (03 Dec 2021 11:44)  POCT Blood Glucose.: 209 mg/dL (03 Dec 2021 05:03)  POCT Blood Glucose.: 241 mg/dL (03 Dec 2021 00:31)  POCT Blood Glucose.: 145 mg/dL (02 Dec 2021 17:16)

## 2021-12-03 NOTE — H&P ADULT - ASSESSMENT
- Admit to [facility] under Dr. Luna  - Monitor BMs  - Trend CBC, transfuse PRN  - IR consult for possible embolization  - GI consult   - Appreciate excellent care per SICU    Magdalena Mcgill MD  PGY2 Consult Resident  [facility]  [id number]       - Admit to [facility] under Dr. Quintana   -NPO/IVF  - Monitor BMs  - Trend CBC, transfuse PRN  -CTA if bloody BM continues  - IR consult for possible embolization  - GI consult  - Appreciate excellent care per MOHAMUD Stone MD PGY-1  [facility]  [phone]   66M with PMHx of HTN, T2DM, stroke on 11/13/21, Afib on Eliquis and rectal adenocarcinoma transferred from [facility] ICU to [facility] SICU for BRBPR    Plan:    - Admit to [facility] under Dr. Quintana   -NPO/IVF  - Monitor BMs  - Trend CBC, transfuse PRN  -CTA if bloody BM continues  - IR consult for possible embolization  - GI consult  - Appreciate excellent care per SICU    Breanna Stone MD PGY-1  [facility]  [phone]

## 2021-12-03 NOTE — CONSULT NOTE ADULT - SUBJECTIVE AND OBJECTIVE BOX
Patient seen and examined.  Full consult dictated and will be available shortly.    Middle aged male with adenocarcinoma of rectum (6 cm from anal verge), s/p radiation tx and chemo followed by Dr. Buck Magana now admitted with rectal bleeding that began last night and continues.  He was transfused PRBC and vasopressor agents started.  He is confused and offers little meaningful history.  No abdominal pain.    VSS.  Abdomen obese, nontender    Impression: GI Bleeding from Rectal adenocarcinoma    Plan:  1. NPO  2. Patient being transferred to [facility].  Recommend IR evaluation with possible embolization

## 2021-12-03 NOTE — DIETITIAN INITIAL EVALUATION ADULT. - ORAL INTAKE PTA/DIET HISTORY
Pt's family at bedside at time of interview. Reports pt had good appetite PTA. Wife cooks, does not follow any specific diet. Diet recall shows high intake of starches and limited fruits/veggies. Pt moderately active PTA - took frequent walks around neighboorhood. Pt allergic to peanuts. Family reports last known HbA1C reported at 13.0%. HbA1c is currently 11.8%, indicating suboptimal glycemic control. Pt took Metformin PTA. UBW reported at 250# a few months ago.

## 2021-12-03 NOTE — DISCHARGE NOTE PROVIDER - HOSPITAL COURSE
65 yo M with PMHx of HTN, DM2, Afib on Eliquis and rectal adenocarcinoma admitted to ICU from rehab unit on 12/2/21 due to GI bleed. Patient was to be started on chemotherapy and port was placed on 11/11/21. Prior to port placement patient was instructed to hold Eliquis, which is believe to be a contributing to stroke the patient sustained on 11/13/21. Patient transferred to [facility] for rehab and on 12/2/21 patient began to have blood per rectum, prior to this patient was restarted on Eliquis. Patient was hypotensive at time of onset and was transfused 2U PRBC and transferred to ICU for further management. Patient will be transferred to SICU for continuity of care, possible intervention need. Family agrees with plan. Upon discharge patient stable and in no acute distress. 65 yo M with PMHx of HTN, DM2, Afib on Eliquis and rectal adenocarcinoma admitted to ICU from rehab unit on 12/2/21 due to GI bleed. Patient was to be started on chemotherapy and port was placed on 11/11/21. Prior to port placement patient was instructed to hold Eliquis, which is believe to be a contributing to stroke the patient sustained on 11/13/21. Patient transferred to [facility] for rehab and on 12/2/21 patient began to have blood per rectum, prior to this patient was restarted on Eliquis. Patient was hypotensive at time of onset and was transfused 2U PRBC and transferred to ICU for further management. Patient will be transferred to SICU for continuity of care, possible intervention need. Family agrees with plan. Upon discharge patient stable and in no acute distress.     For full account of hospital course please refer to actual medical records. As this is a brief summery.

## 2021-12-03 NOTE — DIETITIAN INITIAL EVALUATION ADULT. - PERTINENT MEDS FT
MEDICATIONS  (STANDING):  atorvastatin 80 milliGRAM(s) Oral at bedtime  chlorhexidine 4% Liquid 1 Application(s) Topical <User Schedule>  dextrose 40% Gel 15 Gram(s) Oral once  dextrose 5%. 1000 milliLiter(s) (50 mL/Hr) IV Continuous <Continuous>  dextrose 5%. 1000 milliLiter(s) (100 mL/Hr) IV Continuous <Continuous>  dextrose 50% Injectable 25 Gram(s) IV Push once  dextrose 50% Injectable 12.5 Gram(s) IV Push once  dextrose 50% Injectable 25 Gram(s) IV Push once  ferrous    sulfate 325 milliGRAM(s) Oral daily  glucagon  Injectable 1 milliGRAM(s) IntraMuscular once  insulin lispro (ADMELOG) corrective regimen sliding scale   SubCutaneous every 6 hours  lactated ringers. 1000 milliLiter(s) (75 mL/Hr) IV Continuous <Continuous>  magnesium sulfate  IVPB 2 Gram(s) IV Intermittent once  phenylephrine    Infusion 0.1 MICROgram(s)/kG/Min (4.46 mL/Hr) IV Continuous <Continuous>  QUEtiapine 12.5 milliGRAM(s) Oral at bedtime    MEDICATIONS  (PRN):  acetaminophen     Tablet .. 650 milliGRAM(s) Oral every 6 hours PRN Temp greater or equal to 38C (100.4F), Mild Pain (1 - 3)  melatonin 3 milliGRAM(s) Oral at bedtime PRN Sleep  senna 2 Tablet(s) Oral at bedtime PRN Constipation

## 2021-12-03 NOTE — PROGRESS NOTE ADULT - SUBJECTIVE AND OBJECTIVE BOX
Addendum to H&P/ICU Consult note  - Patient seen and examined today    ICU CONSULT  Location of Patient : [facility] CCU1 [id number] ( CCU1)  Attending requesting Consult:Ar Simon  Chief Complaint :  GIB  Reason For consult : GIB       Initial HPI on admission:  HPI:  66 year old Male with a h/o DM type 2, HTN, hx of CVA, Afib on eliquis (w/c was held 11/8 for chemoport placement on 11/11), colon cancer (invasive adenocarcinoma of the rectum), who initially presented to [facility] on 11/13/21 with speech and facial droop. 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 Patient transferred to [facility] for mechanical thrombectomy.  He underwent thrombectomy with TICI2b flow restored on 11/13/21. Etiology cardioembolic vs hypercoagulable/malignancy. He was stabilized, d/xavier to [place] [facility] on 11/17.   Pt was on tx dose lovenox, then changed to Eliquis 10 mg twice daily, was started 12/2/21. Last dose given was at 5 PM.  Around 830 PM 12/2/21 evening, was noted to have BRBPR w/ blood clots in his diaper. His vitals remained stable, stat labs were drawn, and 1 kiter of NS was administered.  A few minutes later, pt had another episode of BRBPR w/ blood clots.   Pt then was transferred to telemetry.  Family was informed of plan.  While pt was in telemetry, pt had another episode of hematochezia, BP was rechecked and noted to be 77/50, pulse of 108/min.  1 L of LR bolus was started and 2 u PRBC ordered. H/H prior to being transferred to [facility] came back at 11/33, however BUN/Crea noted to be elev now to 26/1.3 and pt visibly appears pale.  ICU was consulted, pt is upgraded to ICU.         BRIEF HOSPITAL COURSE:   Patient with one Bloody BM ~ 11 AM today, prior to that was last night  patient upset about whole situation   on iv Brendon to maintain BP       PAST MEDICAL & SURGICAL HISTORY:  Lumbago    Lumbago with sciatica of right side    Benign hypertension  dx 10 years    H/O renal calculi  ESWL right side yrs ago  last stone one year ago  (passed on its own)    Obese    Eczema    Diabetes mellitus type II  on Meds 4/2012    Chronic atrial fibrillation    Colon cancer    S/P tonsillectomy    Port-A-Cath in place      Allergies    No Known Drug Allergies  Nuts (Hives)  Patient stated he had sensation of  throat closing  30 minites after  Epidural pain management resolved it self few minitus after , /  20 y ago Unable to recall MD name or number (Other)  Sloan (Unknown)    Intolerances      FAMILY HISTORY:  No pertinent family history in first degree relatives      Social history: Social History:  , lives with wife  remote ex smoker - quit 20 years ago  denies etoh (02 Dec 2021 22:17)         Review of Systems: as stated above    CONSTITUTIONAL: No fever, No chills, +fatigue  EYES: No eye pain, No visual disturbances, No discharge  ENMT:  No difficulty hearing, No tinnitus, No vertigo; No sinus or throat pain  NECK: No pain, No stiffness  RESPIRATORY: No Cough, No SOB, No Secretions  CARDIOVASCULAR: No chest pain, No palpitations, No dizziness, or No leg swelling  GASTROINTESTINAL: No abdominal or epigastric pain. No nausea, No vomiting, No hematemesis; No diarrhea, No constipation. No melena, +hematochezia.  GENITOURINARY: No dysuria, No frequency, No hematuria, No incontinence  NEUROLOGICAL: No headaches, No memory loss, No loss of strength, No numbness, No tremors  SKIN: No itching, No burning, No rashes, No lesions   MUSCULOSKELETAL: No joint pain or swelling; No muscle, back, No extremity pain  PSYCHIATRIC: No depression, + anxiety, No mood swings, No difficulty sleeping      Medications:  MEDICATIONS  (STANDING):  atorvastatin 80 milliGRAM(s) Oral at bedtime  chlorhexidine 4% Liquid 1 Application(s) Topical <User Schedule>  dextrose 40% Gel 15 Gram(s) Oral once  dextrose 5%. 1000 milliLiter(s) (50 mL/Hr) IV Continuous <Continuous>  dextrose 5%. 1000 milliLiter(s) (100 mL/Hr) IV Continuous <Continuous>  dextrose 50% Injectable 25 Gram(s) IV Push once  dextrose 50% Injectable 12.5 Gram(s) IV Push once  dextrose 50% Injectable 25 Gram(s) IV Push once  ferrous    sulfate 325 milliGRAM(s) Oral daily  glucagon  Injectable 1 milliGRAM(s) IntraMuscular once  insulin lispro (ADMELOG) corrective regimen sliding scale   SubCutaneous every 6 hours  phenylephrine    Infusion 0.1 MICROgram(s)/kG/Min (4.46 mL/Hr) IV Continuous <Continuous>  QUEtiapine 12.5 milliGRAM(s) Oral at bedtime    MEDICATIONS  (PRN):  acetaminophen     Tablet .. 650 milliGRAM(s) Oral every 6 hours PRN Temp greater or equal to 38C (100.4F), Mild Pain (1 - 3)  melatonin 3 milliGRAM(s) Oral at bedtime PRN Sleep  senna 2 Tablet(s) Oral at bedtime PRN Constipation      Home Medications:  Last Order Reconciliation Date: 12-02-21 (Admission Reconciliation)  acetaminophen 325 mg oral tablet: 2 tab(s) orally every 6 hours, As needed, Temp greater or equal to 38C (100.4F), Mild Pain (1 - 3) (12-02-21)  amLODIPine 10 mg oral tablet: 1 tab(s) orally once a day (11-16-21)  atorvastatin 80 mg oral tablet: 1 tab(s) orally once a day (at bedtime) (11-16-21)  ferrous sulfate 325 mg (65 mg elemental iron) oral tablet: 1 tab(s) orally once a day (12-02-21)  insulin glargine: 13 milligram(s) subcutaneous once a day (at bedtime) (11-16-21)  insulin lispro 100 units/mL injectable solution: Before meals:  2 Unit(s) if Glucose 151 - 200  4 Unit(s) if Glucose 201 - 250  6 Unit(s) if Glucose 251 - 300  8 Unit(s) if Glucose 301 - 350  10 Unit(s) if Glucose 351 - 400  12 Unit(s) if Glucose Greater Than 400 (11-16-21)  insulin lispro 100 units/mL injectable solution: At bedtime:  0 Unit(s) if Glucose 61 - 250  2 Unit(s) if Glucose 251 - 300  4 Unit(s) if Glucose 301 - 350  6 Unit(s) if Glucose 351 - 400  8 Unit(s) if Glucose Greater Than 400 (11-16-21)  insulin lispro 100 units/mL injectable solution: 7  injectable 3 times a day (before meals) (11-16-21)  melatonin 5 mg oral tablet: 1 tab(s) orally once a day (at bedtime) (11-16-21)  metFORMIN 1000 mg oral tablet: 1 tab(s) orally 2 times a day (12-02-21)  pantoprazole 40 mg oral delayed release tablet: 1 tab(s) orally once a day (before a meal) (12-02-21)  polyethylene glycol 3350 oral powder for reconstitution: 17 gram(s) orally 2 times a day (12-02-21)  QUEtiapine: 12.5 milligram(s) orally once a day (at bedtime) (12-02-21)  senna oral tablet: 2 tab(s) orally once a day (at bedtime) (12-02-21)  sodium chloride 0.65% nasal spray:  nasal  (12-02-21)  VITAMIN D3 50 MCG TABLET:  (11-17-21)        LABS:                        10.2   12.12 )-----------( 328      ( 03 Dec 2021 05:00 )             32.4     12-03    140  |  106  |  32<H>  ----------------------------<  187<H>  4.6   |  22  |  1.57<H>    Ca    8.3<L>      03 Dec 2021 05:00  Phos  5.1     12-03  Mg     1.4     12-03    TPro  5.9<L>  /  Alb  2.3<L>  /  TBili  0.5  /  DBili  x   /  AST  22  /  ALT  20  /  AlkPhos  91  12-03          VITALS:  T(C): 36.8 (12-03-21 @ 09:00), Max: 37 (12-02-21 @ 20:53)  T(F): 98.3 (12-03-21 @ 09:00), Max: 98.6 (12-02-21 @ 20:53)  HR: 106 (12-03-21 @ 09:30) (86 - 118)  BP: 99/60 (12-03-21 @ 09:30) (65/27 - 144/88)  BP(mean): 69 (12-03-21 @ 09:30) (35 - 88)  ABP: --  ABP(mean): --  RR: 27 (12-03-21 @ 09:30) (16 - 32)  SpO2: 100% (12-03-21 @ 09:30) (93% - 100%)  CVP(mm Hg): --  CVP(cm H2O): --    Ins and Outs     12-02-21 @ 07:01  -  12-03-21 @ 07:00  --------------------------------------------------------  IN: 698.3 mL / OUT: 0 mL / NET: 698.3 mL        Height (cm): 180.3 (12-02-21 @ 22:14)  Weight (kg): 119 (12-02-21 @ 22:14)  BMI (kg/m2): 36.6 (12-02-21 @ 22:14)        I&O's Detail    02 Dec 2021 07:01  -  03 Dec 2021 07:00  --------------------------------------------------------  IN:    Phenylephrine: 80.3 mL    PRBCs (Packed Red Blood Cells): 618 mL  Total IN: 698.3 mL    OUT:  Total OUT: 0 mL    Total NET: 698.3 mL         
Patient started on the eliquis transitioned off lovenox.  Eliquis at the 10mg dose given at 5PM yesterday. Yesterday evening bloody stools noted, patient hypotensive now in ICU.  Received Kcentra for reversal.  Patient currently in process of being transfer to [facility] for possible IR embolization.        MEDICATIONS  (STANDING):  atorvastatin 80 milliGRAM(s) Oral at bedtime  chlorhexidine 4% Liquid 1 Application(s) Topical <User Schedule>  dextrose 40% Gel 15 Gram(s) Oral once  dextrose 5%. 1000 milliLiter(s) (50 mL/Hr) IV Continuous <Continuous>  dextrose 5%. 1000 milliLiter(s) (100 mL/Hr) IV Continuous <Continuous>  dextrose 50% Injectable 25 Gram(s) IV Push once  dextrose 50% Injectable 12.5 Gram(s) IV Push once  dextrose 50% Injectable 25 Gram(s) IV Push once  ferrous    sulfate 325 milliGRAM(s) Oral daily  glucagon  Injectable 1 milliGRAM(s) IntraMuscular once  insulin lispro (ADMELOG) corrective regimen sliding scale   SubCutaneous every 6 hours  lactated ringers. 1000 milliLiter(s) (75 mL/Hr) IV Continuous <Continuous>  phenylephrine    Infusion 0.1 MICROgram(s)/kG/Min (4.46 mL/Hr) IV Continuous <Continuous>  QUEtiapine 12.5 milliGRAM(s) Oral at bedtime    MEDICATIONS  (PRN):  acetaminophen     Tablet .. 650 milliGRAM(s) Oral every 6 hours PRN Temp greater or equal to 38C (100.4F), Mild Pain (1 - 3)  melatonin 3 milliGRAM(s) Oral at bedtime PRN Sleep  senna 2 Tablet(s) Oral at bedtime PRN Constipation                        9.3    11.55 )-----------( 315      ( 03 Dec 2021 12:06 )             28.6   12-03    140  |  106  |  32<H>  ----------------------------<  187<H>  4.6   |  22  |  1.57<H>    Ca    8.3<L>      03 Dec 2021 05:00  Phos  5.1     12-03  Mg     1.4     12-03    TPro  5.9<L>  /  Alb  2.3<L>  /  TBili  0.5  /  DBili  x   /  AST  22  /  ALT  20  /  AlkPhos  91  12-03    Vital Signs Last 24 Hrs  T(C): 36.8 (03 Dec 2021 16:00), Max: 37 (02 Dec 2021 20:53)  T(F): 98.3 (03 Dec 2021 16:00), Max: 98.6 (02 Dec 2021 20:53)  HR: 93 (03 Dec 2021 18:00) (86 - 118)  BP: 135/78 (03 Dec 2021 18:00) (65/27 - 144/88)  BP(mean): 93 (03 Dec 2021 18:00) (35 - 93)  RR: 25 (03 Dec 2021 18:00) (16 - 32)  SpO2: 99% (03 Dec 2021 18:00) (93% - 100%)

## 2021-12-03 NOTE — DISCHARGE NOTE PROVIDER - NSDCMRMEDTOKEN_GEN_ALL_CORE_FT
acetaminophen 325 mg oral tablet: 2 tab(s) orally every 6 hours, As needed, Temp greater or equal to 38C (100.4F), Mild Pain (1 - 3)  amLODIPine 10 mg oral tablet: 1 tab(s) orally once a day  atorvastatin 80 mg oral tablet: 1 tab(s) orally once a day (at bedtime)  ferrous sulfate 325 mg (65 mg elemental iron) oral tablet: 1 tab(s) orally once a day  insulin glargine: 13 milligram(s) subcutaneous once a day (at bedtime)  insulin lispro 100 units/mL injectable solution: Before meals:  2 Unit(s) if Glucose 151 - 200  4 Unit(s) if Glucose 201 - 250  6 Unit(s) if Glucose 251 - 300  8 Unit(s) if Glucose 301 - 350  10 Unit(s) if Glucose 351 - 400  12 Unit(s) if Glucose Greater Than 400  insulin lispro 100 units/mL injectable solution: At bedtime:  0 Unit(s) if Glucose 61 - 250  2 Unit(s) if Glucose 251 - 300  4 Unit(s) if Glucose 301 - 350  6 Unit(s) if Glucose 351 - 400  8 Unit(s) if Glucose Greater Than 400  insulin lispro 100 units/mL injectable solution: 7  injectable 3 times a day (before meals)  melatonin 5 mg oral tablet: 1 tab(s) orally once a day (at bedtime)  metFORMIN 1000 mg oral tablet: 1 tab(s) orally 2 times a day  pantoprazole 40 mg oral delayed release tablet: 1 tab(s) orally once a day (before a meal)  polyethylene glycol 3350 oral powder for reconstitution: 17 gram(s) orally 2 times a day  QUEtiapine: 12.5 milligram(s) orally once a day (at bedtime)  senna oral tablet: 2 tab(s) orally once a day (at bedtime)  sodium chloride 0.65% nasal spray:  nasal   VITAMIN D3 50 MCG TABLET:

## 2021-12-03 NOTE — DIETITIAN INITIAL EVALUATION ADULT. - PERSON TAUGHT/METHOD
Wife educated on overall healthful diet for diabetes/verbal instruction/spouse instructed/daughter instructed

## 2021-12-03 NOTE — DIETITIAN INITIAL EVALUATION ADULT. - OTHER INFO
66 year old M with a h/o DM type 2, HTN, hx of CVA, Afib on eliquis (w/c was held 11/8 for chemoport placement on 11/11), colon cancer (invasive adenocarcinoma of the rectum), who initially presented to [facility] on 11/13/21 with speech and facial droop. 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 Patient transferred to [facility] for mechanical thrombectomy. He was stabilized, d/xavier to [place] [facility] on 11/17. Pt noted with several blood clots in diaper, transferred to ICU.     Pt is currently NPO. Pt was previously on an Easy to Chew, ConsCHO diet (no snacks) with mildly thick liquids. Pt admitted to ICU with rectal bleeding. No other GI distress reported. Skin is intact per nursing flowsheets. No edema reported per nursing flowsheets. Pt currently weighs 262 #, indicating 12# weight gain over the past few months. POCT blood sugars reported as 143 -241 over the past 24 hours. Discussed with wife importance of glycemic control. Wife was able to teach back 2 points from previous education provided to her regarding carbohydrate servings and diabetes foods list. Further educated on importance of overall healthful diet. Wife declines further written education material. Recommend advancing diet to oral solids when medically feasible to ensure adequate nutrition.

## 2021-12-03 NOTE — CONSULT NOTE ADULT - ASSESSMENT
Impression/Plan:  MELIZA DAILEY is a 65yo man with rectal bleeding likely secondary to mid-rectal adenoCA exacerbated by anticoagulation.     RECTAL BLEEDING 2/TO RECTAL CA  T3N0 moderately differentiated invasive adenoCA - 6cm from anal verge.   Undergoing total neoadjuvant chemoradiation currently.   Colorectal surgeon = Buck Magana    RECENT CVA    --------------------------------------------------------    HPI:  MELIZA DAILEY is a 65yo man who was transferred from inpatient rehab to [facility] ICU for hematochezia . History pertinent for AFIB on eliquis, rectal adenoCA. Eliquis was temporarily held for a chemoport placement on 11/13. He was recently treated at [facility] for a CVA involving the basilar and P1 segments. Underwent a mechanical thrombectomy. At approximately 8:30PM last night, he was noted have BRBPR associated with hemodynamic instability. He was transferred to ICU for further care. He has been transferred two units thus far. Surgical consultation was requested today given his history of rectal CA.     Medical Hx:  Rectal CA  Lumbago with sciatica of right side  Benign hypertension  H/O renal calculi  Obesity  Eczema  Diabetes mellitus type II  on Meds 4/2012  Chronic atrial fibrillation on Eliquis    Surgical Hx:  S/P tonsillectomy  Port-A-Cath in place    Medications:  acetaminophen 325 mg oral tablet: 2 tab(s) orally every 6 hours, As needed, Temp greater or equal to 38C (100.4F), Mild Pain (1 - 3)  amLODIPine 10 mg oral tablet: 1 tab(s) orally once a day  atorvastatin 80 mg oral tablet: 1 tab(s) orally once a day (at bedtime)  ferrous sulfate 325 mg (65 mg elemental iron) oral tablet: 1 tab(s) orally once a day  insulin glargine: 13 milligram(s) subcutaneous once a day (at bedtime)  insulin lispro 100 units/mL injectable solution: Before meals:  2 Unit(s) if Glucose 151 - 200  4 Unit(s) if Glucose 201 - 250  6 Unit(s) if Glucose 251 - 300  8 Unit(s) if Glucose 301 - 350  10 Unit(s) if Glucose 351 - 400  12 Unit(s) if Glucose Greater Than 400  insulin lispro 100 units/mL injectable solution: At bedtime:  0 Unit(s) if Glucose 61 - 250  2 Unit(s) if Glucose 251 - 300  4 Unit(s) if Glucose 301 - 350  6 Unit(s) if Glucose 351 - 400  8 Unit(s) if Glucose Greater Than 400  insulin lispro 100 units/mL injectable solution: 7  injectable 3 times a day (before meals)  melatonin 5 mg oral tablet: 1 tab(s) orally once a day (at bedtime)  metFORMIN 1000 mg oral tablet: 1 tab(s) orally 2 times a day  pantoprazole 40 mg oral delayed release tablet: 1 tab(s) orally once a day (before a meal)  polyethylene glycol 3350 oral powder for reconstitution: 17 gram(s) orally 2 times a day  QUEtiapine: 12.5 milligram(s) orally once a day (at bedtime)  senna oral tablet: 2 tab(s) orally once a day (at bedtime)  sodium chloride 0.65% nasal spray:  nasal   VITAMIN D3 50 MCG TABLET:     Allergies:  No Known Drug Allergies  Nuts (Hives)  Patient stated he had sensation of  throat closing  30 minites after  Epidural pain management resolved it self few minitus after , /  20 y ago Unable to recall MD name or number (Other)  [place] (Unknown)    Social Hx:  No tobacco use, excessive ETOH use, or illicit drug use.     Family Hx:  No pertinent family history in first degree relatives      ROS:  A 14-point review of systems was unremarkable except as listed in the HPI.    Objective:   T(C): 36.8 (12-03-21 @ 06:00), Max: 37 (12-02-21 @ 20:53)  HR: 102 (12-03-21 @ 07:30) (86 - 118)  BP: 112/65 (12-03-21 @ 07:30) (65/27 - 144/88)  RR: 29 (12-03-21 @ 07:30) (16 - 32)  SpO2: 100% (12-03-21 @ 07:30) (93% - 100%)    Physical Exam  Constitutional: No distress  Eyes: EOMI; PERRL; no drainage or redness  ENMT: No oral lesions; no gross abnormalities  Neck: No bruits; no thyromegaly or nodules  Breasts: Not examined  Back: No deformity or limitation of movement  Respiratory: Breath Sounds equal & clear to percussion & auscultation, no accessory muscle use  Cardiovascular: Regular rate & rhythm, normal S1, S2; no murmurs, gallops or rubs; no S3, S4  Gastrointestinal: Soft, non-tender, no hepatosplenomegaly, normal bowel sounds  Genitourinary: Not examined  Rectal: Not examined  Extremities: No cyanosis, clubbing or edema  Vascular: Equal and normal pulses (carotid, femoral, dorsalis pedis)  Neurological: Alert & oriented; no sensory, motor or coordination deficits, normal reflexes  Skin: No lesions; no rash  Lymph Nodes: No lymphadedenopathy  Musculoskeletal: No joint pain, swelling or deformity; no limitation of movement  Psychiatric: Affect and characteristics of appearance, verbalizations, behaviors are appropriate    Laboratory Data    Imaging Data   Impression/Plan:  MELIZA DAILEY is a 65yo man with rectal bleeding likely secondary to mid-rectal adenoCA exacerbated by anticoagulation.     RECTAL BLEEDING 2/TO RECTAL CA  T3N0 moderately differentiated invasive adenoCA - 6cm from anal verge.   Underwent RT and on capecitabine with plans for total neoadjuvant FOLFOX.  Colorectal surgeon = Buck Magana  - Continue serial CBC checks and transfuse PRN.   - Wean xiao as tolerated.   - Discussed with GI: endoscopic cauterization also high risk.   - Do not feel rate of bleeding high enough for IR embolization.   - Awaiting callback from colorectal surgeon.   - May require transfer to tertiary care center with IR capabilities.     RECENT CVA  - Appreciate Neurology recommendations.     HYPERCOAGULABLE STATE/AFIB  From malignancy, antiphospholipid state.  - High benefit from anticoagulation, but definite risk from rectal bleeding.    - May need to discuss conversion to medication that can be monitored and reversed quickly, e.g. Coumadin.   - Consider lower extremity duplex to evaluate for DVTs.     --------------------------------------------------------    HPI:  MELIZA DAILEY is a 65yo man who was transferred from inpatient rehab to [facility] ICU for hematochezia . History pertinent for AFIB on eliquis, rectal adenoCA. Eliquis was temporarily held for a chemoport placement on 11/13. He was recently treated at [facility] for a CVA involving the basilar and P1 segments. Underwent a mechanical thrombectomy. Eventually recovered and was transferred to inpatient rehab at [facility]. He was ataxic with two person assist. At approximately 8:30PM last night, he was noted have BRBPR associated with hemodynamic instability. He was transferred to ICU for further care. He has been transferred two units thus far. He is HD stable on low amounts of neosynephrine. Surgical consultation was requested today given his history of rectal CA. At bedside examination, he reports no pain. Feels thirsty. Still some oozing from the rectum, but has slowed considerably.     Medical Hx:  Rectal CA  Lumbago with sciatica of right side  Benign hypertension  H/O renal calculi  Obesity  Eczema  Diabetes mellitus type II  on Meds 4/2012  Chronic atrial fibrillation on Eliquis    Surgical Hx:  S/P tonsillectomy  Port-A-Cath in place    Medications:  acetaminophen 325 mg oral tablet: 2 tab(s) orally every 6 hours, As needed, Temp greater or equal to 38C (100.4F), Mild Pain (1 - 3)  amLODIPine 10 mg oral tablet: 1 tab(s) orally once a day  atorvastatin 80 mg oral tablet: 1 tab(s) orally once a day (at bedtime)  ferrous sulfate 325 mg (65 mg elemental iron) oral tablet: 1 tab(s) orally once a day  insulin glargine: 13 milligram(s) subcutaneous once a day (at bedtime)  insulin lispro 100 units/mL injectable solution: Before meals:  2 Unit(s) if Glucose 151 - 200  4 Unit(s) if Glucose 201 - 250  6 Unit(s) if Glucose 251 - 300  8 Unit(s) if Glucose 301 - 350  10 Unit(s) if Glucose 351 - 400  12 Unit(s) if Glucose Greater Than 400  insulin lispro 100 units/mL injectable solution: At bedtime:  0 Unit(s) if Glucose 61 - 250  2 Unit(s) if Glucose 251 - 300  4 Unit(s) if Glucose 301 - 350  6 Unit(s) if Glucose 351 - 400  8 Unit(s) if Glucose Greater Than 400  insulin lispro 100 units/mL injectable solution: 7  injectable 3 times a day (before meals)  melatonin 5 mg oral tablet: 1 tab(s) orally once a day (at bedtime)  metFORMIN 1000 mg oral tablet: 1 tab(s) orally 2 times a day  pantoprazole 40 mg oral delayed release tablet: 1 tab(s) orally once a day (before a meal)  polyethylene glycol 3350 oral powder for reconstitution: 17 gram(s) orally 2 times a day  QUEtiapine: 12.5 milligram(s) orally once a day (at bedtime)  senna oral tablet: 2 tab(s) orally once a day (at bedtime)  sodium chloride 0.65% nasal spray:  nasal   VITAMIN D3 50 MCG TABLET:     Allergies:  No Known Drug Allergies  Nuts (Hives)  Patient stated he had sensation of  throat closing  30 minites after  Epidural pain management resolved it self few minitus after , /  20 y ago Unable to recall MD name or number (Other)  [place] (Unknown)    Social Hx:  No tobacco use, excessive ETOH use, or illicit drug use.     Family Hx:  No pertinent family history in first degree relatives    ROS:  A 14-point review of systems was unremarkable except as listed in the HPI.    Objective:   T(C): 36.8 (12-03-21 @ 06:00), Max: 37 (12-02-21 @ 20:53)  HR: 102 (12-03-21 @ 07:30) (86 - 118)  BP: 112/65 (12-03-21 @ 07:30) (65/27 - 144/88)  RR: 29 (12-03-21 @ 07:30) (16 - 32)  SpO2: 100% (12-03-21 @ 07:30) (93% - 100%)    Physical Exam  Constitutional: No distress  Eyes: EOMI; PERRL; no drainage or redness  ENMT: No oral lesions; no gross abnormalities  Neck: No bruits; no thyromegaly or nodules  Breasts: Not examined  Back: No deformity or limitation of movement  Respiratory: Breath Sounds equal & clear to percussion & auscultation, no accessory muscle use  Cardiovascular: Regular rate & rhythm, normal S1, S2; no murmurs, gallops or rubs; no S3, S4  Gastrointestinal: Nondistended. Wide distribution of subcutaneous ecchymosis and subQ hematomas.   Genitourinary: No swelling. Voiding freely into diaper.   Rectal: Blood in rectal vault, but volume of stool has diminished.   Extremities: No cyanosis, clubbing or edema  Vascular: Equal and normal pulses (carotid, femoral, dorsalis pedis)  Neurological: Alert & oriented; Moving all extremities. No facial drooping. Some ataxic movements noted in upper extremities.   Skin: Numerous scabs and superficial ulcerations noted.   Lymph Nodes: No lymphadedenopathy  Musculoskeletal: No joint pain, swelling or deformity; no limitation of movement  Psychiatric: Affect and characteristics of appearance, verbalizations, behaviors are appropriate    Laboratory Data            10.2                140   | 106  | 32     12.12 )------( 328       --------------------<187                32.4                4.6   | 22   | 1.57     TP    x    | ALB 2.3       CA 8.3           PT 20.7     ----------------------  AST 22   | ALT 20        MAG 1.4        PTT 36.8             ----------------------  TBIL 0.5  | DBIL x         PHOS 5.1       INR 1.76    ----------------------  ALP 91       Imaging Data  NA

## 2021-12-03 NOTE — H&P ADULT - NSHPPHYSICALEXAM_GEN_ALL_CORE
Physical Exam:  General: NAD, resting comfortably  HEENT: NC/AT, EOMI, normal hearing, no oral lesions, no LAD, neck supple  Pulmonary: normal resp effort, CTA-B  Cardiovascular: NSR, no murmurs  Abdominal: soft, ND/NT, no organomegaly  Rectal: No stool in rectal vault, good spincter tone, gross blood residue on glove  Extremities: WWP, normal strength, no clubbing/cyanosis/edema  Neuro: A/O x 3, CNs II-XII grossly intact, normal sensation, no focal deficits  Pulses: palpable distal pulses    Vital Signs Last 24 Hrs  T(C): 36.4 (03 Dec 2021 19:20), Max: 37 (02 Dec 2021 20:53)  T(F): 97.5 (03 Dec 2021 19:20), Max: 98.6 (02 Dec 2021 20:53)  HR: 101 (03 Dec 2021 19:20) (86 - 118)  BP: 139/83 (03 Dec 2021 19:20) (65/27 - 144/88)  BP(mean): 107 (03 Dec 2021 19:20) (35 - 107)  RR: 25 (03 Dec 2021 19:20) (16 - 32)  SpO2: 100% (03 Dec 2021 19:20) (93% - 100%)    I&O's Summary Physical Exam:  General: NAD, resting comfortably, speech impediment  HEENT: Essential head tremor,   Pulmonary: normal resp effort  Cardiovascular: afib on continuous monitor  Abdominal: soft, ND/NT, no organomegaly  Rectal: No stool in rectal vault, good sphincter tone, minimal to no gross blood residue on glove, rectal tumor mass noted   Extremities: WWP, normal strength, no clubbing/cyanosis/edema  Neuro: A/O x 3, speech impediment but no aphasia, essential tremor of head and intention tremor of b/l hands    Vital Signs Last 24 Hrs  T(C): 36.4 (03 Dec 2021 19:20), Max: 37 (02 Dec 2021 20:53)  T(F): 97.5 (03 Dec 2021 19:20), Max: 98.6 (02 Dec 2021 20:53)  HR: 101 (03 Dec 2021 19:20) (86 - 118)  BP: 139/83 (03 Dec 2021 19:20) (65/27 - 144/88)  BP(mean): 107 (03 Dec 2021 19:20) (35 - 107)  RR: 25 (03 Dec 2021 19:20) (16 - 32)  SpO2: 100% (03 Dec 2021 19:20) (93% - 100%)    I&O's Summary Physical Exam:  General: NAD, resting comfortably, speech impediment  HEENT: Essential head tremor,   Pulmonary: normal resp effort  Cardiovascular: afib on continuous monitor  Abdominal: soft, ND/NT, no organomegaly  BEREKET: No stool in rectal vault, good sphincter tone, minimal to no gross blood residue on glove, rectal mass noted about 6m from anal verge  Extremities: WWP, normal strength, no clubbing/cyanosis/edema  Neuro: A/O x 3, speech impediment but no aphasia, essential tremor of head and intention tremor of b/l hands    Vital Signs Last 24 Hrs  T(C): 36.4 (03 Dec 2021 19:20), Max: 37 (02 Dec 2021 20:53)  T(F): 97.5 (03 Dec 2021 19:20), Max: 98.6 (02 Dec 2021 20:53)  HR: 101 (03 Dec 2021 19:20) (86 - 118)  BP: 139/83 (03 Dec 2021 19:20) (65/27 - 144/88)  BP(mean): 107 (03 Dec 2021 19:20) (35 - 107)  RR: 25 (03 Dec 2021 19:20) (16 - 32)  SpO2: 100% (03 Dec 2021 19:20) (93% - 100%)    I&O's Summary

## 2021-12-03 NOTE — CONSULT NOTE ADULT - ATTENDING COMMENTS
67 yo m with complex medical history, significant for A fib on Eliquis, recent CVA and resumption of AC. Transferred from [facility] due to LGIB, has T3No rectal adenocarcinoma. Hypotensive on arrival requiring pressor support.  - Multimodal pain management. Neuro checks. AAO3 on arrival to SICU.  - Small amount of blood on diaper on arrival to SICU.  - Continue volume resuscitation, wean pressors as tolerated with goal MAP >65. Elevated lactate consistent with hemorrhagic shock. Serial CBCs and blood products as needed. Monitor coagulation parameters.  - Acute kidney injury, likely prerenal from shock. Continue volume resuscitation, serial CMP.  - Monitor glycemia, ISS.    Has life and organ function threatening condition requiring ICU management.

## 2021-12-03 NOTE — CONSULT NOTE ADULT - SUBJECTIVE AND OBJECTIVE BOX
HISTORY OF PRESENT ILLNESS:  Patient currently denies headache, dizziness, weakness, fevers, chills, shortness of breath, chest pain, abdominal pain, or nausea/vomiting.    PAST MEDICAL HISTORY: Acute hand eczema    Lumbago    Lumbago with sciatica of right side    Benign hypertension    Borderline diabetes mellitus    H/O renal calculi    Obese    Eczema    Diabetes mellitus type II  on Meds 4/2012    Chronic atrial fibrillation    Colon cancer        PAST SURGICAL HISTORY: S/P tonsillectomy    Obese    Port-A-Cath in place        HOME MEDICATIONS:    ALLERGIES: No Known Drug Allergies  Nuts (Hives)  Patient stated he had sensation of  throat closing  30 minites after  Epidural pain management resolved it self few minitus after , /  20 y ago Unable to recall MD name or number (Other)  [place] (Unknown)      FAMILY HISTORY: No pertinent family history in first degree relatives        SOCIAL HISTORY:    REVIEW OF SYSTEMS:    VITAL SIGNS:  ICU Vital Signs Last 24 Hrs  T(C): 36.4 (03 Dec 2021 19:20), Max: 37 (02 Dec 2021 20:53)  T(F): 97.5 (03 Dec 2021 19:20), Max: 98.6 (02 Dec 2021 20:53)  HR: 101 (03 Dec 2021 19:20) (86 - 118)  BP: 139/83 (03 Dec 2021 19:20) (65/27 - 144/88)  BP(mean): 107 (03 Dec 2021 19:20) (35 - 107)  ABP: --  ABP(mean): --  RR: 25 (03 Dec 2021 19:20) (16 - 32)  SpO2: 100% (03 Dec 2021 19:20) (93% - 100%)      PHYSICAL EXAMINATION:  General - well-nourished, no acute distress  Neuro - awake, alert, oriented x4, no acute focal deficits  HEENT - normocephalic, PERRL, moist mucous membranes  Lungs - clear to auscultation bilaterally, right chest wall tenderness  Heart - regular rate and rhythm, S1S2 / irregularly irregular  Abdomen - soft, nontender, nondistended  Extremities - all four extremities are warm & pink with 2+ pulses, strength 5/5, sensation intact    LABS:                          9.3    11.55 )-----------( 315      ( 03 Dec 2021 12:06 )             28.6       12-03    140  |  106  |  32<H>  ----------------------------<  187<H>  4.6   |  22  |  1.57<H>    Ca    8.3<L>      03 Dec 2021 05:00  Phos  5.1     12-03  Mg     1.4     12-03    TPro  5.9<L>  /  Alb  2.3<L>  /  TBili  0.5  /  DBili  x   /  AST  22  /  ALT  20  /  AlkPhos  91  12-03      PT/INR - ( 03 Dec 2021 05:00 )   PT: 20.7 sec;   INR: 1.76 ratio         PTT - ( 03 Dec 2021 05:00 )  PTT:36.8 sec    ABG - ( 03 Dec 2021 12:06 )  pH: x     /  pCO2: x     /  pO2: x     / HCO3: x     / Base Excess: x     /  SaO2: x       Lactate: 1.7                    RECENT CULTURES:      CAPILLARY BLOOD GLUCOSE      POCT Blood Glucose.: 178 mg/dL (03 Dec 2021 17:11)  POCT Blood Glucose.: 178 mg/dL (03 Dec 2021 11:44)  POCT Blood Glucose.: 209 mg/dL (03 Dec 2021 05:03)  POCT Blood Glucose.: 241 mg/dL (03 Dec 2021 00:31)      IMAGING STUDIES: HISTORY OF PRESENT ILLNESS: 66M with PMHx of HTN, T2DM, stroke on 11/13/21, Afib on Eliquis and rectal adenocarcinoma transferred from [facility] ICU to [facility] SICU for BRBPR. Patient history obtained through both patient and chart as patient has speech deficits 2/2 history of stroke. According to patient chart from [facility], patient initiated chemotherapy on 11/11/21 through a port catheter. Eliquis was held prior to port placement that may have contributed to stroke patient sustained on 11/13/21. Patient was sent to rehab and started on tLovenox. Yestrday, 12/2/21, patient was restarted on 10mg Eliquis BID and noted to have BRBPR on 12/2/21 shortly after receiving second dose of Eliquis. Patient admitted to [place], . Patient was transferred to [facility] SICU for further care. Patient denies any abdominal pain, lightheadedness, SOB, dizziness, and notes to notice BRBPR only during bowel movements.     Patient currently denies headache, dizziness, weakness, fevers, chills, shortness of breath, chest pain, abdominal pain, or nausea/vomiting.    PAST MEDICAL HISTORY: Acute hand eczema    Lumbago    Lumbago with sciatica of right side    Benign hypertension    Borderline diabetes mellitus    H/O renal calculi    Obese    Eczema    Diabetes mellitus type II  on Meds 4/2012    Chronic atrial fibrillation    Colon cancer        PAST SURGICAL HISTORY: S/P tonsillectomy    Obese    Port-A-Cath in place        HOME MEDICATIONS:    ALLERGIES: No Known Drug Allergies  Nuts (Hives)  Patient stated he had sensation of  throat closing  30 minites after  Epidural pain management resolved it self few minitus after , /  20 y ago Unable to recall MD name or number (Other)  [place] (Unknown)      FAMILY HISTORY: No pertinent family history in first degree relatives        SOCIAL HISTORY:    REVIEW OF SYSTEMS:    VITAL SIGNS:  ICU Vital Signs Last 24 Hrs  T(C): 36.4 (03 Dec 2021 19:20), Max: 37 (02 Dec 2021 20:53)  T(F): 97.5 (03 Dec 2021 19:20), Max: 98.6 (02 Dec 2021 20:53)  HR: 101 (03 Dec 2021 19:20) (86 - 118)  BP: 139/83 (03 Dec 2021 19:20) (65/27 - 144/88)  BP(mean): 107 (03 Dec 2021 19:20) (35 - 107)  ABP: --  ABP(mean): --  RR: 25 (03 Dec 2021 19:20) (16 - 32)  SpO2: 100% (03 Dec 2021 19:20) (93% - 100%)      PHYSICAL EXAMINATION:  General - well-nourished, no acute distress  Neuro - awake, alert, oriented x4, no acute focal deficits  HEENT - normocephalic, PERRL, moist mucous membranes  Lungs - clear to auscultation bilaterally, right chest wall tenderness  Heart - regular rate and rhythm, S1S2 / irregularly irregular  Abdomen - soft, nontender, nondistended  Extremities - all four extremities are warm & pink with 2+ pulses, strength 5/5, sensation intact    LABS:                          9.3    11.55 )-----------( 315      ( 03 Dec 2021 12:06 )             28.6       12-03    140  |  106  |  32<H>  ----------------------------<  187<H>  4.6   |  22  |  1.57<H>    Ca    8.3<L>      03 Dec 2021 05:00  Phos  5.1     12-03  Mg     1.4     12-03    TPro  5.9<L>  /  Alb  2.3<L>  /  TBili  0.5  /  DBili  x   /  AST  22  /  ALT  20  /  AlkPhos  91  12-03      PT/INR - ( 03 Dec 2021 05:00 )   PT: 20.7 sec;   INR: 1.76 ratio         PTT - ( 03 Dec 2021 05:00 )  PTT:36.8 sec    ABG - ( 03 Dec 2021 12:06 )  pH: x     /  pCO2: x     /  pO2: x     / HCO3: x     / Base Excess: x     /  SaO2: x       Lactate: 1.7                    RECENT CULTURES:      CAPILLARY BLOOD GLUCOSE      POCT Blood Glucose.: 178 mg/dL (03 Dec 2021 17:11)  POCT Blood Glucose.: 178 mg/dL (03 Dec 2021 11:44)  POCT Blood Glucose.: 209 mg/dL (03 Dec 2021 05:03)  POCT Blood Glucose.: 241 mg/dL (03 Dec 2021 00:31)      IMAGING STUDIES: HISTORY OF PRESENT ILLNESS: 66M with PMHx of HTN, T2DM, Afib on Eliquis, stroke (while A/C held) on s/p mechanical thrombectomy, and T3N0 rectal adenocarcinoma undergoing neoadjuvant chemo/XRT, transferred from [facility] ICU to [facility] SICU for BRBPR. Patient history obtained through both patient and chart as patient has speech deficits 2/2 history of stroke. According to patient chart from [facility], patient initiated chemotherapy on 11/11/21 through a port catheter. Eliquis was held prior to port placement that may have contributed to stroke patient sustained on 11/13/21. Patient was sent to rehab and started on tLovenox. Yestrday, 12/2/21, patient was restarted on 10mg Eliquis BID and noted to have BRBPR on 12/2/21 shortly after receiving second dose of Eliquis. Patient admitted to [place]. Patient was transferred to [facility] SICU for further care. Patient denies any abdominal pain, lightheadedness, SOB, dizziness, and notes to notice BRBPR only during bowel movements.     Patient currently denies headache, dizziness, weakness, fevers, chills, shortness of breath, chest pain, abdominal pain, or nausea/vomiting.    PAST MEDICAL HISTORY: Acute hand eczema    Lumbago    Lumbago with sciatica of right side    Benign hypertension    Borderline diabetes mellitus    H/O renal calculi    Obese    Eczema    Diabetes mellitus type II  on Meds 4/2012    Chronic atrial fibrillation    Colon cancer        PAST SURGICAL HISTORY: S/P tonsillectomy    Obese    Port-A-Cath in place        HOME MEDICATIONS:    ALLERGIES: No Known Drug Allergies  Nuts (Hives)  Patient stated he had sensation of  throat closing  30 minites after  Epidural pain management resolved it self few minitus after , /  20 y ago Unable to recall MD name or number (Other)  [place] (Unknown)      FAMILY HISTORY: No pertinent family history in first degree relatives        SOCIAL HISTORY: , lives with wife  remote ex smoker quit 20 years ago  denies etoh    REVIEW OF SYSTEMS: Negative    VITAL SIGNS:  ICU Vital Signs Last 24 Hrs  T(C): 36.4 (03 Dec 2021 19:20), Max: 37 (02 Dec 2021 20:53)  T(F): 97.5 (03 Dec 2021 19:20), Max: 98.6 (02 Dec 2021 20:53)  HR: 101 (03 Dec 2021 19:20) (86 - 118)  BP: 139/83 (03 Dec 2021 19:20) (65/27 - 144/88)  BP(mean): 107 (03 Dec 2021 19:20) (35 - 107)  ABP: --  ABP(mean): --  RR: 25 (03 Dec 2021 19:20) (16 - 32)  SpO2: 100% (03 Dec 2021 19:20) (93% - 100%)      PHYSICAL EXAMINATION:  General - well-nourished, no acute distress  Neuro - awake, alert, oriented x2, no acute focal deficits  HEENT - normocephalic, PERRL, moist mucous membranes  Lungs - clear to auscultation bilaterally, right chest wall tenderness  Heart - regular rate and rhythm  Abdomen - soft, nontender, nondistended  Rectal - No stool in rectal vault, good sphincter tone, gross blood residue on glove, small rectal ulceration palpated about 6cm from anal verge  Extremities - grossly moving all four extremities on command  Vasc - 2+ palpable radial and DP pulses     LABS:                          9.3    11.55 )-----------( 315      ( 03 Dec 2021 12:06 )             28.6       12-03    140  |  106  |  32<H>  ----------------------------<  187<H>  4.6   |  22  |  1.57<H>    Ca    8.3<L>      03 Dec 2021 05:00  Phos  5.1     12-03  Mg     1.4     12-03    TPro  5.9<L>  /  Alb  2.3<L>  /  TBili  0.5  /  DBili  x   /  AST  22  /  ALT  20  /  AlkPhos  91  12-03      PT/INR - ( 03 Dec 2021 05:00 )   PT: 20.7 sec;   INR: 1.76 ratio         PTT - ( 03 Dec 2021 05:00 )  PTT:36.8 sec    ABG - ( 03 Dec 2021 12:06 )  pH: x     /  pCO2: x     /  pO2: x     / HCO3: x     / Base Excess: x     /  SaO2: x       Lactate: 1.7        CAPILLARY BLOOD GLUCOSE      POCT Blood Glucose.: 178 mg/dL (03 Dec 2021 17:11)  POCT Blood Glucose.: 178 mg/dL (03 Dec 2021 11:44)  POCT Blood Glucose.: 209 mg/dL (03 Dec 2021 05:03)  POCT Blood Glucose.: 241 mg/dL (03 Dec 2021 00:31)

## 2021-12-03 NOTE — H&P ADULT - HISTORY OF PRESENT ILLNESS
GREEN TEAM SURGERY H&P  HPI:        PAST MEDICAL & SURGICAL HISTORY:  Lumbago    Lumbago with sciatica of right side    Benign hypertension  dx 10 years    H/O renal calculi  ESWL right side yrs ago  last stone one year ago  (passed on its own)    Obese    Eczema    Diabetes mellitus type II  on Meds 4/2012    Chronic atrial fibrillation    Colon cancer    S/P tonsillectomy    Port-A-Cath in place        MEDICATIONS  (STANDING):  lactated ringers. 1000 milliLiter(s) (100 mL/Hr) IV Continuous <Continuous>    MEDICATIONS  (PRN):      Allergies    No Known Drug Allergies  Nuts (Hives)  Patient stated he had sensation of  throat closing  30 minites after  Epidural pain management resolved it self few minitus after , /  20 y ago Unable to recall MD name or number (Other)  [place] (Unknown)    Intolerances        SOCIAL HISTORY:    FAMILY HISTORY:  No pertinent family history in first degree relatives               GREEN TEAM SURGERY H&P  HPI:  66M with PMHx of HTN, T2DM, stroke on 11/13/21, Afib on Eliquis and rectal adenocarcinoma transferred from [facility] ICU to [facility] SICU for BRBPR. Patient history obtained through both patient and chart as patient has speech deficits 2/2 history of stroke. According to patient chart from [facility], patient initiated chemotherapy on 11/11/21 through a port catheter. Eliquis was held prior to port placement that may have contributed to stroke patient sustained on 11/13/21. Patient was noted to have BRBPR on 12/2/21 shortly after restarting Eliquis. Patient became hypotensive at time of onset and 2UpRBCs were transfused. Patient was transferred to [facility] SICU for further care. Patient denies any abdominal pain, lightheadedness, SOB, dizziness, and notes to notice BRBPR only during bowel movements.       PAST MEDICAL & SURGICAL HISTORY:  Lumbago    Lumbago with sciatica of right side    Benign hypertension  dx 10 years    H/O renal calculi  ESWL right side yrs ago  last stone one year ago  (passed on its own)    Obese    Eczema    Diabetes mellitus type II  on Meds 4/2012    Chronic atrial fibrillation    Colon cancer    S/P tonsillectomy    Port-A-Cath in place        MEDICATIONS  (STANDING):  lactated ringers. 1000 milliLiter(s) (100 mL/Hr) IV Continuous <Continuous>    MEDICATIONS  (PRN):      Allergies    No Known Drug Allergies  Nuts (Hives)  Patient stated he had sensation of  throat closing  30 minites after  Epidural pain management resolved it self few minitus after , /  20 y ago Unable to recall MD name or number (Other)  [place] (Unknown)    Intolerances        SOCIAL HISTORY: , lives with wife  remote ex smoker quit 20 years ago  denies etoh    FAMILY HISTORY:  No pertinent family history in first degree relatives               GREEN TEAM SURGERY H&P  HPI:  66M with PMHx of HTN, T2DM, stroke on 11/13/21, Afib on Eliquis and rectal adenocarcinoma transferred from [facility] ICU to [facility] SICU for BRBPR. Patient history obtained through both patient and chart as patient has speech deficits 2/2 history of stroke. According to patient chart from [facility], patient initiated chemotherapy on 11/11/21 through a port catheter. Eliquis was held prior to port placement that may have contributed to stroke patient sustained on 11/13/21. Patient was sent to rehab and started on Lovenox. Yesterday, 12/2/21, patient was restarted on 10mg Eliquis BID and noted to have BRBPR on 12/2/21 shortly after receiving second dose of Eliquis.Patient became hypotensive at time of onset and 2UpRBCs were transfused. Patient was transferred to [facility] SICU for further care. Patient denies any abdominal pain, lightheadedness, SOB, dizziness, and notes to notice BRBPR only during bowel movements.         PAST MEDICAL & SURGICAL HISTORY:  Lumbago    Lumbago with sciatica of right side    Benign hypertension  dx 10 years    H/O renal calculi  ESWL right side yrs ago  last stone one year ago  (passed on its own)    Obese    Eczema    Diabetes mellitus type II  on Meds 4/2012    Chronic atrial fibrillation    Colon cancer    S/P tonsillectomy    Port-A-Cath in place        MEDICATIONS  (STANDING):  lactated ringers. 1000 milliLiter(s) (100 mL/Hr) IV Continuous <Continuous>    MEDICATIONS  (PRN):      Allergies    No Known Drug Allergies  Nuts (Hives)  Patient stated he had sensation of  throat closing  30 minites after  Epidural pain management resolved it self few minitus after , /  20 y ago Unable to recall MD name or number (Other)  [place] (Unknown)    Intolerances        SOCIAL HISTORY: , lives with wife  remote ex smoker quit 20 years ago  denies etoh    FAMILY HISTORY:  No pertinent family history in first degree relatives

## 2021-12-04 ENCOUNTER — APPOINTMENT (OUTPATIENT)
Dept: INFUSION THERAPY | Facility: HOSPITAL | Age: 66
End: 2021-12-04

## 2021-12-04 LAB
ANION GAP SERPL CALC-SCNC: 16 MMOL/L — SIGNIFICANT CHANGE UP (ref 5–17)
APTT BLD: 33.1 SEC — SIGNIFICANT CHANGE UP (ref 27.5–35.5)
BUN SERPL-MCNC: 35 MG/DL — HIGH (ref 7–23)
CALCIUM SERPL-MCNC: 8.6 MG/DL — SIGNIFICANT CHANGE UP (ref 8.4–10.5)
CHLORIDE SERPL-SCNC: 107 MMOL/L — SIGNIFICANT CHANGE UP (ref 96–108)
CO2 SERPL-SCNC: 18 MMOL/L — LOW (ref 22–31)
CREAT SERPL-MCNC: 1.28 MG/DL — SIGNIFICANT CHANGE UP (ref 0.5–1.3)
GLUCOSE BLDC GLUCOMTR-MCNC: 159 MG/DL — HIGH (ref 70–99)
GLUCOSE BLDC GLUCOMTR-MCNC: 169 MG/DL — HIGH (ref 70–99)
GLUCOSE BLDC GLUCOMTR-MCNC: 186 MG/DL — HIGH (ref 70–99)
GLUCOSE BLDC GLUCOMTR-MCNC: 192 MG/DL — HIGH (ref 70–99)
GLUCOSE BLDC GLUCOMTR-MCNC: 196 MG/DL — HIGH (ref 70–99)
GLUCOSE SERPL-MCNC: 176 MG/DL — HIGH (ref 70–99)
HCT VFR BLD CALC: 28.1 % — LOW (ref 39–50)
HCT VFR BLD CALC: 28.1 % — LOW (ref 39–50)
HCT VFR BLD CALC: 29 % — LOW (ref 39–50)
HCT VFR BLD CALC: 32.5 % — LOW (ref 39–50)
HGB BLD-MCNC: 10.4 G/DL — LOW (ref 13–17)
HGB BLD-MCNC: 8.9 G/DL — LOW (ref 13–17)
HGB BLD-MCNC: 9 G/DL — LOW (ref 13–17)
HGB BLD-MCNC: 9.3 G/DL — LOW (ref 13–17)
INR BLD: 1.3 RATIO — HIGH (ref 0.88–1.16)
MAGNESIUM SERPL-MCNC: 2.2 MG/DL — SIGNIFICANT CHANGE UP (ref 1.6–2.6)
MCHC RBC-ENTMCNC: 27.8 PG — SIGNIFICANT CHANGE UP (ref 27–34)
MCHC RBC-ENTMCNC: 28.1 PG — SIGNIFICANT CHANGE UP (ref 27–34)
MCHC RBC-ENTMCNC: 28.4 PG — SIGNIFICANT CHANGE UP (ref 27–34)
MCHC RBC-ENTMCNC: 28.5 PG — SIGNIFICANT CHANGE UP (ref 27–34)
MCHC RBC-ENTMCNC: 31.7 GM/DL — LOW (ref 32–36)
MCHC RBC-ENTMCNC: 32 GM/DL — SIGNIFICANT CHANGE UP (ref 32–36)
MCHC RBC-ENTMCNC: 32 GM/DL — SIGNIFICANT CHANGE UP (ref 32–36)
MCHC RBC-ENTMCNC: 32.1 GM/DL — SIGNIFICANT CHANGE UP (ref 32–36)
MCV RBC AUTO: 86.9 FL — SIGNIFICANT CHANGE UP (ref 80–100)
MCV RBC AUTO: 88.6 FL — SIGNIFICANT CHANGE UP (ref 80–100)
MCV RBC AUTO: 88.6 FL — SIGNIFICANT CHANGE UP (ref 80–100)
MCV RBC AUTO: 89 FL — SIGNIFICANT CHANGE UP (ref 80–100)
NRBC # BLD: 0 /100 WBCS — SIGNIFICANT CHANGE UP (ref 0–0)
PHOSPHATE SERPL-MCNC: 4.9 MG/DL — HIGH (ref 2.5–4.5)
PLATELET # BLD AUTO: 277 K/UL — SIGNIFICANT CHANGE UP (ref 150–400)
PLATELET # BLD AUTO: 277 K/UL — SIGNIFICANT CHANGE UP (ref 150–400)
PLATELET # BLD AUTO: 303 K/UL — SIGNIFICANT CHANGE UP (ref 150–400)
PLATELET # BLD AUTO: 314 K/UL — SIGNIFICANT CHANGE UP (ref 150–400)
POTASSIUM SERPL-MCNC: 4.1 MMOL/L — SIGNIFICANT CHANGE UP (ref 3.5–5.3)
POTASSIUM SERPL-SCNC: 4.1 MMOL/L — SIGNIFICANT CHANGE UP (ref 3.5–5.3)
PROTHROM AB SERPL-ACNC: 15.4 SEC — HIGH (ref 10.6–13.6)
RBC # BLD: 3.17 M/UL — LOW (ref 4.2–5.8)
RBC # BLD: 3.17 M/UL — LOW (ref 4.2–5.8)
RBC # BLD: 3.26 M/UL — LOW (ref 4.2–5.8)
RBC # BLD: 3.74 M/UL — LOW (ref 4.2–5.8)
RBC # FLD: 17.1 % — HIGH (ref 10.3–14.5)
RBC # FLD: 17.1 % — HIGH (ref 10.3–14.5)
RBC # FLD: 17.2 % — HIGH (ref 10.3–14.5)
RBC # FLD: 17.2 % — HIGH (ref 10.3–14.5)
SODIUM SERPL-SCNC: 141 MMOL/L — SIGNIFICANT CHANGE UP (ref 135–145)
WBC # BLD: 11.17 K/UL — HIGH (ref 3.8–10.5)
WBC # BLD: 9.32 K/UL — SIGNIFICANT CHANGE UP (ref 3.8–10.5)
WBC # BLD: 9.45 K/UL — SIGNIFICANT CHANGE UP (ref 3.8–10.5)
WBC # BLD: 9.53 K/UL — SIGNIFICANT CHANGE UP (ref 3.8–10.5)
WBC # FLD AUTO: 11.17 K/UL — HIGH (ref 3.8–10.5)
WBC # FLD AUTO: 9.32 K/UL — SIGNIFICANT CHANGE UP (ref 3.8–10.5)
WBC # FLD AUTO: 9.45 K/UL — SIGNIFICANT CHANGE UP (ref 3.8–10.5)
WBC # FLD AUTO: 9.53 K/UL — SIGNIFICANT CHANGE UP (ref 3.8–10.5)

## 2021-12-04 PROCEDURE — 99233 SBSQ HOSP IP/OBS HIGH 50: CPT

## 2021-12-04 PROCEDURE — 71045 X-RAY EXAM CHEST 1 VIEW: CPT | Mod: 26

## 2021-12-04 RX ORDER — ACETAMINOPHEN 500 MG
1000 TABLET ORAL EVERY 6 HOURS
Refills: 0 | Status: DISCONTINUED | OUTPATIENT
Start: 2021-12-04 | End: 2021-12-08

## 2021-12-04 RX ORDER — ASPIRIN/CALCIUM CARB/MAGNESIUM 324 MG
81 TABLET ORAL DAILY
Refills: 0 | Status: DISCONTINUED | OUTPATIENT
Start: 2021-12-04 | End: 2021-12-06

## 2021-12-04 RX ORDER — SODIUM CHLORIDE 9 MG/ML
1000 INJECTION, SOLUTION INTRAVENOUS
Refills: 0 | Status: DISCONTINUED | OUTPATIENT
Start: 2021-12-04 | End: 2021-12-05

## 2021-12-04 RX ADMIN — Medication 2: at 05:32

## 2021-12-04 RX ADMIN — Medication 2: at 11:44

## 2021-12-04 RX ADMIN — Medication 2: at 00:56

## 2021-12-04 RX ADMIN — Medication 2: at 17:18

## 2021-12-04 RX ADMIN — Medication 2: at 23:12

## 2021-12-04 RX ADMIN — CHLORHEXIDINE GLUCONATE 1 APPLICATION(S): 213 SOLUTION TOPICAL at 11:43

## 2021-12-04 RX ADMIN — SODIUM CHLORIDE 75 MILLILITER(S): 9 INJECTION, SOLUTION INTRAVENOUS at 19:33

## 2021-12-04 RX ADMIN — PANTOPRAZOLE SODIUM 40 MILLIGRAM(S): 20 TABLET, DELAYED RELEASE ORAL at 11:43

## 2021-12-04 RX ADMIN — SODIUM CHLORIDE 75 MILLILITER(S): 9 INJECTION, SOLUTION INTRAVENOUS at 11:43

## 2021-12-04 NOTE — PROGRESS NOTE ADULT - ASSESSMENT
66M with PMHx of HTN, T2DM, Afib on Eliquis, stroke (while A/C held) on s/p mechanical thrombectomy, and T3N0 rectal adenocarcinoma undergoing neoadjuvant chemo/XRT, transferred from [facility] ICU to [facility] for LGIB with hypotension, admitted to SICU for hemodynamic monitoring.     Neuro: AOx4, Hx stroke whil off a/c s/p mechanical thrombectomy with no residual deficits  - Tylenol PRN    Resp: no acute issues  - wean NC tolerated   - incentive spirometry    CV: hemorrhagic shock requiring pressors  - wean xiao gtt as tolerated  - hold home amlodipine    GI: T3N0 rectal adenocarcinoma undergoing chemo/XRT  - NPO  - IV Protonix 40mg daily   - monitor quality and quantity of BM    /Renal: AMELIA  - LR @ 100cc/hr  - Trend BMP daily    Heme: hemorrhagic shock secondary to BRBPR, resolving. Afib on Eliquis  - hold Eliquis  - Trend CBC Q4H    ID: mild leukocytosis   - no indication for abx at this time  - monitor WBC and temp curve    Endo: hx T2DM  - moderate sliding scale insulin

## 2021-12-04 NOTE — PROGRESS NOTE ADULT - ATTENDING COMMENTS
66M with PMHx of HTN, T2DM, Afib on Eliquis, stroke (while A/C held) for mediport placement  s/p mechanical thrombectomy, and T3N0 rectal adenocarcinoma undergoing neoadjuvant chemo/XRT, transferred from [facility] ICU to [facility] for LGIB with hypotension, admitted to SICU for hemodynamic monitoring.     Awake and alert, non focal. MRI reviewed, has basal ganglia and cerebellar infarcts with small hemorrhagic conversion. Hold ASA.  NPO/IVF  Bed side swallow eval, pt was on thickened diet prior to transfer  IVF while NPO  HCT have been stable, to start phar DVT ppx in AM if remains stable, hold off full dose AC  Neurology consult 66M with PMHx of HTN, T2DM, Afib on Eliquis, stroke (while A/C held) for mediport placement  s/p mechanical thrombectomy, and T3N0 rectal adenocarcinoma undergoing neoadjuvant chemo/XRT, transferred from [facility] ICU to [facility] for LGIB with hypotension, admitted to SICU for hemodynamic monitoring.     Awake and alert, non focal. MRI reviewed, has basal ganglia and cerebellar infarcts with small hemorrhagic conversion. Hold ASA.  NPO/IVF  Bed side swallow eval, pt was on thickened diet prior to transfer  IVF while NPO  HCT have been stable, to start phar DVT ppx in AM if remains stable, hold off full dose AC  Neurology consult  Wife updated

## 2021-12-04 NOTE — PROGRESS NOTE ADULT - SUBJECTIVE AND OBJECTIVE BOX
HISTORY  66y Male PMHx of HTN, T2DM, stroke on 11/13/21, Afib on Eliquis and rectal adenocarcinoma transferred from [facility] ICU to [facility] SICU for BRBPR. Patient history obtained through both patient and chart as patient has speech deficits 2/2 history of stroke. According to patient chart from [facility], patient initiated chemotherapy on 11/11/21 through a port catheter. Eliquis was held prior to port placement that may have contributed to stroke patient sustained on 11/13/21. Patient was noted to have BRBPR on 12/2/21 shortly after restarting Eliquis. Patient became hypotensive at time of onset and 2UpRBCs were transfused. Patient was transferred to [facility] SICU for further care. Patient denies any abdominal pain, lightheadedness, SOB, dizziness, and notes to notice BRBPR only during bowel movements.       24 HOUR EVENTS:  - CBC stable     SUBJECTIVE/ROS:  [ ] A ten-point review of systems was otherwise negative except as noted.  [ ] Due to altered mental status/intubation, subjective information were not able to be obtained from the patient. History was obtained, to the extent possible, from review of the chart and collateral sources of information.      NEURO  Exam: awake, alert, oriented  Meds: acetaminophen   IVPB .. 1000 milliGRAM(s) IV Intermittent every 6 hours PRN Mild Pain (1 - 3), Moderate Pain (4 - 6)    [x] Adequacy of sedation and pain control has been assessed and adjusted      RESPIRATORY  RR: 21 (12-04-21 @ 01:00) (20 - 50)  SpO2: 95% (12-04-21 @ 01:00) (95% - 100%)  Exam: unlabored, clear to auscultation bilaterally  Mechanical Ventilation: none   ABG - ( 03 Dec 2021 12:06 )  pH: x     /  pCO2: x     /  pO2: x     / HCO3: x     / Base Excess: x     /  SaO2: x       Lactate: 1.7    [N/A] Extubation Readiness Assessed  Meds: none       CARDIOVASCULAR  HR: 92 (12-04-21 @ 01:00) (92 - 114)  BP: 139/78 (12-04-21 @ 01:00) (65/27 - 150/77)  BP(mean): 104 (12-04-21 @ 01:00) (35 - 107)  VBG - ( 03 Dec 2021 20:56 )  pH: 7.32  /  pCO2: 46    /  pO2: 47    / HCO3: 24    / Base Excess: -2.5  /  SaO2: 77.1   Lactate: 1.6    Lactate, Blood: 1.7 mmol/L (12-03 @ 12:06)  Exam: regular rate and rhythm  Cardiac Rhythm: sinus  Perfusion     [x]Adequate   [ ]Inadequate  Mentation   [x]Normal       [ ]Reduced  Extremities  [x]Warm         [ ]Cool  Volume Status [ ]Hypervolemic [x]Euvolemic [ ]Hypovolemic  Meds: none      GI/NUTRITION  Exam: soft, nontender, nondistended, incision C/D/I  Diet: NPO   Meds: pantoprazole  Injectable 40 milliGRAM(s) IV Push daily      GENITOURINARY  I&O's Detail    12-03 @ 07:01  -  12-04 @ 01:48  --------------------------------------------------------  IN:    IV PiggyBack: 50 mL    Lactated Ringers: 575 mL    Phenylephrine: 10 mL  Total IN: 635 mL    OUT:    Intermittent Catheterization - Urethral (mL): 1050 mL    Voided (mL): 125 mL  Total OUT: 1175 mL    Total NET: -540 mL        Weight (kg): 110.1 (12-03 @ 19:20)  12-04    141  |  107  |  35<H>  ----------------------------<  176<H>  4.1   |  18<L>  |  1.28    Ca    8.6      04 Dec 2021 00:46  Phos  4.9     12-04  Mg     2.2     12-04    TPro  5.9<L>  /  Alb  2.3<L>  /  TBili  0.5  /  DBili  x   /  AST  22  /  ALT  20  /  AlkPhos  91  12-03    [ ] Dumas catheter, indication: N/A  Meds: lactated ringers. 1000 milliLiter(s) IV Continuous <Continuous>        HEMATOLOGIC  Meds:   [x] VTE Prophylaxis                        10.4   9.53  )-----------( 314      ( 04 Dec 2021 00:46 )             32.5     PT/INR - ( 04 Dec 2021 00:46 )   PT: 15.4 sec;   INR: 1.30 ratio         PTT - ( 04 Dec 2021 00:46 )  PTT:33.1 sec  Transfusion     [ ] PRBC   [ ] Platelets   [ ] FFP   [ ] Cryoprecipitate      INFECTIOUS DISEASES  WBC Count: 9.53 K/uL (12-04 @ 00:46)  WBC Count: 11.12 K/uL (12-03 @ 20:30)  WBC Count: 11.55 K/uL (12-03 @ 12:06)  WBC Count: 12.12 K/uL (12-03 @ 05:00)    RECENT CULTURES: none    Meds: none      ENDOCRINE  CAPILLARY BLOOD GLUCOSE      POCT Blood Glucose.: 192 mg/dL (04 Dec 2021 00:54)  POCT Blood Glucose.: 178 mg/dL (03 Dec 2021 17:11)  POCT Blood Glucose.: 178 mg/dL (03 Dec 2021 11:44)  POCT Blood Glucose.: 209 mg/dL (03 Dec 2021 05:03)    Meds: insulin lispro (ADMELOG) corrective regimen sliding scale   SubCutaneous every 6 hours        ACCESS DEVICES:  [x] Peripheral IV  [ ] Central Venous Line	[ ] R	[ ] L	[ ] IJ	[ ] Fem	[ ] SC	Placed:   [ ] Arterial Line		[ ] R	[ ] L	[ ] Fem	[ ] Rad	[ ] Ax	Placed:   [ ] PICC:					[ ] Mediport  [ ] Urinary Catheter, Date Placed:   [x] Necessity of urinary, arterial, and venous catheters discussed    OTHER MEDICATIONS:  chlorhexidine 2% Cloths 1 Application(s) Topical daily      CODE STATUS: full code    IMAGING: < from: CT Head No Cont (11.14.21 @ 09:37) >  5mm axial sections of the brain were obtained from base to vertex, without the intravenous administration of contrast material. Coronal and sagittal computer generated reconstructed views are available.    Comparison is made with the prior CT of 11/13/2021 and demonstrates no significant interval change.      The ventricles and sulci are prominent consistent age appropriate involutional changes. Small vessel white matter ischemic changes are noted. There is an old left medial occipital infarct. There is no hemorrhage, mass, or shift of midline structures. Bone window examinationis unremarkable.    IMPRESSION: Age-appropriate involutional and ischemic gliotic changes. Old left medial occipital infarct No hemorrhage. No change from 11/13/2021.    < end of copied text >

## 2021-12-04 NOTE — PROGRESS NOTE ADULT - ASSESSMENT
66M with PMHx of HTN, T2DM, stroke on 11/13/21, Afib on Eliquis and rectal adenocarcinoma transferred from [facility] ICU to [facility] SICU for BRBPR    Plan:  -NPO/IVF  - Monitor BMs  - Trend CBC, transfuse PRN  - CTA if bloody BM continues  - IR consult for possible embolization  - GI consult  - Appreciate excellent care per SICU      Green Team Surgery  [id number]

## 2021-12-04 NOTE — PATIENT PROFILE ADULT - ARRIVAL FROM
transfer from [facility]

## 2021-12-04 NOTE — CHART NOTE - NSCHARTNOTEFT_GEN_A_CORE
Team called inquiring about question of AC in this pt admitted with acute GI bleed and known invasive rectal CA and Afib.     66 M, with recent (11/2021) posterior circulation stroke s/p mechanical thrombectomy;  of competing etiology, i.e. cardioembolic versus hypercoagulable state of malignancy.     Recommendations:     Given acute bleed, would hold off on AC for now.   Would resume AC when deemed stable by GI/medical/surg teams.  If cannot be on full AC, then would do Aspirin 81 qd in the interim if no other absolute contraindication and if ok from bleed perspective.    This, overall is an unfortunate situation with limited options.     Please page with any further questions [phone] Team called inquiring about question of AC in this pt admitted with acute GI bleed and known invasive rectal CA and Afib.     66 M, with recent (11/2021) posterior circulation stroke s/p mechanical thrombectomy;  of competing etiology, i.e. cardioembolic versus hypercoagulable state of malignancy.     Recommendations:     Given acute bleed, would hold off on AC for now.   Would resume therapeutic AC when deemed stable by GI/medical/surg teams.  If cannot be on full AC, then would do Aspirin 81 qd in the interim if no other absolute contraindication and if ok from bleed perspective.    This, overall is an unfortunate situation with limited options. He needs to be on AC for dual reasons. Family and patient should be counseled as such.    Please page with any further questions [phone] Team called inquiring about question of AC in this pt admitted with acute GI bleed and known invasive rectal CA and Afib.     66 M, with recent (11/2021) posterior circulation stroke s/p mechanical thrombectomy;  of competing etiology, i.e. cardioembolic versus hypercoagulable state of malignancy.     Recommendations:     Given acute bleed, would hold off on AC for now.   Would resume therapeutic AC when deemed stable by GI/medical/surg teams.  If cannot be on full AC, then would do Aspirin 81 qd in the interim if no other absolute contraindication and if ok from bleed perspective.    If has to remain off of AC for prolonged period, then consideration could have been given to implanting Watchman device or atrial clipping procedure for his hx of AFIB.  however that would not protect against the other competing etiology for stroke, i.e. hypercoagulable state of malignancy for which full AC would be treatment of choice.    This, overall is an unfortunate situation with limited options. He needs to be on AC for dual reasons. Family and patient should be counseled as such.     Please page with any further questions [phone] or call [phone] if we can help further.    d/w neurovascular fellow Dr. Workman.

## 2021-12-04 NOTE — PROGRESS NOTE ADULT - SUBJECTIVE AND OBJECTIVE BOX
Green Team Surgery Daily Progress Note    Subjective:   Patient seen at bedside this AM. Denies acute onset abdominal pain, N/V/D, fevers, chills, SOB, CP, lightheadedness    24h Events:   - Overnight, no acute events    Objective:  Vital Signs  T(C): 36.6 (12-03 @ 23:00), Max: 36.6 (12-03 @ 23:00)  HR: 88 (12-04 @ 02:00) (88 - 101)  BP: 128/75 (12-04 @ 02:00) (121/78 - 150/77)  RR: 22 (12-04 @ 02:00) (21 - 50)  SpO2: 98% (12-04 @ 02:00) (95% - 100%)  12-03-21 @ 07:01  -  12-04-21 @ 02:23  --------------------------------------------------------  IN:  Total IN: 0 mL    OUT:    Intermittent Catheterization - Urethral (mL): 1050 mL    Voided (mL): 125 mL  Total OUT: 1175 mL    Total NET: -1175 mL          Physical Exam:  GEN: resting in bed comfortably in NAD  RESP: no increased WOB  ABD: soft, non-distended, non-tender without rebound tenderness or guarding  EXTR: warm, well-perfused without gross deformities; spontaneous movement in b/l U/L extrem  NEURO: AAOx4    Labs:                        10.4   9.53  )-----------( 314      ( 04 Dec 2021 00:46 )             32.5   12-04    141  |  107  |  35<H>  ----------------------------<  176<H>  4.1   |  18<L>  |  1.28    Ca    8.6      04 Dec 2021 00:46  Phos  4.9     12-04  Mg     2.2     12-04    TPro  5.9<L>  /  Alb  2.3<L>  /  TBili  0.5  /  DBili  x   /  AST  22  /  ALT  20  /  AlkPhos  91  12-03    CAPILLARY BLOOD GLUCOSE      POCT Blood Glucose.: 192 mg/dL (04 Dec 2021 00:54)  POCT Blood Glucose.: 178 mg/dL (03 Dec 2021 17:11)  POCT Blood Glucose.: 178 mg/dL (03 Dec 2021 11:44)  POCT Blood Glucose.: 209 mg/dL (03 Dec 2021 05:03)      Medications:   MEDICATIONS  (STANDING):  chlorhexidine 2% Cloths 1 Application(s) Topical daily  insulin lispro (ADMELOG) corrective regimen sliding scale   SubCutaneous every 6 hours  lactated ringers. 1000 milliLiter(s) (100 mL/Hr) IV Continuous <Continuous>  pantoprazole  Injectable 40 milliGRAM(s) IV Push daily    MEDICATIONS  (PRN):  acetaminophen   IVPB .. 1000 milliGRAM(s) IV Intermittent every 6 hours PRN Mild Pain (1 - 3), Moderate Pain (4 - 6)      Imaging:

## 2021-12-04 NOTE — PATIENT PROFILE ADULT - FALL HARM RISK - HARM RISK INTERVENTIONS

## 2021-12-05 LAB
ANION GAP SERPL CALC-SCNC: 11 MMOL/L — SIGNIFICANT CHANGE UP (ref 5–17)
APTT BLD: 30.5 SEC — SIGNIFICANT CHANGE UP (ref 27.5–35.5)
BUN SERPL-MCNC: 19 MG/DL — SIGNIFICANT CHANGE UP (ref 7–23)
CALCIUM SERPL-MCNC: 8.4 MG/DL — SIGNIFICANT CHANGE UP (ref 8.4–10.5)
CHLORIDE SERPL-SCNC: 108 MMOL/L — SIGNIFICANT CHANGE UP (ref 96–108)
CO2 SERPL-SCNC: 21 MMOL/L — LOW (ref 22–31)
CREAT SERPL-MCNC: 0.84 MG/DL — SIGNIFICANT CHANGE UP (ref 0.5–1.3)
GLUCOSE BLDC GLUCOMTR-MCNC: 167 MG/DL — HIGH (ref 70–99)
GLUCOSE BLDC GLUCOMTR-MCNC: 184 MG/DL — HIGH (ref 70–99)
GLUCOSE BLDC GLUCOMTR-MCNC: 211 MG/DL — HIGH (ref 70–99)
GLUCOSE BLDC GLUCOMTR-MCNC: 218 MG/DL — HIGH (ref 70–99)
GLUCOSE SERPL-MCNC: 152 MG/DL — HIGH (ref 70–99)
HCT VFR BLD CALC: 26.7 % — LOW (ref 39–50)
HCT VFR BLD CALC: 27.4 % — LOW (ref 39–50)
HGB BLD-MCNC: 8.5 G/DL — LOW (ref 13–17)
HGB BLD-MCNC: 8.7 G/DL — LOW (ref 13–17)
INR BLD: 1.19 RATIO — HIGH (ref 0.88–1.16)
MAGNESIUM SERPL-MCNC: 1.7 MG/DL — SIGNIFICANT CHANGE UP (ref 1.6–2.6)
MCHC RBC-ENTMCNC: 28 PG — SIGNIFICANT CHANGE UP (ref 27–34)
MCHC RBC-ENTMCNC: 28.2 PG — SIGNIFICANT CHANGE UP (ref 27–34)
MCHC RBC-ENTMCNC: 31.8 GM/DL — LOW (ref 32–36)
MCHC RBC-ENTMCNC: 31.8 GM/DL — LOW (ref 32–36)
MCV RBC AUTO: 87.8 FL — SIGNIFICANT CHANGE UP (ref 80–100)
MCV RBC AUTO: 89 FL — SIGNIFICANT CHANGE UP (ref 80–100)
NRBC # BLD: 0 /100 WBCS — SIGNIFICANT CHANGE UP (ref 0–0)
NRBC # BLD: 0 /100 WBCS — SIGNIFICANT CHANGE UP (ref 0–0)
PHOSPHATE SERPL-MCNC: 2.9 MG/DL — SIGNIFICANT CHANGE UP (ref 2.5–4.5)
PLATELET # BLD AUTO: 282 K/UL — SIGNIFICANT CHANGE UP (ref 150–400)
PLATELET # BLD AUTO: 301 K/UL — SIGNIFICANT CHANGE UP (ref 150–400)
POTASSIUM SERPL-MCNC: 3.8 MMOL/L — SIGNIFICANT CHANGE UP (ref 3.5–5.3)
POTASSIUM SERPL-SCNC: 3.8 MMOL/L — SIGNIFICANT CHANGE UP (ref 3.5–5.3)
PROCALCITONIN SERPL-MCNC: 0.11 NG/ML — HIGH (ref 0.02–0.1)
PROTHROM AB SERPL-ACNC: 14.2 SEC — HIGH (ref 10.6–13.6)
RBC # BLD: 3.04 M/UL — LOW (ref 4.2–5.8)
RBC # BLD: 3.08 M/UL — LOW (ref 4.2–5.8)
RBC # FLD: 17 % — HIGH (ref 10.3–14.5)
RBC # FLD: 17.1 % — HIGH (ref 10.3–14.5)
SODIUM SERPL-SCNC: 140 MMOL/L — SIGNIFICANT CHANGE UP (ref 135–145)
WBC # BLD: 8.77 K/UL — SIGNIFICANT CHANGE UP (ref 3.8–10.5)
WBC # BLD: 9.51 K/UL — SIGNIFICANT CHANGE UP (ref 3.8–10.5)
WBC # FLD AUTO: 8.77 K/UL — SIGNIFICANT CHANGE UP (ref 3.8–10.5)
WBC # FLD AUTO: 9.51 K/UL — SIGNIFICANT CHANGE UP (ref 3.8–10.5)

## 2021-12-05 PROCEDURE — 99232 SBSQ HOSP IP/OBS MODERATE 35: CPT

## 2021-12-05 RX ORDER — INSULIN LISPRO 100/ML
VIAL (ML) SUBCUTANEOUS
Refills: 0 | Status: DISCONTINUED | OUTPATIENT
Start: 2021-12-05 | End: 2021-12-08

## 2021-12-05 RX ORDER — PANTOPRAZOLE SODIUM 20 MG/1
40 TABLET, DELAYED RELEASE ORAL
Refills: 0 | Status: DISCONTINUED | OUTPATIENT
Start: 2021-12-05 | End: 2021-12-08

## 2021-12-05 RX ORDER — METOPROLOL TARTRATE 50 MG
50 TABLET ORAL EVERY 12 HOURS
Refills: 0 | Status: DISCONTINUED | OUTPATIENT
Start: 2021-12-05 | End: 2021-12-08

## 2021-12-05 RX ORDER — MAGNESIUM SULFATE 500 MG/ML
2 VIAL (ML) INJECTION ONCE
Refills: 0 | Status: COMPLETED | OUTPATIENT
Start: 2021-12-05 | End: 2021-12-05

## 2021-12-05 RX ORDER — ATORVASTATIN CALCIUM 80 MG/1
80 TABLET, FILM COATED ORAL AT BEDTIME
Refills: 0 | Status: DISCONTINUED | OUTPATIENT
Start: 2021-12-05 | End: 2021-12-08

## 2021-12-05 RX ORDER — FERROUS SULFATE 325(65) MG
325 TABLET ORAL DAILY
Refills: 0 | Status: DISCONTINUED | OUTPATIENT
Start: 2021-12-05 | End: 2021-12-08

## 2021-12-05 RX ORDER — METOPROLOL TARTRATE 50 MG
50 TABLET ORAL ONCE
Refills: 0 | Status: COMPLETED | OUTPATIENT
Start: 2021-12-05 | End: 2021-12-05

## 2021-12-05 RX ORDER — QUETIAPINE FUMARATE 200 MG/1
12.5 TABLET, FILM COATED ORAL AT BEDTIME
Refills: 0 | Status: DISCONTINUED | OUTPATIENT
Start: 2021-12-05 | End: 2021-12-08

## 2021-12-05 RX ORDER — INSULIN LISPRO 100/ML
VIAL (ML) SUBCUTANEOUS AT BEDTIME
Refills: 0 | Status: DISCONTINUED | OUTPATIENT
Start: 2021-12-05 | End: 2021-12-08

## 2021-12-05 RX ORDER — POTASSIUM PHOSPHATE, MONOBASIC POTASSIUM PHOSPHATE, DIBASIC 236; 224 MG/ML; MG/ML
15 INJECTION, SOLUTION INTRAVENOUS ONCE
Refills: 0 | Status: COMPLETED | OUTPATIENT
Start: 2021-12-05 | End: 2021-12-05

## 2021-12-05 RX ORDER — ENOXAPARIN SODIUM 100 MG/ML
40 INJECTION SUBCUTANEOUS EVERY 24 HOURS
Refills: 0 | Status: DISCONTINUED | OUTPATIENT
Start: 2021-12-05 | End: 2021-12-06

## 2021-12-05 RX ORDER — INSULIN GLARGINE 100 [IU]/ML
10 INJECTION, SOLUTION SUBCUTANEOUS AT BEDTIME
Refills: 0 | Status: DISCONTINUED | OUTPATIENT
Start: 2021-12-05 | End: 2021-12-08

## 2021-12-05 RX ADMIN — CHLORHEXIDINE GLUCONATE 1 APPLICATION(S): 213 SOLUTION TOPICAL at 11:51

## 2021-12-05 RX ADMIN — ATORVASTATIN CALCIUM 80 MILLIGRAM(S): 80 TABLET, FILM COATED ORAL at 21:48

## 2021-12-05 RX ADMIN — Medication 50 MILLIGRAM(S): at 17:07

## 2021-12-05 RX ADMIN — Medication 2: at 17:07

## 2021-12-05 RX ADMIN — Medication 81 MILLIGRAM(S): at 11:50

## 2021-12-05 RX ADMIN — PANTOPRAZOLE SODIUM 40 MILLIGRAM(S): 20 TABLET, DELAYED RELEASE ORAL at 11:50

## 2021-12-05 RX ADMIN — POTASSIUM PHOSPHATE, MONOBASIC POTASSIUM PHOSPHATE, DIBASIC 62.5 MILLIMOLE(S): 236; 224 INJECTION, SOLUTION INTRAVENOUS at 02:00

## 2021-12-05 RX ADMIN — Medication 50 GRAM(S): at 02:00

## 2021-12-05 RX ADMIN — QUETIAPINE FUMARATE 12.5 MILLIGRAM(S): 200 TABLET, FILM COATED ORAL at 21:48

## 2021-12-05 RX ADMIN — Medication 50 MILLIGRAM(S): at 10:47

## 2021-12-05 RX ADMIN — INSULIN GLARGINE 10 UNIT(S): 100 INJECTION, SOLUTION SUBCUTANEOUS at 21:48

## 2021-12-05 RX ADMIN — Medication 4: at 12:25

## 2021-12-05 RX ADMIN — Medication 2: at 06:14

## 2021-12-05 RX ADMIN — Medication 325 MILLIGRAM(S): at 11:50

## 2021-12-05 RX ADMIN — ENOXAPARIN SODIUM 40 MILLIGRAM(S): 100 INJECTION SUBCUTANEOUS at 11:50

## 2021-12-05 NOTE — PROGRESS NOTE ADULT - SUBJECTIVE AND OBJECTIVE BOX
24 HOUR EVENTS:    HISTORY  Patient is a 66y Male PMHx of HTN, T2DM, stroke on 11/13/21, Afib on Eliquis and rectal adenocarcinoma transferred from [facility] ICU to [facility] SICU for BRBPR. Patient history obtained through both patient and chart as patient has speech deficits 2/2 history of stroke. According to patient chart from [facility], patient initiated chemotherapy on 11/11/21 through a port catheter. Eliquis was held prior to port placement that may have contributed to stroke patient sustained on 11/13/21. Patient was noted to have BRBPR on 12/2/21 shortly after restarting Eliquis. Patient became hypotensive at time of onset and 2UpRBCs were transfused. Patient was transferred to [facility] SICU for further care. Patient notice BRBPR only during bowel movements. Patient admitted to SICU for GIB with BRBPER after Eliquis in hemorrhagic shock.     SUBJECTIVE/ROS:  [x ] A ten-point review of systems was otherwise negative except as noted.  [ ] Due to altered mental status/intubation, subjective information were not able to be obtained from the patient. History was obtained, to the extent possible, from review of the chart and collateral sources of information.      NEURO  Exam: awake, alert, oriented  Meds: acetaminophen   IVPB .. 1000 milliGRAM(s) IV Intermittent every 6 hours PRN Mild Pain (1 - 3), Moderate Pain (4 - 6)    [x] Adequacy of sedation and pain control has been assessed and adjusted      RESPIRATORY  RR: 26 (12-05-21 @ 00:00) (20 - 34)  SpO2: 97% (12-05-21 @ 00:00) (93% - 100%)  Exam: unlabored, clear to auscultation bilaterally  ABG - ( 03 Dec 2021 12:06 )  pH: x     /  pCO2: x     /  pO2: x     / HCO3: x     / Base Excess: x     /  SaO2: x       Lactate: 1.7      [N/A] Extubation Readiness Assessed        CARDIOVASCULAR  HR: 95 (12-05-21 @ 00:00) (86 - 99)  BP: 137/83 (12-05-21 @ 00:00) (117/72 - 171/79)  BP(mean): 106 (12-05-21 @ 00:00) (86 - 113)  VBG - ( 03 Dec 2021 20:56 )  pH: 7.32  /  pCO2: 46    /  pO2: 47    / HCO3: 24    / Base Excess: -2.5  /  SaO2: 77.1   Lactate: 1.6                Exam: regular rate and rhythm  Cardiac Rhythm: sinus  Perfusion     [x]Adequate   [ ]Inadequate  Mentation   [x]Normal       [ ]Reduced  Extremities  [x]Warm         [ ]Cool  Volume Status [ ]Hypervolemic [x]Euvolemic [ ]Hypovolemic  Meds:       GI/NUTRITION  Exam: soft, nontender, nondistended, incision C/D/I  Diet:  Meds: pantoprazole  Injectable 40 milliGRAM(s) IV Push daily      GENITOURINARY  I&O's Detail    12-03 @ 07:01  -  12-04 @ 07:00  --------------------------------------------------------  IN:    IV PiggyBack: 50 mL    Lactated Ringers: 1175 mL    Phenylephrine: 10 mL  Total IN: 1235 mL    OUT:    Indwelling Catheter - Urethral (mL): 465 mL    Intermittent Catheterization - Urethral (mL): 1050 mL    Voided (mL): 125 mL  Total OUT: 1640 mL    Total NET: -405 mL      12-04 @ 07:01  -  12-05 @ 01:51  --------------------------------------------------------  IN:    Lactated Ringers: 200 mL    Lactated Ringers: 1125 mL    Oral Fluid: 720 mL  Total IN: 2045 mL    OUT:    Indwelling Catheter - Urethral (mL): 1055 mL  Total OUT: 1055 mL    Total NET: 990 mL          12-04    141  |  107  |  35<H>  ----------------------------<  176<H>  4.1   |  18<L>  |  1.28    Ca    8.6      04 Dec 2021 00:46  Phos  4.9     12-04  Mg     2.2     12-04    TPro  5.9<L>  /  Alb  2.3<L>  /  TBili  0.5  /  DBili  x   /  AST  22  /  ALT  20  /  AlkPhos  91  12-03    [ ] Dumas catheter, indication: N/A  Meds: lactated ringers. 1000 milliLiter(s) IV Continuous <Continuous>        HEMATOLOGIC  Meds: aspirin  chewable 81 milliGRAM(s) Oral daily    [x] VTE Prophylaxis                        9.3    11.17 )-----------( 303      ( 04 Dec 2021 13:52 )             29.0     PT/INR - ( 04 Dec 2021 00:46 )   PT: 15.4 sec;   INR: 1.30 ratio         PTT - ( 04 Dec 2021 00:46 )  PTT:33.1 sec  Transfusion     [ ] PRBC   [ ] Platelets   [ ] FFP   [ ] Cryoprecipitate      INFECTIOUS DISEASES  WBC Count: 11.17 K/uL (12-04 @ 13:52)  WBC Count: 9.32 K/uL (12-04 @ 08:30)  WBC Count: 9.45 K/uL (12-04 @ 04:32)    RECENT CULTURES:    Meds:       ENDOCRINE  CAPILLARY BLOOD GLUCOSE      POCT Blood Glucose.: 159 mg/dL (04 Dec 2021 23:11)  POCT Blood Glucose.: 186 mg/dL (04 Dec 2021 17:16)  POCT Blood Glucose.: 196 mg/dL (04 Dec 2021 11:39)  POCT Blood Glucose.: 169 mg/dL (04 Dec 2021 05:24)    Meds: insulin lispro (ADMELOG) corrective regimen sliding scale   SubCutaneous every 6 hours        ACCESS DEVICES:  [ ] Peripheral IV  [ ] Central Venous Line	[ ] R	[ ] L	[ ] IJ	[ ] Fem	[ ] SC	Placed:   [ ] Arterial Line		[ ] R	[ ] L	[ ] Fem	[ ] Rad	[ ] Ax	Placed:   [ ] PICC:					[ ] Mediport  [ ] Urinary Catheter, Date Placed:   [x] Necessity of urinary, arterial, and venous catheters discussed    OTHER MEDICATIONS:  chlorhexidine 2% Cloths 1 Application(s) Topical daily      CODE STATUS:      IMAGING: 24 HOUR EVENTS:  - melenic stools with clots   - CBC q6h   - advanced to clear liquids/thicken pending  - consider CTA if bloody BM continues  - neuro c/s for ASA renewal recs    HISTORY  Patient is a 66y Male PMHx of HTN, T2DM, stroke on 11/13/21, Afib on Eliquis and rectal adenocarcinoma transferred from [facility] ICU to [facility] SICU for BRBPR. Patient history obtained through both patient and chart as patient has speech deficits 2/2 history of stroke. According to patient chart from [facility], patient initiated chemotherapy on 11/11/21 through a port catheter. Eliquis was held prior to port placement that may have contributed to stroke patient sustained on 11/13/21. Patient was noted to have BRBPR on 12/2/21 shortly after restarting Eliquis. Patient became hypotensive at time of onset and 2UpRBCs were transfused. Patient was transferred to [facility] SICU for further care. Patient notice BRBPR only during bowel movements. Patient admitted to SICU for GIB with BRBPER after Eliquis in hemorrhagic shock.     SUBJECTIVE/ROS:  [x ] A ten-point review of systems was otherwise negative except as noted.  [ ] Due to altered mental status/intubation, subjective information were not able to be obtained from the patient. History was obtained, to the extent possible, from review of the chart and collateral sources of information.      NEURO  Exam: awake, alert, oriented  Meds: acetaminophen   IVPB .. 1000 milliGRAM(s) IV Intermittent every 6 hours PRN Mild Pain (1 - 3), Moderate Pain (4 - 6)    [x] Adequacy of sedation and pain control has been assessed and adjusted      RESPIRATORY  RR: 26 (12-05-21 @ 00:00) (20 - 34)  SpO2: 97% (12-05-21 @ 00:00) (93% - 100%)  Exam: unlabored, clear to auscultation bilaterally  ABG - ( 03 Dec 2021 12:06 )  pH: x     /  pCO2: x     /  pO2: x     / HCO3: x     / Base Excess: x     /  SaO2: x       Lactate: 1.7      [N/A] Extubation Readiness Assessed        CARDIOVASCULAR  HR: 95 (12-05-21 @ 00:00) (86 - 99)  BP: 137/83 (12-05-21 @ 00:00) (117/72 - 171/79)  BP(mean): 106 (12-05-21 @ 00:00) (86 - 113)  VBG - ( 03 Dec 2021 20:56 )  pH: 7.32  /  pCO2: 46    /  pO2: 47    / HCO3: 24    / Base Excess: -2.5  /  SaO2: 77.1   Lactate: 1.6                Exam: regular rate and rhythm  Cardiac Rhythm: sinus  Perfusion     [x]Adequate   [ ]Inadequate  Mentation   [x]Normal       [ ]Reduced  Extremities  [x]Warm         [ ]Cool  Volume Status [ ]Hypervolemic [x]Euvolemic [ ]Hypovolemic  Meds:       GI/NUTRITION  Exam: soft, nontender, nondistended, incision C/D/I  Diet:  Meds: pantoprazole  Injectable 40 milliGRAM(s) IV Push daily      GENITOURINARY  I&O's Detail    12-03 @ 07:01  -  12-04 @ 07:00  --------------------------------------------------------  IN:    IV PiggyBack: 50 mL    Lactated Ringers: 1175 mL    Phenylephrine: 10 mL  Total IN: 1235 mL    OUT:    Indwelling Catheter - Urethral (mL): 465 mL    Intermittent Catheterization - Urethral (mL): 1050 mL    Voided (mL): 125 mL  Total OUT: 1640 mL    Total NET: -405 mL      12-04 @ 07:01 - 12-05 @ 01:51  --------------------------------------------------------  IN:    Lactated Ringers: 200 mL    Lactated Ringers: 1125 mL    Oral Fluid: 720 mL  Total IN: 2045 mL    OUT:    Indwelling Catheter - Urethral (mL): 1055 mL  Total OUT: 1055 mL    Total NET: 990 mL          12-04    141  |  107  |  35<H>  ----------------------------<  176<H>  4.1   |  18<L>  |  1.28    Ca    8.6      04 Dec 2021 00:46  Phos  4.9     12-04  Mg     2.2     12-04    TPro  5.9<L>  /  Alb  2.3<L>  /  TBili  0.5  /  DBili  x   /  AST  22  /  ALT  20  /  AlkPhos  91  12-03    [ ] Dumas catheter, indication: N/A  Meds: lactated ringers. 1000 milliLiter(s) IV Continuous <Continuous>        HEMATOLOGIC  Meds: aspirin  chewable 81 milliGRAM(s) Oral daily    [x] VTE Prophylaxis                        9.3    11.17 )-----------( 303      ( 04 Dec 2021 13:52 )             29.0     PT/INR - ( 04 Dec 2021 00:46 )   PT: 15.4 sec;   INR: 1.30 ratio         PTT - ( 04 Dec 2021 00:46 )  PTT:33.1 sec  Transfusion     [ ] PRBC   [ ] Platelets   [ ] FFP   [ ] Cryoprecipitate      INFECTIOUS DISEASES  WBC Count: 11.17 K/uL (12-04 @ 13:52)  WBC Count: 9.32 K/uL (12-04 @ 08:30)  WBC Count: 9.45 K/uL (12-04 @ 04:32)    RECENT CULTURES:    Meds:       ENDOCRINE  CAPILLARY BLOOD GLUCOSE      POCT Blood Glucose.: 159 mg/dL (04 Dec 2021 23:11)  POCT Blood Glucose.: 186 mg/dL (04 Dec 2021 17:16)  POCT Blood Glucose.: 196 mg/dL (04 Dec 2021 11:39)  POCT Blood Glucose.: 169 mg/dL (04 Dec 2021 05:24)    Meds: insulin lispro (ADMELOG) corrective regimen sliding scale   SubCutaneous every 6 hours        ACCESS DEVICES:  [ ] Peripheral IV  [ ] Central Venous Line	[ ] R	[ ] L	[ ] IJ	[ ] Fem	[ ] SC	Placed:   [ ] Arterial Line		[ ] R	[ ] L	[ ] Fem	[ ] Rad	[ ] Ax	Placed:   [ ] PICC:					[ ] Mediport  [ ] Urinary Catheter, Date Placed:   [x] Necessity of urinary, arterial, and venous catheters discussed    OTHER MEDICATIONS:  chlorhexidine 2% Cloths 1 Application(s) Topical daily      CODE STATUS:      IMAGING:

## 2021-12-05 NOTE — PROGRESS NOTE ADULT - ATTENDING COMMENTS
66M with PMHx of HTN, T2DM, Afib on Eliquis, stroke (while A/C held) for mediport placement  s/p mechanical thrombectomy, and T3N0 rectal adenocarcinoma undergoing neoadjuvant chemo/XRT, transferred from [facility] ICU to [facility] for LGIB with hypotension, admitted to SICU for hemodynamic monitoring.     Awake and alert, non focal. Has basal ganglia and cerebellar infarcts with small hemorrhagic conversion. Can start ASA as per Neuro.  Tolerating dysphasia diet,  DC IVF  HCT have been stable, start phar DVT ppx, hold off full dose AC  PT  Wife updated  Transfer

## 2021-12-05 NOTE — CONSULT NOTE ADULT - ASSESSMENT
66M with  HTN, T2DM, stroke on 11/13/21 with BA occlusion s/p TICI 2b mechanical thrombectomy, Afib on Eliquis and rectal adenocarcinoma transferred from [facility] ICU to [facility] SICU for BRBPR after LOvenox changed to eliquis 10 s/p PRBC   last admission results:   MRI brain B/L cerebellar and pontine infarcts in BA distribution.   CTA H/N with occlusion of distal BA and P1.   TTE EF 55% mild dilayted LA   A1c 11.8 LDL 97  Impression: etiology of BA occlusion and prior stroke of competing mechanisms either cardioembolic from AF vs hypercoag from malignancy. now with GI bleed   - now on ASA and statin therapy for secondary stroke prevention.   - would stop ASA and restart AC when GIB stable.  prefer full dose lovenox as he had no issues with GIB until switched to eliquis and given his h/o malignancy it has most evidence for hypercoaguability 2/2 malgnancy.  Further more, eliquis dosing for AF should have been 5mg BID and not 10 which he received prior to the GIB.  - if bleeding continues, can consider Atria clip or watchman device in future.   - telemetry  - PT/OT/SS/SLP, OOBC  - check FS, glucose control <180  - GI/DVT ppx  - Counseling on diet, exercise, and medication adherence was done  - Counseling on smoking cessation and alcohol consumption offered when appropriate.  - Pain assessed and judicious use of narcotics when appropriate was discussed.    - Stroke education given when appropriate.  - Importance of fall prevention discussed.   - Differential diagnosis and plan of care discussed with patient and/or family and primary team  - Thank you for allowing me to participate in the care of this patient. Call with questions.   Long Fernandez MD  Vascular Neurology  Office: [phone]

## 2021-12-05 NOTE — PROGRESS NOTE ADULT - ASSESSMENT
PAtient is a 66M with PMHx of HTN, T2DM, Afib on Eliquis, stroke (while A/C held) on s/p mechanical thrombectomy, and T3N0 rectal adenocarcinoma undergoing neoadjuvant chemo/XRT, transferred from [facility] ICU to [facility] for LGIB with hemorrhagic shock, admitted to SICU for hemodynamic monitoring.     Neuro: Hx stroke while off a/c s/p mechanical thrombectomy with no residual deficits  - Tylenol PRN    Resp:    - wean NC tolerated   - incentive spirometry  -HOB >35 degrees    CV: hemorrhagic shock requiring pressors, hx of Afib on Eliquis  - weaned of neosynephrine drip now.  - received total of 3 units of pRBC  - hold home amlodipine in the setting of shock  -monitor bp hr    GI: T3N0 rectal adenocarcinoma undergoing chemo/XRT  - advanced to  CLD mod thick liquids  - IV Protonix 40mg daily   - monitor quality and quantity of BM    /Renal: AMELIA  - LR @ 100cc/hr  - Trend BMP daily    Heme: hemorrhagic shock secondary to LGIB resolving.  - hold Eliquis for Afib  - Trend CBC Q4H  -s/p blood transfusion ( tota 3 units of pRBC)    ID: mild leukocytosis   - no indication for abx at this time  - monitor WBC and temp curve    Endo: hx T2DM  - moderate sliding scale insulin

## 2021-12-05 NOTE — CONSULT NOTE ADULT - SUBJECTIVE AND OBJECTIVE BOX
Neurology Consult    Reason for Consult: Patient is a 66y old  Male who presents with a chief complaint of Rectal Bleeding (05 Dec 2021 04:59)      HPI:  GREEN TEAM SURGERY H&P  HPI:  66M with  HTN, T2DM, stroke on 11/13/21, Afib on Eliquis and rectal adenocarcinoma transferred from [facility] ICU to [facility] SICU for BRBPR. Patient history obtained through both patient and chart as patient has speech deficits 2/2 history of stroke. According to patient chart from [facility], patient initiated chemotherapy on 11/11/21 through a port catheter. Eliquis was held prior to port placement that may have contributed to stroke patient sustained on 11/13/21. Patient was sent to rehab and started on Lovenox. Yesterday, 12/2/21, patient was restarted on 10mg Eliquis BID and noted to have BRBPR on 12/2/21 shortly after receiving second dose of Eliquis.Patient became hypotensive at time of onset and 2UpRBCs were transfused. Patient was transferred to [facility] SICU for further care. Patient denies any abdominal pain, lightheadedness, SOB, dizziness, and notes to notice BRBPR only during bowel movements.         PAST MEDICAL & SURGICAL HISTORY:  Lumbago    Lumbago with sciatica of right side    Benign hypertension  dx 10 years    H/O renal calculi  ESWL right side yrs ago  last stone one year ago  (passed on its own)    Obese    Eczema    Diabetes mellitus type II  on Meds 4/2012    Chronic atrial fibrillation    Colon cancer    S/P tonsillectomy    Port-A-Cath in place        MEDICATIONS  (STANDING):  lactated ringers. 1000 milliLiter(s) (100 mL/Hr) IV Continuous <Continuous>    MEDICATIONS  (PRN):      Allergies    No Known Drug Allergies  Nuts (Hives)  Patient stated he had sensation of  throat closing  30 minites after  Epidural pain management resolved it self few minitus after , /  20 y ago Unable to recall MD name or number (Other)  [place] (Unknown)    Intolerances        SOCIAL HISTORY: , lives with wife  remote ex smoker quit 20 years ago  denies etoh    FAMILY HISTORY:  No pertinent family history in first degree relatives               (03 Dec 2021 19:44)       PAST MEDICAL & SURGICAL HISTORY:  Lumbago    Lumbago with sciatica of right side    Benign hypertension  dx 10 years    H/O renal calculi  ESWL right side yrs ago  last stone one year ago  (passed on its own)    Obese    Eczema    Diabetes mellitus type II  on Meds 4/2012    Chronic atrial fibrillation    Colon cancer    S/P tonsillectomy    Port-A-Cath in place        Allergies: Allergies    No Known Drug Allergies  Nuts (Hives)  Patient stated he had sensation of  throat closing  30 minites after  Epidural pain management resolved it self few minitus after , /  20 y ago Unable to recall MD name or number (Other)  [place] (Unknown)    Intolerances        Social History: Denies toxic habits including tobacco, ETOH or illicit drugs.    Family History: FAMILY HISTORY:  No pertinent family history in first degree relatives    . No family history of strokes    Medications: MEDICATIONS  (STANDING):  aspirin  chewable 81 milliGRAM(s) Oral daily  atorvastatin 80 milliGRAM(s) Oral at bedtime  chlorhexidine 2% Cloths 1 Application(s) Topical daily  enoxaparin Injectable 40 milliGRAM(s) SubCutaneous every 24 hours  ferrous    sulfate 325 milliGRAM(s) Oral daily  insulin glargine Injectable (LANTUS) 10 Unit(s) SubCutaneous at bedtime  insulin lispro (ADMELOG) corrective regimen sliding scale   SubCutaneous three times a day before meals  insulin lispro (ADMELOG) corrective regimen sliding scale   SubCutaneous at bedtime  metoprolol tartrate 50 milliGRAM(s) Oral every 12 hours  metoprolol tartrate 50 milliGRAM(s) Oral once  pantoprazole    Tablet 40 milliGRAM(s) Oral before breakfast  QUEtiapine 12.5 milliGRAM(s) Oral at bedtime    MEDICATIONS  (PRN):  acetaminophen   IVPB .. 1000 milliGRAM(s) IV Intermittent every 6 hours PRN Mild Pain (1 - 3), Moderate Pain (4 - 6)      Review of Systems:  CONSTITUTIONAL:  No weight loss, fever, chills, weakness or fatigue.  HEENT:  Eyes:  No visual loss, blurred vision, double vision or yellow sclera. Ears, Nose, Throat:  No hearing loss, sneezing, congestion, runny nose or sore throat.  SKIN:  No rash or itching.  CARDIOVASCULAR:  No chest pain, chest pressure or chest discomfort. No palpitations or edema.  RESPIRATORY:  No shortness of breath, cough or sputum.  GASTROINTESTINAL:  rectal cancer   GENITOURINARY:  No burning on urination or incontinence   NEUROLOGICAL:  No headache, dizziness, syncope, paralysis, ataxia, numbness or tingling in the extremities. No change in bowel or bladder control. no limb weakness. no vision changes. recent stroke   MUSCULOSKELETAL:  No muscle, back pain, joint pain or stiffness.  HEMATOLOGIC:  No anemia, bleeding or bruising.  LYMPHATICS:  No enlarged nodes. No history of splenectomy.  PSYCHIATRIC:  No history of depression or anxiety.  ENDOCRINOLOGIC:  No reports of sweating, cold or heat intolerance. No polyuria or polydipsia.      Vitals:  Vital Signs Last 24 Hrs  T(C): 36.9 (05 Dec 2021 07:00), Max: 36.9 (05 Dec 2021 07:00)  T(F): 98.4 (05 Dec 2021 07:00), Max: 98.4 (05 Dec 2021 07:00)  HR: 96 (05 Dec 2021 09:00) (86 - 99)  BP: 153/84 (05 Dec 2021 09:00) (117/72 - 161/77)  BP(mean): 112 (05 Dec 2021 09:00) (86 - 112)  RR: 20 (05 Dec 2021 09:00) (15 - 29)  SpO2: 94% (05 Dec 2021 09:00) (93% - 100%)    General Exam:   General Appearance: Appropriately dressed and in no acute distress       Head: Normocephalic, atraumatic and no dysmorphic features  Ear, Nose, and Throat: Moist mucous membranes  CVS: S1S2+  Resp: No SOB, no wheeze or rhonchi  GI: soft NT/ND  Extremities: No edema or cyanosis  Skin: No bruises or rashes     Neurological Exam:  Mental Status: Awake, alert and oriented x ~2-3.  Able to follow simple verbal commands. Able to name and repeat. fluent speech. No obvious aphasia + mild dysarthria noted.   Cranial Nerves: PERRL, dysconjugate gaze, R exotropia, RHHA, sensation V1-V3 intact,  mild R facial asymmetry, equal elevation of palate, scm/trap 5/5, tongue is midline on protrusion. no obvious papilledema on fundoscopic exam. hearing is grossly intact.   Motor: DAWSON well against gravity   Sensation: Intact to light touch and pinprick throughout. no right/left confusion. no extinction to tactile on DSS.    Reflexes: 1+ throughout at biceps, brachioradialis, triceps, patellars and ankles bilaterally and equal. No clonus. R toe and L toe were both downgoing.  Coordination: + dysmetria B/L uppers   Gait: deferred     Data/Labs/Imaging which I personally reviewed.     Labs:     CBC Full  -  ( 05 Dec 2021 06:32 )  WBC Count : 8.77 K/uL  RBC Count : 3.08 M/uL  Hemoglobin : 8.7 g/dL  Hematocrit : 27.4 %  Platelet Count - Automated : 301 K/uL  Mean Cell Volume : 89.0 fl  Mean Cell Hemoglobin : 28.2 pg  Mean Cell Hemoglobin Concentration : 31.8 gm/dL  Auto Neutrophil # : x  Auto Lymphocyte # : x  Auto Monocyte # : x  Auto Eosinophil # : x  Auto Basophil # : x  Auto Neutrophil % : x  Auto Lymphocyte % : x  Auto Monocyte % : x  Auto Eosinophil % : x  Auto Basophil % : x    12-05    140  |  108  |  19  ----------------------------<  152<H>  3.8   |  21<L>  |  0.84    Ca    8.4      05 Dec 2021 00:33  Phos  2.9     12-05  Mg     1.7     12-05        PT/INR - ( 05 Dec 2021 00:33 )   PT: 14.2 sec;   INR: 1.19 ratio         PTT - ( 05 Dec 2021 00:33 )  PTT:30.5 sec        MR Head No Cont (11.15.21 @ 22:21)   Early subacute infarct of the basilar artery branch distribution involving the bilateral cerebellum and sammi, and to lesser extent distal left posterior cerebral artery (more embolic appearing). Susceptibility within the central pontine lesion suggests someearly hemorrhagic conversion.    Evolving T2 signal intensity in the mid/superior sammi and cerebral peduncles consistent with that seen on the head CT of 11/14/2021; extent of this finding is greater than any regional DWI abnormalities, more consistent with extracellular edema.    Chronic old infarct with old blood products involving the medial left occipital lobe.    Unremarkable T2 flow voids within the vertebral basilar system. Suboptimally visualized left PCA. Consider MRA. For further assessment as clinically indicated.    No extra-axial collection, hydrocephalus, midline shift or space-occupying mass lesion.        CT Head No Cont (11.14.21 @ 09:37)   Age-appropriate involutional and ischemic gliotic changes. Old left medial occipital infarct No hemorrhage. No change from 11/13/2021.    Head CT (11/13/21) No CT evidence of acute intracranial hemorrhage, mass effect or midline shift. Ill-defined region of hypoattenuation in the left occipital lobe concerning for subacute to chronic left PCA infarct.     CT brain perfusion: Large area of ischemic penumbra in the posterior fossa, sammi, bilateral cerebral white matter, with areas involving both occipital lobes.    CTA head and neck: Occlusion of the distal basilar artery, and bilateral proximal posterior cerebral artery P1 segments of the basilar. Bilateral superior cerebellar arteries are also not seen. No CTA evidence of aneurysm or dissection

## 2021-12-05 NOTE — PROGRESS NOTE ADULT - ASSESSMENT
66M with PMHx of HTN, T2DM, stroke on 11/13/21, Afib on Eliquis and rectal adenocarcinoma transferred from [facility] ICU to [facility] SICU for BRBPR    Plan:  -NPO/IVF  - Monitor BMs  - Trend CBC, transfuse PRN  - CTA if bloody BM continues  - IR consult for possible embolization  - GI consult  - Appreciate excellent care per SICU      Green Team Surgery  [id number] 66M with PMHx of HTN, T2DM, stroke on 11/13/21, Afib on Eliquis and rectal adenocarcinoma transferred from [facility] ICU to [facility] SICU for BRBPR    Plan:  - CLD / IVF  - Monitor BMs  - Trend CBC, transfuse PRN  - CTA if bloody BM continues  - IR consult for possible embolization  - GI consult  - Appreciate excellent care per SICU  - Appreciate neurology recs re: AC    Green Team Surgery  [id number] 66M with PMHx of HTN, T2DM, stroke on 11/13/21, Afib on Eliquis and rectal adenocarcinoma transferred from [facility] ICU to [facility] SICU for BRBPR    Plan:  - CLD / IVF  - Monitor BMs  - Trend CBC, transfuse PRN  - CTA if bloody BM continues  - IR consult for possible embolization  - f/u GI consult  - Appreciate excellent care per SICU  - Appreciate neurology recs re: AC  - care per SICU    Green Team Surgery  [id number]

## 2021-12-05 NOTE — CHART NOTE - NSCHARTNOTEFT_GEN_A_CORE
SICU Transfer Note    Transfer from: SICU  Transfer to: Floor  Accepting physican:       ASSESSMENT & PLAN:   66M with PMHx of HTN, T2DM, stroke on 11/13/21, Afib on Eliquis(stopped now) and rectal adenocarcinoma transferred from [facility] ICU to [facility] SICU for BRBPR. Patient is hemodynamic stable, no active bleeding per rectum, tolerating CLD and trying regular diet today.      For Follow-Up:  f/u vitals  f/u bowel movement       Vital Signs Last 24 Hrs  T(C): 36.8 (05 Dec 2021 15:37), Max: 37.1 (05 Dec 2021 11:00)  T(F): 98.3 (05 Dec 2021 15:37), Max: 98.7 (05 Dec 2021 11:00)  HR: 86 (05 Dec 2021 15:37) (84 - 99)  BP: 152/80 (05 Dec 2021 15:37) (118/67 - 161/77)  BP(mean): 101 (05 Dec 2021 14:00) (88 - 112)  RR: 20 (05 Dec 2021 15:37) (15 - 29)  SpO2: 97% (05 Dec 2021 15:37) (93% - 100%)  I&O's Summary    04 Dec 2021 07:01  -  05 Dec 2021 07:00  --------------------------------------------------------  IN: 2670 mL / OUT: 1410 mL / NET: 1260 mL    05 Dec 2021 07:01  -  05 Dec 2021 17:10  --------------------------------------------------------  IN: 875 mL / OUT: 475 mL / NET: 400 mL          MEDICATIONS  (STANDING):  aspirin  chewable 81 milliGRAM(s) Oral daily  atorvastatin 80 milliGRAM(s) Oral at bedtime  chlorhexidine 2% Cloths 1 Application(s) Topical daily  enoxaparin Injectable 40 milliGRAM(s) SubCutaneous every 24 hours  ferrous    sulfate 325 milliGRAM(s) Oral daily  insulin glargine Injectable (LANTUS) 10 Unit(s) SubCutaneous at bedtime  insulin lispro (ADMELOG) corrective regimen sliding scale   SubCutaneous three times a day before meals  insulin lispro (ADMELOG) corrective regimen sliding scale   SubCutaneous at bedtime  metoprolol tartrate 50 milliGRAM(s) Oral every 12 hours  pantoprazole    Tablet 40 milliGRAM(s) Oral before breakfast  QUEtiapine 12.5 milliGRAM(s) Oral at bedtime    MEDICATIONS  (PRN):  acetaminophen   IVPB .. 1000 milliGRAM(s) IV Intermittent every 6 hours PRN Mild Pain (1 - 3), Moderate Pain (4 - 6)        LABS                                            8.7                   Neurophils% (auto):   x      (12-05 @ 06:32):    8.77 )-----------(301          Lymphocytes% (auto):  x                                             27.4                   Eosinphils% (auto):   x        Manual%: Neutrophils x    ; Lymphocytes x    ; Eosinophils x    ; Bands%: x    ; Blasts x                                    140    |  108    |  19                  Calcium: 8.4   / iCa: x      (12-05 @ 00:33)    ----------------------------<  152       Magnesium: 1.7                              3.8     |  21     |  0.84             Phosphorous: 2.9        ( 12-05 @ 00:33 )   PT: 14.2 sec;   INR: 1.19 ratio  aPTT: 30.5 sec

## 2021-12-05 NOTE — PROGRESS NOTE ADULT - SUBJECTIVE AND OBJECTIVE BOX
SURGERY DAILY PROGRESS NOTE:       SUBJECTIVE/ROS: Patient seen and examined at bedside this morning. No acute events overnight. Denies acute onset abdominal pain, N/V/D, fevers, chills, SOB, CP, lightheadedness      24 HOUR EVENTS:  - melenic stools with clots   - CBC q6h   - advanced to clear liquids/thicken pending  - consider CTA if bloody BM continues  - neuro c/s for ASA renewal recs    OBJECTIVE:    Vital Signs Last 24 Hrs  T(C): 36.2 (05 Dec 2021 03:00), Max: 36.2 (05 Dec 2021 03:00)  T(F): 97.2 (05 Dec 2021 03:00), Max: 97.2 (05 Dec 2021 03:00)  HR: 93 (05 Dec 2021 04:00) (92 - 99)  BP: 131/74 (05 Dec 2021 04:00) (117/72 - 171/79)  BP(mean): 97 (05 Dec 2021 04:00) (86 - 113)  RR: 18 (05 Dec 2021 04:00) (15 - 34)  SpO2: 94% (05 Dec 2021 04:00) (93% - 100%)    I&O's Detail    03 Dec 2021 07:01  -  04 Dec 2021 07:00  --------------------------------------------------------  IN:    IV PiggyBack: 50 mL    Lactated Ringers: 1175 mL    Phenylephrine: 10 mL  Total IN: 1235 mL    OUT:    Indwelling Catheter - Urethral (mL): 465 mL    Intermittent Catheterization - Urethral (mL): 1050 mL    Voided (mL): 125 mL  Total OUT: 1640 mL    Total NET: -405 mL      04 Dec 2021 07:01  -  05 Dec 2021 05:00  --------------------------------------------------------  IN:    Lactated Ringers: 200 mL    Lactated Ringers: 1425 mL    Oral Fluid: 720 mL  Total IN: 2345 mL    OUT:    Indwelling Catheter - Urethral (mL): 1215 mL  Total OUT: 1215 mL    Total NET: 1130 mL          PHYSICAL EXAM:  Constitutional: well developed, well nourished, NAD  Eyes: anicteric  ENMT: normal facies, symmetric  Neck: supple  Respiratory: airway patent, unlabored breathing  Gastrointestinal: abdomen soft, nontender, nondistended, w/o obvious masses or peritonitis  Extremities: FROM, warm    LABS:                        8.5    9.51  )-----------( 282      ( 05 Dec 2021 00:33 )             26.7     12-05    140  |  108  |  19  ----------------------------<  152<H>  3.8   |  21<L>  |  0.84    Ca    8.4      05 Dec 2021 00:33  Phos  2.9     12-05  Mg     1.7     12-05      PT/INR - ( 05 Dec 2021 00:33 )   PT: 14.2 sec;   INR: 1.19 ratio         PTT - ( 05 Dec 2021 00:33 )  PTT:30.5 sec                     SURGERY DAILY PROGRESS NOTE:       SUBJECTIVE/ROS: Patient seen and examined at bedside this morning. No acute events overnight. Denies acute onset abdominal pain, N/V/D, fevers, chills, SOB, CP, lightheadedness. Tolerating CLD.      24 HOUR EVENTS:  - melenic stools with clots   - CBC q6h   - advanced to clear liquids/thicken pending  - consider CTA if bloody BM continues  - neuro c/s for ASA renewal recs    OBJECTIVE:    Vital Signs Last 24 Hrs  T(C): 36.2 (05 Dec 2021 03:00), Max: 36.2 (05 Dec 2021 03:00)  T(F): 97.2 (05 Dec 2021 03:00), Max: 97.2 (05 Dec 2021 03:00)  HR: 93 (05 Dec 2021 04:00) (92 - 99)  BP: 131/74 (05 Dec 2021 04:00) (117/72 - 171/79)  BP(mean): 97 (05 Dec 2021 04:00) (86 - 113)  RR: 18 (05 Dec 2021 04:00) (15 - 34)  SpO2: 94% (05 Dec 2021 04:00) (93% - 100%)    I&O's Detail    03 Dec 2021 07:01  -  04 Dec 2021 07:00  --------------------------------------------------------  IN:    IV PiggyBack: 50 mL    Lactated Ringers: 1175 mL    Phenylephrine: 10 mL  Total IN: 1235 mL    OUT:    Indwelling Catheter - Urethral (mL): 465 mL    Intermittent Catheterization - Urethral (mL): 1050 mL    Voided (mL): 125 mL  Total OUT: 1640 mL    Total NET: -405 mL      04 Dec 2021 07:01  -  05 Dec 2021 05:00  --------------------------------------------------------  IN:    Lactated Ringers: 200 mL    Lactated Ringers: 1425 mL    Oral Fluid: 720 mL  Total IN: 2345 mL    OUT:    Indwelling Catheter - Urethral (mL): 1215 mL  Total OUT: 1215 mL    Total NET: 1130 mL          PHYSICAL EXAM:  Constitutional: well developed, well nourished, NAD  Eyes: anicteric  ENMT: normal facies, symmetric  Neck: supple  Respiratory: airway patent, unlabored breathing  Gastrointestinal: abdomen soft, nontender, nondistended, w/o obvious masses or peritonitis  Extremities: FROM, warm    LABS:                        8.5    9.51  )-----------( 282      ( 05 Dec 2021 00:33 )             26.7     12-05    140  |  108  |  19  ----------------------------<  152<H>  3.8   |  21<L>  |  0.84    Ca    8.4      05 Dec 2021 00:33  Phos  2.9     12-05  Mg     1.7     12-05      PT/INR - ( 05 Dec 2021 00:33 )   PT: 14.2 sec;   INR: 1.19 ratio         PTT - ( 05 Dec 2021 00:33 )  PTT:30.5 sec                     SURGERY DAILY PROGRESS NOTE:       SUBJECTIVE/ROS: Patient seen and examined at bedside this morning. No acute events overnight. Denies acute onset abdominal pain, N/V/D, fevers, chills, SOB, CP, lightheadedness. Tolerating CLD.       24 HOUR EVENTS:  - melenic stools with clots   - CBC q6h   - advanced to clear liquids/thicken pending  - consider CTA if bloody BM continues  - neuro c/s for ASA renewal recs    OBJECTIVE:    Vital Signs Last 24 Hrs  T(C): 36.2 (05 Dec 2021 03:00), Max: 36.2 (05 Dec 2021 03:00)  T(F): 97.2 (05 Dec 2021 03:00), Max: 97.2 (05 Dec 2021 03:00)  HR: 93 (05 Dec 2021 04:00) (92 - 99)  BP: 131/74 (05 Dec 2021 04:00) (117/72 - 171/79)  BP(mean): 97 (05 Dec 2021 04:00) (86 - 113)  RR: 18 (05 Dec 2021 04:00) (15 - 34)  SpO2: 94% (05 Dec 2021 04:00) (93% - 100%)    I&O's Detail    03 Dec 2021 07:01  -  04 Dec 2021 07:00  --------------------------------------------------------  IN:    IV PiggyBack: 50 mL    Lactated Ringers: 1175 mL    Phenylephrine: 10 mL  Total IN: 1235 mL    OUT:    Indwelling Catheter - Urethral (mL): 465 mL    Intermittent Catheterization - Urethral (mL): 1050 mL    Voided (mL): 125 mL  Total OUT: 1640 mL    Total NET: -405 mL      04 Dec 2021 07:01  -  05 Dec 2021 05:00  --------------------------------------------------------  IN:    Lactated Ringers: 200 mL    Lactated Ringers: 1425 mL    Oral Fluid: 720 mL  Total IN: 2345 mL    OUT:    Indwelling Catheter - Urethral (mL): 1215 mL  Total OUT: 1215 mL    Total NET: 1130 mL          PHYSICAL EXAM:  Constitutional: well developed, well nourished, NAD  Eyes: anicteric  ENMT: normal facies, symmetric  Neck: supple  Respiratory: airway patent, unlabored breathing  Gastrointestinal: abdomen soft, nontender, nondistended, w/o obvious masses or peritonitis  Extremities: FROM, warm    LABS:                        8.5    9.51  )-----------( 282      ( 05 Dec 2021 00:33 )             26.7     12-05    140  |  108  |  19  ----------------------------<  152<H>  3.8   |  21<L>  |  0.84    Ca    8.4      05 Dec 2021 00:33  Phos  2.9     12-05  Mg     1.7     12-05      PT/INR - ( 05 Dec 2021 00:33 )   PT: 14.2 sec;   INR: 1.19 ratio         PTT - ( 05 Dec 2021 00:33 )  PTT:30.5 sec

## 2021-12-06 ENCOUNTER — OUTPATIENT (OUTPATIENT)
Dept: OUTPATIENT SERVICES | Facility: HOSPITAL | Age: 66
LOS: 1 days | Discharge: HOME | End: 2021-12-06

## 2021-12-06 DIAGNOSIS — C21.8 MALIGNANT NEOPLASM OF OVERLAPPING SITES OF RECTUM, ANUS AND ANAL CANAL: ICD-10-CM

## 2021-12-06 DIAGNOSIS — Z95.828 PRESENCE OF OTHER VASCULAR IMPLANTS AND GRAFTS: Chronic | ICD-10-CM

## 2021-12-06 LAB
ANION GAP SERPL CALC-SCNC: 12 MMOL/L — SIGNIFICANT CHANGE UP (ref 5–17)
APTT BLD: 31.1 SEC — SIGNIFICANT CHANGE UP (ref 27.5–35.5)
BUN SERPL-MCNC: 14 MG/DL — SIGNIFICANT CHANGE UP (ref 7–23)
CALCIUM SERPL-MCNC: 8.6 MG/DL — SIGNIFICANT CHANGE UP (ref 8.4–10.5)
CHLORIDE SERPL-SCNC: 104 MMOL/L — SIGNIFICANT CHANGE UP (ref 96–108)
CO2 SERPL-SCNC: 22 MMOL/L — SIGNIFICANT CHANGE UP (ref 22–31)
CREAT SERPL-MCNC: 0.88 MG/DL — SIGNIFICANT CHANGE UP (ref 0.5–1.3)
GLUCOSE BLDC GLUCOMTR-MCNC: 191 MG/DL — HIGH (ref 70–99)
GLUCOSE BLDC GLUCOMTR-MCNC: 230 MG/DL — HIGH (ref 70–99)
GLUCOSE BLDC GLUCOMTR-MCNC: 237 MG/DL — HIGH (ref 70–99)
GLUCOSE BLDC GLUCOMTR-MCNC: 308 MG/DL — HIGH (ref 70–99)
GLUCOSE SERPL-MCNC: 179 MG/DL — HIGH (ref 70–99)
HCT VFR BLD CALC: 26.2 % — LOW (ref 39–50)
HGB BLD-MCNC: 8.4 G/DL — LOW (ref 13–17)
MAGNESIUM SERPL-MCNC: 1.8 MG/DL — SIGNIFICANT CHANGE UP (ref 1.6–2.6)
MCHC RBC-ENTMCNC: 28.5 PG — SIGNIFICANT CHANGE UP (ref 27–34)
MCHC RBC-ENTMCNC: 32.1 GM/DL — SIGNIFICANT CHANGE UP (ref 32–36)
MCV RBC AUTO: 88.8 FL — SIGNIFICANT CHANGE UP (ref 80–100)
NRBC # BLD: 0 /100 WBCS — SIGNIFICANT CHANGE UP (ref 0–0)
PHOSPHATE SERPL-MCNC: 3.5 MG/DL — SIGNIFICANT CHANGE UP (ref 2.5–4.5)
PLATELET # BLD AUTO: 309 K/UL — SIGNIFICANT CHANGE UP (ref 150–400)
POTASSIUM SERPL-MCNC: 3.4 MMOL/L — LOW (ref 3.5–5.3)
POTASSIUM SERPL-SCNC: 3.4 MMOL/L — LOW (ref 3.5–5.3)
RBC # BLD: 2.95 M/UL — LOW (ref 4.2–5.8)
RBC # FLD: 17 % — HIGH (ref 10.3–14.5)
SODIUM SERPL-SCNC: 138 MMOL/L — SIGNIFICANT CHANGE UP (ref 135–145)
WBC # BLD: 8.12 K/UL — SIGNIFICANT CHANGE UP (ref 3.8–10.5)
WBC # FLD AUTO: 8.12 K/UL — SIGNIFICANT CHANGE UP (ref 3.8–10.5)

## 2021-12-06 RX ORDER — HEPARIN SODIUM 5000 [USP'U]/ML
800 INJECTION INTRAVENOUS; SUBCUTANEOUS
Qty: 25000 | Refills: 0 | Status: DISCONTINUED | OUTPATIENT
Start: 2021-12-06 | End: 2021-12-08

## 2021-12-06 RX ORDER — MAGNESIUM SULFATE 500 MG/ML
2 VIAL (ML) INJECTION ONCE
Refills: 0 | Status: COMPLETED | OUTPATIENT
Start: 2021-12-06 | End: 2021-12-06

## 2021-12-06 RX ORDER — TAMSULOSIN HYDROCHLORIDE 0.4 MG/1
0.4 CAPSULE ORAL AT BEDTIME
Refills: 0 | Status: DISCONTINUED | OUTPATIENT
Start: 2021-12-06 | End: 2021-12-08

## 2021-12-06 RX ORDER — POTASSIUM CHLORIDE 20 MEQ
10 PACKET (EA) ORAL
Refills: 0 | Status: COMPLETED | OUTPATIENT
Start: 2021-12-06 | End: 2021-12-06

## 2021-12-06 RX ADMIN — ATORVASTATIN CALCIUM 80 MILLIGRAM(S): 80 TABLET, FILM COATED ORAL at 21:32

## 2021-12-06 RX ADMIN — Medication 50 MILLIGRAM(S): at 17:57

## 2021-12-06 RX ADMIN — Medication 325 MILLIGRAM(S): at 13:19

## 2021-12-06 RX ADMIN — Medication 50 GRAM(S): at 09:38

## 2021-12-06 RX ADMIN — Medication 81 MILLIGRAM(S): at 13:19

## 2021-12-06 RX ADMIN — Medication 2: at 09:35

## 2021-12-06 RX ADMIN — PANTOPRAZOLE SODIUM 40 MILLIGRAM(S): 20 TABLET, DELAYED RELEASE ORAL at 06:31

## 2021-12-06 RX ADMIN — INSULIN GLARGINE 10 UNIT(S): 100 INJECTION, SOLUTION SUBCUTANEOUS at 22:04

## 2021-12-06 RX ADMIN — HEPARIN SODIUM 8 UNIT(S)/HR: 5000 INJECTION INTRAVENOUS; SUBCUTANEOUS at 14:44

## 2021-12-06 RX ADMIN — Medication 8: at 14:17

## 2021-12-06 RX ADMIN — Medication 50 MILLIGRAM(S): at 06:31

## 2021-12-06 RX ADMIN — Medication 100 MILLIEQUIVALENT(S): at 10:41

## 2021-12-06 RX ADMIN — Medication 4: at 17:59

## 2021-12-06 RX ADMIN — TAMSULOSIN HYDROCHLORIDE 0.4 MILLIGRAM(S): 0.4 CAPSULE ORAL at 22:05

## 2021-12-06 RX ADMIN — TAMSULOSIN HYDROCHLORIDE 0.4 MILLIGRAM(S): 0.4 CAPSULE ORAL at 17:57

## 2021-12-06 RX ADMIN — Medication 100 MILLIEQUIVALENT(S): at 14:18

## 2021-12-06 RX ADMIN — QUETIAPINE FUMARATE 12.5 MILLIGRAM(S): 200 TABLET, FILM COATED ORAL at 21:33

## 2021-12-06 RX ADMIN — HEPARIN SODIUM 12 UNIT(S)/HR: 5000 INJECTION INTRAVENOUS; SUBCUTANEOUS at 21:29

## 2021-12-06 RX ADMIN — CHLORHEXIDINE GLUCONATE 1 APPLICATION(S): 213 SOLUTION TOPICAL at 12:36

## 2021-12-06 RX ADMIN — Medication 100 MILLIEQUIVALENT(S): at 13:19

## 2021-12-06 NOTE — PROGRESS NOTE ADULT - ATTENDING COMMENTS
no further bleeding  h/h stable  risk of recurrent stroke  resume AC- will be at risk of re-bleeding  will observe as in pt

## 2021-12-06 NOTE — CONSULT NOTE ADULT - ASSESSMENT
afib long standing   HR stable  cont metoprolol   resume a/c if no there no active bleed or risk of bleed from his rectal ca has diminished   given his recent cva and his risk factors he is at high risk of recurrent cva    CVA  embolic   underlying afib  on statin   a/c once deemed safe     HTN  stable    DM II  Monitor finger stick. Insulin coverage. Diabetic education and Diabetic diet. Consider nutrition consultation.     Advanced care planning was discussed with patient and family.  Risks, benefits and alternatives of the cardiac treatments and medical therapy including procedures were discussed in detail and all questions were answered. Importance of compliance with medical therapy and lifestyle modification to improve cardiovascular health were addressed. Appropriate forms and patient educational materials were reviewed. 30 minutes face to face spent.

## 2021-12-06 NOTE — PROGRESS NOTE ADULT - ASSESSMENT
66M with PMHx of HTN, T2DM, stroke on 11/13/21, Afib on Eliquis and rectal adenocarcinoma transferred from [facility] ICU to [facility] SICU for BRBPR    Plan:  - CLD / IVF  - Monitor BMs  - Trend CBC, transfuse PRN  - CTA if bloody BM continues  - IR consult for possible embolization  - f/u GI consult  - Appreciate neurology recs re: AC    Green Team Surgery  [id number] 66M with PMHx of HTN, T2DM, stroke on 11/13/21, Afib on Eliquis and rectal adenocarcinoma transferred from [facility] ICU to [facility] SICU for BRBPR    Plan:  - moderate thick liquid / IVF  - Monitor BMs  - Cardiology consult for restarting AC  - Trend CBC, transfuse PRN  - CTA if bloody BM continues  - Appreciate neurology recs re: AC    Green Team Surgery  [id number]

## 2021-12-06 NOTE — PROGRESS NOTE ADULT - SUBJECTIVE AND OBJECTIVE BOX
Green Team Surgery Daily Progress Note    Subjective:   Patient seen at bedside this AM. Denies acute onset abdominal pain, N/V/D, fevers, chills, SOB, CP, lightheadedness. Tolerating CLD.     24h Events:   - Overnight, no acute events    Objective:  Vital Signs  T(C): 37.1 (12-05 @ 20:49), Max: 37.1 (12-05 @ 11:00)  HR: 87 (12-05 @ 20:49) (84 - 96)  BP: 148/82 (12-05 @ 20:49) (118/67 - 161/77)  RR: 20 (12-05 @ 20:49) (15 - 26)  SpO2: 96% (12-05 @ 20:49) (93% - 97%)  12-04-21 @ 07:01  -  12-05-21 @ 07:00  --------------------------------------------------------  IN:  Total IN: 0 mL    OUT:    Indwelling Catheter - Urethral (mL): 1410 mL  Total OUT: 1410 mL    Total NET: -1410 mL      12-05-21 @ 07:01  -  12-06-21 @ 00:29  --------------------------------------------------------  IN:  Total IN: 0 mL    OUT:    Indwelling Catheter - Urethral (mL): 475 mL    Intermittent Catheterization - Urethral (mL): 600 mL  Total OUT: 1075 mL    Total NET: -1075 mL          Physical Exam:  GEN: resting in bed comfortably in NAD  RESP: no increased WOB  ABD: soft, non-distended, non-tender without rebound tenderness or guarding  EXTR: warm, well-perfused without gross deformities; spontaneous movement in b/l U/L extrem  NEURO: AAOx4    Labs:                        8.7    8.77  )-----------( 301      ( 05 Dec 2021 06:32 )             27.4   12-05    140  |  108  |  19  ----------------------------<  152<H>  3.8   |  21<L>  |  0.84    Ca    8.4      05 Dec 2021 00:33  Phos  2.9     12-05  Mg     1.7     12-05      CAPILLARY BLOOD GLUCOSE      POCT Blood Glucose.: 218 mg/dL (05 Dec 2021 21:11)  POCT Blood Glucose.: 184 mg/dL (05 Dec 2021 16:58)  POCT Blood Glucose.: 211 mg/dL (05 Dec 2021 12:23)  POCT Blood Glucose.: 167 mg/dL (05 Dec 2021 06:11)      Medications:   MEDICATIONS  (STANDING):  aspirin  chewable 81 milliGRAM(s) Oral daily  atorvastatin 80 milliGRAM(s) Oral at bedtime  chlorhexidine 2% Cloths 1 Application(s) Topical daily  enoxaparin Injectable 40 milliGRAM(s) SubCutaneous every 24 hours  ferrous    sulfate 325 milliGRAM(s) Oral daily  insulin glargine Injectable (LANTUS) 10 Unit(s) SubCutaneous at bedtime  insulin lispro (ADMELOG) corrective regimen sliding scale   SubCutaneous three times a day before meals  insulin lispro (ADMELOG) corrective regimen sliding scale   SubCutaneous at bedtime  metoprolol tartrate 50 milliGRAM(s) Oral every 12 hours  pantoprazole    Tablet 40 milliGRAM(s) Oral before breakfast  QUEtiapine 12.5 milliGRAM(s) Oral at bedtime    MEDICATIONS  (PRN):  acetaminophen   IVPB .. 1000 milliGRAM(s) IV Intermittent every 6 hours PRN Mild Pain (1 - 3), Moderate Pain (4 - 6)      Imaging:       Green Team Surgery Daily Progress Note    Subjective:   Patient seen at bedside this AM. Patient mentions he feels very tired.  Denies acute onset abdominal pain, N/V/D, fevers, chills, SOB, CP, lightheadedness. Tolerating moderate thick liquid diet.     24h Events:   - Overnight, no acute events    Objective:  Vital Signs  T(C): 37.1 (12-05 @ 20:49), Max: 37.1 (12-05 @ 11:00)  HR: 87 (12-05 @ 20:49) (84 - 96)  BP: 148/82 (12-05 @ 20:49) (118/67 - 161/77)  RR: 20 (12-05 @ 20:49) (15 - 26)  SpO2: 96% (12-05 @ 20:49) (93% - 97%)  12-04-21 @ 07:01  -  12-05-21 @ 07:00  --------------------------------------------------------  IN:  Total IN: 0 mL    OUT:    Indwelling Catheter - Urethral (mL): 1410 mL  Total OUT: 1410 mL    Total NET: -1410 mL      12-05-21 @ 07:01  -  12-06-21 @ 00:29  --------------------------------------------------------  IN:  Total IN: 0 mL    OUT:    Indwelling Catheter - Urethral (mL): 475 mL    Intermittent Catheterization - Urethral (mL): 600 mL  Total OUT: 1075 mL    Total NET: -1075 mL          Physical Exam:  GEN: resting in bed comfortably in NAD  RESP: no increased WOB  ABD: soft, non-distended, non-tender without rebound tenderness or guarding  EXTR: warm, well-perfused without gross deformities; spontaneous movement in b/l U/L extrem  NEURO: AAOx4    Labs:                        8.7    8.77  )-----------( 301      ( 05 Dec 2021 06:32 )             27.4   12-05    140  |  108  |  19  ----------------------------<  152<H>  3.8   |  21<L>  |  0.84    Ca    8.4      05 Dec 2021 00:33  Phos  2.9     12-05  Mg     1.7     12-05      CAPILLARY BLOOD GLUCOSE      POCT Blood Glucose.: 218 mg/dL (05 Dec 2021 21:11)  POCT Blood Glucose.: 184 mg/dL (05 Dec 2021 16:58)  POCT Blood Glucose.: 211 mg/dL (05 Dec 2021 12:23)  POCT Blood Glucose.: 167 mg/dL (05 Dec 2021 06:11)      Medications:   MEDICATIONS  (STANDING):  aspirin  chewable 81 milliGRAM(s) Oral daily  atorvastatin 80 milliGRAM(s) Oral at bedtime  chlorhexidine 2% Cloths 1 Application(s) Topical daily  enoxaparin Injectable 40 milliGRAM(s) SubCutaneous every 24 hours  ferrous    sulfate 325 milliGRAM(s) Oral daily  insulin glargine Injectable (LANTUS) 10 Unit(s) SubCutaneous at bedtime  insulin lispro (ADMELOG) corrective regimen sliding scale   SubCutaneous three times a day before meals  insulin lispro (ADMELOG) corrective regimen sliding scale   SubCutaneous at bedtime  metoprolol tartrate 50 milliGRAM(s) Oral every 12 hours  pantoprazole    Tablet 40 milliGRAM(s) Oral before breakfast  QUEtiapine 12.5 milliGRAM(s) Oral at bedtime    MEDICATIONS  (PRN):  acetaminophen   IVPB .. 1000 milliGRAM(s) IV Intermittent every 6 hours PRN Mild Pain (1 - 3), Moderate Pain (4 - 6)      Imaging:

## 2021-12-06 NOTE — CONSULT NOTE ADULT - SUBJECTIVE AND OBJECTIVE BOX
CHIEF COMPLAINT:Patient is a 66y old  Male who presents with a chief complaint of Rectal Bleeding (06 Dec 2021 00:28)      HISTORY OF PRESENT ILLNESS:    66 male with history as below, afib, recent CVA, rectal ca admitted with BRBPR  pt denies any chest pain, sob, palpitation, dizziness or syncope.      PAST MEDICAL & SURGICAL HISTORY:  Lumbago    Lumbago with sciatica of right side    Benign hypertension  dx 10 years    H/O renal calculi  ESWL right side yrs ago  last stone one year ago  (passed on its own)    Obese    Eczema    Diabetes mellitus type II  on Meds 4/2012    Chronic atrial fibrillation    Colon cancer    S/P tonsillectomy    Port-A-Cath in place            MEDICATIONS:  aspirin  chewable 81 milliGRAM(s) Oral daily  enoxaparin Injectable 40 milliGRAM(s) SubCutaneous every 24 hours  metoprolol tartrate 50 milliGRAM(s) Oral every 12 hours        acetaminophen   IVPB .. 1000 milliGRAM(s) IV Intermittent every 6 hours PRN  QUEtiapine 12.5 milliGRAM(s) Oral at bedtime    pantoprazole    Tablet 40 milliGRAM(s) Oral before breakfast    atorvastatin 80 milliGRAM(s) Oral at bedtime  insulin glargine Injectable (LANTUS) 10 Unit(s) SubCutaneous at bedtime  insulin lispro (ADMELOG) corrective regimen sliding scale   SubCutaneous three times a day before meals  insulin lispro (ADMELOG) corrective regimen sliding scale   SubCutaneous at bedtime    chlorhexidine 2% Cloths 1 Application(s) Topical daily  ferrous    sulfate 325 milliGRAM(s) Oral daily  potassium chloride  10 mEq/100 mL IVPB 10 milliEquivalent(s) IV Intermittent every 1 hour      FAMILY HISTORY:  No pertinent family history in first degree relatives        Non-contributory    SOCIAL HISTORY:    No tobacco, drugs or etoh    Allergies    No Known Drug Allergies  Nuts (Hives)  Patient stated he had sensation of  throat closing  30 minites after  Epidural pain management resolved it self few minitus after , /  20 y ago Unable to recall MD name or number (Other)  [place] (Unknown)    Intolerances    	    REVIEW OF SYSTEMS:  as above  The rest of the 14 points ROS reviewed and except above they are unremarkable.        PHYSICAL EXAM:  T(C): 36.4 (12-06-21 @ 09:50), Max: 37.1 (12-05-21 @ 20:49)  HR: 73 (12-06-21 @ 09:50) (73 - 91)  BP: 143/75 (12-06-21 @ 09:50) (131/82 - 157/94)  RR: 18 (12-06-21 @ 09:50) (18 - 23)  SpO2: 94% (12-06-21 @ 09:50) (94% - 97%)  Wt(kg): --  I&O's Summary    05 Dec 2021 07:01  -  06 Dec 2021 07:00  --------------------------------------------------------  IN: 875 mL / OUT: 1075 mL / NET: -200 mL      JVP: Normal  Neck: supple  Lung: clear   CV: S1 S2 , Murmur:  Abd: soft  Ext: No edema  neuro: Awake / alert  Psych: flat affect  Skin: hyperpigmentation in legs      LABS/DATA:    TELEMETRY: 	    ECG:  	   	  CARDIAC MARKERS:    < from: TTE with Doppler (w/Cont) (11.15.21 @ 07:50) >  ------------------------------------------------------------------------  Conclusions:  1. Mild mitral annular calcification, otherwise normal  mitral valve. Minimal mitral regurgitation.  2. Aortic valve not well visualized; appears to be a  calcified trileaflet valve with normal opening. No aortic  valve regurgitation seen.  3. Endocardial visualization enhanced with intravenous  injection of Ultrasonic Enhancing Agent (Definity). Normal  left ventricular systolic function. No segmental wall  motion abnormalities.  4. The right ventricle is not well visualized; grossly  normal right ventricular systolic function.  *** No previous Echo exam.  ------------------------------------------------------------------------  Confirmed on  11/15/2021 - 09:52:56 by Fany Gaona M.D.  ------------------------------------------------------------------------    < end of copied text >                                    8.4    8.12  )-----------( 309      ( 06 Dec 2021 07:24 )             26.2     12-06    138  |  104  |  14  ----------------------------<  179<H>  3.4<L>   |  22  |  0.88    Ca    8.6      06 Dec 2021 07:28  Phos  3.5     12-06  Mg     1.8     12-06      proBNP:   Lipid Profile:   HgA1c:   TSH:

## 2021-12-07 LAB
ANION GAP SERPL CALC-SCNC: 12 MMOL/L — SIGNIFICANT CHANGE UP (ref 5–17)
APTT BLD: 37.4 SEC — HIGH (ref 27.5–35.5)
APTT BLD: 39.1 SEC — HIGH (ref 27.5–35.5)
APTT BLD: 61.3 SEC — HIGH (ref 27.5–35.5)
APTT BLD: 65 SEC — HIGH (ref 27.5–35.5)
BUN SERPL-MCNC: 11 MG/DL — SIGNIFICANT CHANGE UP (ref 7–23)
CALCIUM SERPL-MCNC: 8.5 MG/DL — SIGNIFICANT CHANGE UP (ref 8.4–10.5)
CHLORIDE SERPL-SCNC: 103 MMOL/L — SIGNIFICANT CHANGE UP (ref 96–108)
CO2 SERPL-SCNC: 23 MMOL/L — SIGNIFICANT CHANGE UP (ref 22–31)
CREAT SERPL-MCNC: 0.88 MG/DL — SIGNIFICANT CHANGE UP (ref 0.5–1.3)
GLUCOSE BLDC GLUCOMTR-MCNC: 202 MG/DL — HIGH (ref 70–99)
GLUCOSE BLDC GLUCOMTR-MCNC: 276 MG/DL — HIGH (ref 70–99)
GLUCOSE BLDC GLUCOMTR-MCNC: 285 MG/DL — HIGH (ref 70–99)
GLUCOSE BLDC GLUCOMTR-MCNC: 289 MG/DL — HIGH (ref 70–99)
GLUCOSE SERPL-MCNC: 202 MG/DL — HIGH (ref 70–99)
HCT VFR BLD CALC: 27.4 % — LOW (ref 39–50)
HCT VFR BLD CALC: 29.1 % — LOW (ref 39–50)
HGB BLD-MCNC: 8.4 G/DL — LOW (ref 13–17)
HGB BLD-MCNC: 9.1 G/DL — LOW (ref 13–17)
INR BLD: 1.11 RATIO — SIGNIFICANT CHANGE UP (ref 0.88–1.16)
MAGNESIUM SERPL-MCNC: 1.7 MG/DL — SIGNIFICANT CHANGE UP (ref 1.6–2.6)
MCHC RBC-ENTMCNC: 27.9 PG — SIGNIFICANT CHANGE UP (ref 27–34)
MCHC RBC-ENTMCNC: 28.7 PG — SIGNIFICANT CHANGE UP (ref 27–34)
MCHC RBC-ENTMCNC: 30.7 GM/DL — LOW (ref 32–36)
MCHC RBC-ENTMCNC: 31.3 GM/DL — LOW (ref 32–36)
MCV RBC AUTO: 91 FL — SIGNIFICANT CHANGE UP (ref 80–100)
MCV RBC AUTO: 91.8 FL — SIGNIFICANT CHANGE UP (ref 80–100)
NRBC # BLD: 0 /100 WBCS — SIGNIFICANT CHANGE UP (ref 0–0)
NRBC # BLD: 0 /100 WBCS — SIGNIFICANT CHANGE UP (ref 0–0)
PHOSPHATE SERPL-MCNC: 3.3 MG/DL — SIGNIFICANT CHANGE UP (ref 2.5–4.5)
PLATELET # BLD AUTO: 318 K/UL — SIGNIFICANT CHANGE UP (ref 150–400)
PLATELET # BLD AUTO: 324 K/UL — SIGNIFICANT CHANGE UP (ref 150–400)
POTASSIUM SERPL-MCNC: 4.1 MMOL/L — SIGNIFICANT CHANGE UP (ref 3.5–5.3)
POTASSIUM SERPL-SCNC: 4.1 MMOL/L — SIGNIFICANT CHANGE UP (ref 3.5–5.3)
PROTHROM AB SERPL-ACNC: 13.3 SEC — SIGNIFICANT CHANGE UP (ref 10.6–13.6)
RBC # BLD: 3.01 M/UL — LOW (ref 4.2–5.8)
RBC # BLD: 3.17 M/UL — LOW (ref 4.2–5.8)
RBC # FLD: 17.4 % — HIGH (ref 10.3–14.5)
RBC # FLD: 17.5 % — HIGH (ref 10.3–14.5)
SODIUM SERPL-SCNC: 138 MMOL/L — SIGNIFICANT CHANGE UP (ref 135–145)
WBC # BLD: 8.69 K/UL — SIGNIFICANT CHANGE UP (ref 3.8–10.5)
WBC # BLD: 9.53 K/UL — SIGNIFICANT CHANGE UP (ref 3.8–10.5)
WBC # FLD AUTO: 8.69 K/UL — SIGNIFICANT CHANGE UP (ref 3.8–10.5)
WBC # FLD AUTO: 9.53 K/UL — SIGNIFICANT CHANGE UP (ref 3.8–10.5)

## 2021-12-07 RX ORDER — MAGNESIUM SULFATE 500 MG/ML
2 VIAL (ML) INJECTION ONCE
Refills: 0 | Status: COMPLETED | OUTPATIENT
Start: 2021-12-07 | End: 2021-12-07

## 2021-12-07 RX ADMIN — ATORVASTATIN CALCIUM 80 MILLIGRAM(S): 80 TABLET, FILM COATED ORAL at 22:13

## 2021-12-07 RX ADMIN — PANTOPRAZOLE SODIUM 40 MILLIGRAM(S): 20 TABLET, DELAYED RELEASE ORAL at 06:22

## 2021-12-07 RX ADMIN — HEPARIN SODIUM 18 UNIT(S)/HR: 5000 INJECTION INTRAVENOUS; SUBCUTANEOUS at 08:50

## 2021-12-07 RX ADMIN — Medication 325 MILLIGRAM(S): at 12:41

## 2021-12-07 RX ADMIN — HEPARIN SODIUM 16 UNIT(S)/HR: 5000 INJECTION INTRAVENOUS; SUBCUTANEOUS at 03:33

## 2021-12-07 RX ADMIN — TAMSULOSIN HYDROCHLORIDE 0.4 MILLIGRAM(S): 0.4 CAPSULE ORAL at 22:13

## 2021-12-07 RX ADMIN — Medication 6: at 12:40

## 2021-12-07 RX ADMIN — QUETIAPINE FUMARATE 12.5 MILLIGRAM(S): 200 TABLET, FILM COATED ORAL at 22:13

## 2021-12-07 RX ADMIN — HEPARIN SODIUM 18 UNIT(S)/HR: 5000 INJECTION INTRAVENOUS; SUBCUTANEOUS at 22:36

## 2021-12-07 RX ADMIN — HEPARIN SODIUM 18 UNIT(S)/HR: 5000 INJECTION INTRAVENOUS; SUBCUTANEOUS at 16:40

## 2021-12-07 RX ADMIN — Medication 6: at 17:42

## 2021-12-07 RX ADMIN — INSULIN GLARGINE 10 UNIT(S): 100 INJECTION, SOLUTION SUBCUTANEOUS at 22:11

## 2021-12-07 RX ADMIN — Medication 50 MILLIGRAM(S): at 17:44

## 2021-12-07 RX ADMIN — Medication 2: at 22:10

## 2021-12-07 RX ADMIN — Medication 4: at 08:11

## 2021-12-07 RX ADMIN — Medication 50 GRAM(S): at 10:46

## 2021-12-07 RX ADMIN — Medication 50 MILLIGRAM(S): at 06:22

## 2021-12-07 NOTE — PHYSICAL THERAPY INITIAL EVALUATION ADULT - LIGHT TOUCH SENSATION, LUE, REHAB EVAL
Arterial Line Insertion  Performed by: Yolanda Gonzalez CRNA  Authorized by: Beverly Alcantara MD   Consent: The procedure was performed in an emergent situation  Verbal consent obtained  Written consent obtained  Risks and benefits: risks, benefits and alternatives were discussed  Consent given by: power of   Patient understanding: patient states understanding of the procedure being performed  Patient consent: the patient's understanding of the procedure matches consent given  Procedure consent: procedure consent matches procedure scheduled  Relevant documents: relevant documents present and verified  Test results: test results available and properly labeled  Site marked: the operative site was marked  Radiology Images: Radiology Images displayed and confirmed  If images not available, report reviewed  Required items: required blood products, implants, devices, and special equipment available  Patient identity confirmed: arm band and verbally with patient  Time out: Immediately prior to procedure a "time out" was called to verify the correct patient, procedure, equipment, support staff and site/side marked as required  Preparation: Patient was prepped and draped in the usual sterile fashion    Indications: hemodynamic monitoring    Location: radial artery  Sedation:  Patient sedated: GA     Procedure Details:  Junior's test normal: yes  Needle gauge: 20  Number of attempts: 1    Post-procedure:  Post-procedure: dressing applied  Waveform: good waveform  Post-procedure CNS: unchanged  Patient tolerance: Patient tolerated the procedure well with no immediate complications within normal limits

## 2021-12-07 NOTE — PROGRESS NOTE ADULT - ASSESSMENT
66M with PMHx of HTN, T2DM, stroke on 11/13/21, Afib on Eliquis and rectal adenocarcinoma transferred from [facility] ICU to [facility] SICU for BRBPR    Plan:  - moderate thick liquid / IVF  - Monitor BMs  - on hep gtt; strict monitoring of ptt and recurrence of bleeding  - Trend CBC, transfuse PRN  - CTA if bloody BM continues  - Appreciate neurology recs re: AC  -dispo planning-rehab    Green Team Surgery  [id number]

## 2021-12-07 NOTE — PROGRESS NOTE ADULT - SUBJECTIVE AND OBJECTIVE BOX
Neurology Progress Note    S: Patient seen and examined. No new events overnight. patient denied CP, SOB, HA or pain.     Medication:  acetaminophen   IVPB .. 1000 milliGRAM(s) IV Intermittent every 6 hours PRN  atorvastatin 80 milliGRAM(s) Oral at bedtime  chlorhexidine 2% Cloths 1 Application(s) Topical daily  ferrous    sulfate 325 milliGRAM(s) Oral daily  heparin  Infusion 800 Unit(s)/Hr IV Continuous <Continuous>  insulin glargine Injectable (LANTUS) 10 Unit(s) SubCutaneous at bedtime  insulin lispro (ADMELOG) corrective regimen sliding scale   SubCutaneous three times a day before meals  insulin lispro (ADMELOG) corrective regimen sliding scale   SubCutaneous at bedtime  metoprolol tartrate 50 milliGRAM(s) Oral every 12 hours  pantoprazole    Tablet 40 milliGRAM(s) Oral before breakfast  QUEtiapine 12.5 milliGRAM(s) Oral at bedtime  tamsulosin 0.4 milliGRAM(s) Oral at bedtime      Vitals:  Vital Signs Last 24 Hrs  T(C): 36.7 (07 Dec 2021 16:48), Max: 36.8 (07 Dec 2021 06:10)  T(F): 98.1 (07 Dec 2021 16:48), Max: 98.2 (07 Dec 2021 06:10)  HR: 82 (07 Dec 2021 16:48) (82 - 90)  BP: 137/82 (07 Dec 2021 16:48) (126/78 - 138/93)  BP(mean): --  RR: 18 (07 Dec 2021 16:48) (18 - 18)  SpO2: 96% (07 Dec 2021 16:48) (94% - 96%)      General Exam:   General Appearance: Appropriately dressed and in no acute distress       Head: Normocephalic, atraumatic and no dysmorphic features  Ear, Nose, and Throat: Moist mucous membranes  CVS: S1S2+  Resp: No SOB, no wheeze or rhonchi  GI: soft NT/ND  Extremities: No edema or cyanosis  Skin: No bruises or rashes     Neurological Exam:  Mental Status: Awake, alert and oriented x ~2-3.  Able to follow simple verbal commands. Able to name and repeat. fluent speech. No obvious aphasia + mild dysarthria noted.   Cranial Nerves: PERRL, dysconjugate gaze, R exotropia, RHHA, sensation V1-V3 intact,  mild R facial asymmetry, equal elevation of palate, scm/trap 5/5, tongue is midline on protrusion. no obvious papilledema on fundoscopic exam. hearing is grossly intact.   Motor: DAWSON well against gravity   Sensation: Intact to light touch and pinprick throughout. no right/left confusion. no extinction to tactile on DSS.    Reflexes: 1+ throughout at biceps, brachioradialis, triceps, patellars and ankles bilaterally and equal. No clonus. R toe and L toe were both downgoing.  Coordination: + dysmetria B/L uppers   Gait: deferred     I personally reviewed the below data/images/labs:      CBC Full  -  ( 07 Dec 2021 15:36 )  WBC Count : 9.53 K/uL  RBC Count : 3.01 M/uL  Hemoglobin : 8.4 g/dL  Hematocrit : 27.4 %  Platelet Count - Automated : 324 K/uL  Mean Cell Volume : 91.0 fl  Mean Cell Hemoglobin : 27.9 pg  Mean Cell Hemoglobin Concentration : 30.7 gm/dL  Auto Neutrophil # : x  Auto Lymphocyte # : x  Auto Monocyte # : x  Auto Eosinophil # : x  Auto Basophil # : x  Auto Neutrophil % : x  Auto Lymphocyte % : x  Auto Monocyte % : x  Auto Eosinophil % : x  Auto Basophil % : x    12-07    138  |  103  |  11  ----------------------------<  202<H>  4.1   |  23  |  0.88    Ca    8.5      07 Dec 2021 08:21  Phos  3.3     12-07  Mg     1.7     12-07      MR Head No Cont (11.15.21 @ 22:21)   Early subacute infarct of the basilar artery branch distribution involving the bilateral cerebellum and sammi, and to lesser extent distal left posterior cerebral artery (more embolic appearing). Susceptibility within the central pontine lesion suggests someearly hemorrhagic conversion.    Evolving T2 signal intensity in the mid/superior sammi and cerebral peduncles consistent with that seen on the head CT of 11/14/2021; extent of this finding is greater than any regional DWI abnormalities, more consistent with extracellular edema.    Chronic old infarct with old blood products involving the medial left occipital lobe.    Unremarkable T2 flow voids within the vertebral basilar system. Suboptimally visualized left PCA. Consider MRA. For further assessment as clinically indicated.    No extra-axial collection, hydrocephalus, midline shift or space-occupying mass lesion.        CT Head No Cont (11.14.21 @ 09:37)   Age-appropriate involutional and ischemic gliotic changes. Old left medial occipital infarct No hemorrhage. No change from 11/13/2021.    Head CT (11/13/21) No CT evidence of acute intracranial hemorrhage, mass effect or midline shift. Ill-defined region of hypoattenuation in the left occipital lobe concerning for subacute to chronic left PCA infarct.     CT brain perfusion: Large area of ischemic penumbra in the posterior fossa, sammi, bilateral cerebral white matter, with areas involving both occipital lobes.    CTA head and neck: Occlusion of the distal basilar artery, and bilateral proximal posterior cerebral artery P1 segments of the basilar. Bilateral superior cerebellar arteries are also not seen. No CTA evidence of aneurysm or dissection          PT/INR - ( 07 Dec 2021 08:21 )   PT: 13.3 sec;   INR: 1.11 ratio         PTT - ( 07 Dec 2021 15:36 )  PTT:61.3 sec

## 2021-12-07 NOTE — PROGRESS NOTE ADULT - ASSESSMENT
afib long standing   HR stable  cont metoprolol   on a/c     CVA  embolic   underlying afib  on statin   a/c once deemed safe     HTN  stable    DM II  Monitor finger stick. Insulin coverage. Diabetic education and Diabetic diet. Consider nutrition consultation.     Advanced care planning was discussed with patient and family.  Risks, benefits and alternatives of the cardiac treatments and medical therapy including procedures were discussed in detail and all questions were answered. Importance of compliance with medical therapy and lifestyle modification to improve cardiovascular health were addressed. Appropriate forms and patient educational materials were reviewed. 30 minutes face to face spent.

## 2021-12-07 NOTE — PHYSICAL THERAPY INITIAL EVALUATION ADULT - DIAGNOSIS, PT EVAL
Pt presents with speech & language deficits, mild confusion, decreased strength, balance and endurance impacting ability to perform ADLs and functional mobility

## 2021-12-07 NOTE — PHYSICAL THERAPY INITIAL EVALUATION ADULT - ADDITIONAL COMMENTS
Patient was discharged to [facility] and was in the process of being  transferred to [facility] pending appeal from [facility]. Pt reports requiring x1 person assist for bed mobility, transfers and amb~50ft with RW at [facility]. Prior to rehab, patient resides in private home with spouse, 5 steps to enter, +12 steps inside. Pt was functionally independent in all aspects of functional mobility and self care with cane for functional mobility.

## 2021-12-07 NOTE — PHYSICAL THERAPY INITIAL EVALUATION ADULT - PRECAUTIONS/LIMITATIONS, REHAB EVAL
According to patient chart from [facility], patient initiated chemotherapy on 11/11/21 through a port catheter. Eliquis was held prior to port placement that may have contributed to stroke patient sustained on 11/13/21. Patient was sent to rehab and started on Lovenox. On 12/2/21, patient was restarted on 10mg Eliquis BID and noted to have BRBPR on 12/2/21 shortly after receiving second dose of Eliquis. Patient became hypotensive at time of onset and 2UpRBCs were transfused. Patient was transferred to [facility] SICU for further care. Patient denies any abdominal pain, lightheadedness, SOB, dizziness, and notes to notice BRBPR only during bowel movements./fall precautions

## 2021-12-07 NOTE — PHYSICAL THERAPY INITIAL EVALUATION ADULT - PERTINENT HX OF CURRENT PROBLEM, REHAB EVAL
67yo M with PMHx of HTN, T2DM, stroke on 11/13/21, Afib on Eliquis and rectal adenocarcinoma transferred from [facility] ICU to [facility] SICU for BRBPR. Patient history obtained through both patient and chart as patient has speech deficits 2/2 history of stroke. CONTINUED:

## 2021-12-07 NOTE — PROGRESS NOTE ADULT - SUBJECTIVE AND OBJECTIVE BOX
DATE OF SERVICE: 12-07-21 @ 10:44    Subjective: Patient seen and examined. No new events except as noted.     SUBJECTIVE/ROS:  Pt seen and examined early this am  nad       MEDICATIONS:  MEDICATIONS  (STANDING):  atorvastatin 80 milliGRAM(s) Oral at bedtime  chlorhexidine 2% Cloths 1 Application(s) Topical daily  ferrous    sulfate 325 milliGRAM(s) Oral daily  heparin  Infusion 800 Unit(s)/Hr (18 mL/Hr) IV Continuous <Continuous>  insulin glargine Injectable (LANTUS) 10 Unit(s) SubCutaneous at bedtime  insulin lispro (ADMELOG) corrective regimen sliding scale   SubCutaneous three times a day before meals  insulin lispro (ADMELOG) corrective regimen sliding scale   SubCutaneous at bedtime  magnesium sulfate  IVPB 2 Gram(s) IV Intermittent once  metoprolol tartrate 50 milliGRAM(s) Oral every 12 hours  pantoprazole    Tablet 40 milliGRAM(s) Oral before breakfast  QUEtiapine 12.5 milliGRAM(s) Oral at bedtime  tamsulosin 0.4 milliGRAM(s) Oral at bedtime      PHYSICAL EXAM:  T(C): 36.8 (12-07-21 @ 06:10), Max: 36.8 (12-07-21 @ 06:10)  HR: 88 (12-07-21 @ 06:10) (72 - 90)  BP: 138/93 (12-07-21 @ 06:10) (131/79 - 148/73)  RR: 18 (12-07-21 @ 06:10) (18 - 18)  SpO2: 95% (12-07-21 @ 06:10) (95% - 96%)  Wt(kg): --  I&O's Summary    06 Dec 2021 07:01  -  07 Dec 2021 07:00  --------------------------------------------------------  IN: 664 mL / OUT: 575 mL / NET: 89 mL            JVP: Normal  Neck: supple  Lung: clear   CV: S1 S2 , Murmur:  Abd: soft  Ext: No edema  neuro: Awake / alert  Psych: flat affect  Skin: normal``    LABS/DATA:    CARDIAC MARKERS:                                9.1    8.69  )-----------( 318      ( 07 Dec 2021 08:21 )             29.1     12-07    138  |  103  |  11  ----------------------------<  202<H>  4.1   |  23  |  0.88    Ca    8.5      07 Dec 2021 08:21  Phos  3.3     12-07  Mg     1.7     12-07      proBNP:   Lipid Profile:   HgA1c:   TSH:     TELE:  EKG:

## 2021-12-07 NOTE — PHYSICAL THERAPY INITIAL EVALUATION ADULT - BALANCE TRAINING, PT EVAL
GOAL: Pt will improve static/dynamic balance by at least 1/2 grade to decrease fall risk during functional mobility within 3-4 wks.

## 2021-12-07 NOTE — PROGRESS NOTE ADULT - SUBJECTIVE AND OBJECTIVE BOX
Green Team Surgery Daily Progress Note    Subjective:   Patient seen at bedside this AM. Denies acute onset abdominal pain, N/V/D, fevers, chills, SOB, CP, lightheadedness. Tolerating moderate thick liquid diet.    24h Events:   - Overnight, no acute events    Objective:  Vital Signs  T(C): 36.6 (12-07 @ 00:36), Max: 36.8 (12-06 @ 06:47)  HR: 90 (12-07 @ 00:36) (72 - 90)  BP: 131/79 (12-07 @ 00:36) (131/79 - 157/94)  RR: 18 (12-07 @ 00:36) (18 - 18)  SpO2: 96% (12-07 @ 00:36) (94% - 96%)  12-05-21 @ 07:01  -  12-06-21 @ 07:00  --------------------------------------------------------  IN:  Total IN: 0 mL    OUT:    Indwelling Catheter - Urethral (mL): 475 mL    Intermittent Catheterization - Urethral (mL): 600 mL  Total OUT: 1075 mL    Total NET: -1075 mL      12-06-21 @ 07:01  -  12-07-21 @ 02:28  --------------------------------------------------------  IN:  Total IN: 0 mL    OUT:    Intermittent Catheterization - Urethral (mL): 575 mL    Voided (mL): 0 mL  Total OUT: 575 mL    Total NET: -575 mL          Physical Exam:  GEN: resting in bed comfortably in NAD  RESP: no increased WOB  ABD: soft, non-distended, non-tender without rebound tenderness or guarding  EXTR: warm, well-perfused without gross deformities; spontaneous movement in b/l U/L extrem  NEURO: AAOx4    Labs:                        8.4    8.12  )-----------( 309      ( 06 Dec 2021 07:24 )             26.2   12-06    138  |  104  |  14  ----------------------------<  179<H>  3.4<L>   |  22  |  0.88    Ca    8.6      06 Dec 2021 07:28  Phos  3.5     12-06  Mg     1.8     12-06      CAPILLARY BLOOD GLUCOSE      POCT Blood Glucose.: 230 mg/dL (06 Dec 2021 21:49)  POCT Blood Glucose.: 237 mg/dL (06 Dec 2021 17:20)  POCT Blood Glucose.: 308 mg/dL (06 Dec 2021 14:03)  POCT Blood Glucose.: 191 mg/dL (06 Dec 2021 08:32)      Medications:   MEDICATIONS  (STANDING):  atorvastatin 80 milliGRAM(s) Oral at bedtime  chlorhexidine 2% Cloths 1 Application(s) Topical daily  ferrous    sulfate 325 milliGRAM(s) Oral daily  heparin  Infusion 800 Unit(s)/Hr (12 mL/Hr) IV Continuous <Continuous>  insulin glargine Injectable (LANTUS) 10 Unit(s) SubCutaneous at bedtime  insulin lispro (ADMELOG) corrective regimen sliding scale   SubCutaneous three times a day before meals  insulin lispro (ADMELOG) corrective regimen sliding scale   SubCutaneous at bedtime  metoprolol tartrate 50 milliGRAM(s) Oral every 12 hours  pantoprazole    Tablet 40 milliGRAM(s) Oral before breakfast  QUEtiapine 12.5 milliGRAM(s) Oral at bedtime  tamsulosin 0.4 milliGRAM(s) Oral at bedtime    MEDICATIONS  (PRN):  acetaminophen   IVPB .. 1000 milliGRAM(s) IV Intermittent every 6 hours PRN Mild Pain (1 - 3), Moderate Pain (4 - 6)      Imaging:

## 2021-12-08 ENCOUNTER — APPOINTMENT (OUTPATIENT)
Dept: HEMATOLOGY ONCOLOGY | Facility: CLINIC | Age: 66
End: 2021-12-08

## 2021-12-08 ENCOUNTER — TRANSCRIPTION ENCOUNTER (OUTPATIENT)
Age: 66
End: 2021-12-08

## 2021-12-08 VITALS
SYSTOLIC BLOOD PRESSURE: 137 MMHG | HEART RATE: 80 BPM | RESPIRATION RATE: 18 BRPM | OXYGEN SATURATION: 95 % | DIASTOLIC BLOOD PRESSURE: 87 MMHG | TEMPERATURE: 99 F

## 2021-12-08 DIAGNOSIS — C20 MALIGNANT NEOPLASM OF RECTUM: ICD-10-CM

## 2021-12-08 LAB
ANION GAP SERPL CALC-SCNC: 10 MMOL/L — SIGNIFICANT CHANGE UP (ref 5–17)
APTT BLD: 75.1 SEC — HIGH (ref 27.5–35.5)
BUN SERPL-MCNC: 12 MG/DL — SIGNIFICANT CHANGE UP (ref 7–23)
CALCIUM SERPL-MCNC: 8.7 MG/DL — SIGNIFICANT CHANGE UP (ref 8.4–10.5)
CHLORIDE SERPL-SCNC: 104 MMOL/L — SIGNIFICANT CHANGE UP (ref 96–108)
CO2 SERPL-SCNC: 23 MMOL/L — SIGNIFICANT CHANGE UP (ref 22–31)
CREAT SERPL-MCNC: 0.89 MG/DL — SIGNIFICANT CHANGE UP (ref 0.5–1.3)
GLUCOSE BLDC GLUCOMTR-MCNC: 205 MG/DL — HIGH (ref 70–99)
GLUCOSE BLDC GLUCOMTR-MCNC: 239 MG/DL — HIGH (ref 70–99)
GLUCOSE BLDC GLUCOMTR-MCNC: 265 MG/DL — HIGH (ref 70–99)
GLUCOSE SERPL-MCNC: 206 MG/DL — HIGH (ref 70–99)
HCT VFR BLD CALC: 25.6 % — LOW (ref 39–50)
HGB BLD-MCNC: 8.1 G/DL — LOW (ref 13–17)
MAGNESIUM SERPL-MCNC: 1.7 MG/DL — SIGNIFICANT CHANGE UP (ref 1.6–2.6)
MCHC RBC-ENTMCNC: 28.5 PG — SIGNIFICANT CHANGE UP (ref 27–34)
MCHC RBC-ENTMCNC: 31.6 GM/DL — LOW (ref 32–36)
MCV RBC AUTO: 90.1 FL — SIGNIFICANT CHANGE UP (ref 80–100)
NRBC # BLD: 0 /100 WBCS — SIGNIFICANT CHANGE UP (ref 0–0)
PHOSPHATE SERPL-MCNC: 3.3 MG/DL — SIGNIFICANT CHANGE UP (ref 2.5–4.5)
PLATELET # BLD AUTO: 319 K/UL — SIGNIFICANT CHANGE UP (ref 150–400)
POTASSIUM SERPL-MCNC: 3.5 MMOL/L — SIGNIFICANT CHANGE UP (ref 3.5–5.3)
POTASSIUM SERPL-SCNC: 3.5 MMOL/L — SIGNIFICANT CHANGE UP (ref 3.5–5.3)
RBC # BLD: 2.84 M/UL — LOW (ref 4.2–5.8)
RBC # FLD: 17.4 % — HIGH (ref 10.3–14.5)
SARS-COV-2 RNA SPEC QL NAA+PROBE: SIGNIFICANT CHANGE UP
SODIUM SERPL-SCNC: 137 MMOL/L — SIGNIFICANT CHANGE UP (ref 135–145)
WBC # BLD: 9.96 K/UL — SIGNIFICANT CHANGE UP (ref 3.8–10.5)
WBC # FLD AUTO: 9.96 K/UL — SIGNIFICANT CHANGE UP (ref 3.8–10.5)

## 2021-12-08 RX ORDER — APIXABAN 2.5 MG/1
5 TABLET, FILM COATED ORAL EVERY 12 HOURS
Refills: 0 | Status: DISCONTINUED | OUTPATIENT
Start: 2021-12-08 | End: 2021-12-08

## 2021-12-08 RX ORDER — APIXABAN 2.5 MG/1
1 TABLET, FILM COATED ORAL
Qty: 0 | Refills: 0 | DISCHARGE
Start: 2021-12-08

## 2021-12-08 RX ORDER — POTASSIUM CHLORIDE 20 MEQ
30 PACKET (EA) ORAL ONCE
Refills: 0 | Status: COMPLETED | OUTPATIENT
Start: 2021-12-08 | End: 2021-12-08

## 2021-12-08 RX ORDER — MAGNESIUM SULFATE 500 MG/ML
2 VIAL (ML) INJECTION ONCE
Refills: 0 | Status: COMPLETED | OUTPATIENT
Start: 2021-12-08 | End: 2021-12-08

## 2021-12-08 RX ADMIN — Medication 6: at 13:49

## 2021-12-08 RX ADMIN — Medication 4: at 09:22

## 2021-12-08 RX ADMIN — Medication 25 GRAM(S): at 09:20

## 2021-12-08 RX ADMIN — Medication 30 MILLIEQUIVALENT(S): at 09:19

## 2021-12-08 RX ADMIN — Medication 325 MILLIGRAM(S): at 13:50

## 2021-12-08 RX ADMIN — PANTOPRAZOLE SODIUM 40 MILLIGRAM(S): 20 TABLET, DELAYED RELEASE ORAL at 05:35

## 2021-12-08 RX ADMIN — Medication 50 MILLIGRAM(S): at 05:35

## 2021-12-08 RX ADMIN — HEPARIN SODIUM 18 UNIT(S)/HR: 5000 INJECTION INTRAVENOUS; SUBCUTANEOUS at 07:34

## 2021-12-08 NOTE — PROGRESS NOTE ADULT - REASON FOR ADMISSION
Rectal Bleeding

## 2021-12-08 NOTE — PROGRESS NOTE ADULT - SUBJECTIVE AND OBJECTIVE BOX
DATE OF SERVICE: 12-08-21 @ 10:55    Subjective: Patient seen and examined. No new events except as noted.     SUBJECTIVE/ROS:  Pt seen and examined early this am  nad       MEDICATIONS:  MEDICATIONS  (STANDING):  apixaban 5 milliGRAM(s) Oral every 12 hours  atorvastatin 80 milliGRAM(s) Oral at bedtime  chlorhexidine 2% Cloths 1 Application(s) Topical daily  ferrous    sulfate 325 milliGRAM(s) Oral daily  insulin glargine Injectable (LANTUS) 10 Unit(s) SubCutaneous at bedtime  insulin lispro (ADMELOG) corrective regimen sliding scale   SubCutaneous three times a day before meals  insulin lispro (ADMELOG) corrective regimen sliding scale   SubCutaneous at bedtime  metoprolol tartrate 50 milliGRAM(s) Oral every 12 hours  pantoprazole    Tablet 40 milliGRAM(s) Oral before breakfast  QUEtiapine 12.5 milliGRAM(s) Oral at bedtime  tamsulosin 0.4 milliGRAM(s) Oral at bedtime      PHYSICAL EXAM:  T(C): 37.1 (12-08-21 @ 10:45), Max: 37.4 (12-07-21 @ 20:15)  HR: 80 (12-08-21 @ 10:45) (80 - 88)  BP: 121/79 (12-08-21 @ 10:45) (121/79 - 163/82)  RR: 18 (12-08-21 @ 10:45) (18 - 18)  SpO2: 98% (12-08-21 @ 10:45) (95% - 98%)  Wt(kg): --  I&O's Summary    07 Dec 2021 07:01  -  08 Dec 2021 07:00  --------------------------------------------------------  IN: 1330 mL / OUT: 150 mL / NET: 1180 mL            JVP: Normal  Neck: supple  Lung: clear   CV: S1 S2 , Murmur:  Abd: soft  Ext: No edema  neuro: Awake / alert  Psych: flat affect  Skin: normal``    LABS/DATA:    CARDIAC MARKERS:                                8.1    9.96  )-----------( 319      ( 08 Dec 2021 05:49 )             25.6     12-08    137  |  104  |  12  ----------------------------<  206<H>  3.5   |  23  |  0.89    Ca    8.7      08 Dec 2021 05:49  Phos  3.3     12-08  Mg     1.7     12-08      proBNP:   Lipid Profile:   HgA1c:   TSH:     TELE:  EKG:

## 2021-12-08 NOTE — PROVIDER CONTACT NOTE (OTHER) - BACKGROUND
pt admit [facility] 12/3 after episode of rectal bleeding at [place]. 12/6 was started on heparin drip.   has hx of HTN

## 2021-12-08 NOTE — PROGRESS NOTE ADULT - SUBJECTIVE AND OBJECTIVE BOX
Neurology Progress Note    S: Patient seen and examined. No new events overnight. patient denied CP, SOB, HA or pain. dc plan     Medication:  MEDICATIONS  (STANDING):  apixaban 5 milliGRAM(s) Oral every 12 hours  atorvastatin 80 milliGRAM(s) Oral at bedtime  chlorhexidine 2% Cloths 1 Application(s) Topical daily  ferrous    sulfate 325 milliGRAM(s) Oral daily  insulin glargine Injectable (LANTUS) 10 Unit(s) SubCutaneous at bedtime  insulin lispro (ADMELOG) corrective regimen sliding scale   SubCutaneous three times a day before meals  insulin lispro (ADMELOG) corrective regimen sliding scale   SubCutaneous at bedtime  metoprolol tartrate 50 milliGRAM(s) Oral every 12 hours  pantoprazole    Tablet 40 milliGRAM(s) Oral before breakfast  QUEtiapine 12.5 milliGRAM(s) Oral at bedtime  tamsulosin 0.4 milliGRAM(s) Oral at bedtime    MEDICATIONS  (PRN):  acetaminophen   IVPB .. 1000 milliGRAM(s) IV Intermittent every 6 hours PRN Mild Pain (1 - 3), Moderate Pain (4 - 6)        Vitals:  Vital Signs Last 24 Hrs  T(C): 37 (12-08-21 @ 04:15), Max: 37.4 (12-07-21 @ 20:15)  T(F): 98.6 (12-08-21 @ 04:15), Max: 99.4 (12-07-21 @ 20:15)  HR: 86 (12-08-21 @ 04:15) (81 - 88)  BP: 163/82 (12-08-21 @ 04:15) (126/78 - 163/82)  BP(mean): --  RR: 18 (12-08-21 @ 04:15) (18 - 18)  SpO2: 98% (12-08-21 @ 04:15) (94% - 98%)        General Exam:   General Appearance: Appropriately dressed and in no acute distress       Head: Normocephalic, atraumatic and no dysmorphic features  Ear, Nose, and Throat: Moist mucous membranes  CVS: S1S2+  Resp: No SOB, no wheeze or rhonchi  GI: soft NT/ND  Extremities: No edema or cyanosis  Skin: No bruises or rashes     Neurological Exam:  Mental Status: Awake, alert and oriented x ~2-3.  Able to follow simple verbal commands. Able to name and repeat. fluent speech. No obvious aphasia + mild dysarthria noted.   Cranial Nerves: PERRL, dysconjugate gaze, R exotropia, RHHA, sensation V1-V3 intact,  mild R facial asymmetry, equal elevation of palate, scm/trap 5/5, tongue is midline on protrusion. no obvious papilledema on fundoscopic exam. hearing is grossly intact.   Motor: DAWSON well against gravity   Sensation: Intact to light touch and pinprick throughout. no right/left confusion. no extinction to tactile on DSS.    Reflexes: 1+ throughout at biceps, brachioradialis, triceps, patellars and ankles bilaterally and equal. No clonus. R toe and L toe were both downgoing.  Coordination: + dysmetria B/L uppers   Gait: deferred     I personally reviewed the below data/images/labs:  CBC Full  -  ( 08 Dec 2021 05:49 )  WBC Count : 9.96 K/uL  RBC Count : 2.84 M/uL  Hemoglobin : 8.1 g/dL  Hematocrit : 25.6 %  Platelet Count - Automated : 319 K/uL  Mean Cell Volume : 90.1 fl  Mean Cell Hemoglobin : 28.5 pg  Mean Cell Hemoglobin Concentration : 31.6 gm/dL  Auto Neutrophil # : x  Auto Lymphocyte # : x  Auto Monocyte # : x  Auto Eosinophil # : x  Auto Basophil # : x  Auto Neutrophil % : x  Auto Lymphocyte % : x  Auto Monocyte % : x  Auto Eosinophil % : x  Auto Basophil % : x      12-08    137  |  104  |  12  ----------------------------<  206<H>  3.5   |  23  |  0.89    Ca    8.7      08 Dec 2021 05:49  Phos  3.3     12-08  Mg     1.7     12-08          MR Head No Cont (11.15.21 @ 22:21)   Early subacute infarct of the basilar artery branch distribution involving the bilateral cerebellum and sammi, and to lesser extent distal left posterior cerebral artery (more embolic appearing). Susceptibility within the central pontine lesion suggests someearly hemorrhagic conversion.    Evolving T2 signal intensity in the mid/superior sammi and cerebral peduncles consistent with that seen on the head CT of 11/14/2021; extent of this finding is greater than any regional DWI abnormalities, more consistent with extracellular edema.    Chronic old infarct with old blood products involving the medial left occipital lobe.    Unremarkable T2 flow voids within the vertebral basilar system. Suboptimally visualized left PCA. Consider MRA. For further assessment as clinically indicated.    No extra-axial collection, hydrocephalus, midline shift or space-occupying mass lesion.        CT Head No Cont (11.14.21 @ 09:37)   Age-appropriate involutional and ischemic gliotic changes. Old left medial occipital infarct No hemorrhage. No change from 11/13/2021.    Head CT (11/13/21) No CT evidence of acute intracranial hemorrhage, mass effect or midline shift. Ill-defined region of hypoattenuation in the left occipital lobe concerning for subacute to chronic left PCA infarct.     CT brain perfusion: Large area of ischemic penumbra in the posterior fossa, sammi, bilateral cerebral white matter, with areas involving both occipital lobes.    CTA head and neck: Occlusion of the distal basilar artery, and bilateral proximal posterior cerebral artery P1 segments of the basilar. Bilateral superior cerebellar arteries are also not seen. No CTA evidence of aneurysm or dissection          PT/INR - ( 07 Dec 2021 08:21 )   PT: 13.3 sec;   INR: 1.11 ratio         PTT - ( 07 Dec 2021 15:36 )  PTT:61.3 sec

## 2021-12-08 NOTE — PROVIDER CONTACT NOTE (OTHER) - ASSESSMENT
/96, all other VSS. pt denies headache/chest pain/active bleeding.   pt heparin drip running at 18 ml/hr.
pt displays no s/s of active bleeding. all VSS. heparin drip rate 18 ml/hr.

## 2021-12-08 NOTE — DISCHARGE NOTE NURSING/CASE MANAGEMENT/SOCIAL WORK - NSDCPEFALRISK_GEN_ALL_CORE
For information on Fall & Injury Prevention, visit: [email or web address] OR  [email or web address] OR  https://www.cdc.gov/steadi/patient.html

## 2021-12-08 NOTE — PROGRESS NOTE ADULT - ASSESSMENT
66M with  HTN, T2DM, stroke on 11/13/21 with BA occlusion s/p TICI 2b mechanical thrombectomy, Afib on Eliquis and rectal adenocarcinoma transferred from [facility] ICU to [facility] SICU for BRBPR after LOvenox changed to eliquis 10 s/p PRBC   last admission results:   MRI brain B/L cerebellar and pontine infarcts in BA distribution.   CTA H/N with occlusion of distal BA and P1.   TTE EF 55% mild dilayted LA   A1c 11.8 LDL 97  Impression: etiology of BA occlusion and prior stroke of competing mechanisms either cardioembolic from AF vs hypercoag from malignancy. now with GI bleed   - now on eliqus 5mg BID and statin therapy for secondary stroke prevention.   -  prefer full dose lovenox as he had no issues with GIB until switched to eliquis and given his h/o malignancy it has most evidence for hypercoaguability 2/2 malgnancy.  Further more, eliquis dosing for AF should have been 5mg BID and not 10 which he received prior to the GIB.  Patient refusing lovenox, wants eliquis.  explained Lovenox is preferred from EBM standpint but  eliquis likely okay  - if bleeding continues, can consider Atria clip or watchman device in future.   - telemetry  - PT/OT/SS/SLP, OOBC  - check FS, glucose control <180  - GI/DVT ppx  - Counseling on diet, exercise, and medication adherence was done  - Counseling on smoking cessation and alcohol consumption offered when appropriate.  - Pain assessed and judicious use of narcotics when appropriate was discussed.    - Stroke education given when appropriate.  - Importance of fall prevention discussed.   - Differential diagnosis and plan of care discussed with patient and/or family and primary team  - Thank you for allowing me to participate in the care of this patient. Call with questions.   - spoke with surgery team   - d/c plan today rehab   Long Fernandez MD  Vascular Neurology  Office: [phone]

## 2021-12-08 NOTE — PROGRESS NOTE ADULT - SUBJECTIVE AND OBJECTIVE BOX
Green Team Surgery Daily Progress Note    Subjective:   Patient seen at bedside this AM. Denies acute onset abdominal pain, N/V/D, fevers, chills, SOB, CP, lightheadedness. Tolerating moderate thick liquid diet.     24h Events:   - Overnight, no acute events    Objective:  Vital Signs  T(C): 37.1 (12-08 @ 00:10), Max: 37.4 (12-07 @ 20:15)  HR: 88 (12-08 @ 00:10) (81 - 88)  BP: 155/96 (12-08 @ 00:10) (126/78 - 155/96)  RR: 18 (12-08 @ 00:10) (18 - 18)  SpO2: 95% (12-08 @ 00:10) (94% - 96%)  12-06-21 @ 07:01  -  12-07-21 @ 07:00  --------------------------------------------------------  IN:  Total IN: 0 mL    OUT:    Intermittent Catheterization - Urethral (mL): 575 mL    Voided (mL): 0 mL  Total OUT: 575 mL    Total NET: -575 mL      12-07-21 @ 07:01  -  12-08-21 @ 01:51  --------------------------------------------------------  IN:  Total IN: 0 mL    OUT:    Voided (mL): 150 mL  Total OUT: 150 mL    Total NET: -150 mL          Physical Exam:  GEN: resting in bed comfortably in NAD  RESP: no increased WOB  ABD: soft, non-distended, non-tender without rebound tenderness or guarding  EXTR: warm, well-perfused without gross deformities; spontaneous movement in b/l U/L extrem  NEURO: AAOx4    Labs:                        8.4    9.53  )-----------( 324      ( 07 Dec 2021 15:36 )             27.4   12-07    138  |  103  |  11  ----------------------------<  202<H>  4.1   |  23  |  0.88    Ca    8.5      07 Dec 2021 08:21  Phos  3.3     12-07  Mg     1.7     12-07      CAPILLARY BLOOD GLUCOSE      POCT Blood Glucose.: 276 mg/dL (07 Dec 2021 21:30)  POCT Blood Glucose.: 285 mg/dL (07 Dec 2021 17:30)  POCT Blood Glucose.: 289 mg/dL (07 Dec 2021 12:34)  POCT Blood Glucose.: 202 mg/dL (07 Dec 2021 08:04)      Medications:   MEDICATIONS  (STANDING):  atorvastatin 80 milliGRAM(s) Oral at bedtime  chlorhexidine 2% Cloths 1 Application(s) Topical daily  ferrous    sulfate 325 milliGRAM(s) Oral daily  heparin  Infusion 800 Unit(s)/Hr (18 mL/Hr) IV Continuous <Continuous>  insulin glargine Injectable (LANTUS) 10 Unit(s) SubCutaneous at bedtime  insulin lispro (ADMELOG) corrective regimen sliding scale   SubCutaneous three times a day before meals  insulin lispro (ADMELOG) corrective regimen sliding scale   SubCutaneous at bedtime  metoprolol tartrate 50 milliGRAM(s) Oral every 12 hours  pantoprazole    Tablet 40 milliGRAM(s) Oral before breakfast  QUEtiapine 12.5 milliGRAM(s) Oral at bedtime  tamsulosin 0.4 milliGRAM(s) Oral at bedtime    MEDICATIONS  (PRN):  acetaminophen   IVPB .. 1000 milliGRAM(s) IV Intermittent every 6 hours PRN Mild Pain (1 - 3), Moderate Pain (4 - 6)      Imaging:       Green Team Surgery Daily Progress Note    Subjective:   Patient seen at bedside this AM. Denies acute onset abdominal pain, N/V/D, fevers, chills, SOB, CP, lightheadedness. Tolerating moderate thick liquid diet. No bleeding    24h Events:   - Overnight, no acute events    Objective:  Vital Signs  T(C): 37.1 (12-08 @ 00:10), Max: 37.4 (12-07 @ 20:15)  HR: 88 (12-08 @ 00:10) (81 - 88)  BP: 155/96 (12-08 @ 00:10) (126/78 - 155/96)  RR: 18 (12-08 @ 00:10) (18 - 18)  SpO2: 95% (12-08 @ 00:10) (94% - 96%)  12-06-21 @ 07:01  -  12-07-21 @ 07:00  --------------------------------------------------------  IN:  Total IN: 0 mL    OUT:    Intermittent Catheterization - Urethral (mL): 575 mL    Voided (mL): 0 mL  Total OUT: 575 mL    Total NET: -575 mL      12-07-21 @ 07:01  -  12-08-21 @ 01:51  --------------------------------------------------------  IN:  Total IN: 0 mL    OUT:    Voided (mL): 150 mL  Total OUT: 150 mL    Total NET: -150 mL          Physical Exam:  GEN: resting in bed comfortably in NAD  RESP: no increased WOB  ABD: soft, non-distended, non-tender without rebound tenderness or guarding  EXTR: warm, well-perfused without gross deformities; spontaneous movement in b/l U/L extrem  NEURO: AAOx4    Labs:                        8.4    9.53  )-----------( 324      ( 07 Dec 2021 15:36 )             27.4   12-07    138  |  103  |  11  ----------------------------<  202<H>  4.1   |  23  |  0.88    Ca    8.5      07 Dec 2021 08:21  Phos  3.3     12-07  Mg     1.7     12-07      CAPILLARY BLOOD GLUCOSE      POCT Blood Glucose.: 276 mg/dL (07 Dec 2021 21:30)  POCT Blood Glucose.: 285 mg/dL (07 Dec 2021 17:30)  POCT Blood Glucose.: 289 mg/dL (07 Dec 2021 12:34)  POCT Blood Glucose.: 202 mg/dL (07 Dec 2021 08:04)      Medications:   MEDICATIONS  (STANDING):  atorvastatin 80 milliGRAM(s) Oral at bedtime  chlorhexidine 2% Cloths 1 Application(s) Topical daily  ferrous    sulfate 325 milliGRAM(s) Oral daily  heparin  Infusion 800 Unit(s)/Hr (18 mL/Hr) IV Continuous <Continuous>  insulin glargine Injectable (LANTUS) 10 Unit(s) SubCutaneous at bedtime  insulin lispro (ADMELOG) corrective regimen sliding scale   SubCutaneous three times a day before meals  insulin lispro (ADMELOG) corrective regimen sliding scale   SubCutaneous at bedtime  metoprolol tartrate 50 milliGRAM(s) Oral every 12 hours  pantoprazole    Tablet 40 milliGRAM(s) Oral before breakfast  QUEtiapine 12.5 milliGRAM(s) Oral at bedtime  tamsulosin 0.4 milliGRAM(s) Oral at bedtime    MEDICATIONS  (PRN):  acetaminophen   IVPB .. 1000 milliGRAM(s) IV Intermittent every 6 hours PRN Mild Pain (1 - 3), Moderate Pain (4 - 6)      Imaging:

## 2021-12-08 NOTE — PROVIDER CONTACT NOTE (OTHER) - ACTION/TREATMENT ORDERED:
will continue to monitor. administer BP meds as scheduled, as per MD order
no further interventions at this time. will draw next aPTT w/6AM labs. will continue to monitor.

## 2021-12-08 NOTE — DISCHARGE NOTE PROVIDER - CARE PROVIDER_API CALL
Cesar Luna)  ColonRectal Surgery; Surgery  [address], [address]  [place] [address]  Phone: [phone]  Fax: [phone]  Follow Up Time: Routine

## 2021-12-08 NOTE — DISCHARGE NOTE PROVIDER - HOSPITAL COURSE
Mr. Chacon is a 66 year old Male with PMHx of HTN, T2DM, stroke on 11/13/21, atrial fibrillation (on Eliquis) and rectal adenocarcinoma. He was transferred from [facility] ICU to [facility] SICU for bright red blood per rectum. Patient history obtained through both patient and chart as patient has speech deficits due past history of stroke. According to patient chart from [facility], patient initiated chemotherapy for rectal adenocarcinoma on 11/11/21 through a port catheter. Eliquis was held prior to port placement that may have contributed to stroke patient sustained on 11/13/21. Patient was sent to rehab and started on Lovenox. On 12/2/21, patient was restarted on 10mg Eliquis BID and noted to have BRBPR on 12/2/21 shortly after receiving second dose of Eliquis. Patient became hypotensive at time of onset and 2UpRBCs were transfused. Patient was transferred to [facility] SICU for further care. At the time of admission, patient denied any abdominal pain, lightheadedness, SOB, dizziness, and notes to notice BRBPR only during bowel movements.     Patient admitted to SICU for GIB with BRBPR after Eliquis in hemorrhagic shock. His Eliquis was stopped. In SICU, patient was hemodynamically stable, with no signs of active bleeding per rectum. He tolerated clear liquid diet and subsequently regular diet. He was transferred to the floor and remained stable.  His H&H remained low but stable over the course of admission. Once his rectal bleeding stopped and he no longer had bloody bowel movements, his anticoagulant therapy was resumed. At the time of discharge, his aPTT remains therapeutic. He is hemodynamically stable, denies abdominal pain, chest pain or palpitations. His questions have been addressed.

## 2021-12-08 NOTE — DISCHARGE NOTE NURSING/CASE MANAGEMENT/SOCIAL WORK - PATIENT PORTAL LINK FT
You can access the FollowMyHealth Patient Portal offered by [facility] by registering at the following website: [email or web address]. By joining [employer]’s FollowMyHealth portal, you will also be able to view your health information using other applications (apps) compatible with our system.

## 2021-12-08 NOTE — PROGRESS NOTE ADULT - ASSESSMENT
66M with PMHx of HTN, T2DM, stroke on 11/13/21, Afib on Eliquis and rectal adenocarcinoma transferred from [facility] ICU to [facility] SICU for BRBPR    Plan:  - moderate thick liquid / IVF  - Monitor BMs  - on hep gtt; strict monitoring of ptt and recurrence of bleeding  - Trend CBC, transfuse PRN  - CTA if bloody BM continues  - Appreciate neurology recs re: AC  -dispo planning-rehab    Green Team Surgery  [id number]   66M with PMHx of HTN, T2DM, stroke on 11/13/21, Afib on Eliquis and rectal adenocarcinoma transferred from [place] ICU to [facility] SICU for BRBPR    Plan:  - moderate thick liquid / IVF  - Monitor BMs  - on hep gtt; strict monitoring of ptt and recurrence of bleeding  - Trend CBC, transfuse PRN  - CTA if bloody BM continues  - Appreciate neurology recs re: AC  -dispo planning-rehab    Green Team Surgery  [id number]   66M with PMHx of HTN, T2DM, stroke on 11/13/21, Afib on Eliquis and rectal adenocarcinoma transferred from [place] ICU to [facility] SICU for BRBPR    Plan:  - moderate thick liquid / IVF  - Monitor BMs  - change heparin gtt to eliquis 5mg bid for AC  - Trend CBC, transfuse PRN  - Appreciate neurology recs re: AC  - dispo : rehab today    Green Team Surgery  [id number]

## 2021-12-08 NOTE — PROGRESS NOTE ADULT - ASSESSMENT
afib long standing   HR stable  cont metoprolol   on a/c with Eliquis     CVA  embolic   underlying afib  on statin     HTN  stable    DM II  Monitor finger stick. Insulin coverage. Diabetic education and Diabetic diet. Consider nutrition consultation.

## 2021-12-16 ENCOUNTER — APPOINTMENT (OUTPATIENT)
Dept: INFUSION THERAPY | Facility: HOSPITAL | Age: 66
End: 2021-12-16

## 2021-12-18 ENCOUNTER — APPOINTMENT (OUTPATIENT)
Dept: INFUSION THERAPY | Facility: HOSPITAL | Age: 66
End: 2021-12-18

## 2021-12-22 PROCEDURE — 86850 RBC ANTIBODY SCREEN: CPT

## 2021-12-22 PROCEDURE — 80048 BASIC METABOLIC PNL TOTAL CA: CPT

## 2021-12-22 PROCEDURE — 70551 MRI BRAIN STEM W/O DYE: CPT

## 2021-12-22 PROCEDURE — 71045 X-RAY EXAM CHEST 1 VIEW: CPT

## 2021-12-22 PROCEDURE — U0005: CPT

## 2021-12-22 PROCEDURE — 97166 OT EVAL MOD COMPLEX 45 MIN: CPT

## 2021-12-22 PROCEDURE — 83605 ASSAY OF LACTIC ACID: CPT

## 2021-12-22 PROCEDURE — 84100 ASSAY OF PHOSPHORUS: CPT

## 2021-12-22 PROCEDURE — 80061 LIPID PANEL: CPT

## 2021-12-22 PROCEDURE — 84145 PROCALCITONIN (PCT): CPT

## 2021-12-22 PROCEDURE — C1769: CPT

## 2021-12-22 PROCEDURE — 83735 ASSAY OF MAGNESIUM: CPT

## 2021-12-22 PROCEDURE — C1887: CPT

## 2021-12-22 PROCEDURE — 97116 GAIT TRAINING THERAPY: CPT

## 2021-12-22 PROCEDURE — 82803 BLOOD GASES ANY COMBINATION: CPT

## 2021-12-22 PROCEDURE — 82962 GLUCOSE BLOOD TEST: CPT

## 2021-12-22 PROCEDURE — C9399: CPT

## 2021-12-22 PROCEDURE — C1894: CPT

## 2021-12-22 PROCEDURE — 92610 EVALUATE SWALLOWING FUNCTION: CPT

## 2021-12-22 PROCEDURE — 97162 PT EVAL MOD COMPLEX 30 MIN: CPT

## 2021-12-22 PROCEDURE — 93970 EXTREMITY STUDY: CPT

## 2021-12-22 PROCEDURE — 86900 BLOOD TYPING SEROLOGIC ABO: CPT

## 2021-12-22 PROCEDURE — 84436 ASSAY OF TOTAL THYROXINE: CPT

## 2021-12-22 PROCEDURE — 92523 SPEECH SOUND LANG COMPREHEN: CPT

## 2021-12-22 PROCEDURE — U0003: CPT

## 2021-12-22 PROCEDURE — 36415 COLL VENOUS BLD VENIPUNCTURE: CPT

## 2021-12-22 PROCEDURE — 85610 PROTHROMBIN TIME: CPT

## 2021-12-22 PROCEDURE — 84443 ASSAY THYROID STIM HORMONE: CPT

## 2021-12-22 PROCEDURE — 61645 PERQ ART M-THROMBECT &/NFS: CPT

## 2021-12-22 PROCEDURE — 85014 HEMATOCRIT: CPT

## 2021-12-22 PROCEDURE — 76380 CAT SCAN FOLLOW-UP STUDY: CPT | Mod: XU

## 2021-12-22 PROCEDURE — 82330 ASSAY OF CALCIUM: CPT

## 2021-12-22 PROCEDURE — 82553 CREATINE MB FRACTION: CPT

## 2021-12-22 PROCEDURE — 82947 ASSAY GLUCOSE BLOOD QUANT: CPT

## 2021-12-22 PROCEDURE — 86901 BLOOD TYPING SEROLOGIC RH(D): CPT

## 2021-12-22 PROCEDURE — 83036 HEMOGLOBIN GLYCOSYLATED A1C: CPT

## 2021-12-22 PROCEDURE — 84484 ASSAY OF TROPONIN QUANT: CPT

## 2021-12-22 PROCEDURE — 85027 COMPLETE CBC AUTOMATED: CPT

## 2021-12-22 PROCEDURE — 97530 THERAPEUTIC ACTIVITIES: CPT

## 2021-12-22 PROCEDURE — 99291 CRITICAL CARE FIRST HOUR: CPT

## 2021-12-22 PROCEDURE — C1760: CPT

## 2021-12-22 PROCEDURE — 85018 HEMOGLOBIN: CPT

## 2021-12-22 PROCEDURE — C8929: CPT

## 2021-12-22 PROCEDURE — 84295 ASSAY OF SERUM SODIUM: CPT

## 2021-12-22 PROCEDURE — 82550 ASSAY OF CK (CPK): CPT

## 2021-12-22 PROCEDURE — 82435 ASSAY OF BLOOD CHLORIDE: CPT

## 2021-12-22 PROCEDURE — 70450 CT HEAD/BRAIN W/O DYE: CPT

## 2021-12-22 PROCEDURE — 85730 THROMBOPLASTIN TIME PARTIAL: CPT

## 2021-12-22 PROCEDURE — 84480 ASSAY TRIIODOTHYRONINE (T3): CPT

## 2021-12-22 PROCEDURE — 81001 URINALYSIS AUTO W/SCOPE: CPT

## 2021-12-22 PROCEDURE — 84132 ASSAY OF SERUM POTASSIUM: CPT

## 2021-12-22 PROCEDURE — 97110 THERAPEUTIC EXERCISES: CPT

## 2021-12-25 NOTE — DISCHARGE NOTE PROVIDER - NSDCMRMEDTOKEN_GEN_ALL_CORE_FT
99
acetaminophen 325 mg oral tablet: 2 tab(s) orally every 6 hours, As needed, Temp greater or equal to 38C (100.4F), Mild Pain (1 - 3)  amLODIPine 10 mg oral tablet: 1 tab(s) orally once a day  apixaban 5 mg oral tablet: 1 tab(s) orally every 12 hours  atorvastatin 80 mg oral tablet: 1 tab(s) orally once a day (at bedtime)  ferrous sulfate 325 mg (65 mg elemental iron) oral tablet: 1 tab(s) orally once a day  insulin glargine: 13 milligram(s) subcutaneous once a day (at bedtime)  insulin lispro 100 units/mL injectable solution: Before meals:  2 Unit(s) if Glucose 151 - 200  4 Unit(s) if Glucose 201 - 250  6 Unit(s) if Glucose 251 - 300  8 Unit(s) if Glucose 301 - 350  10 Unit(s) if Glucose 351 - 400  12 Unit(s) if Glucose Greater Than 400  insulin lispro 100 units/mL injectable solution: At bedtime:  0 Unit(s) if Glucose 61 - 250  2 Unit(s) if Glucose 251 - 300  4 Unit(s) if Glucose 301 - 350  6 Unit(s) if Glucose 351 - 400  8 Unit(s) if Glucose Greater Than 400  insulin lispro 100 units/mL injectable solution: 7  injectable 3 times a day (before meals)  melatonin 5 mg oral tablet: 1 tab(s) orally once a day (at bedtime)  metFORMIN 1000 mg oral tablet: 1 tab(s) orally 2 times a day  pantoprazole 40 mg oral delayed release tablet: 1 tab(s) orally once a day (before a meal)  polyethylene glycol 3350 oral powder for reconstitution: 17 gram(s) orally 2 times a day  QUEtiapine: 12.5 milligram(s) orally once a day (at bedtime)  senna oral tablet: 2 tab(s) orally once a day (at bedtime)  sodium chloride 0.65% nasal spray:  nasal   VITAMIN D3 50 MCG TABLET:

## 2021-12-29 PROCEDURE — 85025 COMPLETE CBC W/AUTO DIFF WBC: CPT

## 2021-12-29 PROCEDURE — 82962 GLUCOSE BLOOD TEST: CPT

## 2021-12-29 PROCEDURE — 85384 FIBRINOGEN ACTIVITY: CPT

## 2021-12-29 PROCEDURE — P9016: CPT

## 2021-12-29 PROCEDURE — 83735 ASSAY OF MAGNESIUM: CPT

## 2021-12-29 PROCEDURE — 83605 ASSAY OF LACTIC ACID: CPT

## 2021-12-29 PROCEDURE — 85027 COMPLETE CBC AUTOMATED: CPT

## 2021-12-29 PROCEDURE — 84484 ASSAY OF TROPONIN QUANT: CPT

## 2021-12-29 PROCEDURE — 80053 COMPREHEN METABOLIC PANEL: CPT

## 2021-12-29 PROCEDURE — 36415 COLL VENOUS BLD VENIPUNCTURE: CPT

## 2021-12-29 PROCEDURE — 84100 ASSAY OF PHOSPHORUS: CPT

## 2021-12-29 PROCEDURE — 87635 SARS-COV-2 COVID-19 AMP PRB: CPT

## 2021-12-29 PROCEDURE — 36430 TRANSFUSION BLD/BLD COMPNT: CPT

## 2021-12-29 PROCEDURE — 85379 FIBRIN DEGRADATION QUANT: CPT

## 2021-12-29 PROCEDURE — 85730 THROMBOPLASTIN TIME PARTIAL: CPT

## 2021-12-29 PROCEDURE — 85610 PROTHROMBIN TIME: CPT

## 2021-12-31 ENCOUNTER — INPATIENT (INPATIENT)
Facility: HOSPITAL | Age: 66
LOS: 5 days | Discharge: HOME | DRG: 177 | End: 2022-01-06
Attending: INTERNAL MEDICINE | Admitting: INTERNAL MEDICINE
Payer: MEDICARE

## 2021-12-31 VITALS
RESPIRATION RATE: 20 BRPM | DIASTOLIC BLOOD PRESSURE: 87 MMHG | OXYGEN SATURATION: 100 % | HEIGHT: 71 IN | SYSTOLIC BLOOD PRESSURE: 123 MMHG | TEMPERATURE: 99 F | HEART RATE: 92 BPM | WEIGHT: 220.02 LBS

## 2021-12-31 DIAGNOSIS — I48.91 UNSPECIFIED ATRIAL FIBRILLATION: ICD-10-CM

## 2021-12-31 DIAGNOSIS — Z29.9 ENCOUNTER FOR PROPHYLACTIC MEASURES, UNSPECIFIED: ICD-10-CM

## 2021-12-31 DIAGNOSIS — I10 ESSENTIAL (PRIMARY) HYPERTENSION: ICD-10-CM

## 2021-12-31 DIAGNOSIS — E78.5 HYPERLIPIDEMIA, UNSPECIFIED: ICD-10-CM

## 2021-12-31 DIAGNOSIS — U07.1 COVID-19: ICD-10-CM

## 2021-12-31 DIAGNOSIS — Z95.828 PRESENCE OF OTHER VASCULAR IMPLANTS AND GRAFTS: Chronic | ICD-10-CM

## 2021-12-31 DIAGNOSIS — E11.9 TYPE 2 DIABETES MELLITUS WITHOUT COMPLICATIONS: ICD-10-CM

## 2021-12-31 DIAGNOSIS — R09.02 HYPOXEMIA: ICD-10-CM

## 2021-12-31 LAB
ALBUMIN SERPL ELPH-MCNC: 2.3 G/DL — LOW (ref 3.5–5)
ALP SERPL-CCNC: 97 U/L — SIGNIFICANT CHANGE UP (ref 40–120)
ALT FLD-CCNC: 31 U/L DA — SIGNIFICANT CHANGE UP (ref 10–60)
ANION GAP SERPL CALC-SCNC: 7 MMOL/L — SIGNIFICANT CHANGE UP (ref 5–17)
APTT BLD: 38.8 SEC — HIGH (ref 27.5–35.5)
AST SERPL-CCNC: 30 U/L — SIGNIFICANT CHANGE UP (ref 10–40)
BASOPHILS # BLD AUTO: 0.01 K/UL — SIGNIFICANT CHANGE UP (ref 0–0.2)
BASOPHILS NFR BLD AUTO: 0.2 % — SIGNIFICANT CHANGE UP (ref 0–2)
BILIRUB SERPL-MCNC: 0.3 MG/DL — SIGNIFICANT CHANGE UP (ref 0.2–1.2)
BUN SERPL-MCNC: 27 MG/DL — HIGH (ref 7–18)
CALCIUM SERPL-MCNC: 8.6 MG/DL — SIGNIFICANT CHANGE UP (ref 8.4–10.5)
CHLORIDE SERPL-SCNC: 105 MMOL/L — SIGNIFICANT CHANGE UP (ref 96–108)
CO2 SERPL-SCNC: 27 MMOL/L — SIGNIFICANT CHANGE UP (ref 22–31)
CREAT SERPL-MCNC: 1.13 MG/DL — SIGNIFICANT CHANGE UP (ref 0.5–1.3)
CRP SERPL-MCNC: 31 MG/L — HIGH
D DIMER BLD IA.RAPID-MCNC: 463 NG/ML DDU — HIGH
EOSINOPHIL # BLD AUTO: 0.01 K/UL — SIGNIFICANT CHANGE UP (ref 0–0.5)
EOSINOPHIL NFR BLD AUTO: 0.2 % — SIGNIFICANT CHANGE UP (ref 0–6)
FERRITIN SERPL-MCNC: 52 NG/ML — SIGNIFICANT CHANGE UP (ref 30–400)
GLUCOSE BLDC GLUCOMTR-MCNC: 159 MG/DL — HIGH (ref 70–99)
GLUCOSE SERPL-MCNC: 224 MG/DL — HIGH (ref 70–99)
HCT VFR BLD CALC: 31.5 % — LOW (ref 39–50)
HGB BLD-MCNC: 9.6 G/DL — LOW (ref 13–17)
IMM GRANULOCYTES NFR BLD AUTO: 0.3 % — SIGNIFICANT CHANGE UP (ref 0–1.5)
INR BLD: 1.76 RATIO — HIGH (ref 0.88–1.16)
LYMPHOCYTES # BLD AUTO: 0.44 K/UL — LOW (ref 1–3.3)
LYMPHOCYTES # BLD AUTO: 6.9 % — LOW (ref 13–44)
MCHC RBC-ENTMCNC: 26.2 PG — LOW (ref 27–34)
MCHC RBC-ENTMCNC: 30.5 GM/DL — LOW (ref 32–36)
MCV RBC AUTO: 86.1 FL — SIGNIFICANT CHANGE UP (ref 80–100)
MONOCYTES # BLD AUTO: 0.44 K/UL — SIGNIFICANT CHANGE UP (ref 0–0.9)
MONOCYTES NFR BLD AUTO: 6.9 % — SIGNIFICANT CHANGE UP (ref 2–14)
NEUTROPHILS # BLD AUTO: 5.43 K/UL — SIGNIFICANT CHANGE UP (ref 1.8–7.4)
NEUTROPHILS NFR BLD AUTO: 85.5 % — HIGH (ref 43–77)
NRBC # BLD: 0 /100 WBCS — SIGNIFICANT CHANGE UP (ref 0–0)
PLATELET # BLD AUTO: 403 K/UL — HIGH (ref 150–400)
POTASSIUM SERPL-MCNC: 3.9 MMOL/L — SIGNIFICANT CHANGE UP (ref 3.5–5.3)
POTASSIUM SERPL-SCNC: 3.9 MMOL/L — SIGNIFICANT CHANGE UP (ref 3.5–5.3)
PROCALCITONIN SERPL-MCNC: 0.17 NG/ML — HIGH (ref 0.02–0.1)
PROT SERPL-MCNC: 7 G/DL — SIGNIFICANT CHANGE UP (ref 6–8.3)
PROTHROM AB SERPL-ACNC: 20.4 SEC — HIGH (ref 10.6–13.6)
RBC # BLD: 3.66 M/UL — LOW (ref 4.2–5.8)
RBC # FLD: 16.8 % — HIGH (ref 10.3–14.5)
SARS-COV-2 RNA SPEC QL NAA+PROBE: DETECTED
SODIUM SERPL-SCNC: 139 MMOL/L — SIGNIFICANT CHANGE UP (ref 135–145)
TROPONIN I, HIGH SENSITIVITY RESULT: 14.2 NG/L — SIGNIFICANT CHANGE UP
WBC # BLD: 6.35 K/UL — SIGNIFICANT CHANGE UP (ref 3.8–10.5)
WBC # FLD AUTO: 6.35 K/UL — SIGNIFICANT CHANGE UP (ref 3.8–10.5)

## 2021-12-31 PROCEDURE — 99285 EMERGENCY DEPT VISIT HI MDM: CPT

## 2021-12-31 PROCEDURE — 71045 X-RAY EXAM CHEST 1 VIEW: CPT | Mod: 26

## 2021-12-31 RX ORDER — REMDESIVIR 5 MG/ML
200 INJECTION INTRAVENOUS EVERY 24 HOURS
Refills: 0 | Status: COMPLETED | OUTPATIENT
Start: 2021-12-31 | End: 2021-12-31

## 2021-12-31 RX ORDER — AMLODIPINE BESYLATE 2.5 MG/1
10 TABLET ORAL DAILY
Refills: 0 | Status: DISCONTINUED | OUTPATIENT
Start: 2021-12-31 | End: 2022-01-06

## 2021-12-31 RX ORDER — INSULIN LISPRO 100/ML
VIAL (ML) SUBCUTANEOUS
Refills: 0 | Status: DISCONTINUED | OUTPATIENT
Start: 2021-12-31 | End: 2022-01-06

## 2021-12-31 RX ORDER — REMDESIVIR 5 MG/ML
INJECTION INTRAVENOUS
Refills: 0 | Status: COMPLETED | OUTPATIENT
Start: 2021-12-31 | End: 2022-01-04

## 2021-12-31 RX ORDER — INSULIN GLARGINE 100 [IU]/ML
13 INJECTION, SOLUTION SUBCUTANEOUS AT BEDTIME
Refills: 0 | Status: DISCONTINUED | OUTPATIENT
Start: 2021-12-31 | End: 2022-01-06

## 2021-12-31 RX ORDER — ACETAMINOPHEN 500 MG
650 TABLET ORAL EVERY 4 HOURS
Refills: 0 | Status: DISCONTINUED | OUTPATIENT
Start: 2021-12-31 | End: 2022-01-06

## 2021-12-31 RX ORDER — MULTIVIT-MIN/FERROUS GLUCONATE 9 MG/15 ML
1 LIQUID (ML) ORAL DAILY
Refills: 0 | Status: DISCONTINUED | OUTPATIENT
Start: 2021-12-31 | End: 2022-01-06

## 2021-12-31 RX ORDER — DEXAMETHASONE 0.5 MG/5ML
6 ELIXIR ORAL DAILY
Refills: 0 | Status: DISCONTINUED | OUTPATIENT
Start: 2021-12-31 | End: 2022-01-04

## 2021-12-31 RX ORDER — REMDESIVIR 5 MG/ML
100 INJECTION INTRAVENOUS EVERY 24 HOURS
Refills: 0 | Status: COMPLETED | OUTPATIENT
Start: 2022-01-01 | End: 2022-01-04

## 2021-12-31 RX ORDER — APIXABAN 2.5 MG/1
5 TABLET, FILM COATED ORAL EVERY 12 HOURS
Refills: 0 | Status: DISCONTINUED | OUTPATIENT
Start: 2021-12-31 | End: 2022-01-06

## 2021-12-31 RX ORDER — INSULIN LISPRO 100/ML
7 VIAL (ML) SUBCUTANEOUS
Refills: 0 | Status: DISCONTINUED | OUTPATIENT
Start: 2021-12-31 | End: 2022-01-06

## 2021-12-31 RX ORDER — ATORVASTATIN CALCIUM 80 MG/1
80 TABLET, FILM COATED ORAL AT BEDTIME
Refills: 0 | Status: DISCONTINUED | OUTPATIENT
Start: 2021-12-31 | End: 2022-01-06

## 2021-12-31 RX ORDER — FOLIC ACID 0.8 MG
1 TABLET ORAL DAILY
Refills: 0 | Status: DISCONTINUED | OUTPATIENT
Start: 2021-12-31 | End: 2022-01-06

## 2021-12-31 RX ORDER — CHOLECALCIFEROL (VITAMIN D3) 125 MCG
0 CAPSULE ORAL
Qty: 0 | Refills: 0 | DISCHARGE

## 2021-12-31 RX ORDER — TRAMADOL HYDROCHLORIDE 50 MG/1
75 TABLET ORAL EVERY 6 HOURS
Refills: 0 | Status: DISCONTINUED | OUTPATIENT
Start: 2021-12-31 | End: 2022-01-06

## 2021-12-31 RX ORDER — TRAMADOL HYDROCHLORIDE 50 MG/1
50 TABLET ORAL EVERY 6 HOURS
Refills: 0 | Status: DISCONTINUED | OUTPATIENT
Start: 2021-12-31 | End: 2022-01-06

## 2021-12-31 RX ORDER — PANTOPRAZOLE SODIUM 20 MG/1
40 TABLET, DELAYED RELEASE ORAL
Refills: 0 | Status: DISCONTINUED | OUTPATIENT
Start: 2021-12-31 | End: 2022-01-06

## 2021-12-31 RX ORDER — LANOLIN ALCOHOL/MO/W.PET/CERES
5 CREAM (GRAM) TOPICAL AT BEDTIME
Refills: 0 | Status: DISCONTINUED | OUTPATIENT
Start: 2021-12-31 | End: 2022-01-06

## 2021-12-31 RX ORDER — FERROUS SULFATE 325(65) MG
325 TABLET ORAL DAILY
Refills: 0 | Status: DISCONTINUED | OUTPATIENT
Start: 2021-12-31 | End: 2022-01-06

## 2021-12-31 RX ADMIN — APIXABAN 5 MILLIGRAM(S): 2.5 TABLET, FILM COATED ORAL at 18:10

## 2021-12-31 RX ADMIN — REMDESIVIR 500 MILLIGRAM(S): 5 INJECTION INTRAVENOUS at 23:21

## 2021-12-31 NOTE — ED ADULT NURSE NOTE - OBJECTIVE STATEMENT
BIBA from [facility] for fever, SOB, cough, and + covid. Pt denies any acute pain and SOB. Able to speak full sentences but is borderline tachypneic in the 20s

## 2021-12-31 NOTE — H&P ADULT - NSHPREVIEWOFSYSTEMS_GEN_ALL_CORE
- CONSTITUTIONAL: Denies fever and chills  - HEENT: Denies changes in vision and hearing.  - RESPIRATORY: (+) SOB. Denies cough.  - CV: Denies chest pain and palpitations  - GI: Denies abdominal pain, nausea, vomiting and diarrhea.  - : Denies dysuria and urinary frequency.  - SKIN: Denies rash and pruritus.  - NEUROLOGICAL: Denies headache and syncope.  - PSYCHIATRIC: Denies recent changes in mood. Denies anxiety and depression.

## 2021-12-31 NOTE — H&P ADULT - ASSESSMENT
Patient is a 67 y/o M w/ hx of CVA (11/13/21), T2DM, HTD, AFib (on eliquis), HLD, Sciatica, rectal adenoCA (s/p chemotherapy). He is AAOx3 and bedbound at baseline. He was transferred here from [facility] after testing positive for COVID-19. He was subsequently admitted due to AHRF 2/2 COVID-19.    PRIMARY TEAM TO FOLLOW-UP GOC AND ADVANCED DIRECTIVE (MENTIONED FROM NH FILES) FROM [facility] Patient is a 67 y/o M w/ hx of CVA (11/13/21), T2DM, HTD, AFib (on eliquis), HLD, Sciatica, rectal adenoCA (s/p chemotherapy). He is AAOx3 and bedbound at baseline. He was transferred here from [facility] after testing positive for COVID-19. He was subsequently admitted due to AHRF 2/2 COVID-19.    PRIMARY TEAM TO FOLLOW-UP GOC AND ADVANCED DIRECTIVE (MENTIONED FROM NH FILES) FROM [facility]

## 2021-12-31 NOTE — H&P ADULT - PROBLEM SELECTOR PLAN 6
RISK                                                          Points  [] Previous VTE                                           3  [] Thrombophilia                                        2  [] Lower limb paralysis                              2   [] Current Cancer                                       2   [x] Immobilization > 24 hrs                        1  [] ICU/CCU stay > 24 hours                       1  [x] Age > 60                                                   1    Score: 2    Continue Eliquis for DVT prophylaxis  Continue PPI for GI prophylaxis

## 2021-12-31 NOTE — H&P ADULT - NSHPLABSRESULTS_GEN_ALL_CORE
Complete Blood Count + Automated Diff (12.31.21 @ 11:05)   WBC Count: 6.35 K/uL   RBC Count: 3.66 M/uL   Hemoglobin: 9.6 g/dL   Hematocrit: 31.5 %   Mean Cell Volume: 86.1 fl   Mean Cell Hemoglobin: 26.2 pg   Mean Cell Hemoglobin Conc: 30.5 gm/dL   Red Cell Distrib Width: 16.8 %   Platelet Count - Automated: 403 K/uL   Auto Neutrophil #: 5.43 K/uL   Auto Lymphocyte #: 0.44 K/uL   Auto Monocyte #: 0.44 K/uL   Auto Eosinophil #: 0.01 K/uL   Auto Basophil #: 0.01 K/uL     Comprehensive Metabolic Panel (12.31.21 @ 11:05)   Sodium, Serum: 139 mmol/L   Potassium, Serum: 3.9 mmol/L   Chloride, Serum: 105 mmol/L   Carbon Dioxide, Serum: 27 mmol/L   Anion Gap, Serum: 7 mmol/L   Blood Urea Nitrogen, Serum: 27 mg/dL   Creatinine, Serum: 1.13 mg/dL   Glucose, Serum: 224 mg/dL   Calcium, Total Serum: 8.6 mg/dL   Protein Total, Serum: 7.0 g/dL   Albumin, Serum: 2.3 g/dL   Bilirubin Total, Serum: 0.3 mg/dL   Alkaline Phosphatase, Serum: 97 U/L   Aspartate Aminotransferase (AST/SGOT): 30 U/L   Alanine Aminotransferase (ALT/SGPT): 31 U/L DA     COVID-19 PCR: Detected    D-Dimer Assay, Quantitative: 463:  Activated Partial Thromboplastin Time: 38.8:   Prothrombin Time, Plasma: 20.4 sec   INR: 1.76:   Troponin I, High Sensitivity Result: 14.2  Ferritin, Serum: 52 ng/mL

## 2021-12-31 NOTE — H&P ADULT - NSHPSOCIALHISTORY_GEN_ALL_CORE
from [facility]   with a daughter  former smoker  non-alcoholic beverage drinker  no illicit drug use

## 2021-12-31 NOTE — H&P ADULT - PROBLEM SELECTOR PLAN 1
Pt presented w/ SOB  +COVID on PCR  receive Pfizer x 2  CXR showed b/l infiltrates  O2 on admission - 100 but desaturate to 90  Currently on 4 LPM  Contact and Respiratory isolation  Start Remdesivir  Start Decadron  monitor LFTs  monitor POCT Glucose  start insulin S.S. for coverage  Trend COVID inflammatory markers Pt presented w/ SOB  +COVID on PCR  receive Pfizer x 2  CXR showed b/l infiltrates  O2 on admission - 100 but desaturate to 90  Currently on 4 LPM  Contact and Respiratory isolation  Start Remdesivir per hospital protocol  Start Decadron per hospital protocol  monitor LFTs  monitor POCT Glucose  start insulin S.S. for coverage  Trend COVID inflammatory markers

## 2021-12-31 NOTE — H&P ADULT - NSHPPHYSICALEXAM_GEN_ALL_CORE
Vital Signs  · Temp - 98.8F (37.1F)  · Heart Rate - 92  · BP - 123/87  · Respiration Rate - 20  · SpO2 (%) - 100    11:15  · SpO2 (%) SpO2 (%) 90    PHYSICAL EXAM:  GENERAL: NAD, speaks in full sentences, no signs of respiratory distress  HEAD:  Atraumatic, Normocephalic  EYES: EOMI, PERRLA, conjunctiva and sclera clear  NECK: Supple, No JVD  CHEST/LUNG: bilateral crackles and rhonchi  HEART: Regular rate and rhythm; No murmurs;   ABDOMEN: Soft, Nontender, Nondistended; Bowel sounds present; No guarding  EXTREMITIES:  2+ Peripheral Pulses, No cyanosis or edema  PSYCH: AAOx3  NEUROLOGY: non-focal  SKIN: No rashes or lesions

## 2021-12-31 NOTE — H&P ADULT - PROBLEM SELECTOR PLAN 3
Hx of DM  home meds: metformin, semglee, lispro 7u ac  A1c - 7.6 (12/15/21) from NH chart  POCT glucose  start insulin sliding scale

## 2021-12-31 NOTE — H&P ADULT - HISTORY OF PRESENT ILLNESS
Patient is a 65 y/o M w/ hx of CVA (11/13/21), T2DM, HTD, AFib (on eliquis), HLD, Sciatica, rectal adenoCA (s/p chemotherapy). He is AAOx3 and bedbound at baseline. He was transferred here from [facility] after testing positive for COVID-19. He presented with shortness of breath and desaturation to 90%. He was placed on 4 LPM. He denies any fever, cough, nausea, vomiting, loss of taste and smell, diarrhea. He was vaccinated with Pfizer x 2. Lung auscultation revealed bilateral crackle and rhonchi. Chest Xray showed bilateral infiltrates. He was subsequently admitted.    GOC: F/U ADVANCED DIRECTIVE FROM NURSING [place] Patient is a 65 y/o M w/ hx of CVA (11/13/21), T2DM, HTD, AFib (on eliquis), HLD, Sciatica, rectal adenoCA (s/p chemotherapy). He is AAOx3 and bedbound at baseline. He was transferred here from [facility] after testing positive for COVID-19.   He presented with shortness of breath and desaturation to 90%. He was placed on 4 LPM. He denies any fever, cough, nausea, vomiting, loss of taste and smell, diarrhea. He was vaccinated with Pfizer x 2. Lung auscultation revealed bilateral crackle and rhonchi. Chest Xray showed bilateral infiltrates. He was subsequently admitted.    GOC: F/U ADVANCED DIRECTIVE FROM NURSING [place] Patient is a 65 y/o M w/ hx of CVA (11/13/21), T2DM, HTD, AFib (on eliquis), HLD, Sciatica, rectal adenoCA (s/p chemotherapy). He is AAOx3 and bedbound at baseline. He was transferred here from [facility] after testing positive for COVID-19.   He presented with shortness of breath and desaturation to 90%. He was placed on 4 LPM. He denies any fever, cough, nausea, vomiting, loss of taste and smell, diarrhea. He was vaccinated with Pfizer x 2. Lung auscultation revealed bilateral crackle and rhonchi. Chest Xray showed bilateral infiltrates. He was subsequently admitted.    GOC: FULL CODE as per WIFE

## 2021-12-31 NOTE — H&P ADULT - NSHPADDITIONALINFOADULT_GEN_ALL_CORE
Patient evaluated, case discussed with me, chart reviewed, agree with above H/P as reviewed.  Dr. CLAY Shell ([phone])  covering Dr Grossman

## 2022-01-01 LAB
ALBUMIN SERPL ELPH-MCNC: 2.3 G/DL — LOW (ref 3.5–5)
ALP SERPL-CCNC: 93 U/L — SIGNIFICANT CHANGE UP (ref 40–120)
ALT FLD-CCNC: 31 U/L DA — SIGNIFICANT CHANGE UP (ref 10–60)
ANION GAP SERPL CALC-SCNC: 9 MMOL/L — SIGNIFICANT CHANGE UP (ref 5–17)
APTT BLD: 41.8 SEC — HIGH (ref 27.5–35.5)
AST SERPL-CCNC: 25 U/L — SIGNIFICANT CHANGE UP (ref 10–40)
BASOPHILS # BLD AUTO: 0.01 K/UL — SIGNIFICANT CHANGE UP (ref 0–0.2)
BASOPHILS NFR BLD AUTO: 0.2 % — SIGNIFICANT CHANGE UP (ref 0–2)
BILIRUB SERPL-MCNC: 0.3 MG/DL — SIGNIFICANT CHANGE UP (ref 0.2–1.2)
BUN SERPL-MCNC: 31 MG/DL — HIGH (ref 7–18)
CALCIUM SERPL-MCNC: 8.8 MG/DL — SIGNIFICANT CHANGE UP (ref 8.4–10.5)
CHLORIDE SERPL-SCNC: 106 MMOL/L — SIGNIFICANT CHANGE UP (ref 96–108)
CO2 SERPL-SCNC: 25 MMOL/L — SIGNIFICANT CHANGE UP (ref 22–31)
CREAT SERPL-MCNC: 1.18 MG/DL — SIGNIFICANT CHANGE UP (ref 0.5–1.3)
CRP SERPL-MCNC: 28 MG/L — HIGH
D DIMER BLD IA.RAPID-MCNC: 435 NG/ML DDU — HIGH
EOSINOPHIL # BLD AUTO: 0.03 K/UL — SIGNIFICANT CHANGE UP (ref 0–0.5)
EOSINOPHIL NFR BLD AUTO: 0.5 % — SIGNIFICANT CHANGE UP (ref 0–6)
ERYTHROCYTE [SEDIMENTATION RATE] IN BLOOD: 49 MM/HR — HIGH (ref 0–20)
FERRITIN SERPL-MCNC: 66 NG/ML — SIGNIFICANT CHANGE UP (ref 30–400)
GLUCOSE BLDC GLUCOMTR-MCNC: 151 MG/DL — HIGH (ref 70–99)
GLUCOSE BLDC GLUCOMTR-MCNC: 153 MG/DL — HIGH (ref 70–99)
GLUCOSE BLDC GLUCOMTR-MCNC: 161 MG/DL — HIGH (ref 70–99)
GLUCOSE BLDC GLUCOMTR-MCNC: 162 MG/DL — HIGH (ref 70–99)
GLUCOSE BLDC GLUCOMTR-MCNC: 170 MG/DL — HIGH (ref 70–99)
GLUCOSE BLDC GLUCOMTR-MCNC: 187 MG/DL — HIGH (ref 70–99)
GLUCOSE BLDC GLUCOMTR-MCNC: 188 MG/DL — HIGH (ref 70–99)
GLUCOSE SERPL-MCNC: 194 MG/DL — HIGH (ref 70–99)
HCT VFR BLD CALC: 32.5 % — LOW (ref 39–50)
HGB BLD-MCNC: 10 G/DL — LOW (ref 13–17)
IMM GRANULOCYTES NFR BLD AUTO: 0.2 % — SIGNIFICANT CHANGE UP (ref 0–1.5)
INR BLD: 1.5 RATIO — HIGH (ref 0.88–1.16)
LDH SERPL L TO P-CCNC: 177 U/L — SIGNIFICANT CHANGE UP (ref 120–225)
LYMPHOCYTES # BLD AUTO: 0.43 K/UL — LOW (ref 1–3.3)
LYMPHOCYTES # BLD AUTO: 7 % — LOW (ref 13–44)
MAGNESIUM SERPL-MCNC: 1.8 MG/DL — SIGNIFICANT CHANGE UP (ref 1.6–2.6)
MCHC RBC-ENTMCNC: 26.5 PG — LOW (ref 27–34)
MCHC RBC-ENTMCNC: 30.8 GM/DL — LOW (ref 32–36)
MCV RBC AUTO: 86.2 FL — SIGNIFICANT CHANGE UP (ref 80–100)
MONOCYTES # BLD AUTO: 0.5 K/UL — SIGNIFICANT CHANGE UP (ref 0–0.9)
MONOCYTES NFR BLD AUTO: 8.2 % — SIGNIFICANT CHANGE UP (ref 2–14)
NEUTROPHILS # BLD AUTO: 5.12 K/UL — SIGNIFICANT CHANGE UP (ref 1.8–7.4)
NEUTROPHILS NFR BLD AUTO: 83.9 % — HIGH (ref 43–77)
NRBC # BLD: 0 /100 WBCS — SIGNIFICANT CHANGE UP (ref 0–0)
PHOSPHATE SERPL-MCNC: 4.4 MG/DL — SIGNIFICANT CHANGE UP (ref 2.5–4.5)
PLATELET # BLD AUTO: 378 K/UL — SIGNIFICANT CHANGE UP (ref 150–400)
POTASSIUM SERPL-MCNC: 4.2 MMOL/L — SIGNIFICANT CHANGE UP (ref 3.5–5.3)
POTASSIUM SERPL-SCNC: 4.2 MMOL/L — SIGNIFICANT CHANGE UP (ref 3.5–5.3)
PROT SERPL-MCNC: 7 G/DL — SIGNIFICANT CHANGE UP (ref 6–8.3)
PROTHROM AB SERPL-ACNC: 17.5 SEC — HIGH (ref 10.6–13.6)
RBC # BLD: 3.77 M/UL — LOW (ref 4.2–5.8)
RBC # FLD: 16.6 % — HIGH (ref 10.3–14.5)
SODIUM SERPL-SCNC: 140 MMOL/L — SIGNIFICANT CHANGE UP (ref 135–145)
TSH SERPL-MCNC: 0.91 UU/ML — SIGNIFICANT CHANGE UP (ref 0.34–4.82)
WBC # BLD: 6.1 K/UL — SIGNIFICANT CHANGE UP (ref 3.8–10.5)
WBC # FLD AUTO: 6.1 K/UL — SIGNIFICANT CHANGE UP (ref 3.8–10.5)

## 2022-01-01 RX ORDER — INFLUENZA VIRUS VACCINE 15; 15; 15; 15 UG/.5ML; UG/.5ML; UG/.5ML; UG/.5ML
0.7 SUSPENSION INTRAMUSCULAR ONCE
Refills: 0 | Status: DISCONTINUED | OUTPATIENT
Start: 2022-01-01 | End: 2022-01-06

## 2022-01-01 RX ADMIN — INSULIN GLARGINE 13 UNIT(S): 100 INJECTION, SOLUTION SUBCUTANEOUS at 22:54

## 2022-01-01 RX ADMIN — TRAMADOL HYDROCHLORIDE 50 MILLIGRAM(S): 50 TABLET ORAL at 13:55

## 2022-01-01 RX ADMIN — Medication 325 MILLIGRAM(S): at 11:42

## 2022-01-01 RX ADMIN — TRAMADOL HYDROCHLORIDE 50 MILLIGRAM(S): 50 TABLET ORAL at 12:57

## 2022-01-01 RX ADMIN — Medication 1 TABLET(S): at 11:42

## 2022-01-01 RX ADMIN — Medication 7 UNIT(S): at 11:41

## 2022-01-01 RX ADMIN — ATORVASTATIN CALCIUM 80 MILLIGRAM(S): 80 TABLET, FILM COATED ORAL at 21:52

## 2022-01-01 RX ADMIN — APIXABAN 5 MILLIGRAM(S): 2.5 TABLET, FILM COATED ORAL at 19:17

## 2022-01-01 RX ADMIN — REMDESIVIR 500 MILLIGRAM(S): 5 INJECTION INTRAVENOUS at 22:33

## 2022-01-01 RX ADMIN — Medication 5 MILLIGRAM(S): at 21:52

## 2022-01-01 RX ADMIN — PANTOPRAZOLE SODIUM 40 MILLIGRAM(S): 20 TABLET, DELAYED RELEASE ORAL at 09:32

## 2022-01-01 RX ADMIN — AMLODIPINE BESYLATE 10 MILLIGRAM(S): 2.5 TABLET ORAL at 06:43

## 2022-01-01 RX ADMIN — Medication 6 MILLIGRAM(S): at 06:44

## 2022-01-01 RX ADMIN — TRAMADOL HYDROCHLORIDE 50 MILLIGRAM(S): 50 TABLET ORAL at 00:13

## 2022-01-01 RX ADMIN — TRAMADOL HYDROCHLORIDE 50 MILLIGRAM(S): 50 TABLET ORAL at 06:44

## 2022-01-01 RX ADMIN — Medication 1: at 09:30

## 2022-01-01 RX ADMIN — Medication 7 UNIT(S): at 09:31

## 2022-01-01 RX ADMIN — ATORVASTATIN CALCIUM 80 MILLIGRAM(S): 80 TABLET, FILM COATED ORAL at 00:13

## 2022-01-01 RX ADMIN — Medication 1: at 11:41

## 2022-01-01 RX ADMIN — Medication 5 MILLIGRAM(S): at 00:13

## 2022-01-01 RX ADMIN — Medication 1 MILLIGRAM(S): at 11:42

## 2022-01-01 RX ADMIN — APIXABAN 5 MILLIGRAM(S): 2.5 TABLET, FILM COATED ORAL at 06:44

## 2022-01-01 RX ADMIN — INSULIN GLARGINE 13 UNIT(S): 100 INJECTION, SOLUTION SUBCUTANEOUS at 00:13

## 2022-01-01 RX ADMIN — TRAMADOL HYDROCHLORIDE 50 MILLIGRAM(S): 50 TABLET ORAL at 07:30

## 2022-01-01 NOTE — PROGRESS NOTE ADULT - SUBJECTIVE AND OBJECTIVE BOX
Patient is a 66y old  Male who presents with a chief complaint of COVID-19 (31 Dec 2021 16:58)    pt seen in icu [  ], reg med floor [   ], bed [  ], chair at bedside [   ], a+o x3 [  ], lethargic [  ],  nad [  ]    seha [  ], ngt [  ], peg [  ], et tube [  ], cent line [  ], picc line [  ]        Allergies    No Known Drug Allergies  Nuts (Hives)  Patient stated he had sensation of  throat closing  30 minites after  Epidural pain management resolved it self few minitus after , /  20 y ago Unable to recall MD name or number (Other)  Chintan (Unknown)        Vitals    T(F): 98.4 (01-01-22 @ 05:37), Max: 98.8 (12-31-21 @ 10:22)  HR: 98 (01-01-22 @ 05:37) (85 - 99)  BP: 124/88 (01-01-22 @ 05:37) (109/70 - 146/92)  RR: 20 (01-01-22 @ 05:37) (4 - 20)  SpO2: 96% (01-01-22 @ 05:37) (90% - 100%)  Wt(kg): --  CAPILLARY BLOOD GLUCOSE      POCT Blood Glucose.: 170 mg/dL (01 Jan 2022 00:09)      Labs                          10.0   6.10  )-----------( 378      ( 01 Jan 2022 06:13 )             32.5       12-31    139  |  105  |  27<H>  ----------------------------<  224<H>  3.9   |  27  |  1.13    Ca    8.6      31 Dec 2021 11:05    TPro  7.0  /  Alb  2.3<L>  /  TBili  0.3  /  DBili  x   /  AST  30  /  ALT  31  /  AlkPhos  97  12-31          Troponin I, High Sensitivity Result: 14.2 ng/L (12-31-21 @ 11:05)        Radiology Results      Meds    MEDICATIONS  (STANDING):  amLODIPine   Tablet 10 milliGRAM(s) Oral daily  apixaban 5 milliGRAM(s) Oral every 12 hours  atorvastatin 80 milliGRAM(s) Oral at bedtime  dexAMETHasone  Injectable 6 milliGRAM(s) IV Push daily  ferrous    sulfate 325 milliGRAM(s) Oral daily  folic acid 1 milliGRAM(s) Oral daily  insulin glargine Injectable (LANTUS) 13 Unit(s) SubCutaneous at bedtime  insulin lispro (ADMELOG) corrective regimen sliding scale   SubCutaneous three times a day before meals  insulin lispro Injectable (ADMELOG) 7 Unit(s) SubCutaneous three times a day before meals  melatonin 5 milliGRAM(s) Oral at bedtime  multivitamin/minerals 1 Tablet(s) Oral daily  pantoprazole    Tablet 40 milliGRAM(s) Oral before breakfast  remdesivir  IVPB   IV Intermittent   remdesivir  IVPB 100 milliGRAM(s) IV Intermittent every 24 hours      MEDICATIONS  (PRN):  acetaminophen     Tablet .. 650 milliGRAM(s) Oral every 4 hours PRN Mild Pain (1 - 3)  traMADol 50 milliGRAM(s) Oral every 6 hours PRN Moderate Pain (4 - 6)  traMADol 75 milliGRAM(s) Oral every 6 hours PRN Severe Pain (7 - 10)      Physical Exam    Neuro :  no focal deficits  Respiratory: CTA B/L  CV: RRR, S1S2, no murmurs,   Abdominal: Soft, NT, ND +BS,  Extremities: No edema, + peripheral pulses    ASSESSMENT    Hypoxemia    Acute hand eczema    Lumbago    Lumbago with sciatica of right side    Benign hypertension    Borderline diabetes mellitus    H/O renal calculi    Obese    Eczema    Diabetes mellitus type II  on Meds 4/2012    Chronic atrial fibrillation    Colon cancer    S/P tonsillectomy    Obese    Port-A-Cath in place        PLAN     Patient is a 66y old  Male who presents with a chief complaint of COVID-19 (31 Dec 2021 16:58)    pt seen in ed [ x ], reg med floor [   ], bed [x  ], chair at bedside [   ], awake and responsive [ x ], lethargic [  ],  nad [ x ]        Allergies    No Known Drug Allergies  Nuts (Hives)  Patient stated he had sensation of  throat closing  30 minites after  Epidural pain management resolved it self few minitus after , /  20 y ago Unable to recall MD name or number (Other)  Chintan (Unknown)        Vitals    T(F): 98.4 (01-01-22 @ 05:37), Max: 98.8 (12-31-21 @ 10:22)  HR: 98 (01-01-22 @ 05:37) (85 - 99)  BP: 124/88 (01-01-22 @ 05:37) (109/70 - 146/92)  RR: 20 (01-01-22 @ 05:37) (4 - 20)  SpO2: 96% (01-01-22 @ 05:37) (90% - 100%)  Wt(kg): --  CAPILLARY BLOOD GLUCOSE      POCT Blood Glucose.: 170 mg/dL (01 Jan 2022 00:09)      Labs                          10.0   6.10  )-----------( 378      ( 01 Jan 2022 06:13 )             32.5       12-31    139  |  105  |  27<H>  ----------------------------<  224<H>  3.9   |  27  |  1.13    Ca    8.6      31 Dec 2021 11:05    TPro  7.0  /  Alb  2.3<L>  /  TBili  0.3  /  DBili  x   /  AST  30  /  ALT  31  /  AlkPhos  97  12-31          Troponin I, High Sensitivity Result: 14.2 ng/L (12-31-21 @ 11:05)        Radiology Results      Meds    MEDICATIONS  (STANDING):  amLODIPine   Tablet 10 milliGRAM(s) Oral daily  apixaban 5 milliGRAM(s) Oral every 12 hours  atorvastatin 80 milliGRAM(s) Oral at bedtime  dexAMETHasone  Injectable 6 milliGRAM(s) IV Push daily  ferrous    sulfate 325 milliGRAM(s) Oral daily  folic acid 1 milliGRAM(s) Oral daily  insulin glargine Injectable (LANTUS) 13 Unit(s) SubCutaneous at bedtime  insulin lispro (ADMELOG) corrective regimen sliding scale   SubCutaneous three times a day before meals  insulin lispro Injectable (ADMELOG) 7 Unit(s) SubCutaneous three times a day before meals  melatonin 5 milliGRAM(s) Oral at bedtime  multivitamin/minerals 1 Tablet(s) Oral daily  pantoprazole    Tablet 40 milliGRAM(s) Oral before breakfast  remdesivir  IVPB   IV Intermittent   remdesivir  IVPB 100 milliGRAM(s) IV Intermittent every 24 hours      MEDICATIONS  (PRN):  acetaminophen     Tablet .. 650 milliGRAM(s) Oral every 4 hours PRN Mild Pain (1 - 3)  traMADol 50 milliGRAM(s) Oral every 6 hours PRN Moderate Pain (4 - 6)  traMADol 75 milliGRAM(s) Oral every 6 hours PRN Severe Pain (7 - 10)      Physical Exam    Neuro :  no focal deficits  Respiratory: CTA B/L  CV: RRR, S1S2, no murmurs,   Abdominal: Soft, NT, ND +BS,  Extremities: No edema, + peripheral pulses      ASSESSMENT / PLAN      Problem/Plan - 1:  ·  Problem: Acute hypoxemic respiratory failure due to COVID-19.   ·  Plan: Pt presented w/ SOB  +COVID on PCR  receive Pfizer x 2  CXR showed b/l infiltrates  O2 on admission - 100 but desaturate to 90  Currently on 4 LPM  O2 sat 96% (90% - 100%)  cont Remdesivir per hospital protocol x 5 days  cont Decadron per hospital protocol x 10 days  monitor LFTs  monitor POCT Glucose  start insulin S.S. for coverage  Trend COVID inflammatory markers.     Problem/Plan - 2:  ·  Problem: HTN (hypertension).   ·  Plan: Hx of HTN  home meds: amlodipine  BP monitoring  continue w/ home meds.     Problem/Plan - 3:  ·  Problem: Type 2 diabetes mellitus.   ·  Plan: Hx of DM  A1c - 7.6 (12/15/21) from NH chart  cont lantus 13 units qhs  cont lispro 7 units actid   cont insulin sliding scale.     Problem/Plan - 4:  ·  Problem: Hyperlipidemia.   ·  Plan: Hx of HLD  home meds: lipitor  continue home meds  lipid panel - wnl (12/15/21) from NH chart.     Problem/Plan - 5:  ·  Problem: Atrial fibrillation.   ·  Plan: Hx of AFib  home meds: eliquis  continue home meds  monitor BP and HR.     Problem/Plan - 6:  ·  Problem: Prophylactic measure.   ·  Plan: RISK                                                          Points  [] Previous VTE                                           3  [] Thrombophilia                                        2  [] Lower limb paralysis                              2   [] Current Cancer                                       2   [x] Immobilization > 24 hrs                        1  [] ICU/CCU stay > 24 hours                       1  [x] Age > 60                                                   1    Score: 2    Continue Eliquis for DVT and stroke prophylaxis   Continue PPI for GI prophylaxis.

## 2022-01-02 ENCOUNTER — TRANSCRIPTION ENCOUNTER (OUTPATIENT)
Age: 67
End: 2022-01-02

## 2022-01-02 LAB
ALBUMIN SERPL ELPH-MCNC: 2.2 G/DL — LOW (ref 3.5–5)
ALP SERPL-CCNC: 81 U/L — SIGNIFICANT CHANGE UP (ref 40–120)
ALT FLD-CCNC: 27 U/L DA — SIGNIFICANT CHANGE UP (ref 10–60)
ANION GAP SERPL CALC-SCNC: 4 MMOL/L — LOW (ref 5–17)
AST SERPL-CCNC: 19 U/L — SIGNIFICANT CHANGE UP (ref 10–40)
BASOPHILS # BLD AUTO: 0.01 K/UL — SIGNIFICANT CHANGE UP (ref 0–0.2)
BASOPHILS NFR BLD AUTO: 0.1 % — SIGNIFICANT CHANGE UP (ref 0–2)
BILIRUB SERPL-MCNC: 0.2 MG/DL — SIGNIFICANT CHANGE UP (ref 0.2–1.2)
BUN SERPL-MCNC: 30 MG/DL — HIGH (ref 7–18)
CALCIUM SERPL-MCNC: 9.1 MG/DL — SIGNIFICANT CHANGE UP (ref 8.4–10.5)
CHLORIDE SERPL-SCNC: 108 MMOL/L — SIGNIFICANT CHANGE UP (ref 96–108)
CO2 SERPL-SCNC: 29 MMOL/L — SIGNIFICANT CHANGE UP (ref 22–31)
CREAT SERPL-MCNC: 1.1 MG/DL — SIGNIFICANT CHANGE UP (ref 0.5–1.3)
EOSINOPHIL # BLD AUTO: 0.01 K/UL — SIGNIFICANT CHANGE UP (ref 0–0.5)
EOSINOPHIL NFR BLD AUTO: 0.1 % — SIGNIFICANT CHANGE UP (ref 0–6)
GLUCOSE BLDC GLUCOMTR-MCNC: 162 MG/DL — HIGH (ref 70–99)
GLUCOSE BLDC GLUCOMTR-MCNC: 176 MG/DL — HIGH (ref 70–99)
GLUCOSE BLDC GLUCOMTR-MCNC: 193 MG/DL — HIGH (ref 70–99)
GLUCOSE BLDC GLUCOMTR-MCNC: 198 MG/DL — HIGH (ref 70–99)
GLUCOSE SERPL-MCNC: 135 MG/DL — HIGH (ref 70–99)
HCT VFR BLD CALC: 31.7 % — LOW (ref 39–50)
HGB BLD-MCNC: 9.6 G/DL — LOW (ref 13–17)
IMM GRANULOCYTES NFR BLD AUTO: 0.6 % — SIGNIFICANT CHANGE UP (ref 0–1.5)
LYMPHOCYTES # BLD AUTO: 0.45 K/UL — LOW (ref 1–3.3)
LYMPHOCYTES # BLD AUTO: 6.6 % — LOW (ref 13–44)
MAGNESIUM SERPL-MCNC: 2 MG/DL — SIGNIFICANT CHANGE UP (ref 1.6–2.6)
MCHC RBC-ENTMCNC: 26.3 PG — LOW (ref 27–34)
MCHC RBC-ENTMCNC: 30.3 GM/DL — LOW (ref 32–36)
MCV RBC AUTO: 86.8 FL — SIGNIFICANT CHANGE UP (ref 80–100)
MONOCYTES # BLD AUTO: 0.37 K/UL — SIGNIFICANT CHANGE UP (ref 0–0.9)
MONOCYTES NFR BLD AUTO: 5.4 % — SIGNIFICANT CHANGE UP (ref 2–14)
NEUTROPHILS # BLD AUTO: 5.92 K/UL — SIGNIFICANT CHANGE UP (ref 1.8–7.4)
NEUTROPHILS NFR BLD AUTO: 87.2 % — HIGH (ref 43–77)
NRBC # BLD: 0 /100 WBCS — SIGNIFICANT CHANGE UP (ref 0–0)
PHOSPHATE SERPL-MCNC: 4.2 MG/DL — SIGNIFICANT CHANGE UP (ref 2.5–4.5)
PLATELET # BLD AUTO: 386 K/UL — SIGNIFICANT CHANGE UP (ref 150–400)
POTASSIUM SERPL-MCNC: 4.1 MMOL/L — SIGNIFICANT CHANGE UP (ref 3.5–5.3)
POTASSIUM SERPL-SCNC: 4.1 MMOL/L — SIGNIFICANT CHANGE UP (ref 3.5–5.3)
PROT SERPL-MCNC: 6.7 G/DL — SIGNIFICANT CHANGE UP (ref 6–8.3)
RBC # BLD: 3.65 M/UL — LOW (ref 4.2–5.8)
RBC # FLD: 16.6 % — HIGH (ref 10.3–14.5)
SODIUM SERPL-SCNC: 141 MMOL/L — SIGNIFICANT CHANGE UP (ref 135–145)
WBC # BLD: 6.8 K/UL — SIGNIFICANT CHANGE UP (ref 3.8–10.5)
WBC # FLD AUTO: 6.8 K/UL — SIGNIFICANT CHANGE UP (ref 3.8–10.5)

## 2022-01-02 RX ADMIN — Medication 6 MILLIGRAM(S): at 05:16

## 2022-01-02 RX ADMIN — TRAMADOL HYDROCHLORIDE 50 MILLIGRAM(S): 50 TABLET ORAL at 17:43

## 2022-01-02 RX ADMIN — Medication 1: at 12:29

## 2022-01-02 RX ADMIN — APIXABAN 5 MILLIGRAM(S): 2.5 TABLET, FILM COATED ORAL at 17:10

## 2022-01-02 RX ADMIN — Medication 7 UNIT(S): at 08:19

## 2022-01-02 RX ADMIN — PANTOPRAZOLE SODIUM 40 MILLIGRAM(S): 20 TABLET, DELAYED RELEASE ORAL at 07:04

## 2022-01-02 RX ADMIN — Medication 1: at 17:10

## 2022-01-02 RX ADMIN — Medication 5 MILLIGRAM(S): at 21:20

## 2022-01-02 RX ADMIN — Medication 7 UNIT(S): at 17:09

## 2022-01-02 RX ADMIN — Medication 1 MILLIGRAM(S): at 11:33

## 2022-01-02 RX ADMIN — APIXABAN 5 MILLIGRAM(S): 2.5 TABLET, FILM COATED ORAL at 05:16

## 2022-01-02 RX ADMIN — Medication 1: at 08:19

## 2022-01-02 RX ADMIN — AMLODIPINE BESYLATE 10 MILLIGRAM(S): 2.5 TABLET ORAL at 05:16

## 2022-01-02 RX ADMIN — INSULIN GLARGINE 13 UNIT(S): 100 INJECTION, SOLUTION SUBCUTANEOUS at 21:38

## 2022-01-02 RX ADMIN — Medication 1 TABLET(S): at 11:33

## 2022-01-02 RX ADMIN — ATORVASTATIN CALCIUM 80 MILLIGRAM(S): 80 TABLET, FILM COATED ORAL at 21:20

## 2022-01-02 RX ADMIN — Medication 7 UNIT(S): at 12:29

## 2022-01-02 RX ADMIN — Medication 325 MILLIGRAM(S): at 12:29

## 2022-01-02 NOTE — DISCHARGE NOTE PROVIDER - NSDCCPCAREPLAN_GEN_ALL_CORE_FT
PRINCIPAL DISCHARGE DIAGNOSIS  Diagnosis: Acute hypoxemic respiratory failure due to COVID-19  Assessment and Plan of Treatment: CORONAVIRUS INSTRUCTIONS:   Based on your current clinical status and stability, it has been determined that you no longer need hospitalization and can recover while remaining in self-quarantine at home. You should follow the prevention steps below until a healthcare provider or local or state health department says you can return to your normal activities.   1. You should restrict activities outside your home, except for getting medical care.   2. Do not go to work, school, or public areas.   3. Avoid using public transportation, ride-sharing, or taxis.   4. Separate yourself from other people and animals in your home as much as possible.  When you are around other people (e.g., sharing a room or vehicle) you should wear a facemask.  5. Wash your hands often with soap and water for at least 20 seconds, especially after blowing your nose, coughing, or sneezing; going to the bathroom; and before eating or preparing food.  6. Cover your mouth and nose with a tissue when you cough or sneeze. Throw used tissues in a lined trash can. Immediately wash your hands with soap and water for at least 20 seconds  7. High touch surfaces include counters, tabletops, doorknobs, bathroom fixtures, toilets, phones, keyboards, tablets, and bedside tables.  8. Avoid sharing dishes, drinking glasses, cups, eating utensils, towels, or bedding with other people or pets in your home. After using these items, they should be washed thoroughly with soap and water.  You are strongly advised to seek prompt medical attention if your illness worsens or you develop new symptoms like fever or difficulty breathing.         PRINCIPAL DISCHARGE DIAGNOSIS  Diagnosis: Acute hypoxemic respiratory failure due to COVID-19  Assessment and Plan of Treatment: CORONAVIRUS INSTRUCTIONS:   Based on your current clinical status and stability, it has been determined that you no longer need hospitalization and can recover while remaining in self-quarantine at home. You should follow the prevention steps below until a healthcare provider or local or state health department says you can return to your normal activities.   1. You should restrict activities outside your home, except for getting medical care.   2. Do not go to work, school, or public areas.   3. Avoid using public transportation, ride-sharing, or taxis.   4. Separate yourself from other people and animals in your home as much as possible.  When you are around other people (e.g., sharing a room or vehicle) you should wear a facemask.  5. Wash your hands often with soap and water for at least 20 seconds, especially after blowing your nose, coughing, or sneezing; going to the bathroom; and before eating or preparing food.  6. Cover your mouth and nose with a tissue when you cough or sneeze. Throw used tissues in a lined trash can. Immediately wash your hands with soap and water for at least 20 seconds  7. High touch surfaces include counters, tabletops, doorknobs, bathroom fixtures, toilets, phones, keyboards, tablets, and bedside tables.  8. Avoid sharing dishes, drinking glasses, cups, eating utensils, towels, or bedding with other people or pets in your home. After using these items, they should be washed thoroughly with soap and water.  You are strongly advised to seek prompt medical attention if your illness worsens or you develop new symptoms like fever or difficulty breathing.        SECONDARY DISCHARGE DIAGNOSES  Diagnosis: Type 2 diabetes mellitus  Assessment and Plan of Treatment: HgA1C 11.8 in Nov 2021  Make sure you get your HgA1c checked every three months.  If you take oral diabetes medications, check your blood glucose two times a day.  If you take insulin, check your blood glucose before meals and at bedtime.to all your doctor appointments.  If you have not seen your ophthalmologist this year call for appointment.  It's important not to skip any meals.  Keep a log of your blood glucose results and always take it with you to your doctor appointments.  Keep a list of your current medications including injectables and over the counter medications and bring this medication list with you   Check your feet daily for redness, sores, or openings. Do not self treat. If no improvement in two days call your primary care physician for an appointment.  Low blood sugar (hypoglycemia) is a blood sugar below 70mg/dl. Check your blood sugar if you feel signs/symptoms of hypoglycemia. If your blood sugar is below 70 take 15 grams of carbohydrates (ex 4 oz of apple juice, 3-4 glucose tablets, or 4-6 oz of regular soda) wait 15 minutes and repeat blood sugar to make sure it comes up above 70.  If your blood sugar is above 70 and you are due for a meal, have a meal.  If you are not due for a meal have a snack.  This snack helps keeps your blood sugar at a safe range.      Diagnosis: Atrial fibrillation  Assessment and Plan of Treatment: Atrial fibrillation is the most common heart rhythm problem & has the risk of stroke & heart attack  It helps if you control your blood pressure, not drink more than 1-2 alcohol drinks per day, cut down on caffeine, getting treatment for over active thyroid gland, & getting exercise  Call your doctor if you feel your heart racing or beating unusually, chest tightness or pain, lightheaded, faint, shortness of breath especially with exercise  It is important to take your heart medication as prescribed  You may be on anticoagulation which is very important to take as directed -      Diagnosis: HTN (hypertension)  Assessment and Plan of Treatment: Low salt diet  Activity as tolerated.  Take all medication as prescribed.  Follow up with your medical doctor for routine blood pressure monitoring at your next visit.  Notify your doctor if you have any of the following symptoms:   Dizziness, Lightheadedness, Blurry vision, Headache, Chest pain, Shortness of breath      Diagnosis: Cerebrovascular accident (CVA)  Assessment and Plan of Treatment: Continue medications as prescribed.  Maintain adequate hydration, nutrition, rest, and activity as instructed.   Safety measures.  Aspiration precautions.  Follow-up with your primary care physician within 1 week. Call for appointment. Follow-up with your doctor regarding your chronic tremors upon movement.        PRINCIPAL DISCHARGE DIAGNOSIS  Diagnosis: Acute hypoxemic respiratory failure due to COVID-19  Assessment and Plan of Treatment: CORONAVIRUS INSTRUCTIONS:   Based on your current clinical status and stability, it has been determined that you no longer need hospitalization and can recover while remaining in self-quarantine at home. You should follow the prevention steps below until a healthcare provider or local or state health department says you can return to your normal activities.   1. You should restrict activities outside your home, except for getting medical care.   2. Do not go to work, school, or public areas.   3. Avoid using public transportation, ride-sharing, or taxis.   4. Separate yourself from other people and animals in your home as much as possible.  When you are around other people (e.g., sharing a room or vehicle) you should wear a facemask.  5. Wash your hands often with soap and water for at least 20 seconds, especially after blowing your nose, coughing, or sneezing; going to the bathroom; and before eating or preparing food.  6. Cover your mouth and nose with a tissue when you cough or sneeze. Throw used tissues in a lined trash can. Immediately wash your hands with soap and water for at least 20 seconds  7. High touch surfaces include counters, tabletops, doorknobs, bathroom fixtures, toilets, phones, keyboards, tablets, and bedside tables.  8. Avoid sharing dishes, drinking glasses, cups, eating utensils, towels, or bedding with other people or pets in your home. After using these items, they should be washed thoroughly with soap and water.  You are strongly advised to seek prompt medical attention if your illness worsens or you develop new symptoms like fever or difficulty breathing.        SECONDARY DISCHARGE DIAGNOSES  Diagnosis: HTN (hypertension)  Assessment and Plan of Treatment: Low salt diet  Activity as tolerated.  Take all medication as prescribed.  Follow up with your medical doctor for routine blood pressure monitoring at your next visit.  Notify your doctor if you have any of the following symptoms:   Dizziness, Lightheadedness, Blurry vision, Headache, Chest pain, Shortness of breath      Diagnosis: Type 2 diabetes mellitus  Assessment and Plan of Treatment: HgA1C 11.8% in Nov 2021  Make sure you get your HgA1c checked every three months.  If you take oral diabetes medications, check your blood glucose two times a day.  If you take insulin, check your blood glucose before meals and at bedtime.to all your doctor appointments.  If you have not seen your ophthalmologist this year call for appointment.  It's important not to skip any meals.  Keep a list of your current medications including injectables and over the counter medications and bring this medication list with you   Check your feet daily for redness, sores, or openings. Do not self treat. If no improvement in two days call your primary care physician for an appointment.  Low blood sugar (hypoglycemia) is a blood sugar below 70mg/dl. Check your blood sugar if you feel signs/symptoms of hypoglycemia. If your blood sugar is below 70 take 15 grams of carbohydrates (ex 4 oz of apple juice, 3-4 glucose tablets, or 4-6 oz of regular soda) wait 15 minutes and repeat blood sugar to make sure it comes up above 70.  If your blood sugar is above 70 and you are due for a meal, have a meal.  If you are not due for a meal have a snack.  This snack helps keeps your blood sugar at a safe range.      Diagnosis: Atrial fibrillation  Assessment and Plan of Treatment: Atrial fibrillation is the most common heart rhythm problem & has the risk of stroke & heart attack  It helps if you control your blood pressure, not drink more than 1-2 alcohol drinks per day, cut down on caffeine, getting treatment for over active thyroid gland, & getting exercise  Call your doctor if you feel your heart racing or beating unusually, chest tightness or pain, lightheaded, faint, shortness of breath especially with exercise  It is important to take your heart medication and Elaquis as prescribed      Diagnosis: Cerebrovascular accident (CVA)  Assessment and Plan of Treatment: Continue medications as prescribed.  Maintain adequate hydration, nutrition, rest, and activity as instructed.   Safety measures. Aspiration precautions.  Follow-up with your primary care physician within 1 week. Call for appointment. Follow-up with your doctor regarding your chronic tremors upon movement.        PRINCIPAL DISCHARGE DIAGNOSIS  Diagnosis: Acute hypoxemic respiratory failure due to COVID-19  Assessment and Plan of Treatment: CORONAVIRUS INSTRUCTIONS:   Based on your current clinical status and stability, it has been determined that you no longer need hospitalization and can recover while remaining in self-quarantine at home. You should follow the prevention steps below until a healthcare provider or local or state health department says you can return to your normal activities.   1. You should restrict activities outside your home, except for getting medical care.   2. Do not go to work, school, or public areas.   3. Avoid using public transportation, ride-sharing, or taxis.   4. Separate yourself from other people and animals in your home as much as possible.  When you are around other people (e.g., sharing a room or vehicle) you should wear a facemask.  5. Wash your hands often with soap and water for at least 20 seconds, especially after blowing your nose, coughing, or sneezing; going to the bathroom; and before eating or preparing food.  6. Cover your mouth and nose with a tissue when you cough or sneeze. Throw used tissues in a lined trash can. Immediately wash your hands with soap and water for at least 20 seconds  7. High touch surfaces include counters, tabletops, doorknobs, bathroom fixtures, toilets, phones, keyboards, tablets, and bedside tables.  8. Avoid sharing dishes, drinking glasses, cups, eating utensils, towels, or bedding with other people or pets in your home. After using these items, they should be washed thoroughly with soap and water.  You are strongly advised to seek prompt medical attention if your illness worsens or you develop new symptoms like fever or difficulty breathing.        SECONDARY DISCHARGE DIAGNOSES  Diagnosis: HTN (hypertension)  Assessment and Plan of Treatment: Low salt diet  Activity as tolerated.  Take all medication as prescribed.  Follow up with your medical doctor for routine blood pressure monitoring at your next visit.  Notify your doctor if you have any of the following symptoms:   Dizziness, Lightheadedness, Blurry vision, Headache, Chest pain, Shortness of breath      Diagnosis: Type 2 diabetes mellitus  Assessment and Plan of Treatment: HgA1C 11.8% in Nov 2021  Make sure you get your HgA1c checked every three months.  If you take oral diabetes medications, check your blood glucose two times a day.  If you take insulin, check your blood glucose before meals and at bedtime.to all your doctor appointments.  If you have not seen your ophthalmologist this year call for appointment.  It's important not to skip any meals.  Keep a list of your current medications including injectables and over the counter medications and bring this medication list with you   Check your feet daily for redness, sores, or openings. Do not self treat. If no improvement in two days call your primary care physician for an appointment.  Low blood sugar (hypoglycemia) is a blood sugar below 70mg/dl. Check your blood sugar if you feel signs/symptoms of hypoglycemia. If your blood sugar is below 70 take 15 grams of carbohydrates (ex 4 oz of apple juice, 3-4 glucose tablets, or 4-6 oz of regular soda) wait 15 minutes and repeat blood sugar to make sure it comes up above 70.  If your blood sugar is above 70 and you are due for a meal, have a meal.  If you are not due for a meal have a snack.  This snack helps keeps your blood sugar at a safe range.      Diagnosis: Atrial fibrillation  Assessment and Plan of Treatment: Atrial fibrillation is the most common heart rhythm problem & has the risk of stroke & heart attack  It helps if you control your blood pressure, not drink more than 1-2 alcohol drinks per day, cut down on caffeine, getting treatment for over active thyroid gland, & getting exercise  Call your doctor if you feel your heart racing or beating unusually, chest tightness or pain, lightheaded, faint, shortness of breath especially with exercise  It is important to take your heart medication and Elaquis as prescribed      Diagnosis: Cerebrovascular accident (CVA)  Assessment and Plan of Treatment: Continue medications as prescribed.  Maintain adequate hydration, nutrition, rest, and activity as instructed.   Safety measures. Aspiration precautions.  Follow-up with your primary care physician within 1 week. Call for appointment. Follow-up with your doctor regarding your chronic tremors upon movement.       Diagnosis: Prophylactic measure  Assessment and Plan of Treatment: There is an intact DTI to the Coccyx area without drainage  -Clean the Coccyx wound with normal saline and apply skin prep to the surrounding skin  -Apply a Foam dressing Q 72hrs PRN  -Elevate/float the patients heels using heel protectors and reposition the patient Q 2hrs using wedges or pillows

## 2022-01-02 NOTE — DISCHARGE NOTE PROVIDER - HOSPITAL COURSE
Patient is a 67 y/o M w/ hx of CVA (11/13/21), T2DM, HTD, AFib (on eliquis), HLD, Sciatica, rectal adenoCA (s/p chemotherapy). He is AAOx3 and bedbound at baseline. He was transferred here from [facility] after testing positive for COVID-19. He presented with shortness of breath and desaturation to 90%. He was placed on 4 LPM. He denies any fever, cough, nausea, vomiting, loss of taste and smell, diarrhea. He was vaccinated with Pfizer x 2. Lung auscultation revealed bilateral crackle and rhonchi. Chest Xray showed bilateral infiltrates. He was subsequently admitted and started on dexamethasone and remdesevir and maintained on 2L nasal cannula.    Please note this is only a summary, refer to the full chart for full details.     Patient is a 65 y/o M w/ hx of CVA (11/13/21), T2DM, HTD, AFib (on eliquis), HLD, Sciatica, rectal adenoCA (s/p chemotherapy). He is AAOx3 and bedbound at baseline. He was transferred here from [facility] after testing positive for COVID-19. He presented with shortness of breath and desaturation to 90%. He was placed on 4 LPM. He denies any fever, cough, nausea, vomiting, loss of taste and smell, diarrhea. He was vaccinated with Pfizer x 2. Lung auscultation revealed bilateral crackle and rhonchi. Chest Xray showed bilateral infiltrates. He was subsequently admitted and started on dexamethasone and remdesevir and maintained on 2L nasal cannula.  Pt improved , oxygenating adequately on room air.  PT evaluated and recommends LAILA.  Pt's wife and sister wants pt discharged to home. DME's ordered. CM assisting.     Please note this is only a summary, refer to the full chart for full details.     Patient is a 65 y/o M w/ hx of CVA (11/13/21), T2DM, HTD, AFib (on eliquis), HLD, Sciatica, rectal adenoCA (s/p chemotherapy). He is AAOx3 and bedbound at baseline. He was transferred here from [facility] after testing positive for COVID-19. He presented with shortness of breath and desaturation to 90%. He was placed on 4 LPM. He denies any fever, cough, nausea, vomiting, loss of taste and smell, diarrhea. He was vaccinated with Pfizer x 2. Lung auscultation revealed bilateral crackle and rhonchi. Chest Xray showed bilateral infiltrates. He was subsequently admitted and started on dexamethasone and remdesevir and maintained on 2L nasal cannula.  Pt improved , oxygenating adequately on room air.  PT evaluated and recommends LAILA.  Pt's wife and sister wants pt discharged to home. DME's ordered and delivered. CM assisting.     Please note this is only a summary, refer to the full chart for full details.

## 2022-01-02 NOTE — DISCHARGE NOTE PROVIDER - CARE PROVIDER_API CALL
[facility],   Phone: (   )    -  Fax: (   )    -  Follow Up Time: 1-3 days   Sherrie Cordero)  EndocrinologyMetabDiabetes  [address] [place] [address]  Phone: [phone]  Fax: [phone]  Follow Up Time:    FEMI PAEZ  Gynecological Oncology  [address] [place] [address]  Phone: [phone]  Fax: [phone]  Follow Up Time:     Taryn Busch  ENDOCRINOLOGY/METAB/DIABETES  [address] [place] [address]  Phone: [phone]  Fax: [phone]  Follow Up Time:     Gerson Pyle (DO)  Neurology  [address] [place] [address], [address]  [place] [address]  Phone: [phone]  Fax: [phone]  Follow Up Time:     Cardiology follow up,   at Dr Bee office  Phone: (   )    -  Fax: (   )    -  Follow Up Time:     Mikael Bhakta)  Cardiology Medicine  [address] [facility], [address]  [place] [address]  Phone: [phone]  Fax: [phone]  Follow Up Time:

## 2022-01-02 NOTE — DISCHARGE NOTE PROVIDER - CARE PROVIDERS DIRECT ADDRESSES
,DirectAddress_Unknown ,DirectAddress_Unknown,DirectAddress_Unknown,[email or web address],DirectAddress_Unknown,DirectAddress_Unknown

## 2022-01-02 NOTE — DISCHARGE NOTE PROVIDER - PROVIDER TOKENS
FREE:[LAST:[[facility] [place]],PHONE:[(   )    -],FAX:[(   )    -],FOLLOWUP:[1-3 days]] PROVIDER:[TOKEN:[[id number]]] PROVIDER:[TOKEN:[[id number]]],PROVIDER:[TOKEN:[[id number]]],PROVIDER:[TOKEN:[[id number]]],FREE:[LAST:[Cardiology follow up],PHONE:[(   )    -],FAX:[(   )    -],ADDRESS:[at Dr Bee office]],PROVIDER:[TOKEN:[[id number]]]

## 2022-01-02 NOTE — DISCHARGE NOTE PROVIDER - NSDCMRMEDTOKEN_GEN_ALL_CORE_FT
amLODIPine 10 mg oral tablet: 1 tab(s) orally once a day  apixaban 5 mg oral tablet: 1 tab(s) orally every 12 hours  ferrous sulfate 75 mg/0.6 mL (15 mg elemental iron) oral liquid: 7.5 milliliter(s) orally once a day  folic acid 1 mg oral tablet: 1 tab(s) orally once a day  HumaLOG 100 units/mL subcutaneous solution: 7 unit(s) subcutaneous 3 times a day (before meals)  Lipitor 80 mg oral tablet: 1 tab(s) orally once a day  Melatonin 5 mg oral tablet: 1 tab(s) orally once a day (at bedtime)  metFORMIN 1000 mg oral tablet: 1 tab(s) orally 2 times a day  Multiple Vitamins with Minerals oral tablet: 1 tab(s) orally once a day  omeprazole 20 mg oral delayed release tablet: 1 tab(s) orally once a day  Semglee 100 units/mL subcutaneous solution: 15 unit(s) subcutaneous once a day (at bedtime)  Vitamin C 250 mg oral tablet: 1 tab(s) orally once a day  Vitamin D3 125 mcg (5000 intl units) oral tablet: 1 tab(s) orally once a day   acetaminophen 325 mg oral tablet: 2 tab(s) orally every 4 hours, As needed, Mild Pain (1 - 3)  Admelog SoloStar 100 units/mL injectable solution: 7 unit(s) subcutaneous 3 times a day (with meals) .Do not give if blood glucose is less than 140mg/dL or not eating.   alcohol swabs : Apply topically to affected area 4 times a day   amLODIPine 10 mg oral tablet: 1 tab(s) orally once a day  apixaban 5 mg oral tablet: 1 tab(s) orally every 12 hours  Basaglar KwikPen 100 units/mL subcutaneous solution: 13 unit(s) subcutaneous once a day   ferrous sulfate 75 mg/0.6 mL (15 mg elemental iron) oral liquid: 7.5 milliliter(s) orally once a day  folic acid 1 mg oral tablet: 1 tab(s) orally once a day  glucometer (per patient&#x27;s insurance): Test blood sugars four times a day. Dispense #1 glucometer.  Insulin Pen Needles, 4mm: 1 application subcutaneously 4 times a day. ** Use with insulin pen **   lancets: 1 application subcutaneously 4 times a day   Lipitor 80 mg oral tablet: 1 tab(s) orally once a day  Melatonin 5 mg oral tablet: 1 tab(s) orally once a day (at bedtime)  Multiple Vitamins with Minerals oral tablet: 1 tab(s) orally once a day  omeprazole 20 mg oral delayed release tablet: 1 tab(s) orally once a day  Semglee 100 units/mL subcutaneous solution: 15 unit(s) subcutaneous once a day (at bedtime)  test strips (per patient&#x27;s insurance): 1 application subcutaneously 4 times a day. ** Compatible with patient&#x27;s glucometer **  Vitamin C 250 mg oral tablet: 1 tab(s) orally once a day  Vitamin D3 125 mcg (5000 intl units) oral tablet: 1 tab(s) orally once a day   acetaminophen 325 mg oral tablet: 2 tab(s) orally every 4 hours, As needed, Mild Pain (1 - 3)  Admelog SoloStar 100 units/mL injectable solution: 7 unit(s) subcutaneous 3 times a day (with meals) .Do not give if blood glucose is less than 140mg/dL or not eating.   alcohol swabs : Apply topically to affected area 4 times a day   amLODIPine 10 mg oral tablet: 1 tab(s) orally once a day  apixaban 5 mg oral tablet: 1 tab(s) orally every 12 hours  Basaglar KwikPen 100 units/mL subcutaneous solution: 13 unit(s) subcutaneous once a day   ferrous sulfate 75 mg/0.6 mL (15 mg elemental iron) oral liquid: 7.5 milliliter(s) orally once a day  folic acid 1 mg oral tablet: 1 tab(s) orally once a day  glucometer (per patient&#x27;s insurance): Test blood sugars four times a day. Dispense #1 glucometer.  Insulin Pen Needles, 4mm: 1 application subcutaneously 4 times a day. ** Use with insulin pen **   lancets: 1 application subcutaneously 4 times a day   Lipitor 80 mg oral tablet: 1 tab(s) orally once a day  Melatonin 5 mg oral tablet: 1 tab(s) orally once a day (at bedtime)  Multiple Vitamins with Minerals oral tablet: 1 tab(s) orally once a day  omeprazole 20 mg oral delayed release tablet: 1 tab(s) orally once a day  test strips (per patient&#x27;s insurance): 1 application subcutaneously 4 times a day. ** Compatible with patient&#x27;s glucometer **  Vitamin C 250 mg oral tablet: 1 tab(s) orally once a day  Vitamin D3 125 mcg (5000 intl units) oral tablet: 1 tab(s) orally once a day   acetaminophen 325 mg oral tablet: 2 tab(s) orally every 4 hours, As needed, Mild Pain (1 - 3)  Admelog SoloStar 100 units/mL injectable solution: 7 unit(s) subcutaneous 3 times a day (with meals) .Do not give if blood glucose is less than 140mg/dL or not eating.   alcohol swabs : Apply topically to affected area 4 times a day   amLODIPine 10 mg oral tablet: 1 tab(s) orally once a day  apixaban 5 mg oral tablet: 1 tab(s) orally every 12 hours  Basaglar KwikPen 100 units/mL subcutaneous solution: 13 unit(s) subcutaneous once a day   ferrous sulfate 75 mg/0.6 mL (15 mg elemental iron) oral liquid: 7.5 milliliter(s) orally once a day  folic acid 1 mg oral tablet: 1 tab(s) orally once a day  glucometer (per patient&#x27;s insurance): Test blood sugars four times a day. Dispense #1 glucometer.  Insulin Pen Needles, 4mm: 1 application subcutaneously 4 times a day. ** Use with insulin pen **   lancets: 1 application subcutaneously 4 times a day   Lipitor 80 mg oral tablet: 1 tab(s) orally once a day  Melatonin 5 mg oral tablet: 1 tab(s) orally once a day (at bedtime)  Multiple Vitamins with Minerals oral tablet: 1 tab(s) orally once a day  omeprazole 20 mg oral delayed release tablet: 1 tab(s) orally once a day  test strips (per patient&#x27;s insurance): 1 application subcutaneously 4 times a day. ** Compatible with patient&#x27;s glucometer **  traMADol 50 mg oral tablet: 1 tab(s) orally every 6 hours, As needed, Moderate Pain (4 - 6) MDD:4 tabs /day  Vitamin C 250 mg oral tablet: 1 tab(s) orally once a day  Vitamin D3 125 mcg (5000 intl units) oral tablet: 1 tab(s) orally once a day

## 2022-01-02 NOTE — DISCHARGE NOTE PROVIDER - NSDCFUADDINST_GEN_ALL_CORE_FT
There is an intact DTI to the Coccyx area without drainage	  -Clean the Coccyx wound with normal saline and apply skin prep to the surrounding skin  -Apply a Foam dressing Q 72hrs PRN  -Elevate/float the patients heels using heel protectors and reposition the patient Q 2hrs using wedges or pillows

## 2022-01-03 DIAGNOSIS — Z02.9 ENCOUNTER FOR ADMINISTRATIVE EXAMINATIONS, UNSPECIFIED: ICD-10-CM

## 2022-01-03 LAB
ALBUMIN SERPL ELPH-MCNC: 2.4 G/DL — LOW (ref 3.5–5)
ALP SERPL-CCNC: 80 U/L — SIGNIFICANT CHANGE UP (ref 40–120)
ALT FLD-CCNC: 27 U/L DA — SIGNIFICANT CHANGE UP (ref 10–60)
ANION GAP SERPL CALC-SCNC: 6 MMOL/L — SIGNIFICANT CHANGE UP (ref 5–17)
AST SERPL-CCNC: 22 U/L — SIGNIFICANT CHANGE UP (ref 10–40)
BASOPHILS # BLD AUTO: 0.01 K/UL — SIGNIFICANT CHANGE UP (ref 0–0.2)
BASOPHILS NFR BLD AUTO: 0.1 % — SIGNIFICANT CHANGE UP (ref 0–2)
BILIRUB SERPL-MCNC: 0.3 MG/DL — SIGNIFICANT CHANGE UP (ref 0.2–1.2)
BUN SERPL-MCNC: 25 MG/DL — HIGH (ref 7–18)
CALCIUM SERPL-MCNC: 9.1 MG/DL — SIGNIFICANT CHANGE UP (ref 8.4–10.5)
CHLORIDE SERPL-SCNC: 107 MMOL/L — SIGNIFICANT CHANGE UP (ref 96–108)
CO2 SERPL-SCNC: 28 MMOL/L — SIGNIFICANT CHANGE UP (ref 22–31)
CREAT SERPL-MCNC: 0.98 MG/DL — SIGNIFICANT CHANGE UP (ref 0.5–1.3)
EOSINOPHIL # BLD AUTO: 0.01 K/UL — SIGNIFICANT CHANGE UP (ref 0–0.5)
EOSINOPHIL NFR BLD AUTO: 0.1 % — SIGNIFICANT CHANGE UP (ref 0–6)
GLUCOSE BLDC GLUCOMTR-MCNC: 162 MG/DL — HIGH (ref 70–99)
GLUCOSE BLDC GLUCOMTR-MCNC: 188 MG/DL — HIGH (ref 70–99)
GLUCOSE BLDC GLUCOMTR-MCNC: 211 MG/DL — HIGH (ref 70–99)
GLUCOSE BLDC GLUCOMTR-MCNC: 240 MG/DL — HIGH (ref 70–99)
GLUCOSE SERPL-MCNC: 157 MG/DL — HIGH (ref 70–99)
HCT VFR BLD CALC: 30.9 % — LOW (ref 39–50)
HGB BLD-MCNC: 9.6 G/DL — LOW (ref 13–17)
IMM GRANULOCYTES NFR BLD AUTO: 0.4 % — SIGNIFICANT CHANGE UP (ref 0–1.5)
LYMPHOCYTES # BLD AUTO: 0.43 K/UL — LOW (ref 1–3.3)
LYMPHOCYTES # BLD AUTO: 6.1 % — LOW (ref 13–44)
MAGNESIUM SERPL-MCNC: 1.9 MG/DL — SIGNIFICANT CHANGE UP (ref 1.6–2.6)
MCHC RBC-ENTMCNC: 26.7 PG — LOW (ref 27–34)
MCHC RBC-ENTMCNC: 31.1 GM/DL — LOW (ref 32–36)
MCV RBC AUTO: 86.1 FL — SIGNIFICANT CHANGE UP (ref 80–100)
MONOCYTES # BLD AUTO: 0.35 K/UL — SIGNIFICANT CHANGE UP (ref 0–0.9)
MONOCYTES NFR BLD AUTO: 4.9 % — SIGNIFICANT CHANGE UP (ref 2–14)
NEUTROPHILS # BLD AUTO: 6.26 K/UL — SIGNIFICANT CHANGE UP (ref 1.8–7.4)
NEUTROPHILS NFR BLD AUTO: 88.4 % — HIGH (ref 43–77)
NRBC # BLD: 0 /100 WBCS — SIGNIFICANT CHANGE UP (ref 0–0)
PHOSPHATE SERPL-MCNC: 3.1 MG/DL — SIGNIFICANT CHANGE UP (ref 2.5–4.5)
PLATELET # BLD AUTO: 385 K/UL — SIGNIFICANT CHANGE UP (ref 150–400)
POTASSIUM SERPL-MCNC: 4 MMOL/L — SIGNIFICANT CHANGE UP (ref 3.5–5.3)
POTASSIUM SERPL-SCNC: 4 MMOL/L — SIGNIFICANT CHANGE UP (ref 3.5–5.3)
PROT SERPL-MCNC: 6.7 G/DL — SIGNIFICANT CHANGE UP (ref 6–8.3)
RBC # BLD: 3.59 M/UL — LOW (ref 4.2–5.8)
RBC # FLD: 16.6 % — HIGH (ref 10.3–14.5)
SODIUM SERPL-SCNC: 141 MMOL/L — SIGNIFICANT CHANGE UP (ref 135–145)
WBC # BLD: 7.09 K/UL — SIGNIFICANT CHANGE UP (ref 3.8–10.5)
WBC # FLD AUTO: 7.09 K/UL — SIGNIFICANT CHANGE UP (ref 3.8–10.5)

## 2022-01-03 RX ADMIN — REMDESIVIR 500 MILLIGRAM(S): 5 INJECTION INTRAVENOUS at 23:27

## 2022-01-03 RX ADMIN — Medication 7 UNIT(S): at 08:43

## 2022-01-03 RX ADMIN — Medication 6 MILLIGRAM(S): at 05:03

## 2022-01-03 RX ADMIN — Medication 7 UNIT(S): at 17:37

## 2022-01-03 RX ADMIN — Medication 1: at 08:43

## 2022-01-03 RX ADMIN — Medication 325 MILLIGRAM(S): at 12:25

## 2022-01-03 RX ADMIN — Medication 2: at 12:26

## 2022-01-03 RX ADMIN — Medication 1 TABLET(S): at 12:26

## 2022-01-03 RX ADMIN — Medication 2: at 17:36

## 2022-01-03 RX ADMIN — PANTOPRAZOLE SODIUM 40 MILLIGRAM(S): 20 TABLET, DELAYED RELEASE ORAL at 05:02

## 2022-01-03 RX ADMIN — AMLODIPINE BESYLATE 10 MILLIGRAM(S): 2.5 TABLET ORAL at 05:02

## 2022-01-03 RX ADMIN — Medication 1 MILLIGRAM(S): at 12:25

## 2022-01-03 RX ADMIN — Medication 7 UNIT(S): at 12:26

## 2022-01-03 RX ADMIN — Medication 5 MILLIGRAM(S): at 21:05

## 2022-01-03 RX ADMIN — ATORVASTATIN CALCIUM 80 MILLIGRAM(S): 80 TABLET, FILM COATED ORAL at 21:05

## 2022-01-03 RX ADMIN — APIXABAN 5 MILLIGRAM(S): 2.5 TABLET, FILM COATED ORAL at 17:35

## 2022-01-03 RX ADMIN — APIXABAN 5 MILLIGRAM(S): 2.5 TABLET, FILM COATED ORAL at 05:02

## 2022-01-03 RX ADMIN — REMDESIVIR 500 MILLIGRAM(S): 5 INJECTION INTRAVENOUS at 00:00

## 2022-01-03 RX ADMIN — INSULIN GLARGINE 13 UNIT(S): 100 INJECTION, SOLUTION SUBCUTANEOUS at 21:05

## 2022-01-03 NOTE — PROGRESS NOTE ADULT - SUBJECTIVE AND OBJECTIVE BOX
HPI:  Patient is a 65 y/o M w/ hx of CVA (11/13/21), T2DM, HTD, AFib (on eliquis), HLD, Sciatica, rectal adenoCA (s/p chemotherapy). He is AAOx3 and bedbound at baseline. He was transferred here from [facility] after testing positive for COVID-19.   He presented with shortness of breath and desaturation to 90%. He was placed on 4 LPM. He denies any fever, cough, nausea, vomiting, loss of taste and smell, diarrhea. He was vaccinated with Pfizer x 2. Lung auscultation revealed bilateral crackle and rhonchi. Chest Xray showed bilateral infiltrates. He was subsequently admitted.    GOC: FULL CODE as per WIFE (31 Dec 2021 16:58)      Patient is a 66y old  Male who presents with a chief complaint of COVID-19 (03 Jan 2022 10:38)      INTERVAL HPI/OVERNIGHT EVENTS:  T(C): 36.9 (01-03-22 @ 05:15), Max: 36.9 (01-03-22 @ 05:15)  HR: 93 (01-03-22 @ 05:15) (65 - 93)  BP: 124/76 (01-03-22 @ 05:15) (109/55 - 125/79)  RR: 18 (01-03-22 @ 05:15) (18 - 19)  SpO2: 97% (01-03-22 @ 05:15) (93% - 100%)  Wt(kg): --  I&O's Summary      REVIEW OF SYSTEMS: denies fever, chills, SOB, palpitations, chest pain, abdominal pain, nausea, vomitting, diarrhea, constipation, dizziness    MEDICATIONS  (STANDING):  amLODIPine   Tablet 10 milliGRAM(s) Oral daily  apixaban 5 milliGRAM(s) Oral every 12 hours  atorvastatin 80 milliGRAM(s) Oral at bedtime  dexAMETHasone  Injectable 6 milliGRAM(s) IV Push daily  ferrous    sulfate 325 milliGRAM(s) Oral daily  folic acid 1 milliGRAM(s) Oral daily  influenza  Vaccine (HIGH DOSE) 0.7 milliLiter(s) IntraMuscular once  insulin glargine Injectable (LANTUS) 13 Unit(s) SubCutaneous at bedtime  insulin lispro (ADMELOG) corrective regimen sliding scale   SubCutaneous three times a day before meals  insulin lispro Injectable (ADMELOG) 7 Unit(s) SubCutaneous three times a day before meals  melatonin 5 milliGRAM(s) Oral at bedtime  multivitamin/minerals 1 Tablet(s) Oral daily  pantoprazole    Tablet 40 milliGRAM(s) Oral before breakfast  remdesivir  IVPB   IV Intermittent   remdesivir  IVPB 100 milliGRAM(s) IV Intermittent every 24 hours    MEDICATIONS  (PRN):  acetaminophen     Tablet .. 650 milliGRAM(s) Oral every 4 hours PRN Mild Pain (1 - 3)  traMADol 50 milliGRAM(s) Oral every 6 hours PRN Moderate Pain (4 - 6)  traMADol 75 milliGRAM(s) Oral every 6 hours PRN Severe Pain (7 - 10)      PHYSICAL EXAM:  GENERAL: NAD, well-groomed, well-developed  HEAD:  Atraumatic, Normocephalic  EYES: EOMI, PERRLA, conjunctiva and sclera clear  ENMT: No tonsillar erythema, exudates, or enlargement; Moist mucous membranes, Good dentition, No lesions  NECK: Supple, No JVD, Normal thyroid  NERVOUS SYSTEM:  Alert & Oriented X3, Good concentration; Motor Strength 5/5 B/L upper and lower extremities; DTRs 2+ intact and symmetric  CHEST/LUNG: Clear to percussion bilaterally; No rales, rhonchi, wheezing, or rubs  HEART: Regular rate and rhythm; No murmurs, rubs, or gallops  ABDOMEN: Soft, Nontender, Nondistended; Bowel sounds present  EXTREMITIES:  2+ Peripheral Pulses, No clubbing, cyanosis, or edema  LYMPH: No lymphadenopathy noted  SKIN: No rashes or lesions  LABS:                        9.6    7.09  )-----------( 385      ( 03 Jan 2022 07:24 )             30.9     01-03    141  |  107  |  25<H>  ----------------------------<  157<H>  4.0   |  28  |  0.98    Ca    9.1      03 Jan 2022 07:24  Phos  3.1     01-03  Mg     1.9     01-03    TPro  6.7  /  Alb  2.4<L>  /  TBili  0.3  /  DBili  x   /  AST  22  /  ALT  27  /  AlkPhos  80  01-03        CAPILLARY BLOOD GLUCOSE      POCT Blood Glucose.: 240 mg/dL (03 Jan 2022 11:34)  POCT Blood Glucose.: 188 mg/dL (03 Jan 2022 08:23)  POCT Blood Glucose.: 176 mg/dL (02 Jan 2022 21:34)  POCT Blood Glucose.: 193 mg/dL (02 Jan 2022 16:26)

## 2022-01-03 NOTE — PROGRESS NOTE ADULT - SUBJECTIVE AND OBJECTIVE BOX
NP Note discussed with  Primary Attending    Patient is a 66y old  Male who presents with a chief complaint of COVID-19 (02 Jan 2022 17:05)      INTERVAL HPI/OVERNIGHT EVENTS: no new complaints    MEDICATIONS  (STANDING):  amLODIPine   Tablet 10 milliGRAM(s) Oral daily  apixaban 5 milliGRAM(s) Oral every 12 hours  atorvastatin 80 milliGRAM(s) Oral at bedtime  dexAMETHasone  Injectable 6 milliGRAM(s) IV Push daily  ferrous    sulfate 325 milliGRAM(s) Oral daily  folic acid 1 milliGRAM(s) Oral daily  influenza  Vaccine (HIGH DOSE) 0.7 milliLiter(s) IntraMuscular once  insulin glargine Injectable (LANTUS) 13 Unit(s) SubCutaneous at bedtime  insulin lispro (ADMELOG) corrective regimen sliding scale   SubCutaneous three times a day before meals  insulin lispro Injectable (ADMELOG) 7 Unit(s) SubCutaneous three times a day before meals  melatonin 5 milliGRAM(s) Oral at bedtime  multivitamin/minerals 1 Tablet(s) Oral daily  pantoprazole    Tablet 40 milliGRAM(s) Oral before breakfast  remdesivir  IVPB   IV Intermittent   remdesivir  IVPB 100 milliGRAM(s) IV Intermittent every 24 hours    MEDICATIONS  (PRN):  acetaminophen     Tablet .. 650 milliGRAM(s) Oral every 4 hours PRN Mild Pain (1 - 3)  traMADol 50 milliGRAM(s) Oral every 6 hours PRN Moderate Pain (4 - 6)  traMADol 75 milliGRAM(s) Oral every 6 hours PRN Severe Pain (7 - 10)      __________________________________________________  REVIEW OF SYSTEMS:    CONSTITUTIONAL: No fever,   EYES: no acute visual disturbances  NECK: No pain or stiffness  RESPIRATORY: No cough; No shortness of breath  CARDIOVASCULAR: No chest pain, no palpitations  GASTROINTESTINAL: No pain. No nausea or vomiting; No diarrhea   NEUROLOGICAL: No headache or numbness, no tremors  MUSCULOSKELETAL: No joint pain, no muscle pain  GENITOURINARY: no dysuria, no frequency, no hesitancy  PSYCHIATRY: no depression , no anxiety  ALL OTHER  ROS negative        Vital Signs Last 24 Hrs  T(C): 36.9 (03 Jan 2022 05:15), Max: 36.9 (03 Jan 2022 05:15)  T(F): 98.5 (03 Jan 2022 05:15), Max: 98.5 (03 Jan 2022 05:15)  HR: 93 (03 Jan 2022 05:15) (65 - 93)  BP: 124/76 (03 Jan 2022 05:15) (109/55 - 125/79)  BP(mean): --  RR: 18 (03 Jan 2022 05:15) (18 - 19)  SpO2: 97% (03 Jan 2022 05:15) (93% - 100%)    ________________________________________________  PHYSICAL EXAM:  GENERAL: NAD  HEENT: Normocephalic;  conjunctivae and sclerae clear; moist mucous membranes;   NECK : supple  CHEST/LUNG: Clear to auscultation bilaterally with good air entry   HEART: S1 S2  regular; no murmurs, gallops or rubs  ABDOMEN: Soft, Nontender, Nondistended; Bowel sounds present  EXTREMITIES: no cyanosis; no edema; no calf tenderness  SKIN: warm and dry; no rash  NERVOUS SYSTEM:  Awake and alert; Oriented  to place, person ; no new deficits    _________________________________________________  LABS:                        9.6    7.09  )-----------( 385      ( 03 Jan 2022 07:24 )             30.9     01-03    141  |  107  |  25<H>  ----------------------------<  157<H>  4.0   |  28  |  0.98    Ca    9.1      03 Jan 2022 07:24  Phos  3.1     01-03  Mg     1.9     01-03    TPro  6.7  /  Alb  2.4<L>  /  TBili  0.3  /  DBili  x   /  AST  22  /  ALT  27  /  AlkPhos  80  01-03        CAPILLARY BLOOD GLUCOSE      POCT Blood Glucose.: 188 mg/dL (03 Jan 2022 08:23)  POCT Blood Glucose.: 176 mg/dL (02 Jan 2022 21:34)  POCT Blood Glucose.: 193 mg/dL (02 Jan 2022 16:26)  POCT Blood Glucose.: 198 mg/dL (02 Jan 2022 11:33)    COVID-19 PCR: Detected (31 Dec 2021 11:05)      RADIOLOGY & ADDITIONAL TESTS:    Imaging Personally Reviewed:  YES/NO        Consultant(s) Notes Reviewed:   YES/ No    Care Discussed with Consultants :     Plan of care was discussed with patient and /or primary care giver; all questions and concerns were addressed and care was aligned with patient's wishes.     NP Note discussed with  Primary Attending    Patient is a 66y old  Male who presents with a chief complaint of COVID-19 (02 Jan 2022 17:05)    HPI  67 yo male with PMH of CVA , T2DM, HTN, Afib (on eliquis), HLD, sciatica, rectal adeno cancer (s/p chemo). Pt is AAOX3 and bedbound at baseline.  Pt presented from [facility] after testing  positive COVID 19 . Oxygenation 90% on RA. Pt admitted for acute respiratory failure with hypoxia due to COVID .  CXR shows clear lungs. On Remdesivir and dexamethasone , day 5. Oxygenating % on O2 2L NC.     INTERVAL HPI/OVERNIGHT EVENTS: no new complaints. Pt seen and examined this morning. Pt awake/alert, responsive, follow  few simple commands. NAD    MEDICATIONS  (STANDING):  amLODIPine   Tablet 10 milliGRAM(s) Oral daily  apixaban 5 milliGRAM(s) Oral every 12 hours  atorvastatin 80 milliGRAM(s) Oral at bedtime  dexAMETHasone  Injectable 6 milliGRAM(s) IV Push daily  ferrous    sulfate 325 milliGRAM(s) Oral daily  folic acid 1 milliGRAM(s) Oral daily  influenza  Vaccine (HIGH DOSE) 0.7 milliLiter(s) IntraMuscular once  insulin glargine Injectable (LANTUS) 13 Unit(s) SubCutaneous at bedtime  insulin lispro (ADMELOG) corrective regimen sliding scale   SubCutaneous three times a day before meals  insulin lispro Injectable (ADMELOG) 7 Unit(s) SubCutaneous three times a day before meals  melatonin 5 milliGRAM(s) Oral at bedtime  multivitamin/minerals 1 Tablet(s) Oral daily  pantoprazole    Tablet 40 milliGRAM(s) Oral before breakfast  remdesivir  IVPB   IV Intermittent   remdesivir  IVPB 100 milliGRAM(s) IV Intermittent every 24 hours    MEDICATIONS  (PRN):  acetaminophen     Tablet .. 650 milliGRAM(s) Oral every 4 hours PRN Mild Pain (1 - 3)  traMADol 50 milliGRAM(s) Oral every 6 hours PRN Moderate Pain (4 - 6)  traMADol 75 milliGRAM(s) Oral every 6 hours PRN Severe Pain (7 - 10)      __________________________________________________  REVIEW OF SYSTEMS:    CONSTITUTIONAL: No fever,   EYES: no acute visual disturbances  NECK: No pain or stiffness  RESPIRATORY: No cough; No shortness of breath  CARDIOVASCULAR: No chest pain, no palpitations  GASTROINTESTINAL: No pain. No nausea or vomiting; No diarrhea   NEUROLOGICAL: No headache or numbness, no tremors  MUSCULOSKELETAL: No joint pain, no muscle pain  GENITOURINARY: no dysuria, no frequency, no hesitancy  PSYCHIATRY: no depression , no anxiety  ALL OTHER  ROS negative        Vital Signs Last 24 Hrs  T(C): 36.9 (03 Jan 2022 05:15), Max: 36.9 (03 Jan 2022 05:15)  T(F): 98.5 (03 Jan 2022 05:15), Max: 98.5 (03 Jan 2022 05:15)  HR: 93 (03 Jan 2022 05:15) (65 - 93)  BP: 124/76 (03 Jan 2022 05:15) (109/55 - 125/79)  BP(mean): --  RR: 18 (03 Jan 2022 05:15) (18 - 19)  SpO2: 97% (03 Jan 2022 05:15) (93% - 100%)    ________________________________________________  PHYSICAL EXAM:  GENERAL: NAD  HEENT: Normocephalic;  conjunctivae and sclerae clear; moist mucous membranes;   NECK : supple  CHEST/LUNG: Clear to auscultation bilaterally with good air entry   HEART: S1 S2  regular; no murmurs, gallops or rubs  ABDOMEN: Soft, Nontender, Nondistended; Bowel sounds present  EXTREMITIES: no cyanosis; no edema; no calf tenderness  SKIN: warm and dry; no rash  NERVOUS SYSTEM:  Awake and alert; Oriented  to place, person ; no new deficits    _________________________________________________  LABS:                        9.6    7.09  )-----------( 385      ( 03 Jan 2022 07:24 )             30.9     01-03    141  |  107  |  25<H>  ----------------------------<  157<H>  4.0   |  28  |  0.98    Ca    9.1      03 Jan 2022 07:24  Phos  3.1     01-03  Mg     1.9     01-03    TPro  6.7  /  Alb  2.4<L>  /  TBili  0.3  /  DBili  x   /  AST  22  /  ALT  27  /  AlkPhos  80  01-03        CAPILLARY BLOOD GLUCOSE      POCT Blood Glucose.: 188 mg/dL (03 Jan 2022 08:23)  POCT Blood Glucose.: 176 mg/dL (02 Jan 2022 21:34)  POCT Blood Glucose.: 193 mg/dL (02 Jan 2022 16:26)  POCT Blood Glucose.: 198 mg/dL (02 Jan 2022 11:33)    COVID-19 PCR: Detected (31 Dec 2021 11:05)      RADIOLOGY & ADDITIONAL TESTS:    < from: Xray Chest 1 View- PORTABLE-Urgent (12.31.21 @ 11:33) >  IMPRESSION:    Tip of the Mediport catheter within SVC.    The right hemidiaphragm remains elevated. Clear lungs. No pleural   effusion or pneumothorax.    < end of copied text >      Consultant(s) Notes Reviewed:   YES/ No    Care Discussed with Consultants :     Plan of care was discussed with patient and /or primary care giver; all questions and concerns were addressed and care was aligned with patient's wishes.

## 2022-01-03 NOTE — ADVANCED PRACTICE NURSE CONSULT - ASSESSMENT
This is a 66yr old male patient admitted for Hypoxemia, presenting with the following:  -There is blanchable erythema to the Bilateral Heels  -There is an intact DTI to the Coccyx area without drainage

## 2022-01-04 LAB
GLUCOSE BLDC GLUCOMTR-MCNC: 181 MG/DL — HIGH (ref 70–99)
GLUCOSE BLDC GLUCOMTR-MCNC: 196 MG/DL — HIGH (ref 70–99)
GLUCOSE BLDC GLUCOMTR-MCNC: 218 MG/DL — HIGH (ref 70–99)
GLUCOSE BLDC GLUCOMTR-MCNC: 229 MG/DL — HIGH (ref 70–99)

## 2022-01-04 RX ORDER — DEXAMETHASONE 0.5 MG/5ML
6 ELIXIR ORAL DAILY
Refills: 0 | Status: DISCONTINUED | OUTPATIENT
Start: 2022-01-04 | End: 2022-01-06

## 2022-01-04 RX ADMIN — Medication 1 MILLIGRAM(S): at 12:34

## 2022-01-04 RX ADMIN — INSULIN GLARGINE 13 UNIT(S): 100 INJECTION, SOLUTION SUBCUTANEOUS at 21:41

## 2022-01-04 RX ADMIN — REMDESIVIR 500 MILLIGRAM(S): 5 INJECTION INTRAVENOUS at 23:56

## 2022-01-04 RX ADMIN — Medication 6 MILLIGRAM(S): at 06:11

## 2022-01-04 RX ADMIN — Medication 1 TABLET(S): at 12:34

## 2022-01-04 RX ADMIN — Medication 5 MILLIGRAM(S): at 21:42

## 2022-01-04 RX ADMIN — Medication 7 UNIT(S): at 11:48

## 2022-01-04 RX ADMIN — Medication 2: at 17:18

## 2022-01-04 RX ADMIN — ATORVASTATIN CALCIUM 80 MILLIGRAM(S): 80 TABLET, FILM COATED ORAL at 21:41

## 2022-01-04 RX ADMIN — AMLODIPINE BESYLATE 10 MILLIGRAM(S): 2.5 TABLET ORAL at 06:11

## 2022-01-04 RX ADMIN — APIXABAN 5 MILLIGRAM(S): 2.5 TABLET, FILM COATED ORAL at 17:30

## 2022-01-04 RX ADMIN — Medication 325 MILLIGRAM(S): at 12:34

## 2022-01-04 RX ADMIN — Medication 7 UNIT(S): at 08:44

## 2022-01-04 RX ADMIN — APIXABAN 5 MILLIGRAM(S): 2.5 TABLET, FILM COATED ORAL at 06:11

## 2022-01-04 RX ADMIN — Medication 7 UNIT(S): at 17:18

## 2022-01-04 RX ADMIN — Medication 1: at 08:44

## 2022-01-04 RX ADMIN — Medication 2: at 11:49

## 2022-01-04 RX ADMIN — PANTOPRAZOLE SODIUM 40 MILLIGRAM(S): 20 TABLET, DELAYED RELEASE ORAL at 06:11

## 2022-01-04 NOTE — PROGRESS NOTE ADULT - SUBJECTIVE AND OBJECTIVE BOX
NP Note discussed with  Primary Attending    Patient is a 66y old  Male who presents with a chief complaint of COVID-19 (03 Jan 2022 14:15)    HPI  67 yo male with PMH of CVA , T2DM, HTN, Afib (on eliquis), HLD, sciatica, rectal adeno cancer (s/p chemo). Pt is AAOX3 and bedbound at baseline.  Pt presented from [facility] after testing  positive COVID 19 . Oxygenation 90% on RA. Pt admitted for acute respiratory failure with hypoxia due to COVID .  CXR shows clear lungs.  Remdesivir and dexamethasone Initiated. Oxygenating 93% on RA.      INTERVAL HPI/OVERNIGHT EVENTS: no new complaints    MEDICATIONS  (STANDING):  amLODIPine   Tablet 10 milliGRAM(s) Oral daily  apixaban 5 milliGRAM(s) Oral every 12 hours  atorvastatin 80 milliGRAM(s) Oral at bedtime  dexAMETHasone     Tablet 6 milliGRAM(s) Oral daily  ferrous    sulfate 325 milliGRAM(s) Oral daily  folic acid 1 milliGRAM(s) Oral daily  influenza  Vaccine (HIGH DOSE) 0.7 milliLiter(s) IntraMuscular once  insulin glargine Injectable (LANTUS) 13 Unit(s) SubCutaneous at bedtime  insulin lispro (ADMELOG) corrective regimen sliding scale   SubCutaneous three times a day before meals  insulin lispro Injectable (ADMELOG) 7 Unit(s) SubCutaneous three times a day before meals  melatonin 5 milliGRAM(s) Oral at bedtime  multivitamin/minerals 1 Tablet(s) Oral daily  pantoprazole    Tablet 40 milliGRAM(s) Oral before breakfast  remdesivir  IVPB   IV Intermittent   remdesivir  IVPB 100 milliGRAM(s) IV Intermittent every 24 hours    MEDICATIONS  (PRN):  acetaminophen     Tablet .. 650 milliGRAM(s) Oral every 4 hours PRN Mild Pain (1 - 3)  traMADol 50 milliGRAM(s) Oral every 6 hours PRN Moderate Pain (4 - 6)  traMADol 75 milliGRAM(s) Oral every 6 hours PRN Severe Pain (7 - 10)      __________________________________________________  REVIEW OF SYSTEMS:    CONSTITUTIONAL: No fever,   EYES: no acute visual disturbances  NECK: No pain or stiffness  RESPIRATORY: No cough; No shortness of breath  CARDIOVASCULAR: No chest pain, no palpitations  GASTROINTESTINAL: No pain. No nausea or vomiting; No diarrhea   NEUROLOGICAL: No headache or numbness, no tremors  MUSCULOSKELETAL: No joint pain, no muscle pain  GENITOURINARY: no dysuria, no frequency, no hesitancy  PSYCHIATRY: no depression , no anxiety  ALL OTHER  ROS negative        Vital Signs Last 24 Hrs  T(C): 36.8 (04 Jan 2022 05:19), Max: 36.8 (04 Jan 2022 05:19)  T(F): 98.2 (04 Jan 2022 05:19), Max: 98.2 (04 Jan 2022 05:19)  HR: 96 (04 Jan 2022 05:19) (93 - 96)  BP: 126/74 (04 Jan 2022 05:19) (119/76 - 131/86)  BP(mean): --  RR: 18 (04 Jan 2022 05:19) (18 - 18)  SpO2: 96% (04 Jan 2022 05:19) (93% - 98%)    ________________________________________________  PHYSICAL EXAM:  GENERAL: NAD  HEENT: Normocephalic;  conjunctivae and sclerae clear; moist mucous membranes;   NECK : supple  CHEST/LUNG: Clear to auscultation bilaterally with good air entry   HEART: S1 S2  regular; no murmurs, gallops or rubs  ABDOMEN: Soft, Nontender, Nondistended; Bowel sounds present  EXTREMITIES: no cyanosis; no edema; no calf tenderness  SKIN: warm and dry; no rash  NERVOUS SYSTEM:  Awake and alert; Oriented  to place, person and time ; Tremors noted on extremities upon  mobility.     _________________________________________________  LABS:                        9.6    7.09  )-----------( 385      ( 03 Jan 2022 07:24 )             30.9     01-03    141  |  107  |  25<H>  ----------------------------<  157<H>  4.0   |  28  |  0.98    Ca    9.1      03 Jan 2022 07:24  Phos  3.1     01-03  Mg     1.9     01-03    TPro  6.7  /  Alb  2.4<L>  /  TBili  0.3  /  DBili  x   /  AST  22  /  ALT  27  /  AlkPhos  80  01-03        CAPILLARY BLOOD GLUCOSE      POCT Blood Glucose.: 218 mg/dL (04 Jan 2022 11:33)  POCT Blood Glucose.: 196 mg/dL (04 Jan 2022 08:35)  POCT Blood Glucose.: 162 mg/dL (03 Jan 2022 20:34)  POCT Blood Glucose.: 211 mg/dL (03 Jan 2022 16:01)    COVID-19 PCR: Detected (31 Dec 2021 11:05)    RADIOLOGY & ADDITIONAL TESTS:  < from: Xray Chest 1 View- PORTABLE-Urgent (12.31.21 @ 11:33) >  IMPRESSION:    Tip of the Mediport catheter within SVC.    The right hemidiaphragm remains elevated. Clear lungs. No pleural   effusion or pneumothorax.    < end of copied text >      Consultant(s) Notes Reviewed:   YES/ No    Care Discussed with Consultants :     Plan of care was discussed with patient and /or primary care giver; all questions and concerns were addressed and care was aligned with patient's wishes.

## 2022-01-04 NOTE — CHART NOTE - NSCHARTNOTEFT_GEN_A_CORE
67 yo male with PMH of CVA , T2DM, HTN, Afib (on eliquis), HLD, obese, lumbago with sciatica, rectal adeno cancer (s/p chemo), bedbound at baseline.  Based on pateint's ongoing issues with deconditioning and generalized weakness secondary to diagnosis of lumbago with sciatica, obesity, and bedbound status, Mr. Shabbir Chacon will require a semi electric hospital bed and overlay mattress . This is necessary to achieve positioing of the body in ways not feasible with an ordinary bed. Pt requires frequent body changes and an immediate need for a change in body position . Bed pillows and wedges have been tried and ruled out.     Due to patient's deconditioning and generalized weakness secondary to diagnosis of lumbago with sciatica, obesity, and bedbound status, Mr. Shabbir Chacon will require a standard wheelchair. This is necessary to achieve daily tasks and therapies which cannot be achieved with the use of a cane or rolling walker. Patient and family are in agreement  with wheelchair use at home and assistance will be provided if needed.       Sandy Liriano, NP Medicine  [phone]

## 2022-01-04 NOTE — PHYSICAL THERAPY INITIAL EVALUATION ADULT - MODALITIES TREATMENT COMMENTS
+head titubations; +dysmetria on finger-nose-finger test and heel-shin test; +right eye nystagmus and left eye divergence; poor reach-grasp-release; increased lateral trunk lean towards the left while sitting or even when supine in bed with HOB elevated

## 2022-01-04 NOTE — PHYSICAL THERAPY INITIAL EVALUATION ADULT - LEVEL OF INDEPENDENCE: SIT/STAND, REHAB EVAL
attempted but unable to clear buttocks off from bed and assume standing/dependent (less than 25% patients effort)/unable to perform

## 2022-01-04 NOTE — PROGRESS NOTE ADULT - SUBJECTIVE AND OBJECTIVE BOX
HPI:  Patient is a 65 y/o M w/ hx of CVA (11/13/21), T2DM, HTD, AFib (on eliquis), HLD, Sciatica, rectal adenoCA (s/p chemotherapy). He is AAOx3 and bedbound at baseline. He was transferred here from [facility] after testing positive for COVID-19.   He presented with shortness of breath and desaturation to 90%. He was placed on 4 LPM. He denies any fever, cough, nausea, vomiting, loss of taste and smell, diarrhea. He was vaccinated with Pfizer x 2. Lung auscultation revealed bilateral crackle and rhonchi. Chest Xray showed bilateral infiltrates. He was subsequently admitted.    GOC: FULL CODE as per WIFE (31 Dec 2021 16:58)      Patient is a 66y old  Male who presents with a chief complaint of COVID-19 (03 Jan 2022 14:15)      INTERVAL HPI/OVERNIGHT EVENTS:  T(C): 36.8 (01-04-22 @ 05:19), Max: 36.8 (01-04-22 @ 05:19)  HR: 96 (01-04-22 @ 05:19) (93 - 96)  BP: 126/74 (01-04-22 @ 05:19) (119/76 - 131/86)  RR: 18 (01-04-22 @ 05:19) (18 - 18)  SpO2: 96% (01-04-22 @ 05:19) (93% - 98%)  Wt(kg): --  I&O's Summary      REVIEW OF SYSTEMS: denies fever, chills, SOB, palpitations, chest pain, abdominal pain, nausea, vomitting, diarrhea, constipation, dizziness    MEDICATIONS  (STANDING):  amLODIPine   Tablet 10 milliGRAM(s) Oral daily  apixaban 5 milliGRAM(s) Oral every 12 hours  atorvastatin 80 milliGRAM(s) Oral at bedtime  dexAMETHasone     Tablet 6 milliGRAM(s) Oral daily  ferrous    sulfate 325 milliGRAM(s) Oral daily  folic acid 1 milliGRAM(s) Oral daily  influenza  Vaccine (HIGH DOSE) 0.7 milliLiter(s) IntraMuscular once  insulin glargine Injectable (LANTUS) 13 Unit(s) SubCutaneous at bedtime  insulin lispro (ADMELOG) corrective regimen sliding scale   SubCutaneous three times a day before meals  insulin lispro Injectable (ADMELOG) 7 Unit(s) SubCutaneous three times a day before meals  melatonin 5 milliGRAM(s) Oral at bedtime  multivitamin/minerals 1 Tablet(s) Oral daily  pantoprazole    Tablet 40 milliGRAM(s) Oral before breakfast  remdesivir  IVPB   IV Intermittent   remdesivir  IVPB 100 milliGRAM(s) IV Intermittent every 24 hours    MEDICATIONS  (PRN):  acetaminophen     Tablet .. 650 milliGRAM(s) Oral every 4 hours PRN Mild Pain (1 - 3)  traMADol 50 milliGRAM(s) Oral every 6 hours PRN Moderate Pain (4 - 6)  traMADol 75 milliGRAM(s) Oral every 6 hours PRN Severe Pain (7 - 10)      PHYSICAL EXAM:  GENERAL: NAD, well-groomed, well-developed  HEAD:  Atraumatic, Normocephalic  EYES: EOMI, PERRLA, conjunctiva and sclera clear  ENMT: No tonsillar erythema, exudates, or enlargement; Moist mucous membranes, Good dentition, No lesions  NECK: Supple, No JVD, Normal thyroid  NERVOUS SYSTEM:  Alert & Oriented X3, Good concentration; Motor Strength 5/5 B/L upper and lower extremities; DTRs 2+ intact and symmetric  CHEST/LUNG: Clear to percussion bilaterally; No rales, rhonchi, wheezing, or rubs  HEART: Regular rate and rhythm; No murmurs, rubs, or gallops  ABDOMEN: Soft, Nontender, Nondistended; Bowel sounds present  EXTREMITIES:  2+ Peripheral Pulses, No clubbing, cyanosis, or edema  LYMPH: No lymphadenopathy noted  SKIN: No rashes or lesions  LABS:                        9.6    7.09  )-----------( 385      ( 03 Jan 2022 07:24 )             30.9     01-03    141  |  107  |  25<H>  ----------------------------<  157<H>  4.0   |  28  |  0.98    Ca    9.1      03 Jan 2022 07:24  Phos  3.1     01-03  Mg     1.9     01-03    TPro  6.7  /  Alb  2.4<L>  /  TBili  0.3  /  DBili  x   /  AST  22  /  ALT  27  /  AlkPhos  80  01-03        CAPILLARY BLOOD GLUCOSE      POCT Blood Glucose.: 218 mg/dL (04 Jan 2022 11:33)  POCT Blood Glucose.: 196 mg/dL (04 Jan 2022 08:35)  POCT Blood Glucose.: 162 mg/dL (03 Jan 2022 20:34)  POCT Blood Glucose.: 211 mg/dL (03 Jan 2022 16:01)

## 2022-01-04 NOTE — PHYSICAL THERAPY INITIAL EVALUATION ADULT - DIAGNOSIS, PT EVAL
Covid-19 infection with h/o CVA s/p mechanical thrombectomy in 11/2021; +movement disorders/impaired coordination, left sided weakness; impaired balance; poor posture; difficulty performing bed mobility tasks; inability to perform standing, transfers, and ambulation tasks; impaired pulmonary status

## 2022-01-04 NOTE — PHYSICAL THERAPY INITIAL EVALUATION ADULT - GENERAL OBSERVATIONS, REHAB EVAL
awake, alert, NAD; demonstrates to have head titubations, limb ataxia, right eye nystagmus and left eye divergence; currently on O2@2L/min via NC; + peripheral IV access on right forearm

## 2022-01-04 NOTE — PHYSICAL THERAPY INITIAL EVALUATION ADULT - IMPAIRMENTS FOUND, PT EVAL
aerobic capacity/endurance/decreased midline orientation/gait, locomotion, and balance/muscle strength/posture

## 2022-01-04 NOTE — PHYSICAL THERAPY INITIAL EVALUATION ADULT - LIVES WITH, PROFILE
family in a private house with stairs to negotiate; recently came from [facility] where he was receiving skilled rehabilitation services in a subacute rehabilitation setting

## 2022-01-04 NOTE — PHYSICAL THERAPY INITIAL EVALUATION ADULT - ADDITIONAL COMMENTS
patient was previously independent and uses a single-tip cane for mobility and functional activities prior to CVA in 11/2021; reports having a rolling walker at home but does not utilize. Recently requires extensive assist for transfers and ambulation at [facility]

## 2022-01-04 NOTE — PHYSICAL THERAPY INITIAL EVALUATION ADULT - PERTINENT HX OF CURRENT PROBLEM, REHAB EVAL
admitted due to COVID-19 infection; history admitted due to COVID-19 infection; history of CVA s/p mechanical thrombectomy on 11/2021

## 2022-01-04 NOTE — PHYSICAL THERAPY INITIAL EVALUATION ADULT - MANUAL MUSCLE TESTING RESULTS, REHAB EVAL
MMT on right upper and lower extremities grossly graded 3/5; left upper and lower extremities grossly graded 2/5

## 2022-01-05 LAB
CULTURE RESULTS: SIGNIFICANT CHANGE UP
CULTURE RESULTS: SIGNIFICANT CHANGE UP
GLUCOSE BLDC GLUCOMTR-MCNC: 157 MG/DL — HIGH (ref 70–99)
GLUCOSE BLDC GLUCOMTR-MCNC: 188 MG/DL — HIGH (ref 70–99)
GLUCOSE BLDC GLUCOMTR-MCNC: 229 MG/DL — HIGH (ref 70–99)
GLUCOSE BLDC GLUCOMTR-MCNC: 247 MG/DL — HIGH (ref 70–99)
SARS-COV-2 RNA SPEC QL NAA+PROBE: DETECTED
SPECIMEN SOURCE: SIGNIFICANT CHANGE UP
SPECIMEN SOURCE: SIGNIFICANT CHANGE UP

## 2022-01-05 RX ORDER — METFORMIN HYDROCHLORIDE 850 MG/1
1 TABLET ORAL
Qty: 0 | Refills: 0 | DISCHARGE

## 2022-01-05 RX ORDER — INSULIN GLARGINE 100 [IU]/ML
13 INJECTION, SOLUTION SUBCUTANEOUS
Qty: 5 | Refills: 0
Start: 2022-01-05 | End: 2022-02-03

## 2022-01-05 RX ORDER — ISOPROPYL ALCOHOL, BENZOCAINE .7; .06 ML/ML; ML/ML
1 SWAB TOPICAL
Qty: 100 | Refills: 1
Start: 2022-01-05 | End: 2022-02-23

## 2022-01-05 RX ORDER — ACETAMINOPHEN 500 MG
2 TABLET ORAL
Qty: 0 | Refills: 0 | DISCHARGE
Start: 2022-01-05

## 2022-01-05 RX ORDER — INSULIN LISPRO 100/ML
7 VIAL (ML) SUBCUTANEOUS
Qty: 0 | Refills: 0 | DISCHARGE

## 2022-01-05 RX ORDER — INSULIN LISPRO 100/ML
7 VIAL (ML) SUBCUTANEOUS
Qty: 5 | Refills: 0
Start: 2022-01-05 | End: 2022-02-03

## 2022-01-05 RX ADMIN — APIXABAN 5 MILLIGRAM(S): 2.5 TABLET, FILM COATED ORAL at 17:25

## 2022-01-05 RX ADMIN — Medication 1 MILLIGRAM(S): at 11:28

## 2022-01-05 RX ADMIN — Medication 1 TABLET(S): at 11:28

## 2022-01-05 RX ADMIN — Medication 325 MILLIGRAM(S): at 11:28

## 2022-01-05 RX ADMIN — APIXABAN 5 MILLIGRAM(S): 2.5 TABLET, FILM COATED ORAL at 06:00

## 2022-01-05 RX ADMIN — Medication 1: at 07:56

## 2022-01-05 RX ADMIN — Medication 7 UNIT(S): at 07:56

## 2022-01-05 RX ADMIN — Medication 5 MILLIGRAM(S): at 21:48

## 2022-01-05 RX ADMIN — Medication 2: at 17:34

## 2022-01-05 RX ADMIN — Medication 6 MILLIGRAM(S): at 06:00

## 2022-01-05 RX ADMIN — Medication 7 UNIT(S): at 11:50

## 2022-01-05 RX ADMIN — Medication 7 UNIT(S): at 17:34

## 2022-01-05 RX ADMIN — INSULIN GLARGINE 13 UNIT(S): 100 INJECTION, SOLUTION SUBCUTANEOUS at 22:40

## 2022-01-05 RX ADMIN — PANTOPRAZOLE SODIUM 40 MILLIGRAM(S): 20 TABLET, DELAYED RELEASE ORAL at 06:01

## 2022-01-05 RX ADMIN — Medication 2: at 11:50

## 2022-01-05 RX ADMIN — AMLODIPINE BESYLATE 10 MILLIGRAM(S): 2.5 TABLET ORAL at 06:00

## 2022-01-05 RX ADMIN — ATORVASTATIN CALCIUM 80 MILLIGRAM(S): 80 TABLET, FILM COATED ORAL at 21:42

## 2022-01-05 NOTE — PROGRESS NOTE ADULT - PROBLEM SELECTOR PLAN 3
A1C `11.8  Continue Lantus and Admelog premeal TID  Continue blood glucose monitoring and cover with Admelog scale  Diabetes education to pt's family
A1C `11.8  Continue Lantus and Admelog premeal TID  Continue blood glucose monitoring and cover with Admelog scale
A1C `11.8  Continue Lantus and Admelog premeal TID  Continue blood glucose monitoring and cover with Admelog scale

## 2022-01-05 NOTE — PROGRESS NOTE ADULT - PROBLEM SELECTOR PLAN 7
Pt wife and sister want pt discharged to home.   Pt may need home oxygen  Monitor SpO2 on RA  PT malgorzata  CM following
Pt wife and sister want pt discharged to home.   DME (hospital bed and wheelchair ) ordered.   PT eval  CM following
Pt wife and sister want pt discharged to home.   Awaiting delivery of DME (hospital bed and wheelchair )   Pt's sister Jolie to come into hospital for diabetes education , use of glucometer and insulin administeration. Visit approved by Dr. Zuñiga.   CM following

## 2022-01-05 NOTE — PROGRESS NOTE ADULT - PROBLEM SELECTOR PLAN 1
CXR results as above  Continue Remdesivir/Dexamethasone, Day 5 today  Oxygenating adequately on O2 2L NC  Supportive measures  Airborne/contact isolation  Monitor oxygenation , and wean as tolerated to keep SpO2 >94%
CXR results as above  Completed Remdesivir   Dexamethasone, Day 6 today  Oxygenating at 97% on RA  Supportive measures  Airborne/contact isolation  Monitor oxygenation
CXR results as above  Continue Remdesivir/Dexamethasone, Day 5 today  Oxygenating at 93% on RA  Supportive measures  Airborne/contact isolation  Monitor oxygenation , and wean as tolerated to keep SpO2 >94%

## 2022-01-05 NOTE — PROGRESS NOTE ADULT - PROBLEM SELECTOR PLAN 5
Ventricular rate controlled  Continue apixaban

## 2022-01-05 NOTE — PROGRESS NOTE ADULT - PROBLEM SELECTOR PLAN 2
Controlled  Continued Norvasc   Continue statin

## 2022-01-05 NOTE — PROGRESS NOTE ADULT - SUBJECTIVE AND OBJECTIVE BOX
NP Note discussed with  Primary Attending    Patient is a 66y old  Male who presents with a chief complaint of COVID-19 (04 Jan 2022 13:47)      INTERVAL HPI/OVERNIGHT EVENTS: no new complaints    MEDICATIONS  (STANDING):  amLODIPine   Tablet 10 milliGRAM(s) Oral daily  apixaban 5 milliGRAM(s) Oral every 12 hours  atorvastatin 80 milliGRAM(s) Oral at bedtime  dexAMETHasone     Tablet 6 milliGRAM(s) Oral daily  ferrous    sulfate 325 milliGRAM(s) Oral daily  folic acid 1 milliGRAM(s) Oral daily  influenza  Vaccine (HIGH DOSE) 0.7 milliLiter(s) IntraMuscular once  insulin glargine Injectable (LANTUS) 13 Unit(s) SubCutaneous at bedtime  insulin lispro (ADMELOG) corrective regimen sliding scale   SubCutaneous three times a day before meals  insulin lispro Injectable (ADMELOG) 7 Unit(s) SubCutaneous three times a day before meals  melatonin 5 milliGRAM(s) Oral at bedtime  multivitamin/minerals 1 Tablet(s) Oral daily  pantoprazole    Tablet 40 milliGRAM(s) Oral before breakfast    MEDICATIONS  (PRN):  acetaminophen     Tablet .. 650 milliGRAM(s) Oral every 4 hours PRN Mild Pain (1 - 3)  traMADol 50 milliGRAM(s) Oral every 6 hours PRN Moderate Pain (4 - 6)  traMADol 75 milliGRAM(s) Oral every 6 hours PRN Severe Pain (7 - 10)      __________________________________________________  REVIEW OF SYSTEMS:    CONSTITUTIONAL: No fever,   EYES: no acute visual disturbances  NECK: No pain or stiffness  RESPIRATORY: No cough; No shortness of breath  CARDIOVASCULAR: No chest pain, no palpitations  GASTROINTESTINAL: No pain. No nausea or vomiting; No diarrhea   NEUROLOGICAL: No headache or numbness, no tremors  MUSCULOSKELETAL: No joint pain, no muscle pain  GENITOURINARY: no dysuria, no frequency, no hesitancy  PSYCHIATRY: no depression , no anxiety  ALL OTHER  ROS negative        Vital Signs Last 24 Hrs  T(C): 37 (05 Jan 2022 05:08), Max: 37 (05 Jan 2022 05:08)  T(F): 98.6 (05 Jan 2022 05:08), Max: 98.6 (05 Jan 2022 05:08)  HR: 102 (05 Jan 2022 12:42) (84 - 102)  BP: 142/88 (05 Jan 2022 05:08) (138/87 - 160/80)  BP(mean): 97 (04 Jan 2022 13:36) (97 - 97)  RR: 18 (05 Jan 2022 12:42) (17 - 18)  SpO2: 97% (05 Jan 2022 12:42) (97% - 100%)    ________________________________________________  PHYSICAL EXAM:  GENERAL: NAD  HEENT: Normocephalic;  conjunctivae and sclerae clear; moist mucous membranes; Chronic tremors to neck with movement.   NECK : supple  CHEST/LUNG: Clear to auscultation bilaterally with good air entry   HEART: S1 S2  regular; no murmurs, gallops or rubs  ABDOMEN: Soft, Nontender, Nondistended; Bowel sounds present  EXTREMITIES: Chronic tremors to allextremities upon movement. no cyanosis; no edema; no calf tenderness  SKIN: warm and dry; no rash  NERVOUS SYSTEM:  Awake and alert; Oriented  to place, person and time ; no new deficits    _________________________________________________  LABS:              CAPILLARY BLOOD GLUCOSE      POCT Blood Glucose.: 247 mg/dL (05 Jan 2022 11:35)  POCT Blood Glucose.: 157 mg/dL (05 Jan 2022 07:42)  POCT Blood Glucose.: 181 mg/dL (04 Jan 2022 21:22)  POCT Blood Glucose.: 229 mg/dL (04 Jan 2022 17:09)    COVID-19 PCR: Detected (31 Dec 2021 11:05)      RADIOLOGY & ADDITIONAL TESTS:    IMPRESSION:    Tip of the Mediport catheter within SVC.    The right hemidiaphragm remains elevated. Clear lungs. No pleural   effusion or pneumothorax.    --- End of Report ---        Consultant(s) Notes Reviewed:   YES/ No    Care Discussed with Consultants :     Plan of care was discussed with patient and /or primary care giver; all questions and concerns were addressed and care was aligned with patient's wishes.     NP Note discussed with  Primary Attending    Patient is a 66y old  Male who presents with a chief complaint of COVID-19 (04 Jan 2022 13:47)    HPI    67 yo male with PMH of CVA , T2DM, HTN, Afib (on eliquis), HLD, sciatica, rectal adeno cancer (s/p chemo). Pt is AAOX3 and bedbound at baseline.  Pt presented from [facility] after testing  positive COVID 19 . Oxygenation 90% on RA. Pt admitted for acute respiratory failure with hypoxia due to COVID .  CXR shows clear lungs.  Remdesivir and dexamethasone Initiated. Oxygenating 93% on RA.      `Pt improved, completed Remdesivir. medically optimized for discharge. PT evaluated and endorses LAILA. Wife and sister want pt discharged to home. Awaiting wheelchair and hospital bed delivery, possible today. Pt's sister Jolie Menard ([phone]) will come into hospital for diabetes teaching: use of glucometer and insulin administration. Visit approved by Dr Zuñiga. CM following.      INTERVAL HPI/OVERNIGHT EVENTS: no new complaints. Pt seen and examined this morning. Pt awake, endorses he is wants  to go home. Pt denies SOb, chest discomfort. NAD. SpO2 97% on RA.     MEDICATIONS  (STANDING):  amLODIPine   Tablet 10 milliGRAM(s) Oral daily  apixaban 5 milliGRAM(s) Oral every 12 hours  atorvastatin 80 milliGRAM(s) Oral at bedtime  dexAMETHasone     Tablet 6 milliGRAM(s) Oral daily  ferrous    sulfate 325 milliGRAM(s) Oral daily  folic acid 1 milliGRAM(s) Oral daily  influenza  Vaccine (HIGH DOSE) 0.7 milliLiter(s) IntraMuscular once  insulin glargine Injectable (LANTUS) 13 Unit(s) SubCutaneous at bedtime  insulin lispro (ADMELOG) corrective regimen sliding scale   SubCutaneous three times a day before meals  insulin lispro Injectable (ADMELOG) 7 Unit(s) SubCutaneous three times a day before meals  melatonin 5 milliGRAM(s) Oral at bedtime  multivitamin/minerals 1 Tablet(s) Oral daily  pantoprazole    Tablet 40 milliGRAM(s) Oral before breakfast    MEDICATIONS  (PRN):  acetaminophen     Tablet .. 650 milliGRAM(s) Oral every 4 hours PRN Mild Pain (1 - 3)  traMADol 50 milliGRAM(s) Oral every 6 hours PRN Moderate Pain (4 - 6)  traMADol 75 milliGRAM(s) Oral every 6 hours PRN Severe Pain (7 - 10)      __________________________________________________  REVIEW OF SYSTEMS:    CONSTITUTIONAL: No fever,   EYES: no acute visual disturbances  NECK: No pain or stiffness  RESPIRATORY: No cough; No shortness of breath  CARDIOVASCULAR: No chest pain, no palpitations  GASTROINTESTINAL: No pain. No nausea or vomiting; No diarrhea   NEUROLOGICAL: No headache or numbness, no tremors  MUSCULOSKELETAL: No joint pain, no muscle pain  GENITOURINARY: no dysuria, no frequency, no hesitancy  PSYCHIATRY: no depression , no anxiety  ALL OTHER  ROS negative        Vital Signs Last 24 Hrs  T(C): 37 (05 Jan 2022 05:08), Max: 37 (05 Jan 2022 05:08)  T(F): 98.6 (05 Jan 2022 05:08), Max: 98.6 (05 Jan 2022 05:08)  HR: 102 (05 Jan 2022 12:42) (84 - 102)  BP: 142/88 (05 Jan 2022 05:08) (138/87 - 160/80)  BP(mean): 97 (04 Jan 2022 13:36) (97 - 97)  RR: 18 (05 Jan 2022 12:42) (17 - 18)  SpO2: 97% (05 Jan 2022 12:42) (97% - 100%)    ________________________________________________  PHYSICAL EXAM:  GENERAL: NAD  HEENT: Normocephalic;  conjunctivae and sclerae clear; moist mucous membranes; Chronic tremors to neck with movement.   NECK : supple  CHEST/LUNG: Clear to auscultation bilaterally with good air entry   HEART: S1 S2  regular; no murmurs, gallops or rubs  ABDOMEN: Soft, Nontender, Nondistended; Bowel sounds present  EXTREMITIES: Chronic tremors to allextremities upon movement. no cyanosis; no edema; no calf tenderness  SKIN: warm and dry; no rash  NERVOUS SYSTEM:  Awake and alert; Oriented  to place, person and time ; no new deficits    _________________________________________________  LABS:              CAPILLARY BLOOD GLUCOSE      POCT Blood Glucose.: 247 mg/dL (05 Jan 2022 11:35)  POCT Blood Glucose.: 157 mg/dL (05 Jan 2022 07:42)  POCT Blood Glucose.: 181 mg/dL (04 Jan 2022 21:22)  POCT Blood Glucose.: 229 mg/dL (04 Jan 2022 17:09)    COVID-19 PCR: Detected (31 Dec 2021 11:05)      RADIOLOGY & ADDITIONAL TESTS:    IMPRESSION:    Tip of the Mediport catheter within SVC.    The right hemidiaphragm remains elevated. Clear lungs. No pleural   effusion or pneumothorax.    --- End of Report ---        Consultant(s) Notes Reviewed:   YES/ No    Care Discussed with Consultants :     Plan of care was discussed with patient and /or primary care giver; all questions and concerns were addressed and care was aligned with patient's wishes.

## 2022-01-05 NOTE — PROGRESS NOTE ADULT - ATTENDING COMMENTS
Patient seen and examined.     T(C): 36.9 (01-05-22 @ 14:20), Max: 36.9 (01-05-22 @ 14:20)  HR: 79 (01-05-22 @ 14:20) (79 - 102)  BP: 127/76 (01-05-22 @ 14:20) (127/76 - 127/76)  RR: 18 (01-05-22 @ 14:20) (18 - 18)  SpO2: 99% (01-05-22 @ 14:20) (97% - 99%)    Reviewed labs nad imaging.     COVID pneumonia   Acute Hypoxic Respiratory Failure   Completed Remdesivir.   Continue with Decadron.

## 2022-01-05 NOTE — PROGRESS NOTE ADULT - PROBLEM SELECTOR PLAN 6
DVT ppx: on apixaban  GI ppx: PPI

## 2022-01-06 ENCOUNTER — TRANSCRIPTION ENCOUNTER (OUTPATIENT)
Age: 67
End: 2022-01-06

## 2022-01-06 VITALS
TEMPERATURE: 98 F | RESPIRATION RATE: 18 BRPM | SYSTOLIC BLOOD PRESSURE: 132 MMHG | DIASTOLIC BLOOD PRESSURE: 80 MMHG | HEART RATE: 109 BPM | OXYGEN SATURATION: 97 %

## 2022-01-06 LAB
GLUCOSE BLDC GLUCOMTR-MCNC: 168 MG/DL — HIGH (ref 70–99)
GLUCOSE BLDC GLUCOMTR-MCNC: 190 MG/DL — HIGH (ref 70–99)
GLUCOSE BLDC GLUCOMTR-MCNC: 201 MG/DL — HIGH (ref 70–99)
GLUCOSE BLDC GLUCOMTR-MCNC: 211 MG/DL — HIGH (ref 70–99)

## 2022-01-06 PROCEDURE — 36415 COLL VENOUS BLD VENIPUNCTURE: CPT

## 2022-01-06 PROCEDURE — 71045 X-RAY EXAM CHEST 1 VIEW: CPT

## 2022-01-06 PROCEDURE — 82728 ASSAY OF FERRITIN: CPT

## 2022-01-06 PROCEDURE — 85025 COMPLETE CBC W/AUTO DIFF WBC: CPT

## 2022-01-06 PROCEDURE — 84443 ASSAY THYROID STIM HORMONE: CPT

## 2022-01-06 PROCEDURE — 87040 BLOOD CULTURE FOR BACTERIA: CPT

## 2022-01-06 PROCEDURE — 80053 COMPREHEN METABOLIC PANEL: CPT

## 2022-01-06 PROCEDURE — 83735 ASSAY OF MAGNESIUM: CPT

## 2022-01-06 PROCEDURE — 99285 EMERGENCY DEPT VISIT HI MDM: CPT

## 2022-01-06 PROCEDURE — 85379 FIBRIN DEGRADATION QUANT: CPT

## 2022-01-06 PROCEDURE — 85652 RBC SED RATE AUTOMATED: CPT

## 2022-01-06 PROCEDURE — 87635 SARS-COV-2 COVID-19 AMP PRB: CPT

## 2022-01-06 PROCEDURE — 83615 LACTATE (LD) (LDH) ENZYME: CPT

## 2022-01-06 PROCEDURE — 85730 THROMBOPLASTIN TIME PARTIAL: CPT

## 2022-01-06 PROCEDURE — 85610 PROTHROMBIN TIME: CPT

## 2022-01-06 PROCEDURE — 84484 ASSAY OF TROPONIN QUANT: CPT

## 2022-01-06 PROCEDURE — 86140 C-REACTIVE PROTEIN: CPT

## 2022-01-06 PROCEDURE — 84100 ASSAY OF PHOSPHORUS: CPT

## 2022-01-06 PROCEDURE — 84145 PROCALCITONIN (PCT): CPT

## 2022-01-06 PROCEDURE — 82962 GLUCOSE BLOOD TEST: CPT

## 2022-01-06 PROCEDURE — 93005 ELECTROCARDIOGRAM TRACING: CPT

## 2022-01-06 RX ORDER — INSULIN GLARGINE 100 [IU]/ML
13 INJECTION, SOLUTION SUBCUTANEOUS
Qty: 5 | Refills: 0
Start: 2022-01-06 | End: 2022-02-04

## 2022-01-06 RX ORDER — TRAMADOL HYDROCHLORIDE 50 MG/1
1 TABLET ORAL
Qty: 28 | Refills: 0
Start: 2022-01-06 | End: 2022-01-12

## 2022-01-06 RX ORDER — FERROUS SULFATE 325(65) MG
7.5 TABLET ORAL
Qty: 225 | Refills: 0
Start: 2022-01-06 | End: 2022-02-04

## 2022-01-06 RX ORDER — OMEPRAZOLE 10 MG/1
1 CAPSULE, DELAYED RELEASE ORAL
Qty: 0 | Refills: 0 | DISCHARGE

## 2022-01-06 RX ORDER — ATORVASTATIN CALCIUM 80 MG/1
1 TABLET, FILM COATED ORAL
Qty: 30 | Refills: 0
Start: 2022-01-06 | End: 2022-02-04

## 2022-01-06 RX ORDER — APIXABAN 2.5 MG/1
1 TABLET, FILM COATED ORAL
Qty: 0 | Refills: 0 | DISCHARGE

## 2022-01-06 RX ORDER — AMLODIPINE BESYLATE 2.5 MG/1
1 TABLET ORAL
Qty: 30 | Refills: 0
Start: 2022-01-06 | End: 2022-02-04

## 2022-01-06 RX ORDER — INSULIN GLARGINE 100 [IU]/ML
15 INJECTION, SOLUTION SUBCUTANEOUS
Qty: 0 | Refills: 0 | DISCHARGE

## 2022-01-06 RX ORDER — FOLIC ACID 0.8 MG
1 TABLET ORAL
Qty: 0 | Refills: 0 | DISCHARGE

## 2022-01-06 RX ORDER — APIXABAN 2.5 MG/1
1 TABLET, FILM COATED ORAL
Qty: 60 | Refills: 0
Start: 2022-01-06 | End: 2022-02-04

## 2022-01-06 RX ORDER — AMLODIPINE BESYLATE 2.5 MG/1
1 TABLET ORAL
Qty: 0 | Refills: 0 | DISCHARGE

## 2022-01-06 RX ORDER — ATORVASTATIN CALCIUM 80 MG/1
1 TABLET, FILM COATED ORAL
Qty: 0 | Refills: 0 | DISCHARGE

## 2022-01-06 RX ORDER — FOLIC ACID 0.8 MG
1 TABLET ORAL
Qty: 30 | Refills: 0
Start: 2022-01-06 | End: 2022-02-04

## 2022-01-06 RX ORDER — OMEPRAZOLE 10 MG/1
1 CAPSULE, DELAYED RELEASE ORAL
Qty: 30 | Refills: 0
Start: 2022-01-06 | End: 2022-02-04

## 2022-01-06 RX ORDER — INSULIN LISPRO 100/ML
7 VIAL (ML) SUBCUTANEOUS
Qty: 5 | Refills: 0
Start: 2022-01-06 | End: 2022-02-04

## 2022-01-06 RX ORDER — FERROUS SULFATE 325(65) MG
7.5 TABLET ORAL
Qty: 0 | Refills: 0 | DISCHARGE

## 2022-01-06 RX ADMIN — Medication 1 TABLET(S): at 11:37

## 2022-01-06 RX ADMIN — Medication 2: at 12:06

## 2022-01-06 RX ADMIN — Medication 7 UNIT(S): at 12:05

## 2022-01-06 RX ADMIN — Medication 1: at 08:07

## 2022-01-06 RX ADMIN — APIXABAN 5 MILLIGRAM(S): 2.5 TABLET, FILM COATED ORAL at 17:24

## 2022-01-06 RX ADMIN — Medication 7 UNIT(S): at 17:25

## 2022-01-06 RX ADMIN — AMLODIPINE BESYLATE 10 MILLIGRAM(S): 2.5 TABLET ORAL at 06:02

## 2022-01-06 RX ADMIN — Medication 1 MILLIGRAM(S): at 11:37

## 2022-01-06 RX ADMIN — PANTOPRAZOLE SODIUM 40 MILLIGRAM(S): 20 TABLET, DELAYED RELEASE ORAL at 06:02

## 2022-01-06 RX ADMIN — Medication 2: at 17:25

## 2022-01-06 RX ADMIN — Medication 6 MILLIGRAM(S): at 06:02

## 2022-01-06 RX ADMIN — Medication 7 UNIT(S): at 08:07

## 2022-01-06 RX ADMIN — APIXABAN 5 MILLIGRAM(S): 2.5 TABLET, FILM COATED ORAL at 06:02

## 2022-01-06 RX ADMIN — Medication 325 MILLIGRAM(S): at 11:37

## 2022-01-06 NOTE — PROGRESS NOTE ADULT - ASSESSMENT
anthony nd examined comfortable  vsstable  physical unchaged   no resp distress  Pulse ox ok  pt is being discharged    PCP,  endo,  family to learn to give pt insulin ( 1800 kcal diabetic diet) , card, neuro, labs out pt   for safe discharge
seen and examined  vs stable afebrile physical done   ok     on bed comfortable on 2nc  Sat is Ok   ( on Adm PO was 90)   a/p Covid Pos  saturating well  oob in chair  spirometer  
  _________________________________________________________________________________________  ========>>  M E D I C A L   A T T E N D I N G    F O L L O W  U P  N O T E  <<=========  -----------------------------------------------------------------------------------------------------    - Patient seen and examined by me earlier today.  (covering today)   - In summary,  MELIZA DAILEY is a 66y year old man admitted with   - Patient today overall doing ok, comfortable, eating OK.     ==================>> REVIEW OF SYSTEM <<=================    GEN: no fever, no chills, no pain  RESP: no SOB, no cough, no sputum  CVS: no chest pain, no palpitations, no edema  GI: no abdominal pain, no nausea  : no dysuria, no frequency  Neuro: no headache, no dizziness  Derm : no itching, no rash    ==================>> PHYSICAL EXAM <<=================    GEN: A&O X 3 , NAD , comfortable, pleasant, calm in bed   HEENT: NCAT, PERRL, MMM, hearing intact  Neck: supple , no JVD appreciated  CVS: S1S2 , regular , No M/R/G appreciated  PULM: CTA B/L,  no W/R/R appreciated  ABD.: soft. non tender, non distended,  bowel sounds present  Extrem: intact pulses , no edema   PSYCH : normal mood,  not anxious                            ( Note Written / Date of service :  01-02-22 )    ==================>> MEDICATIONS <<====================    MEDICATIONS  (STANDING):  amLODIPine   Tablet 10 milliGRAM(s) Oral daily  apixaban 5 milliGRAM(s) Oral every 12 hours  atorvastatin 80 milliGRAM(s) Oral at bedtime  dexAMETHasone  Injectable 6 milliGRAM(s) IV Push daily  ferrous    sulfate 325 milliGRAM(s) Oral daily  folic acid 1 milliGRAM(s) Oral daily  influenza  Vaccine (HIGH DOSE) 0.7 milliLiter(s) IntraMuscular once  insulin glargine Injectable (LANTUS) 13 Unit(s) SubCutaneous at bedtime  insulin lispro (ADMELOG) corrective regimen sliding scale   SubCutaneous three times a day before meals  insulin lispro Injectable (ADMELOG) 7 Unit(s) SubCutaneous three times a day before meals  melatonin 5 milliGRAM(s) Oral at bedtime  multivitamin/minerals 1 Tablet(s) Oral daily  pantoprazole    Tablet 40 milliGRAM(s) Oral before breakfast  remdesivir  IVPB   IV Intermittent   remdesivir  IVPB 100 milliGRAM(s) IV Intermittent every 24 hours    MEDICATIONS  (PRN):  acetaminophen     Tablet .. 650 milliGRAM(s) Oral every 4 hours PRN Mild Pain (1 - 3)  traMADol 50 milliGRAM(s) Oral every 6 hours PRN Moderate Pain (4 - 6)  traMADol 75 milliGRAM(s) Oral every 6 hours PRN Severe Pain (7 - 10)    ___________  Active diet:  Diet, DASH/TLC:   Sodium & Cholesterol Restricted  Consistent Carbohydrate No Snacks  ___________________    ==================>> VITAL SIGNS <<==================    T(C): 36.5 (01-02-22 @ 14:48), Max: 37 (01-01-22 @ 17:44)  HR: 93 (01-02-22 @ 14:48) (86 - 96)  BP: 109/55 (01-02-22 @ 14:48) (109/55 - 138/83)  RR: 19 (01-02-22 @ 14:48) (18 - 19)  SpO2: 93% (01-02-22 @ 14:48) (93% - 96%)     POCT Blood Glucose.: 193 mg/dL (02 Jan 2022 16:26)  POCT Blood Glucose.: 198 mg/dL (02 Jan 2022 11:33)  POCT Blood Glucose.: 162 mg/dL (02 Jan 2022 07:49)  POCT Blood Glucose.: 153 mg/dL (01 Jan 2022 22:40)  POCT Blood Glucose.: 151 mg/dL (01 Jan 2022 19:21)  POCT Blood Glucose.: 161 mg/dL (01 Jan 2022 17:50)     ==================>> LAB AND IMAGING <<==================                        9.6    6.80  )-----------( 386      ( 02 Jan 2022 07:26 )             31.7        01-02    141  |  108  |  30<H>  ----------------------------<  135<H>  4.1   |  29  |  1.10    Ca    9.1      02 Jan 2022 07:26  Phos  4.2     01-02  Mg     2.0     01-02    TPro  6.7  /  Alb  2.2<L>  /  TBili  0.2  /  DBili  x   /  AST  19  /  ALT  27  /  AlkPhos  81  01-02    PT/INR - ( 01 Jan 2022 06:13 )   PT: 17.5 sec;   INR: 1.50 ratio    PTT - ( 01 Jan 2022 06:13 )  PTT:41.8 sec               TSH:      0.91   (01-01-22)       ,     1.58   (11-13-21)           HgA1C:   (11-13-21)          (11-13-21)      11.8    ^^^ Inflammatory markers :  ^^^  C R P :          28 (01-01-22)  <<--, 31 (12-31-21)  <<--  P C T :         0.17 (12-31-21) <<--  E S R :        49 (01-01-22)   Ferritin :         66 (01-01-22) <<--, 52 (12-31-21) <<--    D-Dimer :          435 (01-01-22) <<--, 463 (12-31-21) <<--    ___________________________________________________________________________________  ===============>>  A S S E S S M E N T   A N D   P L A N <<===============  ------------------------------------------------------------------------------------------    Patient is a 66y old  Male who presents with a chief complaint of COVID-19 (31 Dec 2021 16:58)     Problem/Plan - 1:  ·  Problem: Acute hypoxemic respiratory failure due to COVID-19.   ·  Plan: Pt presented w/ SOB  +COVID on PCR  receive Pfizer x 2  CXR showed b/l infiltrates  O2 sat monitoring and wean down as able   cont Remdesivir per hospital protocol x 5 days                                                                                                                            cont Decadron per hospital protocol x 10 days  monitor LFTs  monitor POCT Glucose  start insulin S.S. for coverage  Trend COVID inflammatory markers.     Problem/Plan - 2:  ·  Problem: HTN (hypertension).   ·  Plan: Hx of HTN  home meds: amlodipine  BP monitoring  continue w/ home meds.     Problem/Plan - 3:  ·  Problem: Type 2 diabetes mellitus.   ·  Plan: Hx of DM  A1c - 7.6 (12/15/21) from NH chart  cont lantus 13 units qhs  cont lispro 7 units actid   cont insulin sliding scale.     Problem/Plan - 4:  ·  Problem: Hyperlipidemia.   ·  Plan: Hx of HLD  home meds: lipitor  continue home meds  lipid panel - wnl (12/15/21) from NH chart.     Problem/Plan - 5:  ·  Problem: Atrial fibrillation.   ·  Plan: Hx of AFib  home meds: eliquis  continue home meds  monitor BP and HR.    PT /OOB to chair as able  nutrition / hydration  supportive care    --------------------------------------------  Case discussed with   Education given on findings and plan of care  ___________________________  H. TRES Shell  (covering today)   Pager: [phone]    
seen and examined  vs stable  not in any distress  has involunteer movements  neck upper extrem  awake  not in any distress  denies sob   lungs clear   awake not in any distress  labs noted hgb 9.6  a/p covid pos  PO acceptable  on NC  tremer  invol mvements  needs out pt neuro  family wants to take pt home  pt is bed bound H/o CVA  i d/w PT here  pt probably was Deny lift at NH   and i spoke to CM , pt will not qualify much for HHA needs more help   PT needs safe discharge  CM on case

## 2022-01-06 NOTE — DISCHARGE NOTE NURSING/CASE MANAGEMENT/SOCIAL WORK - NSDCPEFALRISK_GEN_ALL_CORE
For information on Fall & Injury Prevention, visit: [email or web address] OR  [email or web address] OR  https://www.cdc.gov/steadi/patient.html

## 2022-01-06 NOTE — DISCHARGE NOTE NURSING/CASE MANAGEMENT/SOCIAL WORK - PATIENT PORTAL LINK FT
You can access the FollowMyHealth Patient Portal offered by [facility] by registering at the following website: [email or web address]. By joining [facility]’s FollowMyHealth portal, you will also be able to view your health information using other applications (apps) compatible with our system.

## 2022-01-06 NOTE — PROGRESS NOTE ADULT - SUBJECTIVE AND OBJECTIVE BOX
HPI:  Patient is a 65 y/o M w/ hx of CVA (11/13/21), T2DM, HTD, AFib (on eliquis), HLD, Sciatica, rectal adenoCA (s/p chemotherapy). He is AAOx3 and bedbound at baseline. He was transferred here from [facility] after testing positive for COVID-19.   He presented with shortness of breath and desaturation to 90%. He was placed on 4 LPM. He denies any fever, cough, nausea, vomiting, loss of taste and smell, diarrhea. He was vaccinated with Pfizer x 2. Lung auscultation revealed bilateral crackle and rhonchi. Chest Xray showed bilateral infiltrates. He was subsequently admitted.    GOC: FULL CODE as per WIFE (31 Dec 2021 16:58)      Patient is a 66y old  Male who presents with a chief complaint of COVID-19 (05 Jan 2022 12:43)      INTERVAL HPI/OVERNIGHT EVENTS:  T(C): 36.5 (01-06-22 @ 14:23), Max: 36.9 (01-06-22 @ 05:04)  HR: 93 (01-06-22 @ 14:23) (81 - 114)  BP: 137/77 (01-06-22 @ 14:23) (130/72 - 138/88)  RR: 18 (01-06-22 @ 14:23) (18 - 19)  SpO2: 95% (01-06-22 @ 14:23) (95% - 100%)  Wt(kg): --  I&O's Summary      REVIEW OF SYSTEMS: denies fever, chills, SOB, palpitations, chest pain, abdominal pain, nausea, vomitting, diarrhea, constipation, dizziness    MEDICATIONS  (STANDING):  amLODIPine   Tablet 10 milliGRAM(s) Oral daily  apixaban 5 milliGRAM(s) Oral every 12 hours  atorvastatin 80 milliGRAM(s) Oral at bedtime  dexAMETHasone     Tablet 6 milliGRAM(s) Oral daily  ferrous    sulfate 325 milliGRAM(s) Oral daily  folic acid 1 milliGRAM(s) Oral daily  influenza  Vaccine (HIGH DOSE) 0.7 milliLiter(s) IntraMuscular once  insulin glargine Injectable (LANTUS) 13 Unit(s) SubCutaneous at bedtime  insulin lispro (ADMELOG) corrective regimen sliding scale   SubCutaneous three times a day before meals  insulin lispro Injectable (ADMELOG) 7 Unit(s) SubCutaneous three times a day before meals  melatonin 5 milliGRAM(s) Oral at bedtime  multivitamin/minerals 1 Tablet(s) Oral daily  pantoprazole    Tablet 40 milliGRAM(s) Oral before breakfast    MEDICATIONS  (PRN):  acetaminophen     Tablet .. 650 milliGRAM(s) Oral every 4 hours PRN Mild Pain (1 - 3)  traMADol 50 milliGRAM(s) Oral every 6 hours PRN Moderate Pain (4 - 6)  traMADol 75 milliGRAM(s) Oral every 6 hours PRN Severe Pain (7 - 10)      PHYSICAL EXAM:  GENERAL: NAD, well-groomed, well-developed  HEAD:  Atraumatic, Normocephalic  EYES: EOMI, PERRLA, conjunctiva and sclera clear  ENMT: No tonsillar erythema, exudates, or enlargement; Moist mucous membranes, Good dentition, No lesions  NECK: Supple, No JVD, Normal thyroid  NERVOUS SYSTEM:  Alert & Oriented X3, Good concentration; Motor Strength 5/5 B/L upper and lower extremities; DTRs 2+ intact and symmetric  CHEST/LUNG: Clear to percussion bilaterally; No rales, rhonchi, wheezing, or rubs  HEART: Regular rate and rhythm; No murmurs, rubs, or gallops  ABDOMEN: Soft, Nontender, Nondistended; Bowel sounds present  EXTREMITIES:  2+ Peripheral Pulses, No clubbing, cyanosis, or edema  LYMPH: No lymphadenopathy noted  SKIN: No rashes or lesions  LABS:              CAPILLARY BLOOD GLUCOSE      POCT Blood Glucose.: 201 mg/dL (06 Jan 2022 11:37)  POCT Blood Glucose.: 190 mg/dL (06 Jan 2022 07:59)  POCT Blood Glucose.: 188 mg/dL (05 Jan 2022 21:57)  POCT Blood Glucose.: 229 mg/dL (05 Jan 2022 17:22)

## 2022-07-02 ENCOUNTER — INPATIENT (INPATIENT)
Facility: HOSPITAL | Age: 67
LOS: 16 days | Discharge: HOSPICE | DRG: 870 | End: 2022-07-19
Attending: INTERNAL MEDICINE | Admitting: INTERNAL MEDICINE
Payer: MEDICARE

## 2022-07-02 VITALS
HEIGHT: 71 IN | SYSTOLIC BLOOD PRESSURE: 147 MMHG | RESPIRATION RATE: 28 BRPM | DIASTOLIC BLOOD PRESSURE: 78 MMHG | HEART RATE: 95 BPM | OXYGEN SATURATION: 97 % | WEIGHT: 229.94 LBS | TEMPERATURE: 100 F

## 2022-07-02 DIAGNOSIS — J18.9 PNEUMONIA, UNSPECIFIED ORGANISM: ICD-10-CM

## 2022-07-02 DIAGNOSIS — Z95.828 PRESENCE OF OTHER VASCULAR IMPLANTS AND GRAFTS: Chronic | ICD-10-CM

## 2022-07-02 LAB
ACETONE SERPL-MCNC: NEGATIVE — SIGNIFICANT CHANGE UP
ALBUMIN SERPL ELPH-MCNC: 1.8 G/DL — LOW (ref 3.5–5)
ALBUMIN SERPL ELPH-MCNC: 2.3 G/DL — LOW (ref 3.5–5)
ALP SERPL-CCNC: 131 U/L — HIGH (ref 40–120)
ALP SERPL-CCNC: 147 U/L — HIGH (ref 40–120)
ALT FLD-CCNC: 18 U/L DA — SIGNIFICANT CHANGE UP (ref 10–60)
ALT FLD-CCNC: 22 U/L DA — SIGNIFICANT CHANGE UP (ref 10–60)
ANION GAP SERPL CALC-SCNC: 10 MMOL/L — SIGNIFICANT CHANGE UP (ref 5–17)
ANION GAP SERPL CALC-SCNC: 11 MMOL/L — SIGNIFICANT CHANGE UP (ref 5–17)
ANISOCYTOSIS BLD QL: SLIGHT — SIGNIFICANT CHANGE UP
ANISOCYTOSIS BLD QL: SLIGHT — SIGNIFICANT CHANGE UP
APPEARANCE UR: ABNORMAL
APTT BLD: 38.4 SEC — HIGH (ref 27.5–35.5)
AST SERPL-CCNC: 10 U/L — SIGNIFICANT CHANGE UP (ref 10–40)
AST SERPL-CCNC: 30 U/L — SIGNIFICANT CHANGE UP (ref 10–40)
BACTERIA # UR AUTO: ABNORMAL /HPF
BASE EXCESS BLDA CALC-SCNC: -0.7 MMOL/L — SIGNIFICANT CHANGE UP (ref -2–3)
BASE EXCESS BLDV CALC-SCNC: -1.7 MMOL/L — SIGNIFICANT CHANGE UP
BASOPHILS # BLD AUTO: 0.04 K/UL — SIGNIFICANT CHANGE UP (ref 0–0.2)
BASOPHILS # BLD AUTO: 0.07 K/UL — SIGNIFICANT CHANGE UP (ref 0–0.2)
BASOPHILS NFR BLD AUTO: 0.2 % — SIGNIFICANT CHANGE UP (ref 0–2)
BASOPHILS NFR BLD AUTO: 0.3 % — SIGNIFICANT CHANGE UP (ref 0–2)
BILIRUB SERPL-MCNC: 0.6 MG/DL — SIGNIFICANT CHANGE UP (ref 0.2–1.2)
BILIRUB SERPL-MCNC: 0.7 MG/DL — SIGNIFICANT CHANGE UP (ref 0.2–1.2)
BILIRUB UR-MCNC: NEGATIVE — SIGNIFICANT CHANGE UP
BLOOD GAS COMMENTS ARTERIAL: SIGNIFICANT CHANGE UP
BLOOD GAS COMMENTS, VENOUS: SIGNIFICANT CHANGE UP
BUN SERPL-MCNC: 38 MG/DL — HIGH (ref 7–18)
BUN SERPL-MCNC: 41 MG/DL — HIGH (ref 7–18)
CALCIUM SERPL-MCNC: 9 MG/DL — SIGNIFICANT CHANGE UP (ref 8.4–10.5)
CALCIUM SERPL-MCNC: 9.3 MG/DL — SIGNIFICANT CHANGE UP (ref 8.4–10.5)
CHLORIDE SERPL-SCNC: 100 MMOL/L — SIGNIFICANT CHANGE UP (ref 96–108)
CHLORIDE SERPL-SCNC: 96 MMOL/L — SIGNIFICANT CHANGE UP (ref 96–108)
CO2 SERPL-SCNC: 22 MMOL/L — SIGNIFICANT CHANGE UP (ref 22–31)
CO2 SERPL-SCNC: 25 MMOL/L — SIGNIFICANT CHANGE UP (ref 22–31)
COLOR SPEC: YELLOW — SIGNIFICANT CHANGE UP
CREAT SERPL-MCNC: 1.49 MG/DL — HIGH (ref 0.5–1.3)
CREAT SERPL-MCNC: 1.67 MG/DL — HIGH (ref 0.5–1.3)
DIFF PNL FLD: ABNORMAL
EGFR: 45 ML/MIN/1.73M2 — LOW
EGFR: 51 ML/MIN/1.73M2 — LOW
EOSINOPHIL # BLD AUTO: 0 K/UL — SIGNIFICANT CHANGE UP (ref 0–0.5)
EOSINOPHIL # BLD AUTO: 0.01 K/UL — SIGNIFICANT CHANGE UP (ref 0–0.5)
EOSINOPHIL NFR BLD AUTO: 0 % — SIGNIFICANT CHANGE UP (ref 0–6)
EOSINOPHIL NFR BLD AUTO: 0 % — SIGNIFICANT CHANGE UP (ref 0–6)
EPI CELLS # UR: ABNORMAL /HPF
FLUAV AG NPH QL: SIGNIFICANT CHANGE UP
FLUBV AG NPH QL: SIGNIFICANT CHANGE UP
GIANT PLATELETS BLD QL SMEAR: PRESENT — SIGNIFICANT CHANGE UP
GIANT PLATELETS BLD QL SMEAR: PRESENT — SIGNIFICANT CHANGE UP
GLUCOSE BLDC GLUCOMTR-MCNC: 208 MG/DL — HIGH (ref 70–99)
GLUCOSE BLDC GLUCOMTR-MCNC: 230 MG/DL — HIGH (ref 70–99)
GLUCOSE BLDC GLUCOMTR-MCNC: 247 MG/DL — HIGH (ref 70–99)
GLUCOSE SERPL-MCNC: 254 MG/DL — HIGH (ref 70–99)
GLUCOSE SERPL-MCNC: 290 MG/DL — HIGH (ref 70–99)
GLUCOSE UR QL: NEGATIVE — SIGNIFICANT CHANGE UP
GRAM STN FLD: SIGNIFICANT CHANGE UP
HCO3 BLDA-SCNC: 23 MMOL/L — SIGNIFICANT CHANGE UP (ref 21–28)
HCO3 BLDV-SCNC: 29 MMOL/L — SIGNIFICANT CHANGE UP (ref 22–29)
HCT VFR BLD CALC: 36.6 % — LOW (ref 39–50)
HCT VFR BLD CALC: 39.2 % — SIGNIFICANT CHANGE UP (ref 39–50)
HGB BLD-MCNC: 10.8 G/DL — LOW (ref 13–17)
HGB BLD-MCNC: 11.5 G/DL — LOW (ref 13–17)
HOROWITZ INDEX BLDA+IHG-RTO: 100 — SIGNIFICANT CHANGE UP
HOROWITZ INDEX BLDV+IHG-RTO: 21 — SIGNIFICANT CHANGE UP
IMM GRANULOCYTES NFR BLD AUTO: 0.6 % — SIGNIFICANT CHANGE UP (ref 0–1.5)
IMM GRANULOCYTES NFR BLD AUTO: 0.9 % — SIGNIFICANT CHANGE UP (ref 0–1.5)
INR BLD: 2.55 RATIO — HIGH (ref 0.88–1.16)
KETONES UR-MCNC: NEGATIVE — SIGNIFICANT CHANGE UP
LACTATE SERPL-SCNC: 2.6 MMOL/L — HIGH (ref 0.7–2)
LACTATE SERPL-SCNC: 3 MMOL/L — HIGH (ref 0.7–2)
LEUKOCYTE ESTERASE UR-ACNC: ABNORMAL
LYMPHOCYTES # BLD AUTO: 0.34 K/UL — LOW (ref 1–3.3)
LYMPHOCYTES # BLD AUTO: 0.62 K/UL — LOW (ref 1–3.3)
LYMPHOCYTES # BLD AUTO: 1.7 % — LOW (ref 13–44)
LYMPHOCYTES # BLD AUTO: 2.3 % — LOW (ref 13–44)
MAGNESIUM SERPL-MCNC: 2 MG/DL — SIGNIFICANT CHANGE UP (ref 1.6–2.6)
MANUAL SMEAR VERIFICATION: SIGNIFICANT CHANGE UP
MANUAL SMEAR VERIFICATION: SIGNIFICANT CHANGE UP
MCHC RBC-ENTMCNC: 24.3 PG — LOW (ref 27–34)
MCHC RBC-ENTMCNC: 24.4 PG — LOW (ref 27–34)
MCHC RBC-ENTMCNC: 29.3 GM/DL — LOW (ref 32–36)
MCHC RBC-ENTMCNC: 29.5 GM/DL — LOW (ref 32–36)
MCV RBC AUTO: 82.4 FL — SIGNIFICANT CHANGE UP (ref 80–100)
MCV RBC AUTO: 83.1 FL — SIGNIFICANT CHANGE UP (ref 80–100)
MONOCYTES # BLD AUTO: 0.93 K/UL — HIGH (ref 0–0.9)
MONOCYTES # BLD AUTO: 1.75 K/UL — HIGH (ref 0–0.9)
MONOCYTES NFR BLD AUTO: 4.8 % — SIGNIFICANT CHANGE UP (ref 2–14)
MONOCYTES NFR BLD AUTO: 6.5 % — SIGNIFICANT CHANGE UP (ref 2–14)
NEUTROPHILS # BLD AUTO: 18.01 K/UL — HIGH (ref 1.8–7.4)
NEUTROPHILS # BLD AUTO: 24.28 K/UL — HIGH (ref 1.8–7.4)
NEUTROPHILS NFR BLD AUTO: 90 % — HIGH (ref 43–77)
NEUTROPHILS NFR BLD AUTO: 92.7 % — HIGH (ref 43–77)
NITRITE UR-MCNC: NEGATIVE — SIGNIFICANT CHANGE UP
NRBC # BLD: 0 /100 WBCS — SIGNIFICANT CHANGE UP (ref 0–0)
NRBC # BLD: 0 /100 WBCS — SIGNIFICANT CHANGE UP (ref 0–0)
NT-PROBNP SERPL-SCNC: 4642 PG/ML — HIGH (ref 0–125)
OVALOCYTES BLD QL SMEAR: SLIGHT — SIGNIFICANT CHANGE UP
PCO2 BLDA: 36 MMHG — SIGNIFICANT CHANGE UP (ref 35–48)
PCO2 BLDV: 77 MMHG — HIGH (ref 42–55)
PH BLDA: 7.42 — SIGNIFICANT CHANGE UP (ref 7.35–7.45)
PH BLDV: 7.18 — LOW (ref 7.32–7.43)
PH UR: 5 — SIGNIFICANT CHANGE UP (ref 5–8)
PHOSPHATE SERPL-MCNC: 4.1 MG/DL — SIGNIFICANT CHANGE UP (ref 2.5–4.5)
PLAT MORPH BLD: NORMAL — SIGNIFICANT CHANGE UP
PLAT MORPH BLD: NORMAL — SIGNIFICANT CHANGE UP
PLATELET # BLD AUTO: 427 K/UL — HIGH (ref 150–400)
PLATELET # BLD AUTO: 517 K/UL — HIGH (ref 150–400)
PO2 BLDA: 106 MMHG — SIGNIFICANT CHANGE UP (ref 83–108)
PO2 BLDV: 51 MMHG — SIGNIFICANT CHANGE UP
POIKILOCYTOSIS BLD QL AUTO: SLIGHT — SIGNIFICANT CHANGE UP
POIKILOCYTOSIS BLD QL AUTO: SLIGHT — SIGNIFICANT CHANGE UP
POTASSIUM SERPL-MCNC: 4.4 MMOL/L — SIGNIFICANT CHANGE UP (ref 3.5–5.3)
POTASSIUM SERPL-MCNC: 5.3 MMOL/L — SIGNIFICANT CHANGE UP (ref 3.5–5.3)
POTASSIUM SERPL-SCNC: 4.4 MMOL/L — SIGNIFICANT CHANGE UP (ref 3.5–5.3)
POTASSIUM SERPL-SCNC: 5.3 MMOL/L — SIGNIFICANT CHANGE UP (ref 3.5–5.3)
PROCALCITONIN SERPL-MCNC: 1.62 NG/ML — HIGH (ref 0.02–0.1)
PROT SERPL-MCNC: 6.8 G/DL — SIGNIFICANT CHANGE UP (ref 6–8.3)
PROT SERPL-MCNC: 8.1 G/DL — SIGNIFICANT CHANGE UP (ref 6–8.3)
PROT UR-MCNC: 30 MG/DL
PROTHROM AB SERPL-ACNC: 30.6 SEC — HIGH (ref 10.5–13.4)
RBC # BLD: 4.44 M/UL — SIGNIFICANT CHANGE UP (ref 4.2–5.8)
RBC # BLD: 4.72 M/UL — SIGNIFICANT CHANGE UP (ref 4.2–5.8)
RBC # FLD: 20 % — HIGH (ref 10.3–14.5)
RBC # FLD: 20.4 % — HIGH (ref 10.3–14.5)
RBC BLD AUTO: ABNORMAL
RBC BLD AUTO: ABNORMAL
RBC CASTS # UR COMP ASSIST: ABNORMAL /HPF (ref 0–2)
SAO2 % BLDA: 99 % — SIGNIFICANT CHANGE UP
SAO2 % BLDV: 74.4 % — SIGNIFICANT CHANGE UP
SARS-COV-2 RNA SPEC QL NAA+PROBE: DETECTED
SCHISTOCYTES BLD QL AUTO: SLIGHT — SIGNIFICANT CHANGE UP
SCHISTOCYTES BLD QL AUTO: SLIGHT — SIGNIFICANT CHANGE UP
SODIUM SERPL-SCNC: 131 MMOL/L — LOW (ref 135–145)
SODIUM SERPL-SCNC: 133 MMOL/L — LOW (ref 135–145)
SP GR SPEC: 1.03 — HIGH (ref 1.01–1.02)
SPECIMEN SOURCE: SIGNIFICANT CHANGE UP
TROPONIN I, HIGH SENSITIVITY RESULT: 9.5 NG/L — SIGNIFICANT CHANGE UP
UROBILINOGEN FLD QL: 1
WBC # BLD: 19.44 K/UL — HIGH (ref 3.8–10.5)
WBC # BLD: 26.97 K/UL — HIGH (ref 3.8–10.5)
WBC # FLD AUTO: 19.44 K/UL — HIGH (ref 3.8–10.5)
WBC # FLD AUTO: 26.97 K/UL — HIGH (ref 3.8–10.5)
WBC UR QL: ABNORMAL /HPF (ref 0–5)

## 2022-07-02 PROCEDURE — 71045 X-RAY EXAM CHEST 1 VIEW: CPT | Mod: 26,77

## 2022-07-02 PROCEDURE — 99285 EMERGENCY DEPT VISIT HI MDM: CPT

## 2022-07-02 PROCEDURE — 99291 CRITICAL CARE FIRST HOUR: CPT

## 2022-07-02 PROCEDURE — 71045 X-RAY EXAM CHEST 1 VIEW: CPT | Mod: 26

## 2022-07-02 RX ORDER — DEXAMETHASONE 0.5 MG/5ML
6 ELIXIR ORAL DAILY
Refills: 0 | Status: DISCONTINUED | OUTPATIENT
Start: 2022-07-02 | End: 2022-07-04

## 2022-07-02 RX ORDER — VANCOMYCIN HCL 1 G
1250 VIAL (EA) INTRAVENOUS ONCE
Refills: 0 | Status: COMPLETED | OUTPATIENT
Start: 2022-07-02 | End: 2022-07-02

## 2022-07-02 RX ORDER — INSULIN GLARGINE 100 [IU]/ML
7 INJECTION, SOLUTION SUBCUTANEOUS AT BEDTIME
Refills: 0 | Status: DISCONTINUED | OUTPATIENT
Start: 2022-07-02 | End: 2022-07-03

## 2022-07-02 RX ORDER — PIPERACILLIN AND TAZOBACTAM 4; .5 G/20ML; G/20ML
3.38 INJECTION, POWDER, LYOPHILIZED, FOR SOLUTION INTRAVENOUS ONCE
Refills: 0 | Status: COMPLETED | OUTPATIENT
Start: 2022-07-02 | End: 2022-07-02

## 2022-07-02 RX ORDER — ACETAMINOPHEN 500 MG
1000 TABLET ORAL ONCE
Refills: 0 | Status: COMPLETED | OUTPATIENT
Start: 2022-07-02 | End: 2022-07-02

## 2022-07-02 RX ORDER — PROPOFOL 10 MG/ML
10 INJECTION, EMULSION INTRAVENOUS
Qty: 1000 | Refills: 0 | Status: DISCONTINUED | OUTPATIENT
Start: 2022-07-02 | End: 2022-07-04

## 2022-07-02 RX ORDER — REMDESIVIR 5 MG/ML
100 INJECTION INTRAVENOUS EVERY 24 HOURS
Refills: 0 | Status: DISCONTINUED | OUTPATIENT
Start: 2022-07-03 | End: 2022-07-03

## 2022-07-02 RX ORDER — PANTOPRAZOLE SODIUM 20 MG/1
40 TABLET, DELAYED RELEASE ORAL DAILY
Refills: 0 | Status: DISCONTINUED | OUTPATIENT
Start: 2022-07-02 | End: 2022-07-04

## 2022-07-02 RX ORDER — FENTANYL CITRATE 50 UG/ML
0.5 INJECTION INTRAVENOUS
Qty: 2500 | Refills: 0 | Status: DISCONTINUED | OUTPATIENT
Start: 2022-07-02 | End: 2022-07-06

## 2022-07-02 RX ORDER — SODIUM CHLORIDE 9 MG/ML
1000 INJECTION INTRAMUSCULAR; INTRAVENOUS; SUBCUTANEOUS ONCE
Refills: 0 | Status: COMPLETED | OUTPATIENT
Start: 2022-07-02 | End: 2022-07-02

## 2022-07-02 RX ORDER — REMDESIVIR 5 MG/ML
INJECTION INTRAVENOUS
Refills: 0 | Status: DISCONTINUED | OUTPATIENT
Start: 2022-07-02 | End: 2022-07-03

## 2022-07-02 RX ORDER — ASCORBIC ACID 60 MG
1 TABLET,CHEWABLE ORAL
Qty: 0 | Refills: 0 | DISCHARGE

## 2022-07-02 RX ORDER — ENOXAPARIN SODIUM 100 MG/ML
40 INJECTION SUBCUTANEOUS EVERY 24 HOURS
Refills: 0 | Status: DISCONTINUED | OUTPATIENT
Start: 2022-07-02 | End: 2022-07-02

## 2022-07-02 RX ORDER — KETAMINE HYDROCHLORIDE 100 MG/ML
200 INJECTION INTRAMUSCULAR; INTRAVENOUS ONCE
Refills: 0 | Status: DISCONTINUED | OUTPATIENT
Start: 2022-07-02 | End: 2022-07-02

## 2022-07-02 RX ORDER — CHLORHEXIDINE GLUCONATE 213 G/1000ML
1 SOLUTION TOPICAL
Refills: 0 | Status: DISCONTINUED | OUTPATIENT
Start: 2022-07-02 | End: 2022-07-19

## 2022-07-02 RX ORDER — SODIUM CHLORIDE 9 MG/ML
500 INJECTION, SOLUTION INTRAVENOUS ONCE
Refills: 0 | Status: COMPLETED | OUTPATIENT
Start: 2022-07-02 | End: 2022-07-02

## 2022-07-02 RX ORDER — SODIUM CHLORIDE 9 MG/ML
3 INJECTION INTRAMUSCULAR; INTRAVENOUS; SUBCUTANEOUS EVERY 8 HOURS
Refills: 0 | Status: DISCONTINUED | OUTPATIENT
Start: 2022-07-02 | End: 2022-07-19

## 2022-07-02 RX ORDER — MULTIVIT-MIN/FERROUS GLUCONATE 9 MG/15 ML
1 LIQUID (ML) ORAL
Qty: 0 | Refills: 0 | DISCHARGE

## 2022-07-02 RX ORDER — CHLORHEXIDINE GLUCONATE 213 G/1000ML
15 SOLUTION TOPICAL EVERY 12 HOURS
Refills: 0 | Status: DISCONTINUED | OUTPATIENT
Start: 2022-07-02 | End: 2022-07-06

## 2022-07-02 RX ORDER — FENTANYL CITRATE 50 UG/ML
0.5 INJECTION INTRAVENOUS
Qty: 2500 | Refills: 0 | Status: DISCONTINUED | OUTPATIENT
Start: 2022-07-02 | End: 2022-07-02

## 2022-07-02 RX ORDER — INSULIN LISPRO 100/ML
VIAL (ML) SUBCUTANEOUS EVERY 6 HOURS
Refills: 0 | Status: DISCONTINUED | OUTPATIENT
Start: 2022-07-02 | End: 2022-07-04

## 2022-07-02 RX ORDER — CHOLECALCIFEROL (VITAMIN D3) 125 MCG
1 CAPSULE ORAL
Qty: 0 | Refills: 0 | DISCHARGE

## 2022-07-02 RX ORDER — AMPICILLIN SODIUM AND SULBACTAM SODIUM 250; 125 MG/ML; MG/ML
3 INJECTION, POWDER, FOR SUSPENSION INTRAMUSCULAR; INTRAVENOUS EVERY 6 HOURS
Refills: 0 | Status: DISCONTINUED | OUTPATIENT
Start: 2022-07-02 | End: 2022-07-07

## 2022-07-02 RX ORDER — REMDESIVIR 5 MG/ML
200 INJECTION INTRAVENOUS EVERY 24 HOURS
Refills: 0 | Status: COMPLETED | OUTPATIENT
Start: 2022-07-02 | End: 2022-07-02

## 2022-07-02 RX ORDER — LANOLIN ALCOHOL/MO/W.PET/CERES
1 CREAM (GRAM) TOPICAL
Qty: 0 | Refills: 0 | DISCHARGE

## 2022-07-02 RX ORDER — ROCURONIUM BROMIDE 10 MG/ML
100 VIAL (ML) INTRAVENOUS ONCE
Refills: 0 | Status: COMPLETED | OUTPATIENT
Start: 2022-07-02 | End: 2022-07-02

## 2022-07-02 RX ADMIN — Medication 3: at 12:46

## 2022-07-02 RX ADMIN — SODIUM CHLORIDE 3 MILLILITER(S): 9 INJECTION INTRAMUSCULAR; INTRAVENOUS; SUBCUTANEOUS at 21:52

## 2022-07-02 RX ADMIN — REMDESIVIR 500 MILLIGRAM(S): 5 INJECTION INTRAVENOUS at 18:43

## 2022-07-02 RX ADMIN — CHLORHEXIDINE GLUCONATE 1 APPLICATION(S): 213 SOLUTION TOPICAL at 11:07

## 2022-07-02 RX ADMIN — Medication 2: at 17:31

## 2022-07-02 RX ADMIN — Medication 6 MILLIGRAM(S): at 17:17

## 2022-07-02 RX ADMIN — INSULIN GLARGINE 7 UNIT(S): 100 INJECTION, SOLUTION SUBCUTANEOUS at 21:23

## 2022-07-02 RX ADMIN — SODIUM CHLORIDE 1000 MILLILITER(S): 9 INJECTION, SOLUTION INTRAVENOUS at 18:15

## 2022-07-02 RX ADMIN — PANTOPRAZOLE SODIUM 40 MILLIGRAM(S): 20 TABLET, DELAYED RELEASE ORAL at 11:07

## 2022-07-02 RX ADMIN — AMPICILLIN SODIUM AND SULBACTAM SODIUM 200 GRAM(S): 250; 125 INJECTION, POWDER, FOR SUSPENSION INTRAMUSCULAR; INTRAVENOUS at 23:02

## 2022-07-02 RX ADMIN — AMPICILLIN SODIUM AND SULBACTAM SODIUM 200 GRAM(S): 250; 125 INJECTION, POWDER, FOR SUSPENSION INTRAMUSCULAR; INTRAVENOUS at 17:17

## 2022-07-02 RX ADMIN — Medication 400 MILLIGRAM(S): at 13:05

## 2022-07-02 RX ADMIN — CHLORHEXIDINE GLUCONATE 15 MILLILITER(S): 213 SOLUTION TOPICAL at 18:49

## 2022-07-02 RX ADMIN — KETAMINE HYDROCHLORIDE 200 MILLIGRAM(S): 100 INJECTION INTRAMUSCULAR; INTRAVENOUS at 06:16

## 2022-07-02 RX ADMIN — SODIUM CHLORIDE 3 MILLILITER(S): 9 INJECTION INTRAMUSCULAR; INTRAVENOUS; SUBCUTANEOUS at 06:18

## 2022-07-02 RX ADMIN — SODIUM CHLORIDE 1000 MILLILITER(S): 9 INJECTION INTRAMUSCULAR; INTRAVENOUS; SUBCUTANEOUS at 06:05

## 2022-07-02 RX ADMIN — Medication 1000 MILLIGRAM(S): at 14:14

## 2022-07-02 RX ADMIN — SODIUM CHLORIDE 3 MILLILITER(S): 9 INJECTION INTRAMUSCULAR; INTRAVENOUS; SUBCUTANEOUS at 13:03

## 2022-07-02 RX ADMIN — Medication 250 MILLIGRAM(S): at 06:20

## 2022-07-02 RX ADMIN — FENTANYL CITRATE 2.61 MICROGRAM(S)/KG/HR: 50 INJECTION INTRAVENOUS at 06:34

## 2022-07-02 RX ADMIN — Medication 2: at 23:00

## 2022-07-02 RX ADMIN — PROPOFOL 6.42 MICROGRAM(S)/KG/MIN: 10 INJECTION, EMULSION INTRAVENOUS at 11:55

## 2022-07-02 RX ADMIN — PIPERACILLIN AND TAZOBACTAM 200 GRAM(S): 4; .5 INJECTION, POWDER, LYOPHILIZED, FOR SOLUTION INTRAVENOUS at 07:15

## 2022-07-02 RX ADMIN — Medication 100 MILLIGRAM(S): at 06:17

## 2022-07-02 RX ADMIN — FENTANYL CITRATE 5.35 MICROGRAM(S)/KG/HR: 50 INJECTION INTRAVENOUS at 14:37

## 2022-07-02 NOTE — ED PROVIDER NOTE - PHYSICAL EXAMINATION
Vital Signs Reviewed----85% on 15L mask  GEN: Speaking in single words, in extremis  HEENT: NCAT, MMM, Neck Supple  RESP: Respiratory distress, severe tachypnea right sided coarse breath sounds with decreased air entry.  CV: RRR, S1S2, No murmurs  ABD: No TTP, Soft, ND, No masses, No CVA Tenderness  Extrem/Skin: Equal pulses bilat, No cyanosis/edema/rashes  Neuro: No new focal deficits

## 2022-07-02 NOTE — H&P ADULT - NSHPREVIEWOFSYSTEMS_GEN_ALL_CORE
CONSTITUTIONAL: No weight loss, or fatigue (+) fever  RESPIRATORY: No cough, wheezing, chills or hemoptysis; (+) shortness of breath  CARDIOVASCULAR: No chest pain, palpitations, dizziness, or leg swelling  GASTROINTESTINAL: No abdominal pain. No nausea, vomiting, or hematemesis; No diarrhea or constipation. No melena or hematochezia.  GENITOURINARY: No dysuria or hematuria, urinary frequency  ENDOCRINE: No polyuria, polydipsia, or heat/cold intolerance  MUSKULOSKELETAL: No muscle aches, joint pains  HEME: no easy bruisability, no tender or enlarged lymph nodes  SKIN: No itching, burning, rashes, or lesions .

## 2022-07-02 NOTE — ED PROVIDER NOTE - OBJECTIVE STATEMENT
66 y/o male with history of CVA with right sided deficits, atrial fibrillation on Eliquis, DM, HTN, HLD, rectal CA, p/w 2 days of fever, cough, shortness of breath, and right sided chest pain. No recent/new focal deficits, nausea, vomiting, diarrhea, syncope, or any other recent illnesses and hospitalizations.

## 2022-07-02 NOTE — H&P ADULT - CONVERSATION DETAILS
Spoke with wife regarding patient's status and his wishes. As per wife, she would like for him to be full code. Wife is HCP with forms in chart.    Pt is FULL CODE.

## 2022-07-02 NOTE — ED ADULT NURSE NOTE - OBJECTIVE STATEMENT
BIBA from home for difficulty breathing. Patient SOB, diaphoretic. He is awake, lethargic, oriented x4. As per wife, he was having symptoms since yesterday. Denied pain.

## 2022-07-02 NOTE — H&P ADULT - HISTORY OF PRESENT ILLNESS
67M from home, walks with walker, AOx3 w/ PMH CVA 11/2021 w/ R sided residual, afib on eliquis, dm on insulin, hld, rectal CA w/ R chemoport, BIBEMS for sob + fever x 4 days.  67M from home, walks with walker, AOx3 w/ PMH CVA 11/2021 w/ R sided residual, afib on eliquis, dm on insulin, hld, rectal CA w/ R chemoport, BIBEMS for sob, fever x 4 days and cough. Pt with Tmax 100.7 3 days prior to admission. Pt without nausea, vomiting, loss of consciousness, diarrhea. Last BM was 7/1 as per wife. In ED, patient was intubated for respiratory distress. Pt was on fentanyl gtt on examination but was able to follow commands and answer yes or no questions. Pt denies pain, headache, chest pain at this time.

## 2022-07-02 NOTE — H&P ADULT - NSHPPHYSICALEXAM_GEN_ALL_CORE
General - intubated + sedated  Eyes - PERRLA, EOM intact  ENT - Nonicteric sclerae, PERRLA, EOMI. Oropharynx clear. Moist mucous membranes. Conjunctivae appear well perfused.   Neck - No noticeable or palpable swelling, redness or rash around throat or on face  Lymph Nodes - No lymphadenopathy  Cardiovascular - RRR no m/r/g, no JVD, no carotid bruits  Lungs - (+) decreased breath sounds more on R than Left, (+) R chest chemoport  Skin - No rashes, skin warm and dry, no erythematous areas  Abdomen - Normal bowel sounds, abdomen soft and nontender  Extremities - No edema, cyanosis or clubbing  Musculoskeletal - no rigidity on passive movement of extremities  Neuro– sedated, pinpoint pupils on fentanyl, not currently following commands

## 2022-07-02 NOTE — PATIENT PROFILE ADULT - FALL HARM RISK - HARM RISK INTERVENTIONS
Assistance with ambulation/Assistance OOB with selected safe patient handling equipment/Communicate Risk of Fall with Harm to all staff/Reinforce activity limits and safety measures with patient and family/Review medications for side effects contributing to fall risk/Sit up slowly, dangle for a short time, stand at bedside before walking/Tailored Fall Risk Interventions/Toileting schedule using arm’s reach rule for commode and bathroom/Visual Cue: Yellow wristband and red socks/Bed in lowest position, wheels locked, appropriate side rails in place/Call bell, personal items and telephone in reach/Instruct patient to call for assistance before getting out of bed or chair/Non-slip footwear when patient is out of bed/[place] to call system/Physically safe environment - no spills, clutter or unnecessary equipment/Purposeful Proactive Rounding/Room/bathroom lighting operational, light cord in reach

## 2022-07-02 NOTE — ED ADULT NURSE NOTE - ED STAT RN HANDOFF DETAILS
Report given to LESLI Zuñiga. Patient is pending transfer to ICU. Patient on ventilator, VS are stable.

## 2022-07-02 NOTE — H&P ADULT - ASSESSMENT
67M from home, walks with walker, AOx3 w/ PMH CVA 11/2021 w/ R sided residual, afib on eliquis, dm on insulin, hld, rectal CA w/ R chemoport, BIBEMS for sob + fever x 4 days admitted to ICU for AHRF requiring intubation    Assessment:    Plan:  Neuro:    Cardiovascular:    Pulmonary:       Gastrointestinal:    Renal:    Infectious Diseases:  #Sepsis  p/w fever, HR >90, lactate 2.6  trend lactate  received 1L bolus in ED  UA positive  f/u blood cultures, urine cultures  tylenol prn      Heme/onc:   #Anemia  Hb 11.5 on admission  baseline hb 9-10  takes iron at home  hold iron while npo    Endo:   #DM on insulin  f/u A1c  On lantus 13 + lispro sliding scale at home  c/w lantus 6 + sliding scale  Adjust insulin as indicated  FS ACHS or q6 while NPO      Skin/ catheter:   Dumas catheter 7/2    Prophylaxis:   PPI  Lovenox    Goals of Care:   FULL CODE    Dispo:  Admit to ICU 67M from home, walks with walker, AOx3 w/ PMH CVA 11/2021 w/ R sided residual, afib on eliquis, dm on insulin, hld, rectal CA w/ R chemoport, BIBEMS for sob + fever x 4 days admitted to ICU for AHRF requiring intubation    Assessment:  #Acute Hypoxic Respiratory Failure  #Sepsis  #DM  #HTN  #HLD   #Afib  #COVID-19 Infection  #Anemia  #AMELIA    Plan:  Neuro:    Cardiovascular:    Pulmonary:       Gastrointestinal:    Renal:    Infectious Diseases:  #Sepsis  p/w fever, HR >90, lactate 2.6  trend lactate  received 1L bolus in ED  UA positive  f/u blood cultures, urine cultures  tylenol prn      Heme/onc:   #Anemia  Hb 11.5 on admission  baseline hb 9-10  takes iron at home  hold iron while npo    Endo:   #DM on insulin  f/u A1c  On lantus 13 + lispro sliding scale at home  c/w lantus 6 + sliding scale  Adjust insulin as indicated  FS ACHS or q6 while NPO      Skin/ catheter:   Dumas catheter 7/2    Prophylaxis:   PPI  Lovenox    Goals of Care:   FULL CODE    Dispo:  Admit to ICU 67M from home, walks with walker, AOx3 w/ PMH CVA 11/2021 w/ R sided residual, afib on eliquis, dm on insulin, hld, rectal CA w/ R chemoport, BIBEMS for sob + fever x 4 days admitted to ICU for AHRF requiring intubation    Assessment:  #Acute Hypoxic Respiratory Failure  #Sepsis  #R Sided pna  #UTI  #DM  #Afib  #COVID-19 Infection  #Anemia  #AMELIA  #HTN  #HLD       Plan:  Neuro:  Intubated and sedated on fentanyl and propofol    Cardiovascular:  #AFib  CHADSVASC: 5  Hold eliquis for now, will place NGT and consider resuming  on bisoprolol for rate control  hold for now   Tele monitoring - Goal rate <110  TTE 11/2021 with normal ef    #HTN  on Bisoprolol 10/ HCTZ 6.25  hold for now  resume when NGT placed and persistently hypertensive    Pulmonary:   #AHRF   - intubated for increased work of breathing.   - pt with respiratory alkalosis, will repeat abg and adjust vent settings accordingly    #R lung opacity/Pneumonia  - CXR with pleural effusion vs consolidation on CXR  - Start Unasyn  - p/w sob, intubated in ED for respiratory distress    #Respiratory Alkalosis  #COVID infection    - obtain covid markers q72 hrs    Gastrointestinal:  NPO for now  place NGT  start TF    Renal:  #AMELIA  Pt w/ SCr 1.7 on admission  Baseline SCr - 0.8-0.9  Monitor bmp daily  s/p 1L bolus    Infectious Diseases:  #Sepsis  p/w fever, HR >90, lactate 2.6  trend lactate  received 1L bolus in ED  UA positive  f/u blood cultures, urine cultures  tylenol prn  Start Unasyn 7/2    Heme/onc:   #Anemia  Hb 11.5 on admission  baseline hb 9-10  takes iron at home  hold iron while npo    Endo:   #DM on insulin  f/u A1c  On lantus 13 + lispro sliding scale at home  c/w lantus 7 + sliding scale  Adjust insulin as indicated  FS ACHS or q6 while NPO  Maintain poct 140-180 while inpatient    Skin/ catheter:   Dumas catheter 7/2 for critical monitoring    Prophylaxis:   PPI  SCDs - Wife refusing lovenox due to history of abdominal bleed when receiving lovenox.    Goals of Care:   FULL CODE    Dispo:  Admit to ICU 67M from home, walks with walker, AOx3 w/ PMH CVA 11/2021 w/ R sided residual, afib on eliquis, dm on insulin, hld, rectal CA w/ R chemoport, BIBEMS for sob + fever x 4 days admitted to ICU for AHRF requiring intubation    Assessment:  #Acute Hypoxic Respiratory Failure  #Sepsis  #R Sided pna  #UTI  #DM  #Afib  #COVID-19 Infection  #Anemia  #AMELIA  #HTN  #HLD       Plan:  Neuro:  Intubated and sedated on fentanyl and propofol    Cardiovascular:  #AFib  CHADSVASC: 5  Hold eliquis for now, will place NGT and consider resuming  on bisoprolol for rate control - hold for now  TTE 11/2021 with normal ef    #HTN  on Bisoprolol 10/ HCTZ 6.25  hold for now  resume when NGT placed and persistently hypertensive    Pulmonary:   #AHRF   - intubated for increased work of breathing.   - pt with respiratory alkalosis, will repeat abg and adjust vent settings accordingly    #R lung opacity/Pneumonia  - CXR with pleural effusion vs consolidation on CXR  - Start Unasyn  - p/w sob, intubated in ED for respiratory distress    #Respiratory Alkalosis  #COVID infection    - obtain covid markers q72 hrs  - Start on decadron x10d and remdesivir x5d  - monitor eGFR, if <30, d/c remdesivir    Gastrointestinal:  NPO for now  - place NGT  - start TF when confirmed    Renal:  #AMELIA  Pt w/ SCr 1.7 on admission  Baseline SCr - 0.8-0.9  Monitor bmp daily  s/p 1L bolus    Infectious Diseases:  #Sepsis  p/w fever, HR >90, lactate 2.6  trend lactate  received 1L bolus in ED  UA positive  f/u blood cultures, urine cultures  tylenol prn  Start Unasyn 7/2    Heme/onc:   #Anemia  Hb 11.5 on admission  baseline hb 9-10  takes iron at home  hold iron while npo    Endo:   #DM on insulin  f/u A1c  On lantus 13 + lispro sliding scale at home  c/w lantus 7 + sliding scale  Adjust insulin as indicated  FS ACHS or q6 while NPO  Maintain poct 140-180 while inpatient    Skin/ catheter:   Dumas catheter 7/2 for critical monitoring    Prophylaxis:   PPI  SCDs - Wife refusing lovenox due to history of abdominal bleed when receiving lovenox.    Goals of Care:   FULL CODE    Dispo:  Admit to ICU

## 2022-07-02 NOTE — ED ADULT NURSE NOTE - HAVE YOU HAD A FIRST COVID-19 BOOSTER?
Field Size (Applicator): 2.0 cm
Fractions / Week Rx 3: 5
Custom Shielding Afterword Text Will Not Be Included With Simple Simulations (X X Y Cm............): port to correlate with the lesion size, including treatment margin. The custom lead shield is adequate to accommodate the appropriate applicator and provide adequate shielding around the treatment site. Additional shielding (as noted below) is used to protect sensitive, normal tissues.
Assessment: Appropriate reaction
Treatment Margins In Cm: 0.6
Shielding Size (Optional- Include Units) Rx 2: 2.2 x 3.3 cm
Total Number Of Fractions Rx 2: 10
Render Text From Evaluation And Management Tab (Will Not Bill [id number]): No
Bill And Render Text From Evaluation And Management Tab (Will Bill [id number]): Yes
Total Number Of Fractions: 20
Fractionation Number: 16
Treatment Device Design After Initial Simulation Justification (Will Render If Bill For Treatment Devices = Yes): The patient is status post radiation simulation and is evaluated as to the use of additional devices for shielding and placement for radiation therapy.
Number Of Treatment Days: 1
Energy (Optional-Please Include Units): 100 kV
Dimensions-X Axis In Cm: 0.8
Functional Status: 0 (fully active)
Patient Positioning: Supine
Dose / Tx In Cgy (Optional): 277.77
Fractions / Week Rx 2: 3
Port Dimensions-X Axis In Cm: 0
Daily Fractionated Dose (Optional- Include Units): 277.77 cGy
Computed Treatment Time In Min (Will Render The Same As Calculated Treatment Time If Left Blank): 0.47
Total Dose (Optional-Please Include Units): 5555.40 cGy
Initial Radiation Treatment Planning (Will Render If Bill Simulation = Yes): The patient had a complete consultation regarding all applicable modalities for the treatment of their skin cancer and based on a variety of factors including the type of tumor, size, and location, the relevant medical history as well as local tissue factors, the functional status of the individual, the ability to perform necessary postoperative wound instructions and the need for simultaneous treatments as well as overall wound healing status, it was determined that the patient would begin radiation therapy treatment for skin cancer.  A full simulation and treatment device design was performed including the determination and formulation of appropriate simple and complex devices including lead shield of 0.762 mm thickness to form molded customized shielding to specifically correlate with the lesion size including treatment margin.  The custom lead shield is adequate to accommodate the appropriate applicator and provide adequate shielding around the treatment site.  The specific field applicator, shields, and devices both simple and complex as well as the specific patient setup is outlined below.  The patient was given a full consent for superficial radiation to both verbally and in writing and the full determination of patient's eligibility for treatment and selection is outlined on the patient eligibility and treatment selection form.  The specific superficial radiotherapy prescription was determined and was documented on the superficial radiotherapy prescription form.  A treatment calculation was also performed and documented on the treatment calculation form.  Based on the prescription, the patient was scheduled for a series of fractional treatments.
Total Dose (Optional-Please Include Units) Rx 2: 2742.60 cGy
Depth (Optional-Please Include Units): 2.02 mm
Comments: RTOG 2
Additional Prescription Justification Text: If there is any interruption in treatment exceeding 5 days please see Decay and Dose Adjustment Calculation and complete treatment under Prescription 2, 3 or 4 as appropriate.
Custom Shielding Preamble Text Will Not Be Included With Simple Simulations (.......... X X Y Cm): A lead shield of 0.762 mm thickness is utilized to form a molded, custom shield with a
Total Number Of Fractions Rx 3: 15
Dimensions-Y Axis In Cm: 0.7
Information: Selecting Yes will display possible errors in your note based on the variables you have selected. This validation is only offered as a suggestion for you. PLEASE NOTE THAT THE VALIDATION TEXT WILL BE REMOVED WHEN YOU FINALIZE YOUR NOTE. IF YOU WANT TO FAX A PRELIMINARY NOTE YOU WILL NEED TO TOGGLE THIS TO 'NO' IF YOU DO NOT WANT IT IN YOUR FAXED NOTE.
Energy (Optional-Please Include Units) Rx 2: 70 kV
Daily Fractionated Dose (Optional- Include Units) Rx 2: 274.26 cGy
Cumulative Dose In Cgy (Optional): 4444.32
No
Day Of The Week Treatment Administered: Wednesday
Field Size (Applicator) Rx 2: 4.0 cm
Treatment Time / Fractionation (Optional- Include Units): 0.47 min
Simple Simulation Preamble Text Will Be Included With Simple Simulations (.......... Indications): Simple simulation was performed today for the following reasons:
Time Dose Fractionation (Optional- Include Units If Applicable) Rx 2: 49
Treatment Time / Fractionation (Optional- Include Units) Rx 2: 0.42 min
Detail Level: Detailed
Time Dose Fractionation (Optional- Include Units If Applicable): 99
Intro Statement (Will Not Render If Left Blank): The patient is undergoing superficial radiation therapy for skin cancer and presents for weekly evaluation and management. Per protocol and as documented on the flow sheet, the patient was questioned as to subjective redness, pruritus, pain, drainage, fatigue, or any other symptoms. Objectively, the radiation area was evaluated with regards to erythema, atrophy, scale, crusting, erosion, ulceration, edema, purpura, tenderness, warmth, drainage, and any other findings. The plan was extensively reviewed including the dose, and dosing schedule. The simulation and clinical setup was also reviewed as was the external and any internal shields and based on this review the appropriateness and sufficiency of treatment was determined.

## 2022-07-02 NOTE — ED PROVIDER NOTE - CLINICAL SUMMARY MEDICAL DECISION MAKING FREE TEXT BOX
Patient presenting in extremis with signs and symptoms of right sided PNA.     Patient intubated for respiratory failure. CXR showing right sided PNA. White count 27,000. ICU consulted. Blood pressure stable. Patient status critical. Patient presenting in extremis with signs and symptoms of right sided PNA.     Patient intubated for respiratory failure. CXR showing right sided PNA. White count 27,000. ICU consulted. Blood pressure stable. Patient status critical.    Lactate mildly elevated. UA pos. On reassessment pt's VS remain stable. Pt endorsed to ICU.

## 2022-07-02 NOTE — ED ADULT TRIAGE NOTE - PAIN RATING/NUMBER SCALE (0-10): ACTIVITY
0 Medical Necessity Statement: Based on my medical judgement, Mohs surgery is the most appropriate treatment for this cancer compared to other treatments.

## 2022-07-02 NOTE — H&P ADULT - ATTENDING COMMENTS
67M from home, walks with walker, AOx3 w/ PMH CVA 11/2021 w/ R sided residual, Afib on eliquis, dm on insulin, hld, rectal CA w/ R chemoport, BIBEMS for sob + fever x 4 days admitted to ICU for AHRF requiring intubation    Assessment:  #Acute Hypoxic Respiratory Failure  #Sepsis  #R Sided PNA  #UTI  #DM  #Afib  #COVID-19 Infection  #Anemia  #AMELIA  #HTN  #HLD     Plan  - intubated and sedated  - Received Vanco, will cont on Unasyn with high risk of aspiration  - mechanical vent control  - daily SAT/SBT  - BCx and UCx pending  - Clinically and radiographically more c/w bacterial than COVID PNA  - Will cover with Remdesivir and Decadron given AHRF while covid positive  - Mechanical DVT prophy  - GI prophy  - Ok to start enteral feeds  Standard vent bundle

## 2022-07-03 LAB
ALBUMIN SERPL ELPH-MCNC: 1.6 G/DL — LOW (ref 3.5–5)
ALP SERPL-CCNC: 117 U/L — SIGNIFICANT CHANGE UP (ref 40–120)
ALT FLD-CCNC: 23 U/L DA — SIGNIFICANT CHANGE UP (ref 10–60)
ANION GAP SERPL CALC-SCNC: 10 MMOL/L — SIGNIFICANT CHANGE UP (ref 5–17)
APTT BLD: 36 SEC — HIGH (ref 27.5–35.5)
AST SERPL-CCNC: 18 U/L — SIGNIFICANT CHANGE UP (ref 10–40)
BASE EXCESS BLDA CALC-SCNC: 0 MMOL/L — SIGNIFICANT CHANGE UP (ref -2–3)
BASOPHILS # BLD AUTO: 0.02 K/UL — SIGNIFICANT CHANGE UP (ref 0–0.2)
BASOPHILS NFR BLD AUTO: 0.1 % — SIGNIFICANT CHANGE UP (ref 0–2)
BILIRUB SERPL-MCNC: 0.3 MG/DL — SIGNIFICANT CHANGE UP (ref 0.2–1.2)
BLOOD GAS COMMENTS ARTERIAL: SIGNIFICANT CHANGE UP
BUN SERPL-MCNC: 48 MG/DL — HIGH (ref 7–18)
CALCIUM SERPL-MCNC: 8.7 MG/DL — SIGNIFICANT CHANGE UP (ref 8.4–10.5)
CHLORIDE SERPL-SCNC: 104 MMOL/L — SIGNIFICANT CHANGE UP (ref 96–108)
CO2 SERPL-SCNC: 23 MMOL/L — SIGNIFICANT CHANGE UP (ref 22–31)
CREAT SERPL-MCNC: 1.38 MG/DL — HIGH (ref 0.5–1.3)
EGFR: 56 ML/MIN/1.73M2 — LOW
EOSINOPHIL # BLD AUTO: 0 K/UL — SIGNIFICANT CHANGE UP (ref 0–0.5)
EOSINOPHIL NFR BLD AUTO: 0 % — SIGNIFICANT CHANGE UP (ref 0–6)
GLUCOSE BLDC GLUCOMTR-MCNC: 225 MG/DL — HIGH (ref 70–99)
GLUCOSE BLDC GLUCOMTR-MCNC: 320 MG/DL — HIGH (ref 70–99)
GLUCOSE BLDC GLUCOMTR-MCNC: 345 MG/DL — HIGH (ref 70–99)
GLUCOSE BLDC GLUCOMTR-MCNC: 386 MG/DL — HIGH (ref 70–99)
GLUCOSE SERPL-MCNC: 241 MG/DL — HIGH (ref 70–99)
HCO3 BLDA-SCNC: 25 MMOL/L — SIGNIFICANT CHANGE UP (ref 21–28)
HCT VFR BLD CALC: 31.1 % — LOW (ref 39–50)
HGB BLD-MCNC: 9.6 G/DL — LOW (ref 13–17)
HOROWITZ INDEX BLDA+IHG-RTO: 60 — SIGNIFICANT CHANGE UP
IMM GRANULOCYTES NFR BLD AUTO: 0.4 % — SIGNIFICANT CHANGE UP (ref 0–1.5)
INR BLD: 2.24 RATIO — HIGH (ref 0.88–1.16)
LYMPHOCYTES # BLD AUTO: 0.22 K/UL — LOW (ref 1–3.3)
LYMPHOCYTES # BLD AUTO: 1.3 % — LOW (ref 13–44)
M PNEUMO IGM SER-ACNC: 0.14 INDEX — SIGNIFICANT CHANGE UP (ref 0–0.9)
MAGNESIUM SERPL-MCNC: 2 MG/DL — SIGNIFICANT CHANGE UP (ref 1.6–2.6)
MCHC RBC-ENTMCNC: 24.6 PG — LOW (ref 27–34)
MCHC RBC-ENTMCNC: 30.9 GM/DL — LOW (ref 32–36)
MCV RBC AUTO: 79.5 FL — LOW (ref 80–100)
MONOCYTES # BLD AUTO: 0.49 K/UL — SIGNIFICANT CHANGE UP (ref 0–0.9)
MONOCYTES NFR BLD AUTO: 2.9 % — SIGNIFICANT CHANGE UP (ref 2–14)
MRSA PCR RESULT.: SIGNIFICANT CHANGE UP
MYCOPLASMA AG SPEC QL: NEGATIVE — SIGNIFICANT CHANGE UP
NEUTROPHILS # BLD AUTO: 16.23 K/UL — HIGH (ref 1.8–7.4)
NEUTROPHILS NFR BLD AUTO: 95.3 % — HIGH (ref 43–77)
NRBC # BLD: 0 /100 WBCS — SIGNIFICANT CHANGE UP (ref 0–0)
PCO2 BLDA: 40 MMHG — SIGNIFICANT CHANGE UP (ref 35–48)
PH BLDA: 7.4 — SIGNIFICANT CHANGE UP (ref 7.35–7.45)
PHOSPHATE SERPL-MCNC: 3.7 MG/DL — SIGNIFICANT CHANGE UP (ref 2.5–4.5)
PLATELET # BLD AUTO: 417 K/UL — HIGH (ref 150–400)
PO2 BLDA: 65 MMHG — LOW (ref 83–108)
POTASSIUM SERPL-MCNC: 4 MMOL/L — SIGNIFICANT CHANGE UP (ref 3.5–5.3)
POTASSIUM SERPL-SCNC: 4 MMOL/L — SIGNIFICANT CHANGE UP (ref 3.5–5.3)
PROT SERPL-MCNC: 6.4 G/DL — SIGNIFICANT CHANGE UP (ref 6–8.3)
PROTHROM AB SERPL-ACNC: 26.9 SEC — HIGH (ref 10.5–13.4)
RBC # BLD: 3.91 M/UL — LOW (ref 4.2–5.8)
RBC # FLD: 20.1 % — HIGH (ref 10.3–14.5)
S AUREUS DNA NOSE QL NAA+PROBE: DETECTED
S PNEUM AG UR QL: NEGATIVE — SIGNIFICANT CHANGE UP
SAO2 % BLDA: 93 % — SIGNIFICANT CHANGE UP
SODIUM SERPL-SCNC: 137 MMOL/L — SIGNIFICANT CHANGE UP (ref 135–145)
WBC # BLD: 17.03 K/UL — HIGH (ref 3.8–10.5)
WBC # FLD AUTO: 17.03 K/UL — HIGH (ref 3.8–10.5)

## 2022-07-03 PROCEDURE — 71045 X-RAY EXAM CHEST 1 VIEW: CPT | Mod: 26

## 2022-07-03 PROCEDURE — 99291 CRITICAL CARE FIRST HOUR: CPT

## 2022-07-03 RX ORDER — ALBUTEROL 90 UG/1
2 AEROSOL, METERED ORAL EVERY 8 HOURS
Refills: 0 | Status: COMPLETED | OUTPATIENT
Start: 2022-07-03 | End: 2023-06-01

## 2022-07-03 RX ORDER — APIXABAN 2.5 MG/1
5 TABLET, FILM COATED ORAL EVERY 12 HOURS
Refills: 0 | Status: DISCONTINUED | OUTPATIENT
Start: 2022-07-03 | End: 2022-07-04

## 2022-07-03 RX ORDER — ALBUTEROL 90 UG/1
2 AEROSOL, METERED ORAL EVERY 8 HOURS
Refills: 0 | Status: DISCONTINUED | OUTPATIENT
Start: 2022-07-03 | End: 2022-07-04

## 2022-07-03 RX ORDER — IPRATROPIUM BROMIDE 0.2 MG/ML
500 SOLUTION, NON-ORAL INHALATION EVERY 6 HOURS
Refills: 0 | Status: DISCONTINUED | OUTPATIENT
Start: 2022-07-03 | End: 2022-07-03

## 2022-07-03 RX ORDER — ACETYLCYSTEINE 200 MG/ML
4 VIAL (ML) MISCELLANEOUS THREE TIMES A DAY
Refills: 0 | Status: DISCONTINUED | OUTPATIENT
Start: 2022-07-03 | End: 2022-07-03

## 2022-07-03 RX ORDER — IPRATROPIUM/ALBUTEROL SULFATE 18-103MCG
3 AEROSOL WITH ADAPTER (GRAM) INHALATION EVERY 6 HOURS
Refills: 0 | Status: DISCONTINUED | OUTPATIENT
Start: 2022-07-03 | End: 2022-07-03

## 2022-07-03 RX ORDER — ONDANSETRON 8 MG/1
4 TABLET, FILM COATED ORAL EVERY 6 HOURS
Refills: 0 | Status: DISCONTINUED | OUTPATIENT
Start: 2022-07-03 | End: 2022-07-19

## 2022-07-03 RX ORDER — INSULIN GLARGINE 100 [IU]/ML
10 INJECTION, SOLUTION SUBCUTANEOUS AT BEDTIME
Refills: 0 | Status: DISCONTINUED | OUTPATIENT
Start: 2022-07-03 | End: 2022-07-04

## 2022-07-03 RX ORDER — ACETYLCYSTEINE 200 MG/ML
4 VIAL (ML) MISCELLANEOUS EVERY 8 HOURS
Refills: 0 | Status: DISCONTINUED | OUTPATIENT
Start: 2022-07-03 | End: 2022-07-03

## 2022-07-03 RX ADMIN — APIXABAN 5 MILLIGRAM(S): 2.5 TABLET, FILM COATED ORAL at 17:31

## 2022-07-03 RX ADMIN — Medication 2: at 05:15

## 2022-07-03 RX ADMIN — CHLORHEXIDINE GLUCONATE 15 MILLILITER(S): 213 SOLUTION TOPICAL at 17:31

## 2022-07-03 RX ADMIN — AMPICILLIN SODIUM AND SULBACTAM SODIUM 200 GRAM(S): 250; 125 INJECTION, POWDER, FOR SUSPENSION INTRAMUSCULAR; INTRAVENOUS at 11:51

## 2022-07-03 RX ADMIN — Medication 5: at 23:24

## 2022-07-03 RX ADMIN — Medication 4 MILLILITER(S): at 16:40

## 2022-07-03 RX ADMIN — SODIUM CHLORIDE 3 MILLILITER(S): 9 INJECTION INTRAMUSCULAR; INTRAVENOUS; SUBCUTANEOUS at 22:29

## 2022-07-03 RX ADMIN — ALBUTEROL 2 PUFF(S): 90 AEROSOL, METERED ORAL at 20:15

## 2022-07-03 RX ADMIN — PANTOPRAZOLE SODIUM 40 MILLIGRAM(S): 20 TABLET, DELAYED RELEASE ORAL at 11:51

## 2022-07-03 RX ADMIN — INSULIN GLARGINE 10 UNIT(S): 100 INJECTION, SOLUTION SUBCUTANEOUS at 23:23

## 2022-07-03 RX ADMIN — AMPICILLIN SODIUM AND SULBACTAM SODIUM 200 GRAM(S): 250; 125 INJECTION, POWDER, FOR SUSPENSION INTRAMUSCULAR; INTRAVENOUS at 23:24

## 2022-07-03 RX ADMIN — Medication 4: at 12:21

## 2022-07-03 RX ADMIN — ONDANSETRON 4 MILLIGRAM(S): 8 TABLET, FILM COATED ORAL at 21:40

## 2022-07-03 RX ADMIN — CHLORHEXIDINE GLUCONATE 1 APPLICATION(S): 213 SOLUTION TOPICAL at 05:19

## 2022-07-03 RX ADMIN — SODIUM CHLORIDE 3 MILLILITER(S): 9 INJECTION INTRAMUSCULAR; INTRAVENOUS; SUBCUTANEOUS at 13:18

## 2022-07-03 RX ADMIN — Medication 3 MILLILITER(S): at 16:26

## 2022-07-03 RX ADMIN — Medication 6 MILLIGRAM(S): at 05:15

## 2022-07-03 RX ADMIN — AMPICILLIN SODIUM AND SULBACTAM SODIUM 200 GRAM(S): 250; 125 INJECTION, POWDER, FOR SUSPENSION INTRAMUSCULAR; INTRAVENOUS at 17:32

## 2022-07-03 RX ADMIN — FENTANYL CITRATE 5.35 MICROGRAM(S)/KG/HR: 50 INJECTION INTRAVENOUS at 17:32

## 2022-07-03 RX ADMIN — Medication 4: at 18:03

## 2022-07-03 RX ADMIN — SODIUM CHLORIDE 3 MILLILITER(S): 9 INJECTION INTRAMUSCULAR; INTRAVENOUS; SUBCUTANEOUS at 05:31

## 2022-07-03 RX ADMIN — CHLORHEXIDINE GLUCONATE 15 MILLILITER(S): 213 SOLUTION TOPICAL at 05:16

## 2022-07-03 NOTE — PROGRESS NOTE ADULT - SUBJECTIVE AND OBJECTIVE BOX
Patient is a 67y old  Male who presents with a chief complaint of AHRF, Sepsis (2022 08:03)    INTERVAL HISTORY/ OVERNIGHT EVENTS: Patient mildly hypotensive improved with IV hydration 500 cc bolus. Ribeiro and NGT placed  PRESSORS: [ ] YES [ X} NO      Antimicrobial:  ampicillin/sulbactam  IVPB 3 Gram(s) IV Intermittent every 6 hours  remdesivir  IVPB 100 milliGRAM(s) IV Intermittent every 24 hours  remdesivir  IVPB   IV Intermittent     Cardiovascular:    Pulmonary:    Hematalogic:    Other:  chlorhexidine 0.12% Liquid 15 milliLiter(s) Oral Mucosa every 12 hours  chlorhexidine 2% Cloths 1 Application(s) Topical <User Schedule>  dexAMETHasone  Injectable 6 milliGRAM(s) IV Push daily  fentaNYL   Infusion 0.5 MICROgram(s)/kG/Hr IV Continuous <Continuous>  insulin glargine Injectable (LANTUS) 7 Unit(s) SubCutaneous at bedtime  insulin lispro (ADMELOG) corrective regimen sliding scale   SubCutaneous every 6 hours  pantoprazole  Injectable 40 milliGRAM(s) IV Push daily  propofol Infusion 10 MICROgram(s)/kG/Min IV Continuous <Continuous>  sodium chloride 0.9% lock flush 3 milliLiter(s) IV Push every 8 hours    ampicillin/sulbactam  IVPB 3 Gram(s) IV Intermittent every 6 hours  chlorhexidine 0.12% Liquid 15 milliLiter(s) Oral Mucosa every 12 hours  chlorhexidine 2% Cloths 1 Application(s) Topical <User Schedule>  dexAMETHasone  Injectable 6 milliGRAM(s) IV Push daily  fentaNYL   Infusion 0.5 MICROgram(s)/kG/Hr IV Continuous <Continuous>  insulin glargine Injectable (LANTUS) 7 Unit(s) SubCutaneous at bedtime  insulin lispro (ADMELOG) corrective regimen sliding scale   SubCutaneous every 6 hours  pantoprazole  Injectable 40 milliGRAM(s) IV Push daily  propofol Infusion 10 MICROgram(s)/kG/Min IV Continuous <Continuous>  remdesivir  IVPB 100 milliGRAM(s) IV Intermittent every 24 hours  remdesivir  IVPB   IV Intermittent   sodium chloride 0.9% lock flush 3 milliLiter(s) IV Push every 8 hours    Drug Dosing Weight  Height (cm): 180.3 (2022 05:23)  Weight (kg): 107 (2022 09:10)  BMI (kg/m2): 32.9 (2022 09:10)  BSA (m2): 2.26 (2022 09:10)    CENTRAL LINE: [ ] YES [ ] NO  LOCATION:     RIBEIRO: [ ] YES [ ] NO      A-LINE:  [ ] YES [ ] NO  LOCATION:         ICU Vital Signs Last 24 Hrs  T(C): 37.2 (2022 23:00), Max: 38.3 (2022 13:00)  T(F): 99 (2022 23:00), Max: 100.9 (2022 13:00)  HR: 79 (2022 00:02) (67 - 96)  BP: 108/61 (2022 23:00) (81/61 - 147/78)  BP(mean): 73 (2022 23:00) (62 - 86)  ABP: --  ABP(mean): --  RR: 14 (2022 23:00) (14 - 33)  SpO2: 94% (2022 00:02) (86% - 100%)      ABG - ( 2022 13:42 )  pH, Arterial: 7.42  pH, Blood: x     /  pCO2: 36    /  pO2: 106   / HCO3: 23    / Base Excess: -0.7  /  SaO2: 99                        Mode: AC/ CMV (Assist Control/ Continuous Mandatory Ventilation)  RR (machine): 16  TV (machine): 500  FiO2: 60  PEEP: 5  ITime: 1  MAP: 11  PIP: 32      PHYSICAL EXAM:  General - intubated + sedated  Eyes - PERRLA, EOM intact  ENT - Nonicteric sclerae, PERRLA, EOMI. Oropharynx clear. Moist mucous membranes. Conjunctivae appear well perfused.   Neck - No noticeable or palpable swelling, redness or rash around throat or on face  Lymph Nodes - No lymphadenopathy  Cardiovascular - RRR no m/r/g, no JVD, no carotid bruits  Lungs - (+) decreased breath sounds more on R than Left, (+) R chest chemoport  Skin - No rashes, skin warm and dry, no erythematous areas  Abdomen - Normal bowel sounds, abdomen soft and nontender  Extremities - No edema, cyanosis or clubbing  Musculoskeletal - no rigidity on passive movement of extremities  Neuro– sedated, pinpoint pupils on fentanyl, not currently following commands  - Ribeiro+    LABS:                        10.8   19.44 )-----------( 427      ( 2022 12:00 )             36.6     07-02    133<L>  |  100  |  41<H>  ----------------------------<  290<H>  4.4   |  22  |  1.49<H>    Ca    9.0      2022 12:00  Phos  4.1     07-02  Mg     2.0     07-02    TPro  6.8  /  Alb  1.8<L>  /  TBili  0.7  /  DBili  x   /  AST  10  /  ALT  18  /  AlkPhos  131<H>  07-02    PT/INR - ( 2022 12:00 )   PT: 30.6 sec;   INR: 2.55 ratio         PTT - ( 2022 12:00 )  PTT:38.4 sec  Urinalysis Basic - ( 2022 06:07 )    Color: Yellow / Appearance: Slightly Turbid / S.030 / pH: x  Gluc: x / Ketone: Negative  / Bili: Negative / Urobili: 1   Blood: x / Protein: 30 mg/dL / Nitrite: Negative   Leuk Esterase: Moderate / RBC: 5-10 /HPF / WBC 26-50 /HPF   Sq Epi: x / Non Sq Epi: Occasional /HPF / Bacteria: Many /HPF      CAPILLARY BLOOD GLUCOSE      POCT Blood Glucose.: 247 mg/dL (2022 22:55)  POCT Blood Glucose.: 208 mg/dL (2022 20:48)  POCT Blood Glucose.: 230 mg/dL (2022 17:26)  POCT Blood Glucose.: 280 mg/dL (2022 05:33)        RADIOLOGY & ADDITIONAL STUDIES REVIEWED:  ***

## 2022-07-03 NOTE — PROGRESS NOTE ADULT - ASSESSMENT
67M from home, walks with walker, AOx3 w/ PMH CVA 11/2021 w/ R sided residual, afib on eliquis, dm on insulin, hld, rectal CA w/ R chemoport, BIBEMS for sob + fever x 4 days admitted to ICU for AHRF requiring intubation    Assessment:  #Acute Hypoxic Respiratory Failure  #Sepsis  #R Sided pna  #UTI  #DM  #Afib  #COVID-19 Infection  #Anemia  #AMELIA  #HTN  #HLD       Plan:  Neuro:  Intubated and sedated on fentanyl and propofol    Cardiovascular:  #AFib  CHADSVASC: 5  Hold eliquis for now, will consider resuming  on bisoprolol for rate control - hold for now  TTE 11/2021 with normal ef    #HTN  on Bisoprolol 10/ HCTZ 6.25  hold for now  resume when NGT placed and persistently hypertensive    Pulmonary:   #AHRF   - intubated for increased work of breathing.   - pt with respiratory alkalosis, will repeat abg and adjust vent settings accordingly    #R lung opacity/Pneumonia  - CXR with pleural effusion vs consolidation on CXR  - c/w Unasyn  - p/w sob, intubated in ED for respiratory distress    #Respiratory Alkalosis  #COVID infection    - obtain covid markers q72 hrs  - c/w decadron x10d and remdesivir x5d  - monitor eGFR, if <30, d/c remdesivir    Gastrointestinal:  NPO for now  - placed NGT  - start TF     Renal:  #AMELIA  Pt w/ SCr 1.7 on admission  Baseline SCr - 0.8-0.9  Monitor bmp daily  s/p 1L bolus    Infectious Diseases:  #Sepsis  p/w fever, HR >90, lactate 2.6  trend lactate  received 1L bolus in ED  UA positive  f/u blood cultures, urine cultures  tylenol prn  c/w Unasyn, started 7/2    Heme/onc:   #Anemia  Hb 11.5 on admission  baseline hb 9-10  takes iron at home  hold iron while npo    Endo:   #DM on insulin  f/u A1c  On lantus 13 + lispro sliding scale at home  c/w lantus 7 + sliding scale  Adjust insulin as indicated  FS ACHS or q6 while NPO  Maintain poct 140-180 while inpatient    Skin/ catheter:   Dumas catheter 7/2 for critical monitoring    Prophylaxis:   PPI  SCDs - Wife refusing lovenox due to history of abdominal bleed when receiving lovenox.    Goals of Care:   FULL CODE    Dispo:  Admit to ICU

## 2022-07-04 LAB
-  AMIKACIN: SIGNIFICANT CHANGE UP
-  AMOXICILLIN/CLAVULANIC ACID: SIGNIFICANT CHANGE UP
-  AMPICILLIN/SULBACTAM: SIGNIFICANT CHANGE UP
-  AMPICILLIN: SIGNIFICANT CHANGE UP
-  AMPICILLIN: SIGNIFICANT CHANGE UP
-  AZTREONAM: SIGNIFICANT CHANGE UP
-  CEFAZOLIN: SIGNIFICANT CHANGE UP
-  CEFEPIME: SIGNIFICANT CHANGE UP
-  CEFTRIAXONE: SIGNIFICANT CHANGE UP
-  CIPROFLOXACIN: SIGNIFICANT CHANGE UP
-  CIPROFLOXACIN: SIGNIFICANT CHANGE UP
-  ERTAPENEM: SIGNIFICANT CHANGE UP
-  GENTAMICIN: SIGNIFICANT CHANGE UP
-  IMIPENEM: SIGNIFICANT CHANGE UP
-  LEVOFLOXACIN: SIGNIFICANT CHANGE UP
-  LEVOFLOXACIN: SIGNIFICANT CHANGE UP
-  MEROPENEM: SIGNIFICANT CHANGE UP
-  NITROFURANTOIN: SIGNIFICANT CHANGE UP
-  NITROFURANTOIN: SIGNIFICANT CHANGE UP
-  PIPERACILLIN/TAZOBACTAM: SIGNIFICANT CHANGE UP
-  TETRACYCLINE: SIGNIFICANT CHANGE UP
-  TIGECYCLINE: SIGNIFICANT CHANGE UP
-  TOBRAMYCIN: SIGNIFICANT CHANGE UP
-  TRIMETHOPRIM/SULFAMETHOXAZOLE: SIGNIFICANT CHANGE UP
-  VANCOMYCIN: SIGNIFICANT CHANGE UP
ALBUMIN SERPL ELPH-MCNC: 1.7 G/DL — LOW (ref 3.5–5)
ALP SERPL-CCNC: 112 U/L — SIGNIFICANT CHANGE UP (ref 40–120)
ALT FLD-CCNC: 33 U/L DA — SIGNIFICANT CHANGE UP (ref 10–60)
ANION GAP SERPL CALC-SCNC: 7 MMOL/L — SIGNIFICANT CHANGE UP (ref 5–17)
AST SERPL-CCNC: 20 U/L — SIGNIFICANT CHANGE UP (ref 10–40)
BASE EXCESS BLDA CALC-SCNC: 1.5 MMOL/L — SIGNIFICANT CHANGE UP (ref -2–3)
BASOPHILS # BLD AUTO: 0.02 K/UL — SIGNIFICANT CHANGE UP (ref 0–0.2)
BASOPHILS NFR BLD AUTO: 0.1 % — SIGNIFICANT CHANGE UP (ref 0–2)
BILIRUB SERPL-MCNC: 0.3 MG/DL — SIGNIFICANT CHANGE UP (ref 0.2–1.2)
BLOOD GAS COMMENTS ARTERIAL: SIGNIFICANT CHANGE UP
BUN SERPL-MCNC: 58 MG/DL — HIGH (ref 7–18)
CALCIUM SERPL-MCNC: 8.7 MG/DL — SIGNIFICANT CHANGE UP (ref 8.4–10.5)
CHLORIDE SERPL-SCNC: 104 MMOL/L — SIGNIFICANT CHANGE UP (ref 96–108)
CO2 SERPL-SCNC: 26 MMOL/L — SIGNIFICANT CHANGE UP (ref 22–31)
CREAT SERPL-MCNC: 1.27 MG/DL — SIGNIFICANT CHANGE UP (ref 0.5–1.3)
CULTURE RESULTS: SIGNIFICANT CHANGE UP
CULTURE RESULTS: SIGNIFICANT CHANGE UP
EGFR: 62 ML/MIN/1.73M2 — SIGNIFICANT CHANGE UP
EOSINOPHIL # BLD AUTO: 0 K/UL — SIGNIFICANT CHANGE UP (ref 0–0.5)
EOSINOPHIL NFR BLD AUTO: 0 % — SIGNIFICANT CHANGE UP (ref 0–6)
GLUCOSE BLDC GLUCOMTR-MCNC: 351 MG/DL — HIGH (ref 70–99)
GLUCOSE BLDC GLUCOMTR-MCNC: 366 MG/DL — HIGH (ref 70–99)
GLUCOSE BLDC GLUCOMTR-MCNC: 400 MG/DL — HIGH (ref 70–99)
GLUCOSE BLDC GLUCOMTR-MCNC: 419 MG/DL — HIGH (ref 70–99)
GLUCOSE SERPL-MCNC: 401 MG/DL — HIGH (ref 70–99)
HCO3 BLDA-SCNC: 27 MMOL/L — SIGNIFICANT CHANGE UP (ref 21–28)
HCT VFR BLD CALC: 29.5 % — LOW (ref 39–50)
HGB BLD-MCNC: 9 G/DL — LOW (ref 13–17)
HOROWITZ INDEX BLDA+IHG-RTO: 60 — SIGNIFICANT CHANGE UP
IMM GRANULOCYTES NFR BLD AUTO: 0.7 % — SIGNIFICANT CHANGE UP (ref 0–1.5)
LEGIONELLA AG UR QL: NEGATIVE — SIGNIFICANT CHANGE UP
LYMPHOCYTES # BLD AUTO: 0.19 K/UL — LOW (ref 1–3.3)
LYMPHOCYTES # BLD AUTO: 1 % — LOW (ref 13–44)
MAGNESIUM SERPL-MCNC: 2.5 MG/DL — SIGNIFICANT CHANGE UP (ref 1.6–2.6)
MCHC RBC-ENTMCNC: 24.4 PG — LOW (ref 27–34)
MCHC RBC-ENTMCNC: 30.5 GM/DL — LOW (ref 32–36)
MCV RBC AUTO: 79.9 FL — LOW (ref 80–100)
METHOD TYPE: SIGNIFICANT CHANGE UP
METHOD TYPE: SIGNIFICANT CHANGE UP
MONOCYTES # BLD AUTO: 0.95 K/UL — HIGH (ref 0–0.9)
MONOCYTES NFR BLD AUTO: 5.2 % — SIGNIFICANT CHANGE UP (ref 2–14)
NEUTROPHILS # BLD AUTO: 16.98 K/UL — HIGH (ref 1.8–7.4)
NEUTROPHILS NFR BLD AUTO: 93 % — HIGH (ref 43–77)
NRBC # BLD: 0 /100 WBCS — SIGNIFICANT CHANGE UP (ref 0–0)
ORGANISM # SPEC MICROSCOPIC CNT: SIGNIFICANT CHANGE UP
PCO2 BLDA: 43 MMHG — SIGNIFICANT CHANGE UP (ref 35–48)
PH BLDA: 7.4 — SIGNIFICANT CHANGE UP (ref 7.35–7.45)
PHOSPHATE SERPL-MCNC: 2.6 MG/DL — SIGNIFICANT CHANGE UP (ref 2.5–4.5)
PLATELET # BLD AUTO: 450 K/UL — HIGH (ref 150–400)
PO2 BLDA: 76 MMHG — LOW (ref 83–108)
POTASSIUM SERPL-MCNC: 4.1 MMOL/L — SIGNIFICANT CHANGE UP (ref 3.5–5.3)
POTASSIUM SERPL-SCNC: 4.1 MMOL/L — SIGNIFICANT CHANGE UP (ref 3.5–5.3)
PROT SERPL-MCNC: 6.2 G/DL — SIGNIFICANT CHANGE UP (ref 6–8.3)
RBC # BLD: 3.69 M/UL — LOW (ref 4.2–5.8)
RBC # FLD: 19.9 % — HIGH (ref 10.3–14.5)
SAO2 % BLDA: 96 % — SIGNIFICANT CHANGE UP
SODIUM SERPL-SCNC: 137 MMOL/L — SIGNIFICANT CHANGE UP (ref 135–145)
SPECIMEN SOURCE: SIGNIFICANT CHANGE UP
SPECIMEN SOURCE: SIGNIFICANT CHANGE UP
WBC # BLD: 18.27 K/UL — HIGH (ref 3.8–10.5)
WBC # FLD AUTO: 18.27 K/UL — HIGH (ref 3.8–10.5)

## 2022-07-04 PROCEDURE — 99291 CRITICAL CARE FIRST HOUR: CPT

## 2022-07-04 PROCEDURE — 71045 X-RAY EXAM CHEST 1 VIEW: CPT | Mod: 26

## 2022-07-04 RX ORDER — INSULIN LISPRO 100/ML
VIAL (ML) SUBCUTANEOUS EVERY 6 HOURS
Refills: 0 | Status: DISCONTINUED | OUTPATIENT
Start: 2022-07-04 | End: 2022-07-07

## 2022-07-04 RX ORDER — PANTOPRAZOLE SODIUM 20 MG/1
40 TABLET, DELAYED RELEASE ORAL DAILY
Refills: 0 | Status: DISCONTINUED | OUTPATIENT
Start: 2022-07-05 | End: 2022-07-19

## 2022-07-04 RX ORDER — PANTOPRAZOLE SODIUM 20 MG/1
40 TABLET, DELAYED RELEASE ORAL DAILY
Refills: 0 | Status: DISCONTINUED | OUTPATIENT
Start: 2022-07-04 | End: 2022-07-04

## 2022-07-04 RX ORDER — ACETYLCYSTEINE 200 MG/ML
4 VIAL (ML) MISCELLANEOUS THREE TIMES A DAY
Refills: 0 | Status: DISCONTINUED | OUTPATIENT
Start: 2022-07-04 | End: 2022-07-06

## 2022-07-04 RX ORDER — INSULIN LISPRO 100/ML
VIAL (ML) SUBCUTANEOUS AT BEDTIME
Refills: 0 | Status: DISCONTINUED | OUTPATIENT
Start: 2022-07-04 | End: 2022-07-04

## 2022-07-04 RX ORDER — IPRATROPIUM/ALBUTEROL SULFATE 18-103MCG
3 AEROSOL WITH ADAPTER (GRAM) INHALATION EVERY 8 HOURS
Refills: 0 | Status: DISCONTINUED | OUTPATIENT
Start: 2022-07-04 | End: 2022-07-06

## 2022-07-04 RX ORDER — INSULIN GLARGINE 100 [IU]/ML
17 INJECTION, SOLUTION SUBCUTANEOUS AT BEDTIME
Refills: 0 | Status: DISCONTINUED | OUTPATIENT
Start: 2022-07-04 | End: 2022-07-06

## 2022-07-04 RX ADMIN — ALBUTEROL 2 PUFF(S): 90 AEROSOL, METERED ORAL at 12:07

## 2022-07-04 RX ADMIN — AMPICILLIN SODIUM AND SULBACTAM SODIUM 200 GRAM(S): 250; 125 INJECTION, POWDER, FOR SUSPENSION INTRAMUSCULAR; INTRAVENOUS at 11:15

## 2022-07-04 RX ADMIN — Medication 5: at 06:15

## 2022-07-04 RX ADMIN — Medication 10: at 17:44

## 2022-07-04 RX ADMIN — AMPICILLIN SODIUM AND SULBACTAM SODIUM 200 GRAM(S): 250; 125 INJECTION, POWDER, FOR SUSPENSION INTRAMUSCULAR; INTRAVENOUS at 05:14

## 2022-07-04 RX ADMIN — PANTOPRAZOLE SODIUM 40 MILLIGRAM(S): 20 TABLET, DELAYED RELEASE ORAL at 11:16

## 2022-07-04 RX ADMIN — Medication 3 MILLILITER(S): at 21:28

## 2022-07-04 RX ADMIN — Medication 6 MILLIGRAM(S): at 05:14

## 2022-07-04 RX ADMIN — AMPICILLIN SODIUM AND SULBACTAM SODIUM 200 GRAM(S): 250; 125 INJECTION, POWDER, FOR SUSPENSION INTRAMUSCULAR; INTRAVENOUS at 17:45

## 2022-07-04 RX ADMIN — CHLORHEXIDINE GLUCONATE 15 MILLILITER(S): 213 SOLUTION TOPICAL at 05:15

## 2022-07-04 RX ADMIN — SODIUM CHLORIDE 3 MILLILITER(S): 9 INJECTION INTRAMUSCULAR; INTRAVENOUS; SUBCUTANEOUS at 22:08

## 2022-07-04 RX ADMIN — Medication 10: at 23:02

## 2022-07-04 RX ADMIN — SODIUM CHLORIDE 3 MILLILITER(S): 9 INJECTION INTRAMUSCULAR; INTRAVENOUS; SUBCUTANEOUS at 06:35

## 2022-07-04 RX ADMIN — CHLORHEXIDINE GLUCONATE 1 APPLICATION(S): 213 SOLUTION TOPICAL at 05:15

## 2022-07-04 RX ADMIN — AMPICILLIN SODIUM AND SULBACTAM SODIUM 200 GRAM(S): 250; 125 INJECTION, POWDER, FOR SUSPENSION INTRAMUSCULAR; INTRAVENOUS at 23:01

## 2022-07-04 RX ADMIN — APIXABAN 5 MILLIGRAM(S): 2.5 TABLET, FILM COATED ORAL at 05:15

## 2022-07-04 RX ADMIN — SODIUM CHLORIDE 3 MILLILITER(S): 9 INJECTION INTRAMUSCULAR; INTRAVENOUS; SUBCUTANEOUS at 13:00

## 2022-07-04 RX ADMIN — ALBUTEROL 2 PUFF(S): 90 AEROSOL, METERED ORAL at 03:13

## 2022-07-04 RX ADMIN — Medication 12: at 11:57

## 2022-07-04 RX ADMIN — INSULIN GLARGINE 17 UNIT(S): 100 INJECTION, SOLUTION SUBCUTANEOUS at 23:03

## 2022-07-04 RX ADMIN — Medication 4 MILLILITER(S): at 21:33

## 2022-07-04 RX ADMIN — CHLORHEXIDINE GLUCONATE 15 MILLILITER(S): 213 SOLUTION TOPICAL at 17:46

## 2022-07-04 NOTE — DIETITIAN INITIAL EVALUATION ADULT - ADD RECOMMEND
Vent, NGT TF-->NPO  Vent, NGT TF-->NPO; Rec: Banana Flakes, Bacid if worsening diarrhea as medically feasible, may consider stool for CDT as medically feasible

## 2022-07-04 NOTE — DIETITIAN INITIAL EVALUATION ADULT - NSFNSGIIOFT_GEN_A_CORE
07-03-22 @ 07:01  -  07-04-22 @ 07:00  --------------------------------------------------------  OUT:  Total OUT: 0 mL    Total NET: 720 mL      07-04-22 @ 07:01  -  07-04-22 @ 10:59  --------------------------------------------------------  OUT:  Total OUT: 0 mL    Total NET: 90 mL

## 2022-07-04 NOTE — DIETITIAN INITIAL EVALUATION ADULT - ENTERAL
Goal: Glucerna 1.5 @ 50ml/h x 24h (1200ml, 1800kcal, 99g protein, 911ml water at goal)   MD to adjust free water as needed

## 2022-07-04 NOTE — PROGRESS NOTE ADULT - SUBJECTIVE AND OBJECTIVE BOX
Patient is a 67y old  Male who presents with a chief complaint of Pneumonia due to organism     (04 Jul 2022 10:59)    INTERVAL HISTORY/ OVERNIGHT EVENTS: hyperglycemic, lantus was increased and steroids discontinued, off propofol as well  POCUS reveals effusion on right side      PRESSORS: [ ] YES [X] NO    Antimicrobial:  ampicillin/sulbactam  IVPB 3 Gram(s) IV Intermittent every 6 hours    Cardiovascular:    Pulmonary:  acetylcysteine 10%  Inhalation 4 milliLiter(s) Inhalation three times a day  ALBUTerol    90 MICROgram(s) HFA Inhaler 2 Puff(s) Inhalation every 8 hours    Hematalogic:    Other:  chlorhexidine 0.12% Liquid 15 milliLiter(s) Oral Mucosa every 12 hours  chlorhexidine 2% Cloths 1 Application(s) Topical <User Schedule>  fentaNYL   Infusion 0.5 MICROgram(s)/kG/Hr IV Continuous <Continuous>  insulin glargine Injectable (LANTUS) 17 Unit(s) SubCutaneous at bedtime  insulin lispro (ADMELOG) corrective regimen sliding scale   SubCutaneous every 6 hours  ondansetron Injectable 4 milliGRAM(s) IV Push every 6 hours PRN  sodium chloride 0.9% lock flush 3 milliLiter(s) IV Push every 8 hours    acetylcysteine 10%  Inhalation 4 milliLiter(s) Inhalation three times a day  ALBUTerol    90 MICROgram(s) HFA Inhaler 2 Puff(s) Inhalation every 8 hours  ampicillin/sulbactam  IVPB 3 Gram(s) IV Intermittent every 6 hours  chlorhexidine 0.12% Liquid 15 milliLiter(s) Oral Mucosa every 12 hours  chlorhexidine 2% Cloths 1 Application(s) Topical <User Schedule>  fentaNYL   Infusion 0.5 MICROgram(s)/kG/Hr IV Continuous <Continuous>  insulin glargine Injectable (LANTUS) 17 Unit(s) SubCutaneous at bedtime  insulin lispro (ADMELOG) corrective regimen sliding scale   SubCutaneous every 6 hours  ondansetron Injectable 4 milliGRAM(s) IV Push every 6 hours PRN  sodium chloride 0.9% lock flush 3 milliLiter(s) IV Push every 8 hours    Drug Dosing Weight  Height (cm): 180.3 (02 Jul 2022 05:23)  Weight (kg): 107 (02 Jul 2022 09:10)  BMI (kg/m2): 32.9 (02 Jul 2022 09:10)  BSA (m2): 2.26 (02 Jul 2022 09:10)    CENTRAL LINE: [ ] YES [ ] NO  LOCATION:     RIBEIRO: [ ] YES [ ] NO      A-LINE:  [ ] YES [ ] NO  LOCATION:         ICU Vital Signs Last 24 Hrs  T(C): 36.6 (04 Jul 2022 13:00), Max: 37.1 (03 Jul 2022 21:00)  T(F): 97.9 (04 Jul 2022 13:00), Max: 98.8 (03 Jul 2022 21:00)  HR: 83 (04 Jul 2022 13:00) (71 - 95)  BP: 131/75 (04 Jul 2022 13:00) (112/69 - 147/85)  BP(mean): 90 (04 Jul 2022 13:00) (74 - 100)  ABP: --  ABP(mean): --  RR: 14 (04 Jul 2022 13:00) (13 - 34)  SpO2: 98% (04 Jul 2022 13:00) (93% - 99%)      ABG - ( 04 Jul 2022 03:19 )  pH, Arterial: 7.40  pH, Blood: x     /  pCO2: 43    /  pO2: 76    / HCO3: 27    / Base Excess: 1.5   /  SaO2: 96                    07-03 @ 07:01  -  07-04 @ 07:00  --------------------------------------------------------  IN: 1247.2 mL / OUT: 1200 mL / NET: 47.2 mL        Mode: AC/ CMV (Assist Control/ Continuous Mandatory Ventilation)  RR (machine): 16  TV (machine): 500  FiO2: 60  PEEP: 5  ITime: 1  MAP: 8  PIP: 22      PHYSICAL EXAM:    General - intubated + sedated  Eyes - PERRLA, EOM intact  ENT - Nonicteric sclerae, PERRLA, EOMI. Oropharynx clear. Moist mucous membranes. Conjunctivae appear well perfused.   Neck - No noticeable or palpable swelling, redness or rash around throat or on face  Lymph Nodes - No lymphadenopathy  Cardiovascular - RRR no m/r/g, no JVD, no carotid bruits  Lungs - (+) decreased breath sounds more on R than Left,   Skin - No rashes, skin warm and dry, no erythematous areas  Abdomen - Normal bowel sounds, abdomen soft and nontender  Extremities - No edema, cyanosis or clubbing  Musculoskeletal - no rigidity on passive movement of extremities  Neuro– sedated, pinpoint pupils on fentanyl, not currently following commands  BRIAN Ribeiro+    LABS:                        9.0    18.27 )-----------( 450      ( 04 Jul 2022 03:42 )             29.5     07-04    137  |  104  |  58<H>  ----------------------------<  401<H>  4.1   |  26  |  1.27    Ca    8.7      04 Jul 2022 03:42  Phos  2.6     07-04  Mg     2.5     07-04    TPro  6.2  /  Alb  1.7<L>  /  TBili  0.3  /  DBili  x   /  AST  20  /  ALT  33  /  AlkPhos  112  07-04    PT/INR - ( 03 Jul 2022 04:38 )   PT: 26.9 sec;   INR: 2.24 ratio         PTT - ( 03 Jul 2022 04:38 )  PTT:36.0 sec    CAPILLARY BLOOD GLUCOSE      POCT Blood Glucose.: 419 mg/dL (04 Jul 2022 11:19)  POCT Blood Glucose.: 400 mg/dL (04 Jul 2022 05:19)  POCT Blood Glucose.: 386 mg/dL (03 Jul 2022 22:48)  POCT Blood Glucose.: 345 mg/dL (03 Jul 2022 17:41)    Culture Results:   Normal Respiratory Kinjal present (07-02 @ 18:57)  Culture Results:   >100,000 CFU/ml Escherichia coli (07-02 @ 16:29)  Culture Results:   No growth to date. (07-02 @ 11:40)  Culture Results:   No growth to date. (07-02 @ 11:40)      RADIOLOGY & ADDITIONAL STUDIES REVIEWED:  ***

## 2022-07-04 NOTE — DIETITIAN INITIAL EVALUATION ADULT - PERTINENT LABORATORY DATA
07-04    137  |  104  |  58<H>  ----------------------------<  401<H>  4.1   |  26  |  1.27    Ca    8.7      04 Jul 2022 03:42  Phos  2.6     07-04  Mg     2.5     07-04    TPro  6.2  /  Alb  1.7<L>  /  TBili  0.3  /  DBili  x   /  AST  20  /  ALT  33  /  AlkPhos  112  07-04  POCT Blood Glucose.: 400 mg/dL (07-04-22 @ 05:19)  A1C with Estimated Average Glucose Result: 11.8 % (11-13-21 @ 11:39)

## 2022-07-04 NOTE — DIETITIAN INITIAL EVALUATION ADULT - FACTORS AFF FOOD INTAKE
acute on chronic comorbidities including Covid19 infection, AHRF, s/p intubation, uncontrolled DM/difficulty feeding self/difficulty swallowing

## 2022-07-04 NOTE — DIETITIAN INITIAL EVALUATION ADULT - PERTINENT MEDS FT
MEDICATIONS  (STANDING):  ALBUTerol    90 MICROgram(s) HFA Inhaler 2 Puff(s) Inhalation every 8 hours  ampicillin/sulbactam  IVPB 3 Gram(s) IV Intermittent every 6 hours  apixaban 5 milliGRAM(s) Oral every 12 hours  chlorhexidine 0.12% Liquid 15 milliLiter(s) Oral Mucosa every 12 hours  chlorhexidine 2% Cloths 1 Application(s) Topical <User Schedule>  dexAMETHasone  Injectable 6 milliGRAM(s) IV Push daily  fentaNYL   Infusion 0.5 MICROgram(s)/kG/Hr (5.35 mL/Hr) IV Continuous <Continuous>  insulin glargine Injectable (LANTUS) 17 Unit(s) SubCutaneous at bedtime  insulin lispro (ADMELOG) corrective regimen sliding scale   SubCutaneous every 6 hours  pantoprazole  Injectable 40 milliGRAM(s) IV Push daily  sodium chloride 0.9% lock flush 3 milliLiter(s) IV Push every 8 hours    MEDICATIONS  (PRN):  ondansetron Injectable 4 milliGRAM(s) IV Push every 6 hours PRN Nausea and/or Vomiting

## 2022-07-04 NOTE — DIETITIAN INITIAL EVALUATION ADULT - OTHER INFO
Pt lives home PTA, visited  Pt lives home PTA, COVID19+arley, in airborne/contact isolation room, unable to conduct a face to face interview due to limited contact restrictions related to pt's medical condition and isolation precautions, able to view via door window; sedated on vent support, NGT progressing per RN;  Pt lives home PTA, COVID19+arley, in airborne/contact isolation room, unable to conduct a face to face interview due to limited contact restrictions related to pt's medical condition and isolation precautions, able to view via door window; sedated on vent support, NGT progressing per RN when visited in the morning, now changed to NPO by MD; h/o DM, elevated finger stick levels noted; Allergy to nuts and salmon per chart  Pt lives home PTA, COVID19+arley, in airborne/contact isolation room, unable to conduct a face to face interview due to limited contact restrictions related to pt's medical condition and isolation precautions, able to view via door window; sedated on vent support, off Propofol, NGT progressing per RN when visited in the morning, now changed to NPO by MD; h/o DM, elevated finger stick levels noted; Allergy to nuts and salmon per chart  Pt lives home PTA, COVID19+arley, in airborne/contact isolation room, unable to conduct a face to face interview due to limited contact restrictions related to pt's medical condition and isolation precautions, able to view via door window; sedated on vent support, off Propofol, NGT progressing per RN when visited in the morning, but diarrhea reported, now changed to NPO by MD; h/o DM, elevated finger stick levels noted; Allergy to nuts and salmon per chart

## 2022-07-04 NOTE — PROGRESS NOTE ADULT - ASSESSMENT
67M from home, walks with walker, AOx3 w/ PMH CVA 11/2021 w/ R sided residual, afib on eliquis, dm on insulin, hld, rectal CA w/ R chemoport, BIBEMS for sob + fever x 4 days admitted to ICU for AHRF requiring intubation    Assessment:  #Acute Hypoxic Respiratory Failure  #Sepsis  #R Sided pna  #UTI  #DM  #Afib  #COVID-19 Infection  #Anemia  #AMELIA  #HTN  #HLD       Plan:  Neuro:  Intubated and sedated on fentanyl and propofol    Cardiovascular:  #AFib  CHADSVASC: 5  Hold eliquis for now, will consider resuming  on bisoprolol for rate control - hold for now  TTE 11/2021 with normal ef    #HTN  on Bisoprolol 10/ HCTZ 6.25  hold for now  resume when NGT placed and persistently hypertensive    Pulmonary:   #AHRF   - intubated for increased work of breathing.   - pt with respiratory alkalosis, will repeat abg and adjust vent settings accordingly    #R lung opacity/Pneumonia  - CXR with pleural effusion vs consolidation on CXR  - c/w Unasyn  - p/w sob, intubated in ED for respiratory distress  will obtain CT chest to further evaluate  holding apixaban for possible chest tube placement given R pleural effusion  - chest PT, mucomyst via ETT, albuterol    #Respiratory Alkalosis  #COVID infection    - obtain covid markers q72 hrs  - no indication for decadron/remdesivir    Gastrointestinal:  NPO for now  - placed NGT  - start TF     Renal:  #AMELIA  Pt w/ SCr 1.7 on admission  Baseline SCr - 0.8-0.9  Monitor bmp daily  s/p 1L bolus    Infectious Diseases:  #Sepsis  p/w fever, HR >90, lactate 2.6  trend lactate  received 1L bolus in ED  UA positive  f/u blood cultures, urine cultures  tylenol prn  c/w Unasyn, started 7/2    Heme/onc:   #Anemia  Hb 11.5 on admission  baseline hb 9-10  takes iron at home  hold iron while npo    Endo:   #DM on insulin  f/u A1c  On lantus 13 + lispro sliding scale at home  c/w lantus 17 + sliding scale  Adjust insulin as indicated  FS ACHS or q6 while NPO  Maintain poct 140-180 while inpatient    Skin/ catheter:   Dumas catheter 7/2 for critical monitoring    Prophylaxis:   PPI  SCDs - Wife refusing lovenox due to history of abdominal bleed when receiving lovenox.    Goals of Care:   FULL CODE    Dispo:  Admit to ICU

## 2022-07-05 LAB
ALBUMIN SERPL ELPH-MCNC: 1.7 G/DL — LOW (ref 3.5–5)
ALP SERPL-CCNC: 106 U/L — SIGNIFICANT CHANGE UP (ref 40–120)
ALT FLD-CCNC: 29 U/L DA — SIGNIFICANT CHANGE UP (ref 10–60)
ANION GAP SERPL CALC-SCNC: 6 MMOL/L — SIGNIFICANT CHANGE UP (ref 5–17)
AST SERPL-CCNC: 10 U/L — SIGNIFICANT CHANGE UP (ref 10–40)
BASE EXCESS BLDA CALC-SCNC: 5.9 MMOL/L — HIGH (ref -2–3)
BASOPHILS # BLD AUTO: 0.02 K/UL — SIGNIFICANT CHANGE UP (ref 0–0.2)
BASOPHILS NFR BLD AUTO: 0.1 % — SIGNIFICANT CHANGE UP (ref 0–2)
BILIRUB SERPL-MCNC: 0.3 MG/DL — SIGNIFICANT CHANGE UP (ref 0.2–1.2)
BLOOD GAS COMMENTS ARTERIAL: SIGNIFICANT CHANGE UP
BUN SERPL-MCNC: 51 MG/DL — HIGH (ref 7–18)
CALCIUM SERPL-MCNC: 9.3 MG/DL — SIGNIFICANT CHANGE UP (ref 8.4–10.5)
CHLORIDE SERPL-SCNC: 108 MMOL/L — SIGNIFICANT CHANGE UP (ref 96–108)
CO2 SERPL-SCNC: 28 MMOL/L — SIGNIFICANT CHANGE UP (ref 22–31)
CREAT SERPL-MCNC: 1.01 MG/DL — SIGNIFICANT CHANGE UP (ref 0.5–1.3)
EGFR: 82 ML/MIN/1.73M2 — SIGNIFICANT CHANGE UP
EOSINOPHIL # BLD AUTO: 0 K/UL — SIGNIFICANT CHANGE UP (ref 0–0.5)
EOSINOPHIL NFR BLD AUTO: 0 % — SIGNIFICANT CHANGE UP (ref 0–6)
GLUCOSE BLDC GLUCOMTR-MCNC: 309 MG/DL — HIGH (ref 70–99)
GLUCOSE SERPL-MCNC: 310 MG/DL — HIGH (ref 70–99)
HCO3 BLDA-SCNC: 30 MMOL/L — HIGH (ref 21–28)
HCT VFR BLD CALC: 28.1 % — LOW (ref 39–50)
HGB BLD-MCNC: 8.8 G/DL — LOW (ref 13–17)
HOROWITZ INDEX BLDA+IHG-RTO: 60 — SIGNIFICANT CHANGE UP
IMM GRANULOCYTES NFR BLD AUTO: 0.5 % — SIGNIFICANT CHANGE UP (ref 0–1.5)
LYMPHOCYTES # BLD AUTO: 0.27 K/UL — LOW (ref 1–3.3)
LYMPHOCYTES # BLD AUTO: 1.5 % — LOW (ref 13–44)
MAGNESIUM SERPL-MCNC: 2.6 MG/DL — SIGNIFICANT CHANGE UP (ref 1.6–2.6)
MCHC RBC-ENTMCNC: 24.5 PG — LOW (ref 27–34)
MCHC RBC-ENTMCNC: 31.3 GM/DL — LOW (ref 32–36)
MCV RBC AUTO: 78.3 FL — LOW (ref 80–100)
MONOCYTES # BLD AUTO: 1.02 K/UL — HIGH (ref 0–0.9)
MONOCYTES NFR BLD AUTO: 5.9 % — SIGNIFICANT CHANGE UP (ref 2–14)
NEUTROPHILS # BLD AUTO: 16.02 K/UL — HIGH (ref 1.8–7.4)
NEUTROPHILS NFR BLD AUTO: 92 % — HIGH (ref 43–77)
NRBC # BLD: 0 /100 WBCS — SIGNIFICANT CHANGE UP (ref 0–0)
PCO2 BLDA: 39 MMHG — SIGNIFICANT CHANGE UP (ref 35–48)
PH BLDA: 7.49 — HIGH (ref 7.35–7.45)
PHOSPHATE SERPL-MCNC: 1.8 MG/DL — LOW (ref 2.5–4.5)
PLATELET # BLD AUTO: 489 K/UL — HIGH (ref 150–400)
PO2 BLDA: 125 MMHG — HIGH (ref 83–108)
POTASSIUM SERPL-MCNC: 4.3 MMOL/L — SIGNIFICANT CHANGE UP (ref 3.5–5.3)
POTASSIUM SERPL-SCNC: 4.3 MMOL/L — SIGNIFICANT CHANGE UP (ref 3.5–5.3)
PROT SERPL-MCNC: 6.3 G/DL — SIGNIFICANT CHANGE UP (ref 6–8.3)
RBC # BLD: 3.59 M/UL — LOW (ref 4.2–5.8)
RBC # FLD: 19.9 % — HIGH (ref 10.3–14.5)
SAO2 % BLDA: 99 % — SIGNIFICANT CHANGE UP
SODIUM SERPL-SCNC: 142 MMOL/L — SIGNIFICANT CHANGE UP (ref 135–145)
WBC # BLD: 17.42 K/UL — HIGH (ref 3.8–10.5)
WBC # FLD AUTO: 17.42 K/UL — HIGH (ref 3.8–10.5)

## 2022-07-05 PROCEDURE — 71045 X-RAY EXAM CHEST 1 VIEW: CPT | Mod: 26

## 2022-07-05 RX ORDER — MUPIROCIN 20 MG/G
1 OINTMENT TOPICAL
Refills: 0 | Status: DISCONTINUED | OUTPATIENT
Start: 2022-07-05 | End: 2022-07-05

## 2022-07-05 RX ORDER — MUPIROCIN 20 MG/G
1 OINTMENT TOPICAL
Refills: 0 | Status: DISCONTINUED | OUTPATIENT
Start: 2022-07-05 | End: 2022-07-15

## 2022-07-05 RX ORDER — POTASSIUM PHOSPHATE, MONOBASIC POTASSIUM PHOSPHATE, DIBASIC 236; 224 MG/ML; MG/ML
30 INJECTION, SOLUTION INTRAVENOUS ONCE
Refills: 0 | Status: COMPLETED | OUTPATIENT
Start: 2022-07-05 | End: 2022-07-05

## 2022-07-05 RX ORDER — DEXMEDETOMIDINE HYDROCHLORIDE IN 0.9% SODIUM CHLORIDE 4 UG/ML
0.6 INJECTION INTRAVENOUS
Qty: 400 | Refills: 0 | Status: DISCONTINUED | OUTPATIENT
Start: 2022-07-05 | End: 2022-07-06

## 2022-07-05 RX ADMIN — Medication 8: at 05:34

## 2022-07-05 RX ADMIN — Medication 3 MILLILITER(S): at 20:16

## 2022-07-05 RX ADMIN — AMPICILLIN SODIUM AND SULBACTAM SODIUM 200 GRAM(S): 250; 125 INJECTION, POWDER, FOR SUSPENSION INTRAMUSCULAR; INTRAVENOUS at 23:33

## 2022-07-05 RX ADMIN — Medication 3 MILLILITER(S): at 05:05

## 2022-07-05 RX ADMIN — Medication 6: at 23:33

## 2022-07-05 RX ADMIN — Medication 4 MILLILITER(S): at 20:16

## 2022-07-05 RX ADMIN — FENTANYL CITRATE 5.35 MICROGRAM(S)/KG/HR: 50 INJECTION INTRAVENOUS at 15:24

## 2022-07-05 RX ADMIN — AMPICILLIN SODIUM AND SULBACTAM SODIUM 200 GRAM(S): 250; 125 INJECTION, POWDER, FOR SUSPENSION INTRAMUSCULAR; INTRAVENOUS at 11:37

## 2022-07-05 RX ADMIN — CHLORHEXIDINE GLUCONATE 15 MILLILITER(S): 213 SOLUTION TOPICAL at 05:35

## 2022-07-05 RX ADMIN — Medication 3 MILLILITER(S): at 16:06

## 2022-07-05 RX ADMIN — DEXMEDETOMIDINE HYDROCHLORIDE IN 0.9% SODIUM CHLORIDE 5.35 MICROGRAM(S)/KG/HR: 4 INJECTION INTRAVENOUS at 15:25

## 2022-07-05 RX ADMIN — AMPICILLIN SODIUM AND SULBACTAM SODIUM 200 GRAM(S): 250; 125 INJECTION, POWDER, FOR SUSPENSION INTRAMUSCULAR; INTRAVENOUS at 05:35

## 2022-07-05 RX ADMIN — PANTOPRAZOLE SODIUM 40 MILLIGRAM(S): 20 TABLET, DELAYED RELEASE ORAL at 11:42

## 2022-07-05 RX ADMIN — SODIUM CHLORIDE 3 MILLILITER(S): 9 INJECTION INTRAMUSCULAR; INTRAVENOUS; SUBCUTANEOUS at 05:49

## 2022-07-05 RX ADMIN — Medication 4 MILLILITER(S): at 05:05

## 2022-07-05 RX ADMIN — SODIUM CHLORIDE 3 MILLILITER(S): 9 INJECTION INTRAMUSCULAR; INTRAVENOUS; SUBCUTANEOUS at 22:59

## 2022-07-05 RX ADMIN — Medication 8: at 17:22

## 2022-07-05 RX ADMIN — INSULIN GLARGINE 17 UNIT(S): 100 INJECTION, SOLUTION SUBCUTANEOUS at 23:31

## 2022-07-05 RX ADMIN — AMPICILLIN SODIUM AND SULBACTAM SODIUM 200 GRAM(S): 250; 125 INJECTION, POWDER, FOR SUSPENSION INTRAMUSCULAR; INTRAVENOUS at 17:24

## 2022-07-05 RX ADMIN — Medication 4 MILLILITER(S): at 16:04

## 2022-07-05 RX ADMIN — DEXMEDETOMIDINE HYDROCHLORIDE IN 0.9% SODIUM CHLORIDE 16.1 MICROGRAM(S)/KG/HR: 4 INJECTION INTRAVENOUS at 23:35

## 2022-07-05 RX ADMIN — CHLORHEXIDINE GLUCONATE 1 APPLICATION(S): 213 SOLUTION TOPICAL at 05:35

## 2022-07-05 RX ADMIN — FENTANYL CITRATE 5.35 MICROGRAM(S)/KG/HR: 50 INJECTION INTRAVENOUS at 23:35

## 2022-07-05 RX ADMIN — CHLORHEXIDINE GLUCONATE 15 MILLILITER(S): 213 SOLUTION TOPICAL at 17:55

## 2022-07-05 RX ADMIN — Medication 8: at 11:37

## 2022-07-05 RX ADMIN — SODIUM CHLORIDE 3 MILLILITER(S): 9 INJECTION INTRAMUSCULAR; INTRAVENOUS; SUBCUTANEOUS at 14:08

## 2022-07-05 RX ADMIN — POTASSIUM PHOSPHATE, MONOBASIC POTASSIUM PHOSPHATE, DIBASIC 83.33 MILLIMOLE(S): 236; 224 INJECTION, SOLUTION INTRAVENOUS at 06:27

## 2022-07-06 LAB
ALBUMIN SERPL ELPH-MCNC: 1.8 G/DL — LOW (ref 3.5–5)
ALP SERPL-CCNC: 100 U/L — SIGNIFICANT CHANGE UP (ref 40–120)
ALT FLD-CCNC: 22 U/L DA — SIGNIFICANT CHANGE UP (ref 10–60)
ANION GAP SERPL CALC-SCNC: 4 MMOL/L — LOW (ref 5–17)
ANISOCYTOSIS BLD QL: SLIGHT — SIGNIFICANT CHANGE UP
AST SERPL-CCNC: 8 U/L — LOW (ref 10–40)
BASOPHILS # BLD AUTO: 0 K/UL — SIGNIFICANT CHANGE UP (ref 0–0.2)
BASOPHILS NFR BLD AUTO: 0 % — SIGNIFICANT CHANGE UP (ref 0–2)
BILIRUB SERPL-MCNC: 0.4 MG/DL — SIGNIFICANT CHANGE UP (ref 0.2–1.2)
BITE CELLS BLD QL SMEAR: PRESENT — SIGNIFICANT CHANGE UP
BUN SERPL-MCNC: 34 MG/DL — HIGH (ref 7–18)
CALCIUM SERPL-MCNC: 9.4 MG/DL — SIGNIFICANT CHANGE UP (ref 8.4–10.5)
CHLORIDE SERPL-SCNC: 110 MMOL/L — HIGH (ref 96–108)
CO2 SERPL-SCNC: 29 MMOL/L — SIGNIFICANT CHANGE UP (ref 22–31)
CREAT SERPL-MCNC: 0.84 MG/DL — SIGNIFICANT CHANGE UP (ref 0.5–1.3)
DACRYOCYTES BLD QL SMEAR: SLIGHT — SIGNIFICANT CHANGE UP
EGFR: 96 ML/MIN/1.73M2 — SIGNIFICANT CHANGE UP
EOSINOPHIL # BLD AUTO: 0.13 K/UL — SIGNIFICANT CHANGE UP (ref 0–0.5)
EOSINOPHIL NFR BLD AUTO: 1 % — SIGNIFICANT CHANGE UP (ref 0–6)
GLUCOSE BLDC GLUCOMTR-MCNC: 205 MG/DL — HIGH (ref 70–99)
GLUCOSE BLDC GLUCOMTR-MCNC: 259 MG/DL — HIGH (ref 70–99)
GLUCOSE BLDC GLUCOMTR-MCNC: 294 MG/DL — HIGH (ref 70–99)
GLUCOSE BLDC GLUCOMTR-MCNC: 315 MG/DL — HIGH (ref 70–99)
GLUCOSE SERPL-MCNC: 327 MG/DL — HIGH (ref 70–99)
HCT VFR BLD CALC: 31.3 % — LOW (ref 39–50)
HGB BLD-MCNC: 9.5 G/DL — LOW (ref 13–17)
HYPOCHROMIA BLD QL: SLIGHT — SIGNIFICANT CHANGE UP
INR BLD: 1.62 RATIO — HIGH (ref 0.88–1.16)
LYMPHOCYTES # BLD AUTO: 0.78 K/UL — LOW (ref 1–3.3)
LYMPHOCYTES # BLD AUTO: 6 % — LOW (ref 13–44)
MAGNESIUM SERPL-MCNC: 2.3 MG/DL — SIGNIFICANT CHANGE UP (ref 1.6–2.6)
MANUAL SMEAR VERIFICATION: SIGNIFICANT CHANGE UP
MCHC RBC-ENTMCNC: 24 PG — LOW (ref 27–34)
MCHC RBC-ENTMCNC: 30.4 GM/DL — LOW (ref 32–36)
MCV RBC AUTO: 79 FL — LOW (ref 80–100)
MICROCYTES BLD QL: SLIGHT — SIGNIFICANT CHANGE UP
MONOCYTES # BLD AUTO: 1.17 K/UL — HIGH (ref 0–0.9)
MONOCYTES NFR BLD AUTO: 9 % — SIGNIFICANT CHANGE UP (ref 2–14)
NEUTROPHILS # BLD AUTO: 10.95 K/UL — HIGH (ref 1.8–7.4)
NEUTROPHILS NFR BLD AUTO: 84 % — HIGH (ref 43–77)
NRBC # BLD: 0 /100 — SIGNIFICANT CHANGE UP (ref 0–0)
OVALOCYTES BLD QL SMEAR: SLIGHT — SIGNIFICANT CHANGE UP
PHOSPHATE SERPL-MCNC: 2.7 MG/DL — SIGNIFICANT CHANGE UP (ref 2.5–4.5)
PLAT MORPH BLD: NORMAL — SIGNIFICANT CHANGE UP
PLATELET # BLD AUTO: 468 K/UL — HIGH (ref 150–400)
PLATELET COUNT - ESTIMATE: NORMAL — SIGNIFICANT CHANGE UP
POIKILOCYTOSIS BLD QL AUTO: SLIGHT — SIGNIFICANT CHANGE UP
POTASSIUM SERPL-MCNC: 4.3 MMOL/L — SIGNIFICANT CHANGE UP (ref 3.5–5.3)
POTASSIUM SERPL-SCNC: 4.3 MMOL/L — SIGNIFICANT CHANGE UP (ref 3.5–5.3)
PROT SERPL-MCNC: 6.5 G/DL — SIGNIFICANT CHANGE UP (ref 6–8.3)
PROTHROM AB SERPL-ACNC: 19.4 SEC — HIGH (ref 10.5–13.4)
RBC # BLD: 3.96 M/UL — LOW (ref 4.2–5.8)
RBC # FLD: 20.7 % — HIGH (ref 10.3–14.5)
RBC BLD AUTO: ABNORMAL
SMUDGE CELLS # BLD: PRESENT — SIGNIFICANT CHANGE UP
SODIUM SERPL-SCNC: 143 MMOL/L — SIGNIFICANT CHANGE UP (ref 135–145)
WBC # BLD: 13.03 K/UL — HIGH (ref 3.8–10.5)
WBC # FLD AUTO: 13.03 K/UL — HIGH (ref 3.8–10.5)

## 2022-07-06 PROCEDURE — 99291 CRITICAL CARE FIRST HOUR: CPT

## 2022-07-06 RX ORDER — INSULIN HUMAN 100 [IU]/ML
5 INJECTION, SOLUTION SUBCUTANEOUS ONCE
Refills: 0 | Status: DISCONTINUED | OUTPATIENT
Start: 2022-07-06 | End: 2022-07-06

## 2022-07-06 RX ORDER — INSULIN GLARGINE 100 [IU]/ML
25 INJECTION, SOLUTION SUBCUTANEOUS AT BEDTIME
Refills: 0 | Status: DISCONTINUED | OUTPATIENT
Start: 2022-07-06 | End: 2022-07-19

## 2022-07-06 RX ORDER — ACETYLCYSTEINE 200 MG/ML
4 VIAL (ML) MISCELLANEOUS THREE TIMES A DAY
Refills: 0 | Status: DISCONTINUED | OUTPATIENT
Start: 2022-07-06 | End: 2022-07-06

## 2022-07-06 RX ORDER — ALBUTEROL 90 UG/1
2 AEROSOL, METERED ORAL EVERY 8 HOURS
Refills: 0 | Status: DISCONTINUED | OUTPATIENT
Start: 2022-07-06 | End: 2022-07-19

## 2022-07-06 RX ADMIN — Medication 4: at 23:44

## 2022-07-06 RX ADMIN — Medication 4 MILLILITER(S): at 15:03

## 2022-07-06 RX ADMIN — Medication 4 MILLILITER(S): at 04:22

## 2022-07-06 RX ADMIN — AMPICILLIN SODIUM AND SULBACTAM SODIUM 200 GRAM(S): 250; 125 INJECTION, POWDER, FOR SUSPENSION INTRAMUSCULAR; INTRAVENOUS at 17:05

## 2022-07-06 RX ADMIN — SODIUM CHLORIDE 3 MILLILITER(S): 9 INJECTION INTRAMUSCULAR; INTRAVENOUS; SUBCUTANEOUS at 21:09

## 2022-07-06 RX ADMIN — CHLORHEXIDINE GLUCONATE 15 MILLILITER(S): 213 SOLUTION TOPICAL at 05:32

## 2022-07-06 RX ADMIN — SODIUM CHLORIDE 3 MILLILITER(S): 9 INJECTION INTRAMUSCULAR; INTRAVENOUS; SUBCUTANEOUS at 13:21

## 2022-07-06 RX ADMIN — Medication 6: at 12:40

## 2022-07-06 RX ADMIN — Medication 3 MILLILITER(S): at 04:22

## 2022-07-06 RX ADMIN — MUPIROCIN 1 APPLICATION(S): 20 OINTMENT TOPICAL at 17:04

## 2022-07-06 RX ADMIN — PANTOPRAZOLE SODIUM 40 MILLIGRAM(S): 20 TABLET, DELAYED RELEASE ORAL at 11:20

## 2022-07-06 RX ADMIN — MUPIROCIN 1 APPLICATION(S): 20 OINTMENT TOPICAL at 05:31

## 2022-07-06 RX ADMIN — Medication 6: at 17:03

## 2022-07-06 RX ADMIN — AMPICILLIN SODIUM AND SULBACTAM SODIUM 200 GRAM(S): 250; 125 INJECTION, POWDER, FOR SUSPENSION INTRAMUSCULAR; INTRAVENOUS at 23:44

## 2022-07-06 RX ADMIN — AMPICILLIN SODIUM AND SULBACTAM SODIUM 200 GRAM(S): 250; 125 INJECTION, POWDER, FOR SUSPENSION INTRAMUSCULAR; INTRAVENOUS at 11:20

## 2022-07-06 RX ADMIN — ALBUTEROL 2 PUFF(S): 90 AEROSOL, METERED ORAL at 21:23

## 2022-07-06 RX ADMIN — DEXMEDETOMIDINE HYDROCHLORIDE IN 0.9% SODIUM CHLORIDE 16.1 MICROGRAM(S)/KG/HR: 4 INJECTION INTRAVENOUS at 05:31

## 2022-07-06 RX ADMIN — AMPICILLIN SODIUM AND SULBACTAM SODIUM 200 GRAM(S): 250; 125 INJECTION, POWDER, FOR SUSPENSION INTRAMUSCULAR; INTRAVENOUS at 05:31

## 2022-07-06 RX ADMIN — CHLORHEXIDINE GLUCONATE 1 APPLICATION(S): 213 SOLUTION TOPICAL at 05:31

## 2022-07-06 RX ADMIN — SODIUM CHLORIDE 3 MILLILITER(S): 9 INJECTION INTRAMUSCULAR; INTRAVENOUS; SUBCUTANEOUS at 07:32

## 2022-07-06 RX ADMIN — Medication 8: at 07:19

## 2022-07-06 RX ADMIN — INSULIN GLARGINE 25 UNIT(S): 100 INJECTION, SOLUTION SUBCUTANEOUS at 21:23

## 2022-07-06 RX ADMIN — Medication 3 MILLILITER(S): at 15:03

## 2022-07-06 NOTE — PHARMACOTHERAPY INTERVENTION NOTE - COMMENTS
Catheter culture is positive for E. faecalis and E. coli ESBL, recommended switching from Unasyn (I for E. coli ESBL & S for E. faecalis) to Levofloxacin for more coverage, but will monitor for now since the patient is clinically improving.

## 2022-07-06 NOTE — PROGRESS NOTE ADULT - SUBJECTIVE AND OBJECTIVE BOX
Patient is a 67y old  Male who presents with a chief complaint of AHRF, Sepsis (06 Jul 2022 16:09)    INTERVAL HISTORY/ OVERNIGHT EVENTS: Patient was examined while he was lying in bed, Aox3 (time, place, person), responding to questions/commands, in NAD/ventilated/O2 by NC. He denies headache, weakness, cough, chest pain, wheezing, abdominal pain. Bowel movement's, Ribeiro catheter placed. He is tolerating ___ diet with no nausea or vomiting.      PRESSORS: [ ] YES [X ] NO  WHICH:    ANTIBIOTICS:                  DATE STARTED:  ANTIBIOTICS:                  DATE STARTED:  ANTIBIOTICS:                  DATE STARTED:    Antimicrobial:  ampicillin/sulbactam  IVPB 3 Gram(s) IV Intermittent every 6 hours    Cardiovascular:    Pulmonary:  acetylcysteine 10%  Inhalation 4 milliLiter(s) Inhalation three times a day  albuterol/ipratropium for Nebulization 3 milliLiter(s) Nebulizer every 8 hours    Hematalogic:    Other:  chlorhexidine 0.12% Liquid 15 milliLiter(s) Oral Mucosa every 12 hours  chlorhexidine 2% Cloths 1 Application(s) Topical <User Schedule>  dexMEDEtomidine Infusion 0.6 MICROgram(s)/kG/Hr IV Continuous <Continuous>  insulin glargine Injectable (LANTUS) 25 Unit(s) SubCutaneous at bedtime  insulin lispro (ADMELOG) corrective regimen sliding scale   SubCutaneous every 6 hours  mupirocin 2% Ointment 1 Application(s) Topical two times a day  ondansetron Injectable 4 milliGRAM(s) IV Push every 6 hours PRN  pantoprazole   Suspension 40 milliGRAM(s) Oral daily  sodium chloride 0.9% lock flush 3 milliLiter(s) IV Push every 8 hours    acetylcysteine 10%  Inhalation 4 milliLiter(s) Inhalation three times a day  albuterol/ipratropium for Nebulization 3 milliLiter(s) Nebulizer every 8 hours  ampicillin/sulbactam  IVPB 3 Gram(s) IV Intermittent every 6 hours  chlorhexidine 0.12% Liquid 15 milliLiter(s) Oral Mucosa every 12 hours  chlorhexidine 2% Cloths 1 Application(s) Topical <User Schedule>  dexMEDEtomidine Infusion 0.6 MICROgram(s)/kG/Hr IV Continuous <Continuous>  insulin glargine Injectable (LANTUS) 25 Unit(s) SubCutaneous at bedtime  insulin lispro (ADMELOG) corrective regimen sliding scale   SubCutaneous every 6 hours  mupirocin 2% Ointment 1 Application(s) Topical two times a day  ondansetron Injectable 4 milliGRAM(s) IV Push every 6 hours PRN  pantoprazole   Suspension 40 milliGRAM(s) Oral daily  sodium chloride 0.9% lock flush 3 milliLiter(s) IV Push every 8 hours    Drug Dosing Weight  Height (cm): 180.3 (02 Jul 2022 05:23)  Weight (kg): 107 (02 Jul 2022 09:10)  BMI (kg/m2): 32.9 (02 Jul 2022 09:10)  BSA (m2): 2.26 (02 Jul 2022 09:10)    CENTRAL LINE: [ ] YES [X ] NO  LOCATION:     RIBEIRO: [X ] YES [ ] NO      A-LINE:  [ ] YES [ X] NO  LOCATION:         ICU Vital Signs Last 24 Hrs  T(C): 36.5 (06 Jul 2022 07:00), Max: 37.2 (05 Jul 2022 23:00)  T(F): 97.7 (06 Jul 2022 07:00), Max: 99 (05 Jul 2022 23:00)  HR: 83 (06 Jul 2022 16:00) (59 - 84)  BP: 135/96 (06 Jul 2022 16:00) (120/75 - 186/85)  BP(mean): 105 (06 Jul 2022 16:00) (86 - 124)  ABP: --  ABP(mean): --  RR: 23 (06 Jul 2022 16:00) (16 - 26)  SpO2: 96% (06 Jul 2022 16:00) (96% - 100%)      ABG - ( 05 Jul 2022 04:01 )  pH, Arterial: 7.49  pH, Blood: x     /  pCO2: 39    /  pO2: 125   / HCO3: 30    / Base Excess: 5.9   /  SaO2: 99                    07-05 @ 07:01  -  07-06 @ 07:00  --------------------------------------------------------  IN: 885.4 mL / OUT: 950 mL / NET: -64.6 mL        Mode: AC/ CMV (Assist Control/ Continuous Mandatory Ventilation)  RR (machine): 16  TV (machine): 500  FiO2: 40  PEEP: 5  ITime: 1  MAP: 10  PIP: 30      PHYSICAL EXAM:  General - sedated  Eyes - PERRLA, EOM intact  ENT - Nonicteric sclerae, PERRLA, EOMI. Oropharynx clear. Moist mucous membranes. Conjunctivae appear well perfused.   Neck - No noticeable or palpable swelling, redness or rash around throat or on face  Lymph Nodes - No lymphadenopathy  Cardiovascular - RRR no m/r/g, no JVD, no carotid bruits  Lungs - (+) decreased breath sounds more on R than Left,   Skin - No rashes, skin warm and dry, no erythematous areas  Abdomen - Normal bowel sounds, abdomen soft and nontender  Extremities - No edema, cyanosis or clubbing  Musculoskeletal - no rigidity on passive movement of extremities  Neuro– sedated, follows some commands  BRIAN Ribeiro+    LABS:                        9.5    13.03 )-----------( 468      ( 06 Jul 2022 03:53 )             31.3     07-06    143  |  110<H>  |  34<H>  ----------------------------<  327<H>  4.3   |  29  |  0.84    Ca    9.4      06 Jul 2022 03:53  Phos  2.7     07-06  Mg     2.3     07-06    TPro  6.5  /  Alb  1.8<L>  /  TBili  0.4  /  DBili  x   /  AST  8<L>  /  ALT  22  /  AlkPhos  100  07-06    PT/INR - ( 06 Jul 2022 03:53 )   PT: 19.4 sec;   INR: 1.62 ratio             CAPILLARY BLOOD GLUCOSE      POCT Blood Glucose.: 294 mg/dL (06 Jul 2022 12:27)  POCT Blood Glucose.: 315 mg/dL (06 Jul 2022 07:06)        RADIOLOGY & ADDITIONAL STUDIES REVIEWED:  ***     Patient is a 67y old  Male who presents with a chief complaint of AHRF, Sepsis (06 Jul 2022 16:09)    INTERVAL HISTORY/ OVERNIGHT EVENTS: Patient pulled out shea, voided in AM. extubated this afternoon    PRESSORS: [ ] YES [X ] NO    Antimicrobial:  ampicillin/sulbactam  IVPB 3 Gram(s) IV Intermittent every 6 hours    Cardiovascular:    Pulmonary:  acetylcysteine 10%  Inhalation 4 milliLiter(s) Inhalation three times a day  albuterol/ipratropium for Nebulization 3 milliLiter(s) Nebulizer every 8 hours    Hematalogic:    Other:  chlorhexidine 0.12% Liquid 15 milliLiter(s) Oral Mucosa every 12 hours  chlorhexidine 2% Cloths 1 Application(s) Topical <User Schedule>  dexMEDEtomidine Infusion 0.6 MICROgram(s)/kG/Hr IV Continuous <Continuous>  insulin glargine Injectable (LANTUS) 25 Unit(s) SubCutaneous at bedtime  insulin lispro (ADMELOG) corrective regimen sliding scale   SubCutaneous every 6 hours  mupirocin 2% Ointment 1 Application(s) Topical two times a day  ondansetron Injectable 4 milliGRAM(s) IV Push every 6 hours PRN  pantoprazole   Suspension 40 milliGRAM(s) Oral daily  sodium chloride 0.9% lock flush 3 milliLiter(s) IV Push every 8 hours    acetylcysteine 10%  Inhalation 4 milliLiter(s) Inhalation three times a day  albuterol/ipratropium for Nebulization 3 milliLiter(s) Nebulizer every 8 hours  ampicillin/sulbactam  IVPB 3 Gram(s) IV Intermittent every 6 hours  chlorhexidine 0.12% Liquid 15 milliLiter(s) Oral Mucosa every 12 hours  chlorhexidine 2% Cloths 1 Application(s) Topical <User Schedule>  dexMEDEtomidine Infusion 0.6 MICROgram(s)/kG/Hr IV Continuous <Continuous>  insulin glargine Injectable (LANTUS) 25 Unit(s) SubCutaneous at bedtime  insulin lispro (ADMELOG) corrective regimen sliding scale   SubCutaneous every 6 hours  mupirocin 2% Ointment 1 Application(s) Topical two times a day  ondansetron Injectable 4 milliGRAM(s) IV Push every 6 hours PRN  pantoprazole   Suspension 40 milliGRAM(s) Oral daily  sodium chloride 0.9% lock flush 3 milliLiter(s) IV Push every 8 hours    Drug Dosing Weight  Height (cm): 180.3 (02 Jul 2022 05:23)  Weight (kg): 107 (02 Jul 2022 09:10)  BMI (kg/m2): 32.9 (02 Jul 2022 09:10)  BSA (m2): 2.26 (02 Jul 2022 09:10)    CENTRAL LINE: [ ] YES [X ] NO  LOCATION:     SHEA: [X ] YES [ ] NO      A-LINE:  [ ] YES [ X] NO  LOCATION:         ICU Vital Signs Last 24 Hrs  T(C): 36.5 (06 Jul 2022 07:00), Max: 37.2 (05 Jul 2022 23:00)  T(F): 97.7 (06 Jul 2022 07:00), Max: 99 (05 Jul 2022 23:00)  HR: 83 (06 Jul 2022 16:00) (59 - 84)  BP: 135/96 (06 Jul 2022 16:00) (120/75 - 186/85)  BP(mean): 105 (06 Jul 2022 16:00) (86 - 124)  ABP: --  ABP(mean): --  RR: 23 (06 Jul 2022 16:00) (16 - 26)  SpO2: 96% (06 Jul 2022 16:00) (96% - 100%)      ABG - ( 05 Jul 2022 04:01 )  pH, Arterial: 7.49  pH, Blood: x     /  pCO2: 39    /  pO2: 125   / HCO3: 30    / Base Excess: 5.9   /  SaO2: 99                    07-05 @ 07:01  -  07-06 @ 07:00  --------------------------------------------------------  IN: 885.4 mL / OUT: 950 mL / NET: -64.6 mL        Mode: AC/ CMV (Assist Control/ Continuous Mandatory Ventilation)  RR (machine): 16  TV (machine): 500  FiO2: 40  PEEP: 5  ITime: 1  MAP: 10  PIP: 30      PHYSICAL EXAM:  General - sedated  Eyes - PERRLA, EOM intact  ENT - Nonicteric sclerae, PERRLA, EOMI. Oropharynx clear. Moist mucous membranes. Conjunctivae appear well perfused.   Neck - No noticeable or palpable swelling, redness or rash around throat or on face  Lymph Nodes - No lymphadenopathy  Cardiovascular - RRR no m/r/g, no JVD, no carotid bruits  Lungs - (+) decreased breath sounds more on R than Left,   Skin - No rashes, skin warm and dry, no erythematous areas  Abdomen - Normal bowel sounds, abdomen soft and nontender  Extremities - No edema, cyanosis or clubbing  Musculoskeletal - no rigidity on passive movement of extremities  Neuro– sedated, follows some commands  - voiding, primafit    LABS:                        9.5    13.03 )-----------( 468      ( 06 Jul 2022 03:53 )             31.3     07-06    143  |  110<H>  |  34<H>  ----------------------------<  327<H>  4.3   |  29  |  0.84    Ca    9.4      06 Jul 2022 03:53  Phos  2.7     07-06  Mg     2.3     07-06    TPro  6.5  /  Alb  1.8<L>  /  TBili  0.4  /  DBili  x   /  AST  8<L>  /  ALT  22  /  AlkPhos  100  07-06    PT/INR - ( 06 Jul 2022 03:53 )   PT: 19.4 sec;   INR: 1.62 ratio             CAPILLARY BLOOD GLUCOSE      POCT Blood Glucose.: 294 mg/dL (06 Jul 2022 12:27)  POCT Blood Glucose.: 315 mg/dL (06 Jul 2022 07:06)        RADIOLOGY & ADDITIONAL STUDIES REVIEWED:  ***

## 2022-07-06 NOTE — PROGRESS NOTE ADULT - ASSESSMENT
67M from home, walks with walker, AOx3 w/ PMH CVA 11/2021 w/ R sided residual, afib on eliquis, dm on insulin, hld, rectal CA w/ R chemoport, BIBEMS for sob + fever x 4 days admitted to ICU for AHRF requiring intubation    Assessment:  #Acute Hypoxic Respiratory Failure  #Sepsis  #R Sided pna  #UTI  #DM  #Afib  #COVID-19 Infection  #Anemia  #AMELIA  #HTN  #HLD       Plan:  Neuro:  Intubated and sedated on fentanyl and propofol    Cardiovascular:  #AFib  CHADSVASC: 5  Hold eliquis for now, will consider resuming  on bisoprolol for rate control - hold for now  TTE 11/2021 with normal ef    #HTN  on Bisoprolol 10/ HCTZ 6.25  hold for now  resume when NGT placed and persistently hypertensive    Pulmonary:   #AHRF   - intubated for increased work of breathing.   - extubated and saturating well on NC for now, will cont to monitor    #R lung opacity/Pneumonia  - CXR with pleural effusion vs consolidation on CXR  - c/w Unasyn  - p/w sob, intubated in ED for respiratory distress  will obtain CT chest to further evaluate  holding apixaban for possible chest tube placement given R pleural effusion  - chest PT, mucomyst via ETT, albuterol  will get CT chest and consider thora pending results    #Respiratory Alkalosis  #COVID infection    - obtain covid markers q72 hrs  - no indication for decadron/remdesivir    Gastrointestinal:    - placed NGT  - start TF     Renal:  #AMELIA  Pt w/ SCr 1.7 on admission  Baseline SCr - 0.8-0.9  Monitor bmp daily  s/p 1L bolus    Infectious Diseases:  #Sepsis  p/w fever, HR >90, lactate 2.6  trend lactate  received 1L bolus in ED  UA positive  f/u blood cultures, urine cultures  tylenol prn  c/w Unasyn, started 7/2    Heme/onc:   #Anemia  Hb 11.5 on admission  baseline hb 9-10  takes iron at home  hold iron while npo    Endo:   #DM on insulin  f/u A1c  On lantus 13 + lispro sliding scale at home  c/w lantus 17 + sliding scale  Adjust insulin as indicated  FS ACHS or q6 while NPO  Maintain poct 140-180 while inpatient    Skin/ catheter:   Dumas catheter 7/2 for critical monitoring    Prophylaxis:   PPI  SCDs - Wife refusing lovenox due to history of abdominal bleed when receiving lovenox.    Goals of Care:   FULL CODE    Dispo:  Admit to ICU

## 2022-07-07 LAB
ALBUMIN SERPL ELPH-MCNC: 1.7 G/DL — LOW (ref 3.5–5)
ALP SERPL-CCNC: 102 U/L — SIGNIFICANT CHANGE UP (ref 40–120)
ALT FLD-CCNC: 22 U/L DA — SIGNIFICANT CHANGE UP (ref 10–60)
ANION GAP SERPL CALC-SCNC: 7 MMOL/L — SIGNIFICANT CHANGE UP (ref 5–17)
APTT BLD: 32.6 SEC — SIGNIFICANT CHANGE UP (ref 27.5–35.5)
AST SERPL-CCNC: 15 U/L — SIGNIFICANT CHANGE UP (ref 10–40)
BILIRUB SERPL-MCNC: 0.4 MG/DL — SIGNIFICANT CHANGE UP (ref 0.2–1.2)
BUN SERPL-MCNC: 28 MG/DL — HIGH (ref 7–18)
CALCIUM SERPL-MCNC: 8.9 MG/DL — SIGNIFICANT CHANGE UP (ref 8.4–10.5)
CHLORIDE SERPL-SCNC: 113 MMOL/L — HIGH (ref 96–108)
CO2 SERPL-SCNC: 28 MMOL/L — SIGNIFICANT CHANGE UP (ref 22–31)
CREAT SERPL-MCNC: 0.83 MG/DL — SIGNIFICANT CHANGE UP (ref 0.5–1.3)
CULTURE RESULTS: SIGNIFICANT CHANGE UP
CULTURE RESULTS: SIGNIFICANT CHANGE UP
EGFR: 97 ML/MIN/1.73M2 — SIGNIFICANT CHANGE UP
GLUCOSE BLDC GLUCOMTR-MCNC: 154 MG/DL — HIGH (ref 70–99)
GLUCOSE BLDC GLUCOMTR-MCNC: 182 MG/DL — HIGH (ref 70–99)
GLUCOSE BLDC GLUCOMTR-MCNC: 186 MG/DL — HIGH (ref 70–99)
GLUCOSE BLDC GLUCOMTR-MCNC: 202 MG/DL — HIGH (ref 70–99)
GLUCOSE SERPL-MCNC: 198 MG/DL — HIGH (ref 70–99)
HCT VFR BLD CALC: 35.9 % — LOW (ref 39–50)
HGB BLD-MCNC: 10.3 G/DL — LOW (ref 13–17)
MAGNESIUM SERPL-MCNC: 2.4 MG/DL — SIGNIFICANT CHANGE UP (ref 1.6–2.6)
MCHC RBC-ENTMCNC: 23.9 PG — LOW (ref 27–34)
MCHC RBC-ENTMCNC: 28.7 GM/DL — LOW (ref 32–36)
MCV RBC AUTO: 83.3 FL — SIGNIFICANT CHANGE UP (ref 80–100)
NRBC # BLD: 0 /100 WBCS — SIGNIFICANT CHANGE UP (ref 0–0)
PHOSPHATE SERPL-MCNC: 3.4 MG/DL — SIGNIFICANT CHANGE UP (ref 2.5–4.5)
PLATELET # BLD AUTO: 529 K/UL — HIGH (ref 150–400)
POTASSIUM SERPL-MCNC: 3.8 MMOL/L — SIGNIFICANT CHANGE UP (ref 3.5–5.3)
POTASSIUM SERPL-SCNC: 3.8 MMOL/L — SIGNIFICANT CHANGE UP (ref 3.5–5.3)
PROT SERPL-MCNC: 6.4 G/DL — SIGNIFICANT CHANGE UP (ref 6–8.3)
RBC # BLD: 4.31 M/UL — SIGNIFICANT CHANGE UP (ref 4.2–5.8)
RBC # FLD: 21 % — HIGH (ref 10.3–14.5)
SODIUM SERPL-SCNC: 148 MMOL/L — HIGH (ref 135–145)
SPECIMEN SOURCE: SIGNIFICANT CHANGE UP
SPECIMEN SOURCE: SIGNIFICANT CHANGE UP
WBC # BLD: 14.48 K/UL — HIGH (ref 3.8–10.5)
WBC # FLD AUTO: 14.48 K/UL — HIGH (ref 3.8–10.5)

## 2022-07-07 PROCEDURE — 71045 X-RAY EXAM CHEST 1 VIEW: CPT | Mod: 26

## 2022-07-07 PROCEDURE — 99233 SBSQ HOSP IP/OBS HIGH 50: CPT

## 2022-07-07 RX ORDER — SODIUM CHLORIDE 9 MG/ML
1000 INJECTION, SOLUTION INTRAVENOUS ONCE
Refills: 0 | Status: COMPLETED | OUTPATIENT
Start: 2022-07-07 | End: 2022-07-07

## 2022-07-07 RX ORDER — MEROPENEM 1 G/30ML
1000 INJECTION INTRAVENOUS EVERY 8 HOURS
Refills: 0 | Status: DISCONTINUED | OUTPATIENT
Start: 2022-07-07 | End: 2022-07-15

## 2022-07-07 RX ORDER — ALBUTEROL 90 UG/1
2.5 AEROSOL, METERED ORAL EVERY 6 HOURS
Refills: 0 | Status: DISCONTINUED | OUTPATIENT
Start: 2022-07-07 | End: 2022-07-19

## 2022-07-07 RX ORDER — INSULIN LISPRO 100/ML
VIAL (ML) SUBCUTANEOUS
Refills: 0 | Status: DISCONTINUED | OUTPATIENT
Start: 2022-07-08 | End: 2022-07-17

## 2022-07-07 RX ORDER — ACETYLCYSTEINE 200 MG/ML
4 VIAL (ML) MISCELLANEOUS EVERY 8 HOURS
Refills: 0 | Status: DISCONTINUED | OUTPATIENT
Start: 2022-07-07 | End: 2022-07-08

## 2022-07-07 RX ORDER — MEROPENEM 1 G/30ML
1 INJECTION INTRAVENOUS EVERY 8 HOURS
Refills: 0 | Status: DISCONTINUED | OUTPATIENT
Start: 2022-07-07 | End: 2022-07-07

## 2022-07-07 RX ADMIN — AMPICILLIN SODIUM AND SULBACTAM SODIUM 200 GRAM(S): 250; 125 INJECTION, POWDER, FOR SUSPENSION INTRAMUSCULAR; INTRAVENOUS at 17:03

## 2022-07-07 RX ADMIN — AMPICILLIN SODIUM AND SULBACTAM SODIUM 200 GRAM(S): 250; 125 INJECTION, POWDER, FOR SUSPENSION INTRAMUSCULAR; INTRAVENOUS at 11:59

## 2022-07-07 RX ADMIN — PANTOPRAZOLE SODIUM 40 MILLIGRAM(S): 20 TABLET, DELAYED RELEASE ORAL at 11:59

## 2022-07-07 RX ADMIN — Medication 2: at 17:16

## 2022-07-07 RX ADMIN — ALBUTEROL 2 PUFF(S): 90 AEROSOL, METERED ORAL at 05:22

## 2022-07-07 RX ADMIN — INSULIN GLARGINE 25 UNIT(S): 100 INJECTION, SOLUTION SUBCUTANEOUS at 23:49

## 2022-07-07 RX ADMIN — MEROPENEM 100 MILLIGRAM(S): 1 INJECTION INTRAVENOUS at 23:19

## 2022-07-07 RX ADMIN — Medication 4 MILLILITER(S): at 21:10

## 2022-07-07 RX ADMIN — SODIUM CHLORIDE 3 MILLILITER(S): 9 INJECTION INTRAMUSCULAR; INTRAVENOUS; SUBCUTANEOUS at 13:07

## 2022-07-07 RX ADMIN — CHLORHEXIDINE GLUCONATE 1 APPLICATION(S): 213 SOLUTION TOPICAL at 05:21

## 2022-07-07 RX ADMIN — ALBUTEROL 2 PUFF(S): 90 AEROSOL, METERED ORAL at 23:21

## 2022-07-07 RX ADMIN — ALBUTEROL 2 PUFF(S): 90 AEROSOL, METERED ORAL at 13:10

## 2022-07-07 RX ADMIN — AMPICILLIN SODIUM AND SULBACTAM SODIUM 200 GRAM(S): 250; 125 INJECTION, POWDER, FOR SUSPENSION INTRAMUSCULAR; INTRAVENOUS at 05:21

## 2022-07-07 RX ADMIN — SODIUM CHLORIDE 3 MILLILITER(S): 9 INJECTION INTRAMUSCULAR; INTRAVENOUS; SUBCUTANEOUS at 23:21

## 2022-07-07 RX ADMIN — ALBUTEROL 2.5 MILLIGRAM(S): 90 AEROSOL, METERED ORAL at 21:10

## 2022-07-07 RX ADMIN — Medication 4: at 13:06

## 2022-07-07 RX ADMIN — SODIUM CHLORIDE 3 MILLILITER(S): 9 INJECTION INTRAMUSCULAR; INTRAVENOUS; SUBCUTANEOUS at 05:20

## 2022-07-07 RX ADMIN — MUPIROCIN 1 APPLICATION(S): 20 OINTMENT TOPICAL at 17:03

## 2022-07-07 RX ADMIN — MUPIROCIN 1 APPLICATION(S): 20 OINTMENT TOPICAL at 05:21

## 2022-07-07 RX ADMIN — Medication 2: at 07:03

## 2022-07-07 RX ADMIN — SODIUM CHLORIDE 1000 MILLILITER(S): 9 INJECTION, SOLUTION INTRAVENOUS at 07:03

## 2022-07-07 NOTE — PROGRESS NOTE ADULT - SUBJECTIVE AND OBJECTIVE BOX
Patient is a 67y old  Male who presents with a chief complaint of AHRF, Sepsis (06 Jul 2022 16:09)    INTERVAL HISTORY/ OVERNIGHT EVENTS: tolerated extubation well, diet started    PRESSORS: [ ] YES [X ] NO    Antimicrobial:  ampicillin/sulbactam  IVPB 3 Gram(s) IV Intermittent every 6 hours    Cardiovascular:    Pulmonary:  ALBUTerol    90 MICROgram(s) HFA Inhaler 2 Puff(s) Inhalation every 8 hours    Hematalogic:    Other:  chlorhexidine 2% Cloths 1 Application(s) Topical <User Schedule>  insulin glargine Injectable (LANTUS) 25 Unit(s) SubCutaneous at bedtime  insulin lispro (ADMELOG) corrective regimen sliding scale   SubCutaneous every 6 hours  mupirocin 2% Ointment 1 Application(s) Topical two times a day  ondansetron Injectable 4 milliGRAM(s) IV Push every 6 hours PRN  pantoprazole   Suspension 40 milliGRAM(s) Oral daily  sodium chloride 0.9% lock flush 3 milliLiter(s) IV Push every 8 hours    ALBUTerol    90 MICROgram(s) HFA Inhaler 2 Puff(s) Inhalation every 8 hours  ampicillin/sulbactam  IVPB 3 Gram(s) IV Intermittent every 6 hours  chlorhexidine 2% Cloths 1 Application(s) Topical <User Schedule>  insulin glargine Injectable (LANTUS) 25 Unit(s) SubCutaneous at bedtime  insulin lispro (ADMELOG) corrective regimen sliding scale   SubCutaneous every 6 hours  mupirocin 2% Ointment 1 Application(s) Topical two times a day  ondansetron Injectable 4 milliGRAM(s) IV Push every 6 hours PRN  pantoprazole   Suspension 40 milliGRAM(s) Oral daily  sodium chloride 0.9% lock flush 3 milliLiter(s) IV Push every 8 hours    Drug Dosing Weight  Height (cm): 180.3 (02 Jul 2022 05:23)  Weight (kg): 107 (02 Jul 2022 09:10)  BMI (kg/m2): 32.9 (02 Jul 2022 09:10)  BSA (m2): 2.26 (02 Jul 2022 09:10)    CENTRAL LINE: [ ] YES [X ] NO  LOCATION:     RIBEIRO: [ ] YES [X ] NO      A-LINE:  [ ] YES [X ] NO  LOCATION:         ICU Vital Signs Last 24 Hrs  T(C): 36.9 (07 Jul 2022 08:00), Max: 37.3 (06 Jul 2022 20:00)  T(F): 98.4 (07 Jul 2022 08:00), Max: 99.2 (06 Jul 2022 20:00)  HR: 103 (07 Jul 2022 15:00) (82 - 109)  BP: 191/98 (07 Jul 2022 15:00) (121/69 - 191/98)  BP(mean): 120 (07 Jul 2022 15:00) (83 - 133)  ABP: --  ABP(mean): --  RR: 17 (07 Jul 2022 15:00) (17 - 35)  SpO2: 94% (07 Jul 2022 15:00) (94% - 100%)            07-06 @ 07:01  -  07-07 @ 07:00  --------------------------------------------------------  IN: 1524.4 mL / OUT: 600 mL / NET: 924.4 mL            PHYSICAL EXAM:  General - NAD  Eyes - PERRLA, EOM intact  ENT - Nonicteric sclerae, PERRLA, EOMI. Oropharynx clear. Moist mucous membranes. Conjunctivae appear well perfused.   Neck - No noticeable or palpable swelling, redness or rash around throat or on face  Lymph Nodes - No lymphadenopathy  Cardiovascular - RRR no m/r/g, no JVD, no carotid bruits  Lungs - (+) decreased breath sounds more on R than Left,   Skin - No rashes, skin warm and dry, no erythematous areas  Abdomen - Normal bowel sounds, abdomen soft and nontender  Extremities - No edema, cyanosis or clubbing  Musculoskeletal - no rigidity on passive movement of extremities  Neuro– AOx3  - voiding, primafit      LABS:                        10.3   14.48 )-----------( 529      ( 07 Jul 2022 06:16 )             35.9     07-07    148<H>  |  113<H>  |  28<H>  ----------------------------<  198<H>  3.8   |  28  |  0.83    Ca    8.9      07 Jul 2022 06:16  Phos  3.4     07-07  Mg     2.4     07-07    TPro  6.4  /  Alb  1.7<L>  /  TBili  0.4  /  DBili  x   /  AST  15  /  ALT  22  /  AlkPhos  102  07-07    PT/INR - ( 06 Jul 2022 03:53 )   PT: 19.4 sec;   INR: 1.62 ratio         PTT - ( 07 Jul 2022 06:16 )  PTT:32.6 sec    CAPILLARY BLOOD GLUCOSE      POCT Blood Glucose.: 182 mg/dL (07 Jul 2022 06:24)  POCT Blood Glucose.: 205 mg/dL (06 Jul 2022 23:34)  POCT Blood Glucose.: 259 mg/dL (06 Jul 2022 16:45)        RADIOLOGY & ADDITIONAL STUDIES REVIEWED:  ***     Yes

## 2022-07-07 NOTE — SWALLOW BEDSIDE ASSESSMENT ADULT - COMMENTS
Pt seen w/ HOB elevated to 90°. AA+Ox3, pt consistently attempted to answer all inquires, p/w stridor, multiple unintelligible vocalizations, & tremor at rest. Pt stated eating "anything" at home with mildly thick liquids. Trials included: ice, puree, minced & moist, soft & bite-sized, and mildly thick liquids.

## 2022-07-07 NOTE — SWALLOW BEDSIDE ASSESSMENT ADULT - SLP PERTINENT HISTORY OF CURRENT PROBLEM
67M from home, walks with walker, AOx3 w/ PMH CVA 11/2021 w/ R sided residual, afib on eliquis, dm on insulin, hld, rectal CA w/ R chemoport. As per records, BIBEMS for sob + fever x 4 days; admitted to ICU for AHRF requiring intubation.

## 2022-07-07 NOTE — PROGRESS NOTE ADULT - ASSESSMENT
67M from home, walks with walker, AOx3 w/ PMH CVA 11/2021 w/ R sided residual, afib on eliquis, dm on insulin, hld, rectal CA w/ R chemoport, BIBEMS for sob + fever x 4 days admitted to ICU for AHRF requiring intubation    Assessment:  #Acute Hypoxic Respiratory Failure  #Sepsis  #R Sided pna  #UTI  #DM  #Afib  #COVID-19 Infection  #Anemia  #AMELIA  #HTN  #HLD       Plan:  Neuro:  extubated no longer sedated    Cardiovascular:  #AFib  CHADSVASC: 5  Hold eliquis for now, will consider resuming  on bisoprolol for rate control - hold for now  TTE 11/2021 with normal ef    #HTN  on Bisoprolol 10/ HCTZ 6.25  hold for now    Pulmonary:   #AHRF   - intubated for increased work of breathing.   now extubated saturating well on NC    #R lung opacity/Pneumonia  - CXR with pleural effusion vs consolidation on CXR  - c/w Unasyn  - p/w sob, intubated in ED for respiratory distress  will obtain CT chest to further evaluate  holding apixaban for possible chest tube placement given R pleural effusion  - chest PT, mucomyst via ETT, albuterol  will get CT chest and consider thora pending results    #Respiratory Alkalosis  #COVID infection    - obtain covid markers q72 hrs  - no indication for decadron/remdesivir    Gastrointestinal:  started on oral diet per SLP recs soft and bite sized      Renal:  #AMELIA  Pt w/ SCr 1.7 on admission  Baseline SCr - 0.8-0.9  Monitor bmp daily  s/p 1L bolus    hypernatremia - monitor as patient to start oral diet and encouraged increased PO free water intake    Infectious Diseases:  #Sepsis  p/w fever, HR >90, lactate 2.6  trend lactate  received 1L bolus in ED  UA positive  f/u blood cultures, urine cultures  tylenol prn  c/w Unasyn, started 7/2    Heme/onc:   #Anemia  Hb 11.5 on admission  baseline hb 9-10  takes iron at home  hold iron while npo    Endo:   #DM on insulin  f/u A1c  On lantus 13 + lispro sliding scale at home  c/w lantus 17 + sliding scale  Adjust insulin as indicated  FS ACHS or q6 while NPO  Maintain poct 140-180 while inpatient    Skin/ catheter:   Dumas catheter 7/2 for critical monitoring    Prophylaxis:   PPI  SCDs - Wife refusing lovenox due to history of abdominal bleed when receiving lovenox.    Goals of Care:   FULL CODE    Dispo:  Admit to ICU

## 2022-07-07 NOTE — SWALLOW BEDSIDE ASSESSMENT ADULT - SWALLOW EVAL: DIAGNOSIS
Pt presents with adequate oral stage skills marked by adequate bolus acceptance, formation, transfer and clearance, timely swallow trigger, adequate hyolaryngeal elevation and no overt s/s of penetration/aspiration.

## 2022-07-07 NOTE — CHART NOTE - NSCHARTNOTEFT_GEN_A_CORE
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 COVID+     ICU Course:   Pt febrile, HR >90, lactate 2.6, UA+, CXR with pleural effusion/mucous plug vs consolidation, started on Unasyn (7/2). Started on mucomyst, albuterol, chest PT. Patient extubated to Nasal cannula. Decadron discontinued per primary team as PNA deemed to be more bacterial then COVID. Eliquis held for possible thoracentesis. Held Bisoprolol/HCTZ for HTN. AMELIA improved with IVF. Lantus and sliding scale for DM, PPI for GI ppx. Started on soft and bite sized diet per S&S. Unsayn switched to Nerissa (7/7) for ESBL E. coli and Enteroccocus growing in urine. CASANDRA Gillespie consulted       Patient is stable for downgrade to AI Floor under care of Dr. Bui for further management, covering NP was informed. Family notified.      Problem List:  #Acute Hypoxic Respiratory Failure  #Sepsis  #R Sided pna  #UTI  #DM  #Afib  #COVID-19 Infection  #Anemia  #AMELIA  #HTN  #HLD     Primary team to follow up:   - c/w meropenem (started today 7/7)   - Pt needs CT Chest, evaluate for possible thora  - Resume home meds (including anticoagulation) as indicated   - f/u PT recs 67M from home, walks with walker, AOx3 w/ PMH CVA 11/2021 w/ R sided residual, Afib (on eliquis) IDDM, HLD, Rectal CA w/ R chemoport, BIBEMS for SOB + fever x 4 days admitted to ICU for AHRF requiring intubation. COVID+     ICU Course:   Pt febrile, HR >90, lactate 2.6, UA+, CXR with pleural effusion/mucous plug vs consolidation, started on Unasyn (7/2). Started on mucomyst, albuterol, chest PT. Patient extubated to Nasal cannula. Decadron discontinued per primary team as PNA deemed to be more bacterial then COVID. Eliquis held for possible thoracentesis. Held Bisoprolol/HCTZ for HTN. AMELIA improved with IVF. Lantus and sliding scale for DM, PPI for GI ppx. Started on soft and bite sized diet per S&S. Unsayn switched to Nerissa (7/7) for ESBL E. coli and Enteroccocus growing in urine. CASANDRA Gillespie consulted       Patient is stable for downgrade to  Floor under care of Dr. Bui for further management, covering NP Jigna was informed. Family notified.      Problem List:  #Acute Hypoxic Respiratory Failure  #Sepsis  #R Sided pna  #UTI  #DM  #Afib  #COVID-19 Infection  #Anemia  #AMELIA  #HTN  #HLD     Primary team to follow up:   - c/w meropenem (started today 7/7)   - Pt needs CT Chest, evaluate for possible thora  - Resume home meds (including anticoagulation) as indicated   - f/u PT recs

## 2022-07-08 DIAGNOSIS — I10 ESSENTIAL (PRIMARY) HYPERTENSION: ICD-10-CM

## 2022-07-08 DIAGNOSIS — E87.8 OTHER DISORDERS OF ELECTROLYTE AND FLUID BALANCE, NOT ELSEWHERE CLASSIFIED: ICD-10-CM

## 2022-07-08 DIAGNOSIS — C20 MALIGNANT NEOPLASM OF RECTUM: ICD-10-CM

## 2022-07-08 DIAGNOSIS — Z02.9 ENCOUNTER FOR ADMINISTRATIVE EXAMINATIONS, UNSPECIFIED: ICD-10-CM

## 2022-07-08 DIAGNOSIS — A41.9 SEPSIS, UNSPECIFIED ORGANISM: ICD-10-CM

## 2022-07-08 DIAGNOSIS — Z29.9 ENCOUNTER FOR PROPHYLACTIC MEASURES, UNSPECIFIED: ICD-10-CM

## 2022-07-08 DIAGNOSIS — E87.6 HYPOKALEMIA: ICD-10-CM

## 2022-07-08 DIAGNOSIS — I48.20 CHRONIC ATRIAL FIBRILLATION, UNSPECIFIED: ICD-10-CM

## 2022-07-08 DIAGNOSIS — D63.8 ANEMIA IN OTHER CHRONIC DISEASES CLASSIFIED ELSEWHERE: ICD-10-CM

## 2022-07-08 DIAGNOSIS — Z86.73 PERSONAL HISTORY OF TRANSIENT ISCHEMIC ATTACK (TIA), AND CEREBRAL INFARCTION WITHOUT RESIDUAL DEFICITS: ICD-10-CM

## 2022-07-08 DIAGNOSIS — E78.5 HYPERLIPIDEMIA, UNSPECIFIED: ICD-10-CM

## 2022-07-08 DIAGNOSIS — J90 PLEURAL EFFUSION, NOT ELSEWHERE CLASSIFIED: ICD-10-CM

## 2022-07-08 DIAGNOSIS — E11.9 TYPE 2 DIABETES MELLITUS WITHOUT COMPLICATIONS: ICD-10-CM

## 2022-07-08 DIAGNOSIS — U07.1 COVID-19: ICD-10-CM

## 2022-07-08 DIAGNOSIS — N39.0 URINARY TRACT INFECTION, SITE NOT SPECIFIED: ICD-10-CM

## 2022-07-08 LAB
A1C WITH ESTIMATED AVERAGE GLUCOSE RESULT: 7.9 % — HIGH (ref 4–5.6)
ALBUMIN SERPL ELPH-MCNC: 1.6 G/DL — LOW (ref 3.5–5)
ALP SERPL-CCNC: 102 U/L — SIGNIFICANT CHANGE UP (ref 40–120)
ALT FLD-CCNC: 29 U/L DA — SIGNIFICANT CHANGE UP (ref 10–60)
ANION GAP SERPL CALC-SCNC: 6 MMOL/L — SIGNIFICANT CHANGE UP (ref 5–17)
AST SERPL-CCNC: 17 U/L — SIGNIFICANT CHANGE UP (ref 10–40)
BILIRUB SERPL-MCNC: 0.3 MG/DL — SIGNIFICANT CHANGE UP (ref 0.2–1.2)
BUN SERPL-MCNC: 17 MG/DL — SIGNIFICANT CHANGE UP (ref 7–18)
CALCIUM SERPL-MCNC: 8.7 MG/DL — SIGNIFICANT CHANGE UP (ref 8.4–10.5)
CHLORIDE SERPL-SCNC: 111 MMOL/L — HIGH (ref 96–108)
CO2 SERPL-SCNC: 31 MMOL/L — SIGNIFICANT CHANGE UP (ref 22–31)
CREAT SERPL-MCNC: 0.7 MG/DL — SIGNIFICANT CHANGE UP (ref 0.5–1.3)
EGFR: 102 ML/MIN/1.73M2 — SIGNIFICANT CHANGE UP
ESTIMATED AVERAGE GLUCOSE: 180 MG/DL — HIGH (ref 68–114)
GLUCOSE BLDC GLUCOMTR-MCNC: 163 MG/DL — HIGH (ref 70–99)
GLUCOSE BLDC GLUCOMTR-MCNC: 229 MG/DL — HIGH (ref 70–99)
GLUCOSE BLDC GLUCOMTR-MCNC: 233 MG/DL — HIGH (ref 70–99)
GLUCOSE BLDC GLUCOMTR-MCNC: 244 MG/DL — HIGH (ref 70–99)
GLUCOSE BLDC GLUCOMTR-MCNC: 269 MG/DL — HIGH (ref 70–99)
GLUCOSE SERPL-MCNC: 173 MG/DL — HIGH (ref 70–99)
HCT VFR BLD CALC: 31.2 % — LOW (ref 39–50)
HGB BLD-MCNC: 9.2 G/DL — LOW (ref 13–17)
MAGNESIUM SERPL-MCNC: 2.2 MG/DL — SIGNIFICANT CHANGE UP (ref 1.6–2.6)
MCHC RBC-ENTMCNC: 24.2 PG — LOW (ref 27–34)
MCHC RBC-ENTMCNC: 29.5 GM/DL — LOW (ref 32–36)
MCV RBC AUTO: 82.1 FL — SIGNIFICANT CHANGE UP (ref 80–100)
NRBC # BLD: 0 /100 WBCS — SIGNIFICANT CHANGE UP (ref 0–0)
PHOSPHATE SERPL-MCNC: 3.7 MG/DL — SIGNIFICANT CHANGE UP (ref 2.5–4.5)
PLATELET # BLD AUTO: 475 K/UL — HIGH (ref 150–400)
POTASSIUM SERPL-MCNC: 3.3 MMOL/L — LOW (ref 3.5–5.3)
POTASSIUM SERPL-SCNC: 3.3 MMOL/L — LOW (ref 3.5–5.3)
PROT SERPL-MCNC: 5.8 G/DL — LOW (ref 6–8.3)
RBC # BLD: 3.8 M/UL — LOW (ref 4.2–5.8)
RBC # FLD: 20.8 % — HIGH (ref 10.3–14.5)
SODIUM SERPL-SCNC: 148 MMOL/L — HIGH (ref 135–145)
WBC # BLD: 13.73 K/UL — HIGH (ref 3.8–10.5)
WBC # FLD AUTO: 13.73 K/UL — HIGH (ref 3.8–10.5)

## 2022-07-08 PROCEDURE — 99233 SBSQ HOSP IP/OBS HIGH 50: CPT

## 2022-07-08 RX ORDER — APIXABAN 2.5 MG/1
5 TABLET, FILM COATED ORAL EVERY 12 HOURS
Refills: 0 | Status: DISCONTINUED | OUTPATIENT
Start: 2022-07-08 | End: 2022-07-09

## 2022-07-08 RX ORDER — ALBUTEROL 90 UG/1
2 AEROSOL, METERED ORAL EVERY 6 HOURS
Refills: 0 | Status: DISCONTINUED | OUTPATIENT
Start: 2022-07-08 | End: 2022-07-17

## 2022-07-08 RX ORDER — POTASSIUM CHLORIDE 20 MEQ
40 PACKET (EA) ORAL EVERY 4 HOURS
Refills: 0 | Status: COMPLETED | OUTPATIENT
Start: 2022-07-08 | End: 2022-07-08

## 2022-07-08 RX ORDER — BUDESONIDE AND FORMOTEROL FUMARATE DIHYDRATE 160; 4.5 UG/1; UG/1
2 AEROSOL RESPIRATORY (INHALATION)
Refills: 0 | Status: DISCONTINUED | OUTPATIENT
Start: 2022-07-08 | End: 2022-07-19

## 2022-07-08 RX ORDER — METOPROLOL TARTRATE 50 MG
25 TABLET ORAL
Refills: 0 | Status: DISCONTINUED | OUTPATIENT
Start: 2022-07-08 | End: 2022-07-19

## 2022-07-08 RX ADMIN — Medication 40 MILLIEQUIVALENT(S): at 08:14

## 2022-07-08 RX ADMIN — MUPIROCIN 1 APPLICATION(S): 20 OINTMENT TOPICAL at 19:06

## 2022-07-08 RX ADMIN — MEROPENEM 100 MILLIGRAM(S): 1 INJECTION INTRAVENOUS at 05:28

## 2022-07-08 RX ADMIN — SODIUM CHLORIDE 3 MILLILITER(S): 9 INJECTION INTRAMUSCULAR; INTRAVENOUS; SUBCUTANEOUS at 13:42

## 2022-07-08 RX ADMIN — Medication 2: at 08:12

## 2022-07-08 RX ADMIN — CHLORHEXIDINE GLUCONATE 1 APPLICATION(S): 213 SOLUTION TOPICAL at 06:12

## 2022-07-08 RX ADMIN — SODIUM CHLORIDE 3 MILLILITER(S): 9 INJECTION INTRAMUSCULAR; INTRAVENOUS; SUBCUTANEOUS at 23:02

## 2022-07-08 RX ADMIN — PANTOPRAZOLE SODIUM 40 MILLIGRAM(S): 20 TABLET, DELAYED RELEASE ORAL at 12:22

## 2022-07-08 RX ADMIN — Medication 40 MILLIEQUIVALENT(S): at 12:22

## 2022-07-08 RX ADMIN — BUDESONIDE AND FORMOTEROL FUMARATE DIHYDRATE 2 PUFF(S): 160; 4.5 AEROSOL RESPIRATORY (INHALATION) at 23:04

## 2022-07-08 RX ADMIN — ALBUTEROL 2 PUFF(S): 90 AEROSOL, METERED ORAL at 06:48

## 2022-07-08 RX ADMIN — Medication 4 MILLILITER(S): at 05:29

## 2022-07-08 RX ADMIN — INSULIN GLARGINE 25 UNIT(S): 100 INJECTION, SOLUTION SUBCUTANEOUS at 23:04

## 2022-07-08 RX ADMIN — Medication 25 MILLIGRAM(S): at 19:08

## 2022-07-08 RX ADMIN — Medication 4: at 12:21

## 2022-07-08 RX ADMIN — ALBUTEROL 2 PUFF(S): 90 AEROSOL, METERED ORAL at 13:10

## 2022-07-08 RX ADMIN — MEROPENEM 100 MILLIGRAM(S): 1 INJECTION INTRAVENOUS at 13:10

## 2022-07-08 RX ADMIN — ALBUTEROL 2 PUFF(S): 90 AEROSOL, METERED ORAL at 21:07

## 2022-07-08 RX ADMIN — SODIUM CHLORIDE 3 MILLILITER(S): 9 INJECTION INTRAMUSCULAR; INTRAVENOUS; SUBCUTANEOUS at 05:28

## 2022-07-08 RX ADMIN — MUPIROCIN 1 APPLICATION(S): 20 OINTMENT TOPICAL at 06:12

## 2022-07-08 RX ADMIN — Medication 4: at 19:06

## 2022-07-08 RX ADMIN — MEROPENEM 100 MILLIGRAM(S): 1 INJECTION INTRAVENOUS at 21:08

## 2022-07-08 NOTE — PROGRESS NOTE ADULT - PROBLEM SELECTOR PLAN 1
p/w fever, HR >90, lactate 2.6  trend lactate  received 1L bolus in ED  UA positive  f/u blood cultures, urine cultures  tylenol prn  c/w Unasyn, started 7/2 -p/w fever, HR >90, lactate 2.6  -trend lactate    -received 1L bolus in ED  -UA positive  -Bcx negative  -Ucx + ESBL EOLI f/u blood cultures, urine cultures  -tylenol prn  -Unasyn started 7/2, changed to Meropenem 7/7 for ESBL  -ID dr. Gillespie

## 2022-07-08 NOTE — PHYSICAL THERAPY INITIAL EVALUATION ADULT - IMPAIRMENTS FOUND, PT EVAL
aerobic capacity/endurance/decreased midline orientation/fine motor/gait, locomotion, and balance/gross motor/muscle strength/posture/ventilation and respiration/gas exchange

## 2022-07-08 NOTE — PHYSICAL THERAPY INITIAL EVALUATION ADULT - GENERAL OBSERVATIONS, REHAB EVAL
patient icu downgrade refer for PT assessment, isolation/room air, presented right hemiparetic, aerobic loss limiting oob mobility

## 2022-07-08 NOTE — PROGRESS NOTE ADULT - ATTENDING COMMENTS
67M from home, walks with walker, AOx3 w/ PMH CVA 11/2021 w/ R sided residual, Afib on eliquis, dm on insulin, hld, rectal CA w/ R chemoport, BIBEMS for sob + fever x 4 days admitted to ICU for AHRF requiring intubation    Assessment:  #Acute Hypoxic Respiratory Failure  #Sepsis  #R Sided PNA  #UTI  #DM  #Afib  #COVID-19 Infection  #Anemia  #AMELIA  #HTN  #HLD     Plan    - switch to meropenem for ecoli esbl coverage  - s/p extubation  - albuterol and mucolysis, chest PT  - right CPPE improving  - restart Eliquis  - GI prophy  - PO diet
67M from home, walks with walker, AOx3 w/ PMH CVA 11/2021 w/ R sided residual, Afib on eliquis, dm on insulin, hld, rectal CA w/ R chemoport, BIBEMS for sob + fever x 4 days admitted to ICU for AHRF requiring intubation    Assessment:  #Acute Hypoxic Respiratory Failure  #Sepsis  #R Sided PNA  #UTI  #DM  #Afib  #COVID-19 Infection  #Anemia  #AMELIA  #HTN  #HLD     Plan  - intubated and sedated  Unasyn with high risk of aspiration  - mechanical vent control  - daily SAT/SBT  - Mucolysis and pulm hygiene with Mucomyst and DuoNeb, chest PT tid  - Clinically and radiographically more c/w bacterial than COVID PNA  - right CPPE will require drainage - holding Eliquis  - Mechanical DVT prophy  - GI prophy  - enteral feeds  Standard vent bundle
67M from home, walks with walker, AOx3 w/ PMH CVA 11/2021 w/ R sided residual, Afib on eliquis, dm on insulin, hld, rectal CA w/ R chemoport, BIBEMS for sob + fever x 4 days admitted to ICU for AHRF requiring intubation    Assessment:  #Acute Hypoxic Respiratory Failure  #Sepsis  #R Sided PNA  #UTI  #DM  #Afib  #COVID-19 Infection  #Anemia  #AMELIA  #HTN  #HLD     Plan  - intubated and sedated  Unasyn with high risk of aspiration  - mechanical vent control  - daily SAT/SBT  - Mucolysis and pulm hygiene with Mucomyst and duonebs, chest PT tid  - Clinically and radiographically more c/w bacterial than COVID PNA  - d/c remdesivir  - Mechanical DVT prophy  - GI prophy  - Ok to start enteral feeds  Standard vent bundle
67M from home, walks with walker, AOx3 w/ PMH CVA 11/2021 w/ R sided residual, Afib on eliquis, dm on insulin, hld, rectal CA w/ R chemoport, BIBEMS for sob + fever x 4 days admitted to ICU for AHRF requiring intubation    Assessment:  #Acute Hypoxic Respiratory Failure  #Sepsis  #R Sided PNA  #UTI  #DM  #Afib  #COVID-19 Infection  #Anemia  #AMELIA  #HTN  #HLD     Plan  - intubated and sedated  Unasyn with high risk of aspiration  - mechanical vent control  - daily SAT/SBT, possible extubation  - Clinically and radiographically more c/w bacterial than COVID PNA  - right CPPE improving  - CT chest pending  - Mechanical DVT prophy  - GI prophy  - enteral feeds  Standard vent bundle
67M from home, walks with walker, AOx3 w/ PMH CVA 11/2021 w/ R sided residual, Afib on eliquis, dm on insulin, hld, rectal CA w/ R chemoport, BIBEMS for sob + fever x 4 days admitted to ICU for AHRF requiring intubation    Assessment:  #Acute Hypoxic Respiratory Failure  #Sepsis  #R Sided PNA  #UTI  #DM  #Afib  #COVID-19 Infection  #Anemia  #AMELIA  #HTN  #HLD     Plan    - switch to meropenem for ecoli esbl coverage  - s/p extubation  - albuterol and mucolysis, chest PT  - right CPPE improving  - restart Eliquis  - GI prophy  - PO diet

## 2022-07-08 NOTE — PROGRESS NOTE ADULT - PROBLEM SELECTOR PLAN 6
f/u A1c  On lantus 13 + lispro sliding scale at home  c/w lantus 17 + sliding scale  Adjust insulin as indicated  FS ACHS or q6 while NPO  Maintain poct 140-180 while inpatient -A1C 7.9  -On lantus 13 + lispro 8U TID with sliding scale at home  -BS bet 150s and 250s  -c/w lantus 25U + sliding scale  -Adjust insulin as indicated  -FS ACHS with diet or q6 while NPO  -Maintain poct 140-180 while inpatient

## 2022-07-08 NOTE — CONSULT NOTE ADULT - SUBJECTIVE AND OBJECTIVE BOX
HPI:  67M from home, walks with walker, AOx3 w/ PMH CVA 11/2021 w/ R sided residual, afib on eliquis, dm on insulin, hld, rectal CA w/ R chemoport, BIBEMS for sob, fever x 4 days and cough. Pt with Tmax 100.7 3 days prior to admission. Pt without nausea, vomiting, loss of consciousness, diarrhea. Last BM was 7/1 as per wife. In ED, patient was intubated for respiratory distress. Pt was on fentanyl gtt on examination but was able to follow commands and answer yes or no questions. Pt denies pain, headache, chest pain at this time.   (02 Jul 2022 08:03)      PAST MEDICAL & SURGICAL HISTORY:  Lumbago      Lumbago with sciatica of right side      Benign hypertension  dx 10 years      H/O renal calculi  ESWL right side yrs ago  last stone one year ago  (passed on its own)      Obese      Eczema      Diabetes mellitus type II  on Meds 4/2012      Chronic atrial fibrillation      Colon cancer      S/P tonsillectomy      Port-A-Cath in place          Nuts (Hives)  Patient stated he had sensation of  throat closing  30 minites after  Epidural pain management resolved it self few minitus after , /  20 y ago Unable to recall MD name or number (Other)  [place] (Unknown)  Tylenol (Other)      Meds:  ALBUTerol    0.083% 2.5 milliGRAM(s) Nebulizer every 6 hours PRN  ALBUTerol    90 MICROgram(s) HFA Inhaler 2 Puff(s) Inhalation every 8 hours  ALBUTerol    90 MICROgram(s) HFA Inhaler 2 Puff(s) Inhalation every 6 hours  budesonide  80 MICROgram(s)/formoterol 4.5 MICROgram(s) Inhaler 2 Puff(s) Inhalation two times a day  chlorhexidine 2% Cloths 1 Application(s) Topical <User Schedule>  insulin glargine Injectable (LANTUS) 25 Unit(s) SubCutaneous at bedtime  insulin lispro (ADMELOG) corrective regimen sliding scale   SubCutaneous three times a day before meals  meropenem  IVPB 1000 milliGRAM(s) IV Intermittent every 8 hours  metoprolol tartrate 25 milliGRAM(s) Oral two times a day  mupirocin 2% Ointment 1 Application(s) Topical two times a day  ondansetron Injectable 4 milliGRAM(s) IV Push every 6 hours PRN  pantoprazole   Suspension 40 milliGRAM(s) Oral daily  sodium chloride 0.9% lock flush 3 milliLiter(s) IV Push every 8 hours      SOCIAL HISTORY:  Smoker:  YES / NO        PACK YEARS:                         WHEN QUIT?  ETOH use:  YES / NO               FREQUENCY / QUANTITY:  Ilicit Drug use:  YES / NO  Occupation:  Assisted device use (Cane / Walker):  Live with:    FAMILY HISTORY:      VITALS:  Vital Signs Last 24 Hrs  T(C): 36.4 (08 Jul 2022 12:28), Max: 36.7 (07 Jul 2022 22:19)  T(F): 97.6 (08 Jul 2022 12:28), Max: 98.1 (07 Jul 2022 22:19)  HR: 89 (08 Jul 2022 12:28) (83 - 102)  BP: 145/82 (08 Jul 2022 12:28) (145/82 - 165/90)  BP(mean): 108 (07 Jul 2022 20:00) (108 - 108)  RR: 18 (08 Jul 2022 12:28) (18 - 22)  SpO2: 94% (08 Jul 2022 12:28) (90% - 98%)    Parameters below as of 08 Jul 2022 12:28  Patient On (Oxygen Delivery Method): nasal cannula  O2 Flow (L/min): 2      LABS/DIAGNOSTIC TESTS:                          9.2    13.73 )-----------( 475      ( 08 Jul 2022 06:57 )             31.2     WBC Count: 13.73 K/uL (07-08 @ 06:57)  WBC Count: 14.48 K/uL (07-07 @ 06:16)  WBC Count: 13.03 K/uL (07-06 @ 03:53)      07-08    148<H>  |  111<H>  |  17  ----------------------------<  173<H>  3.3<L>   |  31  |  0.70    Ca    8.7      08 Jul 2022 06:57  Phos  3.7     07-08  Mg     2.2     07-08    TPro  5.8<L>  /  Alb  1.6<L>  /  TBili  0.3  /  DBili  x   /  AST  17  /  ALT  29  /  AlkPhos  102  07-08          LIVER FUNCTIONS - ( 08 Jul 2022 06:57 )  Alb: 1.6 g/dL / Pro: 5.8 g/dL / ALK PHOS: 102 U/L / ALT: 29 U/L DA / AST: 17 U/L / GGT: x             PTT - ( 07 Jul 2022 06:16 )  PTT:32.6 sec    LACTATE:    ABG -     CULTURES:   ET Tube ET Tube  07-02 @ 18:57   Normal Respiratory Kinjal present  --    Few Squamous epithelial cells per low power field  Moderate polymorphonuclear leukocytes per low power field  Moderate Gram Positive Cocci in Pairs and Chains per oil power field  Moderate Gram Negative Rods per oil power field      Catheterized Catheterized  07-02 @ 16:29   >100,000 CFU/ml Escherichia coli ESBL  >100,000 CFU/ml Enterococcus faecalis  --  Escherichia coli ESBL  Enterococcus faecalis      .Blood Blood-Peripheral  07-02 @ 11:40   No Growth Final  --  --          RADIOLOGY:< from: Xray Chest 1 View- PORTABLE-Urgent (Xray Chest 1 View- PORTABLE-Urgent .) (07.05.22 @ 10:13) >  ACC: [id number] EXAM:  XR CHEST PORTABLE URGENT 1V                          PROCEDURE DATE:  07/05/2022          INTERPRETATION:  Chest one view    HISTORY: Feeding tube placement    COMPARISON STUDY: 7/4/2022    Frontal expiratory view of the chest shows the heart to be similar in   size. Endotracheal tube remains present. Feeding tube reaches at least to   the stomach. Right chest port is again noted.    The lungs show less pulmonary congestion with slight reduction of right   effusion and there is no evidence of pneumothorax nor left pleural   effusion.    IMPRESSION:  Decreased congestive changes. Feeding tube as noted.        Thank you for the courtesy of this referral.    --- End of Report ---            ILAN MONK MD; Attending Interventional Radiologist  This document has been electronically signed. Jul 6 2022 10:47AM    < end of copied text >        ROS  [  ] UNABLE TO ELICIT               HPI:  67M from home, walks with walker, AOx3 w/ PMH CVA 11/2021 w/ R sided residual, afib on eliquis, dm on insulin, hld, rectal CA w/ R chemoport, BIBEMS for sob, fever x 4 days and cough. Pt with Tmax 100.7 3 days prior to admission. Pt without nausea, vomiting, loss of consciousness, diarrhea. Last BM was 7/1 as per wife. In ED, patient was intubated for respiratory distress. Pt was on fentanyl gtt on examination but was able to follow commands and answer yes or no questions. Pt denies pain, headache, chest pain at this time.   (02 Jul 2022 08:03)      History as above, asked to see this patient who was admitted almost 1 week ago with fever and SOB and ended up being intubated for a few days after being diagnosed with COVID , he was initially put on Remdesivir  but DCed after 2 doses. He was found to have a UTI and was on Rocephin but switched to Meropenem yesterday as he had an ESBL E. Coli and Enterococcus. He is talking but seems "off" though oriented , he is also difficult to understand. He denies any dysuria , no frequency of urination, no urgency, no more fevers or chills.        PAST MEDICAL & SURGICAL HISTORY:  Lumbago      Lumbago with sciatica of right side      Benign hypertension  dx 10 years      H/O renal calculi  ESWL right side yrs ago  last stone one year ago  (passed on its own)      Obese      Eczema      Diabetes mellitus type II  on Meds 4/2012      Chronic atrial fibrillation      Colon cancer      S/P tonsillectomy      Port-A-Cath in place          Nuts (Hives)  Patient stated he had sensation of  throat closing  30 minites after  Epidural pain management resolved it self few minitus after , /  20 y ago Unable to recall MD name or number (Other)  [place] (Unknown)  Tylenol (Other)      Meds:  ALBUTerol    0.083% 2.5 milliGRAM(s) Nebulizer every 6 hours PRN  ALBUTerol    90 MICROgram(s) HFA Inhaler 2 Puff(s) Inhalation every 8 hours  ALBUTerol    90 MICROgram(s) HFA Inhaler 2 Puff(s) Inhalation every 6 hours  budesonide  80 MICROgram(s)/formoterol 4.5 MICROgram(s) Inhaler 2 Puff(s) Inhalation two times a day  chlorhexidine 2% Cloths 1 Application(s) Topical <User Schedule>  insulin glargine Injectable (LANTUS) 25 Unit(s) SubCutaneous at bedtime  insulin lispro (ADMELOG) corrective regimen sliding scale   SubCutaneous three times a day before meals  meropenem  IVPB 1000 milliGRAM(s) IV Intermittent every 8 hours  metoprolol tartrate 25 milliGRAM(s) Oral two times a day  mupirocin 2% Ointment 1 Application(s) Topical two times a day  ondansetron Injectable 4 milliGRAM(s) IV Push every 6 hours PRN  pantoprazole   Suspension 40 milliGRAM(s) Oral daily  sodium chloride 0.9% lock flush 3 milliLiter(s) IV Push every 8 hours      SOCIAL HISTORY:  Smoker:  no  ETOH use:  no      FAMILY HISTORY: not sig      VITALS:  Vital Signs Last 24 Hrs  T(C): 36.4 (08 Jul 2022 12:28), Max: 36.7 (07 Jul 2022 22:19)  T(F): 97.6 (08 Jul 2022 12:28), Max: 98.1 (07 Jul 2022 22:19)  HR: 89 (08 Jul 2022 12:28) (83 - 102)  BP: 145/82 (08 Jul 2022 12:28) (145/82 - 165/90)  BP(mean): 108 (07 Jul 2022 20:00) (108 - 108)  RR: 18 (08 Jul 2022 12:28) (18 - 22)  SpO2: 94% (08 Jul 2022 12:28) (90% - 98%)    Parameters below as of 08 Jul 2022 12:28  Patient On (Oxygen Delivery Method): nasal cannula  O2 Flow (L/min): 2      LABS/DIAGNOSTIC TESTS:                          9.2    13.73 )-----------( 475      ( 08 Jul 2022 06:57 )             31.2     WBC Count: 13.73 K/uL (07-08 @ 06:57)  WBC Count: 14.48 K/uL (07-07 @ 06:16)  WBC Count: 13.03 K/uL (07-06 @ 03:53)      07-08    148<H>  |  111<H>  |  17  ----------------------------<  173<H>  3.3<L>   |  31  |  0.70    Ca    8.7      08 Jul 2022 06:57  Phos  3.7     07-08  Mg     2.2     07-08    TPro  5.8<L>  /  Alb  1.6<L>  /  TBili  0.3  /  DBili  x   /  AST  17  /  ALT  29  /  AlkPhos  102  07-08          LIVER FUNCTIONS - ( 08 Jul 2022 06:57 )  Alb: 1.6 g/dL / Pro: 5.8 g/dL / ALK PHOS: 102 U/L / ALT: 29 U/L DA / AST: 17 U/L / GGT: x             PTT - ( 07 Jul 2022 06:16 )  PTT:32.6 sec    LACTATE:    ABG -     CULTURES:   ET Tube ET Tube  07-02 @ 18:57   Normal Respiratory Kinjal present  --    Few Squamous epithelial cells per low power field  Moderate polymorphonuclear leukocytes per low power field  Moderate Gram Positive Cocci in Pairs and Chains per oil power field  Moderate Gram Negative Rods per oil power field      Catheterized Catheterized  07-02 @ 16:29   >100,000 CFU/ml Escherichia coli ESBL  >100,000 CFU/ml Enterococcus faecalis  --  Escherichia coli ESBL  Enterococcus faecalis      .Blood Blood-Peripheral  07-02 @ 11:40   No Growth Final  --  --          RADIOLOGY:< from: Xray Chest 1 View- PORTABLE-Urgent (Xray Chest 1 View- PORTABLE-Urgent .) (07.05.22 @ 10:13) >  ACC: [id number] EXAM:  XR CHEST PORTABLE URGENT 1V                          PROCEDURE DATE:  07/05/2022          INTERPRETATION:  Chest one view    HISTORY: Feeding tube placement    COMPARISON STUDY: 7/4/2022    Frontal expiratory view of the chest shows the heart to be similar in   size. Endotracheal tube remains present. Feeding tube reaches at least to   the stomach. Right chest port is again noted.    The lungs show less pulmonary congestion with slight reduction of right   effusion and there is no evidence of pneumothorax nor left pleural   effusion.    IMPRESSION:  Decreased congestive changes. Feeding tube as noted.        Thank you for the courtesy of this referral.    --- End of Report ---            ILAN MONK MD; Attending Interventional Radiologist  This document has been electronically signed. Jul 6 2022 10:47AM    < end of copied text >        ROS  [  ] UNABLE TO ELICIT

## 2022-07-08 NOTE — PROGRESS NOTE ADULT - PROBLEM SELECTOR PLAN 2
- obtain covid markers q72 hrs  - no indication for decadron/remdesivir    - intubated for increased work of breathing.   now extubated saturating well on NC    #R lung opacity/Pneumonia  - CXR with pleural effusion vs consolidation on CXR  - c/w Unasyn  - p/w sob, intubated in ED for respiratory distress  will obtain CT chest to further evaluate  holding apixaban for possible chest tube placement given R pleural effusion  - chest PT, mucomyst via ETT, albuterol  will get CT chest and consider thora pending results - no indication for decadron/remdesivir  -covid marker Q 72 hr  -CXR as above, infiltrate b/l lower lobes, pleural effusion R>L  -s/p intubation and extubation 7/6.    -saturating well on NC 2L  -MRSA PCR +, treated with Chlorhexadine and mupirocin  -c/w chest PT/vest  -holding apixaban for possible chest tube placement given R pleural effusion  -c/w BB inhaler, Symbicort.   -f/u CT chest for re-evaluate pleural effusion.

## 2022-07-08 NOTE — PROGRESS NOTE ADULT - PROBLEM SELECTOR PLAN 9
Hb 11.5 on admission  baseline hb 9-10  takes iron at home  hold iron while npo -Hb 11.5 on admission  baseline hb 9-10  -takes iron at home  -c/w home med

## 2022-07-08 NOTE — PROGRESS NOTE ADULT - ASSESSMENT
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 COVID+     ICU Course:   Pt febrile, HR >90, lactate 2.6, UA+, CXR with pleural effusion/mucous plug vs consolidation, started on Unasyn (7/2). Started on mucomyst, albuterol, chest PT. Patient extubated to Nasal cannula on 7/6. Decadron discontinued per primary team as PNA deemed to be more bacterial then COVID. Eliquis held for possible thoracentesis. Held Bisoprolol/HCTZ for HTN. AMELIA improved with IVF. Lantus and sliding scale for DM, PPI for GI ppx. Started on soft and bite sized diet per S&S. Unsayn switched to Nerissa (7/7) for ESBL E. coli and Enteroccocus growing in urine. CASANDRA Glilespie consulted.     Patient is downgraded to AI on 7/7.   Patient seen at bedside, remains comfortable on 2L NC, 98%. Evaluated PT and recommends Rehab.   ordered Chest PT and CT chest for re-evaluate pleural effusion.           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 COVID+     ICU Course:   Pt febrile, HR >90, lactate 2.6, UA+, CXR with pleural effusion/mucous plug vs consolidation, started on Unasyn (7/2). Started on mucomyst, albuterol, chest PT. Patient extubated to Nasal cannula on 7/6. Decadron discontinued per primary team as PNA deemed to be more bacterial then COVID. Eliquis held for possible thoracentesis. Held Bisoprolol/HCTZ for HTN. AMELIA improved with IVF. Lantus and sliding scale for DM, PPI for GI ppx. Started on soft and bite sized diet per S&S. Unsayn switched to Nerissa (7/7) for ESBL E. coli and Enteroccocus growing in urine. CASANDRA Gillespie consulted.     Patient is downgraded to AI on 7/7.   Patient seen at bedside, remains comfortable on 2L NC, 98%. Evaluated PT and recommends Rehab.   ordered Chest PT and CT chest for re-evaluate pleural effusion.   spoke with wife and she agrees plan of care. pending LAILA choice. CM/SW following.

## 2022-07-08 NOTE — PROGRESS NOTE ADULT - SUBJECTIVE AND OBJECTIVE BOX
incomplete note      MEILZA DAILEY    SCU progress note    INTERVAL HPI/OVERNIGHT EVENTS: no overnight event, on 2L NC.     DNR [ ]   DNI  [  ]  Full code [x]  Covid - 19 PCR: positive 7/2    The 4Ms    What Matters Most: see GOC  Age appropriate Medications/Screen for High Risk Medication: Yes  Mentation: see CAM below  Mobility: defer to physical exam    The Confusion Assessment Method (CAM) Diagnostic Algorithm     1: Acute Onset or Fluctuating Course  - Is there evidence of an acute change in mental status from the patient’s baseline? Did the (abnormal) behavior  fluctuate during the day, that is, tend to come and go, or increase and decrease in severity?  [ ] YES [x ] NO     2: Inattention  - Did the patient have difficulty focusing attention, being easily distractible, or having difficulty keeping track of what was being said?  [ ] YES [ x] NO     3: Disorganized thinking  -Was the patient’s thinking disorganized or incoherent, such as rambling or irrelevant conversation, unclear or illogical flow of ideas, or unpredictable switching from subject to subject?  [ ] YES [ x] NO    4: Altered Level of consciousness?  [ ] YES [ x] NO    The diagnosis of delirium by CAM requires the presence of features 1 and 2 and either 3 or 4.    PRESSORS: [ ] YES [x ] NO  meropenem  IVPB 1000 milliGRAM(s) IV Intermittent every 8 hours    Cardiovascular:  Heart Failure  Acute   Acute on Chronic  Chronic         Pulmonary:  acetylcysteine 10%  Inhalation 4 milliLiter(s) Inhalation every 8 hours  ALBUTerol    0.083% 2.5 milliGRAM(s) Nebulizer every 6 hours PRN  ALBUTerol    90 MICROgram(s) HFA Inhaler 2 Puff(s) Inhalation every 8 hours    Hematalogic:    Other:  chlorhexidine 2% Cloths 1 Application(s) Topical <User Schedule>  insulin glargine Injectable (LANTUS) 25 Unit(s) SubCutaneous at bedtime  insulin lispro (ADMELOG) corrective regimen sliding scale   SubCutaneous three times a day before meals  mupirocin 2% Ointment 1 Application(s) Topical two times a day  ondansetron Injectable 4 milliGRAM(s) IV Push every 6 hours PRN  pantoprazole   Suspension 40 milliGRAM(s) Oral daily  sodium chloride 0.9% lock flush 3 milliLiter(s) IV Push every 8 hours    acetylcysteine 10%  Inhalation 4 milliLiter(s) Inhalation every 8 hours  ALBUTerol    0.083% 2.5 milliGRAM(s) Nebulizer every 6 hours PRN  ALBUTerol    90 MICROgram(s) HFA Inhaler 2 Puff(s) Inhalation every 8 hours  chlorhexidine 2% Cloths 1 Application(s) Topical <User Schedule>  insulin glargine Injectable (LANTUS) 25 Unit(s) SubCutaneous at bedtime  insulin lispro (ADMELOG) corrective regimen sliding scale   SubCutaneous three times a day before meals  meropenem  IVPB 1000 milliGRAM(s) IV Intermittent every 8 hours  mupirocin 2% Ointment 1 Application(s) Topical two times a day  ondansetron Injectable 4 milliGRAM(s) IV Push every 6 hours PRN  pantoprazole   Suspension 40 milliGRAM(s) Oral daily  sodium chloride 0.9% lock flush 3 milliLiter(s) IV Push every 8 hours    Drug Dosing Weight  Height (cm): 180.3 (02 Jul 2022 05:23)  Weight (kg): 107 (02 Jul 2022 09:10)  BMI (kg/m2): 32.9 (02 Jul 2022 09:10)  BSA (m2): 2.26 (02 Jul 2022 09:10)    CENTRAL LINE: [ ] YES [ ] NO  LOCATION:   DATE INSERTED:  REMOVE: [ ] YES [ ] NO  EXPLAIN:    RIBEIRO: [ ] YES [ x] NO    DATE INSERTED:  REMOVE:  [ ] YES [ ] NO  EXPLAIN:    PAST MEDICAL & SURGICAL HISTORY:  Lumbago      Lumbago with sciatica of right side      Benign hypertension  dx 10 years      H/O renal calculi  ESWL right side yrs ago  last stone one year ago  (passed on its own)      Obese      Eczema      Diabetes mellitus type II  on Meds 4/2012      Chronic atrial fibrillation      Colon cancer      S/P tonsillectomy      Port-A-Cath in place      07-07 @ 07:01  -  07-08 @ 07:00  --------------------------------------------------------  IN: 200 mL / OUT: 0 mL / NET: 200 mL      PHYSICAL EXAM:    GENERAL: NAD, well-groomed, well-developed  HEAD:  Atraumatic, Normocephalic  EYES: EOMI, PERRLA, conjunctiva and sclera clear  ENMT: No tonsillar erythema, exudates, or enlargement; Moist mucous membranes, Good dentition, No lesions  NECK: Supple, No JVD, Normal thyroid  NERVOUS SYSTEM:  Alert & Oriented X 2, right side weakness,  DTRs 2+ intact and symmetric  CHEST/LUNG: Left lung clear, diminished BS right side. no wheezing. Eupneic.   HEART: Regular rate and rhythm; No murmurs, rubs, or gallops  ABDOMEN: Soft, Nontender, Nondistended; Bowel sounds present  EXTREMITIES:  2+ Peripheral Pulses, No clubbing, cyanosis, or edema  LYMPH: No lymphadenopathy noted  SKIN: No rashes or lesions      LABS:  CBC Full  -  ( 08 Jul 2022 06:57 )  WBC Count : 13.73 K/uL  RBC Count : 3.80 M/uL  Hemoglobin : 9.2 g/dL  Hematocrit : 31.2 %  Platelet Count - Automated : 475 K/uL  Mean Cell Volume : 82.1 fl  Mean Cell Hemoglobin : 24.2 pg  Mean Cell Hemoglobin Concentration : 29.5 gm/dL    07-08    148<H>  |  111<H>  |  17  ----------------------------<  173<H>  3.3<L>   |  31  |  0.70    Ca    8.7      08 Jul 2022 06:57  Phos  3.7     07-08  Mg     2.2     07-08    TPro  5.8<L>  /  Alb  1.6<L>  /  TBili  0.3  /  DBili  x   /  AST  17  /  ALT  29  /  AlkPhos  102  07-08    PTT - ( 07 Jul 2022 06:16 )  PTT:32.6 sec    [  ]  DVT Prophylaxis  [  ]  Nutrition, Brand, Rate         Goal Rate        Abnormal Nutritional Status -  Malnutrition   Cachexia      Morbid Obesity BMI >/=40    RADIOLOGY & ADDITIONAL STUDIES:  Yes  < from: Xray Chest 1 View- PORTABLE-Urgent (Xray Chest 1 View- PORTABLE-Urgent .) (07.05.22 @ 10:13) >    IMPRESSION:  Decreased congestive changes. Feeding tube as noted.    < end of copied text >      Goals of Care Discussion with Family/Proxy/Other   - see note from/family update note

## 2022-07-08 NOTE — PROGRESS NOTE ADULT - PROBLEM SELECTOR PLAN 10
on Bisoprolol 10/ HCTZ 6.25  hold for now -on Bisoprolol 10/ HCTZ 6.25 and Amlodipine 10mg daily  -start Metoprolol 25mg BID for now  -monitor BP and adjust med

## 2022-07-08 NOTE — CHART NOTE - NSCHARTNOTEFT_GEN_A_CORE
spoke with wife Namrata Chacon [phone], updated pt condition, test result, including plan of care. verbalized understanding. PT recs triny, agreeable plan for TRINY. no further questions/concerns addressed.

## 2022-07-08 NOTE — PROGRESS NOTE ADULT - PROBLEM SELECTOR PLAN 3
-on admission, noted Pleural effusion large on R > Left   -pulmonary following.  -Chest PT  -on 2L NC saturating well  -PT eval: LAILA  -f/u CT chest to re-evauate

## 2022-07-08 NOTE — PROGRESS NOTE ADULT - PROBLEM SELECTOR PLAN 5
CHERYLE: 5  Hold eliquis for now, will consider resuming  on bisoprolol for rate control - hold for now  TTE 11/2021 with normal ef -CHADSVASC: 5  -Hold eliquis for now since 7/4  -on bisoprolol for rate control - hold for now  -TTE 11/2021 with normal EF  -will resume if there is no clear plan for chest tube, f/u CT chest first

## 2022-07-08 NOTE — PROGRESS NOTE ADULT - PROBLEM SELECTOR PLAN 12
PPI  SCDs - Wife refusing lovenox due to history of abdominal bleed when receiving lovenox. -GI ppx -PPI  -DVT ppx-SCDs - Wife refusing lovenox due to history of abdominal bleed when receiving lovenox.    DC plan: LAILA, pending choices from family. CM/SW following.

## 2022-07-09 LAB
ALBUMIN SERPL ELPH-MCNC: 1.7 G/DL — LOW (ref 3.5–5)
ALP SERPL-CCNC: 120 U/L — SIGNIFICANT CHANGE UP (ref 40–120)
ALT FLD-CCNC: 37 U/L DA — SIGNIFICANT CHANGE UP (ref 10–60)
ANION GAP SERPL CALC-SCNC: 4 MMOL/L — LOW (ref 5–17)
AST SERPL-CCNC: 20 U/L — SIGNIFICANT CHANGE UP (ref 10–40)
BILIRUB SERPL-MCNC: 0.4 MG/DL — SIGNIFICANT CHANGE UP (ref 0.2–1.2)
BUN SERPL-MCNC: 15 MG/DL — SIGNIFICANT CHANGE UP (ref 7–18)
CALCIUM SERPL-MCNC: 8.4 MG/DL — SIGNIFICANT CHANGE UP (ref 8.4–10.5)
CHLORIDE SERPL-SCNC: 110 MMOL/L — HIGH (ref 96–108)
CO2 SERPL-SCNC: 29 MMOL/L — SIGNIFICANT CHANGE UP (ref 22–31)
CREAT SERPL-MCNC: 0.76 MG/DL — SIGNIFICANT CHANGE UP (ref 0.5–1.3)
EGFR: 99 ML/MIN/1.73M2 — SIGNIFICANT CHANGE UP
GLUCOSE BLDC GLUCOMTR-MCNC: 176 MG/DL — HIGH (ref 70–99)
GLUCOSE BLDC GLUCOMTR-MCNC: 189 MG/DL — HIGH (ref 70–99)
GLUCOSE BLDC GLUCOMTR-MCNC: 272 MG/DL — HIGH (ref 70–99)
GLUCOSE BLDC GLUCOMTR-MCNC: 304 MG/DL — HIGH (ref 70–99)
GLUCOSE SERPL-MCNC: 219 MG/DL — HIGH (ref 70–99)
HCT VFR BLD CALC: 32.3 % — LOW (ref 39–50)
HGB BLD-MCNC: 9.4 G/DL — LOW (ref 13–17)
MAGNESIUM SERPL-MCNC: 2.2 MG/DL — SIGNIFICANT CHANGE UP (ref 1.6–2.6)
MCHC RBC-ENTMCNC: 24.1 PG — LOW (ref 27–34)
MCHC RBC-ENTMCNC: 29.1 GM/DL — LOW (ref 32–36)
MCV RBC AUTO: 82.8 FL — SIGNIFICANT CHANGE UP (ref 80–100)
NRBC # BLD: 0 /100 WBCS — SIGNIFICANT CHANGE UP (ref 0–0)
PHOSPHATE SERPL-MCNC: 2.6 MG/DL — SIGNIFICANT CHANGE UP (ref 2.5–4.5)
PLATELET # BLD AUTO: 487 K/UL — HIGH (ref 150–400)
POTASSIUM SERPL-MCNC: 3.9 MMOL/L — SIGNIFICANT CHANGE UP (ref 3.5–5.3)
POTASSIUM SERPL-SCNC: 3.9 MMOL/L — SIGNIFICANT CHANGE UP (ref 3.5–5.3)
PROT SERPL-MCNC: 5.9 G/DL — LOW (ref 6–8.3)
RBC # BLD: 3.9 M/UL — LOW (ref 4.2–5.8)
RBC # FLD: 20.7 % — HIGH (ref 10.3–14.5)
SARS-COV-2 RNA SPEC QL NAA+PROBE: SIGNIFICANT CHANGE UP
SODIUM SERPL-SCNC: 143 MMOL/L — SIGNIFICANT CHANGE UP (ref 135–145)
WBC # BLD: 14.52 K/UL — HIGH (ref 3.8–10.5)
WBC # FLD AUTO: 14.52 K/UL — HIGH (ref 3.8–10.5)

## 2022-07-09 PROCEDURE — 71250 CT THORAX DX C-: CPT | Mod: 26

## 2022-07-09 PROCEDURE — 99497 ADVNCD CARE PLAN 30 MIN: CPT

## 2022-07-09 RX ORDER — HEPARIN SODIUM 5000 [USP'U]/ML
5000 INJECTION INTRAVENOUS; SUBCUTANEOUS EVERY 8 HOURS
Refills: 0 | Status: DISCONTINUED | OUTPATIENT
Start: 2022-07-09 | End: 2022-07-13

## 2022-07-09 RX ORDER — FERROUS SULFATE 325(65) MG
300 TABLET ORAL DAILY
Refills: 0 | Status: DISCONTINUED | OUTPATIENT
Start: 2022-07-09 | End: 2022-07-19

## 2022-07-09 RX ADMIN — CHLORHEXIDINE GLUCONATE 1 APPLICATION(S): 213 SOLUTION TOPICAL at 05:18

## 2022-07-09 RX ADMIN — ALBUTEROL 2 PUFF(S): 90 AEROSOL, METERED ORAL at 21:42

## 2022-07-09 RX ADMIN — ALBUTEROL 2.5 MILLIGRAM(S): 90 AEROSOL, METERED ORAL at 15:24

## 2022-07-09 RX ADMIN — Medication 2: at 17:40

## 2022-07-09 RX ADMIN — MUPIROCIN 1 APPLICATION(S): 20 OINTMENT TOPICAL at 17:41

## 2022-07-09 RX ADMIN — BUDESONIDE AND FORMOTEROL FUMARATE DIHYDRATE 2 PUFF(S): 160; 4.5 AEROSOL RESPIRATORY (INHALATION) at 08:59

## 2022-07-09 RX ADMIN — Medication 6: at 08:17

## 2022-07-09 RX ADMIN — PANTOPRAZOLE SODIUM 40 MILLIGRAM(S): 20 TABLET, DELAYED RELEASE ORAL at 08:58

## 2022-07-09 RX ADMIN — Medication 25 MILLIGRAM(S): at 17:40

## 2022-07-09 RX ADMIN — MEROPENEM 100 MILLIGRAM(S): 1 INJECTION INTRAVENOUS at 21:41

## 2022-07-09 RX ADMIN — Medication 25 MILLIGRAM(S): at 05:17

## 2022-07-09 RX ADMIN — ALBUTEROL 2 PUFF(S): 90 AEROSOL, METERED ORAL at 14:57

## 2022-07-09 RX ADMIN — MEROPENEM 100 MILLIGRAM(S): 1 INJECTION INTRAVENOUS at 05:17

## 2022-07-09 RX ADMIN — INSULIN GLARGINE 25 UNIT(S): 100 INJECTION, SOLUTION SUBCUTANEOUS at 21:42

## 2022-07-09 RX ADMIN — ALBUTEROL 2 PUFF(S): 90 AEROSOL, METERED ORAL at 05:17

## 2022-07-09 RX ADMIN — MUPIROCIN 1 APPLICATION(S): 20 OINTMENT TOPICAL at 05:16

## 2022-07-09 RX ADMIN — MEROPENEM 100 MILLIGRAM(S): 1 INJECTION INTRAVENOUS at 14:59

## 2022-07-09 RX ADMIN — SODIUM CHLORIDE 3 MILLILITER(S): 9 INJECTION INTRAMUSCULAR; INTRAVENOUS; SUBCUTANEOUS at 21:39

## 2022-07-09 RX ADMIN — Medication 300 MILLIGRAM(S): at 12:11

## 2022-07-09 RX ADMIN — BUDESONIDE AND FORMOTEROL FUMARATE DIHYDRATE 2 PUFF(S): 160; 4.5 AEROSOL RESPIRATORY (INHALATION) at 21:42

## 2022-07-09 RX ADMIN — Medication 8: at 12:11

## 2022-07-09 RX ADMIN — SODIUM CHLORIDE 3 MILLILITER(S): 9 INJECTION INTRAMUSCULAR; INTRAVENOUS; SUBCUTANEOUS at 17:07

## 2022-07-09 RX ADMIN — APIXABAN 5 MILLIGRAM(S): 2.5 TABLET, FILM COATED ORAL at 05:17

## 2022-07-09 RX ADMIN — HEPARIN SODIUM 5000 UNIT(S): 5000 INJECTION INTRAVENOUS; SUBCUTANEOUS at 21:43

## 2022-07-09 NOTE — PROGRESS NOTE ADULT - PROBLEM SELECTOR PLAN 4
-Ucx resulted ESBL ecoli and enterococcus   -Sensitive to Meropenem, continue   -Id Dr. Gillespie, appreciate recs -Ucx resulted ESBL ecoli and enterococcus   -Sensitive to Meropenem, continue antibiotics  -ID Dr. Gillespie, appreciate recs

## 2022-07-09 NOTE — PROGRESS NOTE ADULT - PROBLEM SELECTOR PLAN 12
-GI ppx -PPI  -DVT ppx-SCDs - Wife refusing lovenox due to history of abdominal bleed when receiving lovenox. Eliquis restarted on 7/9/am  DC plan: LAILA, pending choices from family. CM/SW following. Wife does NOT want patient to return to [facility]  -Covid Surveillance sent 7/9/22, pending result

## 2022-07-09 NOTE — PROGRESS NOTE ADULT - SUBJECTIVE AND OBJECTIVE BOX
incomplete note      MELIZA DAILEY    SCU progress note    INTERVAL HPI/OVERNIGHT EVENTS: no overnight event,  remains on 2L NC with >90% saturation.    DNR [ ]   DNI  [  ]  Full code [x]    Covid - 19 PCR: positive 7/2    The 4Ms    What Matters Most: see GOC  Age appropriate Medications/Screen for High Risk Medication: Yes  Mentation: see CAM below  Mobility: defer to physical exam    The Confusion Assessment Method (CAM) Diagnostic Algorithm     1: Acute Onset or Fluctuating Course  - Is there evidence of an acute change in mental status from the patient’s baseline? Did the (abnormal) behavior  fluctuate during the day, that is, tend to come and go, or increase and decrease in severity?  [ ] YES [x ] NO     2: Inattention  - Did the patient have difficulty focusing attention, being easily distractible, or having difficulty keeping track of what was being said?  [ ] YES [ x] NO     3: Disorganized thinking  -Was the patient’s thinking disorganized or incoherent, such as rambling or irrelevant conversation, unclear or illogical flow of ideas, or unpredictable switching from subject to subject?  [ ] YES [ x] NO    4: Altered Level of consciousness?  [ ] YES [ x] NO    The diagnosis of delirium by CAM requires the presence of features 1 and 2 and either 3 or 4.    PRESSORS: [ ] YES [x ] NO    MEDICATIONS  (STANDING):  ALBUTerol    90 MICROgram(s) HFA Inhaler 2 Puff(s) Inhalation every 8 hours  ALBUTerol    90 MICROgram(s) HFA Inhaler 2 Puff(s) Inhalation every 6 hours  apixaban 5 milliGRAM(s) Oral every 12 hours  budesonide  80 MICROgram(s)/formoterol 4.5 MICROgram(s) Inhaler 2 Puff(s) Inhalation two times a day  chlorhexidine 2% Cloths 1 Application(s) Topical <User Schedule>  insulin glargine Injectable (LANTUS) 25 Unit(s) SubCutaneous at bedtime  insulin lispro (ADMELOG) corrective regimen sliding scale   SubCutaneous three times a day before meals  meropenem  IVPB 1000 milliGRAM(s) IV Intermittent every 8 hours  metoprolol tartrate 25 milliGRAM(s) Oral two times a day  mupirocin 2% Ointment 1 Application(s) Topical two times a day  pantoprazole   Suspension 40 milliGRAM(s) Oral daily  sodium chloride 0.9% lock flush 3 milliLiter(s) IV Push every 8 hours    MEDICATIONS  (PRN):  ALBUTerol    0.083% 2.5 milliGRAM(s) Nebulizer every 6 hours PRN w/ mucomyst  ondansetron Injectable 4 milliGRAM(s) IV Push every 6 hours PRN Nausea and/or Vomiting      Drug Dosing Weight  Height (cm): 180.3 (02 Jul 2022 05:23)  Weight (kg): 107 (02 Jul 2022 09:10)  BMI (kg/m2): 32.9 (02 Jul 2022 09:10)  BSA (m2): 2.26 (02 Jul 2022 09:10)    CENTRAL LINE: [ ] YES [x ] NO  LOCATION:   DATE INSERTED:  REMOVE: [ ] YES [ ] NO  EXPLAIN: NA    RIBEIRO: [ ] YES [ x] NO    DATE INSERTED:  REMOVE:  [ ] YES [ ] NO  EXPLAIN: NA    PAST MEDICAL & SURGICAL HISTORY:  Lumbago      Lumbago with sciatica of right side      Benign hypertension  dx 10 years      H/O renal calculi  ESWL right side yrs ago  last stone one year ago  (passed on its own)      Obese      Eczema      Diabetes mellitus type II  on Meds 4/2012      Chronic atrial fibrillation      Colon cancer      S/P tonsillectomy      Port-A-Cath in place      07-07 @ 07:01  -  07-08 @ 07:00  --------------------------------------------------------  IN: 200 mL / OUT: 0 mL / NET: 200 mL    PHYSICAL EXAM:    GENERAL: NAD, well-groomed, well-developed  HEAD:  Atraumatic, Normocephalic  EYES: EOMI, PERRLA, conjunctiva and sclera clear  ENMT: No tonsillar erythema, exudates, or enlargement; Moist mucous membranes, Good dentition, No lesions  NECK: Supple, No JVD, Normal thyroid  NERVOUS SYSTEM:  Alert & Oriented X 2, right side weakness,  DTRs 2+ intact and symmetric  CHEST/LUNG: Left lung clear upper bases, diminished BS right side. Some wheezing on left chest; diminished lung sounds at bases;   HEART: Regular rate and rhythm; No murmurs, rubs, or gallops  ABDOMEN: Soft, Nontender, Nondistended; Bowel sounds present  EXTREMITIES:  2+ Peripheral Pulses, No clubbing, cyanosis, or edema  LYMPH: No lymphadenopathy noted  SKIN: No rashes or lesions      LABS:  CBC Full  -  ( 09 Jul 2022 08:36 )  WBC Count : 14.52 K/uL  RBC Count : 3.90 M/uL  Hemoglobin : 9.4 g/dL  Hematocrit : 32.3 %  Platelet Count - Automated : 487 K/uL  Mean Cell Volume : 82.8 fl  Mean Cell Hemoglobin : 24.1 pg  Mean Cell Hemoglobin Concentration : 29.1 gm/dL  Auto Neutrophil # : x  Auto Lymphocyte # : x  Auto Monocyte # : x  Auto Eosinophil # : x  Auto Basophil # : x  Auto Neutrophil % : x  Auto Lymphocyte % : x  Auto Monocyte % : x  Auto Eosinophil % : x  Auto Basophil % : x    07-09    143  |  110<H>  |  15  ----------------------------<  219<H>  3.9   |  29  |  0.76    Ca    8.4      09 Jul 2022 08:36  Phos  2.6     07-09  Mg     2.2     07-09    TPro  5.9<L>  /  Alb  1.7<L>  /  TBili  0.4  /  DBili  x   /  AST  20  /  ALT  37  /  AlkPhos  120  07-09    Culture - Sputum . (07.02.22 @ 18:57)   Gram Stain:   Few Squamous epithelial cells per low power field   Moderate polymorphonuclear leukocytes per low power field   Moderate Gram Positive Cocci in Pairs and Chains per oil power field   Moderate Gram Negative Rods per oil power field   Specimen Source: ET Tube ET Tube   Culture Results:   Normal Respiratory Kinjal present Culture - Urine (07.02.22 @ 16:29)   - Amikacin: S <=16   - Amoxicillin/Clavulanic Acid: S <=8/4   - Ampicillin: R >16 These ampicillin results predict results for amoxicillin   - Ampicillin: S <=2 Predicts results to ampicillin/sulbactam, amoxacillin-clavulanate and piperacillin-tazobactam.   - Ampicillin/Sulbactam: I 16/8 Enterobacter, Klebsiella aerogenes, Citrobacter, and Serratia may develop resistance during prolonged therapy (3-4 days)   - Aztreonam: R 16   - Cefazolin: R >16 (MIC_CL_COM_ENTERIC_CEFAZU) For uncomplicated UTI with K. pneumoniae, E. coli, or P. mirablis: MARA <=16 is sensitive and MARA >=32 is resistant. This also predicts results for oral agents cefaclor, cefdinir, cefpodoxime, cefprozil, cefuroxime axetil, cephalexin and locarbef for uncomplicated UTI. Note that some isolates may be susceptible to these agents while testing resistant to cefazolin.   - Cefepime: R >16   - Ceftriaxone: R >32 Enterobacter, Klebsiella aerogenes, Citrobacter, and Serratia may develop resistance during prolonged therapy   - Ciprofloxacin: S <=0.25   - Ciprofloxacin: S <=1   - Ertapenem: S <=0.5   - Gentamicin: R >8   - Imipenem: S <=1   - Levofloxacin: S <=0.5   - Levofloxacin: S <=1   - Meropenem: S <=1   - Nitrofurantoin: S <=32 Should not be used to treat pyelonephritis   - Nitrofurantoin: S <=32 Should not be used to treat pyelonephritis.   - Piperacillin/Tazobactam: S <=8   - Tetra/Doxy: R >8   - Tigecycline: S <=2   - Tobramycin: S <=2   - Trimethoprim/Sulfamethoxazole: R >2/38   - Vancomycin: S 2   Specimen Source: Catheterized Catheterized   Culture Results:   >100,000 CFU/ml Escherichia coli ESBL   >100,000 CFU/ml Enterococcus faecalis   Organism Identification: Escherichia coli ESBL   Enterococcus faecalis   Organism: Escherichia coli ESBL   Organism: Enterococcus faecalis   Method Type: MARA   Method Type: MARA     [ x ]  DVT Prophylaxis - apixaban  [x] Diet, Soft and Bite Sized:   Consistent Carbohydrate No Snacks  DASH/TLC Sodium & Cholesterol Restricted  Mildly Thick Liquids (MILDTHICKLIQS) (07-07-22 @ 11:15) [Active]      RADIOLOGY & ADDITIONAL STUDIES:  Yes  < from: Xray Chest 1 View- PORTABLE-Urgent (Xray Chest 1 View- PORTABLE-Urgent .) (07.05.22 @ 10:13) >  IMPRESSION:  Decreased congestive changes. Feeding tube as noted.  < end of copied text >    CT Chest Pending       Goals of Care Discussion with Family/Proxy/Other   - goc   incomplete note      MELIZA DAILEY    SCU progress note    INTERVAL HPI/OVERNIGHT EVENTS: no overnight event,  remains on 2L NC with >90% saturation.    DNR [ ]   DNI  [  ]  Full code [x]    Covid - 19 PCR: positive 7/2    The 4Ms    What Matters Most: see GOC  Age appropriate Medications/Screen for High Risk Medication: Yes  Mentation: see CAM below  Mobility: defer to physical exam    The Confusion Assessment Method (CAM) Diagnostic Algorithm     1: Acute Onset or Fluctuating Course  - Is there evidence of an acute change in mental status from the patient’s baseline? Did the (abnormal) behavior  fluctuate during the day, that is, tend to come and go, or increase and decrease in severity?  [ ] YES [x ] NO     2: Inattention  - Did the patient have difficulty focusing attention, being easily distractible, or having difficulty keeping track of what was being said?  [ ] YES [ x] NO     3: Disorganized thinking  -Was the patient’s thinking disorganized or incoherent, such as rambling or irrelevant conversation, unclear or illogical flow of ideas, or unpredictable switching from subject to subject?  [ ] YES [ x] NO    4: Altered Level of consciousness?  [ ] YES [ x] NO    The diagnosis of delirium by CAM requires the presence of features 1 and 2 and either 3 or 4.    PRESSORS: [ ] YES [x ] NO    MEDICATIONS  (STANDING):  ALBUTerol    90 MICROgram(s) HFA Inhaler 2 Puff(s) Inhalation every 8 hours  ALBUTerol    90 MICROgram(s) HFA Inhaler 2 Puff(s) Inhalation every 6 hours  apixaban 5 milliGRAM(s) Oral every 12 hours  budesonide  80 MICROgram(s)/formoterol 4.5 MICROgram(s) Inhaler 2 Puff(s) Inhalation two times a day  chlorhexidine 2% Cloths 1 Application(s) Topical <User Schedule>  insulin glargine Injectable (LANTUS) 25 Unit(s) SubCutaneous at bedtime  insulin lispro (ADMELOG) corrective regimen sliding scale   SubCutaneous three times a day before meals  meropenem  IVPB 1000 milliGRAM(s) IV Intermittent every 8 hours  metoprolol tartrate 25 milliGRAM(s) Oral two times a day  mupirocin 2% Ointment 1 Application(s) Topical two times a day  pantoprazole   Suspension 40 milliGRAM(s) Oral daily  sodium chloride 0.9% lock flush 3 milliLiter(s) IV Push every 8 hours    MEDICATIONS  (PRN):  ALBUTerol    0.083% 2.5 milliGRAM(s) Nebulizer every 6 hours PRN w/ mucomyst  ondansetron Injectable 4 milliGRAM(s) IV Push every 6 hours PRN Nausea and/or Vomiting      Drug Dosing Weight  Height (cm): 180.3 (02 Jul 2022 05:23)  Weight (kg): 107 (02 Jul 2022 09:10)  BMI (kg/m2): 32.9 (02 Jul 2022 09:10)  BSA (m2): 2.26 (02 Jul 2022 09:10)    CENTRAL LINE: [ ] YES [x ] NO  LOCATION:   DATE INSERTED:  REMOVE: [ ] YES [ ] NO  EXPLAIN: NA    RIBEIRO: [ ] YES [ x] NO    DATE INSERTED:  REMOVE:  [ ] YES [ ] NO  EXPLAIN: NA    PAST MEDICAL & SURGICAL HISTORY:  Lumbago      Lumbago with sciatica of right side      Benign hypertension  dx 10 years      H/O renal calculi  ESWL right side yrs ago  last stone one year ago  (passed on its own)      Obese      Eczema      Diabetes mellitus type II  on Meds 4/2012      Chronic atrial fibrillation      Colon cancer      S/P tonsillectomy      Port-A-Cath in place      07-07 @ 07:01  -  07-08 @ 07:00  --------------------------------------------------------  IN: 200 mL / OUT: 0 mL / NET: 200 mL    PHYSICAL EXAM:    GENERAL: NAD, well-groomed, well-developed  HEAD:  Atraumatic, Normocephalic  EYES: EOMI, PERRLA, conjunctiva and sclera clear  ENMT: No tonsillar erythema, exudates, or enlargement; Moist mucous membranes, Good dentition, No lesions  NECK: Supple, No JVD, Normal thyroid  NERVOUS SYSTEM:  Alert & Oriented X 2, right side weakness,  DTRs 2+ intact and symmetric  CHEST/LUNG: Left lung clear upper bases, diminished BS right side. Some wheezing on left chest; diminished lung sounds at bases;   HEART: Regular rate and rhythm; No murmurs, rubs, or gallops  ABDOMEN: Soft, Nontender, Nondistended; Bowel sounds present  EXTREMITIES:  2+ Peripheral Pulses, No clubbing, cyanosis, or edema  LYMPH: No lymphadenopathy noted  SKIN: No rashes or lesions      LABS:  CBC Full  -  ( 09 Jul 2022 08:36 )  WBC Count : 14.52 K/uL  RBC Count : 3.90 M/uL  Hemoglobin : 9.4 g/dL  Hematocrit : 32.3 %  Platelet Count - Automated : 487 K/uL  Mean Cell Volume : 82.8 fl  Mean Cell Hemoglobin : 24.1 pg  Mean Cell Hemoglobin Concentration : 29.1 gm/dL  Auto Neutrophil # : x  Auto Lymphocyte # : x  Auto Monocyte # : x  Auto Eosinophil # : x  Auto Basophil # : x  Auto Neutrophil % : x  Auto Lymphocyte % : x  Auto Monocyte % : x  Auto Eosinophil % : x  Auto Basophil % : x    07-09    143  |  110<H>  |  15  ----------------------------<  219<H>  3.9   |  29  |  0.76    Ca    8.4      09 Jul 2022 08:36  Phos  2.6     07-09  Mg     2.2     07-09    TPro  5.9<L>  /  Alb  1.7<L>  /  TBili  0.4  /  DBili  x   /  AST  20  /  ALT  37  /  AlkPhos  120  07-09    Culture - Sputum . (07.02.22 @ 18:57)   Gram Stain:   Few Squamous epithelial cells per low power field   Moderate polymorphonuclear leukocytes per low power field   Moderate Gram Positive Cocci in Pairs and Chains per oil power field   Moderate Gram Negative Rods per oil power field   Specimen Source: ET Tube ET Tube   Culture Results:   Normal Respiratory Kinjal present Culture - Urine (07.02.22 @ 16:29)   - Amikacin: S <=16   - Amoxicillin/Clavulanic Acid: S <=8/4   - Ampicillin: R >16 These ampicillin results predict results for amoxicillin   - Ampicillin: S <=2 Predicts results to ampicillin/sulbactam, amoxacillin-clavulanate and piperacillin-tazobactam.   - Ampicillin/Sulbactam: I 16/8 Enterobacter, Klebsiella aerogenes, Citrobacter, and Serratia may develop resistance during prolonged therapy (3-4 days)   - Aztreonam: R 16   - Cefazolin: R >16 (MIC_CL_COM_ENTERIC_CEFAZU) For uncomplicated UTI with K. pneumoniae, E. coli, or P. mirablis: MARA <=16 is sensitive and MARA >=32 is resistant. This also predicts results for oral agents cefaclor, cefdinir, cefpodoxime, cefprozil, cefuroxime axetil, cephalexin and locarbef for uncomplicated UTI. Note that some isolates may be susceptible to these agents while testing resistant to cefazolin.   - Cefepime: R >16   - Ceftriaxone: R >32 Enterobacter, Klebsiella aerogenes, Citrobacter, and Serratia may develop resistance during prolonged therapy   - Ciprofloxacin: S <=0.25   - Ciprofloxacin: S <=1   - Ertapenem: S <=0.5   - Gentamicin: R >8   - Imipenem: S <=1   - Levofloxacin: S <=0.5   - Levofloxacin: S <=1   - Meropenem: S <=1   - Nitrofurantoin: S <=32 Should not be used to treat pyelonephritis   - Nitrofurantoin: S <=32 Should not be used to treat pyelonephritis.   - Piperacillin/Tazobactam: S <=8   - Tetra/Doxy: R >8   - Tigecycline: S <=2   - Tobramycin: S <=2   - Trimethoprim/Sulfamethoxazole: R >2/38   - Vancomycin: S 2   Specimen Source: Catheterized Catheterized   Culture Results:   >100,000 CFU/ml Escherichia coli ESBL   >100,000 CFU/ml Enterococcus faecalis   Organism Identification: Escherichia coli ESBL   Enterococcus faecalis   Organism: Escherichia coli ESBL   Organism: Enterococcus faecalis   Method Type: MARA   Method Type: MARA     [ x ]  DVT Prophylaxis - apixaban  [x] Diet, Soft and Bite Sized:   Consistent Carbohydrate No Snacks  DASH/TLC Sodium & Cholesterol Restricted  Mildly Thick Liquids (MILDTHICKLIQS) (07-07-22 @ 11:15) [Active]      RADIOLOGY & ADDITIONAL STUDIES REVIEWED:  Yes    < from: Xray Chest 1 View- PORTABLE-Urgent (Xray Chest 1 View- PORTABLE-Urgent .) (07.05.22 @ 10:13) >  ACC: [id number] EXAM:  XR CHEST PORTABLE URGENT 1V                        PROCEDURE DATE:  07/05/2022    INTERPRETATION:  Chest one view  HISTORY: Feeding tube placement  COMPARISON STUDY: 7/4/2022  Frontal expiratory view of the chest shows the heart to be similar in   size. Endotracheal tube remains present. Feeding tube reaches at least to   the stomach. Right chest port is again noted.  The lungs show less pulmonary congestion with slight reduction of right   effusion and there is no evidence of pneumothorax nor left pleural   effusion.  IMPRESSION:  Decreased congestive changes. Feeding tube as noted.  < end of copied text >    < from: Xray Chest 1 View- PORTABLE-Routine (Xray Chest 1 View- PORTABLE-Routine in AM.) (07.04.22 @ 08:49) >  ACC: [id number] EXAM:  XR CHEST PORTABLE ROUTINE 1V                        ACC: [id number] EXAM:  XR CHEST PORTABLE ROUTINE 1V                        PROCEDURE DATE:  07/04/2022    INTERPRETATION:  History:   ET tube placement  Technique: Serial portable chest x-rays, 4 films  Comparison: 722 at 2:37 PM  Findings/  Impression:  First  Films is from 7/3/2022 at 10:17 AM and shows a right-sided Port-A-Cath in   the SVC region. Endotracheal tube is above the loni. The nasogastric   tube courses below the left hemidiaphragm, tip off edge of film.. The   heart is enlarged. There is infiltrate at the left base. There is near   complete opacification of the right chest with shift to the left   bilateral large effusions and bibasilar atelectasis. Mild degenerative   changes are seen in the bones.    Subsequent pair of films from 7/4/2022 at 7:45 AM shows residual large   right effusion, with improved aeration of the right upper lobe. The left   lower lobe infiltrate remains.  --- End of Report ---  < end of copied text >    < from: Xray Chest 1 View- PORTABLE-Urgent (Xray Chest 1 View- PORTABLE-Urgent .) (07.02.22 @ 14:55) >  ACC: [id number] EXAM:  XR CHEST PORTABLE URGENT 1V                        PROCEDURE DATE:  07/02/2022    INTERPRETATION:  XR CHEST URGENT. One view.  INDICATION: ngt  COMPARISON: 7/2/2022  FINDINGS/  IMPRESSION:  Endotracheal tube tipabove the loni. Enteric tube tip within the   stomach. Right IJ central line tip within SVC.  Bilateral pleural effusions, right greater than left are again noted.  --- End of Report ---  ALAYNA AMES MD; Attending Radiologist  This document has been electronically signed. Jul 4 2022  2:58PM  < end of copied text >        Goals of Care Discussion with Family/Proxy/Other   - GOC conversation performed 7/9/22. Wife would like patient to remain full code.

## 2022-07-09 NOTE — PROGRESS NOTE ADULT - PROBLEM SELECTOR PLAN 10
-on Bisoprolol 10/ HCTZ 6.25 and Amlodipine 10mg daily, on hold while inpatient  -Continue Metoprolol 25mg BID for now  -monitor BP and adjust meds as warranted

## 2022-07-09 NOTE — PROGRESS NOTE ADULT - PROBLEM SELECTOR PLAN 9
-Hb 11.5 on admission  baseline hb 9-10  -takes iron at home  -c/w home med -Hb 11.5 on admission  baseline hb 9-10  -takes iron at home  -Started Ferrous Sulfate

## 2022-07-09 NOTE — PROGRESS NOTE ADULT - CONVERSATION DETAILS
Patient's wife was contacted regarding patient clinical updates, diagnosis, treatment and plan. Wife would like everything done to sustain life with all measures at this time. Wife was explained risks and benefits of treatments, diagnosis and plan. Verbalized understanding. VENU Crump witnessed conversation and MOLST form was completed

## 2022-07-09 NOTE — PROGRESS NOTE ADULT - PROBLEM SELECTOR PLAN 3
-on admission, noted Pleural effusion large on Right > Left   -pulmonary following.  -Chest PT daily  -on 2L NC saturating >90%  -f/u CT chest , pending results  -continue symobicort  -continue albuterol  -repeat covid test, surveillance, sent 7/9/22 -on admission, noted Pleural effusion large on Right > Left   -pulmonary following.  -Chest PT daily  -on 2L NC saturating >90%  -f/u CT chest done 7/9 , pending results  -continue symobicort  -continue albuterol  -repeat covid test, surveillance, sent 7/9/22

## 2022-07-09 NOTE — PROGRESS NOTE ADULT - PROBLEM SELECTOR PLAN 2
- no indication for decadron/remdesivir  -covid marker Q 72 hr  -CXR as above, infiltrate b/l lower lobes, pleural effusion R>L  -s/p intubation and extubation 7/6.    -saturating well on NC 2L  -MRSA PCR +, treated with Chlorhexadine and mupirocin  -c/w chest PT/vest  -holding apixaban for possible chest tube placement given R pleural effusion  -c/w BB inhaler, Symbicort.   -f/u CT chest for re-evaluate pleural effusion. - no indication for decadron/remdesivir  -CXR as above, infiltrate b/l lower lobes, pleural effusion R>L  -s/p intubation and extubation 7/6.    -saturating well on NC 2L  -MRSA PCR +, treated with Chlorhexadine and mupirocin  -c/w chest PT/vest  -c/w BB inhaler, Symbicort.   -f/u CT chest for re-evaluate pleural effusion.

## 2022-07-09 NOTE — CHART NOTE - NSCHARTNOTEFT_GEN_A_CORE
Discussed updated CT results with Wife Sivan and sister Jolie England [phone]. Plan for bronchoscopy/chest tube placement possibly Monday, per Dr. Frias. Patient currently stable with oxygen saturation>94% on 2LNC. Patient had Eliquis this morning 5mg. In planning for procedure, Eliquis to be discontinued. Discussed the need for anticoagulation for Afib, given high risk from history. Risks and benefits, treatment plan, diagnosis reviewed. Patient has a history of bleeding from Lovenox per family. Plan to use SQ Heparin in prep for procedure instead. Discontinued Eliquis and started SQ heparin. Dr. Frias aware of plan and agrees as above.

## 2022-07-09 NOTE — PROGRESS NOTE ADULT - PROBLEM SELECTOR PLAN 1
-p/w fever, HR >90, lactate 2.6  -trend lactate    -received 1L bolus in ED  -UA positive  -Bcx negative  -Ucx + ESBL EOLI f/u blood cultures, urine cultures  -tylenol prn  -Unasyn started 7/2, changed to Meropenem 7/7 for ESBL  -ID dr. Gillespie -p/w fever, HR >90, lactate 2.6  -trend lactate    -received 1L bolus in ED  -Bcx negative  -Ucx + ESBL EOLI f/u blood cultures, urine cultures  -tylenol prn  -Unasyn started 7/2, changed to Meropenem 7/7 for ESBL in urine  -ID Dr. Gillespie

## 2022-07-09 NOTE — PROGRESS NOTE ADULT - ASSESSMENT
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 COVID+     ICU Course:   Pt febrile, HR >90, lactate 2.6, UA+, CXR with pleural effusion/mucous plug vs consolidation, started on Unasyn (7/2). Started on mucomyst, albuterol, chest PT. Patient extubated to Nasal cannula on 7/6. Decadron discontinued per primary team as PNA deemed to be more bacterial then COVID. Eliquis held for possible thoracentesis. Held Bisoprolol/HCTZ for HTN. AMELIA improved with IVF. Lantus and sliding scale for DM, PPI for GI ppx. Started on soft and bite sized diet per S&S. Unsayn switched to Nerissa (7/7) for ESBL E. coli and Enteroccocus growing in urine. CASANDRA Gillespie consulted.     Patient is downgraded to AI on 7/7.   Patient seen at bedside, remains comfortable on 2L NC, 98%. Evaluated PT and recommends Rehab.   ordered Chest PT and CT chest for re-evaluate pleural effusion.   spoke with wife and she agrees plan of care. pending LAILA choice. CM/SW following.           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 COVID+     ICU Course:   Pt febrile, HR >90, lactate 2.6, UA+, CXR with pleural effusion/mucous plug vs consolidation, started on Unasyn (7/2). Started on mucomyst, albuterol, chest PT. Patient extubated to Nasal cannula on 7/6. Decadron discontinued per primary team as PNA deemed to be more bacterial then COVID. Eliquis held for possible thoracentesis. Held Bisoprolol/HCTZ for HTN. AMEILA improved with IVF. Lantus and sliding scale for DM, PPI for GI ppx. Started on soft and bite sized diet per S&S. Unsayn switched to Nerissa (7/7) for ESBL E. coli and Enteroccocus growing in urine, ID Jose Miguel on board. Patient was extubated on 7/6/22 and downgraded to SCU on 7/7.     SCU Course:  7/7/22 Patient was seen at bedside, remains comfortable on 2L NC, 98%. Evaluated PT and recommends Rehab.   7/8/22 -Ordered Chest PT and CT chest for re-evaluate pleural effusion.   spoke with wife and she agrees plan of care. Pending LAILA choice. CM/SW following.   7/9/22 - CT Chest completed, pending recs; Resumed Eliquis in AM 5mg BID; Received Chest PT; Oxygenation>90% on 2LNC;

## 2022-07-09 NOTE — PROGRESS NOTE ADULT - PROBLEM SELECTOR PLAN 5
-CHADSVASC: 5  -Eliquis on hold from 7/4 - 7/8; Restarted 7/9 am, 5mg BID  -on bisoprolol for rate control - hold for now  -TTE 11/2021 with normal EF

## 2022-07-09 NOTE — PROGRESS NOTE ADULT - PROBLEM SELECTOR PLAN 6
-A1C 7.9  -On lantus 13 + lispro 8U TID with sliding scale at home  -BS bet 150s and 250s  -c/w lantus 25U + sliding scale  -Adjust insulin as indicated  -FS ACHS with diet or q6 while NPO  -Maintain poct 140-180 while inpatient -A1C 7.9  -On lantus 13 + lispro 8U TID with sliding scale at home  - Currently requiring Lantus 25 units and correctional admelog scale  -BS bet 150s and 250s  -Adjust insulin as indicated  -FS ACHS with diet or q6 while NPO  -Maintain poct 140-180 while inpatient

## 2022-07-09 NOTE — PROGRESS NOTE ADULT - NUTRITIONAL ASSESSMENT
Diet, Soft and Bite Sized:   Consistent Carbohydrate {No Snacks}  DASH/TLC {Sodium & Cholesterol Restricted}  Mildly Thick Liquids (MILDTHICKLIQS) (07-07-22 @ 11:15) [Active]

## 2022-07-10 LAB
ALBUMIN SERPL ELPH-MCNC: 1.6 G/DL — LOW (ref 3.5–5)
ALP SERPL-CCNC: 125 U/L — HIGH (ref 40–120)
ALT FLD-CCNC: 37 U/L DA — SIGNIFICANT CHANGE UP (ref 10–60)
ANION GAP SERPL CALC-SCNC: 7 MMOL/L — SIGNIFICANT CHANGE UP (ref 5–17)
APTT BLD: 32.2 SEC — SIGNIFICANT CHANGE UP (ref 27.5–35.5)
AST SERPL-CCNC: 31 U/L — SIGNIFICANT CHANGE UP (ref 10–40)
BILIRUB SERPL-MCNC: 0.5 MG/DL — SIGNIFICANT CHANGE UP (ref 0.2–1.2)
BUN SERPL-MCNC: 13 MG/DL — SIGNIFICANT CHANGE UP (ref 7–18)
CALCIUM SERPL-MCNC: 9.1 MG/DL — SIGNIFICANT CHANGE UP (ref 8.4–10.5)
CHLORIDE SERPL-SCNC: 107 MMOL/L — SIGNIFICANT CHANGE UP (ref 96–108)
CO2 SERPL-SCNC: 29 MMOL/L — SIGNIFICANT CHANGE UP (ref 22–31)
CREAT SERPL-MCNC: 0.79 MG/DL — SIGNIFICANT CHANGE UP (ref 0.5–1.3)
EGFR: 98 ML/MIN/1.73M2 — SIGNIFICANT CHANGE UP
GLUCOSE BLDC GLUCOMTR-MCNC: 189 MG/DL — HIGH (ref 70–99)
GLUCOSE BLDC GLUCOMTR-MCNC: 216 MG/DL — HIGH (ref 70–99)
GLUCOSE BLDC GLUCOMTR-MCNC: 245 MG/DL — HIGH (ref 70–99)
GLUCOSE BLDC GLUCOMTR-MCNC: 255 MG/DL — HIGH (ref 70–99)
GLUCOSE SERPL-MCNC: 167 MG/DL — HIGH (ref 70–99)
HCT VFR BLD CALC: 31.8 % — LOW (ref 39–50)
HGB BLD-MCNC: 9.6 G/DL — LOW (ref 13–17)
INR BLD: 1.35 RATIO — HIGH (ref 0.88–1.16)
MAGNESIUM SERPL-MCNC: 2.3 MG/DL — SIGNIFICANT CHANGE UP (ref 1.6–2.6)
MCHC RBC-ENTMCNC: 24.8 PG — LOW (ref 27–34)
MCHC RBC-ENTMCNC: 30.2 GM/DL — LOW (ref 32–36)
MCV RBC AUTO: 82.2 FL — SIGNIFICANT CHANGE UP (ref 80–100)
NRBC # BLD: 0 /100 WBCS — SIGNIFICANT CHANGE UP (ref 0–0)
PHOSPHATE SERPL-MCNC: 2.9 MG/DL — SIGNIFICANT CHANGE UP (ref 2.5–4.5)
PLATELET # BLD AUTO: 468 K/UL — HIGH (ref 150–400)
POTASSIUM SERPL-MCNC: 4.4 MMOL/L — SIGNIFICANT CHANGE UP (ref 3.5–5.3)
POTASSIUM SERPL-SCNC: 4.4 MMOL/L — SIGNIFICANT CHANGE UP (ref 3.5–5.3)
PROT SERPL-MCNC: 6.3 G/DL — SIGNIFICANT CHANGE UP (ref 6–8.3)
PROTHROM AB SERPL-ACNC: 16.1 SEC — HIGH (ref 10.5–13.4)
RBC # BLD: 3.87 M/UL — LOW (ref 4.2–5.8)
RBC # FLD: 20.8 % — HIGH (ref 10.3–14.5)
SARS-COV-2 RNA SPEC QL NAA+PROBE: SIGNIFICANT CHANGE UP
SODIUM SERPL-SCNC: 143 MMOL/L — SIGNIFICANT CHANGE UP (ref 135–145)
WBC # BLD: 12.47 K/UL — HIGH (ref 3.8–10.5)
WBC # FLD AUTO: 12.47 K/UL — HIGH (ref 3.8–10.5)

## 2022-07-10 RX ADMIN — MUPIROCIN 1 APPLICATION(S): 20 OINTMENT TOPICAL at 06:38

## 2022-07-10 RX ADMIN — MEROPENEM 100 MILLIGRAM(S): 1 INJECTION INTRAVENOUS at 13:35

## 2022-07-10 RX ADMIN — ALBUTEROL 2 PUFF(S): 90 AEROSOL, METERED ORAL at 21:09

## 2022-07-10 RX ADMIN — MUPIROCIN 1 APPLICATION(S): 20 OINTMENT TOPICAL at 17:04

## 2022-07-10 RX ADMIN — Medication 6: at 11:53

## 2022-07-10 RX ADMIN — CHLORHEXIDINE GLUCONATE 1 APPLICATION(S): 213 SOLUTION TOPICAL at 06:38

## 2022-07-10 RX ADMIN — Medication 2: at 08:16

## 2022-07-10 RX ADMIN — HEPARIN SODIUM 5000 UNIT(S): 5000 INJECTION INTRAVENOUS; SUBCUTANEOUS at 06:39

## 2022-07-10 RX ADMIN — HEPARIN SODIUM 5000 UNIT(S): 5000 INJECTION INTRAVENOUS; SUBCUTANEOUS at 21:09

## 2022-07-10 RX ADMIN — BUDESONIDE AND FORMOTEROL FUMARATE DIHYDRATE 2 PUFF(S): 160; 4.5 AEROSOL RESPIRATORY (INHALATION) at 11:39

## 2022-07-10 RX ADMIN — Medication 25 MILLIGRAM(S): at 06:39

## 2022-07-10 RX ADMIN — INSULIN GLARGINE 25 UNIT(S): 100 INJECTION, SOLUTION SUBCUTANEOUS at 21:54

## 2022-07-10 RX ADMIN — Medication 4: at 17:05

## 2022-07-10 RX ADMIN — ALBUTEROL 2 PUFF(S): 90 AEROSOL, METERED ORAL at 13:34

## 2022-07-10 RX ADMIN — BUDESONIDE AND FORMOTEROL FUMARATE DIHYDRATE 2 PUFF(S): 160; 4.5 AEROSOL RESPIRATORY (INHALATION) at 21:09

## 2022-07-10 RX ADMIN — ALBUTEROL 2 PUFF(S): 90 AEROSOL, METERED ORAL at 06:39

## 2022-07-10 RX ADMIN — Medication 300 MILLIGRAM(S): at 11:39

## 2022-07-10 RX ADMIN — PANTOPRAZOLE SODIUM 40 MILLIGRAM(S): 20 TABLET, DELAYED RELEASE ORAL at 11:39

## 2022-07-10 RX ADMIN — SODIUM CHLORIDE 3 MILLILITER(S): 9 INJECTION INTRAMUSCULAR; INTRAVENOUS; SUBCUTANEOUS at 13:24

## 2022-07-10 RX ADMIN — SODIUM CHLORIDE 3 MILLILITER(S): 9 INJECTION INTRAMUSCULAR; INTRAVENOUS; SUBCUTANEOUS at 06:39

## 2022-07-10 RX ADMIN — Medication 25 MILLIGRAM(S): at 17:04

## 2022-07-10 RX ADMIN — HEPARIN SODIUM 5000 UNIT(S): 5000 INJECTION INTRAVENOUS; SUBCUTANEOUS at 13:32

## 2022-07-10 RX ADMIN — MEROPENEM 100 MILLIGRAM(S): 1 INJECTION INTRAVENOUS at 21:09

## 2022-07-10 RX ADMIN — MEROPENEM 100 MILLIGRAM(S): 1 INJECTION INTRAVENOUS at 06:38

## 2022-07-10 RX ADMIN — SODIUM CHLORIDE 3 MILLILITER(S): 9 INJECTION INTRAMUSCULAR; INTRAVENOUS; SUBCUTANEOUS at 21:07

## 2022-07-10 RX ADMIN — BUDESONIDE AND FORMOTEROL FUMARATE DIHYDRATE 2 PUFF(S): 160; 4.5 AEROSOL RESPIRATORY (INHALATION) at 10:49

## 2022-07-10 NOTE — PROGRESS NOTE ADULT - PROBLEM SELECTOR PLAN 5
-CHADSVASC: 5  -Eliquis on hold from 7/4 - 7/8; Restarted 7/9 am, 5mg BID; Discontinued in plan for procedure; Replaced anticoagulation with Heparin. Patient with history of life threatening bleeding with Lovenox- DO NOT GIVE LOVENOX per family/patient.  -on bisoprolol for rate control - hold for now  -TTE 11/2021 with normal EF

## 2022-07-10 NOTE — PROGRESS NOTE ADULT - PROBLEM SELECTOR PLAN 3
-on admission, noted Pleural effusion large on Right > Left   -pulmonary following.  -Chest PT daily  -on 2L NC saturating >90%  -CT Chest as above  -continue symobicort  -continue albuterol  -Covid Negative x2 7/9 and 7/10

## 2022-07-10 NOTE — PROGRESS NOTE ADULT - PROBLEM SELECTOR PLAN 1
-p/w fever, HR >90, lactate 2.6  -trend lactate    -received 1L bolus in ED  -Bcx negative  -Ucx + ESBL EOLI f/u blood cultures, urine cultures  -tylenol prn  -Unasyn started 7/2, changed to Meropenem 7/7 for ESBL in urine  -ID Dr. Gillespie

## 2022-07-10 NOTE — PROGRESS NOTE ADULT - ASSESSMENT
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 COVID+     ICU Course:   Pt febrile, HR >90, lactate 2.6, UA+, CXR with pleural effusion/mucous plug vs consolidation, started on Unasyn (7/2). Started on mucomyst, albuterol, chest PT. Patient extubated to Nasal cannula on 7/6. Decadron discontinued per primary team as PNA deemed to be more bacterial then COVID. Eliquis held for possible thoracentesis. Held Bisoprolol/HCTZ for HTN. AMELIA improved with IVF. Lantus and sliding scale for DM, PPI for GI ppx. Started on soft and bite sized diet per S&S. Unsayn switched to Nerissa (7/7) for ESBL E. coli and Enteroccocus growing in urine, ID Jose Miguel on board. Patient was extubated on 7/6/22 and downgraded to SCU on 7/7.     SCU Course:  7/7/22 Patient was seen at bedside, remains comfortable on 2L NC, 98%. Evaluated PT and recommends Rehab.   7/8/22 -Ordered Chest PT and CT chest for re-evaluate pleural effusion.   spoke with wife and she agrees plan of care. Pending [facility] choice. CM/SW following.   7/9/22 - CT Chest completed, pending recs; Resumed Eliquis in AM 5mg BID; Received Chest PT; Oxygenation>90% on 2LNC;   7/10/22- Patient with right lung effusion and right bronchus secretions per CT. Plan for bronchoscopy and possible chest tube placement tomorrow. Family aware and plan to be present for consent and procedure.

## 2022-07-10 NOTE — PROGRESS NOTE ADULT - PROBLEM SELECTOR PLAN 6
-A1C 7.9  -On lantus 13 + lispro 8U TID with sliding scale at home  - Currently requiring Lantus 25 units and correctional admelog scale  -BS bet 150s and 250s  -Adjust insulin as indicated  -FS ACHS with diet or q6 while NPO  -Maintain poct 140-180 while inpatient

## 2022-07-10 NOTE — PROGRESS NOTE ADULT - PROBLEM SELECTOR PLAN 12
-GI ppx -PPI  -DVT ppx-SCDs - Wife refusing Lovenox due to history of abdominal bleed when receiving Lovenox. Eliquis restarted on 7/9/am - discontinued in 7/9 PM, started Heparin SQ in anticipation of chest tube placement and bronchoscopy  DC plan: [facility], pending choices from family. CM/SW following. Wife does NOT want patient to return to [facility]  -Covid Surveillance sent 7/9/22, pending result  -Conversation on 7/10, family may want to take patient home depending on condition and choices -GI ppx -PPI  -DVT ppx-SCDs - Wife refusing Lovenox due to history of abdominal bleed when receiving Lovenox. Eliquis restarted on 7/9/am - discontinued eliquis on 7/9 PM, started Heparin SQ in anticipation of chest tube placement and bronchoscopy    DC plan: [facility]IL[facility], pending choices from family. CM/SW following. Wife does NOT want patient to return to [facility]  -Covid Surveillance sent 7/9/22 and 7/10/22 - Both negative, isolation removed  -Conversation on 7/10, family may want to take patient home depending on condition and choices

## 2022-07-10 NOTE — PROGRESS NOTE ADULT - PROBLEM SELECTOR PLAN 2
- no indication for decadron/remdesivir  -CXR as above, infiltrate b/l lower lobes, pleural effusion R>L  -s/p intubation and extubation 7/6.    -saturating well on NC 2L  -MRSA PCR +, treated with Chlorhexadine and mupirocin  -c/w chest PT/vest  -c/w BB inhaler, Symbicort.   -CT with large pleural effusion on Right Lung as above - plan for bronchoscopy and chest tube placement tomorrow  - NPO after midnight for procedure

## 2022-07-10 NOTE — PROGRESS NOTE ADULT - PROBLEM SELECTOR PLAN 4
-Ucx resulted ESBL ecoli and enterococcus   -Sensitive to Meropenem, continue antibiotics  -ID Dr. Gillespie, appreciate recs

## 2022-07-10 NOTE — PROGRESS NOTE ADULT - SUBJECTIVE AND OBJECTIVE BOX
MELIZA DAILEY    SCU progress note    INTERVAL HPI/OVERNIGHT EVENTS: no overnight event,  remains on 2L NC with >92% saturation. Patient retested for covid, negative x2. Family at bedside today. Reviewed plan for procedure, risks, benefits and goals of care.     DNR [ ]   DNI  [  ]  Full code [x]    Covid - 19 PCR:   COVID-19 PCR: NotDetec (10 Jul 2022 09:51)  COVID-19 PCR: NotDetec (09 Jul 2022 11:10)      The 4Ms    What Matters Most: see GOC  Age appropriate Medications/Screen for High Risk Medication: Yes  Mentation: see CAM below  Mobility: defer to physical exam    The Confusion Assessment Method (CAM) Diagnostic Algorithm     1: Acute Onset or Fluctuating Course  - Is there evidence of an acute change in mental status from the patient’s baseline? Did the (abnormal) behavior  fluctuate during the day, that is, tend to come and go, or increase and decrease in severity?  [ ] YES [x ] NO     2: Inattention  - Did the patient have difficulty focusing attention, being easily distractible, or having difficulty keeping track of what was being said?  [ ] YES [ x] NO     3: Disorganized thinking  -Was the patient’s thinking disorganized or incoherent, such as rambling or irrelevant conversation, unclear or illogical flow of ideas, or unpredictable switching from subject to subject?  [ ] YES [ x] NO    4: Altered Level of consciousness?  [ ] YES [ x] NO    The diagnosis of delirium by CAM requires the presence of features 1 and 2 and either 3 or 4.    PRESSORS: [ ] YES [x ] NO    MEDICATIONS  (STANDING):  ALBUTerol    90 MICROgram(s) HFA Inhaler 2 Puff(s) Inhalation every 8 hours  ALBUTerol    90 MICROgram(s) HFA Inhaler 2 Puff(s) Inhalation every 6 hours  budesonide  80 MICROgram(s)/formoterol 4.5 MICROgram(s) Inhaler 2 Puff(s) Inhalation two times a day  chlorhexidine 2% Cloths 1 Application(s) Topical <User Schedule>  ferrous    sulfate Liquid 300 milliGRAM(s) Oral daily  heparin   Injectable 5000 Unit(s) SubCutaneous every 8 hours  insulin glargine Injectable (LANTUS) 25 Unit(s) SubCutaneous at bedtime  insulin lispro (ADMELOG) corrective regimen sliding scale   SubCutaneous three times a day before meals  meropenem  IVPB 1000 milliGRAM(s) IV Intermittent every 8 hours  metoprolol tartrate 25 milliGRAM(s) Oral two times a day  mupirocin 2% Ointment 1 Application(s) Topical two times a day  pantoprazole   Suspension 40 milliGRAM(s) Oral daily  sodium chloride 0.9% lock flush 3 milliLiter(s) IV Push every 8 hours    MEDICATIONS  (PRN):  ALBUTerol    0.083% 2.5 milliGRAM(s) Nebulizer every 6 hours PRN w/ mucomyst  ondansetron Injectable 4 milliGRAM(s) IV Push every 6 hours PRN Nausea and/or Vomiting      Drug Dosing Weight  Height (cm): 180.3 (02 Jul 2022 05:23)  Weight (kg): 107 (02 Jul 2022 09:10)  BMI (kg/m2): 32.9 (02 Jul 2022 09:10)  BSA (m2): 2.26 (02 Jul 2022 09:10)    CENTRAL LINE: [ ] YES [x ] NO  LOCATION:   DATE INSERTED:  REMOVE: [ ] YES [ ] NO  EXPLAIN: NA    RIBEIRO: [ ] YES [ x] NO    DATE INSERTED:  REMOVE:  [ ] YES [ ] NO  EXPLAIN: NA    PAST MEDICAL & SURGICAL HISTORY:  Lumbago    Lumbago with sciatica of right side    Benign hypertension  dx 10 years      H/O renal calculi  ESWL right side yrs ago  last stone one year ago  (passed on its own)      Obese  Eczema  Diabetes mellitus type II  on Meds 4/2012  Chronic atrial fibrillation  Colon cancer  S/P tonsillectomy  Port-A-Cath in place    PHYSICAL EXAM:    GENERAL: NAD  HEAD:  Atraumatic, Normocephalic  EYES: EOMI, PERRLA, conjunctiva and sclera clear  ENMT: No tonsillar erythema, exudates, or enlargement; Moist mucous membranes, Good dentition, No lesions  NECK: Supple, No JVD, Normal thyroid  NERVOUS SYSTEM:  Alert & Oriented X 3, right side weakness,  DTRs 2+ intact and symmetric  CHEST/LUNG: Left lung clear upper bases, diminished BS right side. Wheezing on left chest; diminished lung sounds at bases;   HEART: Regular rate and rhythm; No murmurs, rubs, or gallops  ABDOMEN: Soft, Nontender, Nondistended; Bowel sounds present  EXTREMITIES:  2+ Peripheral Pulses, No clubbing, cyanosis, or edema  LYMPH: No lymphadenopathy noted  SKIN: No rashes or lesions      LABS:  CBC Full  -  ( 10 Jul 2022 08:19 )  WBC Count : 12.47 K/uL  RBC Count : 3.87 M/uL  Hemoglobin : 9.6 g/dL  Hematocrit : 31.8 %  Platelet Count - Automated : 468 K/uL  Mean Cell Volume : 82.2 fl  Mean Cell Hemoglobin : 24.8 pg  Mean Cell Hemoglobin Concentration : 30.2 gm/dL  Auto Neutrophil # : x  Auto Lymphocyte # : x  Auto Monocyte # : x  Auto Eosinophil # : x  Auto Basophil # : x  Auto Neutrophil % : x  Auto Lymphocyte % : x  Auto Monocyte % : x  Auto Eosinophil % : x  Auto Basophil % : x      07-10    143  |  107  |  13  ----------------------------<  167<H>  4.4   |  29  |  0.79    Ca    9.1      10 Jul 2022 08:19  Phos  2.9     07-10  Mg     2.3     07-10    TPro  6.3  /  Alb  1.6<L>  /  TBili  0.5  /  DBili  x   /  AST  31  /  ALT  37  /  AlkPhos  125<H>  07-10      Culture - Sputum . (07.02.22 @ 18:57)   Gram Stain:   Few Squamous epithelial cells per low power field   Moderate polymorphonuclear leukocytes per low power field   Moderate Gram Positive Cocci in Pairs and Chains per oil power field   Moderate Gram Negative Rods per oil power field   Specimen Source: ET Tube ET Tube   Culture Results:   Normal Respiratory Kinjal present Culture - Urine (07.02.22 @ 16:29)   - Amikacin: S <=16   - Amoxicillin/Clavulanic Acid: S <=8/4   - Ampicillin: R >16 These ampicillin results predict results for amoxicillin   - Ampicillin: S <=2 Predicts results to ampicillin/sulbactam, amoxacillin-clavulanate and piperacillin-tazobactam.   - Ampicillin/Sulbactam: I 16/8 Enterobacter, Klebsiella aerogenes, Citrobacter, and Serratia may develop resistance during prolonged therapy (3-4 days)   - Aztreonam: R 16   - Cefazolin: R >16 (MIC_CL_COM_ENTERIC_CEFAZU) For uncomplicated UTI with K. pneumoniae, E. coli, or P. mirablis: MARA <=16 is sensitive and MARA >=32 is resistant. This also predicts results for oral agents cefaclor, cefdinir, cefpodoxime, cefprozil, cefuroxime axetil, cephalexin and locarbef for uncomplicated UTI. Note that some isolates may be susceptible to these agents while testing resistant to cefazolin.   - Cefepime: R >16   - Ceftriaxone: R >32 Enterobacter, Klebsiella aerogenes, Citrobacter, and Serratia may develop resistance during prolonged therapy   - Ciprofloxacin: S <=0.25   - Ciprofloxacin: S <=1   - Ertapenem: S <=0.5   - Gentamicin: R >8   - Imipenem: S <=1   - Levofloxacin: S <=0.5   - Levofloxacin: S <=1   - Meropenem: S <=1   - Nitrofurantoin: S <=32 Should not be used to treat pyelonephritis   - Nitrofurantoin: S <=32 Should not be used to treat pyelonephritis.   - Piperacillin/Tazobactam: S <=8   - Tetra/Doxy: R >8   - Tigecycline: S <=2   - Tobramycin: S <=2   - Trimethoprim/Sulfamethoxazole: R >2/38   - Vancomycin: S 2   Specimen Source: Catheterized Catheterized   Culture Results:   >100,000 CFU/ml Escherichia coli ESBL   >100,000 CFU/ml Enterococcus faecalis   Organism Identification: Escherichia coli ESBL   Enterococcus faecalis   Organism: Escherichia coli ESBL   Organism: Enterococcus faecalis   Method Type: MARA   Method Type: MARA     [ x ]  DVT Prophylaxis - apixaban  [x] Diet, Soft and Bite Sized:   Consistent Carbohydrate No Snacks  DASH/TLC Sodium & Cholesterol Restricted  Mildly Thick Liquids (MILDTHICKLIQS) (07-07-22 @ 11:15) [Active]      RADIOLOGY & ADDITIONAL STUDIES REVIEWED:  Yes  < from: CT Chest No Cont (07.09.22 @ 09:47) >    ACC: [id number] EXAM:  CT CHEST                          *** ADDENDUM***    These findings were discussed with SONALI Jeffries on 7/9/2022 12:29 PM with   read back.    --- End of Report ---    *** END OF ADDENDUM***      PROCEDURE DATE:  07/09/2022          INTERPRETATION:  INDICATION: pleural effusion, history of rectal cancer.  TECHNIQUE: Unenhanced CT of the chest. Coronal, sagittal, and MIP images   were reconstructed and reviewed.  COMPARISON: 7/10/2021 chest CT.    FINDINGS:    AIRWAYS, LUNGS and PLEURA: The right main bronchus and its lobar branches   are obliterated by retained secretions. Large loculated right pleural   effusion. Complete atelectasis of right lung. Trace left pleural effusion   and interlobular septal thickening likely edema. Patchy opacities within   lingula and left lower lobe may represent edema or infection.    MEDIASTINUM AND RALPH: The right hilum and right paratracheal region are   poorly evaluated without contrast. Mildly enlarged subcarinal lymph node   measuring 1.5 cm in short axis, new from prior.    HEART AND VESSELS: Cardiomegaly. Coronary calcifications. No pericardial   effusion. Thoracic aorta and pulmonary artery normal in diameter. Tip of   the Mediport catheter within SVC.    VISUALIZED UPPER ABDOMEN: Bilateral renal cysts.    CHEST WALL AND BONES: No aggressive osseous lesion.    LOWER NECK: Within normal limits.    IMPRESSION:    The right main bronchus and its lobar branches are obliterated by   retained secretions. Large loculated rightpleural effusion. Complete   atelectasis of right lung.    Trace left pleural effusion and interlobular septal thickening likely   edema. Patchy opacities within lingula and left lower lobe may represent   edema or infection    --- End of Report ---    ***Please see the addendum at the top of this report. It may contain   additional important information or changes.****        ALAYNA AMES MD; Attending Radiologist  This document has been electronically signed. Jul 9 2022 12:22PM  Addend:ALAYNA AMES MD; Attending Radiologist  This addendum was electronically signed on: Jul 9 2022 12:30PM.    < end of copied text >    < from: Xray Chest 1 View- PORTABLE-Urgent (Xray Chest 1 View- PORTABLE-Urgent .) (07.05.22 @ 10:13) >  ACC: [id number] EXAM:  XR CHEST PORTABLE URGENT 1V                        PROCEDURE DATE:  07/05/2022    INTERPRETATION:  Chest one view  HISTORY: Feeding tube placement  COMPARISON STUDY: 7/4/2022  Frontal expiratory view of the chest shows the heart to be similar in   size. Endotracheal tube remains present. Feeding tube reaches at least to   the stomach. Right chest port is again noted.  The lungs show less pulmonary congestion with slight reduction of right   effusion and there is no evidence of pneumothorax nor left pleural   effusion.  IMPRESSION:  Decreased congestive changes. Feeding tube as noted.  < end of copied text >    < from: Xray Chest 1 View- PORTABLE-Routine (Xray Chest 1 View- PORTABLE-Routine in AM.) (07.04.22 @ 08:49) >  ACC: [id number] EXAM:  XR CHEST PORTABLE ROUTINE 1V                        ACC: [id number] EXAM:  XR CHEST PORTABLE ROUTINE 1V                        PROCEDURE DATE:  07/04/2022    INTERPRETATION:  History:   ET tube placement  Technique: Serial portable chest x-rays, 4 films  Comparison: 722 at 2:37 PM  Findings/  Impression:  First  Films is from 7/3/2022 at 10:17 AM and shows a right-sided Port-A-Cath in   the SVC region. Endotracheal tube is above the loni. The nasogastric   tube courses below the left hemidiaphragm, tip off edge of film.. The   heart is enlarged. There is infiltrate at the left base. There is near   complete opacification of the right chest with shift to the left   bilateral large effusions and bibasilar atelectasis. Mild degenerative   changes are seen in the bones.    Subsequent pair of films from 7/4/2022 at 7:45 AM shows residual large   right effusion, with improved aeration of the right upper lobe. The left   lower lobe infiltrate remains.  --- End of Report ---  < end of copied text >    < from: Xray Chest 1 View- PORTABLE-Urgent (Xray Chest 1 View- PORTABLE-Urgent .) (07.02.22 @ 14:55) >  ACC: [id number] EXAM:  XR CHEST PORTABLE URGENT 1V                        PROCEDURE DATE:  07/02/2022    INTERPRETATION:  XR CHEST URGENT. One view.  INDICATION: ngt  COMPARISON: 7/2/2022  FINDINGS/  IMPRESSION:  Endotracheal tube tipabove the loni. Enteric tube tip within the   stomach. Right IJ central line tip within SVC.  Bilateral pleural effusions, right greater than left are again noted.  --- End of Report ---  ALAYNA AMES MD; Attending Radiologist  This document has been electronically signed. Jul 4 2022  2:58PM  < end of copied text >        Goals of Care Discussion with Family/Proxy/Other   - [facility] conversation performed 7/9/22 and 7/10 Wife and sister would like patient to remain full code. Patient has power of  in chart.

## 2022-07-11 DIAGNOSIS — Z71.89 OTHER SPECIFIED COUNSELING: ICD-10-CM

## 2022-07-11 DIAGNOSIS — R56.9 UNSPECIFIED CONVULSIONS: ICD-10-CM

## 2022-07-11 LAB
ALBUMIN SERPL ELPH-MCNC: 1.6 G/DL — LOW (ref 3.5–5)
ALP SERPL-CCNC: 131 U/L — HIGH (ref 40–120)
ALT FLD-CCNC: 39 U/L DA — SIGNIFICANT CHANGE UP (ref 10–60)
ANION GAP SERPL CALC-SCNC: 6 MMOL/L — SIGNIFICANT CHANGE UP (ref 5–17)
AST SERPL-CCNC: 22 U/L — SIGNIFICANT CHANGE UP (ref 10–40)
BILIRUB SERPL-MCNC: 0.2 MG/DL — SIGNIFICANT CHANGE UP (ref 0.2–1.2)
BUN SERPL-MCNC: 10 MG/DL — SIGNIFICANT CHANGE UP (ref 7–18)
CALCIUM SERPL-MCNC: 8.6 MG/DL — SIGNIFICANT CHANGE UP (ref 8.4–10.5)
CHLORIDE SERPL-SCNC: 106 MMOL/L — SIGNIFICANT CHANGE UP (ref 96–108)
CO2 SERPL-SCNC: 30 MMOL/L — SIGNIFICANT CHANGE UP (ref 22–31)
CREAT SERPL-MCNC: 0.7 MG/DL — SIGNIFICANT CHANGE UP (ref 0.5–1.3)
EGFR: 102 ML/MIN/1.73M2 — SIGNIFICANT CHANGE UP
GLUCOSE BLDC GLUCOMTR-MCNC: 147 MG/DL — HIGH (ref 70–99)
GLUCOSE BLDC GLUCOMTR-MCNC: 157 MG/DL — HIGH (ref 70–99)
GLUCOSE BLDC GLUCOMTR-MCNC: 157 MG/DL — HIGH (ref 70–99)
GLUCOSE BLDC GLUCOMTR-MCNC: 168 MG/DL — HIGH (ref 70–99)
GLUCOSE BLDC GLUCOMTR-MCNC: 205 MG/DL — HIGH (ref 70–99)
GLUCOSE BLDC GLUCOMTR-MCNC: 235 MG/DL — HIGH (ref 70–99)
GLUCOSE BLDC GLUCOMTR-MCNC: 252 MG/DL — HIGH (ref 70–99)
GLUCOSE BLDC GLUCOMTR-MCNC: 304 MG/DL — HIGH (ref 70–99)
GLUCOSE BLDC GLUCOMTR-MCNC: 306 MG/DL — HIGH (ref 70–99)
GLUCOSE SERPL-MCNC: 154 MG/DL — HIGH (ref 70–99)
HCT VFR BLD CALC: 30.2 % — LOW (ref 39–50)
HGB BLD-MCNC: 8.8 G/DL — LOW (ref 13–17)
INR BLD: 1.31 RATIO — HIGH (ref 0.88–1.16)
MAGNESIUM SERPL-MCNC: 2.3 MG/DL — SIGNIFICANT CHANGE UP (ref 1.6–2.6)
MCHC RBC-ENTMCNC: 24.2 PG — LOW (ref 27–34)
MCHC RBC-ENTMCNC: 29.1 GM/DL — LOW (ref 32–36)
MCV RBC AUTO: 83 FL — SIGNIFICANT CHANGE UP (ref 80–100)
NRBC # BLD: 0 /100 WBCS — SIGNIFICANT CHANGE UP (ref 0–0)
PHOSPHATE SERPL-MCNC: 3.2 MG/DL — SIGNIFICANT CHANGE UP (ref 2.5–4.5)
PLATELET # BLD AUTO: 455 K/UL — HIGH (ref 150–400)
POTASSIUM SERPL-MCNC: 4.2 MMOL/L — SIGNIFICANT CHANGE UP (ref 3.5–5.3)
POTASSIUM SERPL-SCNC: 4.2 MMOL/L — SIGNIFICANT CHANGE UP (ref 3.5–5.3)
PROT SERPL-MCNC: 5.9 G/DL — LOW (ref 6–8.3)
PROTHROM AB SERPL-ACNC: 15.6 SEC — HIGH (ref 10.5–13.4)
RBC # BLD: 3.64 M/UL — LOW (ref 4.2–5.8)
RBC # FLD: 20.5 % — HIGH (ref 10.3–14.5)
SODIUM SERPL-SCNC: 142 MMOL/L — SIGNIFICANT CHANGE UP (ref 135–145)
WBC # BLD: 12.11 K/UL — HIGH (ref 3.8–10.5)
WBC # FLD AUTO: 12.11 K/UL — HIGH (ref 3.8–10.5)

## 2022-07-11 RX ORDER — MORPHINE SULFATE 50 MG/1
2 CAPSULE, EXTENDED RELEASE ORAL ONCE
Refills: 0 | Status: DISCONTINUED | OUTPATIENT
Start: 2022-07-11 | End: 2022-07-19

## 2022-07-11 RX ORDER — FUROSEMIDE 40 MG
40 TABLET ORAL ONCE
Refills: 0 | Status: COMPLETED | OUTPATIENT
Start: 2022-07-11 | End: 2022-07-11

## 2022-07-11 RX ADMIN — HEPARIN SODIUM 5000 UNIT(S): 5000 INJECTION INTRAVENOUS; SUBCUTANEOUS at 22:33

## 2022-07-11 RX ADMIN — BUDESONIDE AND FORMOTEROL FUMARATE DIHYDRATE 2 PUFF(S): 160; 4.5 AEROSOL RESPIRATORY (INHALATION) at 22:34

## 2022-07-11 RX ADMIN — ALBUTEROL 2 PUFF(S): 90 AEROSOL, METERED ORAL at 22:35

## 2022-07-11 RX ADMIN — BUDESONIDE AND FORMOTEROL FUMARATE DIHYDRATE 2 PUFF(S): 160; 4.5 AEROSOL RESPIRATORY (INHALATION) at 12:07

## 2022-07-11 RX ADMIN — Medication 25 MILLIGRAM(S): at 18:03

## 2022-07-11 RX ADMIN — Medication 25 MILLIGRAM(S): at 06:33

## 2022-07-11 RX ADMIN — Medication 4: at 17:17

## 2022-07-11 RX ADMIN — MEROPENEM 100 MILLIGRAM(S): 1 INJECTION INTRAVENOUS at 22:33

## 2022-07-11 RX ADMIN — HEPARIN SODIUM 5000 UNIT(S): 5000 INJECTION INTRAVENOUS; SUBCUTANEOUS at 14:53

## 2022-07-11 RX ADMIN — MUPIROCIN 1 APPLICATION(S): 20 OINTMENT TOPICAL at 06:33

## 2022-07-11 RX ADMIN — MEROPENEM 100 MILLIGRAM(S): 1 INJECTION INTRAVENOUS at 14:53

## 2022-07-11 RX ADMIN — SODIUM CHLORIDE 3 MILLILITER(S): 9 INJECTION INTRAMUSCULAR; INTRAVENOUS; SUBCUTANEOUS at 06:18

## 2022-07-11 RX ADMIN — SODIUM CHLORIDE 3 MILLILITER(S): 9 INJECTION INTRAMUSCULAR; INTRAVENOUS; SUBCUTANEOUS at 21:45

## 2022-07-11 RX ADMIN — MEROPENEM 100 MILLIGRAM(S): 1 INJECTION INTRAVENOUS at 06:34

## 2022-07-11 RX ADMIN — CHLORHEXIDINE GLUCONATE 1 APPLICATION(S): 213 SOLUTION TOPICAL at 06:33

## 2022-07-11 RX ADMIN — MUPIROCIN 1 APPLICATION(S): 20 OINTMENT TOPICAL at 17:18

## 2022-07-11 RX ADMIN — SODIUM CHLORIDE 3 MILLILITER(S): 9 INJECTION INTRAMUSCULAR; INTRAVENOUS; SUBCUTANEOUS at 15:01

## 2022-07-11 RX ADMIN — ALBUTEROL 2 PUFF(S): 90 AEROSOL, METERED ORAL at 15:02

## 2022-07-11 RX ADMIN — Medication 40 MILLIGRAM(S): at 15:46

## 2022-07-11 RX ADMIN — ALBUTEROL 2 PUFF(S): 90 AEROSOL, METERED ORAL at 06:33

## 2022-07-11 NOTE — PROGRESS NOTE ADULT - PROBLEM SELECTOR PLAN 3
Large right sided pleural effusion.  CXR and CT chest as above.  Scheduled for right sided chest tube placement today.  Serial CXRs  Continue mechanical chest vest, bronchodilators and ICS as ordered.  Monitor oxygen saturation. Continue oxygen 2LPM  Will need oxygen testing before discharge.

## 2022-07-11 NOTE — PROGRESS NOTE ADULT - PROBLEM SELECTOR PLAN 10
AC currently on hold for procedure.  Continue GI prophylaxis.  Schedule for chest tube placement today.  Serial CXRs  Will need oxygen testing before discharge.  F/U fluid studies when chest tube is inserted. AC currently on hold for procedure.  Continue GI prophylaxis.  Pulmonary unable to place chest tube. IR will place chest tube in am.  Serial CXRs  Will need oxygen testing before discharge.  F/U fluid studies when chest tube is inserted.

## 2022-07-11 NOTE — PROGRESS NOTE ADULT - SUBJECTIVE AND OBJECTIVE BOX
MELIZA DAILEY    SCU progress note    INTERVAL HPI/OVERNIGHT EVENTS: ***Scheduled for right chest tube placement today.    DNR [ ]   DNI  [  ]   FULL CODE    Covid - 19 PCR: Negative 7/10    The 4Ms    What Matters Most: see GOC  Age appropriate Medications/Screen for High Risk Medication: Yes  Mentation: see CAM below  Mobility: defer to physical exam    The Confusion Assessment Method (CAM) Diagnostic Algorithm     1: Acute Onset or Fluctuating Course  - Is there evidence of an acute change in mental status from the patient’s baseline? Did the (abnormal) behavior  fluctuate during the day, that is, tend to come and go, or increase and decrease in severity?  [ ] YES [x ] NO     2: Inattention  - Did the patient have difficulty focusing attention, being easily distractible, or having difficulty keeping track of what was being said?  [ ] YES [x ] NO     3: Disorganized thinking  -Was the patient’s thinking disorganized or incoherent, such as rambling or irrelevant conversation, unclear or illogical flow of ideas, or unpredictable switching from subject to subject?  [ ] YES [x ] NO    4: Altered Level of consciousness?  [ ] YES [x ] NO    The diagnosis of delirium by CAM requires the presence of features 1 and 2 and either 3 or 4.    PRESSORS: [ ] YES [x ] NO  meropenem  IVPB 1000 milliGRAM(s) IV Intermittent every 8 hours    Cardiovascular:  Heart Failure  Acute   Acute on Chronic  Chronic       metoprolol tartrate 25 milliGRAM(s) Oral two times a day    Pulmonary:  ALBUTerol    0.083% 2.5 milliGRAM(s) Nebulizer every 6 hours PRN  ALBUTerol    90 MICROgram(s) HFA Inhaler 2 Puff(s) Inhalation every 6 hours  ALBUTerol    90 MICROgram(s) HFA Inhaler 2 Puff(s) Inhalation every 8 hours  budesonide  80 MICROgram(s)/formoterol 4.5 MICROgram(s) Inhaler 2 Puff(s) Inhalation two times a day    Hematalogic:  heparin   Injectable 5000 Unit(s) SubCutaneous every 8 hours    Other:  chlorhexidine 2% Cloths 1 Application(s) Topical <User Schedule>  ferrous    sulfate Liquid 300 milliGRAM(s) Oral daily  insulin glargine Injectable (LANTUS) 25 Unit(s) SubCutaneous at bedtime  insulin lispro (ADMELOG) corrective regimen sliding scale   SubCutaneous three times a day before meals  mupirocin 2% Ointment 1 Application(s) Topical two times a day  ondansetron Injectable 4 milliGRAM(s) IV Push every 6 hours PRN  pantoprazole   Suspension 40 milliGRAM(s) Oral daily  sodium chloride 0.9% lock flush 3 milliLiter(s) IV Push every 8 hours    ALBUTerol    0.083% 2.5 milliGRAM(s) Nebulizer every 6 hours PRN  ALBUTerol    90 MICROgram(s) HFA Inhaler 2 Puff(s) Inhalation every 6 hours  ALBUTerol    90 MICROgram(s) HFA Inhaler 2 Puff(s) Inhalation every 8 hours  budesonide  80 MICROgram(s)/formoterol 4.5 MICROgram(s) Inhaler 2 Puff(s) Inhalation two times a day  chlorhexidine 2% Cloths 1 Application(s) Topical <User Schedule>  ferrous    sulfate Liquid 300 milliGRAM(s) Oral daily  heparin   Injectable 5000 Unit(s) SubCutaneous every 8 hours  insulin glargine Injectable (LANTUS) 25 Unit(s) SubCutaneous at bedtime  insulin lispro (ADMELOG) corrective regimen sliding scale   SubCutaneous three times a day before meals  meropenem  IVPB 1000 milliGRAM(s) IV Intermittent every 8 hours  metoprolol tartrate 25 milliGRAM(s) Oral two times a day  mupirocin 2% Ointment 1 Application(s) Topical two times a day  ondansetron Injectable 4 milliGRAM(s) IV Push every 6 hours PRN  pantoprazole   Suspension 40 milliGRAM(s) Oral daily  sodium chloride 0.9% lock flush 3 milliLiter(s) IV Push every 8 hours    Drug Dosing Weight  Height (cm): 180.3 (02 Jul 2022 05:23)  Weight (kg): 107 (02 Jul 2022 09:10)  BMI (kg/m2): 32.9 (02 Jul 2022 09:10)  BSA (m2): 2.26 (02 Jul 2022 09:10)    CENTRAL LINE: [ ] YES [x ] NO  LOCATION:   DATE INSERTED:  REMOVE: [ ] YES [ ] NO  EXPLAIN:    RIBEIRO: [ ] YES [x ] NO    DATE INSERTED:  REMOVE:  [ ] YES [ ] NO  EXPLAIN:    PAST MEDICAL & SURGICAL HISTORY:  Lumbago      Lumbago with sciatica of right side      Benign hypertension  dx 10 years      H/O renal calculi  ESWL right side yrs ago  last stone one year ago  (passed on its own)      Obese      Eczema      Diabetes mellitus type II  on Meds 4/2012      Chronic atrial fibrillation      Colon cancer      S/P tonsillectomy      Port-A-Cath in place                        PHYSICAL EXAM:    GENERAL: NAD  HEAD:  Atraumatic, Normocephalic  EYES: EOMI, PERRLA, conjunctiva and sclera clear  ENMT: No tonsillar erythema, exudates  NECK: Supple, No JVD  NERVOUS SYSTEM:  Alert & Oriented X3, +Right sided weakness  CHEST/LUNG: Diminished left base. Right diminished throughout +crackles throughout right lung. +Dullness right side of chest.  HEART: Regular rate and rhythm; No murmurs, rubs, or gallops  ABDOMEN: Soft, Nontender, Nondistended; Bowel sounds present  EXTREMITIES:  +Weakness right side  2+ Peripheral Pulses, No clubbing, cyanosis, or edema  LYMPH: No lymphadenopathy noted  SKIN: No rashes or lesions      LABS:  CBC Full  -  ( 11 Jul 2022 08:39 )  WBC Count : 12.11 K/uL  RBC Count : 3.64 M/uL  Hemoglobin : 8.8 g/dL  Hematocrit : 30.2 %  Platelet Count - Automated : 455 K/uL  Mean Cell Volume : 83.0 fl  Mean Cell Hemoglobin : 24.2 pg  Mean Cell Hemoglobin Concentration : 29.1 gm/dL  Auto Neutrophil # : x  Auto Lymphocyte # : x  Auto Monocyte # : x  Auto Eosinophil # : x  Auto Basophil # : x  Auto Neutrophil % : x  Auto Lymphocyte % : x  Auto Monocyte % : x  Auto Eosinophil % : x  Auto Basophil % : x    07-11    142  |  106  |  10  ----------------------------<  154<H>  4.2   |  30  |  0.70    Ca    8.6      11 Jul 2022 08:39  Phos  3.2     07-11  Mg     2.3     07-11    TPro  5.9<L>  /  Alb  1.6<L>  /  TBili  0.2  /  DBili  x   /  AST  22  /  ALT  39  /  AlkPhos  131<H>  07-11    PT/INR - ( 11 Jul 2022 08:39 )   PT: 15.6 sec;   INR: 1.31 ratio         PTT - ( 10 Jul 2022 08:19 )  PTT:32.2 sec          [  ]  DVT Prophylaxis  [  ]  Nutrition, Brand, Rate         Goal Rate        Abnormal Nutritional Status -  Malnutrition   Cachexia          RADIOLOGY & ADDITIONAL STUDIES:  ***  < from: CT Chest No Cont (07.09.22 @ 09:47) >    *** ADDENDUM***    These findings were discussed with SONALI Jeffries on 7/9/2022 12:29 PM with   read back.    --- End of Report ---    *** END OF ADDENDUM***      PROCEDURE DATE:  07/09/2022          INTERPRETATION:  INDICATION: pleural effusion, history of rectal cancer.  TECHNIQUE: Unenhanced CT of the chest. Coronal, sagittal, and MIP images   were reconstructed and reviewed.  COMPARISON: 7/10/2021 chest CT.    FINDINGS:    AIRWAYS, LUNGS and PLEURA: The right main bronchus and its lobar branches   are obliterated by retained secretions. Large loculated right pleural   effusion. Complete atelectasis of right lung. Trace left pleural effusion   and interlobular septal thickening likely edema. Patchy opacities within   lingula and left lower lobe may represent edema or infection.    MEDIASTINUM AND RALPH: The right hilum and right paratracheal region are   poorly evaluated without contrast. Mildly enlarged subcarinal lymph node   measuring 1.5 cm in short axis, new from prior.    HEART AND VESSELS: Cardiomegaly. Coronary calcifications. No pericardial   effusion. Thoracic aorta and pulmonary artery normal in diameter. Tip of   the Mediport catheter within SVC.    VISUALIZED UPPER ABDOMEN: Bilateral renal cysts.    CHEST WALL AND BONES: No aggressive osseous lesion.    LOWER NECK: Within normal limits.    IMPRESSION:    The right main bronchus and its lobar branches are obliterated by   retained secretions. Large loculated rightpleural effusion. Complete   atelectasis of right lung.    Trace left pleural effusion and interlobular septal thickening likely   edema. Patchy opacities within lingula and left lower lobe may represent   edema or infection    --- End of Report ---      < end of copied text >    Goals of Care Discussion with Family/Proxy/Other   - see note from 7/09/22

## 2022-07-11 NOTE — PROGRESS NOTE ADULT - PROBLEM SELECTOR PLAN 6
A1C 7.9  On lantus 13 + lispro 8U TID with sliding scale at home  Currently requiring Lantus 25 units and correctional admelog scale  BS bet 150s and 250s  Adjust insulin as indicated  FS ACHS with diet or q6 while NPO  Maintain poct 140-180 while inpatient.

## 2022-07-11 NOTE — PROGRESS NOTE ADULT - PROBLEM SELECTOR PLAN 2
No indication for decadron/remdesivir  CXR as above, infiltrate b/l lower lobes, pleural effusion R>L  s/p intubation and extubation 7/6.    saturating well on NC 2L  MRSA PCR +, treated with Chlorhexadine and mupirocin  Continue with mechanical chest vest. Encourage to lay on left side with right side up.  Continue with bronchodilators and ICS  CT with large pleural effusion on Right Lung as above Scheduled for right sided chest tube placement today.  Serial CXRs No indication for decadron/remdesivir  CXR as above, infiltrate b/l lower lobes, pleural effusion R>L  s/p intubation and extubation 7/6.    saturating well on NC 2L  MRSA PCR +, treated with Chlorhexadine and mupirocin  Continue with mechanical chest vest. Encourage to lay on left side with right side up.  Continue with bronchodilators and ICS  CT with large pleural effusion on Right Lung as above. Pulmonary unable to place chest tube at bedside. IR will place chest tube tomorrow. Hold heparin.  Serial CXRs

## 2022-07-11 NOTE — PROGRESS NOTE ADULT - PROBLEM SELECTOR PLAN 5
CHADSVASC: 5  -Eliquis on hold from 7/4 - 7/8; Restarted 7/9 am, 5mg BID; Discontinued in plan for procedure; Replaced anticoagulation with Heparin. Patient with history of life threatening bleeding with Lovenox- DO NOT GIVE LOVENOX per family/patient.  Continue metoprolol 25mg BID with parameters.  TTE 11/2021 with normal EF.

## 2022-07-12 LAB
ALBUMIN SERPL ELPH-MCNC: 1.5 G/DL — LOW (ref 3.5–5)
ALP SERPL-CCNC: 123 U/L — HIGH (ref 40–120)
ALT FLD-CCNC: 32 U/L DA — SIGNIFICANT CHANGE UP (ref 10–60)
ANION GAP SERPL CALC-SCNC: 6 MMOL/L — SIGNIFICANT CHANGE UP (ref 5–17)
AST SERPL-CCNC: 15 U/L — SIGNIFICANT CHANGE UP (ref 10–40)
BILIRUB SERPL-MCNC: 0.2 MG/DL — SIGNIFICANT CHANGE UP (ref 0.2–1.2)
BUN SERPL-MCNC: 10 MG/DL — SIGNIFICANT CHANGE UP (ref 7–18)
CALCIUM SERPL-MCNC: 8.6 MG/DL — SIGNIFICANT CHANGE UP (ref 8.4–10.5)
CHLORIDE SERPL-SCNC: 102 MMOL/L — SIGNIFICANT CHANGE UP (ref 96–108)
CO2 SERPL-SCNC: 33 MMOL/L — HIGH (ref 22–31)
CREAT SERPL-MCNC: 0.68 MG/DL — SIGNIFICANT CHANGE UP (ref 0.5–1.3)
EGFR: 103 ML/MIN/1.73M2 — SIGNIFICANT CHANGE UP
GLUCOSE BLDC GLUCOMTR-MCNC: 137 MG/DL — HIGH (ref 70–99)
GLUCOSE BLDC GLUCOMTR-MCNC: 141 MG/DL — HIGH (ref 70–99)
GLUCOSE BLDC GLUCOMTR-MCNC: 322 MG/DL — HIGH (ref 70–99)
GLUCOSE SERPL-MCNC: 141 MG/DL — HIGH (ref 70–99)
HCT VFR BLD CALC: 31.9 % — LOW (ref 39–50)
HGB BLD-MCNC: 9.2 G/DL — LOW (ref 13–17)
INR BLD: 1.26 RATIO — HIGH (ref 0.88–1.16)
MAGNESIUM SERPL-MCNC: 2.3 MG/DL — SIGNIFICANT CHANGE UP (ref 1.6–2.6)
MCHC RBC-ENTMCNC: 24 PG — LOW (ref 27–34)
MCHC RBC-ENTMCNC: 28.8 GM/DL — LOW (ref 32–36)
MCV RBC AUTO: 83.3 FL — SIGNIFICANT CHANGE UP (ref 80–100)
NRBC # BLD: 0 /100 WBCS — SIGNIFICANT CHANGE UP (ref 0–0)
PHOSPHATE SERPL-MCNC: 3.5 MG/DL — SIGNIFICANT CHANGE UP (ref 2.5–4.5)
PLATELET # BLD AUTO: 432 K/UL — HIGH (ref 150–400)
POTASSIUM SERPL-MCNC: 3.8 MMOL/L — SIGNIFICANT CHANGE UP (ref 3.5–5.3)
POTASSIUM SERPL-SCNC: 3.8 MMOL/L — SIGNIFICANT CHANGE UP (ref 3.5–5.3)
PROT SERPL-MCNC: 6.1 G/DL — SIGNIFICANT CHANGE UP (ref 6–8.3)
PROTHROM AB SERPL-ACNC: 15 SEC — HIGH (ref 10.5–13.4)
RBC # BLD: 3.83 M/UL — LOW (ref 4.2–5.8)
RBC # FLD: 20.1 % — HIGH (ref 10.3–14.5)
SODIUM SERPL-SCNC: 141 MMOL/L — SIGNIFICANT CHANGE UP (ref 135–145)
WBC # BLD: 11.89 K/UL — HIGH (ref 3.8–10.5)
WBC # FLD AUTO: 11.89 K/UL — HIGH (ref 3.8–10.5)

## 2022-07-12 RX ADMIN — HEPARIN SODIUM 5000 UNIT(S): 5000 INJECTION INTRAVENOUS; SUBCUTANEOUS at 22:26

## 2022-07-12 RX ADMIN — HEPARIN SODIUM 5000 UNIT(S): 5000 INJECTION INTRAVENOUS; SUBCUTANEOUS at 13:53

## 2022-07-12 RX ADMIN — INSULIN GLARGINE 25 UNIT(S): 100 INJECTION, SOLUTION SUBCUTANEOUS at 22:26

## 2022-07-12 RX ADMIN — CHLORHEXIDINE GLUCONATE 1 APPLICATION(S): 213 SOLUTION TOPICAL at 06:25

## 2022-07-12 RX ADMIN — MEROPENEM 100 MILLIGRAM(S): 1 INJECTION INTRAVENOUS at 13:54

## 2022-07-12 RX ADMIN — SODIUM CHLORIDE 3 MILLILITER(S): 9 INJECTION INTRAMUSCULAR; INTRAVENOUS; SUBCUTANEOUS at 13:54

## 2022-07-12 RX ADMIN — SODIUM CHLORIDE 3 MILLILITER(S): 9 INJECTION INTRAMUSCULAR; INTRAVENOUS; SUBCUTANEOUS at 22:36

## 2022-07-12 RX ADMIN — ALBUTEROL 2 PUFF(S): 90 AEROSOL, METERED ORAL at 06:26

## 2022-07-12 RX ADMIN — Medication 25 MILLIGRAM(S): at 06:23

## 2022-07-12 RX ADMIN — ALBUTEROL 2 PUFF(S): 90 AEROSOL, METERED ORAL at 13:53

## 2022-07-12 RX ADMIN — MUPIROCIN 1 APPLICATION(S): 20 OINTMENT TOPICAL at 17:47

## 2022-07-12 RX ADMIN — MEROPENEM 100 MILLIGRAM(S): 1 INJECTION INTRAVENOUS at 06:22

## 2022-07-12 RX ADMIN — PANTOPRAZOLE SODIUM 40 MILLIGRAM(S): 20 TABLET, DELAYED RELEASE ORAL at 12:47

## 2022-07-12 RX ADMIN — MUPIROCIN 1 APPLICATION(S): 20 OINTMENT TOPICAL at 06:22

## 2022-07-12 RX ADMIN — Medication 25 MILLIGRAM(S): at 17:47

## 2022-07-12 RX ADMIN — Medication 8: at 16:53

## 2022-07-12 RX ADMIN — Medication 300 MILLIGRAM(S): at 12:47

## 2022-07-12 RX ADMIN — SODIUM CHLORIDE 3 MILLILITER(S): 9 INJECTION INTRAMUSCULAR; INTRAVENOUS; SUBCUTANEOUS at 06:23

## 2022-07-12 RX ADMIN — MEROPENEM 100 MILLIGRAM(S): 1 INJECTION INTRAVENOUS at 22:26

## 2022-07-12 NOTE — PROGRESS NOTE ADULT - PROBLEM SELECTOR PLAN 2
No indication for decadron/remdesivir  CXR as above, infiltrate b/l lower lobes, pleural effusion R>L  s/p intubation and extubation 7/6.    saturating well on NC 2L  MRSA PCR +, treated with Chlorhexadine and mupirocin  Continue with mechanical chest vest. Encourage to lay on left side with right side up.  Continue with bronchodilators and ICS  CT with large pleural effusion on Right Lung as above. Pulmonary unable to place chest tube at bedside. IR will place chest tube today. Hold heparin.  Serial CXRs.

## 2022-07-12 NOTE — PROGRESS NOTE ADULT - SUBJECTIVE AND OBJECTIVE BOX
Patient for CT guided right sided chest tube placment in IR tomorrow.  Please keep patient NPO, send early AM labs including CBC, BMP, and PT, INR / PTT.  Hold all anticoagulation, NSAIDS, and antiplatelet medication.

## 2022-07-12 NOTE — PROGRESS NOTE ADULT - PROBLEM SELECTOR PLAN 3
Large right sided pleural effusion.  CXR and CT chest as above.  Scheduled for right sided chest tube placement today by IR.  Serial CXRs  Continue mechanical chest vest, bronchodilators and ICS as ordered.  Monitor oxygen saturation. Continue oxygen 2LPM  Will need oxygen testing before discharge.

## 2022-07-12 NOTE — PROGRESS NOTE ADULT - SUBJECTIVE AND OBJECTIVE BOX
MELIZA DAILEY    SCU progress note    INTERVAL HPI/OVERNIGHT EVENTS: ***No overnight events. Scheduled for IR placement of right chest tube today.    DNR [ ]   DNI  [  ]   FULL CODE    Covid - 19 PCR: Negative 7/10    The 4Ms    What Matters Most: see GOC  Age appropriate Medications/Screen for High Risk Medication: Yes  Mentation: see CAM below  Mobility: defer to physical exam    The Confusion Assessment Method (CAM) Diagnostic Algorithm     1: Acute Onset or Fluctuating Course  - Is there evidence of an acute change in mental status from the patient’s baseline? Did the (abnormal) behavior  fluctuate during the day, that is, tend to come and go, or increase and decrease in severity?  [ ] YES [x ] NO     2: Inattention  - Did the patient have difficulty focusing attention, being easily distractible, or having difficulty keeping track of what was being said?  [ ] YES [x ] NO     3: Disorganized thinking  -Was the patient’s thinking disorganized or incoherent, such as rambling or irrelevant conversation, unclear or illogical flow of ideas, or unpredictable switching from subject to subject?  [ ] YES [x ] NO    4: Altered Level of consciousness?  [ ] YES [ x] NO    The diagnosis of delirium by CAM requires the presence of features 1 and 2 and either 3 or 4.    PRESSORS: [ ] YES [x ] NO  meropenem  IVPB 1000 milliGRAM(s) IV Intermittent every 8 hours    Cardiovascular:  Heart Failure  Acute   Acute on Chronic  Chronic       metoprolol tartrate 25 milliGRAM(s) Oral two times a day    Pulmonary:  ALBUTerol    0.083% 2.5 milliGRAM(s) Nebulizer every 6 hours PRN  ALBUTerol    90 MICROgram(s) HFA Inhaler 2 Puff(s) Inhalation every 8 hours  ALBUTerol    90 MICROgram(s) HFA Inhaler 2 Puff(s) Inhalation every 6 hours  budesonide  80 MICROgram(s)/formoterol 4.5 MICROgram(s) Inhaler 2 Puff(s) Inhalation two times a day    Hematalogic:  heparin   Injectable 5000 Unit(s) SubCutaneous every 8 hours    Other:  chlorhexidine 2% Cloths 1 Application(s) Topical <User Schedule>  ferrous    sulfate Liquid 300 milliGRAM(s) Oral daily  insulin glargine Injectable (LANTUS) 25 Unit(s) SubCutaneous at bedtime  insulin lispro (ADMELOG) corrective regimen sliding scale   SubCutaneous three times a day before meals  morphine  - Injectable 2 milliGRAM(s) IV Push once  mupirocin 2% Ointment 1 Application(s) Topical two times a day  ondansetron Injectable 4 milliGRAM(s) IV Push every 6 hours PRN  pantoprazole   Suspension 40 milliGRAM(s) Oral daily  sodium chloride 0.9% lock flush 3 milliLiter(s) IV Push every 8 hours    ALBUTerol    0.083% 2.5 milliGRAM(s) Nebulizer every 6 hours PRN  ALBUTerol    90 MICROgram(s) HFA Inhaler 2 Puff(s) Inhalation every 8 hours  ALBUTerol    90 MICROgram(s) HFA Inhaler 2 Puff(s) Inhalation every 6 hours  budesonide  80 MICROgram(s)/formoterol 4.5 MICROgram(s) Inhaler 2 Puff(s) Inhalation two times a day  chlorhexidine 2% Cloths 1 Application(s) Topical <User Schedule>  ferrous    sulfate Liquid 300 milliGRAM(s) Oral daily  heparin   Injectable 5000 Unit(s) SubCutaneous every 8 hours  insulin glargine Injectable (LANTUS) 25 Unit(s) SubCutaneous at bedtime  insulin lispro (ADMELOG) corrective regimen sliding scale   SubCutaneous three times a day before meals  meropenem  IVPB 1000 milliGRAM(s) IV Intermittent every 8 hours  metoprolol tartrate 25 milliGRAM(s) Oral two times a day  morphine  - Injectable 2 milliGRAM(s) IV Push once  mupirocin 2% Ointment 1 Application(s) Topical two times a day  ondansetron Injectable 4 milliGRAM(s) IV Push every 6 hours PRN  pantoprazole   Suspension 40 milliGRAM(s) Oral daily  sodium chloride 0.9% lock flush 3 milliLiter(s) IV Push every 8 hours    Drug Dosing Weight  Height (cm): 180.3 (02 Jul 2022 05:23)  Weight (kg): 107 (02 Jul 2022 09:10)  BMI (kg/m2): 32.9 (02 Jul 2022 09:10)  BSA (m2): 2.26 (02 Jul 2022 09:10)    CENTRAL LINE: [ ] YES [x ] NO  LOCATION:   DATE INSERTED:  REMOVE: [ ] YES [ ] NO  EXPLAIN:    RIBEIRO: [ ] YES [x ] NO    DATE INSERTED:  REMOVE:  [ ] YES [ ] NO  EXPLAIN:    PAST MEDICAL & SURGICAL HISTORY:  Lumbago      Lumbago with sciatica of right side      Benign hypertension  dx 10 years      H/O renal calculi  ESWL right side yrs ago  last stone one year ago  (passed on its own)      Obese      Eczema      Diabetes mellitus type II  on Meds 4/2012      Chronic atrial fibrillation      Colon cancer      S/P tonsillectomy      Port-A-Cath in place                  07-11 @ 07:01  -  07-12 @ 07:00  --------------------------------------------------------  IN: 100 mL / OUT: 0 mL / NET: 100 mL            PHYSICAL EXAM:    GENERAL: NAD, well-groomed, well-developed  HEAD:  Atraumatic, Normocephalic  EYES: EOMI, PERRLA, conjunctiva and sclera clear  ENMT: No tonsillar erythema, exudates  NECK: Supple, No JVD  NERVOUS SYSTEM:  Alert & Oriented X3, +Right sided weakness  CHEST/LUNG: Diminished left base. Right diminished throughout +crackles throughout right lung.   HEART: Regular rate and rhythm; No murmurs, rubs, or gallops  ABDOMEN: Soft, Nontender, Nondistended; Bowel sounds present  EXTREMITIES: +Right sided weakness + Peripheral Pulses, No clubbing, cyanosis, or edema  LYMPH: No lymphadenopathy noted  SKIN: No rashes or lesions      LABS:  CBC Full  -  ( 12 Jul 2022 09:12 )  WBC Count : 11.89 K/uL  RBC Count : 3.83 M/uL  Hemoglobin : 9.2 g/dL  Hematocrit : 31.9 %  Platelet Count - Automated : 432 K/uL  Mean Cell Volume : 83.3 fl  Mean Cell Hemoglobin : 24.0 pg  Mean Cell Hemoglobin Concentration : 28.8 gm/dL  Auto Neutrophil # : x  Auto Lymphocyte # : x  Auto Monocyte # : x  Auto Eosinophil # : x  Auto Basophil # : x  Auto Neutrophil % : x  Auto Lymphocyte % : x  Auto Monocyte % : x  Auto Eosinophil % : x  Auto Basophil % : x    07-12    x   |  x   |  x   ----------------------------<  x   x    |  x   |  0.68    Ca    8.6      11 Jul 2022 08:39  Phos  3.5     07-12  Mg     2.3     07-12    TPro  6.1  /  Alb  x   /  TBili  0.2  /  DBili  x   /  AST  15  /  ALT  32  /  AlkPhos  123<H>  07-12    PT/INR - ( 12 Jul 2022 09:12 )   PT: 15.0 sec;   INR: 1.26 ratio                   [  ]  DVT Prophylaxis  [  ]  Nutrition, Brand, Rate         Goal Rate        Abnormal Nutritional Status -  Malnutrition   Cachexia          RADIOLOGY & ADDITIONAL STUDIES:  ***  < from: CT Chest No Cont (07.09.22 @ 09:47) >  *** ADDENDUM***    These findings were discussed with SONALI Jeffries, on 7/9/2022 12:29 PM with   read back.    --- End of Report ---    *** END OF ADDENDUM***      PROCEDURE DATE:  07/09/2022          INTERPRETATION:  INDICATION: pleural effusion, history of rectal cancer.  TECHNIQUE: Unenhanced CT of the chest. Coronal, sagittal, and MIP images   were reconstructed and reviewed.  COMPARISON: 7/10/2021 chest CT.    FINDINGS:    AIRWAYS, LUNGS and PLEURA: The right main bronchus and its lobar branches   are obliterated by retained secretions. Large loculated right pleural   effusion. Complete atelectasis of right lung. Trace left pleural effusion   and interlobular septal thickening likely edema. Patchy opacities within   lingula and left lower lobe may represent edema or infection.    MEDIASTINUM AND RALPH: The right hilum and right paratracheal region are   poorly evaluated without contrast. Mildly enlarged subcarinal lymph node   measuring 1.5 cm in short axis, new from prior.    HEART AND VESSELS: Cardiomegaly. Coronary calcifications. No pericardial   effusion. Thoracic aorta and pulmonary artery normal in diameter. Tip of   the Mediport catheter within SVC.    VISUALIZED UPPER ABDOMEN: Bilateral renal cysts.    CHEST WALL AND BONES: No aggressive osseous lesion.    LOWER NECK: Within normal limits.    IMPRESSION:    The right main bronchus and its lobar branches are obliterated by   retained secretions. Large loculated rightpleural effusion. Complete   atelectasis of right lung.    Trace left pleural effusion and interlobular septal thickening likely   edema. Patchy opacities within lingula and left lower lobe may represent   edema or infection    --- End of Report ---    ***Please see the addendum at the top of this report. It may contain   additional important information or changes.****      < end of copied text >  < from: Xray Chest 1 View- PORTABLE-Urgent (Xray Chest 1 View- PORTABLE-Urgent .) (07.07.22 @ 10:58) >  clinical history:Pleural effusion    Removal of endotracheal tube since July 5. Moderate to large right   pleural effusion stable. Left lung grossly clear.    IMPRESSION: Stable right pleural effusion    < end of copied text >    Goals of Care Discussion with Family/Proxy/Other   - see note from 7/09

## 2022-07-12 NOTE — PROGRESS NOTE ADULT - PROBLEM SELECTOR PLAN 1
+Fever and elevated lactate upon admission.  Given 1L bolus in ED.  Bcx negative  Ucx +ESBL   Unasyn started 7/2, changed to Meropenem 7/7 for ESBL in urine  -ID Dr. Gillespie.

## 2022-07-12 NOTE — PROGRESS NOTE ADULT - PROBLEM SELECTOR PLAN 10
AC currently on hold for procedure.  Continue GI prophylaxis.  Pulmonary unable to place chest tube. IR will place chest tube today.  Serial CXRs  Will need oxygen testing before discharge.  F/U fluid studies when chest tube is inserted.

## 2022-07-12 NOTE — CHART NOTE - NSCHARTNOTEFT_GEN_A_CORE
Wife updated on test results and treatment plan at bedside.  Scheduled for CT guided chest tube placement tomorrow.  NPOpmn.  All questions answered.

## 2022-07-12 NOTE — PROGRESS NOTE ADULT - ASSESSMENT
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 COVID+   ICU Course:   Pt febrile, HR >90, lactate 2.6, UA+, CXR with pleural effusion/mucous plug vs consolidation, started on Unasyn (7/2). Started on mucomyst, albuterol, chest PT. Patient extubated to Nasal cannula on 7/6. Decadron discontinued per primary team as PNA deemed to be more bacterial then COVID. Eliquis held for possible thoracentesis. Held Bisoprolol/HCTZ for HTN. AMELIA improved with IVF. Lantus and sliding scale for DM, PPI for GI ppx. Started on soft and bite sized diet per S&S. Unsayn switched to Nerissa (7/7) for ESBL E. coli and Enteroccocus growing in urine, ID Jose Miguel on board. Patient was extubated on 7/6/22 and downgraded to SCU on 7/7.   SCU Course:  7/7/22 Patient was seen at bedside, remains comfortable on 2L NC, 98%. Evaluated PT and recommends Rehab.   7/8/22 -Ordered Chest PT and CT chest for re-evaluate pleural effusion.   spoke with wife and she agrees plan of care. Pending [facility]IL[facility] choice. CM/SW following.   7/9/22 - CT Chest completed, pending recs; Resumed Eliquis in AM 5mg BID; Received Chest PT; Oxygenation>90% on 2LNC;   7/10/22- Patient with right lung effusion and right bronchus secretions per CT. Plan for bronchoscopy and possible chest tube placement tomorrow.   7/11 Pulmonary unable to place chest tube. IR to place chest tube tomorrow.

## 2022-07-12 NOTE — PROGRESS NOTE ADULT - ASSESSMENT
UTI - ESBL e coli  COVID +  Leukocytosis      Plan - Cont Meropenem 1 gm iv q8hrs D#6 UTI - ESBL e coli  COVID +  Leukocytosis - improving      Plan - Cont Meropenem 1 gm iv q8hrs D#6 for 1 more day  Reconsult prn

## 2022-07-12 NOTE — PROGRESS NOTE ADULT - SUBJECTIVE AND OBJECTIVE BOX
67y Male is under our care for     REVIEW OF SYSTEMS:  [  ] Not able to elicit      MEDS:  meropenem  IVPB 1000 milliGRAM(s) IV Intermittent every 8 hours    ALLERGIES: Allergies    Nuts (Hives)  Patient stated he had sensation of  throat closing  30 minites after  Epidural pain management resolved it self few minitus after , /  20 y ago Unable to recall MD name or number (Other)  [place] (Unknown)  Tylenol (Other)    Intolerances        VITALS:  Vital Signs Last 24 Hrs  T(C): 36.7 (12 Jul 2022 05:45), Max: 36.7 (12 Jul 2022 05:45)  T(F): 98 (12 Jul 2022 05:45), Max: 98 (12 Jul 2022 05:45)  HR: 84 (12 Jul 2022 05:45) (80 - 84)  BP: 131/76 (12 Jul 2022 05:45) (131/76 - 144/82)  BP(mean): --  RR: 18 (12 Jul 2022 05:45) (17 - 19)  SpO2: 95% (12 Jul 2022 05:45) (94% - 97%)    Parameters below as of 12 Jul 2022 05:45  Patient On (Oxygen Delivery Method): nasal cannula  O2 Flow (L/min): 2        PHYSICAL EXAM:      LABS/DIAGNOSTIC TESTS:                        9.2    11.89 )-----------( 432      ( 12 Jul 2022 09:12 )             31.9     WBC Count: 11.89 K/uL (07-12 @ 09:12)  WBC Count: 12.11 K/uL (07-11 @ 08:39)  WBC Count: 12.47 K/uL (07-10 @ 08:19)  WBC Count: 14.52 K/uL (07-09 @ 08:36)  WBC Count: 13.73 K/uL (07-08 @ 06:57)    07-12    141  |  102  |  10  ----------------------------<  141<H>  3.8   |  33<H>  |  0.68    Ca    8.6      12 Jul 2022 09:12  Phos  3.5     07-12  Mg     2.3     07-12    TPro  6.1  /  Alb  1.5<L>  /  TBili  0.2  /  DBili  x   /  AST  15  /  ALT  32  /  AlkPhos  123<H>  07-12      CULTURES:   ET Tube ET Tube  07-02 @ 18:57   Normal Respiratory Kinjal present  --    Few Squamous epithelial cells per low power field  Moderate polymorphonuclear leukocytes per low power field  Moderate Gram Positive Cocci in Pairs and Chains per oil power field  Moderate Gram Negative Rods per oil power field      Catheterized Catheterized  07-02 @ 16:29   >100,000 CFU/ml Escherichia coli ESBL  >100,000 CFU/ml Enterococcus faecalis  --  Escherichia coli ESBL  Enterococcus faecalis      .Blood Blood-Peripheral  07-02 @ 11:40   No Growth Final  --  --        RADIOLOGY:  no new studies 67y Male is under our care for COVID-19 infection and UTI with ESBL.  Patient was seen laying comfortably in bed with no acute distress on 2L nasal canula with family at the bedside.  Patient denies any cough or shortness of breath at this time, remains afebrile and is pending chest tube placement.     REVIEW OF SYSTEMS:  [  ] Not able to elicit  General: no fevers no malaise  Chest: no cough no sob  GI: no nvd  : no urinary sxs   Skin: no rashes  Musculoskeletal: no trauma no LBP  Neuro: no ha's no dizziness     MEDS:  meropenem  IVPB 1000 milliGRAM(s) IV Intermittent every 8 hours    ALLERGIES: Allergies    Nuts (Hives)  Patient stated he had sensation of  throat closing  30 minites after  Epidural pain management resolved it self few minitus after , /  20 y ago Unable to recall MD name or number (Other)  [place] (Unknown)  Tylenol (Other)    Intolerances        VITALS:  Vital Signs Last 24 Hrs  T(C): 36.7 (12 Jul 2022 05:45), Max: 36.7 (12 Jul 2022 05:45)  T(F): 98 (12 Jul 2022 05:45), Max: 98 (12 Jul 2022 05:45)  HR: 84 (12 Jul 2022 05:45) (80 - 84)  BP: 131/76 (12 Jul 2022 05:45) (131/76 - 144/82)  BP(mean): --  RR: 18 (12 Jul 2022 05:45) (17 - 19)  SpO2: 95% (12 Jul 2022 05:45) (94% - 97%)    Parameters below as of 12 Jul 2022 05:45  Patient On (Oxygen Delivery Method): nasal cannula  O2 Flow (L/min): 2        PHYSICAL EXAM:  HEENT: n/a  Neck: supple no LN's   Respiratory: lungs clear no rales  Cardiovascular: S1 S2 reg no murmurs  Gastrointestinal: +BS with soft, nondistended abdomen; nontender  Extremities: no edema  Skin: no rashes  Ortho: n/a  Neuro: AAO x 2      LABS/DIAGNOSTIC TESTS:                        9.2    11.89 )-----------( 432      ( 12 Jul 2022 09:12 )             31.9     WBC Count: 11.89 K/uL (07-12 @ 09:12)  WBC Count: 12.11 K/uL (07-11 @ 08:39)  WBC Count: 12.47 K/uL (07-10 @ 08:19)  WBC Count: 14.52 K/uL (07-09 @ 08:36)  WBC Count: 13.73 K/uL (07-08 @ 06:57)    07-12    141  |  102  |  10  ----------------------------<  141<H>  3.8   |  33<H>  |  0.68    Ca    8.6      12 Jul 2022 09:12  Phos  3.5     07-12  Mg     2.3     07-12    TPro  6.1  /  Alb  1.5<L>  /  TBili  0.2  /  DBili  x   /  AST  15  /  ALT  32  /  AlkPhos  123<H>  07-12      CULTURES:   ET Tube ET Tube  07-02 @ 18:57   Normal Respiratory Kinjal present  --    Few Squamous epithelial cells per low power field  Moderate polymorphonuclear leukocytes per low power field  Moderate Gram Positive Cocci in Pairs and Chains per oil power field  Moderate Gram Negative Rods per oil power field      Catheterized Catheterized  07-02 @ 16:29   >100,000 CFU/ml Escherichia coli ESBL  >100,000 CFU/ml Enterococcus faecalis  --  Escherichia coli ESBL  Enterococcus faecalis      .Blood Blood-Peripheral  07-02 @ 11:40   No Growth Final  --  --        RADIOLOGY:  no new studies 67y Male is under our care for COVID-19 infection and UTI with ESBL.  Patient was seen laying comfortably in bed with no acute distress on 2L nasal canula with family at the bedside.  Patient denies any cough or shortness of breath at this time, remains afebrile and is pending chest tube placement.     REVIEW OF SYSTEMS:  [  ] Not able to elicit  General: no fevers no malaise  Chest: no cough no sob  GI: no nvd  : no urinary sxs   Skin: no rashes  Musculoskeletal: no trauma no LBP  Neuro: no ha's no dizziness     MEDS:  meropenem  IVPB 1000 milliGRAM(s) IV Intermittent every 8 hours    ALLERGIES: Allergies    Nuts (Hives)  Patient stated he had sensation of  throat closing  30 minites after  Epidural pain management resolved it self few minitus after , /  20 y ago Unable to recall MD name or number (Other)  [place] (Unknown)  Tylenol (Other)    Intolerances        VITALS:  Vital Signs Last 24 Hrs  T(C): 36.7 (12 Jul 2022 05:45), Max: 36.7 (12 Jul 2022 05:45)  T(F): 98 (12 Jul 2022 05:45), Max: 98 (12 Jul 2022 05:45)  HR: 84 (12 Jul 2022 05:45) (80 - 84)  BP: 131/76 (12 Jul 2022 05:45) (131/76 - 144/82)  BP(mean): --  RR: 18 (12 Jul 2022 05:45) (17 - 19)  SpO2: 95% (12 Jul 2022 05:45) (94% - 97%)    Parameters below as of 12 Jul 2022 05:45  Patient On (Oxygen Delivery Method): nasal cannula  O2 Flow (L/min): 2        PHYSICAL EXAM:  HEENT: n/a  Neck: supple no LN's   Respiratory: lungs clear no rales  Cardiovascular: S1 S2 reg no murmurs  Gastrointestinal: +BS with soft, nondistended abdomen; nontender  Extremities: no edema  Skin: no rashes  Ortho: n/a  Neuro: AAO x 3      LABS/DIAGNOSTIC TESTS:                        9.2    11.89 )-----------( 432      ( 12 Jul 2022 09:12 )             31.9     WBC Count: 11.89 K/uL (07-12 @ 09:12)  WBC Count: 12.11 K/uL (07-11 @ 08:39)  WBC Count: 12.47 K/uL (07-10 @ 08:19)  WBC Count: 14.52 K/uL (07-09 @ 08:36)  WBC Count: 13.73 K/uL (07-08 @ 06:57)    07-12    141  |  102  |  10  ----------------------------<  141<H>  3.8   |  33<H>  |  0.68    Ca    8.6      12 Jul 2022 09:12  Phos  3.5     07-12  Mg     2.3     07-12    TPro  6.1  /  Alb  1.5<L>  /  TBili  0.2  /  DBili  x   /  AST  15  /  ALT  32  /  AlkPhos  123<H>  07-12      CULTURES:   ET Tube ET Tube  07-02 @ 18:57   Normal Respiratory Kinjal present  --    Few Squamous epithelial cells per low power field  Moderate polymorphonuclear leukocytes per low power field  Moderate Gram Positive Cocci in Pairs and Chains per oil power field  Moderate Gram Negative Rods per oil power field      Catheterized Catheterized  07-02 @ 16:29   >100,000 CFU/ml Escherichia coli ESBL  >100,000 CFU/ml Enterococcus faecalis  --  Escherichia coli ESBL  Enterococcus faecalis      .Blood Blood-Peripheral  07-02 @ 11:40   No Growth Final  --  --        RADIOLOGY:  no new studies

## 2022-07-13 ENCOUNTER — RESULT REVIEW (OUTPATIENT)
Age: 67
End: 2022-07-13

## 2022-07-13 DIAGNOSIS — E88.09 OTHER DISORDERS OF PLASMA-PROTEIN METABOLISM, NOT ELSEWHERE CLASSIFIED: ICD-10-CM

## 2022-07-13 LAB
ALBUMIN SERPL ELPH-MCNC: 1.5 G/DL — LOW (ref 3.5–5)
ALP SERPL-CCNC: 148 U/L — HIGH (ref 40–120)
ALT FLD-CCNC: 31 U/L DA — SIGNIFICANT CHANGE UP (ref 10–60)
ANION GAP SERPL CALC-SCNC: 7 MMOL/L — SIGNIFICANT CHANGE UP (ref 5–17)
AST SERPL-CCNC: 15 U/L — SIGNIFICANT CHANGE UP (ref 10–40)
B PERT IGG+IGM PNL SER: ABNORMAL
BILIRUB SERPL-MCNC: 0.2 MG/DL — SIGNIFICANT CHANGE UP (ref 0.2–1.2)
BUN SERPL-MCNC: 11 MG/DL — SIGNIFICANT CHANGE UP (ref 7–18)
CALCIUM SERPL-MCNC: 8.5 MG/DL — SIGNIFICANT CHANGE UP (ref 8.4–10.5)
CHLORIDE SERPL-SCNC: 102 MMOL/L — SIGNIFICANT CHANGE UP (ref 96–108)
CO2 SERPL-SCNC: 32 MMOL/L — HIGH (ref 22–31)
COLOR FLD: SIGNIFICANT CHANGE UP
CREAT SERPL-MCNC: 0.65 MG/DL — SIGNIFICANT CHANGE UP (ref 0.5–1.3)
EGFR: 104 ML/MIN/1.73M2 — SIGNIFICANT CHANGE UP
FLUID INTAKE SUBSTANCE CLASS: SIGNIFICANT CHANGE UP
GLUCOSE BLDC GLUCOMTR-MCNC: 130 MG/DL — HIGH (ref 70–99)
GLUCOSE BLDC GLUCOMTR-MCNC: 168 MG/DL — HIGH (ref 70–99)
GLUCOSE BLDC GLUCOMTR-MCNC: 193 MG/DL — HIGH (ref 70–99)
GLUCOSE BLDC GLUCOMTR-MCNC: 239 MG/DL — HIGH (ref 70–99)
GLUCOSE BLDC GLUCOMTR-MCNC: 329 MG/DL — HIGH (ref 70–99)
GLUCOSE SERPL-MCNC: 158 MG/DL — HIGH (ref 70–99)
GRAM STN FLD: SIGNIFICANT CHANGE UP
HCT VFR BLD CALC: 30 % — LOW (ref 39–50)
HGB BLD-MCNC: 8.8 G/DL — LOW (ref 13–17)
INR BLD: 1.27 RATIO — HIGH (ref 0.88–1.16)
LYMPHOCYTES # FLD: 1 % — SIGNIFICANT CHANGE UP
MAGNESIUM SERPL-MCNC: 2.3 MG/DL — SIGNIFICANT CHANGE UP (ref 1.6–2.6)
MCHC RBC-ENTMCNC: 24.2 PG — LOW (ref 27–34)
MCHC RBC-ENTMCNC: 29.3 GM/DL — LOW (ref 32–36)
MCV RBC AUTO: 82.6 FL — SIGNIFICANT CHANGE UP (ref 80–100)
NEUTROPHILS-BODY FLUID: 99 % — SIGNIFICANT CHANGE UP
NRBC # BLD: 0 /100 WBCS — SIGNIFICANT CHANGE UP (ref 0–0)
PHOSPHATE SERPL-MCNC: 2.8 MG/DL — SIGNIFICANT CHANGE UP (ref 2.5–4.5)
PLATELET # BLD AUTO: 447 K/UL — HIGH (ref 150–400)
POTASSIUM SERPL-MCNC: 3.7 MMOL/L — SIGNIFICANT CHANGE UP (ref 3.5–5.3)
POTASSIUM SERPL-SCNC: 3.7 MMOL/L — SIGNIFICANT CHANGE UP (ref 3.5–5.3)
PROT SERPL-MCNC: 6 G/DL — SIGNIFICANT CHANGE UP (ref 6–8.3)
PROTHROM AB SERPL-ACNC: 15.1 SEC — HIGH (ref 10.5–13.4)
RBC # BLD: 3.63 M/UL — LOW (ref 4.2–5.8)
RBC # FLD: 20.2 % — HIGH (ref 10.3–14.5)
RCV VOL RI: HIGH /UL (ref 0–5)
SODIUM SERPL-SCNC: 141 MMOL/L — SIGNIFICANT CHANGE UP (ref 135–145)
SPECIMEN SOURCE: SIGNIFICANT CHANGE UP
TOTAL NUCLEATED CELL COUNT, BODY FLUID: 2600 /UL — SIGNIFICANT CHANGE UP
TUBE TYPE: SIGNIFICANT CHANGE UP
WBC # BLD: 13.15 K/UL — HIGH (ref 3.8–10.5)
WBC # FLD AUTO: 13.15 K/UL — HIGH (ref 3.8–10.5)

## 2022-07-13 PROCEDURE — 88112 CYTOPATH CELL ENHANCE TECH: CPT | Mod: 26

## 2022-07-13 PROCEDURE — 88305 TISSUE EXAM BY PATHOLOGIST: CPT | Mod: 26

## 2022-07-13 PROCEDURE — 49405 IMAGE CATH FLUID COLXN VISC: CPT | Mod: RT

## 2022-07-13 RX ORDER — FENTANYL CITRATE 50 UG/ML
25 INJECTION INTRAVENOUS
Refills: 0 | Status: DISCONTINUED | OUTPATIENT
Start: 2022-07-13 | End: 2022-07-13

## 2022-07-13 RX ORDER — FENTANYL CITRATE 50 UG/ML
30 INJECTION INTRAVENOUS ONCE
Refills: 0 | Status: DISCONTINUED | OUTPATIENT
Start: 2022-07-13 | End: 2022-07-13

## 2022-07-13 RX ADMIN — Medication 300 MILLIGRAM(S): at 13:21

## 2022-07-13 RX ADMIN — Medication 25 MILLIGRAM(S): at 06:03

## 2022-07-13 RX ADMIN — CHLORHEXIDINE GLUCONATE 1 APPLICATION(S): 213 SOLUTION TOPICAL at 05:23

## 2022-07-13 RX ADMIN — Medication 8: at 17:32

## 2022-07-13 RX ADMIN — SODIUM CHLORIDE 3 MILLILITER(S): 9 INJECTION INTRAMUSCULAR; INTRAVENOUS; SUBCUTANEOUS at 05:22

## 2022-07-13 RX ADMIN — SODIUM CHLORIDE 3 MILLILITER(S): 9 INJECTION INTRAMUSCULAR; INTRAVENOUS; SUBCUTANEOUS at 22:15

## 2022-07-13 RX ADMIN — PANTOPRAZOLE SODIUM 40 MILLIGRAM(S): 20 TABLET, DELAYED RELEASE ORAL at 13:21

## 2022-07-13 RX ADMIN — Medication 2: at 08:15

## 2022-07-13 RX ADMIN — MUPIROCIN 1 APPLICATION(S): 20 OINTMENT TOPICAL at 17:32

## 2022-07-13 RX ADMIN — ALBUTEROL 2 PUFF(S): 90 AEROSOL, METERED ORAL at 22:15

## 2022-07-13 RX ADMIN — INSULIN GLARGINE 25 UNIT(S): 100 INJECTION, SOLUTION SUBCUTANEOUS at 22:14

## 2022-07-13 RX ADMIN — MEROPENEM 100 MILLIGRAM(S): 1 INJECTION INTRAVENOUS at 22:15

## 2022-07-13 RX ADMIN — Medication 25 MILLIGRAM(S): at 17:32

## 2022-07-13 RX ADMIN — ALBUTEROL 2 PUFF(S): 90 AEROSOL, METERED ORAL at 14:39

## 2022-07-13 RX ADMIN — ALBUTEROL 2 PUFF(S): 90 AEROSOL, METERED ORAL at 07:23

## 2022-07-13 RX ADMIN — MEROPENEM 100 MILLIGRAM(S): 1 INJECTION INTRAVENOUS at 14:37

## 2022-07-13 RX ADMIN — MEROPENEM 100 MILLIGRAM(S): 1 INJECTION INTRAVENOUS at 05:24

## 2022-07-13 RX ADMIN — MUPIROCIN 1 APPLICATION(S): 20 OINTMENT TOPICAL at 05:25

## 2022-07-13 RX ADMIN — SODIUM CHLORIDE 3 MILLILITER(S): 9 INJECTION INTRAMUSCULAR; INTRAVENOUS; SUBCUTANEOUS at 14:36

## 2022-07-13 RX ADMIN — BUDESONIDE AND FORMOTEROL FUMARATE DIHYDRATE 2 PUFF(S): 160; 4.5 AEROSOL RESPIRATORY (INHALATION) at 22:16

## 2022-07-13 NOTE — PRE PROCEDURE NOTE - PRE PROCEDURE EVALUATION
Interventional Radiology Pre-Procedure Note    Diagnosis/Indication: Patient is a 67y old Male with a large multiloculated right pleural effusion s/p unsuccessful attempt at chest tube placement at bedside. Right chest tube placement was requested.     PAST MEDICAL & SURGICAL HISTORY:  Lumbago  Lumbago with sciatica of right side  Benign hypertension  dx 10 years  H/O renal calculi  ESWL right side yrs ago  last stone one year ago  (passed on its own)  Obese  Eczema  Diabetes mellitus type II  on Meds 4/2012  Chronic atrial fibrillation  Colon cancer  S/P tonsillectomy  Port-A-Cath in place      Allergies: Nuts (Hives)  Patient stated he had sensation of  throat closing  30 minites after  Epidural pain management resolved it self few minitus after , /  20 y ago Unable to recall MD name or number (Other)  [place] (Unknown)  Tylenol (Other)    LABS:  CBC Full  -  ( 13 Jul 2022 08:03 )  WBC Count : 13.15 K/uL  RBC Count : 3.63 M/uL  Hemoglobin : 8.8 g/dL  Hematocrit : 30.0 %  Platelet Count - Automated : 447 K/uL  Mean Cell Volume : 82.6 fl  Mean Cell Hemoglobin : 24.2 pg  Mean Cell Hemoglobin Concentration : 29.3 gm/dL    07-13    141  |  102  |  11  ----------------------------<  158<H>  3.7   |  32<H>  |  0.65    Ca    8.5      13 Jul 2022 08:03  Phos  2.8     07-13  Mg     2.3     07-13    TPro  6.0  /  Alb  1.5<L>  /  TBili  0.2  /  DBili  x   /  AST  15  /  ALT  31  /  AlkPhos  148<H>  07-13    PT/INR - ( 13 Jul 2022 08:03 )   PT: 15.1 sec;   INR: 1.27 ratio      Procedure/ risks/ benefits/ alternatives were discussed with the patient's wife, who verbalizes understanding, and witnessed informed consent was obtained.

## 2022-07-13 NOTE — PROCEDURE NOTE - NSINFORMCONSENT_GEN_A_CORE
patient's wife/Benefits, risks, and possible complications of procedure explained to patient/caregiver who verbalized understanding and gave verbal consent.

## 2022-07-13 NOTE — PROGRESS NOTE ADULT - SUBJECTIVE AND OBJECTIVE BOX
MELIZA DAILEY    SCU progress note    INTERVAL HPI/OVERNIGHT EVENTS: ***No overnight events. Scheduled for CT guided chest tube placement in IR today    DNR [ ]   DNI  [  ]  FULL CODE    Covid - 19 PCR: Negative 7/10    The 4Ms    What Matters Most: see GOC  Age appropriate Medications/Screen for High Risk Medication: Yes  Mentation: see CAM below  Mobility: defer to physical exam    The Confusion Assessment Method (CAM) Diagnostic Algorithm     1: Acute Onset or Fluctuating Course  - Is there evidence of an acute change in mental status from the patient’s baseline? Did the (abnormal) behavior  fluctuate during the day, that is, tend to come and go, or increase and decrease in severity?  [ ] YES [ x] NO     2: Inattention  - Did the patient have difficulty focusing attention, being easily distractible, or having difficulty keeping track of what was being said?  [ ] YES [x NO     3: Disorganized thinking  -Was the patient’s thinking disorganized or incoherent, such as rambling or irrelevant conversation, unclear or illogical flow of ideas, or unpredictable switching from subject to subject?  [ ] YES [x ] NO    4: Altered Level of consciousness?  [ ] YES [x ] NO    The diagnosis of delirium by CAM requires the presence of features 1 and 2 and either 3 or 4.    PRESSORS: [ ] YES [x ] NO  meropenem  IVPB 1000 milliGRAM(s) IV Intermittent every 8 hours    Cardiovascular:  Heart Failure  Acute   Acute on Chronic  Chronic       metoprolol tartrate 25 milliGRAM(s) Oral two times a day    Pulmonary:  ALBUTerol    0.083% 2.5 milliGRAM(s) Nebulizer every 6 hours PRN  ALBUTerol    90 MICROgram(s) HFA Inhaler 2 Puff(s) Inhalation every 6 hours  ALBUTerol    90 MICROgram(s) HFA Inhaler 2 Puff(s) Inhalation every 8 hours  budesonide  80 MICROgram(s)/formoterol 4.5 MICROgram(s) Inhaler 2 Puff(s) Inhalation two times a day    Hematalogic:    Other:  chlorhexidine 2% Cloths 1 Application(s) Topical <User Schedule>  ferrous    sulfate Liquid 300 milliGRAM(s) Oral daily  insulin glargine Injectable (LANTUS) 25 Unit(s) SubCutaneous at bedtime  insulin lispro (ADMELOG) corrective regimen sliding scale   SubCutaneous three times a day before meals  morphine  - Injectable 2 milliGRAM(s) IV Push once  mupirocin 2% Ointment 1 Application(s) Topical two times a day  ondansetron Injectable 4 milliGRAM(s) IV Push every 6 hours PRN  pantoprazole   Suspension 40 milliGRAM(s) Oral daily  sodium chloride 0.9% lock flush 3 milliLiter(s) IV Push every 8 hours    ALBUTerol    0.083% 2.5 milliGRAM(s) Nebulizer every 6 hours PRN  ALBUTerol    90 MICROgram(s) HFA Inhaler 2 Puff(s) Inhalation every 6 hours  ALBUTerol    90 MICROgram(s) HFA Inhaler 2 Puff(s) Inhalation every 8 hours  budesonide  80 MICROgram(s)/formoterol 4.5 MICROgram(s) Inhaler 2 Puff(s) Inhalation two times a day  chlorhexidine 2% Cloths 1 Application(s) Topical <User Schedule>  ferrous    sulfate Liquid 300 milliGRAM(s) Oral daily  insulin glargine Injectable (LANTUS) 25 Unit(s) SubCutaneous at bedtime  insulin lispro (ADMELOG) corrective regimen sliding scale   SubCutaneous three times a day before meals  meropenem  IVPB 1000 milliGRAM(s) IV Intermittent every 8 hours  metoprolol tartrate 25 milliGRAM(s) Oral two times a day  morphine  - Injectable 2 milliGRAM(s) IV Push once  mupirocin 2% Ointment 1 Application(s) Topical two times a day  ondansetron Injectable 4 milliGRAM(s) IV Push every 6 hours PRN  pantoprazole   Suspension 40 milliGRAM(s) Oral daily  sodium chloride 0.9% lock flush 3 milliLiter(s) IV Push every 8 hours    Drug Dosing Weight  Height (cm): 180.3 (02 Jul 2022 05:23)  Weight (kg): 107 (02 Jul 2022 09:10)  BMI (kg/m2): 32.9 (02 Jul 2022 09:10)  BSA (m2): 2.26 (02 Jul 2022 09:10)    CENTRAL LINE: [ ] YES [ ] NO  LOCATION:   DATE INSERTED:  REMOVE: [ ] YES [ ] NO  EXPLAIN:    RIBEIRO: [ ] YES [ ] NO    DATE INSERTED:  REMOVE:  [ ] YES [ ] NO  EXPLAIN:    PAST MEDICAL & SURGICAL HISTORY:  Lumbago      Lumbago with sciatica of right side      Benign hypertension  dx 10 years      H/O renal calculi  ESWL right side yrs ago  last stone one year ago  (passed on its own)      Obese      Eczema      Diabetes mellitus type II  on Meds 4/2012      Chronic atrial fibrillation      Colon cancer      S/P tonsillectomy      Port-A-Cath in place                        PHYSICAL EXAM:    GENERAL: NAD, well-groomed, well-developed  HEAD:  Atraumatic, Normocephalic  EYES: EOMI, PERRLA, conjunctiva and sclera clear  ENMT: No tonsillar erythema, exudates  NECK: Supple, No JVD, tracheostomy stoma intact  NERVOUS SYSTEM:  Awake and alert. Follows commands. +Right sided weakness  CHEST/LUNG: Diminished left base. Right diminished throughout +crackles throughout right lung. Poor air movement right lung.  HEART: Regular rate and rhythm; No murmurs, rubs, or gallops  ABDOMEN: Soft, Nontender, Nondistended; Bowel sounds present  EXTREMITIES:  +Right sided weakness. 2+ Peripheral Pulses, No clubbing, cyanosis, or edema  LYMPH: No lymphadenopathy noted  SKIN: No rashes or lesions      LABS:  CBC Full  -  ( 13 Jul 2022 08:03 )  WBC Count : 13.15 K/uL  RBC Count : 3.63 M/uL  Hemoglobin : 8.8 g/dL  Hematocrit : 30.0 %  Platelet Count - Automated : 447 K/uL  Mean Cell Volume : 82.6 fl  Mean Cell Hemoglobin : 24.2 pg  Mean Cell Hemoglobin Concentration : 29.3 gm/dL  Auto Neutrophil # : x  Auto Lymphocyte # : x  Auto Monocyte # : x  Auto Eosinophil # : x  Auto Basophil # : x  Auto Neutrophil % : x  Auto Lymphocyte % : x  Auto Monocyte % : x  Auto Eosinophil % : x  Auto Basophil % : x    07-13    141  |  102  |  11  ----------------------------<  158<H>  3.7   |  32<H>  |  0.65    Ca    8.5      13 Jul 2022 08:03  Phos  2.8     07-13  Mg     2.3     07-13    TPro  6.0  /  Alb  1.5<L>  /  TBili  0.2  /  DBili  x   /  AST  15  /  ALT  31  /  AlkPhos  148<H>  07-13    PT/INR - ( 13 Jul 2022 08:03 )   PT: 15.1 sec;   INR: 1.27 ratio                   [  ]  DVT Prophylaxis  [  ]  Nutrition, Brand, Rate         Goal Rate        Abnormal Nutritional Status -  Malnutrition   Cachexia          RADIOLOGY & ADDITIONAL STUDIES:  ***    < from: CT Chest No Cont (07.09.22 @ 09:47) >  *** ADDENDUM***    These findings were discussed with SONALI Jeffries on 7/9/2022 12:29 PM with   read back.    --- End of Report ---    *** END OF ADDENDUM***      PROCEDURE DATE:  07/09/2022          INTERPRETATION:  INDICATION: pleural effusion, history of rectal cancer.  TECHNIQUE: Unenhanced CT of the chest. Coronal, sagittal, and MIP images   were reconstructed and reviewed.  COMPARISON: 7/10/2021 chest CT.    FINDINGS:    AIRWAYS, LUNGS and PLEURA: The right main bronchus and its lobar branches   are obliterated by retained secretions. Large loculated right pleural   effusion. Complete atelectasis of right lung. Trace left pleural effusion   and interlobular septal thickening likely edema. Patchy opacities within   lingula and left lower lobe may represent edema or infection.    MEDIASTINUM AND RALPH: The right hilum and right paratracheal region are   poorly evaluated without contrast. Mildly enlarged subcarinal lymph node   measuring 1.5 cm in short axis, new from prior.    HEART AND VESSELS: Cardiomegaly. Coronary calcifications. No pericardial   effusion. Thoracic aorta and pulmonary artery normal in diameter. Tip of   the Mediport catheter within SVC.    VISUALIZED UPPER ABDOMEN: Bilateral renal cysts.    CHEST WALL AND BONES: No aggressive osseous lesion.    LOWER NECK: Within normal limits.    IMPRESSION:    The right main bronchus and its lobar branches are obliterated by   retained secretions. Large loculated rightpleural effusion. Complete   atelectasis of right lung.    Trace left pleural effusion and interlobular septal thickening likely   edema. Patchy opacities within lingula and left lower lobe may represent   edema or infection    --- End of Report ---    < end of copied text >  < from: Xray Chest 1 View- PORTABLE-Urgent (Xray Chest 1 View- PORTABLE-Urgent .) (07.07.22 @ 10:58) >  INTERPRETATION:  Exam:XR CHEST URGENT    clinical history:Pleural effusion    Removal of endotracheal tube since July 5. Moderate to large right   pleural effusion stable. Left lung grossly clear.    IMPRESSION: Stable right pleural effusion    --- End of Report ---    Goals of Care Discussion with Family/Proxy/Other   - see note from 7/09

## 2022-07-13 NOTE — PROCEDURE NOTE - PROCEDURE FINDINGS AND DETAILS
Large multiloculated right pleural effusion again idetified. 12 Fr pigtail catheter placed. 50 cc of serosanguinous fluid removed and samples sent for testing. Catheter connected to PleurEvac device.

## 2022-07-13 NOTE — PROCEDURE NOTE - PLAN
- Recovery x 1 hour.   - F/u fluid analysis results.   - Management as per primary team.   - Recommend thoracic surgery consult.   - HOLD Eliquis for 48 hours. HOLD prophylactic anticoagulation for 24 hours. May resume after said interval, if clinically not contraindicated.     Official report to follow.

## 2022-07-13 NOTE — CHART NOTE - NSCHARTNOTEFT_GEN_A_CORE
Wife and sister updated on test results and treatment plan.  S/P CT guided chest tube placement today.  All questions answered.

## 2022-07-13 NOTE — PROGRESS NOTE ADULT - PROBLEM SELECTOR PLAN 2
No indication for decadron/remdesivir  CXR as above, infiltrate b/l lower lobes, pleural effusion R>L  s/p intubation and extubation 7/6.    saturating well on NC 2L  MRSA PCR +, treated with Chlorhexidine and mupirocin  Continue with mechanical chest vest. Encourage to lay on left side with right side up.  Continue with bronchodilators and ICS  CT with large pleural effusion on Right Lung as above. Pulmonary unable to place chest tube at bedside. IR will place chest tube today under CT guidance. . Hold heparin.  Serial CXRs.

## 2022-07-13 NOTE — PROGRESS NOTE ADULT - PROBLEM SELECTOR PLAN 1
+Fever and elevated lactate upon admission.  Given 1L bolus in ED.  Bcx negative  Ucx +ESBL   Unasyn started 7/2, changed to Meropenem 7/7 for ESBL in urine. Today last day of meropenem as per ID.  -ID Dr. Gillespie.

## 2022-07-14 LAB
ALBUMIN FLD-MCNC: 1.7 G/DL — SIGNIFICANT CHANGE UP
ALBUMIN SERPL ELPH-MCNC: 1.5 G/DL — LOW (ref 3.5–5)
ALP SERPL-CCNC: 132 U/L — HIGH (ref 40–120)
ALT FLD-CCNC: 24 U/L DA — SIGNIFICANT CHANGE UP (ref 10–60)
ANION GAP SERPL CALC-SCNC: 4 MMOL/L — LOW (ref 5–17)
AST SERPL-CCNC: 9 U/L — LOW (ref 10–40)
BILIRUB SERPL-MCNC: 0.2 MG/DL — SIGNIFICANT CHANGE UP (ref 0.2–1.2)
BUN SERPL-MCNC: 9 MG/DL — SIGNIFICANT CHANGE UP (ref 7–18)
CALCIUM SERPL-MCNC: 8.4 MG/DL — SIGNIFICANT CHANGE UP (ref 8.4–10.5)
CHLORIDE SERPL-SCNC: 102 MMOL/L — SIGNIFICANT CHANGE UP (ref 96–108)
CHOLEST SERPL-MCNC: 107 MG/DL — SIGNIFICANT CHANGE UP
CO2 SERPL-SCNC: 34 MMOL/L — HIGH (ref 22–31)
CREAT SERPL-MCNC: 0.6 MG/DL — SIGNIFICANT CHANGE UP (ref 0.5–1.3)
EGFR: 106 ML/MIN/1.73M2 — SIGNIFICANT CHANGE UP
GLUCOSE BLDC GLUCOMTR-MCNC: 161 MG/DL — HIGH (ref 70–99)
GLUCOSE BLDC GLUCOMTR-MCNC: 181 MG/DL — HIGH (ref 70–99)
GLUCOSE BLDC GLUCOMTR-MCNC: 186 MG/DL — HIGH (ref 70–99)
GLUCOSE BLDC GLUCOMTR-MCNC: 221 MG/DL — HIGH (ref 70–99)
GLUCOSE BLDC GLUCOMTR-MCNC: 278 MG/DL — HIGH (ref 70–99)
GLUCOSE FLD-MCNC: 74 MG/DL — SIGNIFICANT CHANGE UP
GLUCOSE SERPL-MCNC: 179 MG/DL — HIGH (ref 70–99)
HCT VFR BLD CALC: 31.7 % — LOW (ref 39–50)
HDLC SERPL-MCNC: 24 MG/DL — LOW
HGB BLD-MCNC: 9.1 G/DL — LOW (ref 13–17)
LDH SERPL L TO P-CCNC: 4898 U/L — SIGNIFICANT CHANGE UP
LIPID PNL WITH DIRECT LDL SERPL: 61 MG/DL — SIGNIFICANT CHANGE UP
MAGNESIUM SERPL-MCNC: 2.2 MG/DL — SIGNIFICANT CHANGE UP (ref 1.6–2.6)
MCHC RBC-ENTMCNC: 24.3 PG — LOW (ref 27–34)
MCHC RBC-ENTMCNC: 28.7 GM/DL — LOW (ref 32–36)
MCV RBC AUTO: 84.5 FL — SIGNIFICANT CHANGE UP (ref 80–100)
NON HDL CHOLESTEROL: 83 MG/DL — SIGNIFICANT CHANGE UP
NRBC # BLD: 0 /100 WBCS — SIGNIFICANT CHANGE UP (ref 0–0)
PH FLD: 7.1 — SIGNIFICANT CHANGE UP
PHOSPHATE SERPL-MCNC: 3.1 MG/DL — SIGNIFICANT CHANGE UP (ref 2.5–4.5)
PLATELET # BLD AUTO: 446 K/UL — HIGH (ref 150–400)
POTASSIUM SERPL-MCNC: 3.7 MMOL/L — SIGNIFICANT CHANGE UP (ref 3.5–5.3)
POTASSIUM SERPL-SCNC: 3.7 MMOL/L — SIGNIFICANT CHANGE UP (ref 3.5–5.3)
PROT FLD-MCNC: 4.1 G/DL — SIGNIFICANT CHANGE UP
PROT SERPL-MCNC: 6.1 G/DL — SIGNIFICANT CHANGE UP (ref 6–8.3)
RBC # BLD: 3.75 M/UL — LOW (ref 4.2–5.8)
RBC # FLD: 19.9 % — HIGH (ref 10.3–14.5)
SODIUM SERPL-SCNC: 140 MMOL/L — SIGNIFICANT CHANGE UP (ref 135–145)
TRIGL SERPL-MCNC: 112 MG/DL — SIGNIFICANT CHANGE UP
WBC # BLD: 12.48 K/UL — HIGH (ref 3.8–10.5)
WBC # FLD AUTO: 12.48 K/UL — HIGH (ref 3.8–10.5)

## 2022-07-14 PROCEDURE — 71045 X-RAY EXAM CHEST 1 VIEW: CPT | Mod: 26

## 2022-07-14 RX ADMIN — SODIUM CHLORIDE 3 MILLILITER(S): 9 INJECTION INTRAMUSCULAR; INTRAVENOUS; SUBCUTANEOUS at 05:35

## 2022-07-14 RX ADMIN — SODIUM CHLORIDE 3 MILLILITER(S): 9 INJECTION INTRAMUSCULAR; INTRAVENOUS; SUBCUTANEOUS at 22:41

## 2022-07-14 RX ADMIN — ALBUTEROL 2 PUFF(S): 90 AEROSOL, METERED ORAL at 05:34

## 2022-07-14 RX ADMIN — Medication 25 MILLIGRAM(S): at 05:33

## 2022-07-14 RX ADMIN — Medication 300 MILLIGRAM(S): at 12:03

## 2022-07-14 RX ADMIN — Medication 25 MILLIGRAM(S): at 17:04

## 2022-07-14 RX ADMIN — ALBUTEROL 2 PUFF(S): 90 AEROSOL, METERED ORAL at 22:39

## 2022-07-14 RX ADMIN — INSULIN GLARGINE 25 UNIT(S): 100 INJECTION, SOLUTION SUBCUTANEOUS at 23:18

## 2022-07-14 RX ADMIN — MEROPENEM 100 MILLIGRAM(S): 1 INJECTION INTRAVENOUS at 14:21

## 2022-07-14 RX ADMIN — BUDESONIDE AND FORMOTEROL FUMARATE DIHYDRATE 2 PUFF(S): 160; 4.5 AEROSOL RESPIRATORY (INHALATION) at 10:35

## 2022-07-14 RX ADMIN — BUDESONIDE AND FORMOTEROL FUMARATE DIHYDRATE 2 PUFF(S): 160; 4.5 AEROSOL RESPIRATORY (INHALATION) at 22:40

## 2022-07-14 RX ADMIN — MEROPENEM 100 MILLIGRAM(S): 1 INJECTION INTRAVENOUS at 05:32

## 2022-07-14 RX ADMIN — CHLORHEXIDINE GLUCONATE 1 APPLICATION(S): 213 SOLUTION TOPICAL at 05:33

## 2022-07-14 RX ADMIN — Medication 6: at 12:02

## 2022-07-14 RX ADMIN — ALBUTEROL 2 PUFF(S): 90 AEROSOL, METERED ORAL at 14:21

## 2022-07-14 RX ADMIN — MUPIROCIN 1 APPLICATION(S): 20 OINTMENT TOPICAL at 05:34

## 2022-07-14 RX ADMIN — SODIUM CHLORIDE 3 MILLILITER(S): 9 INJECTION INTRAMUSCULAR; INTRAVENOUS; SUBCUTANEOUS at 14:18

## 2022-07-14 RX ADMIN — PANTOPRAZOLE SODIUM 40 MILLIGRAM(S): 20 TABLET, DELAYED RELEASE ORAL at 12:02

## 2022-07-14 RX ADMIN — MUPIROCIN 1 APPLICATION(S): 20 OINTMENT TOPICAL at 17:05

## 2022-07-14 RX ADMIN — MEROPENEM 100 MILLIGRAM(S): 1 INJECTION INTRAVENOUS at 22:41

## 2022-07-14 RX ADMIN — Medication 4: at 17:04

## 2022-07-14 RX ADMIN — Medication 2: at 08:36

## 2022-07-14 NOTE — CHART NOTE - NSCHARTNOTEFT_GEN_A_CORE
Spoke with both Wife (Mindy) and sister (Nagi Dave [phone]) at bedside, updated regarding test results and treatment plan.  S/P CT guided chest tube placement and awaiting for pathology result from pleural fluid.   All questions/issues addressed and answered.

## 2022-07-14 NOTE — PROGRESS NOTE ADULT - PROBLEM SELECTOR PLAN 11
AC currently on hold for procedure.  Continue GI prophylaxis.  Pulmonary unable to place chest tube. IR will place chest tube today.  Serial CXRs  Will need oxygen testing before discharge.  F/U fluid studies when chest tube is inserted. AC currently on hold for procedure.  Continue GI prophylaxis.  Pulmonary unable to place chest tube. s/p chest tube placement 7/13 by IR  Serial CXRs  Will need oxygen testing before discharge.  F/U pathology result from fluid studies  7/13. AC currently on hold for procedure.  Continue GI prophylaxis.  s/p chest tube placement 7/13 by IR for pleural effusion, fluid study sent  Serial CXRs  Will need oxygen testing before discharge.  F/U pathology result from fluid studies  7/13.

## 2022-07-14 NOTE — PROGRESS NOTE ADULT - SUBJECTIVE AND OBJECTIVE BOX
MELIZA DAILEY    SCU progress note    INTERVAL HPI/OVERNIGHT EVENTS: ***    DNR [ ]   DNI  [  ]    Covid - 19 PCR:     The 4Ms    What Matters Most: see GOC  Age appropriate Medications/Screen for High Risk Medication: Yes  Mentation: see CAM below  Mobility: defer to physical exam    The Confusion Assessment Method (CAM) Diagnostic Algorithm     1: Acute Onset or Fluctuating Course  - Is there evidence of an acute change in mental status from the patient’s baseline? Did the (abnormal) behavior  fluctuate during the day, that is, tend to come and go, or increase and decrease in severity?  [ ] YES [ ] NO     2: Inattention  - Did the patient have difficulty focusing attention, being easily distractible, or having difficulty keeping track of what was being said?  [ ] YES [ ] NO     3: Disorganized thinking  -Was the patient’s thinking disorganized or incoherent, such as rambling or irrelevant conversation, unclear or illogical flow of ideas, or unpredictable switching from subject to subject?  [ ] YES [ ] NO    4: Altered Level of consciousness?  [ ] YES [ ] NO    The diagnosis of delirium by CAM requires the presence of features 1 and 2 and either 3 or 4.    PRESSORS: [ ] YES [ ] NO  meropenem  IVPB 1000 milliGRAM(s) IV Intermittent every 8 hours    Cardiovascular:  Heart Failure  Acute   Acute on Chronic  Chronic       metoprolol tartrate 25 milliGRAM(s) Oral two times a day    Pulmonary:  ALBUTerol    0.083% 2.5 milliGRAM(s) Nebulizer every 6 hours PRN  ALBUTerol    90 MICROgram(s) HFA Inhaler 2 Puff(s) Inhalation every 6 hours  ALBUTerol    90 MICROgram(s) HFA Inhaler 2 Puff(s) Inhalation every 8 hours  budesonide  80 MICROgram(s)/formoterol 4.5 MICROgram(s) Inhaler 2 Puff(s) Inhalation two times a day    Hematalogic:    Other:  chlorhexidine 2% Cloths 1 Application(s) Topical <User Schedule>  ferrous    sulfate Liquid 300 milliGRAM(s) Oral daily  insulin glargine Injectable (LANTUS) 25 Unit(s) SubCutaneous at bedtime  insulin lispro (ADMELOG) corrective regimen sliding scale   SubCutaneous three times a day before meals  morphine  - Injectable 2 milliGRAM(s) IV Push once  mupirocin 2% Ointment 1 Application(s) Topical two times a day  ondansetron Injectable 4 milliGRAM(s) IV Push every 6 hours PRN  pantoprazole   Suspension 40 milliGRAM(s) Oral daily  sodium chloride 0.9% lock flush 3 milliLiter(s) IV Push every 8 hours    ALBUTerol    0.083% 2.5 milliGRAM(s) Nebulizer every 6 hours PRN  ALBUTerol    90 MICROgram(s) HFA Inhaler 2 Puff(s) Inhalation every 6 hours  ALBUTerol    90 MICROgram(s) HFA Inhaler 2 Puff(s) Inhalation every 8 hours  budesonide  80 MICROgram(s)/formoterol 4.5 MICROgram(s) Inhaler 2 Puff(s) Inhalation two times a day  chlorhexidine 2% Cloths 1 Application(s) Topical <User Schedule>  ferrous    sulfate Liquid 300 milliGRAM(s) Oral daily  insulin glargine Injectable (LANTUS) 25 Unit(s) SubCutaneous at bedtime  insulin lispro (ADMELOG) corrective regimen sliding scale   SubCutaneous three times a day before meals  meropenem  IVPB 1000 milliGRAM(s) IV Intermittent every 8 hours  metoprolol tartrate 25 milliGRAM(s) Oral two times a day  morphine  - Injectable 2 milliGRAM(s) IV Push once  mupirocin 2% Ointment 1 Application(s) Topical two times a day  ondansetron Injectable 4 milliGRAM(s) IV Push every 6 hours PRN  pantoprazole   Suspension 40 milliGRAM(s) Oral daily  sodium chloride 0.9% lock flush 3 milliLiter(s) IV Push every 8 hours    Drug Dosing Weight  Height (cm): 180.3 (02 Jul 2022 05:23)  Weight (kg): 107 (02 Jul 2022 09:10)  BMI (kg/m2): 32.9 (02 Jul 2022 09:10)  BSA (m2): 2.26 (02 Jul 2022 09:10)    CENTRAL LINE: [ ] YES [ ] NO  LOCATION:   DATE INSERTED:  REMOVE: [ ] YES [ ] NO  EXPLAIN:    RIBEIRO: [ ] YES [ ] NO    DATE INSERTED:  REMOVE:  [ ] YES [ ] NO  EXPLAIN:    PAST MEDICAL & SURGICAL HISTORY:  Lumbago      Lumbago with sciatica of right side      Benign hypertension  dx 10 years      H/O renal calculi  ESWL right side yrs ago  last stone one year ago  (passed on its own)      Obese      Eczema      Diabetes mellitus type II  on Meds 4/2012      Chronic atrial fibrillation      Colon cancer      S/P tonsillectomy      Port-A-Cath in place                  07-13 @ 07:01  -  07-14 @ 07:00  --------------------------------------------------------  IN: 200 mL / OUT: 42 mL / NET: 158 mL            PHYSICAL EXAM:    GENERAL: NAD, well-groomed, well-developed  HEAD:  Atraumatic, Normocephalic  EYES: EOMI, PERRLA, conjunctiva and sclera clear  ENMT: No tonsillar erythema, exudates, or enlargement; Moist mucous membranes, Good dentition, No lesions  NECK: Supple, No JVD, Normal thyroid  NERVOUS SYSTEM:  Alert & Oriented X3, Good concentration; Motor Strength 5/5 B/L upper and lower extremities; DTRs 2+ intact and symmetric  CHEST/LUNG: Clear to percussion bilaterally; No rales, rhonchi, wheezing, or rubs  HEART: Regular rate and rhythm; No murmurs, rubs, or gallops  ABDOMEN: Soft, Nontender, Nondistended; Bowel sounds present  EXTREMITIES:  2+ Peripheral Pulses, No clubbing, cyanosis, or edema  LYMPH: No lymphadenopathy noted  SKIN: No rashes or lesions      LABS:  CBC Full  -  ( 14 Jul 2022 09:07 )  WBC Count : 12.48 K/uL  RBC Count : 3.75 M/uL  Hemoglobin : 9.1 g/dL  Hematocrit : 31.7 %  Platelet Count - Automated : 446 K/uL  Mean Cell Volume : 84.5 fl  Mean Cell Hemoglobin : 24.3 pg  Mean Cell Hemoglobin Concentration : 28.7 gm/dL    07-14    140  |  102  |  9   ----------------------------<  179<H>  3.7   |  34<H>  |  0.60    Ca    8.4      14 Jul 2022 09:07  Phos  3.1     07-14  Mg     2.2     07-14    TPro  6.1  /  Alb  1.5<L>  /  TBili  0.2  /  DBili  x   /  AST  9<L>  /  ALT  24  /  AlkPhos  132<H>  07-14    PT/INR - ( 13 Jul 2022 08:03 )   PT: 15.1 sec;   INR: 1.27 ratio      [  ]  DVT Prophylaxis  [  ]  Nutrition, Brand, Rate         Goal Rate        Abnormal Nutritional Status -  Malnutrition   Cachexia      Morbid Obesity BMI >/=40    RADIOLOGY & ADDITIONAL STUDIES:  ***    Goals of Care Discussion with Family/Proxy/Other   - see note from/family meeting set up for...     MELIZA DAILEY    SCU progress note    INTERVAL HPI/OVERNIGHT EVENTS: seen at bedside, no overnight event. Chest tube output 40ml this am, placed CT to suction.     DNR [ ]   DNI  [  ] Full code [x]    Covid - 19 PCR: 7/10 negative    The 4Ms    What Matters Most: see GOC  Age appropriate Medications/Screen for High Risk Medication: Yes  Mentation: see CAM below  Mobility: defer to physical exam    The Confusion Assessment Method (CAM) Diagnostic Algorithm     1: Acute Onset or Fluctuating Course  - Is there evidence of an acute change in mental status from the patient’s baseline? Did the (abnormal) behavior  fluctuate during the day, that is, tend to come and go, or increase and decrease in severity?  [ ] YES [x ] NO     2: Inattention  - Did the patient have difficulty focusing attention, being easily distractible, or having difficulty keeping track of what was being said?  [ ] YES [ x] NO     3: Disorganized thinking  -Was the patient’s thinking disorganized or incoherent, such as rambling or irrelevant conversation, unclear or illogical flow of ideas, or unpredictable switching from subject to subject?  [ ] YES [x ] NO    4: Altered Level of consciousness?  [ ] YES [ x] NO    The diagnosis of delirium by CAM requires the presence of features 1 and 2 and either 3 or 4.    PRESSORS: [ ] YES [ x] NO  meropenem  IVPB 1000 milliGRAM(s) IV Intermittent every 8 hours    Cardiovascular:  Heart Failure  Acute   Acute on Chronic  Chronic       metoprolol tartrate 25 milliGRAM(s) Oral two times a day    Pulmonary:  ALBUTerol    0.083% 2.5 milliGRAM(s) Nebulizer every 6 hours PRN  ALBUTerol    90 MICROgram(s) HFA Inhaler 2 Puff(s) Inhalation every 6 hours  ALBUTerol    90 MICROgram(s) HFA Inhaler 2 Puff(s) Inhalation every 8 hours  budesonide  80 MICROgram(s)/formoterol 4.5 MICROgram(s) Inhaler 2 Puff(s) Inhalation two times a day    Hematalogic:    Other:  chlorhexidine 2% Cloths 1 Application(s) Topical <User Schedule>  ferrous    sulfate Liquid 300 milliGRAM(s) Oral daily  insulin glargine Injectable (LANTUS) 25 Unit(s) SubCutaneous at bedtime  insulin lispro (ADMELOG) corrective regimen sliding scale   SubCutaneous three times a day before meals  morphine  - Injectable 2 milliGRAM(s) IV Push once  mupirocin 2% Ointment 1 Application(s) Topical two times a day  ondansetron Injectable 4 milliGRAM(s) IV Push every 6 hours PRN  pantoprazole   Suspension 40 milliGRAM(s) Oral daily  sodium chloride 0.9% lock flush 3 milliLiter(s) IV Push every 8 hours    ALBUTerol    0.083% 2.5 milliGRAM(s) Nebulizer every 6 hours PRN  ALBUTerol    90 MICROgram(s) HFA Inhaler 2 Puff(s) Inhalation every 6 hours  ALBUTerol    90 MICROgram(s) HFA Inhaler 2 Puff(s) Inhalation every 8 hours  budesonide  80 MICROgram(s)/formoterol 4.5 MICROgram(s) Inhaler 2 Puff(s) Inhalation two times a day  chlorhexidine 2% Cloths 1 Application(s) Topical <User Schedule>  ferrous    sulfate Liquid 300 milliGRAM(s) Oral daily  insulin glargine Injectable (LANTUS) 25 Unit(s) SubCutaneous at bedtime  insulin lispro (ADMELOG) corrective regimen sliding scale   SubCutaneous three times a day before meals  meropenem  IVPB 1000 milliGRAM(s) IV Intermittent every 8 hours  metoprolol tartrate 25 milliGRAM(s) Oral two times a day  morphine  - Injectable 2 milliGRAM(s) IV Push once  mupirocin 2% Ointment 1 Application(s) Topical two times a day  ondansetron Injectable 4 milliGRAM(s) IV Push every 6 hours PRN  pantoprazole   Suspension 40 milliGRAM(s) Oral daily  sodium chloride 0.9% lock flush 3 milliLiter(s) IV Push every 8 hours    Drug Dosing Weight  Height (cm): 180.3 (02 Jul 2022 05:23)  Weight (kg): 107 (02 Jul 2022 09:10)  BMI (kg/m2): 32.9 (02 Jul 2022 09:10)  BSA (m2): 2.26 (02 Jul 2022 09:10)    CENTRAL LINE: [ ] YES [ ] NO  LOCATION:   DATE INSERTED:  REMOVE: [ ] YES [ ] NO  EXPLAIN:    RIBEIRO: [ ] YES [ ] NO    DATE INSERTED:  REMOVE:  [ ] YES [ ] NO  EXPLAIN:    PAST MEDICAL & SURGICAL HISTORY:  Lumbago      Lumbago with sciatica of right side      Benign hypertension  dx 10 years      H/O renal calculi  ESWL right side yrs ago  last stone one year ago  (passed on its own)      Obese      Eczema      Diabetes mellitus type II  on Meds 4/2012      Chronic atrial fibrillation      Colon cancer      S/P tonsillectomy      Port-A-Cath in place      07-13 @ 07:01  -  07-14 @ 07:00  --------------------------------------------------------  IN: 200 mL / OUT: 42 mL / NET: 158 mL    PHYSICAL EXAM:    GENERAL: NAD, ill apearing  HEAD:  Atraumatic, Normocephalic  EYES: EOMI, PERRLA, conjunctiva and sclera clear  ENMT: No tonsillar erythema, exudates  NECK: Supple, No JVD, tracheostomy stoma intact  NERVOUS SYSTEM:  Awake and alert. Follows commands. +Right sided weakness  CHEST/LUNG: + right chest tube in place to suction, diminished BS at bases b/l. Air entry fair   HEART: Regular rate and rhythm; No murmurs, rubs, or gallops  ABDOMEN: Soft, Nontender, Nondistended; Bowel sounds present  EXTREMITIES:  +Right sided weakness. 2+ Peripheral Pulses, No clubbing, cyanosis, or edema  LYMPH: No lymphadenopathy noted  SKIN: No rashes or lesions        LABS:  CBC Full  -  ( 14 Jul 2022 09:07 )  WBC Count : 12.48 K/uL  RBC Count : 3.75 M/uL  Hemoglobin : 9.1 g/dL  Hematocrit : 31.7 %  Platelet Count - Automated : 446 K/uL  Mean Cell Volume : 84.5 fl  Mean Cell Hemoglobin : 24.3 pg  Mean Cell Hemoglobin Concentration : 28.7 gm/dL    07-14    140  |  102  |  9   ----------------------------<  179<H>  3.7   |  34<H>  |  0.60    Ca    8.4      14 Jul 2022 09:07  Phos  3.1     07-14  Mg     2.2     07-14    TPro  6.1  /  Alb  1.5<L>  /  TBili  0.2  /  DBili  x   /  AST  9<L>  /  ALT  24  /  AlkPhos  132<H>  07-14    PT/INR - ( 13 Jul 2022 08:03 )   PT: 15.1 sec;   INR: 1.27 ratio      [  ]  DVT Prophylaxis  [  ]  Nutrition, Brand, Rate         Goal Rate        Abnormal Nutritional Status -  Malnutrition   Cachexia      Morbid Obesity BMI >/=40    RADIOLOGY & ADDITIONAL STUDIES:     < from: Xray Chest 1 View- PORTABLE-Urgent (Xray Chest 1 View- PORTABLE-Urgent .) (07.07.22 @ 10:58) >    ACC: [id number] EXAM:  XR CHEST PORTABLE URGENT 1V                          PROCEDURE DATE:  07/07/2022          INTERPRETATION:  Exam:XR CHEST URGENT    clinical history:Pleural effusion    Removal of endotracheal tube since July 5. Moderate to large right   pleural effusion stable. Left lung grossly clear.    IMPRESSION: Stable right pleural effusion    --- End of Report ---    ABDI VARELA MD; Attending Radiologist  This document has been electronically signed. Jul 9 2022 11:06AM    < end of copied text >    Goals of Care Discussion with Family/Proxy/Other   - see note from/family meeting set up for...     MELIZA DAILEY    SCU progress note    INTERVAL HPI/OVERNIGHT EVENTS: seen at bedside, no overnight event. Chest tube output 40ml this am, placed CT to suction.     DNR [ ]   DNI  [  ] Full code [x]    Covid - 19 PCR: 7/10 negative    The 4Ms    What Matters Most: see GOC  Age appropriate Medications/Screen for High Risk Medication: Yes  Mentation: see CAM below  Mobility: defer to physical exam    The Confusion Assessment Method (CAM) Diagnostic Algorithm     1: Acute Onset or Fluctuating Course  - Is there evidence of an acute change in mental status from the patient’s baseline? Did the (abnormal) behavior  fluctuate during the day, that is, tend to come and go, or increase and decrease in severity?  [ ] YES [x ] NO     2: Inattention  - Did the patient have difficulty focusing attention, being easily distractible, or having difficulty keeping track of what was being said?  [ ] YES [ x] NO     3: Disorganized thinking  -Was the patient’s thinking disorganized or incoherent, such as rambling or irrelevant conversation, unclear or illogical flow of ideas, or unpredictable switching from subject to subject?  [ ] YES [x ] NO    4: Altered Level of consciousness?  [ ] YES [ x] NO    The diagnosis of delirium by CAM requires the presence of features 1 and 2 and either 3 or 4.    PRESSORS: [ ] YES [ x] NO  meropenem  IVPB 1000 milliGRAM(s) IV Intermittent every 8 hours    Cardiovascular:  Heart Failure  Acute   Acute on Chronic  Chronic       metoprolol tartrate 25 milliGRAM(s) Oral two times a day    Pulmonary:  ALBUTerol    0.083% 2.5 milliGRAM(s) Nebulizer every 6 hours PRN  ALBUTerol    90 MICROgram(s) HFA Inhaler 2 Puff(s) Inhalation every 6 hours  ALBUTerol    90 MICROgram(s) HFA Inhaler 2 Puff(s) Inhalation every 8 hours  budesonide  80 MICROgram(s)/formoterol 4.5 MICROgram(s) Inhaler 2 Puff(s) Inhalation two times a day    Hematalogic:    Other:  chlorhexidine 2% Cloths 1 Application(s) Topical <User Schedule>  ferrous    sulfate Liquid 300 milliGRAM(s) Oral daily  insulin glargine Injectable (LANTUS) 25 Unit(s) SubCutaneous at bedtime  insulin lispro (ADMELOG) corrective regimen sliding scale   SubCutaneous three times a day before meals  morphine  - Injectable 2 milliGRAM(s) IV Push once  mupirocin 2% Ointment 1 Application(s) Topical two times a day  ondansetron Injectable 4 milliGRAM(s) IV Push every 6 hours PRN  pantoprazole   Suspension 40 milliGRAM(s) Oral daily  sodium chloride 0.9% lock flush 3 milliLiter(s) IV Push every 8 hours    ALBUTerol    0.083% 2.5 milliGRAM(s) Nebulizer every 6 hours PRN  ALBUTerol    90 MICROgram(s) HFA Inhaler 2 Puff(s) Inhalation every 6 hours  ALBUTerol    90 MICROgram(s) HFA Inhaler 2 Puff(s) Inhalation every 8 hours  budesonide  80 MICROgram(s)/formoterol 4.5 MICROgram(s) Inhaler 2 Puff(s) Inhalation two times a day  chlorhexidine 2% Cloths 1 Application(s) Topical <User Schedule>  ferrous    sulfate Liquid 300 milliGRAM(s) Oral daily  insulin glargine Injectable (LANTUS) 25 Unit(s) SubCutaneous at bedtime  insulin lispro (ADMELOG) corrective regimen sliding scale   SubCutaneous three times a day before meals  meropenem  IVPB 1000 milliGRAM(s) IV Intermittent every 8 hours  metoprolol tartrate 25 milliGRAM(s) Oral two times a day  morphine  - Injectable 2 milliGRAM(s) IV Push once  mupirocin 2% Ointment 1 Application(s) Topical two times a day  ondansetron Injectable 4 milliGRAM(s) IV Push every 6 hours PRN  pantoprazole   Suspension 40 milliGRAM(s) Oral daily  sodium chloride 0.9% lock flush 3 milliLiter(s) IV Push every 8 hours    Drug Dosing Weight  Height (cm): 180.3 (02 Jul 2022 05:23)  Weight (kg): 107 (02 Jul 2022 09:10)  BMI (kg/m2): 32.9 (02 Jul 2022 09:10)  BSA (m2): 2.26 (02 Jul 2022 09:10)    CENTRAL LINE: [ ] YES [ x] NO  LOCATION:   DATE INSERTED:  REMOVE: [ ] YES [ ] NO  EXPLAIN:    RIBEIRO: [ ] YES [x ] NO    DATE INSERTED:  REMOVE:  [ ] YES [ ] NO  EXPLAIN:    PAST MEDICAL & SURGICAL HISTORY:  Lumbago      Lumbago with sciatica of right side      Benign hypertension  dx 10 years      H/O renal calculi  ESWL right side yrs ago  last stone one year ago  (passed on its own)      Obese      Eczema      Diabetes mellitus type II  on Meds 4/2012      Chronic atrial fibrillation      Colon cancer      S/P tonsillectomy      Port-A-Cath in place      07-13 @ 07:01  -  07-14 @ 07:00  --------------------------------------------------------  IN: 200 mL / OUT: 42 mL / NET: 158 mL    PHYSICAL EXAM:    GENERAL: NAD, ill apearing  HEAD:  Atraumatic, Normocephalic  EYES: EOMI, PERRLA, conjunctiva and sclera clear  ENMT: No tonsillar erythema, exudates  NECK: Supple, No JVD, tracheostomy stoma intact  NERVOUS SYSTEM:  Awake and alert. Follows commands. +Right sided weakness  CHEST/LUNG: + right chest tube in place to suction, diminished BS at bases b/l. Air entry fair   HEART: Regular rate and rhythm; No murmurs, rubs, or gallops  ABDOMEN: Soft, Nontender, Nondistended; Bowel sounds present  EXTREMITIES:  +Right sided weakness. 2+ Peripheral Pulses, No clubbing, cyanosis, or edema  LYMPH: No lymphadenopathy noted  SKIN: No rashes or lesions        LABS:  CBC Full  -  ( 14 Jul 2022 09:07 )  WBC Count : 12.48 K/uL  RBC Count : 3.75 M/uL  Hemoglobin : 9.1 g/dL  Hematocrit : 31.7 %  Platelet Count - Automated : 446 K/uL  Mean Cell Volume : 84.5 fl  Mean Cell Hemoglobin : 24.3 pg  Mean Cell Hemoglobin Concentration : 28.7 gm/dL    07-14    140  |  102  |  9   ----------------------------<  179<H>  3.7   |  34<H>  |  0.60    Ca    8.4      14 Jul 2022 09:07  Phos  3.1     07-14  Mg     2.2     07-14    TPro  6.1  /  Alb  1.5<L>  /  TBili  0.2  /  DBili  x   /  AST  9<L>  /  ALT  24  /  AlkPhos  132<H>  07-14    PT/INR - ( 13 Jul 2022 08:03 )   PT: 15.1 sec;   INR: 1.27 ratio      [  ]  DVT Prophylaxis  [  ]  Nutrition, Brand, Rate         Goal Rate        Abnormal Nutritional Status -  Malnutrition   Cachexia      Morbid Obesity BMI >/=40    RADIOLOGY & ADDITIONAL STUDIES:     < from: Xray Chest 1 View- PORTABLE-Urgent (Xray Chest 1 View- PORTABLE-Urgent .) (07.07.22 @ 10:58) >    ACC: [id number] EXAM:  XR CHEST PORTABLE URGENT 1V                          PROCEDURE DATE:  07/07/2022          INTERPRETATION:  Exam:XR CHEST URGENT    clinical history:Pleural effusion    Removal of endotracheal tube since July 5. Moderate to large right   pleural effusion stable. Left lung grossly clear.    IMPRESSION: Stable right pleural effusion    --- End of Report ---    ABDI VARELA MD; Attending Radiologist  This document has been electronically signed. Jul 9 2022 11:06AM    < end of copied text >    Goals of Care Discussion with Family/Proxy/Other   - see note from/family update

## 2022-07-14 NOTE — PROGRESS NOTE ADULT - ASSESSMENT
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 COVID+   ICU Course:   Pt febrile, HR >90, lactate 2.6, UA+, CXR with pleural effusion/mucous plug vs consolidation, started on Unasyn (7/2). Started on mucomyst, albuterol, chest PT. Patient extubated to Nasal cannula on 7/6. Decadron discontinued per primary team as PNA deemed to be more bacterial then COVID. Eliquis held for possible thoracentesis. Held Bisoprolol/HCTZ for HTN. AMELIA improved with IVF. Lantus and sliding scale for DM, PPI for GI ppx. Started on soft and bite sized diet per S&S. Unsayn switched to Nerissa (7/7) for ESBL E. coli and Enteroccocus growing in urine, ID Jose Miguel on board. Patient was extubated on 7/6/22 and downgraded to SCU on 7/7.   SCU Course:  7/7/22 Patient was seen at bedside, remains comfortable on 2L NC, 98%. Evaluated PT and recommends Rehab.   7/8/22 -Ordered Chest PT and CT chest for re-evaluate pleural effusion.   spoke with wife and she agrees plan of care. Pending [facility] choice. CM/SW following.   7/9/22 - CT Chest completed, pending recs; Resumed Eliquis in AM 5mg BID; Received Chest PT; Oxygenation>90% on 2LNC;   7/10/22- Patient with right lung effusion and right bronchus secretions per CT. Plan for bronchoscopy and possible chest tube placement tomorrow.   7/11 Pulmonary unable to place chest tube. IR to place chest tube tomorrow.       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 COVID+   ICU Course:   Pt febrile, HR >90, lactate 2.6, UA+, CXR with pleural effusion/mucous plug vs consolidation, started on Unasyn (7/2). Started on mucomyst, albuterol, chest PT. Patient extubated to Nasal cannula on 7/6. Decadron discontinued per primary team as PNA deemed to be more bacterial then COVID. Eliquis held for possible thoracentesis. Held Bisoprolol/HCTZ for HTN. AMELIA improved with IVF. Lantus and sliding scale for DM, PPI for GI ppx. Started on soft and bite sized diet per S&S. Unsayn switched to Nerisas (7/7) for ESBL E. coli and Enteroccocus growing in urine, ID Jose Miguel on board. Patient was extubated on 7/6/22 and downgraded to SCU on 7/7.   SCU Course:  7/7/22 Patient was seen at bedside, remains comfortable on 2L NC, 98%. Evaluated PT and recommends Rehab.   7/8/22 -Ordered Chest PT and CT chest for re-evaluate pleural effusion.   spoke with wife and she agrees plan of care. Pending [facility] choice. CM/SW following.   7/9/22 - CT Chest completed, pending recs; Resumed Eliquis in AM 5mg BID; Received Chest PT; Oxygenation>90% on 2LNC;   7/10/22- Patient with right lung effusion and right bronchus secretions per CT. Plan for bronchoscopy and possible chest tube placement tomorrow.   7/11 Pulmonary unable to place chest tube. IR to follow up for chest tube placement.   7/13 placed chest tube by IR, fluid pathology sent  7/14 continue chest tube right with suction, monitor I/Os.  hold AC for 48hr post CT. will resume tomorrow.  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 COVID+   ICU Course:   Pt febrile, HR >90, lactate 2.6, UA+, CXR with pleural effusion/mucous plug vs consolidation, started on Unasyn (7/2). Started on mucomyst, albuterol, chest PT. Patient extubated to Nasal cannula on 7/6. Decadron discontinued per primary team as PNA deemed to be more bacterial then COVID. Eliquis held for possible thoracentesis. Held Bisoprolol/HCTZ for HTN. AMELIA improved with IVF. Lantus and sliding scale for DM, PPI for GI ppx. Started on soft and bite sized diet per S&S. Unsayn switched to Nerissa (7/7) for ESBL E. coli and Enteroccocus growing in urine, ID Jose Miguel on board. Patient was extubated on 7/6/22 and downgraded to SCU on 7/7.   SCU Course:  7/7/22 Patient was seen at bedside, remains comfortable on 2L NC, 98%. Evaluated PT and recommends Rehab.   7/8/22 -Ordered Chest PT and CT chest for re-evaluate pleural effusion.   spoke with wife and she agrees plan of care. Pending [facility] choice. CM/SW following.   7/9/22 - CT Chest completed, pending recs; Resumed Eliquis in AM 5mg BID; Received Chest PT; Oxygenation>90% on 2LNC;   7/10/22- Patient with right lung effusion and right bronchus secretions per CT. Plan for bronchoscopy and possible chest tube placement tomorrow.   7/11 Pulmonary unable to place chest tube. IR to follow up for chest tube placement.   7/13 placed chest tube by IR, fluid pathology sent  7/14 continue chest tube right with suction, monitor I/Os. recorded 42 ml this am with serous fluid. H/H stable.  hold AC for 48hr post CT. will resume tomorrow.

## 2022-07-14 NOTE — PROGRESS NOTE ADULT - PROBLEM SELECTOR PLAN 5
CHADSVASC: 5  -Eliquis on hold from 7/4 - 7/8; Restarted 7/9 am, 5mg BID; Discontinued in plan for procedure; Replaced anticoagulation with Heparin. Patient with history of life threatening bleeding with Lovenox- DO NOT GIVE LOVENOX per family/patient.  Continue metoprolol 25mg BID with parameters.  TTE 11/2021 with normal EF. CHADSVASC: 5  -Eliquis on hold from 7/4 - 7/8; Restarted 7/9 am, 5mg BID; Discontinued in plan for procedure; Replaced anticoagulation with Heparin. Patient with history of life threatening bleeding with Lovenox- DO NOT GIVE LOVENOX per family/patient.  Continue metoprolol 25mg BID with parameters.  TTE 11/2021 with normal EF.  will resume Eliquis tomorrow, hold 48hr post CT placement per IR.

## 2022-07-14 NOTE — PROGRESS NOTE ADULT - PROBLEM SELECTOR PLAN 2
No indication for decadron/remdesivir  CXR as above, infiltrate b/l lower lobes, pleural effusion R>L  s/p intubation and extubation 7/6.    saturating well on NC 2L  MRSA PCR +, treated with Chlorhexidine and mupirocin  Continue with mechanical chest vest. Encourage to lay on left side with right side up.  Continue with bronchodilators and ICS  CT with large pleural effusion on Right Lung as above. Pulmonary unable to place chest tube at bedside. IR will place chest tube today under CT guidance. . Hold heparin.  Serial CXRs. No indication for decadron/remdesivir  CXR as above, infiltrate b/l lower lobes, pleural effusion R>L  s/p intubation and extubation 7/6.    saturating well on NC 2L  MRSA PCR +, treated with Chlorhexidine and mupirocin  Continue with mechanical chest vest. Encourage to lay on left side with right side up.  Continue with bronchodilators and ICS  Serial CXRs.

## 2022-07-14 NOTE — PROGRESS NOTE ADULT - PROBLEM SELECTOR PLAN 3
Large right sided pleural effusion.  CXR and CT chest as above.  Scheduled for right sided chest tube placement today by IR.  Serial CXRs  Continue mechanical chest vest, bronchodilators and ICS as ordered.  Monitor oxygen saturation. Continue oxygen 2LPM  Will need oxygen testing before discharge. Large right sided pleural effusion.  Xray as above  `CT with large pleural effusion on Right Lung as above. Pulmonary unable to place chest tube at bedside.   IR placed chest tube 7/13 under CT guidance. will hold AC x 48hrs per IR reccs.   Follow up fluid pathology.   Serial CXRs  Continue mechanical chest vest, bronchodilators and ICS as ordered.  Monitor oxygen saturation. Continue oxygen 2LPM  Will need oxygen testing before discharge.

## 2022-07-14 NOTE — PROGRESS NOTE ADULT - PROBLEM SELECTOR PLAN 1
+Fever and elevated lactate upon admission.  Given 1L bolus in ED.  Bcx negative  Ucx +ESBL   Unasyn started 7/2, changed to Meropenem 7/7 for ESBL in urine. Today last day of meropenem as per ID.  -ID Dr. Gillespie. +Fever and elevated lactate upon admission.  Given 1L bolus in ED.  Bcx negative  Ucx +ESBL   Unasyn started 7/2, changed to Meropenem 7/7 for ESBL in urine. completed on 7/13 as per ID.  -ID Dr. Gillespie.

## 2022-07-15 LAB
ALBUMIN SERPL ELPH-MCNC: 1.5 G/DL — LOW (ref 3.5–5)
ALP SERPL-CCNC: 123 U/L — HIGH (ref 40–120)
ALT FLD-CCNC: 21 U/L DA — SIGNIFICANT CHANGE UP (ref 10–60)
ANION GAP SERPL CALC-SCNC: 4 MMOL/L — LOW (ref 5–17)
AST SERPL-CCNC: 8 U/L — LOW (ref 10–40)
BILIRUB SERPL-MCNC: 0.2 MG/DL — SIGNIFICANT CHANGE UP (ref 0.2–1.2)
BUN SERPL-MCNC: 7 MG/DL — SIGNIFICANT CHANGE UP (ref 7–18)
CALCIUM SERPL-MCNC: 8.6 MG/DL — SIGNIFICANT CHANGE UP (ref 8.4–10.5)
CHLORIDE SERPL-SCNC: 100 MMOL/L — SIGNIFICANT CHANGE UP (ref 96–108)
CO2 SERPL-SCNC: 36 MMOL/L — HIGH (ref 22–31)
CREAT SERPL-MCNC: 0.6 MG/DL — SIGNIFICANT CHANGE UP (ref 0.5–1.3)
EGFR: 106 ML/MIN/1.73M2 — SIGNIFICANT CHANGE UP
GLUCOSE BLDC GLUCOMTR-MCNC: 157 MG/DL — HIGH (ref 70–99)
GLUCOSE BLDC GLUCOMTR-MCNC: 243 MG/DL — HIGH (ref 70–99)
GLUCOSE BLDC GLUCOMTR-MCNC: 269 MG/DL — HIGH (ref 70–99)
GLUCOSE BLDC GLUCOMTR-MCNC: 350 MG/DL — HIGH (ref 70–99)
GLUCOSE SERPL-MCNC: 158 MG/DL — HIGH (ref 70–99)
HCT VFR BLD CALC: 32.1 % — LOW (ref 39–50)
HGB BLD-MCNC: 9.1 G/DL — LOW (ref 13–17)
MAGNESIUM SERPL-MCNC: 2.3 MG/DL — SIGNIFICANT CHANGE UP (ref 1.6–2.6)
MCHC RBC-ENTMCNC: 24.3 PG — LOW (ref 27–34)
MCHC RBC-ENTMCNC: 28.3 GM/DL — LOW (ref 32–36)
MCV RBC AUTO: 85.6 FL — SIGNIFICANT CHANGE UP (ref 80–100)
NON-GYNECOLOGICAL CYTOLOGY STUDY: SIGNIFICANT CHANGE UP
NRBC # BLD: 0 /100 WBCS — SIGNIFICANT CHANGE UP (ref 0–0)
PHOSPHATE SERPL-MCNC: 2.9 MG/DL — SIGNIFICANT CHANGE UP (ref 2.5–4.5)
PLATELET # BLD AUTO: 478 K/UL — HIGH (ref 150–400)
POTASSIUM SERPL-MCNC: 3.8 MMOL/L — SIGNIFICANT CHANGE UP (ref 3.5–5.3)
POTASSIUM SERPL-SCNC: 3.8 MMOL/L — SIGNIFICANT CHANGE UP (ref 3.5–5.3)
PROT SERPL-MCNC: 6.3 G/DL — SIGNIFICANT CHANGE UP (ref 6–8.3)
RBC # BLD: 3.75 M/UL — LOW (ref 4.2–5.8)
RBC # FLD: 20.1 % — HIGH (ref 10.3–14.5)
SODIUM SERPL-SCNC: 140 MMOL/L — SIGNIFICANT CHANGE UP (ref 135–145)
WBC # BLD: 11.46 K/UL — HIGH (ref 3.8–10.5)
WBC # FLD AUTO: 11.46 K/UL — HIGH (ref 3.8–10.5)

## 2022-07-15 PROCEDURE — 99498 ADVNCD CARE PLAN ADDL 30 MIN: CPT

## 2022-07-15 PROCEDURE — 99497 ADVNCD CARE PLAN 30 MIN: CPT

## 2022-07-15 PROCEDURE — 71045 X-RAY EXAM CHEST 1 VIEW: CPT | Mod: 26

## 2022-07-15 RX ORDER — APIXABAN 2.5 MG/1
5 TABLET, FILM COATED ORAL EVERY 12 HOURS
Refills: 0 | Status: DISCONTINUED | OUTPATIENT
Start: 2022-07-15 | End: 2022-07-17

## 2022-07-15 RX ADMIN — APIXABAN 5 MILLIGRAM(S): 2.5 TABLET, FILM COATED ORAL at 18:05

## 2022-07-15 RX ADMIN — ALBUTEROL 2 PUFF(S): 90 AEROSOL, METERED ORAL at 21:09

## 2022-07-15 RX ADMIN — SODIUM CHLORIDE 3 MILLILITER(S): 9 INJECTION INTRAMUSCULAR; INTRAVENOUS; SUBCUTANEOUS at 13:14

## 2022-07-15 RX ADMIN — CHLORHEXIDINE GLUCONATE 1 APPLICATION(S): 213 SOLUTION TOPICAL at 06:07

## 2022-07-15 RX ADMIN — PANTOPRAZOLE SODIUM 40 MILLIGRAM(S): 20 TABLET, DELAYED RELEASE ORAL at 13:13

## 2022-07-15 RX ADMIN — Medication 2: at 09:03

## 2022-07-15 RX ADMIN — Medication 6: at 12:10

## 2022-07-15 RX ADMIN — Medication 25 MILLIGRAM(S): at 06:07

## 2022-07-15 RX ADMIN — ALBUTEROL 2 PUFF(S): 90 AEROSOL, METERED ORAL at 13:11

## 2022-07-15 RX ADMIN — ALBUTEROL 2 PUFF(S): 90 AEROSOL, METERED ORAL at 06:08

## 2022-07-15 RX ADMIN — MEROPENEM 100 MILLIGRAM(S): 1 INJECTION INTRAVENOUS at 06:09

## 2022-07-15 RX ADMIN — INSULIN GLARGINE 25 UNIT(S): 100 INJECTION, SOLUTION SUBCUTANEOUS at 22:23

## 2022-07-15 RX ADMIN — Medication 25 MILLIGRAM(S): at 18:05

## 2022-07-15 RX ADMIN — Medication 8: at 16:59

## 2022-07-15 RX ADMIN — SODIUM CHLORIDE 3 MILLILITER(S): 9 INJECTION INTRAMUSCULAR; INTRAVENOUS; SUBCUTANEOUS at 06:06

## 2022-07-15 RX ADMIN — BUDESONIDE AND FORMOTEROL FUMARATE DIHYDRATE 2 PUFF(S): 160; 4.5 AEROSOL RESPIRATORY (INHALATION) at 09:06

## 2022-07-15 RX ADMIN — BUDESONIDE AND FORMOTEROL FUMARATE DIHYDRATE 2 PUFF(S): 160; 4.5 AEROSOL RESPIRATORY (INHALATION) at 21:09

## 2022-07-15 RX ADMIN — MUPIROCIN 1 APPLICATION(S): 20 OINTMENT TOPICAL at 06:07

## 2022-07-15 RX ADMIN — Medication 300 MILLIGRAM(S): at 13:13

## 2022-07-15 RX ADMIN — SODIUM CHLORIDE 3 MILLILITER(S): 9 INJECTION INTRAMUSCULAR; INTRAVENOUS; SUBCUTANEOUS at 21:08

## 2022-07-15 NOTE — CHART NOTE - NSCHARTNOTEFT_GEN_A_CORE
Discussed test results, treatment plan and prognosis with multiple family members.  Overall prognosis is poor.  Family requesting Home with hospice. They picked [facility].  SW aware. Referral sent.  Emotional support given.  Family requesting DNR/DNI  MOLST drafted as per family request.

## 2022-07-15 NOTE — GOALS OF CARE CONVERSATION - ADVANCED CARE PLANNING - CONVERSATION DETAILS
Discussed with wife, daughter and sister. Diagnosis, treatment plan and prognosis discussed in detail. Family requesting that patient be made a DNR/DNI  and be discharged home with hospice. Family does not want pt to suffer. They want him home and comfortable as possible. SW referral made  SW discussing with family about hospice. Patient will need oxygen. Has a hospital bed at home already. Will also need 12 hour aid SW aware.

## 2022-07-15 NOTE — PROGRESS NOTE ADULT - SUBJECTIVE AND OBJECTIVE BOX
MELIZA DAILEY    SCU progress note    INTERVAL HPI/OVERNIGHT EVENTS: ***No overnight events. Right chest tube intact. Chest tube output 15ml so far today.    DNR [ ]   DNI  [  ]  FULL CODE    Covid - 19 PCR: Negative 7/10    The 4Ms    What Matters Most: see GOC  Age appropriate Medications/Screen for High Risk Medication: Yes  Mentation: see CAM below  Mobility: defer to physical exam    The Confusion Assessment Method (CAM) Diagnostic Algorithm     1: Acute Onset or Fluctuating Course  - Is there evidence of an acute change in mental status from the patient’s baseline? Did the (abnormal) behavior  fluctuate during the day, that is, tend to come and go, or increase and decrease in severity?  [ ] YES [x ] NO     2: Inattention  - Did the patient have difficulty focusing attention, being easily distractible, or having difficulty keeping track of what was being said?  [ ] YES [x ] NO     3: Disorganized thinking  -Was the patient’s thinking disorganized or incoherent, such as rambling or irrelevant conversation, unclear or illogical flow of ideas, or unpredictable switching from subject to subject?  [ ] YES [x ] NO    4: Altered Level of consciousness?  [ ] YES [x ] NO    The diagnosis of delirium by CAM requires the presence of features 1 and 2 and either 3 or 4.    PRESSORS: [ ] YES [x ] NO  meropenem  IVPB 1000 milliGRAM(s) IV Intermittent every 8 hours    Cardiovascular:  Heart Failure  Acute   Acute on Chronic  Chronic       metoprolol tartrate 25 milliGRAM(s) Oral two times a day    Pulmonary:  ALBUTerol    0.083% 2.5 milliGRAM(s) Nebulizer every 6 hours PRN  ALBUTerol    90 MICROgram(s) HFA Inhaler 2 Puff(s) Inhalation every 8 hours  ALBUTerol    90 MICROgram(s) HFA Inhaler 2 Puff(s) Inhalation every 6 hours  budesonide  80 MICROgram(s)/formoterol 4.5 MICROgram(s) Inhaler 2 Puff(s) Inhalation two times a day    Hematalogic:  apixaban 5 milliGRAM(s) Oral every 12 hours    Other:  chlorhexidine 2% Cloths 1 Application(s) Topical <User Schedule>  ferrous    sulfate Liquid 300 milliGRAM(s) Oral daily  insulin glargine Injectable (LANTUS) 25 Unit(s) SubCutaneous at bedtime  insulin lispro (ADMELOG) corrective regimen sliding scale   SubCutaneous three times a day before meals  morphine  - Injectable 2 milliGRAM(s) IV Push once  mupirocin 2% Ointment 1 Application(s) Topical two times a day  ondansetron Injectable 4 milliGRAM(s) IV Push every 6 hours PRN  pantoprazole   Suspension 40 milliGRAM(s) Oral daily  sodium chloride 0.9% lock flush 3 milliLiter(s) IV Push every 8 hours    ALBUTerol    0.083% 2.5 milliGRAM(s) Nebulizer every 6 hours PRN  ALBUTerol    90 MICROgram(s) HFA Inhaler 2 Puff(s) Inhalation every 8 hours  ALBUTerol    90 MICROgram(s) HFA Inhaler 2 Puff(s) Inhalation every 6 hours  apixaban 5 milliGRAM(s) Oral every 12 hours  budesonide  80 MICROgram(s)/formoterol 4.5 MICROgram(s) Inhaler 2 Puff(s) Inhalation two times a day  chlorhexidine 2% Cloths 1 Application(s) Topical <User Schedule>  ferrous    sulfate Liquid 300 milliGRAM(s) Oral daily  insulin glargine Injectable (LANTUS) 25 Unit(s) SubCutaneous at bedtime  insulin lispro (ADMELOG) corrective regimen sliding scale   SubCutaneous three times a day before meals  meropenem  IVPB 1000 milliGRAM(s) IV Intermittent every 8 hours  metoprolol tartrate 25 milliGRAM(s) Oral two times a day  morphine  - Injectable 2 milliGRAM(s) IV Push once  mupirocin 2% Ointment 1 Application(s) Topical two times a day  ondansetron Injectable 4 milliGRAM(s) IV Push every 6 hours PRN  pantoprazole   Suspension 40 milliGRAM(s) Oral daily  sodium chloride 0.9% lock flush 3 milliLiter(s) IV Push every 8 hours    Drug Dosing Weight  Height (cm): 180.3 (02 Jul 2022 05:23)  Weight (kg): 107 (02 Jul 2022 09:10)  BMI (kg/m2): 32.9 (02 Jul 2022 09:10)  BSA (m2): 2.26 (02 Jul 2022 09:10)    CENTRAL LINE: [ ] YES [x ] NO  LOCATION:   DATE INSERTED:  REMOVE: [ ] YES [ ] NO  EXPLAIN:    RIBEIRO: [ ] YES [ ] NO    DATE INSERTED:  REMOVE:  [ ] YES [ ] NO  EXPLAIN:    PAST MEDICAL & SURGICAL HISTORY:  Lumbago      Lumbago with sciatica of right side      Benign hypertension  dx 10 years      H/O renal calculi  ESWL right side yrs ago  last stone one year ago  (passed on its own)      Obese      Eczema      Diabetes mellitus type II  on Meds 4/2012      Chronic atrial fibrillation      Colon cancer      S/P tonsillectomy      Port-A-Cath in place                  07-14 @ 07:01  -  07-15 @ 07:00  --------------------------------------------------------  IN: 0 mL / OUT: 15 mL / NET: -15 mL            PHYSICAL EXAM:    GENERAL: NAD  HEAD:  Atraumatic, Normocephalic  EYES: EOMI, PERRLA, conjunctiva and sclera clear  ENMT: No tonsillar erythema, exudates  NECK: Supple, No JVD  NERVOUS SYSTEM:  Alert & Oriented X3, Follows commands, moving all extremities +Right sided weakness  CHEST/LUNG: +Right sided chest tube. Diffusely diminished breath sounds right side. Diminished breath sounds left base.  HEART: Regular rate and rhythm; No murmurs, rubs, or gallops  ABDOMEN: Soft, Nontender, Nondistended; Bowel sounds present  EXTREMITIES:  2+ Peripheral Pulses, No clubbing, cyanosis, or edema  LYMPH: No lymphadenopathy noted  SKIN: No rashes or lesions      LABS:  CBC Full  -  ( 15 Jul 2022 09:05 )  WBC Count : 11.46 K/uL  RBC Count : 3.75 M/uL  Hemoglobin : 9.1 g/dL  Hematocrit : 32.1 %  Platelet Count - Automated : 478 K/uL  Mean Cell Volume : 85.6 fl  Mean Cell Hemoglobin : 24.3 pg  Mean Cell Hemoglobin Concentration : 28.3 gm/dL  Auto Neutrophil # : x  Auto Lymphocyte # : x  Auto Monocyte # : x  Auto Eosinophil # : x  Auto Basophil # : x  Auto Neutrophil % : x  Auto Lymphocyte % : x  Auto Monocyte % : x  Auto Eosinophil % : x  Auto Basophil % : x    07-14    140  |  102  |  9   ----------------------------<  179<H>  3.7   |  34<H>  |  0.60    Ca    8.4      14 Jul 2022 09:07  Phos  3.1     07-14  Mg     2.2     07-14    TPro  6.1  /  Alb  1.5<L>  /  TBili  0.2  /  DBili  x   /  AST  9<L>  /  ALT  24  /  AlkPhos  132<H>  07-14              [  ]  DVT Prophylaxis  [  ]  Nutrition, Brand, Rate         Goal Rate        Abnormal Nutritional Status -  Malnutrition   Cachexia          RADIOLOGY & ADDITIONAL STUDIES:  ***  < from: CT Chest No Cont (07.09.22 @ 09:47) >  *** ADDENDUM***    These findings were discussed with SONALI Jeffries, on 7/9/2022 12:29 PM with   read back.    --- End of Report ---    *** END OF ADDENDUM***      PROCEDURE DATE:  07/09/2022          INTERPRETATION:  INDICATION: pleural effusion, history of rectal cancer.  TECHNIQUE: Unenhanced CT of the chest. Coronal, sagittal, and MIP images   were reconstructed and reviewed.  COMPARISON: 7/10/2021 chest CT.    FINDINGS:    AIRWAYS, LUNGS and PLEURA: The right main bronchus and its lobar branches   are obliterated by retained secretions. Large loculated right pleural   effusion. Complete atelectasis of right lung. Trace left pleural effusion   and interlobular septal thickening likely edema. Patchy opacities within   lingula and left lower lobe may represent edema or infection.    MEDIASTINUM AND RALPH: The right hilum and right paratracheal region are   poorly evaluated without contrast. Mildly enlarged subcarinal lymph node   measuring 1.5 cm in short axis, new from prior.    HEART AND VESSELS: Cardiomegaly. Coronary calcifications. No pericardial   effusion. Thoracic aorta and pulmonary artery normal in diameter. Tip of   the Mediport catheter within SVC.    VISUALIZED UPPER ABDOMEN: Bilateral renal cysts.    CHEST WALL AND BONES: No aggressive osseous lesion.    LOWER NECK: Within normal limits.    IMPRESSION:    The right main bronchus and its lobar branches are obliterated by   retained secretions. Large loculated rightpleural effusion. Complete   atelectasis of right lung.    Trace left pleural effusion and interlobular septal thickening likely   edema. Patchy opacities within lingula and left lower lobe may represent   edema or infection      < end of copied text >    Goals of Care Discussion with Family/Proxy/Other   - see note from 7/09

## 2022-07-15 NOTE — PROGRESS NOTE ADULT - ASSESSMENT
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 COVID+   ICU Course:   Pt febrile, HR >90, lactate 2.6, UA+, CXR with pleural effusion/mucous plug vs consolidation, started on Unasyn (7/2). Started on mucomyst, albuterol, chest PT. Patient extubated to Nasal cannula on 7/6. Decadron discontinued per primary team as PNA deemed to be more bacterial then COVID. Eliquis held for possible thoracentesis. Held Bisoprolol/HCTZ for HTN. AMELIA improved with IVF. Lantus and sliding scale for DM, PPI for GI ppx. Started on soft and bite sized diet per S&S. Unsayn switched to Nerissa (7/7) for ESBL E. coli and Enteroccocus growing in urine, ID Jose Miguel on board. Patient was extubated on 7/6/22 and downgraded to SCU on 7/7.   SCU Course:  7/7/22 Patient was seen at bedside, remains comfortable on 2L NC, 98%. Evaluated PT and recommends Rehab.   7/8/22 -Ordered Chest PT and CT chest for re-evaluate pleural effusion.   spoke with wife and she agrees plan of care. Pending [facility] choice. CM/SW following.   7/9/22 - CT Chest completed, pending recs; Resumed Eliquis in AM 5mg BID; Received Chest PT; Oxygenation>90% on 2LNC;   7/10/22- Patient with right lung effusion and right bronchus secretions per CT. Plan for bronchoscopy and possible chest tube placement tomorrow.   7/11 Pulmonary unable to place chest tube. IR to follow up for chest tube placement.   7/13 placed chest tube by IR, fluid pathology sent  7/14 continue chest tube right with suction, monitor I/Os. recorded 42 ml this am with serous fluid. H/H stable.  hold AC for 48hr post CT. will resume tomorrow.

## 2022-07-15 NOTE — PROGRESS NOTE ADULT - PROBLEM SELECTOR PLAN 3
Multiple loculated effusions right chest.  S/P CT guided chest tube 7/13  CXR shows near complete atelectasis of right lung.  Position on left side with right side up.  Serial CXR  Chest PT every 4 hours  F/U pleural fluid pathology. Likely exudative. Will readdress GOC with family when they arrive.  Will need oxygen testing before discharge.

## 2022-07-15 NOTE — CHART NOTE - NSCHARTNOTEFT_GEN_A_CORE
Assessment:   67yMalePatient is a 67y old  Male who presents with a chief complaint of AHRF, Sepsis (15 Jul 2022 09:16)      Factors impacting intake: [ ] none [ ] nausea  [ ] vomiting [ ] diarrhea [ ] constipation  [ ]chewing problems [ ] swallowing issues  [x ] other: patient is now comfort measures. no nutrition intervention is warranted at this time. Will follow as needed.     Diet Presciption:   Intake:     Current Weight:   % Weight Change    Pertinent Medications: MEDICATIONS  (STANDING):  ALBUTerol    90 MICROgram(s) HFA Inhaler 2 Puff(s) Inhalation every 6 hours  ALBUTerol    90 MICROgram(s) HFA Inhaler 2 Puff(s) Inhalation every 8 hours  apixaban 5 milliGRAM(s) Oral every 12 hours  budesonide  80 MICROgram(s)/formoterol 4.5 MICROgram(s) Inhaler 2 Puff(s) Inhalation two times a day  chlorhexidine 2% Cloths 1 Application(s) Topical <User Schedule>  ferrous    sulfate Liquid 300 milliGRAM(s) Oral daily  insulin glargine Injectable (LANTUS) 25 Unit(s) SubCutaneous at bedtime  insulin lispro (ADMELOG) corrective regimen sliding scale   SubCutaneous three times a day before meals  metoprolol tartrate 25 milliGRAM(s) Oral two times a day  morphine  - Injectable 2 milliGRAM(s) IV Push once  mupirocin 2% Ointment 1 Application(s) Topical two times a day  pantoprazole   Suspension 40 milliGRAM(s) Oral daily  sodium chloride 0.9% lock flush 3 milliLiter(s) IV Push every 8 hours    MEDICATIONS  (PRN):  ALBUTerol    0.083% 2.5 milliGRAM(s) Nebulizer every 6 hours PRN w/ mucomyst  ondansetron Injectable 4 milliGRAM(s) IV Push every 6 hours PRN Nausea and/or Vomiting    Pertinent Labs: 07-15 Na140 mmol/L Glu 158 mg/dL<H> K+ 3.8 mmol/L Cr  0.60 mg/dL BUN 7 mg/dL 07-15 Phos 2.9 mg/dL 07-15 Alb 1.5 g/dL<L> 07-14 Chol 107 mg/dL LDL --    HDL 24 mg/dL<L> Trig 112 mg/dL     CAPILLARY BLOOD GLUCOSE      POCT Blood Glucose.: 269 mg/dL (15 Jul 2022 12:01)  POCT Blood Glucose.: 157 mg/dL (15 Jul 2022 08:02)  POCT Blood Glucose.: 186 mg/dL (14 Jul 2022 23:15)  POCT Blood Glucose.: 161 mg/dL (14 Jul 2022 21:49)    Skin:     Estimated Needs:   [ ] no change since previous assessment  [ ] recalculated:     Previous Nutrition Diagnosis:   [ ] Inadequate Energy Intake [ ]Inadequate Oral Intake [ ] Excessive Energy Intake   [ ] Underweight [ ] Increased Nutrient Needs [ ] Overweight/Obesity   [ ] Altered GI Function [ ] Unintended Weight Loss [ ] Food & Nutrition Related Knowledge Deficit [ ] Malnutrition     Nutrition Diagnosis is [ ] ongoing  [ ] resolved [ ] not applicable     New Nutrition Diagnosis: [ ] not applicable       Interventions:   Recommend  [ ] Change Diet To:  [ ] Nutrition Supplement  [ ] Nutrition Support  [ ] Other:     Monitoring and Evaluation:   [ ] PO intake [ x ] Tolerance to diet prescription [ x ] weights [ x ] labs[ x ] follow up per protocol  [ ] other:

## 2022-07-15 NOTE — PROGRESS NOTE ADULT - PROBLEM SELECTOR PLAN 12
Family meeting held. Discussed patient's poor prognosis related to Total atelectasis right lung, multiple loculated pleural effusions, CVA (11/21) and cancer. Family understands prognosis is poor and has requested that pt be discharged with home hospice. SHERRILL discussed wife and daughter Hafsa decided to make pt DNR/DNI and have picked [facility] as provider. Pt has hospital bed at home. Will need 2LPM nasal canula.  Oxygen saturation RA at rest 87% Oxygen saturation on 2LPM 96%.  Family needs to sign hospice consents.

## 2022-07-15 NOTE — PROGRESS NOTE ADULT - PROBLEM SELECTOR PLAN 11
Eliquis restarted. Continue GI prophylaxis  S/P chest tube placement 7/13 by IR  F/U fluid studies. Likely exudative. Overall prognosis poor.  Will address GOC with family when they arrive at hospital  For now patient remains a FULL CODE. Eliquis restarted. Continue GI prophylaxis  S/P chest tube placement 7/13 by IR  F/U fluid studies. Likely exudative. Overall prognosis poor.  Will address GOC with family when they arrive at hospital

## 2022-07-15 NOTE — PROGRESS NOTE ADULT - NUTRITIONAL ASSESSMENT
Severe Protein calorie malnutrition  Albumin 1.5 Severe Protein calorie malnutrition  Protein 6.3   Albumin 1.5

## 2022-07-15 NOTE — PROGRESS NOTE ADULT - PROBLEM SELECTOR PLAN 1
+Fever and elevated lactate upon admission.  Given 1L bolus in ED.  Bcx negative  Ucx +ESBL   Unasyn started 7/2, changed to Meropenem 7/7 for ESBL in urine. completed on 7/13 as per ID.  ID Dr. Gillespie.  Continue oxygen supplementation/ currently 2LPM. Monitor saturation.

## 2022-07-15 NOTE — PROGRESS NOTE ADULT - PROBLEM SELECTOR PLAN 5
Ventricular rate controlled.  CHADSVASC: 5  Eliquis restarted. Patient with history of life threatening bleeding with Lovenox- DO NOT GIVE LOVENOX per family/patient.  Continue metoprolol 25mg BID  TTE 11/2021 with normal EF.

## 2022-07-16 LAB
ALBUMIN SERPL ELPH-MCNC: 1.6 G/DL — LOW (ref 3.5–5)
ALP SERPL-CCNC: 127 U/L — HIGH (ref 40–120)
ALT FLD-CCNC: 24 U/L DA — SIGNIFICANT CHANGE UP (ref 10–60)
ANION GAP SERPL CALC-SCNC: 5 MMOL/L — SIGNIFICANT CHANGE UP (ref 5–17)
AST SERPL-CCNC: 16 U/L — SIGNIFICANT CHANGE UP (ref 10–40)
BILIRUB SERPL-MCNC: 0.2 MG/DL — SIGNIFICANT CHANGE UP (ref 0.2–1.2)
BUN SERPL-MCNC: 8 MG/DL — SIGNIFICANT CHANGE UP (ref 7–18)
CALCIUM SERPL-MCNC: 8.6 MG/DL — SIGNIFICANT CHANGE UP (ref 8.4–10.5)
CHLORIDE SERPL-SCNC: 100 MMOL/L — SIGNIFICANT CHANGE UP (ref 96–108)
CO2 SERPL-SCNC: 36 MMOL/L — HIGH (ref 22–31)
CREAT SERPL-MCNC: 0.62 MG/DL — SIGNIFICANT CHANGE UP (ref 0.5–1.3)
EGFR: 105 ML/MIN/1.73M2 — SIGNIFICANT CHANGE UP
GLUCOSE BLDC GLUCOMTR-MCNC: 171 MG/DL — HIGH (ref 70–99)
GLUCOSE BLDC GLUCOMTR-MCNC: 176 MG/DL — HIGH (ref 70–99)
GLUCOSE BLDC GLUCOMTR-MCNC: 241 MG/DL — HIGH (ref 70–99)
GLUCOSE BLDC GLUCOMTR-MCNC: 367 MG/DL — HIGH (ref 70–99)
GLUCOSE SERPL-MCNC: 161 MG/DL — HIGH (ref 70–99)
HCT VFR BLD CALC: 30 % — LOW (ref 39–50)
HGB BLD-MCNC: 8.8 G/DL — LOW (ref 13–17)
MAGNESIUM SERPL-MCNC: 2.3 MG/DL — SIGNIFICANT CHANGE UP (ref 1.6–2.6)
MCHC RBC-ENTMCNC: 24.4 PG — LOW (ref 27–34)
MCHC RBC-ENTMCNC: 29.3 GM/DL — LOW (ref 32–36)
MCV RBC AUTO: 83.1 FL — SIGNIFICANT CHANGE UP (ref 80–100)
NRBC # BLD: 0 /100 WBCS — SIGNIFICANT CHANGE UP (ref 0–0)
PHOSPHATE SERPL-MCNC: 3.4 MG/DL — SIGNIFICANT CHANGE UP (ref 2.5–4.5)
PLATELET # BLD AUTO: 434 K/UL — HIGH (ref 150–400)
POTASSIUM SERPL-MCNC: 4 MMOL/L — SIGNIFICANT CHANGE UP (ref 3.5–5.3)
POTASSIUM SERPL-SCNC: 4 MMOL/L — SIGNIFICANT CHANGE UP (ref 3.5–5.3)
PROT SERPL-MCNC: 6.3 G/DL — SIGNIFICANT CHANGE UP (ref 6–8.3)
RBC # BLD: 3.61 M/UL — LOW (ref 4.2–5.8)
RBC # FLD: 19.9 % — HIGH (ref 10.3–14.5)
SARS-COV-2 RNA SPEC QL NAA+PROBE: SIGNIFICANT CHANGE UP
SODIUM SERPL-SCNC: 141 MMOL/L — SIGNIFICANT CHANGE UP (ref 135–145)
WBC # BLD: 11.13 K/UL — HIGH (ref 3.8–10.5)
WBC # FLD AUTO: 11.13 K/UL — HIGH (ref 3.8–10.5)

## 2022-07-16 PROCEDURE — 71045 X-RAY EXAM CHEST 1 VIEW: CPT | Mod: 26

## 2022-07-16 RX ORDER — DEXTROSE 50 % IN WATER 50 %
12.5 SYRINGE (ML) INTRAVENOUS ONCE
Refills: 0 | Status: DISCONTINUED | OUTPATIENT
Start: 2022-07-16 | End: 2022-07-19

## 2022-07-16 RX ORDER — SODIUM CHLORIDE 9 MG/ML
1000 INJECTION, SOLUTION INTRAVENOUS
Refills: 0 | Status: DISCONTINUED | OUTPATIENT
Start: 2022-07-16 | End: 2022-07-19

## 2022-07-16 RX ORDER — DEXTROSE 50 % IN WATER 50 %
25 SYRINGE (ML) INTRAVENOUS ONCE
Refills: 0 | Status: DISCONTINUED | OUTPATIENT
Start: 2022-07-16 | End: 2022-07-19

## 2022-07-16 RX ORDER — INSULIN LISPRO 100/ML
VIAL (ML) SUBCUTANEOUS AT BEDTIME
Refills: 0 | Status: DISCONTINUED | OUTPATIENT
Start: 2022-07-16 | End: 2022-07-19

## 2022-07-16 RX ORDER — INSULIN LISPRO 100/ML
4 VIAL (ML) SUBCUTANEOUS ONCE
Refills: 0 | Status: COMPLETED | OUTPATIENT
Start: 2022-07-16 | End: 2022-07-16

## 2022-07-16 RX ORDER — INSULIN LISPRO 100/ML
4 VIAL (ML) SUBCUTANEOUS
Refills: 0 | Status: DISCONTINUED | OUTPATIENT
Start: 2022-07-16 | End: 2022-07-19

## 2022-07-16 RX ORDER — GLUCAGON INJECTION, SOLUTION 0.5 MG/.1ML
1 INJECTION, SOLUTION SUBCUTANEOUS ONCE
Refills: 0 | Status: DISCONTINUED | OUTPATIENT
Start: 2022-07-16 | End: 2022-07-19

## 2022-07-16 RX ORDER — DEXTROSE 50 % IN WATER 50 %
15 SYRINGE (ML) INTRAVENOUS ONCE
Refills: 0 | Status: DISCONTINUED | OUTPATIENT
Start: 2022-07-16 | End: 2022-07-19

## 2022-07-16 RX ADMIN — Medication 10: at 17:35

## 2022-07-16 RX ADMIN — SODIUM CHLORIDE 3 MILLILITER(S): 9 INJECTION INTRAMUSCULAR; INTRAVENOUS; SUBCUTANEOUS at 22:36

## 2022-07-16 RX ADMIN — ALBUTEROL 2 PUFF(S): 90 AEROSOL, METERED ORAL at 05:27

## 2022-07-16 RX ADMIN — ALBUTEROL 2 PUFF(S): 90 AEROSOL, METERED ORAL at 13:52

## 2022-07-16 RX ADMIN — APIXABAN 5 MILLIGRAM(S): 2.5 TABLET, FILM COATED ORAL at 17:36

## 2022-07-16 RX ADMIN — SODIUM CHLORIDE 3 MILLILITER(S): 9 INJECTION INTRAMUSCULAR; INTRAVENOUS; SUBCUTANEOUS at 05:25

## 2022-07-16 RX ADMIN — Medication 2: at 08:50

## 2022-07-16 RX ADMIN — PANTOPRAZOLE SODIUM 40 MILLIGRAM(S): 20 TABLET, DELAYED RELEASE ORAL at 11:22

## 2022-07-16 RX ADMIN — SODIUM CHLORIDE 3 MILLILITER(S): 9 INJECTION INTRAMUSCULAR; INTRAVENOUS; SUBCUTANEOUS at 13:51

## 2022-07-16 RX ADMIN — INSULIN GLARGINE 25 UNIT(S): 100 INJECTION, SOLUTION SUBCUTANEOUS at 22:33

## 2022-07-16 RX ADMIN — CHLORHEXIDINE GLUCONATE 1 APPLICATION(S): 213 SOLUTION TOPICAL at 05:27

## 2022-07-16 RX ADMIN — Medication 25 MILLIGRAM(S): at 17:36

## 2022-07-16 RX ADMIN — ALBUTEROL 2 PUFF(S): 90 AEROSOL, METERED ORAL at 22:33

## 2022-07-16 RX ADMIN — BUDESONIDE AND FORMOTEROL FUMARATE DIHYDRATE 2 PUFF(S): 160; 4.5 AEROSOL RESPIRATORY (INHALATION) at 11:02

## 2022-07-16 RX ADMIN — Medication 25 MILLIGRAM(S): at 05:26

## 2022-07-16 RX ADMIN — Medication 300 MILLIGRAM(S): at 11:22

## 2022-07-16 RX ADMIN — APIXABAN 5 MILLIGRAM(S): 2.5 TABLET, FILM COATED ORAL at 05:27

## 2022-07-16 RX ADMIN — BUDESONIDE AND FORMOTEROL FUMARATE DIHYDRATE 2 PUFF(S): 160; 4.5 AEROSOL RESPIRATORY (INHALATION) at 22:35

## 2022-07-16 RX ADMIN — Medication 4 UNIT(S): at 17:36

## 2022-07-16 RX ADMIN — Medication 4: at 12:28

## 2022-07-16 NOTE — PROGRESS NOTE ADULT - PROBLEM SELECTOR PLAN 1
Resolved.   BC NGTD   UC grew ESBL  Completed course of antibiotic.   ID Dr. Gillespie  Continue supplemental oxygen .  Monitor oxygenation. Tolerating O2 2L NC, oxygenating 99%

## 2022-07-16 NOTE — PROGRESS NOTE ADULT - PROBLEM SELECTOR PLAN 12
F/u pleural studies. Likely exudative.   Chest tube to water seal. F/u CXR in am.   Overall prognosis poor.   Pt is DNR/DNI   Family requests home hospice.

## 2022-07-16 NOTE — PROGRESS NOTE ADULT - SUBJECTIVE AND OBJECTIVE BOX
MELIZA DAILEY    SCU progress note    INTERVAL HPI/OVERNIGHT EVENTS: ***No acute events overnight.     DNR [x ]   DNI  [ x ]    Covid - 19 PCR: COVID-19 PCR: NotDetec (16 Jul 2022 07:05)  COVID-19 PCR: NotDetec (10 Jul 2022 09:51)  COVID-19 PCR: NotDetec (09 Jul 2022 11:10)      The 4Ms    What Matters Most: see GOC  Age appropriate Medications/Screen for High Risk Medication: Yes  Mentation: see CAM below  Mobility: defer to physical exam    The Confusion Assessment Method (CAM) Diagnostic Algorithm     1: Acute Onset or Fluctuating Course  - Is there evidence of an acute change in mental status from the patient’s baseline? Did the (abnormal) behavior  fluctuate during the day, that is, tend to come and go, or increase and decrease in severity?  [ ] YES [ x] NO     2: Inattention  - Did the patient have difficulty focusing attention, being easily distractible, or having difficulty keeping track of what was being said?  [ ] YES [ x] NO     3: Disorganized thinking  -Was the patient’s thinking disorganized or incoherent, such as rambling or irrelevant conversation, unclear or illogical flow of ideas, or unpredictable switching from subject to subject?  [ ] YES [x ] NO    4: Altered Level of consciousness?  [ ] YES [x ] NO    The diagnosis of delirium by CAM requires the presence of features 1 and 2 and either 3 or 4.    PRESSORS: [ ] YES [x ] NO    Cardiovascular:  11/2021 TTE : EF 55%   metoprolol tartrate 25 milliGRAM(s) Oral two times a day    Pulmonary:  ALBUTerol    0.083% 2.5 milliGRAM(s) Nebulizer every 6 hours PRN  ALBUTerol    90 MICROgram(s) HFA Inhaler 2 Puff(s) Inhalation every 6 hours  ALBUTerol    90 MICROgram(s) HFA Inhaler 2 Puff(s) Inhalation every 8 hours  budesonide  80 MICROgram(s)/formoterol 4.5 MICROgram(s) Inhaler 2 Puff(s) Inhalation two times a day    Hematalogic:  apixaban 5 milliGRAM(s) Oral every 12 hours    Other:  chlorhexidine 2% Cloths 1 Application(s) Topical <User Schedule>  ferrous    sulfate Liquid 300 milliGRAM(s) Oral daily  insulin glargine Injectable (LANTUS) 25 Unit(s) SubCutaneous at bedtime  insulin lispro (ADMELOG) corrective regimen sliding scale   SubCutaneous three times a day before meals  morphine  - Injectable 2 milliGRAM(s) IV Push once  ondansetron Injectable 4 milliGRAM(s) IV Push every 6 hours PRN  pantoprazole   Suspension 40 milliGRAM(s) Oral daily  sodium chloride 0.9% lock flush 3 milliLiter(s) IV Push every 8 hours    ALBUTerol    0.083% 2.5 milliGRAM(s) Nebulizer every 6 hours PRN  ALBUTerol    90 MICROgram(s) HFA Inhaler 2 Puff(s) Inhalation every 6 hours  ALBUTerol    90 MICROgram(s) HFA Inhaler 2 Puff(s) Inhalation every 8 hours  apixaban 5 milliGRAM(s) Oral every 12 hours  budesonide  80 MICROgram(s)/formoterol 4.5 MICROgram(s) Inhaler 2 Puff(s) Inhalation two times a day  chlorhexidine 2% Cloths 1 Application(s) Topical <User Schedule>  ferrous    sulfate Liquid 300 milliGRAM(s) Oral daily  insulin glargine Injectable (LANTUS) 25 Unit(s) SubCutaneous at bedtime  insulin lispro (ADMELOG) corrective regimen sliding scale   SubCutaneous three times a day before meals  metoprolol tartrate 25 milliGRAM(s) Oral two times a day  morphine  - Injectable 2 milliGRAM(s) IV Push once  ondansetron Injectable 4 milliGRAM(s) IV Push every 6 hours PRN  pantoprazole   Suspension 40 milliGRAM(s) Oral daily  sodium chloride 0.9% lock flush 3 milliLiter(s) IV Push every 8 hours    Drug Dosing Weight  Height (cm): 180.3 (02 Jul 2022 05:23)  Weight (kg): 107 (02 Jul 2022 09:10)  BMI (kg/m2): 32.9 (02 Jul 2022 09:10)  BSA (m2): 2.26 (02 Jul 2022 09:10)    CENTRAL LINE: [ ] YES [x ] NO  LOCATION:   DATE INSERTED:  REMOVE: [ ] YES [ ] NO  EXPLAIN:    RIBEIRO: [ ] YES [ x] NO    DATE INSERTED:  REMOVE:  [ ] YES [ ] NO  EXPLAIN:    PAST MEDICAL & SURGICAL HISTORY:  Lumbago      Lumbago with sciatica of right side      Benign hypertension  dx 10 years      H/O renal calculi  ESWL right side yrs ago  last stone one year ago  (passed on its own)      Obese      Eczema      Diabetes mellitus type II  on Meds 4/2012      Chronic atrial fibrillation      Colon cancer      S/P tonsillectomy      Port-A-Cath in place      07-15 @ 07:01  -  07-16 @ 07:00  --------------------------------------------------------  IN: 0 mL / OUT: 17 mL / NET: -17 mL      PHYSICAL EXAM:    GENERAL: NAD  HEAD:  Atraumatic, Normocephalic  EYES: EOMI, PERRLA, conjunctiva and sclera clear  ENMT: No tonsillar erythema, exudates, or enlargement; Moist mucous membranes, Good dentition, No lesions  NECK: Supple, No JVD, Normal thyroid  NERVOUS SYSTEM:Awake/  Alert & Oriented X3, Follows commands.  Right sided weakness.   CHEST/LUNG: Right chest tube to suction. Diminished Right base.  Clear upper lobes bilaterally; No rales, rhonchi, wheezing, or rubs  HEART: Regular rate and rhythm; No murmurs, rubs, or gallops  ABDOMEN: Soft, Nontender, Nondistended; Bowel sounds present  EXTREMITIES:  2+ Peripheral Pulses, No clubbing, cyanosis, or edema  LYMPH: No lymphadenopathy noted  SKIN: No rashes or lesions      LABS:  CBC Full  -  ( 16 Jul 2022 07:33 )  WBC Count : 11.13 K/uL  RBC Count : 3.61 M/uL  Hemoglobin : 8.8 g/dL  Hematocrit : 30.0 %  Platelet Count - Automated : 434 K/uL  Mean Cell Volume : 83.1 fl  Mean Cell Hemoglobin : 24.4 pg  Mean Cell Hemoglobin Concentration : 29.3 gm/dL  Auto Neutrophil # : x  Auto Lymphocyte # : x  Auto Monocyte # : x  Auto Eosinophil # : x  Auto Basophil # : x  Auto Neutrophil % : x  Auto Lymphocyte % : x  Auto Monocyte % : x  Auto Eosinophil % : x  Auto Basophil % : x    07-16    141  |  100  |  8   ----------------------------<  161<H>  4.0   |  36<H>  |  0.62    Ca    8.6      16 Jul 2022 07:33  Phos  3.4     07-16  Mg     2.3     07-16    TPro  6.3  /  Alb  1.6<L>  /  TBili  0.2  /  DBili  x   /  AST  16  /  ALT  24  /  AlkPhos  127<H>  07-16        [  ]  DVT Prophylaxis  [  ]   Abnormal Nutritional Status -  Malnutrition   Cachexia       Diet, Soft and Bite Sized:   Consistent Carbohydrate No Snacks  DASH/TLC Sodium & Cholesterol Restricted  Mildly Thick Liquids (MILDTHICKLIQS) (07-07-22 @ 11:15) [Active]      RADIOLOGY & ADDITIONAL STUDIES:  ***      < from: Xray Chest 1 View- PORTABLE-Routine (Xray Chest 1 View- PORTABLE-Routine in AM.) (07.15.22 @ 11:04) >  PROCEDURE DATE:  07/14/2022          INTERPRETATION:  AP chest on erect on July 14, 2022 at 8:42 AM.Patient   is being followed for right effusion.    Heart magnified by technique. Right jugular line remains.    On July 13 CAT scan there was a massive right effusion. This is again   noted.    Present film shows a catheter right chest tube in place new since the CAT   scan.    Follow-up AP chest on July 15, 2022 at 9:59 AM.    There is still a quite large right effusion.    IMPRESSION: Rather large right effusion little changed after catheter   right chest tube.    < end of copied text >  < from: CT Drain Lung Fluid Collection, Right (07.13.22 @ 11:38) >    IMPRESSION: SUCCESSFUL COMPUTED TOMOGRAPHY GUIDED RIGHT CHEST TUBE   PLACEMENT, AS DESCRIBED ABOVE.    < end of copied text >  < from: CT Chest No Cont (07.09.22 @ 09:47) >  PROCEDURE DATE:  07/09/2022          INTERPRETATION:  INDICATION: pleural effusion, history of rectal cancer.  TECHNIQUE: Unenhanced CT of the chest. Coronal, sagittal, and MIP images   were reconstructed and reviewed.  COMPARISON: 7/10/2021 chest CT.    FINDINGS:    AIRWAYS, LUNGS and PLEURA: The right main bronchus and its lobar branches   are obliterated by retained secretions. Large loculated right pleural   effusion. Complete atelectasis of right lung. Trace left pleural effusion   and interlobular septal thickening likely edema. Patchy opacities within   lingula and left lower lobe may represent edema or infection.    MEDIASTINUM AND RALPH: The right hilum and right paratracheal region are   poorly evaluated without contrast. Mildly enlarged subcarinal lymph node   measuring 1.5 cm in short axis, new from prior.    HEART AND VESSELS: Cardiomegaly. Coronary calcifications. No pericardial   effusion. Thoracic aorta and pulmonary artery normal in diameter. Tip of   the Mediport catheter within SVC.    VISUALIZED UPPER ABDOMEN: Bilateral renal cysts.    CHEST WALL AND BONES: No aggressive osseous lesion.    LOWER NECK: Within normal limits.    IMPRESSION:    The right main bronchus and its lobar branches are obliterated by   retained secretions. Large loculated rightpleural effusion. Complete   atelectasis of right lung.    Trace left pleural effusion and interlobular septal thickening likely   edema. Patchy opacities within lingula and left lower lobe may represent   edema or infection    --- End of Report ---    ***Please see the addendum at the top of this report. It may contain   additional important information or changes.****    < end of copied text >  < from: Xray Chest 1 View- PORTABLE-Urgent (Xray Chest 1 View- PORTABLE-Urgent .) (07.07.22 @ 10:58) >  INTERPRETATION:  Exam:XR CHEST URGENT    clinical history:Pleural effusion    Removal of endotracheal tube since July 5. Moderate to large right   pleural effusion stable. Left lung grossly clear.    IMPRESSION: Stable right pleural effusion    --- End of Report ---    < end of copied text >  < from: Xray Chest 1 View- PORTABLE-Urgent (Xray Chest 1 View- PORTABLE-Urgent .) (07.05.22 @ 10:13) >    IMPRESSION:  Decreased congestive changes. Feeding tube as noted.    < end of copied text >  < from: Xray Chest 1 View- PORTABLE-Routine (Xray Chest 1 View- PORTABLE-Routine in AM.) (07.04.22 @ 08:49) >  Findings/  Impression:    First    Films is from 7/3/2022 at 10:17 AM and shows a right-sided Port-A-Cath in   the SVC region. Endotracheal tube is above the loni. The nasogastric   tube courses below the left hemidiaphragm, tip off edge of film.. The   heart is enlarged. There is infiltrate at the left base. There is near   complete opacification of the right chest with shift to the left   bilateral large effusions and bibasilar atelectasis. Mild degenerative   changes are seen in the bones.    Subsequent pair of films from 7/4/2022 at 7:45 AM shows residual large   right effusion, with improved aeration of the right upper lobe. The left   lower lobe infiltrate remains.    < end of copied text >  < from: Xray Chest 1 View- PORTABLE-Urgent (Xray Chest 1 View- PORTABLE-Urgent .) (07.02.22 @ 14:55) >  COMPARISON: 7/2/2022    FINDINGS/  IMPRESSION:    Endotracheal tube tipabove the loni. Enteric tube tip within the   stomach. Right IJ central line tip within SVC.    Bilateral pleural effusions, right greater than left are again noted.    < end of copied text >    Goals of Care Discussion with Family/Proxy/Other   - see note from 7/15/22   MELIZA DAILEY    SCU progress note    INTERVAL HPI/OVERNIGHT EVENTS: ***No acute events overnight.     DNR [x ]   DNI  [ x ]    Covid - 19 PCR: COVID-19 PCR: NotDetec (16 Jul 2022 07:05)  COVID-19 PCR: NotDetec (10 Jul 2022 09:51)  COVID-19 PCR: NotDetec (09 Jul 2022 11:10)      The 4Ms    What Matters Most: see GOC  Age appropriate Medications/Screen for High Risk Medication: Yes  Mentation: see CAM below  Mobility: defer to physical exam    The Confusion Assessment Method (CAM) Diagnostic Algorithm     1: Acute Onset or Fluctuating Course  - Is there evidence of an acute change in mental status from the patient’s baseline? Did the (abnormal) behavior  fluctuate during the day, that is, tend to come and go, or increase and decrease in severity?  [ ] YES [ x] NO     2: Inattention  - Did the patient have difficulty focusing attention, being easily distractible, or having difficulty keeping track of what was being said?  [ ] YES [ x] NO     3: Disorganized thinking  -Was the patient’s thinking disorganized or incoherent, such as rambling or irrelevant conversation, unclear or illogical flow of ideas, or unpredictable switching from subject to subject?  [ ] YES [x ] NO    4: Altered Level of consciousness?  [ ] YES [x ] NO    The diagnosis of delirium by CAM requires the presence of features 1 and 2 and either 3 or 4.    PRESSORS: [ ] YES [x ] NO    Cardiovascular:  11/2021 TTE : EF 55%   metoprolol tartrate 25 milliGRAM(s) Oral two times a day    Pulmonary:  ALBUTerol    0.083% 2.5 milliGRAM(s) Nebulizer every 6 hours PRN  ALBUTerol    90 MICROgram(s) HFA Inhaler 2 Puff(s) Inhalation every 6 hours  ALBUTerol    90 MICROgram(s) HFA Inhaler 2 Puff(s) Inhalation every 8 hours  budesonide  80 MICROgram(s)/formoterol 4.5 MICROgram(s) Inhaler 2 Puff(s) Inhalation two times a day    Hematalogic:  apixaban 5 milliGRAM(s) Oral every 12 hours    Other:  chlorhexidine 2% Cloths 1 Application(s) Topical <User Schedule>  ferrous    sulfate Liquid 300 milliGRAM(s) Oral daily  insulin glargine Injectable (LANTUS) 25 Unit(s) SubCutaneous at bedtime  insulin lispro (ADMELOG) corrective regimen sliding scale   SubCutaneous three times a day before meals  morphine  - Injectable 2 milliGRAM(s) IV Push once  ondansetron Injectable 4 milliGRAM(s) IV Push every 6 hours PRN  pantoprazole   Suspension 40 milliGRAM(s) Oral daily  sodium chloride 0.9% lock flush 3 milliLiter(s) IV Push every 8 hours    ALBUTerol    0.083% 2.5 milliGRAM(s) Nebulizer every 6 hours PRN  ALBUTerol    90 MICROgram(s) HFA Inhaler 2 Puff(s) Inhalation every 6 hours  ALBUTerol    90 MICROgram(s) HFA Inhaler 2 Puff(s) Inhalation every 8 hours  apixaban 5 milliGRAM(s) Oral every 12 hours  budesonide  80 MICROgram(s)/formoterol 4.5 MICROgram(s) Inhaler 2 Puff(s) Inhalation two times a day  chlorhexidine 2% Cloths 1 Application(s) Topical <User Schedule>  ferrous    sulfate Liquid 300 milliGRAM(s) Oral daily  insulin glargine Injectable (LANTUS) 25 Unit(s) SubCutaneous at bedtime  insulin lispro (ADMELOG) corrective regimen sliding scale   SubCutaneous three times a day before meals  metoprolol tartrate 25 milliGRAM(s) Oral two times a day  morphine  - Injectable 2 milliGRAM(s) IV Push once  ondansetron Injectable 4 milliGRAM(s) IV Push every 6 hours PRN  pantoprazole   Suspension 40 milliGRAM(s) Oral daily  sodium chloride 0.9% lock flush 3 milliLiter(s) IV Push every 8 hours    Drug Dosing Weight  Height (cm): 180.3 (02 Jul 2022 05:23)  Weight (kg): 107 (02 Jul 2022 09:10)  BMI (kg/m2): 32.9 (02 Jul 2022 09:10)  BSA (m2): 2.26 (02 Jul 2022 09:10)    CENTRAL LINE: [ ] YES [x ] NO  LOCATION:   DATE INSERTED:  REMOVE: [ ] YES [ ] NO  EXPLAIN:    RIBEIRO: [ ] YES [ x] NO    DATE INSERTED:  REMOVE:  [ ] YES [ ] NO  EXPLAIN:    PAST MEDICAL & SURGICAL HISTORY:  Lumbago  Lumbago with sciatica of right side  Benign hypertension  dx 10 years      H/O renal calculi  ESWL right side yrs ago  last stone one year ago  (passed on its own)    Obese  Eczema  Diabetes mellitus type II  on Meds 4/2012  Chronic atrial fibrillation  Colon cancer  S/P tonsillectomy  Port-A-Cath in place      07-15 @ 07:01  -  07-16 @ 07:00  --------------------------------------------------------  IN: 0 mL / OUT: 17 mL / NET: -17 mL      PHYSICAL EXAM:    GENERAL: NAD  HEAD:  Atraumatic, Normocephalic  EYES: EOMI, PERRLA, conjunctiva and sclera clear  ENMT: No tonsillar erythema, exudates, or enlargement; Moist mucous membranes, Good dentition, No lesions  NECK: Supple, No JVD, Normal thyroid  NERVOUS SYSTEM:Awake/  Alert & Oriented X3, Follows commands.  Right sided weakness.   CHEST/LUNG: Right chest tube to suction. Diminished Right base.  Clear upper lobes bilaterally; No rales, rhonchi, wheezing, or rubs  HEART: Regular rate and rhythm; No murmurs, rubs, or gallops  ABDOMEN: Soft, Nontender, Nondistended; Bowel sounds present  EXTREMITIES:  2+ Peripheral Pulses, No clubbing, cyanosis, or edema  LYMPH: No lymphadenopathy noted  SKIN: No rashes or lesions      LABS:  CBC Full  -  ( 16 Jul 2022 07:33 )  WBC Count : 11.13 K/uL  RBC Count : 3.61 M/uL  Hemoglobin : 8.8 g/dL  Hematocrit : 30.0 %  Platelet Count - Automated : 434 K/uL  Mean Cell Volume : 83.1 fl  Mean Cell Hemoglobin : 24.4 pg  Mean Cell Hemoglobin Concentration : 29.3 gm/dL  Auto Neutrophil # : x  Auto Lymphocyte # : x  Auto Monocyte # : x  Auto Eosinophil # : x  Auto Basophil # : x  Auto Neutrophil % : x  Auto Lymphocyte % : x  Auto Monocyte % : x  Auto Eosinophil % : x  Auto Basophil % : x    07-16    141  |  100  |  8   ----------------------------<  161<H>  4.0   |  36<H>  |  0.62    Ca    8.6      16 Jul 2022 07:33  Phos  3.4     07-16  Mg     2.3     07-16    TPro  6.3  /  Alb  1.6<L>  /  TBili  0.2  /  DBili  x   /  AST  16  /  ALT  24  /  AlkPhos  127<H>  07-16        [  ]  DVT Prophylaxis  [  ]   Abnormal Nutritional Status -  Malnutrition   Cachexia       Diet, Soft and Bite Sized:   Consistent Carbohydrate No Snacks  DASH/TLC Sodium & Cholesterol Restricted  Mildly Thick Liquids (MILDTHICKLIQS) (07-07-22 @ 11:15) [Active]      RADIOLOGY & ADDITIONAL STUDIES:  ***      < from: Xray Chest 1 View- PORTABLE-Routine (Xray Chest 1 View- PORTABLE-Routine in AM.) (07.15.22 @ 11:04) >  PROCEDURE DATE:  07/14/2022          INTERPRETATION:  AP chest on erect on July 14, 2022 at 8:42 AM.Patient   is being followed for right effusion.    Heart magnified by technique. Right jugular line remains.    On July 13 CAT scan there was a massive right effusion. This is again   noted.    Present film shows a catheter right chest tube in place new since the CAT   scan.    Follow-up AP chest on July 15, 2022 at 9:59 AM.    There is still a quite large right effusion.    IMPRESSION: Rather large right effusion little changed after catheter   right chest tube.    < end of copied text >  < from: CT Drain Lung Fluid Collection, Right (07.13.22 @ 11:38) >    IMPRESSION: SUCCESSFUL COMPUTED TOMOGRAPHY GUIDED RIGHT CHEST TUBE   PLACEMENT, AS DESCRIBED ABOVE.    < end of copied text >  < from: CT Chest No Cont (07.09.22 @ 09:47) >  PROCEDURE DATE:  07/09/2022          INTERPRETATION:  INDICATION: pleural effusion, history of rectal cancer.  TECHNIQUE: Unenhanced CT of the chest. Coronal, sagittal, and MIP images   were reconstructed and reviewed.  COMPARISON: 7/10/2021 chest CT.    FINDINGS:    AIRWAYS, LUNGS and PLEURA: The right main bronchus and its lobar branches   are obliterated by retained secretions. Large loculated right pleural   effusion. Complete atelectasis of right lung. Trace left pleural effusion   and interlobular septal thickening likely edema. Patchy opacities within   lingula and left lower lobe may represent edema or infection.    MEDIASTINUM AND RALPH: The right hilum and right paratracheal region are   poorly evaluated without contrast. Mildly enlarged subcarinal lymph node   measuring 1.5 cm in short axis, new from prior.    HEART AND VESSELS: Cardiomegaly. Coronary calcifications. No pericardial   effusion. Thoracic aorta and pulmonary artery normal in diameter. Tip of   the Mediport catheter within SVC.    VISUALIZED UPPER ABDOMEN: Bilateral renal cysts.    CHEST WALL AND BONES: No aggressive osseous lesion.    LOWER NECK: Within normal limits.    IMPRESSION:    The right main bronchus and its lobar branches are obliterated by   retained secretions. Large loculated rightpleural effusion. Complete   atelectasis of right lung.    Trace left pleural effusion and interlobular septal thickening likely   edema. Patchy opacities within lingula and left lower lobe may represent   edema or infection    --- End of Report ---    ***Please see the addendum at the top of this report. It may contain   additional important information or changes.****    < end of copied text >  < from: Xray Chest 1 View- PORTABLE-Urgent (Xray Chest 1 View- PORTABLE-Urgent .) (07.07.22 @ 10:58) >  INTERPRETATION:  Exam:XR CHEST URGENT    clinical history:Pleural effusion    Removal of endotracheal tube since July 5. Moderate to large right   pleural effusion stable. Left lung grossly clear.    IMPRESSION: Stable right pleural effusion    --- End of Report ---    < end of copied text >  < from: Xray Chest 1 View- PORTABLE-Urgent (Xray Chest 1 View- PORTABLE-Urgent .) (07.05.22 @ 10:13) >    IMPRESSION:  Decreased congestive changes. Feeding tube as noted.    < end of copied text >  < from: Xray Chest 1 View- PORTABLE-Routine (Xray Chest 1 View- PORTABLE-Routine in AM.) (07.04.22 @ 08:49) >  Findings/  Impression:    First    Films is from 7/3/2022 at 10:17 AM and shows a right-sided Port-A-Cath in   the SVC region. Endotracheal tube is above the loni. The nasogastric   tube courses below the left hemidiaphragm, tip off edge of film.. The   heart is enlarged. There is infiltrate at the left base. There is near   complete opacification of the right chest with shift to the left   bilateral large effusions and bibasilar atelectasis. Mild degenerative   changes are seen in the bones.    Subsequent pair of films from 7/4/2022 at 7:45 AM shows residual large   right effusion, with improved aeration of the right upper lobe. The left   lower lobe infiltrate remains.    < end of copied text >  < from: Xray Chest 1 View- PORTABLE-Urgent (Xray Chest 1 View- PORTABLE-Urgent .) (07.02.22 @ 14:55) >  COMPARISON: 7/2/2022    FINDINGS/  IMPRESSION:    Endotracheal tube tipabove the loni. Enteric tube tip within the   stomach. Right IJ central line tip within SVC.    Bilateral pleural effusions, right greater than left are again noted.    < end of copied text >    Goals of Care Discussion with Family/Proxy/Other   - see note from 7/15/22

## 2022-07-16 NOTE — PROGRESS NOTE ADULT - PROBLEM SELECTOR PLAN 4
Multiple loculated effusions right chest.  S/P CT guided chest tube 7/13  CXR shows near complete atelectasis of right lung.  Position on left side with right side up.  Serial CXR  Chest PT every 4 hours  Chest tube to water seal   F/U pleural fluid pathology. Likely exudative.   Will need oxygen testing before discharge.

## 2022-07-16 NOTE — PROGRESS NOTE ADULT - PROBLEM SELECTOR PLAN 2
No indication for decadron/remdesivir  CXR as above, infiltrate b/l lower lobes, pleural effusion R>L  s/p intubation and extubation 7/6.    saturating well on NC 2L  MRSA PCR +, treated with Chlorhexidine and mupirocin  Encourage to lay on left side with right side up.   Right chest tube to water seal.   Continue with bronchodilators and ICS  Serial CXRs.

## 2022-07-16 NOTE — CHART NOTE - NSCHARTNOTEFT_GEN_A_CORE
Met at bedside with pt's spouse  and updated on pt's clinical condition and plan of care.   All questions and concerns addressed.     Sandy Liriano NP Medicine /[facility]   [phone] Met at bedside with pt's spouse  and updated on pt's clinical condition and plan of care, discharge planning.   All questions and concerns addressed.     Sandy Liriano NP Medicine /[facility]   [phone]

## 2022-07-16 NOTE — PROGRESS NOTE ADULT - PROBLEM SELECTOR PLAN 5
Ventricular rate controlled.   CHADSVASC : 5  Continue Metoprolol 25mg BID  Eliquis resumed 7/15.   Patient with history of life threatening bleeding with Lovenox- DO NOT GIVE LOVENOX per family/patient.  TTE 11/2021 with normal EF.

## 2022-07-17 LAB
ANION GAP SERPL CALC-SCNC: 4 MMOL/L — LOW (ref 5–17)
BUN SERPL-MCNC: 8 MG/DL — SIGNIFICANT CHANGE UP (ref 7–18)
CALCIUM SERPL-MCNC: 8.6 MG/DL — SIGNIFICANT CHANGE UP (ref 8.4–10.5)
CHLORIDE SERPL-SCNC: 100 MMOL/L — SIGNIFICANT CHANGE UP (ref 96–108)
CO2 SERPL-SCNC: 37 MMOL/L — HIGH (ref 22–31)
CREAT SERPL-MCNC: 0.61 MG/DL — SIGNIFICANT CHANGE UP (ref 0.5–1.3)
EGFR: 106 ML/MIN/1.73M2 — SIGNIFICANT CHANGE UP
GLUCOSE BLDC GLUCOMTR-MCNC: 120 MG/DL — HIGH (ref 70–99)
GLUCOSE BLDC GLUCOMTR-MCNC: 163 MG/DL — HIGH (ref 70–99)
GLUCOSE BLDC GLUCOMTR-MCNC: 167 MG/DL — HIGH (ref 70–99)
GLUCOSE BLDC GLUCOMTR-MCNC: 196 MG/DL — HIGH (ref 70–99)
GLUCOSE SERPL-MCNC: 148 MG/DL — HIGH (ref 70–99)
HCT VFR BLD CALC: 30.8 % — LOW (ref 39–50)
HGB BLD-MCNC: 8.8 G/DL — LOW (ref 13–17)
MAGNESIUM SERPL-MCNC: 2.3 MG/DL — SIGNIFICANT CHANGE UP (ref 1.6–2.6)
MCHC RBC-ENTMCNC: 24 PG — LOW (ref 27–34)
MCHC RBC-ENTMCNC: 28.6 GM/DL — LOW (ref 32–36)
MCV RBC AUTO: 84.2 FL — SIGNIFICANT CHANGE UP (ref 80–100)
NRBC # BLD: 0 /100 WBCS — SIGNIFICANT CHANGE UP (ref 0–0)
PLATELET # BLD AUTO: 479 K/UL — HIGH (ref 150–400)
POTASSIUM SERPL-MCNC: 3.9 MMOL/L — SIGNIFICANT CHANGE UP (ref 3.5–5.3)
POTASSIUM SERPL-SCNC: 3.9 MMOL/L — SIGNIFICANT CHANGE UP (ref 3.5–5.3)
RBC # BLD: 3.66 M/UL — LOW (ref 4.2–5.8)
RBC # FLD: 19.7 % — HIGH (ref 10.3–14.5)
SODIUM SERPL-SCNC: 141 MMOL/L — SIGNIFICANT CHANGE UP (ref 135–145)
WBC # BLD: 10.24 K/UL — SIGNIFICANT CHANGE UP (ref 3.8–10.5)
WBC # FLD AUTO: 10.24 K/UL — SIGNIFICANT CHANGE UP (ref 3.8–10.5)

## 2022-07-17 PROCEDURE — 71045 X-RAY EXAM CHEST 1 VIEW: CPT | Mod: 26

## 2022-07-17 RX ORDER — INSULIN LISPRO 100/ML
VIAL (ML) SUBCUTANEOUS
Refills: 0 | Status: DISCONTINUED | OUTPATIENT
Start: 2022-07-17 | End: 2022-07-19

## 2022-07-17 RX ADMIN — SODIUM CHLORIDE 3 MILLILITER(S): 9 INJECTION INTRAMUSCULAR; INTRAVENOUS; SUBCUTANEOUS at 05:43

## 2022-07-17 RX ADMIN — Medication 25 MILLIGRAM(S): at 17:21

## 2022-07-17 RX ADMIN — Medication 1: at 17:22

## 2022-07-17 RX ADMIN — INSULIN GLARGINE 25 UNIT(S): 100 INJECTION, SOLUTION SUBCUTANEOUS at 22:37

## 2022-07-17 RX ADMIN — BUDESONIDE AND FORMOTEROL FUMARATE DIHYDRATE 2 PUFF(S): 160; 4.5 AEROSOL RESPIRATORY (INHALATION) at 22:38

## 2022-07-17 RX ADMIN — SODIUM CHLORIDE 3 MILLILITER(S): 9 INJECTION INTRAMUSCULAR; INTRAVENOUS; SUBCUTANEOUS at 13:19

## 2022-07-17 RX ADMIN — Medication 1: at 11:54

## 2022-07-17 RX ADMIN — ALBUTEROL 2 PUFF(S): 90 AEROSOL, METERED ORAL at 13:40

## 2022-07-17 RX ADMIN — Medication 300 MILLIGRAM(S): at 11:05

## 2022-07-17 RX ADMIN — PANTOPRAZOLE SODIUM 40 MILLIGRAM(S): 20 TABLET, DELAYED RELEASE ORAL at 11:05

## 2022-07-17 RX ADMIN — Medication 4 UNIT(S): at 11:54

## 2022-07-17 RX ADMIN — Medication 4 UNIT(S): at 17:22

## 2022-07-17 RX ADMIN — BUDESONIDE AND FORMOTEROL FUMARATE DIHYDRATE 2 PUFF(S): 160; 4.5 AEROSOL RESPIRATORY (INHALATION) at 11:04

## 2022-07-17 RX ADMIN — ALBUTEROL 2 PUFF(S): 90 AEROSOL, METERED ORAL at 22:38

## 2022-07-17 RX ADMIN — APIXABAN 5 MILLIGRAM(S): 2.5 TABLET, FILM COATED ORAL at 05:39

## 2022-07-17 RX ADMIN — ALBUTEROL 2 PUFF(S): 90 AEROSOL, METERED ORAL at 05:40

## 2022-07-17 RX ADMIN — CHLORHEXIDINE GLUCONATE 1 APPLICATION(S): 213 SOLUTION TOPICAL at 05:39

## 2022-07-17 RX ADMIN — SODIUM CHLORIDE 3 MILLILITER(S): 9 INJECTION INTRAMUSCULAR; INTRAVENOUS; SUBCUTANEOUS at 22:40

## 2022-07-17 RX ADMIN — Medication 4 UNIT(S): at 08:11

## 2022-07-17 RX ADMIN — Medication 2: at 08:11

## 2022-07-17 RX ADMIN — Medication 25 MILLIGRAM(S): at 05:39

## 2022-07-17 NOTE — PROGRESS NOTE ADULT - SUBJECTIVE AND OBJECTIVE BOX
MELIZA DAILEY    SCU progress note    INTERVAL HPI/OVERNIGHT EVENTS: ***No acute events overnight.     DNR [x ]   DNI  [ x ]    Covid - 19 PCR: COVID-19 PCR: Manolotec (16 Jul 2022 07:05)    The 4Ms    What Matters Most: see GOC  Age appropriate Medications/Screen for High Risk Medication: Yes  Mentation: see CAM below  Mobility: defer to physical exam    The Confusion Assessment Method (CAM) Diagnostic Algorithm     1: Acute Onset or Fluctuating Course  - Is there evidence of an acute change in mental status from the patient’s baseline? Did the (abnormal) behavior  fluctuate during the day, that is, tend to come and go, or increase and decrease in severity?  [ ] YES [x ] NO     2: Inattention  - Did the patient have difficulty focusing attention, being easily distractible, or having difficulty keeping track of what was being said?  [ ] YES [x] NO     3: Disorganized thinking  -Was the patient’s thinking disorganized or incoherent, such as rambling or irrelevant conversation, unclear or illogical flow of ideas, or unpredictable switching from subject to subject?  [ ] YES [ xNO    4: Altered Level of consciousness?  [ ] YES [ x] NO    The diagnosis of delirium by CAM requires the presence of features 1 and 2 and either 3 or 4.    PRESSORS: [ ] YES [ x] NO    Cardiovascular:  11/2021 TTE : EF 55%   metoprolol tartrate 25 milliGRAM(s) Oral two times a day    Pulmonary:  ALBUTerol    0.083% 2.5 milliGRAM(s) Nebulizer every 6 hours PRN  ALBUTerol    90 MICROgram(s) HFA Inhaler 2 Puff(s) Inhalation every 6 hours  ALBUTerol    90 MICROgram(s) HFA Inhaler 2 Puff(s) Inhalation every 8 hours  budesonide  80 MICROgram(s)/formoterol 4.5 MICROgram(s) Inhaler 2 Puff(s) Inhalation two times a day    Hematalogic:  apixaban 5 milliGRAM(s) Oral every 12 hours    Other:  chlorhexidine 2% Cloths 1 Application(s) Topical <User Schedule>  dextrose 5%. 1000 milliLiter(s) IV Continuous <Continuous>  dextrose 5%. 1000 milliLiter(s) IV Continuous <Continuous>  dextrose 50% Injectable 25 Gram(s) IV Push once  dextrose 50% Injectable 12.5 Gram(s) IV Push once  dextrose 50% Injectable 25 Gram(s) IV Push once  dextrose Oral Gel 15 Gram(s) Oral once PRN  ferrous    sulfate Liquid 300 milliGRAM(s) Oral daily  glucagon  Injectable 1 milliGRAM(s) IntraMuscular once  insulin glargine Injectable (LANTUS) 25 Unit(s) SubCutaneous at bedtime  insulin lispro (ADMELOG) corrective regimen sliding scale   SubCutaneous three times a day before meals  insulin lispro (ADMELOG) corrective regimen sliding scale   SubCutaneous at bedtime  insulin lispro Injectable (ADMELOG) 4 Unit(s) SubCutaneous three times a day before meals  morphine  - Injectable 2 milliGRAM(s) IV Push once  ondansetron Injectable 4 milliGRAM(s) IV Push every 6 hours PRN  pantoprazole   Suspension 40 milliGRAM(s) Oral daily  sodium chloride 0.9% lock flush 3 milliLiter(s) IV Push every 8 hours    ALBUTerol    0.083% 2.5 milliGRAM(s) Nebulizer every 6 hours PRN  ALBUTerol    90 MICROgram(s) HFA Inhaler 2 Puff(s) Inhalation every 6 hours  ALBUTerol    90 MICROgram(s) HFA Inhaler 2 Puff(s) Inhalation every 8 hours  apixaban 5 milliGRAM(s) Oral every 12 hours  budesonide  80 MICROgram(s)/formoterol 4.5 MICROgram(s) Inhaler 2 Puff(s) Inhalation two times a day  chlorhexidine 2% Cloths 1 Application(s) Topical <User Schedule>  dextrose 5%. 1000 milliLiter(s) IV Continuous <Continuous>  dextrose 5%. 1000 milliLiter(s) IV Continuous <Continuous>  dextrose 50% Injectable 25 Gram(s) IV Push once  dextrose 50% Injectable 12.5 Gram(s) IV Push once  dextrose 50% Injectable 25 Gram(s) IV Push once  dextrose Oral Gel 15 Gram(s) Oral once PRN  ferrous    sulfate Liquid 300 milliGRAM(s) Oral daily  glucagon  Injectable 1 milliGRAM(s) IntraMuscular once  insulin glargine Injectable (LANTUS) 25 Unit(s) SubCutaneous at bedtime  insulin lispro (ADMELOG) corrective regimen sliding scale   SubCutaneous three times a day before meals  insulin lispro (ADMELOG) corrective regimen sliding scale   SubCutaneous at bedtime  insulin lispro Injectable (ADMELOG) 4 Unit(s) SubCutaneous three times a day before meals  metoprolol tartrate 25 milliGRAM(s) Oral two times a day  morphine  - Injectable 2 milliGRAM(s) IV Push once  ondansetron Injectable 4 milliGRAM(s) IV Push every 6 hours PRN  pantoprazole   Suspension 40 milliGRAM(s) Oral daily  sodium chloride 0.9% lock flush 3 milliLiter(s) IV Push every 8 hours    Drug Dosing Weight  Height (cm): 180.3 (02 Jul 2022 05:23)  Weight (kg): 107 (02 Jul 2022 09:10)  BMI (kg/m2): 32.9 (02 Jul 2022 09:10)  BSA (m2): 2.26 (02 Jul 2022 09:10)    CENTRAL LINE: [ ] YES [ x] NO  LOCATION:   DATE INSERTED:  REMOVE: [ ] YES [ ] NO  EXPLAIN:    RIBEIRO: [ ] YES [x ] NO    DATE INSERTED:  REMOVE:  [ ] YES [ ] NO  EXPLAIN:    PAST MEDICAL & SURGICAL HISTORY:  Lumbago  Lumbago with sciatica of right side  Benign hypertension  dx 10 years    H/O renal calculi  ESWL right side yrs ago  last stone one year ago  (passed on its own)    Obese  Eczema  Diabetes mellitus type II  on Meds 4/2012  Chronic atrial fibrillation  Colon cancer  S/P tonsillectomy  Port-A-Cath in place      07-16 @ 07:01  -  07-17 @ 07:00  --------------------------------------------------------  IN: 0 mL / OUT: 10 mL / NET: -10 mL      PHYSICAL EXAM:  GENERAL: NAD, well-groomed, well-developed  HEAD:  Atraumatic, Normocephalic  EYES: EOMI, PERRLA, conjunctiva and sclera clear  ENMT: No tonsillar erythema, exudates, or enlargement; Moist mucous membranes, Good dentition, No lesions  NECK: Supple, No JVD, Normal thyroid  NERVOUS SYSTEM:  Alert & Oriented X3, Good concentration;Generalized weakness. Right sided weakness.   CHEST/LUNG: Right chest tube to water seal. Diminished right base. Clear upper lobes bilaterally; No rales, rhonchi, wheezing, or rubs  HEART: Regular rate and rhythm; No murmurs, rubs, or gallops  ABDOMEN: Soft, Nontender, Nondistended; Bowel sounds present  EXTREMITIES:  2+ Peripheral Pulses, No clubbing, cyanosis, or edema  LYMPH: No lymphadenopathy noted  SKIN: No rashes or lesions        LABS:  CBC Full  -  ( 17 Jul 2022 06:41 )  WBC Count : 10.24 K/uL  RBC Count : 3.66 M/uL  Hemoglobin : 8.8 g/dL  Hematocrit : 30.8 %  Platelet Count - Automated : 479 K/uL  Mean Cell Volume : 84.2 fl  Mean Cell Hemoglobin : 24.0 pg  Mean Cell Hemoglobin Concentration : 28.6 gm/dL  Auto Neutrophil # : x  Auto Lymphocyte # : x  Auto Monocyte # : x  Auto Eosinophil # : x  Auto Basophil # : x  Auto Neutrophil % : x  Auto Lymphocyte % : x  Auto Monocyte % : x  Auto Eosinophil % : x  Auto Basophil % : x    07-17    141  |  100  |  8   ----------------------------<  148<H>  3.9   |  37<H>  |  0.61    Ca    8.6      17 Jul 2022 06:41  Phos  3.4     07-16  Mg     2.3     07-17    TPro  6.3  /  Alb  1.6<L>  /  TBili  0.2  /  DBili  x   /  AST  16  /  ALT  24  /  AlkPhos  127<H>  07-16      [  ]  DVT Prophylaxis  [  ]   Abnormal Nutritional Status -  Malnutrition   Cachexia      Morbid Obesity BMI >/=40  Diet, Soft and Bite Sized:   Consistent Carbohydrate No Snacks  DASH/TLC Sodium & Cholesterol Restricted  Mildly Thick Liquids (MILDTHICKLIQS) (07-07-22 @ 11:15) [Active]      RADIOLOGY & ADDITIONAL STUDIES:  ***    < from: Xray Chest 1 View- PORTABLE-Routine (Xray Chest 1 View- PORTABLE-Routine in AM.) (07.15.22 @ 11:04) >  PROCEDURE DATE:  07/14/2022          INTERPRETATION:  AP chest on erect on July 14, 2022 at 8:42 AM.Patient   is being followed for right effusion.    Heart magnified by technique. Right jugular line remains.    On July 13 CAT scan there was a massive right effusion. This is again   noted.    Present film shows a catheter right chest tube in place new since the CAT   scan.    Follow-up AP chest on July 15, 2022 at 9:59 AM.    There is still a quite large right effusion.    IMPRESSION: Rather large right effusion little changed after catheter   right chest tube.    < end of copied text >  < from: CT Chest No Cont (07.09.22 @ 09:47) >  IMPRESSION:    The right main bronchus and its lobar branches are obliterated by   retained secretions. Large loculated rightpleural effusion. Complete   atelectasis of right lung.    Trace left pleural effusion and interlobular septal thickening likely   edema. Patchy opacities within lingula and left lower lobe may represent   edema or infection    < end of copied text >  < from: Xray Chest 1 View- PORTABLE-Urgent (Xray Chest 1 View- PORTABLE-Urgent .) (07.07.22 @ 10:58) >  PROCEDURE DATE:  07/07/2022          INTERPRETATION:  Exam:XR CHEST URGENT    clinical history:Pleural effusion    Removal of endotracheal tube since July 5. Moderate to large right   pleural effusion stable. Left lung grossly clear.    IMPRESSION: Stable right pleural effusion    < end of copied text >      Goals of Care Discussion with Family/Proxy/Other   - see note from  7/15/22

## 2022-07-17 NOTE — PROGRESS NOTE ADULT - PROBLEM SELECTOR PLAN 4
Multiple loculated effusions right chest.  S/P CT guided chest tube 7/13  CXR shows near complete atelectasis of right lung.  Position on left side with right side up.  Serial CXR  Chest PT every 4 hours  Chest tube to water seal   F/U pleural fluid pathology. Likely exudative.   Will need oxygen testing before discharge. Multiple loculated effusions right chest.  S/P CT guided chest tube 7/13  CXR shows near complete atelectasis of right lung.  Position on left side with right side up.  Serial CXR  Chest PT every 4 hours  Chest tube to water seal   F/U pleural fluid pathology. Likely exudative.   Will need oxygen testing before discharge.  Hold Eliquis for possible CT removal (hopefully tomorrow). F/u with IR.

## 2022-07-17 NOTE — PROGRESS NOTE ADULT - ASSESSMENT
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 COVID+   ICU Course:   Pt febrile, HR >90, lactate 2.6, UA+, CXR with pleural effusion/mucous plug vs consolidation, started on Unasyn (7/2). Started on mucomyst, albuterol, chest PT. Patient extubated to Nasal cannula on 7/6. Decadron discontinued per primary team as PNA deemed to be more bacterial then COVID. Eliquis held for possible thoracentesis. Held Bisoprolol/HCTZ for HTN. AMELIA improved with IVF. Lantus and sliding scale for DM, PPI for GI ppx. Started on soft and bite sized diet per S&S. Unsayn switched to Nerissa (7/7) for ESBL E. coli and Enteroccocus growing in urine, ID Jose Miguel on board. Patient was extubated on 7/6/22 and downgraded to SCU on 7/7.   SCU Course:  7/7/22 Patient was seen at bedside, remains comfortable on 2L NC, 98%. Evaluated PT and recommends Rehab.   7/8/22 -Ordered Chest PT and CT chest for re-evaluate pleural effusion.   spoke with wife and she agrees plan of care. Pending [facility] choice. CM/SW following.   7/9/22 - CT Chest completed, pending recs; Resumed Eliquis in AM 5mg BID; Received Chest PT; Oxygenation>90% on 2LNC;   7/10/22- Patient with right lung effusion and right bronchus secretions per CT. Plan for bronchoscopy and possible chest tube placement tomorrow.   7/11 Pulmonary unable to place chest tube. IR to follow up for chest tube placement.   7/13 placed chest tube by IR, fluid pathology sent  7/14 continue chest tube right with suction, monitor I/Os. recorded 42 ml this am with serous fluid. H/H stable.  hold AC for 48hr post CT. will resume tomorrow.   7/16 Right Chest tube to suction , draining small amt. F/u CXR. Change to water seal .

## 2022-07-17 NOTE — PROGRESS NOTE ADULT - PROBLEM SELECTOR PLAN 5
entricular rate controlled.   CHADSVASC : 5  Continue Metoprolol 25mg BID  Eliquis resumed 7/15.   Patient with history of life threatening bleeding with Lovenox- DO NOT GIVE LOVENOX per family/patient.  TTE 11/2021 with normal EF.

## 2022-07-17 NOTE — CHART NOTE - NSCHARTNOTEFT_GEN_A_CORE
Updated  pt's spouse on  pt's clinical condition and plan of care,  discharge planning  All questions and concerns addressed.     Sandy Liriano NP Medicine /SCU   [phone]

## 2022-07-17 NOTE — PROGRESS NOTE ADULT - PROBLEM SELECTOR PLAN 12
F/u pleural studies. Likely exudative.   Chest tube to water seal. F/u CXR  Overall prognosis poor  Pt is DNR/DNI  Family requests home hospice .  JULIA following F/u pleural studies. Likely exudative.   Chest tube to water seal. F/u CXR  Hold Eliquis for possible CT removal (hopefully tomorrow). F/u with IR.  Overall prognosis poor  Pt is DNR/DNI  Family requests home hospice .  SW following

## 2022-07-17 NOTE — PROGRESS NOTE ADULT - PROBLEM SELECTOR PLAN 9
Improved control   Continue Lantus 25 units qHS  Continue Admelog 4 units premeal TID   Continue glucose monitoring ac/hs and cover with Admelog s/s (lowered to low corrective scale)  Goal 140-180

## 2022-07-17 NOTE — PROGRESS NOTE ADULT - PROBLEM SELECTOR PLAN 1
Resolved.   BC NGTD   UC grew ESBL  Completed course of antibiotic.   ID Dr. Gillespie  Continue supplemental oxygen .  Monitor oxygenation. Tolerating O2 2L NC

## 2022-07-18 ENCOUNTER — TRANSCRIPTION ENCOUNTER (OUTPATIENT)
Age: 67
End: 2022-07-18

## 2022-07-18 LAB
ANION GAP SERPL CALC-SCNC: 5 MMOL/L — SIGNIFICANT CHANGE UP (ref 5–17)
BUN SERPL-MCNC: 8 MG/DL — SIGNIFICANT CHANGE UP (ref 7–18)
CALCIUM SERPL-MCNC: 8.5 MG/DL — SIGNIFICANT CHANGE UP (ref 8.4–10.5)
CHLORIDE SERPL-SCNC: 101 MMOL/L — SIGNIFICANT CHANGE UP (ref 96–108)
CO2 SERPL-SCNC: 35 MMOL/L — HIGH (ref 22–31)
CREAT SERPL-MCNC: 0.62 MG/DL — SIGNIFICANT CHANGE UP (ref 0.5–1.3)
CULTURE RESULTS: SIGNIFICANT CHANGE UP
EGFR: 105 ML/MIN/1.73M2 — SIGNIFICANT CHANGE UP
GLUCOSE BLDC GLUCOMTR-MCNC: 125 MG/DL — HIGH (ref 70–99)
GLUCOSE BLDC GLUCOMTR-MCNC: 181 MG/DL — HIGH (ref 70–99)
GLUCOSE BLDC GLUCOMTR-MCNC: 211 MG/DL — HIGH (ref 70–99)
GLUCOSE BLDC GLUCOMTR-MCNC: 299 MG/DL — HIGH (ref 70–99)
GLUCOSE SERPL-MCNC: 123 MG/DL — HIGH (ref 70–99)
HCT VFR BLD CALC: 29.2 % — LOW (ref 39–50)
HGB BLD-MCNC: 8.4 G/DL — LOW (ref 13–17)
MAGNESIUM SERPL-MCNC: 2.3 MG/DL — SIGNIFICANT CHANGE UP (ref 1.6–2.6)
MCHC RBC-ENTMCNC: 23.7 PG — LOW (ref 27–34)
MCHC RBC-ENTMCNC: 28.8 GM/DL — LOW (ref 32–36)
MCV RBC AUTO: 82.5 FL — SIGNIFICANT CHANGE UP (ref 80–100)
NRBC # BLD: 0 /100 WBCS — SIGNIFICANT CHANGE UP (ref 0–0)
PHOSPHATE SERPL-MCNC: 3.6 MG/DL — SIGNIFICANT CHANGE UP (ref 2.5–4.5)
PLATELET # BLD AUTO: 474 K/UL — HIGH (ref 150–400)
POTASSIUM SERPL-MCNC: 3.9 MMOL/L — SIGNIFICANT CHANGE UP (ref 3.5–5.3)
POTASSIUM SERPL-SCNC: 3.9 MMOL/L — SIGNIFICANT CHANGE UP (ref 3.5–5.3)
RBC # BLD: 3.54 M/UL — LOW (ref 4.2–5.8)
RBC # FLD: 19.9 % — HIGH (ref 10.3–14.5)
SODIUM SERPL-SCNC: 141 MMOL/L — SIGNIFICANT CHANGE UP (ref 135–145)
SPECIMEN SOURCE: SIGNIFICANT CHANGE UP
WBC # BLD: 9.16 K/UL — SIGNIFICANT CHANGE UP (ref 3.8–10.5)
WBC # FLD AUTO: 9.16 K/UL — SIGNIFICANT CHANGE UP (ref 3.8–10.5)

## 2022-07-18 RX ORDER — BISOPROLOL FUMARATE AND HYDROCHLOROTHIAZIDE 5; 6.25 MG/1; MG/1
1 TABLET ORAL
Qty: 0 | Refills: 0 | DISCHARGE

## 2022-07-18 RX ADMIN — Medication 25 MILLIGRAM(S): at 05:32

## 2022-07-18 RX ADMIN — Medication 4 UNIT(S): at 12:08

## 2022-07-18 RX ADMIN — BUDESONIDE AND FORMOTEROL FUMARATE DIHYDRATE 2 PUFF(S): 160; 4.5 AEROSOL RESPIRATORY (INHALATION) at 11:22

## 2022-07-18 RX ADMIN — CHLORHEXIDINE GLUCONATE 1 APPLICATION(S): 213 SOLUTION TOPICAL at 05:31

## 2022-07-18 RX ADMIN — ALBUTEROL 2 PUFF(S): 90 AEROSOL, METERED ORAL at 21:59

## 2022-07-18 RX ADMIN — INSULIN GLARGINE 25 UNIT(S): 100 INJECTION, SOLUTION SUBCUTANEOUS at 22:02

## 2022-07-18 RX ADMIN — Medication 4 UNIT(S): at 17:15

## 2022-07-18 RX ADMIN — ALBUTEROL 2 PUFF(S): 90 AEROSOL, METERED ORAL at 14:27

## 2022-07-18 RX ADMIN — SODIUM CHLORIDE 3 MILLILITER(S): 9 INJECTION INTRAMUSCULAR; INTRAVENOUS; SUBCUTANEOUS at 05:32

## 2022-07-18 RX ADMIN — SODIUM CHLORIDE 3 MILLILITER(S): 9 INJECTION INTRAMUSCULAR; INTRAVENOUS; SUBCUTANEOUS at 22:00

## 2022-07-18 RX ADMIN — Medication 4 UNIT(S): at 07:58

## 2022-07-18 RX ADMIN — Medication 300 MILLIGRAM(S): at 12:10

## 2022-07-18 RX ADMIN — BUDESONIDE AND FORMOTEROL FUMARATE DIHYDRATE 2 PUFF(S): 160; 4.5 AEROSOL RESPIRATORY (INHALATION) at 21:59

## 2022-07-18 RX ADMIN — Medication 2: at 17:16

## 2022-07-18 RX ADMIN — SODIUM CHLORIDE 3 MILLILITER(S): 9 INJECTION INTRAMUSCULAR; INTRAVENOUS; SUBCUTANEOUS at 14:26

## 2022-07-18 RX ADMIN — PANTOPRAZOLE SODIUM 40 MILLIGRAM(S): 20 TABLET, DELAYED RELEASE ORAL at 13:24

## 2022-07-18 RX ADMIN — Medication 3: at 12:09

## 2022-07-18 RX ADMIN — ALBUTEROL 2 PUFF(S): 90 AEROSOL, METERED ORAL at 05:31

## 2022-07-18 RX ADMIN — Medication 25 MILLIGRAM(S): at 17:53

## 2022-07-18 NOTE — DISCHARGE NOTE PROVIDER - NSDCMRMEDTOKEN_GEN_ALL_CORE_FT
apixaban 5 mg oral tablet: 1 tab(s) orally every 12 hours  Basaglar KwikPen 100 units/mL subcutaneous solution: 13 unit(s) subcutaneous once a day   ferrous sulfate 75 mg/0.6 mL (15 mg elemental iron) oral liquid: 7.5 milliliter(s) orally once a day  Oxygen: 99 months  2LPM   Nasal canula  J91.8, 98.11  Oxygen sat RA rest 87  Oxygen saturation 2lpm 94%   albuterol 90 mcg/inh inhalation aerosol: 2 puff(s) inhaled every 8 hours  apixaban 5 mg oral tablet: 1 tab(s) orally every 12 hours  Basaglar KwikPen 100 units/mL subcutaneous solution: 25 unit(s) subcutaneous once a day   budesonide-formoterol 80 mcg-4.5 mcg/inh inhalation aerosol: 2 puff(s) inhaled every 12 hours   ferrous sulfate 300 mg/5 mL (60 mg/5 mL elemental iron) oral liquid: 5 milliliter(s) orally once a day  metoprolol tartrate 25 mg oral tablet: 1 tab(s) orally 2 times a day  Oxygen: 99 months  2LPM   Nasal canula  J91.8, 98.11  Oxygen sat RA rest 87  Oxygen saturation 2lpm 94%

## 2022-07-18 NOTE — PROGRESS NOTE ADULT - PROBLEM SELECTOR PLAN 5
entricular rate controlled.   CHADSVASC : 5  Continue Metoprolol 25mg BID  Eliquis resume after CT removal- cleare by IR  Patient with history of life threatening bleeding with Lovenox- DO NOT GIVE LOVENOX per family/patient.  TTE 11/2021 with normal EF.

## 2022-07-18 NOTE — CHART NOTE - NSCHARTNOTEFT_GEN_A_CORE
Spoke with spouse and sister at bedside. Explained current condition. Encouraged family to address concerns and emotional support given, all concerns and questions addressed.

## 2022-07-18 NOTE — DISCHARGE NOTE PROVIDER - NSDCCPCAREPLAN_GEN_ALL_CORE_FT
PRINCIPAL DISCHARGE DIAGNOSIS  Diagnosis: Sepsis with acute hypoxic respiratory failure  Assessment and Plan of Treatment: You were treated for COVID Pneumonia. Please continue to recommendations from Hospice Services.  Continue to use the Oxygen therapy as recommended.  Please continue taking your medication as prescribed. If you have any questions or concerns about your medication please direct them to your prescribing Healthcare Provider.        SECONDARY DISCHARGE DIAGNOSES  Diagnosis: Chronic atrial fibrillation  Assessment and Plan of Treatment: You have atrial fibrillation, this condition may have no symptoms, but when symptoms do appear they include palpitations, shortness of breath, and fatigue.  Please seek medical attention when you feel:  -chest pain   -dizziness, fatigue, inability to exercise, weakness, confusion  -fast , irregular, heart rate or palpitations, fluttering or thumping in chest  -shortness of breath  Please conitnue to take you medications as prescribed. Please follow up with your primary care physician and cardiologist in a week.      Diagnosis: Pleural effusion  Assessment and Plan of Treatment: You were treated with right Chest Tube that was removed. Please continue to use your oxygen therapy. Please follow the recommendations from Hospice Services.  Your Pleural Fluid result negative for Malignant cells.   Please follow up with Hospice.    Diagnosis: Rectal cancer  Assessment and Plan of Treatment: Please continue to follow the recommendations of Hospice service.    Diagnosis: Type 2 diabetes mellitus  Assessment and Plan of Treatment: You have Insulin Dependant Diabetes Mellitus.   When your blood sugar is elevated you may experience:  -excessive thirst, fatigue, hunger or sweating  -nausea or vomiting  -bedwetting or excessive urination  -blurred vision, fast heart rate, headache  You are insulin dependant to manage your Diabetes. Please monitor signs of Hypoglycemia (low Blood Sugar) Blood Sugar <70. Pleae closely monitor your finger stick to tightly manage your blood sugar. You blood sugar should be between  mg/dl.   Please follow up with hospice services.    Diagnosis: HTN (hypertension)  Assessment and Plan of Treatment: Please continue taking your medication as prescribed. If you have any questions or concerns about your medication please direct them to your prescribing Healthcare Provider.  Please follow recommendations from hospice services.    Diagnosis: Anemia of chronic disease  Assessment and Plan of Treatment: Anemia is a low number of red blood cells or a low amount of hemoglobin in your red blood cells. Hemoglobin is a protein that helps carry oxygen throughout your body. Red blood cells use iron to create hemoglobin. Anemia may develop if your body does not have enough iron. It may also develop if your body does not make enough red blood cells or they die faster than your body can make them.  Please follow the directions of Hospice Services.      Diagnosis: History of CVA (cerebrovascular accident)  Assessment and Plan of Treatment: Please continue taking your medication as prescribed. If you have any questions or concerns about your medication please direct them to your prescribing Healthcare Provider.      Diagnosis: UTI due to extended-spectrum beta lactamase (ESBL) producing Escherichia coli  Assessment and Plan of Treatment: You were found to have UTI (urinary tract infection) and were treated with IV antibiotics.  There are preventative measures like:  -wiping front to back after using the toilet.  -urination after sexual intercourse.  -take showers instead of baths, avoid bath oils  -drink pleanty of fluids   Please monitor for common symptoms:  -bad urine odor  -pain or burning when you urinate  -needing to urinate more often  -hard to empty your bladde all the way  -strong need to empty your bladder  Please seek immediate attention for:  -fever, palpitations, abnormal breathing,   -dizziness, lethary, confusion  Please follow up with your primary care physician within 1 week.      Diagnosis: Pneumonia due to COVID-19 virus  Assessment and Plan of Treatment: CORONAVIRUS INSTRUCTIONS: Based on your current clinical status and stability, it has been determined that you no longer need hospitalization     PRINCIPAL DISCHARGE DIAGNOSIS  Diagnosis: Sepsis with acute hypoxic respiratory failure  Assessment and Plan of Treatment: You were treated for COVID Pneumonia. Please continue to recommendations from Hospice Services.  Continue to use the Oxygen therapy as recommended.  Please continue taking your medication as prescribed. If you have any questions or concerns about your medication please direct them to your prescribing Healthcare Provider.        SECONDARY DISCHARGE DIAGNOSES  Diagnosis: Chronic atrial fibrillation  Assessment and Plan of Treatment: You have atrial fibrillation, this condition may have no symptoms, but when symptoms do appear they include palpitations, shortness of breath, and fatigue.  Please seek medical attention when you feel:  -chest pain   -dizziness, fatigue, inability to exercise, weakness, confusion  -fast , irregular, heart rate or palpitations, fluttering or thumping in chest  -shortness of breath  Please conitnue to take you medications as prescribed. Please follow up with your primary care physician and cardiologist in a week.      Diagnosis: Pleural effusion  Assessment and Plan of Treatment: You were treated with right Chest Tube that was removed please remove dressing 48hrs after, on 7/20. Please continue to use your oxygen therapy. Please follow the recommendations from Hospice Services.  Your Pleural Fluid result negative for Malignant cells.   Please follow up with Hospice.    Diagnosis: Rectal cancer  Assessment and Plan of Treatment: Please continue to follow the recommendations of Hospice service.    Diagnosis: Type 2 diabetes mellitus  Assessment and Plan of Treatment: You have Insulin Dependant Diabetes Mellitus.   When your blood sugar is elevated you may experience:  -excessive thirst, fatigue, hunger or sweating  -nausea or vomiting  -bedwetting or excessive urination  -blurred vision, fast heart rate, headache  You are insulin dependant to manage your Diabetes. Please monitor signs of Hypoglycemia (low Blood Sugar) Blood Sugar <70. Pleae closely monitor your finger stick to tightly manage your blood sugar. You blood sugar should be between  mg/dl.   Please follow up with hospice services.    Diagnosis: HTN (hypertension)  Assessment and Plan of Treatment: Please continue taking your medication as prescribed. If you have any questions or concerns about your medication please direct them to your prescribing Healthcare Provider.  Please follow recommendations from hospice services.    Diagnosis: Anemia of chronic disease  Assessment and Plan of Treatment: Anemia is a low number of red blood cells or a low amount of hemoglobin in your red blood cells. Hemoglobin is a protein that helps carry oxygen throughout your body. Red blood cells use iron to create hemoglobin. Anemia may develop if your body does not have enough iron. It may also develop if your body does not make enough red blood cells or they die faster than your body can make them.  Please follow the directions of Hospice Services.      Diagnosis: History of CVA (cerebrovascular accident)  Assessment and Plan of Treatment: Please continue taking your medication as prescribed. If you have any questions or concerns about your medication please direct them to your prescribing Healthcare Provider.      Diagnosis: UTI due to extended-spectrum beta lactamase (ESBL) producing Escherichia coli  Assessment and Plan of Treatment: You were found to have UTI (urinary tract infection) and were treated with IV antibiotics.  There are preventative measures like:  -wiping front to back after using the toilet.  -urination after sexual intercourse.  -take showers instead of baths, avoid bath oils  -drink pleanty of fluids   Please monitor for common symptoms:  -bad urine odor  -pain or burning when you urinate  -needing to urinate more often  -hard to empty your bladde all the way  -strong need to empty your bladder  Please seek immediate attention for:  -fever, palpitations, abnormal breathing,   -dizziness, lethary, confusion  Please follow up with your primary care physician within 1 week.      Diagnosis: Pneumonia due to COVID-19 virus  Assessment and Plan of Treatment: CORONAVIRUS INSTRUCTIONS: Based on your current clinical status and stability, it has been determined that you no longer need hospitalization

## 2022-07-18 NOTE — PROGRESS NOTE ADULT - PROBLEM SELECTOR PLAN 4
Multiple loculated effusions right chest.  S/P CT guided chest tube 7/13  CXR shows near complete atelectasis of right lung.  Chest PT every 4 hours  Chest tube to water seal - will be removed today by IR  Hold Eliquis for CT removal

## 2022-07-18 NOTE — PROGRESS NOTE ADULT - SUBJECTIVE AND OBJECTIVE BOX
MELIZA DAILEY    SCU progress note    INTERVAL HPI/OVERNIGHT EVENTS: *** Patient seen and examined at bedside. No overnight events.    DNR [x ]   DNI  [  x]    Covid - 19 PCR: 7/16/22 negative    The 4Ms    What Matters Most: see [facility]  Age appropriate Medications/Screen for High Risk Medication: Yes  Mentation: see CAM below  Mobility: defer to physical exam    The Confusion Assessment Method (CAM) Diagnostic Algorithm     1: Acute Onset or Fluctuating Course  - Is there evidence of an acute change in mental status from the patient’s baseline? Did the (abnormal) behavior  fluctuate during the day, that is, tend to come and go, or increase and decrease in severity?  [ ] YES [x ] NO     2: Inattention  - Did the patient have difficulty focusing attention, being easily distractible, or having difficulty keeping track of what was being said?  [ ] YES [ x] NO     3: Disorganized thinking  -Was the patient’s thinking disorganized or incoherent, such as rambling or irrelevant conversation, unclear or illogical flow of ideas, or unpredictable switching from subject to subject?  [ ] YES [ x] NO    4: Altered Level of consciousness?  [ ] YES [ x] NO    The diagnosis of delirium by CAM requires the presence of features 1 and 2 and either 3 or 4.    PRESSORS: [ ] YES [x ] NO    Cardiovascular:  Heart Failure  Acute   Acute on Chronic  Chronic       metoprolol tartrate 25 milliGRAM(s) Oral two times a day    Pulmonary:  ALBUTerol    0.083% 2.5 milliGRAM(s) Nebulizer every 6 hours PRN  ALBUTerol    90 MICROgram(s) HFA Inhaler 2 Puff(s) Inhalation every 8 hours  budesonide  80 MICROgram(s)/formoterol 4.5 MICROgram(s) Inhaler 2 Puff(s) Inhalation two times a day    Hematalogic:    Other:  chlorhexidine 2% Cloths 1 Application(s) Topical <User Schedule>  dextrose 5%. 1000 milliLiter(s) IV Continuous <Continuous>  dextrose 5%. 1000 milliLiter(s) IV Continuous <Continuous>  dextrose 50% Injectable 25 Gram(s) IV Push once  dextrose 50% Injectable 12.5 Gram(s) IV Push once  dextrose 50% Injectable 25 Gram(s) IV Push once  dextrose Oral Gel 15 Gram(s) Oral once PRN  ferrous    sulfate Liquid 300 milliGRAM(s) Oral daily  glucagon  Injectable 1 milliGRAM(s) IntraMuscular once  insulin glargine Injectable (LANTUS) 25 Unit(s) SubCutaneous at bedtime  insulin lispro (ADMELOG) corrective regimen sliding scale   SubCutaneous at bedtime  insulin lispro (ADMELOG) corrective regimen sliding scale   SubCutaneous three times a day before meals  insulin lispro Injectable (ADMELOG) 4 Unit(s) SubCutaneous three times a day before meals  morphine  - Injectable 2 milliGRAM(s) IV Push once  ondansetron Injectable 4 milliGRAM(s) IV Push every 6 hours PRN  pantoprazole   Suspension 40 milliGRAM(s) Oral daily  sodium chloride 0.9% lock flush 3 milliLiter(s) IV Push every 8 hours    ALBUTerol    0.083% 2.5 milliGRAM(s) Nebulizer every 6 hours PRN  ALBUTerol    90 MICROgram(s) HFA Inhaler 2 Puff(s) Inhalation every 8 hours  budesonide  80 MICROgram(s)/formoterol 4.5 MICROgram(s) Inhaler 2 Puff(s) Inhalation two times a day  chlorhexidine 2% Cloths 1 Application(s) Topical <User Schedule>  dextrose 5%. 1000 milliLiter(s) IV Continuous <Continuous>  dextrose 5%. 1000 milliLiter(s) IV Continuous <Continuous>  dextrose 50% Injectable 25 Gram(s) IV Push once  dextrose 50% Injectable 12.5 Gram(s) IV Push once  dextrose 50% Injectable 25 Gram(s) IV Push once  dextrose Oral Gel 15 Gram(s) Oral once PRN  ferrous    sulfate Liquid 300 milliGRAM(s) Oral daily  glucagon  Injectable 1 milliGRAM(s) IntraMuscular once  insulin glargine Injectable (LANTUS) 25 Unit(s) SubCutaneous at bedtime  insulin lispro (ADMELOG) corrective regimen sliding scale   SubCutaneous at bedtime  insulin lispro (ADMELOG) corrective regimen sliding scale   SubCutaneous three times a day before meals  insulin lispro Injectable (ADMELOG) 4 Unit(s) SubCutaneous three times a day before meals  metoprolol tartrate 25 milliGRAM(s) Oral two times a day  morphine  - Injectable 2 milliGRAM(s) IV Push once  ondansetron Injectable 4 milliGRAM(s) IV Push every 6 hours PRN  pantoprazole   Suspension 40 milliGRAM(s) Oral daily  sodium chloride 0.9% lock flush 3 milliLiter(s) IV Push every 8 hours    Drug Dosing Weight  Height (cm): 180.3 (02 Jul 2022 05:23)  Weight (kg): 107 (02 Jul 2022 09:10)  BMI (kg/m2): 32.9 (02 Jul 2022 09:10)  BSA (m2): 2.26 (02 Jul 2022 09:10)    CENTRAL LINE: [ ] YES [x ] NO  LOCATION:   DATE INSERTED:  REMOVE: [ ] YES [ ] NO  EXPLAIN:    RIBEIRO: [ ] YES [x ] NO    DATE INSERTED:  REMOVE:  [ ] YES [ ] NO  EXPLAIN:    PAST MEDICAL & SURGICAL HISTORY:  Lumbago      Lumbago with sciatica of right side      Benign hypertension  dx 10 years      H/O renal calculi  ESWL right side yrs ago  last stone one year ago  (passed on its own)      Obese      Eczema      Diabetes mellitus type II  on Meds 4/2012      Chronic atrial fibrillation      Colon cancer      S/P tonsillectomy      Port-A-Cath in place            PHYSICAL EXAM:  GENERAL: NAD  HEAD:  Atraumatic, Normocephalic  EYES: EOMI, PERRLA, conjunctiva and sclera clear  ENMT: No tonsillar erythema, exudates, or enlargement; Moist mucous membranes, Good dentition, No lesions  NECK: Supple, No JVD, Normal thyroid  NERVOUS SYSTEM:  Alert & Oriented X3, Good concentration; Generalized weakness  CHEST/LUNG: Right chest tube to water seal. Diminished right upper and lower chest.   HEART: Regular rate and rhythm; No murmurs, rubs, or gallops  ABDOMEN: Soft, Nontender, Nondistended; Bowel sounds present  EXTREMITIES:  2+ Peripheral Pulses, No clubbing, cyanosis, or edema  LYMPH: No lymphadenopathy noted  SKIN: No rashes or lesions;         LABS:  CBC Full  -  ( 18 Jul 2022 09:05 )  WBC Count : 9.16 K/uL  RBC Count : 3.54 M/uL  Hemoglobin : 8.4 g/dL  Hematocrit : 29.2 %  Platelet Count - Automated : 474 K/uL  Mean Cell Volume : 82.5 fl  Mean Cell Hemoglobin : 23.7 pg  Mean Cell Hemoglobin Concentration : 28.8 gm/dL  Auto Neutrophil # : x  Auto Lymphocyte # : x  Auto Monocyte # : x  Auto Eosinophil # : x  Auto Basophil # : x  Auto Neutrophil % : x  Auto Lymphocyte % : x  Auto Monocyte % : x  Auto Eosinophil % : x  Auto Basophil % : x    07-18    141  |  101  |  8   ----------------------------<  123<H>  3.9   |  35<H>  |  0.62    Ca    8.5      18 Jul 2022 09:05  Phos  3.6     07-18  Mg     2.3     07-18      [  ]  DVT Prophylaxis  [  ]  Nutrition, Brand, Rate         Goal Rate        Abnormal Nutritional Status -  Malnutrition   Cachexia      Morbid Obesity BMI >/=40    RADIOLOGY & ADDITIONAL STUDIES:  ***  < from: Xray Chest 1 View- PORTABLE-Routine (Xray Chest 1 View- PORTABLE-Routine in AM.) (07.17.22 @ 13:15) >  FINDINGS:  Right jugular central venous catheter in projection of the cavoatrial   junction.  Right chest tube in unchanged location projecting over the right lateral   mid chest cavity.  Heart/Vascular: Normal-sized heart.  Pulmonary: Persistent total opacification of the right chest cavity   likely from large right pleural effusion with total right lung   atelectasis and no significant change or improvement compared to prior   study 7/16/2022. Unchanged mild congestion. No pneumothorax. No   mediastinal shift.  Bones: Withinnormal limits.    Impression:  No significant change compared to 7/16/2022 and 7/14/2022. Persistent   large right effusion with atelectasis of the right lung.        --- End of Report ---    < end of copied text >    Goals of Care Discussion with Family/Proxy/Other   - see note from  7/15/22

## 2022-07-18 NOTE — DISCHARGE NOTE PROVIDER - HOSPITAL COURSE
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 COVID+   ICU Course:   Pt febrile, HR >90, lactate 2.6, UA+, CXR with pleural effusion/mucous plug vs consolidation, started on Unasyn (7/2). Started on mucomyst, albuterol, chest PT. Patient extubated to Nasal cannula on 7/6. Decadron discontinued per primary team as PNA deemed to be more bacterial then COVID. Eliquis held for possible thoracentesis. Held Bisoprolol/HCTZ for HTN. AMELIA improved with IVF. Lantus and sliding scale for DM, PPI for GI ppx. Started on soft and bite sized diet per S&S. Unsayn switched to Nerissa (7/7) for ESBL E. coli and Enteroccocus growing in urine, ID Jose Miguel on board. Patient was extubated on 7/6/22 and downgraded to SCU on 7/7.   SCU Course:  7/7/22 Patient was seen at bedside, remains comfortable on 2L NC, 98%. Evaluated PT and recommends Rehab.   7/8/22 -Ordered Chest PT and CT chest for re-evaluate pleural effusion.   spoke with wife and she agrees plan of care. Pending [facility] choice. CM/SW following.   7/9/22 - CT Chest completed, pending recs; Resumed Eliquis in AM 5mg BID; Received Chest PT; Oxygenation>90% on 2LNC;   7/10/22- Patient with right lung effusion and right bronchus secretions per CT. Plan for bronchoscopy and possible chest tube placement tomorrow.   7/11 Pulmonary unable to place chest tube. IR to follow up for chest tube placement.   7/13 placed chest tube by IR, fluid pathology sent  7/14 continue chest tube right with suction, monitor I/Os. recorded 42 ml this am with serous fluid. H/H stable.  hold AC for 48hr post CT. will resume tomorrow.   7/16 Right Chest tube to suction , draining small amt. F/u CXR. Change to water seal  7/18 Right chest tube removed. Patient Oxygen delivery by today, for discharge to home with hospice.    Please note that this a brief summary of hospital course please refer to daily progress notes and consult notes for full course and events. Patient seen and examined at bedside, discussed with medical attending. Patient for discharge to home with home hospice.

## 2022-07-18 NOTE — PROGRESS NOTE ADULT - SUBJECTIVE AND OBJECTIVE BOX
Request made to remove right chest tube.  Minimal output, patient ready for discharge.  Tube removed , dressing applied.

## 2022-07-18 NOTE — PROGRESS NOTE ADULT - PROBLEM SELECTOR PLAN 12
rectal Cancer  Hold Eliquis for CT removal  Overall prognosis poor  Pt is DNR/DNI  Family requests home hospice .  SW following

## 2022-07-18 NOTE — CHART NOTE - NSCHARTNOTESELECT_GEN_ALL_CORE
Family Update Note/Event Note
Family Update/Event Note
Nutrition Services
family update/Event Note
Family Update Note/Event Note
Family Update Note/Event Note
Family Update/Event Note
Family update/Event Note
Family/Event Note
ICU Downgrade to AI/Transfer Note
family update/Event Note

## 2022-07-19 ENCOUNTER — TRANSCRIPTION ENCOUNTER (OUTPATIENT)
Age: 67
End: 2022-07-19

## 2022-07-19 VITALS
TEMPERATURE: 98 F | OXYGEN SATURATION: 99 % | HEART RATE: 84 BPM | RESPIRATION RATE: 18 BRPM | DIASTOLIC BLOOD PRESSURE: 58 MMHG | SYSTOLIC BLOOD PRESSURE: 120 MMHG

## 2022-07-19 LAB
ALBUMIN SERPL ELPH-MCNC: 1.4 G/DL — LOW (ref 3.5–5)
ALP SERPL-CCNC: 118 U/L — SIGNIFICANT CHANGE UP (ref 40–120)
ALT FLD-CCNC: 18 U/L DA — SIGNIFICANT CHANGE UP (ref 10–60)
ANION GAP SERPL CALC-SCNC: 5 MMOL/L — SIGNIFICANT CHANGE UP (ref 5–17)
AST SERPL-CCNC: 11 U/L — SIGNIFICANT CHANGE UP (ref 10–40)
BILIRUB SERPL-MCNC: 0.2 MG/DL — SIGNIFICANT CHANGE UP (ref 0.2–1.2)
BUN SERPL-MCNC: 8 MG/DL — SIGNIFICANT CHANGE UP (ref 7–18)
CALCIUM SERPL-MCNC: 8.8 MG/DL — SIGNIFICANT CHANGE UP (ref 8.4–10.5)
CHLORIDE SERPL-SCNC: 102 MMOL/L — SIGNIFICANT CHANGE UP (ref 96–108)
CO2 SERPL-SCNC: 33 MMOL/L — HIGH (ref 22–31)
CREAT SERPL-MCNC: 0.59 MG/DL — SIGNIFICANT CHANGE UP (ref 0.5–1.3)
EGFR: 107 ML/MIN/1.73M2 — SIGNIFICANT CHANGE UP
GLUCOSE BLDC GLUCOMTR-MCNC: 149 MG/DL — HIGH (ref 70–99)
GLUCOSE BLDC GLUCOMTR-MCNC: 180 MG/DL — HIGH (ref 70–99)
GLUCOSE SERPL-MCNC: 114 MG/DL — HIGH (ref 70–99)
HCT VFR BLD CALC: 29.4 % — LOW (ref 39–50)
HGB BLD-MCNC: 8.4 G/DL — LOW (ref 13–17)
MAGNESIUM SERPL-MCNC: 2.3 MG/DL — SIGNIFICANT CHANGE UP (ref 1.6–2.6)
MCHC RBC-ENTMCNC: 24 PG — LOW (ref 27–34)
MCHC RBC-ENTMCNC: 28.6 GM/DL — LOW (ref 32–36)
MCV RBC AUTO: 84 FL — SIGNIFICANT CHANGE UP (ref 80–100)
NRBC # BLD: 0 /100 WBCS — SIGNIFICANT CHANGE UP (ref 0–0)
PHOSPHATE SERPL-MCNC: 3.9 MG/DL — SIGNIFICANT CHANGE UP (ref 2.5–4.5)
PLATELET # BLD AUTO: 491 K/UL — HIGH (ref 150–400)
POTASSIUM SERPL-MCNC: 4.1 MMOL/L — SIGNIFICANT CHANGE UP (ref 3.5–5.3)
POTASSIUM SERPL-SCNC: 4.1 MMOL/L — SIGNIFICANT CHANGE UP (ref 3.5–5.3)
PROT SERPL-MCNC: 6.6 G/DL — SIGNIFICANT CHANGE UP (ref 6–8.3)
RBC # BLD: 3.5 M/UL — LOW (ref 4.2–5.8)
RBC # FLD: 19.7 % — HIGH (ref 10.3–14.5)
SODIUM SERPL-SCNC: 140 MMOL/L — SIGNIFICANT CHANGE UP (ref 135–145)
WBC # BLD: 8.11 K/UL — SIGNIFICANT CHANGE UP (ref 3.8–10.5)
WBC # FLD AUTO: 8.11 K/UL — SIGNIFICANT CHANGE UP (ref 3.8–10.5)

## 2022-07-19 PROCEDURE — 85730 THROMBOPLASTIN TIME PARTIAL: CPT

## 2022-07-19 PROCEDURE — 88305 TISSUE EXAM BY PATHOLOGIST: CPT

## 2022-07-19 PROCEDURE — 97530 THERAPEUTIC ACTIVITIES: CPT

## 2022-07-19 PROCEDURE — 87635 SARS-COV-2 COVID-19 AMP PRB: CPT

## 2022-07-19 PROCEDURE — 87102 FUNGUS ISOLATION CULTURE: CPT

## 2022-07-19 PROCEDURE — 83880 ASSAY OF NATRIURETIC PEPTIDE: CPT

## 2022-07-19 PROCEDURE — 84157 ASSAY OF PROTEIN OTHER: CPT

## 2022-07-19 PROCEDURE — 83615 LACTATE (LD) (LDH) ENZYME: CPT

## 2022-07-19 PROCEDURE — 94640 AIRWAY INHALATION TREATMENT: CPT

## 2022-07-19 PROCEDURE — 92610 EVALUATE SWALLOWING FUNCTION: CPT

## 2022-07-19 PROCEDURE — 87070 CULTURE OTHR SPECIMN AEROBIC: CPT

## 2022-07-19 PROCEDURE — 81001 URINALYSIS AUTO W/SCOPE: CPT

## 2022-07-19 PROCEDURE — 82945 GLUCOSE OTHER FLUID: CPT

## 2022-07-19 PROCEDURE — 71250 CT THORAX DX C-: CPT

## 2022-07-19 PROCEDURE — 80048 BASIC METABOLIC PNL TOTAL CA: CPT

## 2022-07-19 PROCEDURE — 83986 ASSAY PH BODY FLUID NOS: CPT

## 2022-07-19 PROCEDURE — 83605 ASSAY OF LACTIC ACID: CPT

## 2022-07-19 PROCEDURE — 85027 COMPLETE CBC AUTOMATED: CPT

## 2022-07-19 PROCEDURE — 80061 LIPID PANEL: CPT

## 2022-07-19 PROCEDURE — 87086 URINE CULTURE/COLONY COUNT: CPT

## 2022-07-19 PROCEDURE — 87075 CULTR BACTERIA EXCEPT BLOOD: CPT

## 2022-07-19 PROCEDURE — 99285 EMERGENCY DEPT VISIT HI MDM: CPT

## 2022-07-19 PROCEDURE — 87186 SC STD MICRODIL/AGAR DIL: CPT

## 2022-07-19 PROCEDURE — 93005 ELECTROCARDIOGRAM TRACING: CPT

## 2022-07-19 PROCEDURE — 82962 GLUCOSE BLOOD TEST: CPT

## 2022-07-19 PROCEDURE — 88112 CYTOPATH CELL ENHANCE TECH: CPT

## 2022-07-19 PROCEDURE — 94002 VENT MGMT INPAT INIT DAY: CPT

## 2022-07-19 PROCEDURE — 87899 AGENT NOS ASSAY W/OPTIC: CPT

## 2022-07-19 PROCEDURE — 83036 HEMOGLOBIN GLYCOSYLATED A1C: CPT

## 2022-07-19 PROCEDURE — 36415 COLL VENOUS BLD VENIPUNCTURE: CPT

## 2022-07-19 PROCEDURE — 71045 X-RAY EXAM CHEST 1 VIEW: CPT

## 2022-07-19 PROCEDURE — 92526 ORAL FUNCTION THERAPY: CPT

## 2022-07-19 PROCEDURE — 85610 PROTHROMBIN TIME: CPT

## 2022-07-19 PROCEDURE — 82009 KETONE BODYS QUAL: CPT

## 2022-07-19 PROCEDURE — 82803 BLOOD GASES ANY COMBINATION: CPT

## 2022-07-19 PROCEDURE — 96375 TX/PRO/DX INJ NEW DRUG ADDON: CPT

## 2022-07-19 PROCEDURE — 87205 SMEAR GRAM STAIN: CPT

## 2022-07-19 PROCEDURE — 97161 PT EVAL LOW COMPLEX 20 MIN: CPT

## 2022-07-19 PROCEDURE — 82042 OTHER SOURCE ALBUMIN QUAN EA: CPT

## 2022-07-19 PROCEDURE — 87040 BLOOD CULTURE FOR BACTERIA: CPT

## 2022-07-19 PROCEDURE — 89051 BODY FLUID CELL COUNT: CPT

## 2022-07-19 PROCEDURE — 49405 IMAGE CATH FLUID COLXN VISC: CPT

## 2022-07-19 PROCEDURE — 87637 SARSCOV2&INF A&B&RSV AMP PRB: CPT

## 2022-07-19 PROCEDURE — 94003 VENT MGMT INPAT SUBQ DAY: CPT

## 2022-07-19 PROCEDURE — 84100 ASSAY OF PHOSPHORUS: CPT

## 2022-07-19 PROCEDURE — 83735 ASSAY OF MAGNESIUM: CPT

## 2022-07-19 PROCEDURE — 97110 THERAPEUTIC EXERCISES: CPT

## 2022-07-19 PROCEDURE — 80053 COMPREHEN METABOLIC PANEL: CPT

## 2022-07-19 PROCEDURE — 96374 THER/PROPH/DIAG INJ IV PUSH: CPT

## 2022-07-19 PROCEDURE — 85025 COMPLETE CBC W/AUTO DIFF WBC: CPT

## 2022-07-19 PROCEDURE — 87449 NOS EACH ORGANISM AG IA: CPT

## 2022-07-19 PROCEDURE — 84145 PROCALCITONIN (PCT): CPT

## 2022-07-19 PROCEDURE — 87640 STAPH A DNA AMP PROBE: CPT

## 2022-07-19 PROCEDURE — 86738 MYCOPLASMA ANTIBODY: CPT

## 2022-07-19 PROCEDURE — 84484 ASSAY OF TROPONIN QUANT: CPT

## 2022-07-19 PROCEDURE — 87641 MR-STAPH DNA AMP PROBE: CPT

## 2022-07-19 RX ORDER — FERROUS SULFATE 325(65) MG
5 TABLET ORAL
Qty: 150 | Refills: 0
Start: 2022-07-19 | End: 2022-08-17

## 2022-07-19 RX ORDER — INSULIN GLARGINE 100 [IU]/ML
25 INJECTION, SOLUTION SUBCUTANEOUS
Qty: 3 | Refills: 0
Start: 2022-07-19 | End: 2022-08-17

## 2022-07-19 RX ORDER — METOPROLOL TARTRATE 50 MG
1 TABLET ORAL
Qty: 60 | Refills: 0
Start: 2022-07-19 | End: 2022-08-17

## 2022-07-19 RX ORDER — BUDESONIDE AND FORMOTEROL FUMARATE DIHYDRATE 160; 4.5 UG/1; UG/1
2 AEROSOL RESPIRATORY (INHALATION)
Qty: 1 | Refills: 0
Start: 2022-07-19 | End: 2022-08-17

## 2022-07-19 RX ORDER — ALBUTEROL 90 UG/1
2 AEROSOL, METERED ORAL
Qty: 1 | Refills: 0
Start: 2022-07-19 | End: 2022-08-17

## 2022-07-19 RX ORDER — APIXABAN 2.5 MG/1
5 TABLET, FILM COATED ORAL EVERY 12 HOURS
Refills: 0 | Status: DISCONTINUED | OUTPATIENT
Start: 2022-07-19 | End: 2022-07-19

## 2022-07-19 RX ADMIN — Medication 4 UNIT(S): at 08:03

## 2022-07-19 RX ADMIN — Medication 4 UNIT(S): at 11:50

## 2022-07-19 RX ADMIN — ALBUTEROL 2 PUFF(S): 90 AEROSOL, METERED ORAL at 05:51

## 2022-07-19 RX ADMIN — CHLORHEXIDINE GLUCONATE 1 APPLICATION(S): 213 SOLUTION TOPICAL at 05:51

## 2022-07-19 RX ADMIN — Medication 1: at 11:51

## 2022-07-19 RX ADMIN — Medication 25 MILLIGRAM(S): at 05:52

## 2022-07-19 RX ADMIN — SODIUM CHLORIDE 3 MILLILITER(S): 9 INJECTION INTRAMUSCULAR; INTRAVENOUS; SUBCUTANEOUS at 13:05

## 2022-07-19 RX ADMIN — Medication 300 MILLIGRAM(S): at 11:52

## 2022-07-19 RX ADMIN — BUDESONIDE AND FORMOTEROL FUMARATE DIHYDRATE 2 PUFF(S): 160; 4.5 AEROSOL RESPIRATORY (INHALATION) at 09:29

## 2022-07-19 RX ADMIN — PANTOPRAZOLE SODIUM 40 MILLIGRAM(S): 20 TABLET, DELAYED RELEASE ORAL at 11:51

## 2022-07-19 RX ADMIN — SODIUM CHLORIDE 3 MILLILITER(S): 9 INJECTION INTRAMUSCULAR; INTRAVENOUS; SUBCUTANEOUS at 05:49

## 2022-07-19 NOTE — PROGRESS NOTE ADULT - PROBLEM SELECTOR PROBLEM 11
Advance care planning
Prophylactic measure
Advance care planning
History of CVA (cerebrovascular accident)
History of CVA (cerebrovascular accident)
Prophylactic measure
Prophylactic measure
History of CVA (cerebrovascular accident)
Advance care planning
Prophylactic measure

## 2022-07-19 NOTE — PROGRESS NOTE ADULT - ASSESSMENT
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 COVID+   ICU Course:   Pt febrile, HR >90, lactate 2.6, UA+, CXR with pleural effusion/mucous plug vs consolidation, started on Unasyn (7/2). Started on mucomyst, albuterol, chest PT. Patient extubated to Nasal cannula on 7/6. Decadron discontinued per primary team as PNA deemed to be more bacterial then COVID. Eliquis held for possible thoracentesis. Held Bisoprolol/HCTZ for HTN. AMELIA improved with IVF. Lantus and sliding scale for DM, PPI for GI ppx. Started on soft and bite sized diet per S&S. Unsayn switched to Nerissa (7/7) for ESBL E. coli and Enteroccocus growing in urine, ID Jose Miguel on board. Patient was extubated on 7/6/22 and downgraded to SCU on 7/7.   SCU Course:  7/7/22 Patient was seen at bedside, remains comfortable on 2L NC, 98%. Evaluated PT and recommends Rehab.   7/8/22 -Ordered Chest PT and CT chest for re-evaluate pleural effusion.   spoke with wife and she agrees plan of care. Pending [facility] choice. CM/SW following.   7/9/22 - CT Chest completed, pending recs; Resumed Eliquis in AM 5mg BID; Received Chest PT; Oxygenation>90% on 2LNC;   7/10/22- Patient with right lung effusion and right bronchus secretions per CT. Plan for bronchoscopy and possible chest tube placement tomorrow.   7/11 Pulmonary unable to place chest tube. IR to follow up for chest tube placement.   7/13 placed chest tube by IR, fluid pathology sent  7/14 continue chest tube right with suction, monitor I/Os. recorded 42 ml this am with serous fluid. H/H stable.  hold AC for 48hr post CT. will resume tomorrow.   7/16 Right Chest tube to suction , draining small amt. F/u CXR. Change to water seal .   7/19 chest tube removed yester by IR.

## 2022-07-19 NOTE — PROGRESS NOTE ADULT - PROBLEM SELECTOR PLAN 7
A1C 7.9  On lantus 13 + lispro 8U TID with sliding scale at home  Currently requiring Lantus 25 units and correctional admelog scale  BS bet 150s and 250s  Adjust insulin as indicated  FS ACHS with diet or q6 while NPO  Maintain poct 140-180 while inpatient.
-Resolved today, continue to monitor daily  -IVF PRN
h/o rectal adenocarcinoma, s/p chemo.   Per wife, pt has not had an appt since his stroke event.  Home Hospice
h/o rectal adenocarcinoma, s/p chemo.   F/u outpt Heme/Onc for continued management.   Per wife, pt has not had an appt since his stroke event.
hx rectal adenocarcinoma, had chemo, has chemo port, not accessed  f/u outpatient heme/onc for continued management  Per wife, patient has not had an appointment since the stroke incident.
hx rectal adenocarcinoma, had chemo, has chemo port, not accessed  f/u outpatient heme/onc for continued management  Per wife, patient has not had an appointment since the stroke incident.
h/o rectal adenocarcinoma, s/p chemo.   F/u outpt Heme/Onc for continued management.   Per wife, pt has not had an appt since his stroke event.
A1C 7.9  On lantus 13 + lispro 8U TID with sliding scale at home  Currently requiring Lantus 25 units and correctional admelog scale  BS bet 150s and 250s  Adjust insulin as indicated  FS ACHS with diet or q6 while NPO  Maintain poct 140-180 while inpatient.
-Resolved today, continue to monitor daily  -IVF PRN
A1C 7.9  On lantus 13 + lispro 8U TID with sliding scale at home  Currently requiring Lantus 25 units and correctional admelog scale  BS bet 150s and 250s  Adjust insulin as indicated  FS ACHS with diet or q6 while NPO  Maintain poct 140-180 while inpatient.
h/o rectal adenocarcinoma, s/p chemo.   Per wife, pt has not had an appt since his stroke event.  Home Hospice
-K 3.3, Na 148  -IVF   -replaced po supplement  -monitor lytes.

## 2022-07-19 NOTE — PROGRESS NOTE ADULT - PROBLEM SELECTOR PLAN 8
H/H low, stable  Continue iron supplement  Monitor   Transfuse for Hgb <8.0.
hx rectal adenocarcinoma, had chemo, has chemo port, not accessed  f/u outpatient heme/onc for continued management  Per wife, patient has not had an appointment since the stroke incident.
H&H low.  Monitor daily.   Transfuse for Hgb less than 8.0
H/H low, stable  Continue iron supplement  Monitor   Transfuse for Hgb <8.0.
hx rectal adenocarcinoma, had chemo, has chemo port, not accessed  f/u outpatient heme/onc for continued management  Per wife, patient has not had an appointment since the stroke incident.
H&H low but stable.  Monitor daily.   Transfuse for Hgb less than 8.0.
H/H low, stable  Continue iron supplement  Monitor   Transfuse for Hgb <8.0.
H/H low, downtrending.  Continue iron supplement  Monitor daily  Transfuse for Hgb <8.0
-hx rectal adenocarcinoma, had chemo, has chemo port, not accessed  -f/u outpatient heme/onc for continued management  -Per wife, patient has not had an appointment since the stroke incident
hx rectal adenocarcinoma, had chemo, has chemo port, not accessed  f/u outpatient heme/onc for continued management  Per wife, patient has not had an appointment since the stroke incident.
-hx rectal adenocarcinoma, had chemo, has chemo port, not accessed  -f/u outpatient heme/onc for continued management  -Per wife, patient has not had an appointment since the stroke incident
-hx rectal adenocarcinoma, had chemo  -f/u outpatient heme/one

## 2022-07-19 NOTE — PROGRESS NOTE ADULT - PROBLEM SELECTOR PROBLEM 1
Sepsis with acute hypoxic respiratory failure

## 2022-07-19 NOTE — PROGRESS NOTE ADULT - NS ATTEND OPT1 GEN_ALL_CORE

## 2022-07-19 NOTE — PROGRESS NOTE ADULT - NS ATTEND AMEND GEN_ALL_CORE FT
I agree with above
Problem/Plan - 1:  ·  Problem: Sepsis with acute hypoxic respiratory failure.   ·  Plan: +Fever and elevated lactate upon admission.  Given 1L bolus in ED.  Bcx negative  Ucx +ESBL   Unasyn started 7/2, changed to Meropenem 7/7 for ESBL in urine  -ID Dr. Gillespie.     Problem/Plan - 2:  ·  Problem: Pneumonia due to COVID-19 virus.   ·  Plan: No indication for decadron/remdesivir  CXR as above, infiltrate b/l lower lobes, pleural effusion R>L  s/p intubation and extubation 7/6.    saturating well on NC 2L  MRSA PCR +, treated with Chlorhexadine and mupirocin  Continue with mechanical chest vest. Encourage to lay on left side with right side up.  Continue with bronchodilators and ICS  CT with large pleural effusion on Right Lung as above. Pulmonary unable to place chest tube at bedside. IR will place chest tube today. Hold heparin.  Serial CXRs.     Problem/Plan - 3:  ·  Problem: Pleural effusion.   ·  Plan: Large right sided pleural effusion.  CXR and CT chest as above.  Scheduled for right sided chest tube placement today by IR.  Serial CXRs  Continue mechanical chest vest, bronchodilators and ICS as ordered.  Monitor oxygen saturation. Continue oxygen 2LPM  Will need oxygen testing before discharge.     Problem/Plan - 4:  ·  Problem: UTI due to extended-spectrum beta lactamase (ESBL) producing Escherichia coli.   ·  Plan: Continue antibiotics as per ID/Meropenem.  Dr Gillespie following.  ID f/u.     Problem/Plan - 5:  ·  Problem: Chronic atrial fibrillation.   ·  Plan: CHADSVASC: 5  -Eliquis on hold from 7/4 - 7/8; Restarted 7/9 am, 5mg BID; Discontinued in plan for procedure; Replaced anticoagulation with Heparin. Patient with history of life threatening bleeding with Lovenox- DO NOT GIVE LOVENOX per family/patient.  Continue metoprolol 25mg BID with parameters.  TTE 11/2021 with normal EF.     Problem/Plan - 6:  ·  Problem: Type 2 diabetes mellitus.   ·  Plan: A1C 7.9  On lantus 13 + lispro 8U TID with sliding scale at home  Currently requiring Lantus 25 units and correctional admelog scale  BS bet 150s and 250s  Adjust insulin as indicated  FS ACHS with diet or q6 while NPO  Maintain poct 140-180 while inpatient.     Problem/Plan - 7:  ·  Problem: Rectal cancer.   ·  Plan: hx rectal adenocarcinoma, had chemo, has chemo port, not accessed  f/u outpatient heme/onc for continued management  Per wife, patient has not had an appointment since the stroke incident.     Problem/Plan - 8:  ·  Problem: Anemia of chronic disease.   ·  Plan: H&H low but stable.  Monitor daily.   Transfuse for Hgb less than 8.0.
Chest tube placement by IR today   Follow up chest tube output and pleural fluid studies   Cont. Antibiotics  Aspiration precaution  PT follow up   Bronchodilators
Problem/Plan - 1:  ·  Problem: Sepsis with acute hypoxic respiratory failure.   ·  Plan: +Fever and elevated lactate upon admission.  Given 1L bolus in ED.  Bcx negative  Ucx +ESBL   Unasyn started 7/2, changed to Meropenem 7/7 for ESBL in urine  -ID Dr. Gillespie.     Problem/Plan - 2:  ·  Problem: Pneumonia due to COVID-19 virus.   ·  Plan: No indication for decadron/remdesivir  CXR as above, infiltrate b/l lower lobes, pleural effusion R>L  s/p intubation and extubation 7/6.    saturating well on NC 2L  MRSA PCR +, treated with Chlorhexadine and mupirocin  Continue with mechanical chest vest. Encourage to lay on left side with right side up.  Continue with bronchodilators and ICS  CT with large pleural effusion on Right Lung as above. Pulmonary unable to place chest tube at bedside. IR will place chest tube tomorrow. Hold heparin.  Serial CXRs.     Problem/Plan - 3:  ·  Problem: Pleural effusion.   ·  Plan: Large right sided pleural effusion.  CXR and CT chest as above.  Scheduled for right sided chest tube placement today.  Serial CXRs  Continue mechanical chest vest, bronchodilators and ICS as ordered.  Monitor oxygen saturation. Continue oxygen 2LPM  Will need oxygen testing before discharge.
Patient remains with collapse of the right lung, concern for possible trapped lung as with out respiratory distress  cont. Chest tube to suction  Would consider thoracic surgery consult, though it seems the wife at bedside prefers a more limited/conservative approach   Repeat CXR daily   Follow up chest tube output and pleural fluid studies - appears exudative   Cont. Antibiotics  Aspiration precaution  PT follow up   Bronchodilators  Wife at bedside  Goals of care conversation with the family
Patient remains with collapse of the right lung, concern for possible trapped lung as with out respiratory distress  Change chest tube to water seal  Follow up chest tube output and pleural fluid studies - appears exudative   Cont. Antibiotics  Aspiration precaution  PT follow up   Bronchodilators  Wife at bedside  Goals of care conversation with the family, plan for home hospice
Patient remains with collapse of the right lung, concern for possible trapped lung as with out respiratory distress  Chest tube removed by IR  Follow up pleural fluid studies - appears exudative   Aspiration precaution  PT follow up   Bronchodilators  Wife and sister at bedside  Goals of care conversation with the family  Discharge home with hospice services
Patient remains with collapse of the right lung, concern for possible trapped lung as with out respiratory distress  Change chest tube to water seal  Follow up chest tube output and pleural fluid studies - appears exudative   Cont. Antibiotics  Aspiration precaution  PT follow up   Bronchodilators  Wife at bedside  Goals of care conversation with the family, plan for home hospice
Patient remains with collapse of the right lung, concern for possible trapped lung as with out respiratory distress  Plan for removal of chest tube today by IR  Follow up pleural fluid studies - appears exudative   Aspiration precaution  PT follow up   Bronchodilators  Wife and sister at bedside  Goals of care conversation with the family, plan for home hospice pending oxygen delivery
Problem/Plan - 1:  ·  Problem: Sepsis with acute hypoxic respiratory failure.   ·  Plan: -p/w fever, HR >90, lactate 2.6  -trend lactate    -received 1L bolus in ED  -Bcx negative  -Ucx + ESBL EOLI f/u blood cultures, urine cultures  -tylenol prn  -Unasyn started 7/2, changed to Meropenem 7/7 for ESBL in urine  -ID Dr. Gillespie.     Problem/Plan - 2:  ·  Problem: Pneumonia due to COVID-19 virus.   ·  Plan: - no indication for decadron/remdesivir  -CXR as above, infiltrate b/l lower lobes, pleural effusion R>L  -s/p intubation and extubation 7/6.    -saturating well on NC 2L  -MRSA PCR +, treated with Chlorhexadine and mupirocin  -c/w chest PT/vest  -c/w BB inhaler, Symbicort.   -CT with large pleural effusion on Right Lung as above - plan for bronchoscopy and chest tube placement tomorrow  - NPO after midnight for procedure.     Problem/Plan - 3:  ·  Problem: Pleural effusion.   ·  Plan: -on admission, noted Pleural effusion large on Right > Left   -pulmonary following.  -Chest PT daily  -on 2L NC saturating >90%  -CT Chest as above  -continue symobicort  -continue albuterol  -Covid Negative x2 7/9 and 7/10.  -For chest tube placement
Place chest tube on water seal  Repeat CXR daily   Follow up chest tube output and pleural fluid studies   Cont. Antibiotics  Aspiration precaution  PT follow up   Bronchodilators  Wife at bedside
Problem/Plan - 1:  ·  Problem: Sepsis with acute hypoxic respiratory failure.   ·  Plan: -p/w fever, HR >90, lactate 2.6  -trend lactate    -received 1L bolus in ED  -Bcx negative  -Ucx + ESBL EOLI f/u blood cultures, urine cultures  -tylenol prn  -Unasyn started 7/2, changed to Meropenem 7/7 for ESBL in urine  -ID Dr. Gillespie.     Problem/Plan - 2:  ·  Problem: Pneumonia due to COVID-19 virus.   ·  Plan: - no indication for decadron/remdesivir  -CXR as above, infiltrate b/l lower lobes, pleural effusion R>L  -s/p intubation and extubation 7/6.    -saturating well on NC 2L  -MRSA PCR +, treated with Chlorhexadine and mupirocin  -c/w chest PT/vest  -c/w BB inhaler, Symbicort.   -f/u CT chest for re-evaluate pleural effusion.     Problem/Plan - 3:  ·  Problem: Pleural effusion.   ·  Plan: -on admission, noted Pleural effusion large on Right > Left   -pulmonary following.  -Chest PT daily  -on 2L NC saturating >90%  -f/u CT chest done 7/9 , pending results  -continue symobicort  -continue albuterol  -repeat covid test, surveillance, sent 7/9/22.     Problem/Plan - 4:  ·  Problem: UTI due to extended-spectrum beta lactamase (ESBL) producing Escherichia coli.   ·  Plan: -Ucx resulted ESBL ecoli and enterococcus   -Sensitive to Meropenem, continue antibiotics  -ID Dr. Gillespie, appreciate recs.     Problem/Plan - 5:  ·  Problem: Chronic atrial fibrillation.   ·  Plan: -CHADSVASC: 5  -Eliquis on hold from 7/4 - 7/8; Restarted 7/9 am, 5mg BID  -on bisoprolol for rate control - hold for now  -TTE 11/2021 with normal EF.     Problem/Plan - 6:  ·  Problem: Type 2 diabetes mellitus.   ·  Plan: -A1C 7.9  -On lantus 13 + lispro 8U TID with sliding scale at home  - Currently requiring Lantus 25 units and correctional admelog scale  -BS bet 150s and 250s  -Adjust insulin as indicated  -FS ACHS with diet or q6 while NPO  -Maintain poct 140-180 while inpatient.     Problem/Plan - 7:  ·  Problem: Electrolyte imbalance.   ·  Plan: -Resolved today, continue to monitor daily  -IVF PRN.     Problem/Plan - 8:  ·  Problem: Rectal cancer.   ·  Plan: -hx rectal adenocarcinoma, had chemo, has chemo port, not accessed  -f/u outpatient heme/onc for continued management  -Per wife, patient has not had an appointment since the stroke incident.     Problem/Plan - 9:  ·  Problem: Anemia of chronic disease.   ·  Plan: -Hb 11.5 on admission  baseline hb 9-10  -takes iron at home  -Started Ferrous Sulfate.     Problem/Plan - 10:  ·  Problem: HTN (hypertension).   ·  Plan; -on Bisoprolol 10/ HCTZ 6.25 and Amlodipine 10mg daily, on hold while inpatient  -Continue Metoprolol 25mg BID for now  -monitor BP and adjust meds as warranted.     Problem/Plan - 11:  ·  Problem: History of CVA (cerebrovascular accident).   ·  Plan: -hx CVA with right side weakness, Afib, on eliquis  -supportive care  -PT juan GARCIA

## 2022-07-19 NOTE — PROGRESS NOTE ADULT - PROBLEM SELECTOR PROBLEM 10
HTN (hypertension)
History of CVA (cerebrovascular accident)
HTN (hypertension)
Advance care planning
History of CVA (cerebrovascular accident)
Advance care planning
HTN (hypertension)
History of CVA (cerebrovascular accident)
History of CVA (cerebrovascular accident)
HTN (hypertension)

## 2022-07-19 NOTE — PROGRESS NOTE ADULT - PROBLEM SELECTOR PLAN 4
Multiple loculated effusions right chest.  CXR shows near complete atelectasis of right lung.  S/P CT guided chest tube 7/13- removed 7/19

## 2022-07-19 NOTE — PROGRESS NOTE ADULT - SUBJECTIVE AND OBJECTIVE BOX
MELIZA DAILEY    SCU progress note    INTERVAL HPI/OVERNIGHT EVENTS: *** Patient seen and examined no overnight events.     DNR [x ]   DNI  [ x ]    Covid - 19 PCR:  7/16/22 negative    The 4Ms    What Matters Most: see [facility]  Age appropriate Medications/Screen for High Risk Medication: Yes  Mentation: see CAM below  Mobility: defer to physical exam    The Confusion Assessment Method (CAM) Diagnostic Algorithm     1: Acute Onset or Fluctuating Course  - Is there evidence of an acute change in mental status from the patient’s baseline? Did the (abnormal) behavior  fluctuate during the day, that is, tend to come and go, or increase and decrease in severity?  [ ] YES [ x] NO     2: Inattention  - Did the patient have difficulty focusing attention, being easily distractible, or having difficulty keeping track of what was being said?  [ ] YES [x ] NO     3: Disorganized thinking  -Was the patient’s thinking disorganized or incoherent, such as rambling or irrelevant conversation, unclear or illogical flow of ideas, or unpredictable switching from subject to subject?  [x ] YES [ ] NO    4: Altered Level of consciousness?  [ ] YES [x ] NO    The diagnosis of delirium by CAM requires the presence of features 1 and 2 and either 3 or 4.    PRESSORS: [ ] YES [ ] NO    Cardiovascular:  Heart Failure  Acute   Acute on Chronic  Chronic       metoprolol tartrate 25 milliGRAM(s) Oral two times a day    Pulmonary:  ALBUTerol    0.083% 2.5 milliGRAM(s) Nebulizer every 6 hours PRN  ALBUTerol    90 MICROgram(s) HFA Inhaler 2 Puff(s) Inhalation every 8 hours  budesonide  80 MICROgram(s)/formoterol 4.5 MICROgram(s) Inhaler 2 Puff(s) Inhalation two times a day    Hematalogic:    Other:  chlorhexidine 2% Cloths 1 Application(s) Topical <User Schedule>  dextrose 5%. 1000 milliLiter(s) IV Continuous <Continuous>  dextrose 5%. 1000 milliLiter(s) IV Continuous <Continuous>  dextrose 50% Injectable 25 Gram(s) IV Push once  dextrose 50% Injectable 12.5 Gram(s) IV Push once  dextrose 50% Injectable 25 Gram(s) IV Push once  dextrose Oral Gel 15 Gram(s) Oral once PRN  ferrous    sulfate Liquid 300 milliGRAM(s) Oral daily  glucagon  Injectable 1 milliGRAM(s) IntraMuscular once  insulin glargine Injectable (LANTUS) 25 Unit(s) SubCutaneous at bedtime  insulin lispro (ADMELOG) corrective regimen sliding scale   SubCutaneous at bedtime  insulin lispro (ADMELOG) corrective regimen sliding scale   SubCutaneous three times a day before meals  insulin lispro Injectable (ADMELOG) 4 Unit(s) SubCutaneous three times a day before meals  morphine  - Injectable 2 milliGRAM(s) IV Push once  ondansetron Injectable 4 milliGRAM(s) IV Push every 6 hours PRN  pantoprazole   Suspension 40 milliGRAM(s) Oral daily  sodium chloride 0.9% lock flush 3 milliLiter(s) IV Push every 8 hours    ALBUTerol    0.083% 2.5 milliGRAM(s) Nebulizer every 6 hours PRN  ALBUTerol    90 MICROgram(s) HFA Inhaler 2 Puff(s) Inhalation every 8 hours  budesonide  80 MICROgram(s)/formoterol 4.5 MICROgram(s) Inhaler 2 Puff(s) Inhalation two times a day  chlorhexidine 2% Cloths 1 Application(s) Topical <User Schedule>  dextrose 5%. 1000 milliLiter(s) IV Continuous <Continuous>  dextrose 5%. 1000 milliLiter(s) IV Continuous <Continuous>  dextrose 50% Injectable 25 Gram(s) IV Push once  dextrose 50% Injectable 12.5 Gram(s) IV Push once  dextrose 50% Injectable 25 Gram(s) IV Push once  dextrose Oral Gel 15 Gram(s) Oral once PRN  ferrous    sulfate Liquid 300 milliGRAM(s) Oral daily  glucagon  Injectable 1 milliGRAM(s) IntraMuscular once  insulin glargine Injectable (LANTUS) 25 Unit(s) SubCutaneous at bedtime  insulin lispro (ADMELOG) corrective regimen sliding scale   SubCutaneous at bedtime  insulin lispro (ADMELOG) corrective regimen sliding scale   SubCutaneous three times a day before meals  insulin lispro Injectable (ADMELOG) 4 Unit(s) SubCutaneous three times a day before meals  metoprolol tartrate 25 milliGRAM(s) Oral two times a day  morphine  - Injectable 2 milliGRAM(s) IV Push once  ondansetron Injectable 4 milliGRAM(s) IV Push every 6 hours PRN  pantoprazole   Suspension 40 milliGRAM(s) Oral daily  sodium chloride 0.9% lock flush 3 milliLiter(s) IV Push every 8 hours    Drug Dosing Weight  Height (cm): 180.3 (02 Jul 2022 05:23)  Weight (kg): 107 (02 Jul 2022 09:10)  BMI (kg/m2): 32.9 (02 Jul 2022 09:10)  BSA (m2): 2.26 (02 Jul 2022 09:10)    CENTRAL LINE: [ ] YES [ ] NO  LOCATION:   DATE INSERTED:  REMOVE: [ ] YES [ ] NO  EXPLAIN:    RIBEIRO: [ ] YES [ ] NO    DATE INSERTED:  REMOVE:  [ ] YES [ ] NO  EXPLAIN:    PAST MEDICAL & SURGICAL HISTORY:  Lumbago      Lumbago with sciatica of right side      Benign hypertension  dx 10 years      H/O renal calculi  ESWL right side yrs ago  last stone one year ago  (passed on its own)      Obese      Eczema      Diabetes mellitus type II  on Meds 4/2012      Chronic atrial fibrillation      Colon cancer      S/P tonsillectomy      Port-A-Cath in place                        PHYSICAL EXAM:    GENERAL: NAD, well-groomed, well-developed  HEAD:  Atraumatic, Normocephalic  EYES: EOMI, PERRLA, conjunctiva and sclera clear  ENMT: No tonsillar erythema, exudates, or enlargement; Moist mucous membranes, Good dentition, No lesions  NECK: Supple, No JVD, Normal thyroid  NERVOUS SYSTEM:  Alert & Oriented X3, Good concentration; Motor Strength 5/5 B/L upper and lower extremities; DTRs 2+ intact and symmetric  CHEST/LUNG: Clear to percussion bilaterally; No rales, rhonchi, wheezing, or rubs  HEART: Regular rate and rhythm; No murmurs, rubs, or gallops  ABDOMEN: Soft, Nontender, Nondistended; Bowel sounds present  EXTREMITIES:  2+ Peripheral Pulses, No clubbing, cyanosis, or edema  LYMPH: No lymphadenopathy noted  SKIN: No rashes or lesions      LABS:  CBC Full  -  ( 19 Jul 2022 06:57 )  WBC Count : 8.11 K/uL  RBC Count : 3.50 M/uL  Hemoglobin : 8.4 g/dL  Hematocrit : 29.4 %  Platelet Count - Automated : 491 K/uL  Mean Cell Volume : 84.0 fl  Mean Cell Hemoglobin : 24.0 pg  Mean Cell Hemoglobin Concentration : 28.6 gm/dL  Auto Neutrophil # : x  Auto Lymphocyte # : x  Auto Monocyte # : x  Auto Eosinophil # : x  Auto Basophil # : x  Auto Neutrophil % : x  Auto Lymphocyte % : x  Auto Monocyte % : x  Auto Eosinophil % : x  Auto Basophil % : x    07-19    140  |  102  |  8   ----------------------------<  114<H>  4.1   |  33<H>  |  0.59    Ca    8.8      19 Jul 2022 06:57  Phos  3.9     07-19  Mg     2.3     07-19    TPro  6.6  /  Alb  1.4<L>  /  TBili  0.2  /  DBili  x   /  AST  11  /  ALT  18  /  AlkPhos  118  07-19              [  ]  DVT Prophylaxis  [  ]  Nutrition, Brand, Rate         Goal Rate        Abnormal Nutritional Status -  Malnutrition   Cachexia      Morbid Obesity BMI >/=40    RADIOLOGY & ADDITIONAL STUDIES:  ***    Goals of Care Discussion with Family/Proxy/Other   - see note from/family meeting set up for...     MELIZA DAILEY    SCU progress note    INTERVAL HPI/OVERNIGHT EVENTS: *** Patient seen and examined no overnight events.     DNR [x ]   DNI  [ x ]    Covid - 19 PCR:  7/16/22 negative    The 4Ms    What Matters Most: see [facility]  Age appropriate Medications/Screen for High Risk Medication: Yes  Mentation: see CAM below  Mobility: defer to physical exam    The Confusion Assessment Method (CAM) Diagnostic Algorithm     1: Acute Onset or Fluctuating Course  - Is there evidence of an acute change in mental status from the patient’s baseline? Did the (abnormal) behavior  fluctuate during the day, that is, tend to come and go, or increase and decrease in severity?  [ ] YES [ x] NO     2: Inattention  - Did the patient have difficulty focusing attention, being easily distractible, or having difficulty keeping track of what was being said?  [ ] YES [x ] NO     3: Disorganized thinking  -Was the patient’s thinking disorganized or incoherent, such as rambling or irrelevant conversation, unclear or illogical flow of ideas, or unpredictable switching from subject to subject?  [x ] YES [ ] NO    4: Altered Level of consciousness?  [ ] YES [x ] NO    The diagnosis of delirium by CAM requires the presence of features 1 and 2 and either 3 or 4.    PRESSORS: [ ] YES [ ] NO    Cardiovascular:  Heart Failure  Acute   Acute on Chronic  Chronic       metoprolol tartrate 25 milliGRAM(s) Oral two times a day    Pulmonary:  ALBUTerol    0.083% 2.5 milliGRAM(s) Nebulizer every 6 hours PRN  ALBUTerol    90 MICROgram(s) HFA Inhaler 2 Puff(s) Inhalation every 8 hours  budesonide  80 MICROgram(s)/formoterol 4.5 MICROgram(s) Inhaler 2 Puff(s) Inhalation two times a day    Hematalogic:    Other:  chlorhexidine 2% Cloths 1 Application(s) Topical <User Schedule>  dextrose 5%. 1000 milliLiter(s) IV Continuous <Continuous>  dextrose 5%. 1000 milliLiter(s) IV Continuous <Continuous>  dextrose 50% Injectable 25 Gram(s) IV Push once  dextrose 50% Injectable 12.5 Gram(s) IV Push once  dextrose 50% Injectable 25 Gram(s) IV Push once  dextrose Oral Gel 15 Gram(s) Oral once PRN  ferrous    sulfate Liquid 300 milliGRAM(s) Oral daily  glucagon  Injectable 1 milliGRAM(s) IntraMuscular once  insulin glargine Injectable (LANTUS) 25 Unit(s) SubCutaneous at bedtime  insulin lispro (ADMELOG) corrective regimen sliding scale   SubCutaneous at bedtime  insulin lispro (ADMELOG) corrective regimen sliding scale   SubCutaneous three times a day before meals  insulin lispro Injectable (ADMELOG) 4 Unit(s) SubCutaneous three times a day before meals  morphine  - Injectable 2 milliGRAM(s) IV Push once  ondansetron Injectable 4 milliGRAM(s) IV Push every 6 hours PRN  pantoprazole   Suspension 40 milliGRAM(s) Oral daily  sodium chloride 0.9% lock flush 3 milliLiter(s) IV Push every 8 hours    ALBUTerol    0.083% 2.5 milliGRAM(s) Nebulizer every 6 hours PRN  ALBUTerol    90 MICROgram(s) HFA Inhaler 2 Puff(s) Inhalation every 8 hours  budesonide  80 MICROgram(s)/formoterol 4.5 MICROgram(s) Inhaler 2 Puff(s) Inhalation two times a day  chlorhexidine 2% Cloths 1 Application(s) Topical <User Schedule>  dextrose 5%. 1000 milliLiter(s) IV Continuous <Continuous>  dextrose 5%. 1000 milliLiter(s) IV Continuous <Continuous>  dextrose 50% Injectable 25 Gram(s) IV Push once  dextrose 50% Injectable 12.5 Gram(s) IV Push once  dextrose 50% Injectable 25 Gram(s) IV Push once  dextrose Oral Gel 15 Gram(s) Oral once PRN  ferrous    sulfate Liquid 300 milliGRAM(s) Oral daily  glucagon  Injectable 1 milliGRAM(s) IntraMuscular once  insulin glargine Injectable (LANTUS) 25 Unit(s) SubCutaneous at bedtime  insulin lispro (ADMELOG) corrective regimen sliding scale   SubCutaneous at bedtime  insulin lispro (ADMELOG) corrective regimen sliding scale   SubCutaneous three times a day before meals  insulin lispro Injectable (ADMELOG) 4 Unit(s) SubCutaneous three times a day before meals  metoprolol tartrate 25 milliGRAM(s) Oral two times a day  morphine  - Injectable 2 milliGRAM(s) IV Push once  ondansetron Injectable 4 milliGRAM(s) IV Push every 6 hours PRN  pantoprazole   Suspension 40 milliGRAM(s) Oral daily  sodium chloride 0.9% lock flush 3 milliLiter(s) IV Push every 8 hours    Drug Dosing Weight  Height (cm): 180.3 (02 Jul 2022 05:23)  Weight (kg): 107 (02 Jul 2022 09:10)  BMI (kg/m2): 32.9 (02 Jul 2022 09:10)  BSA (m2): 2.26 (02 Jul 2022 09:10)    CENTRAL LINE: [ ] YES [ ] NO  LOCATION:   DATE INSERTED:  REMOVE: [ ] YES [ ] NO  EXPLAIN:    RIBEIRO: [ ] YES [ ] NO    DATE INSERTED:  REMOVE:  [ ] YES [ ] NO  EXPLAIN:    PAST MEDICAL & SURGICAL HISTORY:  Lumbago      Lumbago with sciatica of right side      Benign hypertension  dx 10 years      H/O renal calculi  ESWL right side yrs ago  last stone one year ago  (passed on its own)      Obese      Eczema      Diabetes mellitus type II  on Meds 4/2012      Chronic atrial fibrillation      Colon cancer      S/P tonsillectomy      Port-A-Cath in place                          PHYSICAL EXAM:  GENERAL: NAD  HEAD:  Atraumatic, Normocephalic  EYES: EOMI, PERRLA, conjunctiva and sclera clear  ENMT: No tonsillar erythema, exudates, or enlargement; Moist mucous membranes, Good dentition, No lesions  NECK: Supple, No JVD, Normal thyroid  NERVOUS SYSTEM:  Alert & Oriented X3, Good concentration; Generalized weakness  CHEST/LUNG: Diminished right upper and lower chest.   HEART: Regular rate and rhythm; No murmurs, rubs, or gallops  ABDOMEN: Soft, Nontender, Nondistended; Bowel sounds present  EXTREMITIES:  2+ Peripheral Pulses, No clubbing, cyanosis, or edema  LYMPH: No lymphadenopathy noted  SKIN: No rashes or lesions;       LABS:  CBC Full  -  ( 19 Jul 2022 06:57 )  WBC Count : 8.11 K/uL  RBC Count : 3.50 M/uL  Hemoglobin : 8.4 g/dL  Hematocrit : 29.4 %  Platelet Count - Automated : 491 K/uL  Mean Cell Volume : 84.0 fl  Mean Cell Hemoglobin : 24.0 pg  Mean Cell Hemoglobin Concentration : 28.6 gm/dL  Auto Neutrophil # : x  Auto Lymphocyte # : x  Auto Monocyte # : x  Auto Eosinophil # : x  Auto Basophil # : x  Auto Neutrophil % : x  Auto Lymphocyte % : x  Auto Monocyte % : x  Auto Eosinophil % : x  Auto Basophil % : x    07-19    140  |  102  |  8   ----------------------------<  114<H>  4.1   |  33<H>  |  0.59    Ca    8.8      19 Jul 2022 06:57  Phos  3.9     07-19  Mg     2.3     07-19    TPro  6.6  /  Alb  1.4<L>  /  TBili  0.2  /  DBili  x   /  AST  11  /  ALT  18  /  AlkPhos  118  07-19      [  ]  DVT Prophylaxis  [  ]  Nutrition, Brand, Rate         Goal Rate        Abnormal Nutritional Status -  Malnutrition   Cachexia      Morbid Obesity BMI >/=40    RADIOLOGY & ADDITIONAL STUDIES:  ***  < from: Xray Chest 1 View- PORTABLE-Routine (Xray Chest 1 View- PORTABLE-Routine in AM.) (07.17.22 @ 13:15) >  INTERPRETATION:  INDICATION: 67-year-old male patient with right chest   tube    COMPARISON: 7/16/2022    FINDINGS:  Right jugular central venous catheter in projection of the cavoatrial   junction.  Right chest tube in unchanged location projecting over the right lateral   mid chest cavity.  Heart/Vascular: Normal-sized heart.  Pulmonary: Persistent total opacification of the right chest cavity   likely from large right pleural effusion with total right lung   atelectasis and no significant change or improvement compared to prior   study 7/16/2022. Unchanged mild congestion. No pneumothorax. No   mediastinal shift.  Bones: Withinnormal limits.    Impression:  No significant change compared to 7/16/2022 and 7/14/2022. Persistent   large right effusion with atelectasis of the right lung.        --- End of Report ---    < end of copied text >    Goals of Care Discussion with Family/Proxy/Other   - see note from  7/15/22

## 2022-07-19 NOTE — PROGRESS NOTE ADULT - REASON FOR ADMISSION
AHRF, Sepsis

## 2022-07-19 NOTE — PROGRESS NOTE ADULT - PROBLEM SELECTOR PROBLEM 2
Pneumonia due to COVID-19 virus

## 2022-07-19 NOTE — PROGRESS NOTE ADULT - PROVIDER SPECIALTY LIST ADULT
Critical Care
Intervent Radiology
Critical Care
Intervent Radiology
Critical Care
Critical Care
Infectious Disease
Critical Care

## 2022-07-19 NOTE — PROGRESS NOTE ADULT - PROBLEM SELECTOR PROBLEM 6
Type 2 diabetes mellitus
Hypoalbuminemia
HTN (hypertension)
HTN (hypertension)
Type 2 diabetes mellitus
Type 2 diabetes mellitus
HTN (hypertension)
HTN (hypertension)
Hypoalbuminemia
Hypoalbuminemia
Type 2 diabetes mellitus
Type 2 diabetes mellitus

## 2022-07-19 NOTE — PROGRESS NOTE ADULT - PROBLEM SELECTOR PROBLEM 4
UTI due to extended-spectrum beta lactamase (ESBL) producing Escherichia coli
Pleural effusion
UTI due to extended-spectrum beta lactamase (ESBL) producing Escherichia coli
Pleural effusion
Pleural effusion
UTI due to extended-spectrum beta lactamase (ESBL) producing Escherichia coli
UTI due to extended-spectrum beta lactamase (ESBL) producing Escherichia coli
Pleural effusion
UTI due to extended-spectrum beta lactamase (ESBL) producing Escherichia coli

## 2022-07-19 NOTE — DISCHARGE NOTE NURSING/CASE MANAGEMENT/SOCIAL WORK - PATIENT PORTAL LINK FT
You can access the FollowMyHealth Patient Portal offered by [facility] by registering at the following website: [email or web address]. By joining [facility]’s FollowMyHealth portal, you will also be able to view your health information using other applications (apps) compatible with our system.

## 2022-07-19 NOTE — PROGRESS NOTE ADULT - PROBLEM SELECTOR PLAN 2
No indication for decadron/remdesivir  CXR as above, infiltrate b/l lower lobes, pleural effusion R>L  s/p intubation and extubation 7/6.    saturating well on NC 2L  MRSA PCR +, treated with Chlorhexidine and mupirocin  Continue with bronchodilators and ICS

## 2022-07-19 NOTE — DISCHARGE NOTE NURSING/CASE MANAGEMENT/SOCIAL WORK - NSDPLANG ASIS_GEN_ALL_CORE
HPI: per patient questionnaire  Exam: not applicable   Diagnoses and all orders for this visit:    Dermatitis  -     fluocinonide (LIDEX) 0.05 % ointment; Apply topically 2 times daily. Cold pack, benadryl prn. The patient was advised to call or return to clinic prn if these symptoms worsen or fail to improve as anticipated. No

## 2022-07-19 NOTE — PROGRESS NOTE ADULT - PROBLEM SELECTOR PROBLEM 9
Anemia of chronic disease
HTN (hypertension)
Anemia of chronic disease
Anemia of chronic disease
Type 2 diabetes mellitus
Anemia of chronic disease
HTN (hypertension)
Type 2 diabetes mellitus

## 2022-07-19 NOTE — PROGRESS NOTE ADULT - PROBLEM SELECTOR PROBLEM 7
Electrolyte imbalance
Rectal cancer
Type 2 diabetes mellitus
Electrolyte imbalance
Rectal cancer
Rectal cancer
Electrolyte imbalance
Rectal cancer
Rectal cancer
Type 2 diabetes mellitus
Type 2 diabetes mellitus
Rectal cancer

## 2022-07-19 NOTE — PROGRESS NOTE ADULT - PROBLEM SELECTOR PROBLEM 8
Rectal cancer
Rectal cancer
Anemia of chronic disease
Anemia of chronic disease
Rectal cancer
Anemia of chronic disease
Anemia of chronic disease
Rectal cancer
Anemia of chronic disease
Rectal cancer
Anemia of chronic disease
Rectal cancer

## 2022-07-19 NOTE — PROGRESS NOTE ADULT - PROBLEM SELECTOR PLAN 5
rate controlled.   CHADSVASC : 5  Continue Metoprolol 25mg BID  restart Eliquis   Patient with history of life threatening bleeding with Lovenox- DO NOT GIVE LOVENOX per family/patient.  TTE 11/2021 with normal EF.

## 2022-07-19 NOTE — PROGRESS NOTE ADULT - PROBLEM SELECTOR PROBLEM 3
Pleural effusion
UTI due to extended-spectrum beta lactamase (ESBL) producing Escherichia coli
Pleural effusion
UTI due to extended-spectrum beta lactamase (ESBL) producing Escherichia coli
Pleural effusion
UTI due to extended-spectrum beta lactamase (ESBL) producing Escherichia coli
Pleural effusion
UTI due to extended-spectrum beta lactamase (ESBL) producing Escherichia coli

## 2022-07-19 NOTE — PROGRESS NOTE ADULT - PROBLEM SELECTOR PROBLEM 12
Advance care planning
Prophylactic measure
Advance care planning
Discharge planning issues

## 2022-07-19 NOTE — DISCHARGE NOTE NURSING/CASE MANAGEMENT/SOCIAL WORK - NSDCPEFALRISK_GEN_ALL_CORE
For information on Fall & Injury Prevention, visit: [email or web address] OR  [email or web address] OR  https://www.cdc.gov/steadi/patient.html

## 2022-07-28 NOTE — ASU DISCHARGE PLAN (ADULT/PEDIATRIC) - HISTORY OF COVID-19 VACCINATION
[address] [place] [address] AND SPORTS MEDICINE  [address] [place] [address]  Dept: [phone]  Dept Fax: [phone]          Right Shoulder Visit - Follow up    Subjective:     Chief Complaint   Patient presents with    Shoulder Pain     R Shoulder ( 11/24/21)     HPI:     Reynaldo Sylvester presents today for Right shoulder pain. Patient is here today for cortisone injections into Right shoulder. He had his last cortisone injection was on 11/24/2021 by Fouzia Robin PA-C. He had great relief until just about a month ago. He has been having much more difficulty and his glioblastoma is growing per his caretaker. I have reviewed the CC, and if not present in this note, I have reviewed in the patient's chart. I agree with the documentation provided by other staff and have reviewed their documentation prior to providing my signature indicating agreement. Vitals:   BP (!) 150/101   Pulse 64   Resp 12   Ht 5' 11\" (1.803 m)   Wt 125 lb (56.7 kg)   BMI 17.43 kg/m²  Body mass index is 17.43 kg/m². Assessment:     1. Rotator cuff tendonitis, right        Procedure:   Procedure: Yes    Subacromial Bursa Injection    Location: Right Shoulder  Procedure: I discussed in detail the risks, benefits and complications of an injection which included but are not limited to infection, skin reactions, hot swollen joint and anaphylaxis with the patient. The patient verbalized understanding and gave informed consent for the injection. The skin was prepped with betadine in a sterile fashion. Under these sterile conditions, a [employer] ITC ultrasound unit with a variable frequency linear transducer was used for precise placement of a 22-gauge needle into the subacromial bursa.  A clean technique was utilized using sterile gloves and after prepping the patient under the stated sterile conditions, the patient was placed in the Seated position on the exam table. The posterior soft spot approximately 2 cm distal and 2 cm medial to the posterior acromial edge was identified and marked and a 3 cc solution containing 2 cc of 1% Lidocainewith 1 cc containing 40 mg of Kenalog was injected into the subacromial space with the 22-gauge needle. The needled was withdrawn and the injection site was cleansed. A Band-Aid was placed over the injection site. There was no resistance to the injection and the patient tolerated the procedure well without difficulty. Adverse reactions of the injection was discussed with the patient including signs of infection, increasing pain, redness, swelling, warmth, fever, chills and the patient was instructed to call immediately with any of these symptoms. Successful needle placement was achieved and final images were taken and saved in the patient's chart for the permanent record. The images are stored on SD card in the machine until downloaded to the patient's chart. Plan:   Patient should return to the clinic in as needed to follow up with Consuelo Mclain PA-C/Fouzia Robin PA-C. The patient will call the office immediately with any problems. Orders Placed This Encounter   Medications    triamcinolone acetonide (KENALOG-40) injection 40 mg    lidocaine 1 % injection 2 mL       No orders of the defined types were placed in this encounter.           Electronically signed by Fabien Howard PA-C, on 7/28/2022 at 12:58 PM Yes

## 2022-08-13 LAB
CULTURE RESULTS: SIGNIFICANT CHANGE UP
SPECIMEN SOURCE: SIGNIFICANT CHANGE UP

## 2023-04-26 NOTE — PHYSICAL THERAPY INITIAL EVALUATION ADULT - RISK REDUCTION/PREVENTION, PT EVAL
risk factors Advancement Flap (Single) Text: Given the location of the defect and the proximity to free margins a single advancement flap was deemed most appropriate.  Using a sterile surgical marker, an appropriate advancement flap was drawn incorporating the defect and placing the expected incisions within the relaxed skin tension lines where possible.    The area thus outlined was incised deep to adipose tissue with a #[id number] scalpel blade.  The skin margins were undermined to an appropriate distance in all directions utilizing iris scissors.

## 2024-02-05 NOTE — PROGRESS NOTE ADULT - PROBLEM SELECTOR PLAN 1
[Preoperative Visit] : for a medical evaluation prior to surgery [Scheduled Procedure ___] : a [unfilled] [Date of Surgery ___] : on [unfilled] [Fever] : no fever [Chills] : no chills [Fatigue] : no fatigue [Chest Pain] : no chest pain [Cough] : no cough [Dyspnea] : no dyspnea [Dysuria] : no dysuria [Urinary Frequency] : no urinary frequency [Nausea] : no nausea [Vomiting] : no vomiting [Diarrhea] : no diarrhea [Abdominal Pain] : no abdominal pain [Easy Bruising] : no easy bruising [Lower Extremity Swelling] : no lower extremity swelling [Poor Exercise Tolerance] : no poor exercise tolerance [Diabetes] : no diabetes [Cardiovascular Disease] : no cardiovascular disease [Pulmonary Disease] : no pulmonary disease [Anti-Platelet Agents] : no anti-platelet agents [Nicotine Dependence] : no nicotine dependence [Alcohol Use] : no  alcohol use [Renal Disease] : no renal disease [GI Disease] : no gastrointestinal disease [Sleep Apnea] : no sleep apnea +Fever and elevated lactate upon admission.  Given 1L bolus in ED.  Bcx negative  Ucx +ESBL   Unasyn started 7/2, changed to Meropenem 7/7 for ESBL in urine  -ID Dr. Gillespie. [Thromboembolic Problems] : no thromboembolic problems [Frequent use of NSAIDs] : no use of NSAIDs [Transfusion Reaction] : no transfusion reaction [Impaired Immunity] : no impaired immunity [Frequent Aspirin Use] : no frequent aspirin use [Prior Anesthesia] : Prior anesthesia [Prev Anesthesia Reaction] : no previous anesthesia reaction [Electrocardiogram] : ~T an ECG ~C was performed [Echocardiogram] : ~T an echocardiogram ~C was performed [Good] : Good [Anesthesia Reaction] : no anesthesia reaction [Sudden Death] : no sudden death [Clotting Disorder] : no clotting disorder [Bleeding Disorder] : no bleeding disorder [FreeTextEntry1] : Blood pressure looks better on follow up. NO chest pain noted. Echo completed.

## 2024-04-23 NOTE — GOALS OF CARE CONVERSATION - ADVANCED CARE PLANNING - CONVERSATION/DISCUSSION
My signature below certifies that the above stated patient is homebound and upon completion of the Face-To-Face encounter, has the need for intermittent skilled nursing, physical therapy and/or speech or occupational therapy services in their home for their current diagnosis as outlined in their initial plan of care. These services will continue to be monitored by myself or another physician. Diagnosis/Prognosis/MOLST Discussed/Treatment Options/Hospice Referral

## 2024-05-08 NOTE — HISTORY OF PRESENT ILLNESS
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
grossly 3/5 b/l UE and LE extremities/grossly assessed due to

## 2024-08-30 NOTE — ED ADULT NURSE NOTE - NSSUHOSCREENINGYN_ED_ALL_ED
-- DO NOT REPLY / DO NOT REPLY ALL --  -- This inbox is not monitored. If this was sent to the wrong provider or department, reroute message to  [employer]. --  -- Message is from Engagement Center Operations (ECO) --      Message Type:  Refill Medication   Refill request for Pended medication named: yes, need authorization and physician to call it in at [phone]  Preferred pharmacy verified, and selected.   [facility], [place] [facility]    Is the patient OUT of Medication?  Yes and Medication Refills handled by Practice Site        Message: Patient is aware that Dr Martins is out of office until next week and hoping that another physician can call it in.[phone]                    No - the patient is unable to be screened due to medical condition

## 2024-10-21 NOTE — DISCHARGE NOTE NURSING/CASE MANAGEMENT/SOCIAL WORK - HAS THE PATIENT USED TOBACCO IN THE PAST 30 DAYS?
Unable to assess due to patient's cognitive impairment
Class II - visualization of the soft palate, fauces, and uvula

## 2025-02-20 NOTE — ED ADULT NURSE NOTE - DATE OF LAST VACCINATION
Keep affected area elevated as much as possible.    Apply ice pack locally.    Alternate Tylenol with Motrin every four hours as needed     follow up with your doctor in 2 - 3 days and as needed.    
03-Nov-2021

## 2025-05-01 NOTE — ED PROVIDER NOTE - WR ORDER ID 1
gabapentin (NEURONTIN) 400 MG capsule       Last Written Prescription Date:  2/6/25  Last Fill Quantity: 90,   # refills: 1  Last Office Visit: 3/28/25  Future Office visit:    Next 5 appointments (look out 90 days)      May 14, 2025 1:30 PM  (Arrive by 1:15 PM)  Return Visit with Kodi Garcia DO  [facility] ([facility] [place] ) [address]  [place] [address]  [phone]     Jun 30, 2025 11:30 AM  (Arrive by 11:15 AM)  Adult Preventative Visit with Paulette Sherwood MD  [facility] ([facility] [place] ) [address]  [place] [address]  [phone]             Routing refill request to provider for review/approval because:  Drug not on the G, P or [facility] refill protocol or controlled substance    
[id number]

## 2025-07-12 NOTE — REASON FOR VISIT
Pt arrived to endoscopy for flexible sigmoidoscopy.  Just prior to procedure start, we were informed from surgery to not proceed with flex sig at this time.  More discussion needed between providers before any endoscopy procedure.  Procedure cancelled at this time and pt to return to ED.  RN in ED notified.     [Follow-Up Visit] : a follow-up [FreeTextEntry2] : rectal CA